# Patient Record
Sex: FEMALE | Race: WHITE | NOT HISPANIC OR LATINO | URBAN - METROPOLITAN AREA
[De-identification: names, ages, dates, MRNs, and addresses within clinical notes are randomized per-mention and may not be internally consistent; named-entity substitution may affect disease eponyms.]

---

## 2024-04-30 ENCOUNTER — OFFICE VISIT (OUTPATIENT)
Dept: PRIMARY CARE | Facility: CLINIC | Age: 59
End: 2024-04-30
Payer: MEDICARE

## 2024-04-30 ENCOUNTER — LAB (OUTPATIENT)
Dept: LAB | Facility: LAB | Age: 59
End: 2024-04-30
Payer: MEDICARE

## 2024-04-30 VITALS
WEIGHT: 199.4 LBS | BODY MASS INDEX: 36.7 KG/M2 | DIASTOLIC BLOOD PRESSURE: 78 MMHG | HEART RATE: 86 BPM | OXYGEN SATURATION: 91 % | SYSTOLIC BLOOD PRESSURE: 128 MMHG | HEIGHT: 62 IN

## 2024-04-30 DIAGNOSIS — F41.9 ANXIETY: ICD-10-CM

## 2024-04-30 DIAGNOSIS — F17.210 CIGARETTE NICOTINE DEPENDENCE WITHOUT COMPLICATION: ICD-10-CM

## 2024-04-30 DIAGNOSIS — Z00.00 WELLNESS EXAMINATION: ICD-10-CM

## 2024-04-30 DIAGNOSIS — J44.9 CHRONIC OBSTRUCTIVE PULMONARY DISEASE, UNSPECIFIED COPD TYPE (MULTI): ICD-10-CM

## 2024-04-30 DIAGNOSIS — E55.9 VITAMIN D DEFICIENCY: ICD-10-CM

## 2024-04-30 DIAGNOSIS — E11.9 TYPE 2 DIABETES MELLITUS WITHOUT COMPLICATION, WITHOUT LONG-TERM CURRENT USE OF INSULIN (MULTI): ICD-10-CM

## 2024-04-30 DIAGNOSIS — G47.00 INSOMNIA, UNSPECIFIED TYPE: ICD-10-CM

## 2024-04-30 DIAGNOSIS — Z12.11 SCREENING FOR COLON CANCER: ICD-10-CM

## 2024-04-30 DIAGNOSIS — E78.5 HYPERLIPIDEMIA, UNSPECIFIED HYPERLIPIDEMIA TYPE: ICD-10-CM

## 2024-04-30 DIAGNOSIS — K21.9 GASTROESOPHAGEAL REFLUX DISEASE, UNSPECIFIED WHETHER ESOPHAGITIS PRESENT: ICD-10-CM

## 2024-04-30 DIAGNOSIS — E11.9 TYPE 2 DIABETES MELLITUS WITHOUT COMPLICATION, WITHOUT LONG-TERM CURRENT USE OF INSULIN (MULTI): Primary | ICD-10-CM

## 2024-04-30 LAB
25(OH)D3 SERPL-MCNC: 19 NG/ML (ref 30–100)
ALBUMIN SERPL BCP-MCNC: 4.2 G/DL (ref 3.4–5)
ALP SERPL-CCNC: 61 U/L (ref 33–110)
ALT SERPL W P-5'-P-CCNC: 14 U/L (ref 7–45)
ANION GAP SERPL CALC-SCNC: 14 MMOL/L (ref 10–20)
AST SERPL W P-5'-P-CCNC: 11 U/L (ref 9–39)
BILIRUB SERPL-MCNC: 0.2 MG/DL (ref 0–1.2)
BUN SERPL-MCNC: 14 MG/DL (ref 6–23)
CALCIUM SERPL-MCNC: 9.4 MG/DL (ref 8.6–10.6)
CHLORIDE SERPL-SCNC: 103 MMOL/L (ref 98–107)
CHOLEST SERPL-MCNC: 148 MG/DL (ref 0–199)
CHOLESTEROL/HDL RATIO: 2.4
CO2 SERPL-SCNC: 29 MMOL/L (ref 21–32)
CREAT SERPL-MCNC: 0.79 MG/DL (ref 0.5–1.05)
CREAT UR-MCNC: 179.8 MG/DL (ref 20–320)
EGFRCR SERPLBLD CKD-EPI 2021: 86 ML/MIN/1.73M*2
ERYTHROCYTE [DISTWIDTH] IN BLOOD BY AUTOMATED COUNT: 14.6 % (ref 11.5–14.5)
EST. AVERAGE GLUCOSE BLD GHB EST-MCNC: 137 MG/DL
GLUCOSE SERPL-MCNC: 99 MG/DL (ref 74–99)
HBA1C MFR BLD: 6.4 %
HCT VFR BLD AUTO: 43.5 % (ref 36–46)
HDLC SERPL-MCNC: 61.2 MG/DL
HGB BLD-MCNC: 13.9 G/DL (ref 12–16)
LDLC SERPL CALC-MCNC: 64 MG/DL
MCH RBC QN AUTO: 27.3 PG (ref 26–34)
MCHC RBC AUTO-ENTMCNC: 32 G/DL (ref 32–36)
MCV RBC AUTO: 85 FL (ref 80–100)
MICROALBUMIN UR-MCNC: 8.9 MG/L
MICROALBUMIN/CREAT UR: 4.9 UG/MG CREAT
NON HDL CHOLESTEROL: 87 MG/DL (ref 0–149)
NRBC BLD-RTO: 0 /100 WBCS (ref 0–0)
PLATELET # BLD AUTO: 342 X10*3/UL (ref 150–450)
POTASSIUM SERPL-SCNC: 4.7 MMOL/L (ref 3.5–5.3)
PROT SERPL-MCNC: 6.5 G/DL (ref 6.4–8.2)
RBC # BLD AUTO: 5.1 X10*6/UL (ref 4–5.2)
SODIUM SERPL-SCNC: 141 MMOL/L (ref 136–145)
T4 FREE SERPL-MCNC: 1.11 NG/DL (ref 0.78–1.48)
TRIGL SERPL-MCNC: 114 MG/DL (ref 0–149)
TSH SERPL-ACNC: 5.01 MIU/L (ref 0.44–3.98)
VLDL: 23 MG/DL (ref 0–40)
WBC # BLD AUTO: 8.1 X10*3/UL (ref 4.4–11.3)

## 2024-04-30 PROCEDURE — 90715 TDAP VACCINE 7 YRS/> IM: CPT | Performed by: STUDENT IN AN ORGANIZED HEALTH CARE EDUCATION/TRAINING PROGRAM

## 2024-04-30 PROCEDURE — 3078F DIAST BP <80 MM HG: CPT | Performed by: STUDENT IN AN ORGANIZED HEALTH CARE EDUCATION/TRAINING PROGRAM

## 2024-04-30 PROCEDURE — 80053 COMPREHEN METABOLIC PANEL: CPT

## 2024-04-30 PROCEDURE — 82043 UR ALBUMIN QUANTITATIVE: CPT

## 2024-04-30 PROCEDURE — 80061 LIPID PANEL: CPT

## 2024-04-30 PROCEDURE — 82570 ASSAY OF URINE CREATININE: CPT

## 2024-04-30 PROCEDURE — 36415 COLL VENOUS BLD VENIPUNCTURE: CPT

## 2024-04-30 PROCEDURE — 99396 PREV VISIT EST AGE 40-64: CPT | Performed by: STUDENT IN AN ORGANIZED HEALTH CARE EDUCATION/TRAINING PROGRAM

## 2024-04-30 PROCEDURE — 90471 IMMUNIZATION ADMIN: CPT | Performed by: STUDENT IN AN ORGANIZED HEALTH CARE EDUCATION/TRAINING PROGRAM

## 2024-04-30 PROCEDURE — 3074F SYST BP LT 130 MM HG: CPT | Performed by: STUDENT IN AN ORGANIZED HEALTH CARE EDUCATION/TRAINING PROGRAM

## 2024-04-30 PROCEDURE — 85027 COMPLETE CBC AUTOMATED: CPT

## 2024-04-30 PROCEDURE — 1036F TOBACCO NON-USER: CPT | Performed by: STUDENT IN AN ORGANIZED HEALTH CARE EDUCATION/TRAINING PROGRAM

## 2024-04-30 PROCEDURE — 83036 HEMOGLOBIN GLYCOSYLATED A1C: CPT

## 2024-04-30 PROCEDURE — 82306 VITAMIN D 25 HYDROXY: CPT

## 2024-04-30 PROCEDURE — 84439 ASSAY OF FREE THYROXINE: CPT

## 2024-04-30 PROCEDURE — 99204 OFFICE O/P NEW MOD 45 MIN: CPT | Performed by: STUDENT IN AN ORGANIZED HEALTH CARE EDUCATION/TRAINING PROGRAM

## 2024-04-30 PROCEDURE — 84443 ASSAY THYROID STIM HORMONE: CPT

## 2024-04-30 RX ORDER — ROSUVASTATIN CALCIUM 10 MG/1
10 TABLET, COATED ORAL
COMMUNITY
Start: 2023-11-03 | End: 2024-04-30 | Stop reason: SDUPTHER

## 2024-04-30 RX ORDER — METFORMIN HYDROCHLORIDE 500 MG/1
500 TABLET, EXTENDED RELEASE ORAL 2 TIMES DAILY
Qty: 180 TABLET | Refills: 3 | Status: SHIPPED | OUTPATIENT
Start: 2024-04-30 | End: 2025-04-30

## 2024-04-30 RX ORDER — METFORMIN HYDROCHLORIDE 500 MG/1
500 TABLET, EXTENDED RELEASE ORAL 2 TIMES DAILY
COMMUNITY
End: 2024-04-30 | Stop reason: SDUPTHER

## 2024-04-30 RX ORDER — TRAZODONE HYDROCHLORIDE 50 MG/1
50 TABLET ORAL NIGHTLY
COMMUNITY
Start: 2024-04-18

## 2024-04-30 RX ORDER — BUPROPION HYDROCHLORIDE 75 MG/1
75 TABLET ORAL 3 TIMES DAILY
COMMUNITY
Start: 2024-04-18

## 2024-04-30 RX ORDER — ALPRAZOLAM 1 MG/1
1 TABLET ORAL 2 TIMES DAILY
COMMUNITY
Start: 2013-06-03

## 2024-04-30 RX ORDER — OMEPRAZOLE 40 MG/1
40 CAPSULE, DELAYED RELEASE ORAL
COMMUNITY
Start: 2023-11-03 | End: 2024-04-30 | Stop reason: SDUPTHER

## 2024-04-30 RX ORDER — ROSUVASTATIN CALCIUM 10 MG/1
10 TABLET, COATED ORAL
Qty: 90 TABLET | Refills: 3 | Status: SHIPPED | OUTPATIENT
Start: 2024-04-30 | End: 2025-04-30

## 2024-04-30 RX ORDER — ALBUTEROL SULFATE 90 UG/1
2 AEROSOL, METERED RESPIRATORY (INHALATION) EVERY 4 HOURS PRN
COMMUNITY
Start: 2014-01-30

## 2024-04-30 RX ORDER — CARBINOXAMINE MALEATE 4 MG/1
4 TABLET ORAL 2 TIMES DAILY
COMMUNITY
Start: 2024-04-17

## 2024-04-30 RX ORDER — OMEPRAZOLE 40 MG/1
40 CAPSULE, DELAYED RELEASE ORAL
Qty: 90 CAPSULE | Refills: 3 | Status: SHIPPED | OUTPATIENT
Start: 2024-04-30 | End: 2025-04-30

## 2024-04-30 RX ORDER — ESCITALOPRAM OXALATE 20 MG/1
20 TABLET ORAL
COMMUNITY

## 2024-04-30 RX ORDER — BUDESONIDE 0.5 MG/2ML
0.5 INHALANT ORAL
COMMUNITY
Start: 2012-11-26

## 2024-04-30 ASSESSMENT — PATIENT HEALTH QUESTIONNAIRE - PHQ9
2. FEELING DOWN, DEPRESSED OR HOPELESS: NOT AT ALL
1. LITTLE INTEREST OR PLEASURE IN DOING THINGS: NOT AT ALL
SUM OF ALL RESPONSES TO PHQ9 QUESTIONS 1 AND 2: 0

## 2024-04-30 ASSESSMENT — COLUMBIA-SUICIDE SEVERITY RATING SCALE - C-SSRS
1. IN THE PAST MONTH, HAVE YOU WISHED YOU WERE DEAD OR WISHED YOU COULD GO TO SLEEP AND NOT WAKE UP?: NO
6. HAVE YOU EVER DONE ANYTHING, STARTED TO DO ANYTHING, OR PREPARED TO DO ANYTHING TO END YOUR LIFE?: NO
2. HAVE YOU ACTUALLY HAD ANY THOUGHTS OF KILLING YOURSELF?: NO

## 2024-04-30 ASSESSMENT — PAIN SCALES - GENERAL: PAINLEVEL: 0-NO PAIN

## 2024-04-30 NOTE — PROGRESS NOTES
"Subjective   Reason for Visit: Minal Sun is an 58 y.o. female here for a Medicare Wellness visit.               HPI    Patient Care Team:  Mika Morgan MD as PCP - General (Internal Medicine)  Mika Morgan MD     Review of Systems    Objective   Vitals:  /78   Pulse 86   Ht 1.575 m (5' 2\")   Wt 90.4 kg (199 lb 6.4 oz)   SpO2 91%   BMI 36.47 kg/m²       Physical Exam    Assessment/Plan   Problem List Items Addressed This Visit    None           HPI: 58-year-old female presenting to establish care, follow-up on multiple concerns, CPE.  Doing relatively well without any significant new concerns or interval changes.    DMII  Stable, tolerates current regimen well.    HLD  Stable, tolerates current regimen well.    GERD  Stable, tolerates current regimen well.    COPD  Stable, tolerates current regimen well.  Following with allergist, no pulmonologist.    Insomnia, anxiety  Stable, tolerates current regimen well.  Following with psychiatry who manages medications.    SocHx:   - Smoking: About 40 pack years, quit 3 years ago    Denies HA, changes in vision, chest pain, SOB/ALFREDO, palpitations, N/V/D/C, dysuria/hematuria, hematochezia/melena, paresthesias, LE edema.    12 point ROS reviewed and negative other than as stated in HPI      General: Alert, oriented, pleasant, in no acute distress  HEENT:      Head: normocephalic, atraumatic;      eyes: EOMI, no scleral icterus;   Neck: soft, supple, non-tender, no masses appreciated  CV: Heart with regular rate and rhythm, normal S1/S2, no murmurs  Lungs: CTAB without wheezing, rhonchi or rales; good respiratory effort, no increased work of breathing  Abdomen: soft, non-tender, non-distended, no masses appreciated  Extremities: no edema, no cyanosis  MSK: ROM of upper and lower extremities intact; strength 5/5 upper and lower extremities  Neuro: Cranial nerves grossly intact; alert and oriented, normal gait  Psych: Appropriate mood and affect    # " HM  -CBC, CMP, Lipid panel, Vit D, TSH with reflex T4  -Vaccines:       Flu: Out of season      Shingrix: Recommended      Pneumococcal: UTD, next shot at 64      Tdap: IO  -Rajendra w/ elisabeth: 12/2023  -Last PAP: Follows with GYN  -Lung cancer screening with low-dose CT:  about 40 pack years, quit 3 years ago, screening ordered  -Colonoscopy: 2016, 5-year plan, screening ordered  -Osteoporosis screening: At 64    #DMII  -Last A1c 6.4  - Currently on metformin  mg twice daily  - Diabetic eye exam: Ophthalmology referral given  - Diabetic foot exam negative in office  -Plan to add GLP-1, [facility] pharmacist consulted to initiate  - Repeat A1c, albumin    #HLD  -Continue rosuvastatin 10 mg daily  - Repeat lipid panel    #GERD  -Continue omeprazole 40 mg daily    #COPD  -Continue Pulmicort twice daily, albuterol as needed    #Insomnia #anxiety  -Currently on Wellbutrin 75 mg 3 times daily, Lexapro 20 mg daily, alprazolam 1 mg as needed    F/U 6 months, sooner if indicated    Chris D'Amico, DO

## 2024-05-01 DIAGNOSIS — E11.9 TYPE 2 DIABETES MELLITUS WITHOUT COMPLICATION, WITHOUT LONG-TERM CURRENT USE OF INSULIN (MULTI): Primary | ICD-10-CM

## 2024-05-01 NOTE — RESULT ENCOUNTER NOTE
Hemoglobin A1c at 6.4, continue with changes discussed in office.    TSH is borderline high with normal T4, considered subclinical hypothyroidism, has been normal in past labs, will reevaluate at next blood draw, no medication at this time.    Vitamin D mildly low at 19, recommend OTC vitamin D3, 2000 units daily.    Remaining labs unremarkable.

## 2024-05-03 ENCOUNTER — APPOINTMENT (OUTPATIENT)
Dept: PHARMACY | Facility: HOSPITAL | Age: 59
End: 2024-05-03
Payer: MEDICARE

## 2024-05-06 ENCOUNTER — APPOINTMENT (OUTPATIENT)
Dept: PHARMACY | Facility: HOSPITAL | Age: 59
End: 2024-05-06
Payer: MEDICARE

## 2024-05-23 ENCOUNTER — TELEMEDICINE (OUTPATIENT)
Dept: PHARMACY | Facility: HOSPITAL | Age: 59
End: 2024-05-23
Payer: MEDICARE

## 2024-05-23 ENCOUNTER — SPECIALTY PHARMACY (OUTPATIENT)
Dept: PHARMACY | Facility: CLINIC | Age: 59
End: 2024-05-23

## 2024-05-23 DIAGNOSIS — E11.9 TYPE 2 DIABETES MELLITUS WITHOUT COMPLICATION, WITHOUT LONG-TERM CURRENT USE OF INSULIN (MULTI): Primary | ICD-10-CM

## 2024-05-23 PROCEDURE — RXMED WILLOW AMBULATORY MEDICATION CHARGE

## 2024-05-23 RX ORDER — TIRZEPATIDE 2.5 MG/.5ML
2.5 INJECTION, SOLUTION SUBCUTANEOUS
Qty: 2 ML | Refills: 2 | Status: SHIPPED | OUTPATIENT
Start: 2024-05-26

## 2024-05-23 NOTE — PROGRESS NOTES
"Pharmacist Clinic: Diabetes Management  Minal Sun is a 58 y.o. female was referred to Clinical Pharmacy Team for diabetes management.     Referring Provider: Christopher D'Amico, DO     HISTORY OF PRESENT ILLNESS  Patient was diagnosed with diabetes in June of 2023, she has a large family history of T2DM    Diet: patient reports she knows she does not eat \"the best\"   - diet is inconsistent, some days first food will not be unitl mid day, it might be a sandwhich  - patient reports her weakness is treats, she loves brownies, cookies, cake, etc.     LAB REVIEW   Glucose (mg/dL)   Date Value   04/30/2024 99   02/17/2020 102 (H)   11/05/2019 101 (H)   10/07/2017 136 (H)     Hemoglobin A1C (%)   Date Value   04/30/2024 6.4 (H)   12/13/2023 6.4 (H)   10/02/2023 6.4 (H)   06/09/2023 6.5 (H)     Bicarbonate (mmol/L)   Date Value   04/30/2024 29   02/17/2020 28   11/05/2019 30   10/07/2017 27     Urea Nitrogen (mg/dL)   Date Value   04/30/2024 14   02/17/2020 9   11/05/2019 12   10/07/2017 12     Creatinine (mg/dL)   Date Value   04/30/2024 0.79   02/17/2020 0.63   11/05/2019 0.75   10/07/2017 0.78     Lab Results   Component Value Date    HGBA1C 6.4 (H) 04/30/2024    HGBA1C 6.4 (H) 12/13/2023    HGBA1C 6.4 (H) 10/02/2023     Lab Results   Component Value Date    CHOL 148 04/30/2024    CHOL 155 11/05/2019     Lab Results   Component Value Date    HDL 61.2 04/30/2024    HDL 53.1 11/05/2019     Lab Results   Component Value Date    LDLCALC 64 04/30/2024     Lab Results   Component Value Date    TRIG 114 04/30/2024    TRIG 159 (H) 11/05/2019       DIABETES ASSESSMENT    CURRENT PHARMACOTHERAPY  - metformin  mg twice daily     SECONDARY PREVENTION  - Statin? Yes  - ACE-I/ARB? No    DISCUSSION:   Spoke with patient about her most recent A1c, it is at goal however we would like to start a medication to assist with weight loss in addition to glucose lowering   Discussed options alternative pharmacotherapy: "   Discussed with patient a once weekly injection that would also help with weight loss   Went over mounjaro 2.5 mg once weekly   Counseled patient on MOA, expectations, side effects, etc   Answered all questions and concerns     RECOMMENDATIONS/PLAN  1. Patients diabetes is stable with most recent A1c of 6.4 % (goal < 7 %).   - Continue all meds under the continuation of care with the referring provider and clinical pharmacy team.  - Initiate mounjaro 2.5 mg oncw weekly.     Clinical Pharmacist follow up: 1 week     Thank you,  Shea High, PharmD    Verbal consent to manage patient's drug therapy was obtained from the patient. They were informed they may decline to participate or withdraw from participation in pharmacy services at any time.

## 2024-05-28 ENCOUNTER — PHARMACY VISIT (OUTPATIENT)
Dept: PHARMACY | Facility: CLINIC | Age: 59
End: 2024-05-28
Payer: COMMERCIAL

## 2024-05-28 ENCOUNTER — APPOINTMENT (OUTPATIENT)
Dept: PHARMACY | Facility: HOSPITAL | Age: 59
End: 2024-05-28
Payer: MEDICARE

## 2024-05-28 ENCOUNTER — TELEMEDICINE (OUTPATIENT)
Dept: PHARMACY | Facility: HOSPITAL | Age: 59
End: 2024-05-28
Payer: MEDICARE

## 2024-05-28 DIAGNOSIS — E11.9 TYPE 2 DIABETES MELLITUS WITHOUT COMPLICATION, WITHOUT LONG-TERM CURRENT USE OF INSULIN (MULTI): Primary | ICD-10-CM

## 2024-05-28 NOTE — PROGRESS NOTES
"Pharmacist Clinic: Diabetes Management  Minal Sun is a 58 y.o. female was referred to Clinical Pharmacy Team for diabetes management.     Referring Provider: Christopher D'Amico, DO     HISTORY OF PRESENT ILLNESS  Patient was diagnosed with diabetes in June of 2023, she has a large family history of T2DM    Diet: patient reports she knows she does not eat \"the best\"   - diet is inconsistent, some days first food will not be unitl mid day, it might be a sandwhich  - patient reports her weakness is treats, she loves brownies, cookies, cake, etc.     LAB REVIEW   Glucose (mg/dL)   Date Value   04/30/2024 99   02/17/2020 102 (H)   11/05/2019 101 (H)   10/07/2017 136 (H)     Hemoglobin A1C (%)   Date Value   04/30/2024 6.4 (H)   12/13/2023 6.4 (H)   10/02/2023 6.4 (H)   06/09/2023 6.5 (H)     Bicarbonate (mmol/L)   Date Value   04/30/2024 29   02/17/2020 28   11/05/2019 30   10/07/2017 27     Urea Nitrogen (mg/dL)   Date Value   04/30/2024 14   02/17/2020 9   11/05/2019 12   10/07/2017 12     Creatinine (mg/dL)   Date Value   04/30/2024 0.79   02/17/2020 0.63   11/05/2019 0.75   10/07/2017 0.78     Lab Results   Component Value Date    HGBA1C 6.4 (H) 04/30/2024    HGBA1C 6.4 (H) 12/13/2023    HGBA1C 6.4 (H) 10/02/2023     Lab Results   Component Value Date    CHOL 148 04/30/2024    CHOL 155 11/05/2019     Lab Results   Component Value Date    HDL 61.2 04/30/2024    HDL 53.1 11/05/2019     Lab Results   Component Value Date    LDLCALC 64 04/30/2024     Lab Results   Component Value Date    TRIG 114 04/30/2024    TRIG 159 (H) 11/05/2019       DIABETES ASSESSMENT    CURRENT PHARMACOTHERAPY  - metformin  mg twice daily     SECONDARY PREVENTION  - Statin? Yes  - ACE-I/ARB? No    DISCUSSION:   Spoke with patient about her most recent A1c, it is at goal however we would like to start a medication to assist with weight loss in addition to glucose lowering   Discussed options alternative pharmacotherapy: "   Discussed with patient a once weekly injection that would also help with weight loss   Went over mounjaro 2.5 mg once weekly   Counseled patient on MOA, expectations, side effects, etc   Answered all questions and concerns     RECOMMENDATIONS/PLAN  1. Patients diabetes is stable with most recent A1c of 6.4 % (goal < 7 %).   - Continue all meds under the continuation of care with the referring provider and clinical pharmacy team.  - Initiate mounjaro 2.5 mg once weekly.     Clinical Pharmacist follow up: 2 weeks    PAP Approval: 5/25/24    Thank you,  Shea High, PharmD    Verbal consent to manage patient's drug therapy was obtained from the patient. They were informed they may decline to participate or withdraw from participation in pharmacy services at any time.

## 2024-06-11 ENCOUNTER — TELEMEDICINE (OUTPATIENT)
Dept: PHARMACY | Facility: HOSPITAL | Age: 59
End: 2024-06-11
Payer: MEDICARE

## 2024-06-11 DIAGNOSIS — E11.9 TYPE 2 DIABETES MELLITUS WITHOUT COMPLICATION, WITHOUT LONG-TERM CURRENT USE OF INSULIN (MULTI): Primary | ICD-10-CM

## 2024-06-11 NOTE — PROGRESS NOTES
"Pharmacist Clinic: Diabetes Management  Minal Sun is a 58 y.o. female was referred to Clinical Pharmacy Team for diabetes management.     Referring Provider: Christopher D'Amico, DO     HISTORY OF PRESENT ILLNESS  Patient was diagnosed with diabetes in June of 2023, she has a large family history of T2DM    Diet: patient reports she knows she does not eat \"the best\"   - diet is inconsistent, some days first food will not be unitl mid day, it might be a sandwhich  - patient reports her weakness is treats, she loves brownies, cookies, cake, etc.     LAB REVIEW   Glucose (mg/dL)   Date Value   04/30/2024 99   02/17/2020 102 (H)   11/05/2019 101 (H)   10/07/2017 136 (H)     Hemoglobin A1C (%)   Date Value   04/30/2024 6.4 (H)   12/13/2023 6.4 (H)   10/02/2023 6.4 (H)   06/09/2023 6.5 (H)     Bicarbonate (mmol/L)   Date Value   04/30/2024 29   02/17/2020 28   11/05/2019 30   10/07/2017 27     Urea Nitrogen (mg/dL)   Date Value   04/30/2024 14   02/17/2020 9   11/05/2019 12   10/07/2017 12     Creatinine (mg/dL)   Date Value   04/30/2024 0.79   02/17/2020 0.63   11/05/2019 0.75   10/07/2017 0.78     Lab Results   Component Value Date    HGBA1C 6.4 (H) 04/30/2024    HGBA1C 6.4 (H) 12/13/2023    HGBA1C 6.4 (H) 10/02/2023     Lab Results   Component Value Date    CHOL 148 04/30/2024    CHOL 155 11/05/2019     Lab Results   Component Value Date    HDL 61.2 04/30/2024    HDL 53.1 11/05/2019     Lab Results   Component Value Date    LDLCALC 64 04/30/2024     Lab Results   Component Value Date    TRIG 114 04/30/2024    TRIG 159 (H) 11/05/2019       DIABETES ASSESSMENT    CURRENT PHARMACOTHERAPY  - metformin  mg twice daily   - mounjaro 2.5 mg once weekly     SECONDARY PREVENTION  - Statin? Yes  - ACE-I/ARB? No    DISCUSSION:   Patient started mounjaro 2.5 mg:   She reports nausea but it has gotten significanlty better since starting the medication   She reports constipation, but she also has been eating " significantly less   Counseled patient on miralax use, answered all questions and concerns   Discussed we will continue on current dose, and reassess in 1 month to see if side effects have subsided     RECOMMENDATIONS/PLAN  1. Patients diabetes is stable with most recent A1c of 6.4 % (goal < 7 %).   - Continue all meds under the continuation of care with the referring provider and clinical pharmacy team.    Clinical Pharmacist follow up: 5 weeks   [facility] PAP Approval: 5/25/24    Thank you,  Shea High, PharmD    Verbal consent to manage patient's drug therapy was obtained from the patient. They were informed they may decline to participate or withdraw from participation in pharmacy services at any time.

## 2024-06-17 ENCOUNTER — PHARMACY VISIT (OUTPATIENT)
Dept: PHARMACY | Facility: CLINIC | Age: 59
End: 2024-06-17
Payer: COMMERCIAL

## 2024-06-17 PROCEDURE — RXMED WILLOW AMBULATORY MEDICATION CHARGE

## 2024-07-15 ENCOUNTER — TELEMEDICINE (OUTPATIENT)
Dept: PHARMACY | Facility: HOSPITAL | Age: 59
End: 2024-07-15
Payer: MEDICARE

## 2024-07-15 DIAGNOSIS — E11.9 TYPE 2 DIABETES MELLITUS WITHOUT COMPLICATION, WITHOUT LONG-TERM CURRENT USE OF INSULIN (MULTI): Primary | ICD-10-CM

## 2024-07-15 PROCEDURE — RXMED WILLOW AMBULATORY MEDICATION CHARGE

## 2024-07-15 RX ORDER — TIRZEPATIDE 5 MG/.5ML
5 INJECTION, SOLUTION SUBCUTANEOUS
Qty: 2 ML | Refills: 2 | Status: SHIPPED | OUTPATIENT
Start: 2024-07-21

## 2024-07-15 NOTE — PROGRESS NOTES
"Pharmacist Clinic: Diabetes Management  Minal Sun is a 58 y.o. female was referred to Clinical Pharmacy Team for diabetes management.     Referring Provider: Christopher D'Amico, DO     HISTORY OF PRESENT ILLNESS  Patient was diagnosed with diabetes in June of 2023, she has a large family history of T2DM    Diet: patient reports she knows she does not eat \"the best\"   - diet is inconsistent, some days first food will not be unitl mid day, it might be a sandwhich  - patient reports her weakness is treats, she loves brownies, cookies, cake, etc.     LAB REVIEW   Glucose (mg/dL)   Date Value   04/30/2024 99   02/17/2020 102 (H)   11/05/2019 101 (H)   10/07/2017 136 (H)     Hemoglobin A1C (%)   Date Value   04/30/2024 6.4 (H)   12/13/2023 6.4 (H)   10/02/2023 6.4 (H)   06/09/2023 6.5 (H)     Bicarbonate (mmol/L)   Date Value   04/30/2024 29   02/17/2020 28   11/05/2019 30   10/07/2017 27     Urea Nitrogen (mg/dL)   Date Value   04/30/2024 14   02/17/2020 9   11/05/2019 12   10/07/2017 12     Creatinine (mg/dL)   Date Value   04/30/2024 0.79   02/17/2020 0.63   11/05/2019 0.75   10/07/2017 0.78     Lab Results   Component Value Date    HGBA1C 6.4 (H) 04/30/2024    HGBA1C 6.4 (H) 12/13/2023    HGBA1C 6.4 (H) 10/02/2023     Lab Results   Component Value Date    CHOL 148 04/30/2024    CHOL 155 11/05/2019     Lab Results   Component Value Date    HDL 61.2 04/30/2024    HDL 53.1 11/05/2019     Lab Results   Component Value Date    LDLCALC 64 04/30/2024     Lab Results   Component Value Date    TRIG 114 04/30/2024    TRIG 159 (H) 11/05/2019       DIABETES ASSESSMENT    CURRENT PHARMACOTHERAPY  - metformin  mg twice daily   - mounjaro 2.5 mg once weekly     SECONDARY PREVENTION  - Statin? Yes  - ACE-I/ARB? No    DISCUSSION:   Patient is tolerating mounjaro 2.5 mg:   She denies any GI side effects since starting the medication  She has been focusing on healthy eating to avoid any GI side effects  Discussed " increasing the dose as patient reports her appetite has increased   Counseled patient, answered all questions and concerns     RECOMMENDATIONS/PLAN  1. Patients diabetes is stable with most recent A1c of 6.4 % (goal < 7 %).   - Continue all meds under the continuation of care with the referring provider and clinical pharmacy team.  - Increase mounjaro to 5 mg under the skin once weekly.     Clinical Pharmacist follow up: 2 weeks    PAP Approval: 5/25/24    Thank you,  Shea High, PharmD    Verbal consent to manage patient's drug therapy was obtained from the patient. They were informed they may decline to participate or withdraw from participation in pharmacy services at any time.

## 2024-07-16 ENCOUNTER — PHARMACY VISIT (OUTPATIENT)
Dept: PHARMACY | Facility: CLINIC | Age: 59
End: 2024-07-16
Payer: COMMERCIAL

## 2024-07-16 ENCOUNTER — APPOINTMENT (OUTPATIENT)
Dept: PHARMACY | Facility: HOSPITAL | Age: 59
End: 2024-07-16
Payer: MEDICARE

## 2024-07-29 ENCOUNTER — APPOINTMENT (OUTPATIENT)
Dept: PHARMACY | Facility: HOSPITAL | Age: 59
End: 2024-07-29
Payer: MEDICARE

## 2024-08-05 ENCOUNTER — APPOINTMENT (OUTPATIENT)
Dept: PHARMACY | Facility: HOSPITAL | Age: 59
End: 2024-08-05
Payer: MEDICARE

## 2024-08-05 DIAGNOSIS — E11.9 TYPE 2 DIABETES MELLITUS WITHOUT COMPLICATION, WITHOUT LONG-TERM CURRENT USE OF INSULIN (MULTI): Primary | ICD-10-CM

## 2024-08-05 NOTE — PROGRESS NOTES
"Pharmacist Clinic: Diabetes Management  Minal Sun is a 58 y.o. female was referred to Clinical Pharmacy Team for diabetes management.     Referring Provider: Christopher D'Amico, DO     HISTORY OF PRESENT ILLNESS  Patient was diagnosed with diabetes in June of 2023, she has a large family history of T2DM    Diet: patient reports she knows she does not eat \"the best\"   - diet is inconsistent, some days first food will not be unitl mid day, it might be a sandwhich  - patient reports her weakness is treats, she loves brownies, cookies, cake, etc.     LAB REVIEW   Glucose (mg/dL)   Date Value   04/30/2024 99   02/17/2020 102 (H)   11/05/2019 101 (H)   10/07/2017 136 (H)     Hemoglobin A1C (%)   Date Value   04/30/2024 6.4 (H)   12/13/2023 6.4 (H)   10/02/2023 6.4 (H)   06/09/2023 6.5 (H)     Bicarbonate (mmol/L)   Date Value   04/30/2024 29   02/17/2020 28   11/05/2019 30   10/07/2017 27     Urea Nitrogen (mg/dL)   Date Value   04/30/2024 14   02/17/2020 9   11/05/2019 12   10/07/2017 12     Creatinine (mg/dL)   Date Value   04/30/2024 0.79   02/17/2020 0.63   11/05/2019 0.75   10/07/2017 0.78     Lab Results   Component Value Date    HGBA1C 6.4 (H) 04/30/2024    HGBA1C 6.4 (H) 12/13/2023    HGBA1C 6.4 (H) 10/02/2023     Lab Results   Component Value Date    CHOL 148 04/30/2024    CHOL 155 11/05/2019     Lab Results   Component Value Date    HDL 61.2 04/30/2024    HDL 53.1 11/05/2019     Lab Results   Component Value Date    LDLCALC 64 04/30/2024     Lab Results   Component Value Date    TRIG 114 04/30/2024    TRIG 159 (H) 11/05/2019       DIABETES ASSESSMENT    CURRENT PHARMACOTHERAPY  - metformin  mg twice daily   - mounjaro 5 mg once weekly     SECONDARY PREVENTION  - Statin? Yes  - ACE-I/ARB? No    DISCUSSION:   Patient is tolerating mounjaro 5 mg:   She reports mild nausea, however she reports it gets better as her week goes on   Discussed she does not feel like she has lost any weight, she denies " any change in the way her clothes are fitting   Continue on the same dose for 1 more month, we will consider after next PCP appt     RECOMMENDATIONS/PLAN  1. Patients diabetes is stable with most recent A1c of 6.4 % (goal < 7 %).   - Continue all meds under the continuation of care with the referring provider and clinical pharmacy team.    Clinical Pharmacist follow up: 4 weeks    PAP Approval: 5/25/24    Thank you,  Shea High, PharmD    Verbal consent to manage patient's drug therapy was obtained from the patient. They were informed they may decline to participate or withdraw from participation in pharmacy services at any time.

## 2024-08-13 PROCEDURE — RXMED WILLOW AMBULATORY MEDICATION CHARGE

## 2024-08-14 ENCOUNTER — PHARMACY VISIT (OUTPATIENT)
Dept: PHARMACY | Facility: CLINIC | Age: 59
End: 2024-08-14
Payer: COMMERCIAL

## 2024-08-21 ENCOUNTER — APPOINTMENT (OUTPATIENT)
Dept: PHARMACY | Facility: HOSPITAL | Age: 59
End: 2024-08-21
Payer: MEDICARE

## 2024-08-26 ENCOUNTER — APPOINTMENT (OUTPATIENT)
Dept: PHARMACY | Facility: HOSPITAL | Age: 59
End: 2024-08-26
Payer: MEDICARE

## 2024-08-26 DIAGNOSIS — E11.9 TYPE 2 DIABETES MELLITUS WITHOUT COMPLICATION, WITHOUT LONG-TERM CURRENT USE OF INSULIN (MULTI): Primary | ICD-10-CM

## 2024-08-26 NOTE — PROGRESS NOTES
"Pharmacist Clinic: Diabetes Management  Minal Sun is a 59 y.o. female was referred to Clinical Pharmacy Team for diabetes management.     Referring Provider: Christopher D'Amico, DO     HISTORY OF PRESENT ILLNESS  Patient was diagnosed with diabetes in June of 2023, she has a large family history of T2DM    Diet: patient reports she knows she does not eat \"the best\"   - diet is inconsistent, some days first food will not be unitl mid day, it might be a sandwhich  - patient reports her weakness is treats, she loves brownies, cookies, cake, etc.     LAB REVIEW   Glucose (mg/dL)   Date Value   04/30/2024 99   02/17/2020 102 (H)   11/05/2019 101 (H)   10/07/2017 136 (H)     Hemoglobin A1C (%)   Date Value   04/30/2024 6.4 (H)   12/13/2023 6.4 (H)   10/02/2023 6.4 (H)   06/09/2023 6.5 (H)     Bicarbonate (mmol/L)   Date Value   04/30/2024 29   02/17/2020 28   11/05/2019 30   10/07/2017 27     Urea Nitrogen (mg/dL)   Date Value   04/30/2024 14   02/17/2020 9   11/05/2019 12   10/07/2017 12     Creatinine (mg/dL)   Date Value   04/30/2024 0.79   02/17/2020 0.63   11/05/2019 0.75   10/07/2017 0.78     Lab Results   Component Value Date    HGBA1C 6.4 (H) 04/30/2024    HGBA1C 6.4 (H) 12/13/2023    HGBA1C 6.4 (H) 10/02/2023     Lab Results   Component Value Date    CHOL 148 04/30/2024    CHOL 155 11/05/2019     Lab Results   Component Value Date    HDL 61.2 04/30/2024    HDL 53.1 11/05/2019     Lab Results   Component Value Date    LDLCALC 64 04/30/2024     Lab Results   Component Value Date    TRIG 114 04/30/2024    TRIG 159 (H) 11/05/2019       DIABETES ASSESSMENT    CURRENT PHARMACOTHERAPY  - metformin  mg twice daily   - mounjaro 5 mg once weekly     SECONDARY PREVENTION  - Statin? Yes  - ACE-I/ARB? No    DISCUSSION:   Patient is tolerating mounjaro 5 mg:   She reports mild nausea, however she reports it gets better as her week goes on   Discussed she does not feel like she has lost any weight, she denies " any change in the way her clothes are fitting   Continue on the same dose for 1 more month, we will consider after next PCP appt     RECOMMENDATIONS/PLAN  1. Patients diabetes is stable with most recent A1c of 6.4 % (goal < 7 %).   - Continue all meds under the continuation of care with the referring provider and clinical pharmacy team.    Clinical Pharmacist follow up: 4 weeks    PAP Approval: 5/25/24    Thank you,  Shea High, PharmD    Verbal consent to manage patient's drug therapy was obtained from the patient. They were informed they may decline to participate or withdraw from participation in pharmacy services at any time.

## 2024-09-03 ENCOUNTER — APPOINTMENT (OUTPATIENT)
Dept: PRIMARY CARE | Facility: CLINIC | Age: 59
End: 2024-09-03
Payer: MEDICARE

## 2024-09-03 VITALS
HEIGHT: 62 IN | HEART RATE: 81 BPM | DIASTOLIC BLOOD PRESSURE: 87 MMHG | OXYGEN SATURATION: 95 % | SYSTOLIC BLOOD PRESSURE: 138 MMHG | BODY MASS INDEX: 33.13 KG/M2 | WEIGHT: 180 LBS

## 2024-09-03 DIAGNOSIS — G47.00 INSOMNIA, UNSPECIFIED TYPE: ICD-10-CM

## 2024-09-03 DIAGNOSIS — K21.9 GASTROESOPHAGEAL REFLUX DISEASE, UNSPECIFIED WHETHER ESOPHAGITIS PRESENT: ICD-10-CM

## 2024-09-03 DIAGNOSIS — J44.9 CHRONIC OBSTRUCTIVE PULMONARY DISEASE, UNSPECIFIED COPD TYPE (MULTI): ICD-10-CM

## 2024-09-03 DIAGNOSIS — E78.5 HYPERLIPIDEMIA, UNSPECIFIED HYPERLIPIDEMIA TYPE: ICD-10-CM

## 2024-09-03 DIAGNOSIS — F41.9 ANXIETY: ICD-10-CM

## 2024-09-03 DIAGNOSIS — E11.9 TYPE 2 DIABETES MELLITUS WITHOUT COMPLICATION, WITHOUT LONG-TERM CURRENT USE OF INSULIN (MULTI): Primary | ICD-10-CM

## 2024-09-03 DIAGNOSIS — R05.8 PRODUCTIVE COUGH: ICD-10-CM

## 2024-09-03 PROBLEM — H02.531 EYELID RETRACTION RIGHT UPPER EYELID: Status: ACTIVE | Noted: 2024-09-03

## 2024-09-03 PROBLEM — E05.00: Status: ACTIVE | Noted: 2022-04-21

## 2024-09-03 PROBLEM — H05.20 PROPTOSIS: Status: ACTIVE | Noted: 2024-09-03

## 2024-09-03 PROBLEM — F17.200 CURRENT SMOKER: Status: ACTIVE | Noted: 2020-10-20

## 2024-09-03 PROBLEM — J01.90 ACUTE BACTERIAL SINUSITIS: Status: ACTIVE | Noted: 2024-09-03

## 2024-09-03 PROBLEM — M76.60 ACHILLES TENDON PAIN: Status: ACTIVE | Noted: 2024-09-03

## 2024-09-03 PROBLEM — M89.9 DISORDER OF BONE AND ARTICULAR CARTILAGE: Status: ACTIVE | Noted: 2024-09-03

## 2024-09-03 PROBLEM — R71.8 ELEVATED HEMATOCRIT: Status: ACTIVE | Noted: 2024-09-03

## 2024-09-03 PROBLEM — E78.2 MIXED HYPERLIPIDEMIA: Status: ACTIVE | Noted: 2024-09-03

## 2024-09-03 PROBLEM — L21.9 SEBORRHEIC DERMATITIS: Status: ACTIVE | Noted: 2024-09-03

## 2024-09-03 PROBLEM — B96.89 ACUTE BACTERIAL SINUSITIS: Status: ACTIVE | Noted: 2024-09-03

## 2024-09-03 PROBLEM — H57.89 THYROID EYE DISEASE: Status: ACTIVE | Noted: 2024-09-03

## 2024-09-03 PROBLEM — H50.21 HYPOTROPIA OF RIGHT EYE: Status: ACTIVE | Noted: 2024-09-03

## 2024-09-03 PROBLEM — H10.9 CONJUNCTIVITIS OF RIGHT EYE: Status: ACTIVE | Noted: 2024-09-03

## 2024-09-03 PROBLEM — R30.0 DYSURIA: Status: ACTIVE | Noted: 2024-09-03

## 2024-09-03 PROBLEM — M67.431 GANGLION OF RIGHT WRIST: Status: ACTIVE | Noted: 2024-09-03

## 2024-09-03 PROBLEM — H52.31 ANISOMETROPIA: Status: ACTIVE | Noted: 2022-04-21

## 2024-09-03 PROBLEM — J45.909 ASTHMA (HHS-HCC): Status: ACTIVE | Noted: 2024-09-03

## 2024-09-03 PROBLEM — H02.532 EYELID RETRACTION RIGHT LOWER EYELID: Status: ACTIVE | Noted: 2024-09-03

## 2024-09-03 PROBLEM — E55.9 VITAMIN D DEFICIENCY: Status: ACTIVE | Noted: 2024-09-03
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
## 2024-09-03 PROBLEM — H05.821: Status: ACTIVE | Noted: 2024-09-03

## 2024-09-03 PROBLEM — M75.42 IMPINGEMENT SYNDROME OF LEFT SHOULDER: Status: ACTIVE | Noted: 2024-09-03

## 2024-09-03 PROBLEM — M25.519 SHOULDER PAIN: Status: ACTIVE | Noted: 2024-09-03

## 2024-09-03 PROBLEM — J44.89 ASTHMA-COPD OVERLAP SYNDROME (MULTI): Status: ACTIVE | Noted: 2020-10-20

## 2024-09-03 PROBLEM — E05.00 THYROTOXICOSIS WITH DIFFUSE GOITER WITHOUT THYROTOXIC CRISIS OR STORM: Status: ACTIVE | Noted: 2017-11-12

## 2024-09-03 PROBLEM — H50.22 HYPERTROPIA OF LEFT EYE: Status: ACTIVE | Noted: 2022-04-21

## 2024-09-03 PROBLEM — J30.2 SEASONAL ALLERGIC RHINITIS: Status: ACTIVE | Noted: 2020-10-20

## 2024-09-03 PROBLEM — M94.9 DISORDER OF BONE AND ARTICULAR CARTILAGE: Status: ACTIVE | Noted: 2024-09-03

## 2024-09-03 PROBLEM — R07.89 ATYPICAL CHEST PAIN: Status: ACTIVE | Noted: 2024-09-03

## 2024-09-03 PROBLEM — K59.09 CHRONIC CONSTIPATION: Status: ACTIVE | Noted: 2024-09-03

## 2024-09-03 PROBLEM — R73.03 PREDIABETES: Status: ACTIVE | Noted: 2024-09-03

## 2024-09-03 PROBLEM — R73.01 IMPAIRED FASTING GLUCOSE: Status: ACTIVE | Noted: 2024-09-03

## 2024-09-03 PROBLEM — H53.2 DIPLOPIA: Status: ACTIVE | Noted: 2024-09-03

## 2024-09-03 PROBLEM — L50.0 ALLERGIC URTICARIA: Status: ACTIVE | Noted: 2024-09-03

## 2024-09-03 PROBLEM — E66.9 OBESITY: Status: ACTIVE | Noted: 2024-09-03

## 2024-09-03 PROBLEM — M25.50 ARTHRALGIA: Status: ACTIVE | Noted: 2024-09-03

## 2024-09-03 PROBLEM — R03.0 ELEVATED BLOOD PRESSURE READING: Status: ACTIVE | Noted: 2024-09-03

## 2024-09-03 PROBLEM — R11.0 NAUSEA: Status: ACTIVE | Noted: 2024-09-03

## 2024-09-03 PROBLEM — E07.9 THYROID EYE DISEASE: Status: ACTIVE | Noted: 2024-09-03

## 2024-09-03 PROBLEM — E07.9: Status: ACTIVE | Noted: 2024-09-03

## 2024-09-03 PROBLEM — R23.2 HOT FLASHES: Status: ACTIVE | Noted: 2024-09-03

## 2024-09-03 PROCEDURE — 3061F NEG MICROALBUMINURIA REV: CPT | Performed by: STUDENT IN AN ORGANIZED HEALTH CARE EDUCATION/TRAINING PROGRAM

## 2024-09-03 PROCEDURE — 99214 OFFICE O/P EST MOD 30 MIN: CPT | Performed by: STUDENT IN AN ORGANIZED HEALTH CARE EDUCATION/TRAINING PROGRAM

## 2024-09-03 PROCEDURE — 3044F HG A1C LEVEL LT 7.0%: CPT | Performed by: STUDENT IN AN ORGANIZED HEALTH CARE EDUCATION/TRAINING PROGRAM

## 2024-09-03 PROCEDURE — 3079F DIAST BP 80-89 MM HG: CPT | Performed by: STUDENT IN AN ORGANIZED HEALTH CARE EDUCATION/TRAINING PROGRAM

## 2024-09-03 PROCEDURE — 3008F BODY MASS INDEX DOCD: CPT | Performed by: STUDENT IN AN ORGANIZED HEALTH CARE EDUCATION/TRAINING PROGRAM

## 2024-09-03 PROCEDURE — 3048F LDL-C <100 MG/DL: CPT | Performed by: STUDENT IN AN ORGANIZED HEALTH CARE EDUCATION/TRAINING PROGRAM

## 2024-09-03 PROCEDURE — 1036F TOBACCO NON-USER: CPT | Performed by: STUDENT IN AN ORGANIZED HEALTH CARE EDUCATION/TRAINING PROGRAM

## 2024-09-03 PROCEDURE — 3075F SYST BP GE 130 - 139MM HG: CPT | Performed by: STUDENT IN AN ORGANIZED HEALTH CARE EDUCATION/TRAINING PROGRAM

## 2024-09-03 RX ORDER — AMOXICILLIN AND CLAVULANATE POTASSIUM 875; 125 MG/1; MG/1
875 TABLET, FILM COATED ORAL 2 TIMES DAILY
Qty: 20 TABLET | Refills: 0 | Status: SHIPPED | OUTPATIENT
Start: 2024-09-03 | End: 2024-09-03

## 2024-09-03 RX ORDER — PREDNISONE 20 MG/1
40 TABLET ORAL DAILY
Qty: 10 TABLET | Refills: 0 | Status: SHIPPED | OUTPATIENT
Start: 2024-09-03 | End: 2024-09-03

## 2024-09-03 RX ORDER — PREDNISONE 20 MG/1
40 TABLET ORAL DAILY
Qty: 10 TABLET | Refills: 0 | Status: SHIPPED | OUTPATIENT
Start: 2024-09-03 | End: 2024-09-08

## 2024-09-03 RX ORDER — AMOXICILLIN AND CLAVULANATE POTASSIUM 875; 125 MG/1; MG/1
875 TABLET, FILM COATED ORAL 2 TIMES DAILY
Qty: 20 TABLET | Refills: 0 | Status: SHIPPED | OUTPATIENT
Start: 2024-09-03 | End: 2024-09-13

## 2024-09-03 ASSESSMENT — ENCOUNTER SYMPTOMS
VISUAL CHANGE: 0
SPEECH DIFFICULTY: 0
SEIZURES: 0
SWEATS: 0
POLYDIPSIA: 0
LOSS OF SENSATION IN FEET: 0
WEAKNESS: 0
TREMORS: 0
DIZZINESS: 0
BLURRED VISION: 0
BLACKOUTS: 0
DEPRESSION: 0
CONFUSION: 0
OCCASIONAL FEELINGS OF UNSTEADINESS: 0
HUNGER: 0
FATIGUE: 0
POLYPHAGIA: 0
HEADACHES: 0
WEIGHT LOSS: 0
NERVOUS/ANXIOUS: 0

## 2024-09-03 ASSESSMENT — COLUMBIA-SUICIDE SEVERITY RATING SCALE - C-SSRS
2. HAVE YOU ACTUALLY HAD ANY THOUGHTS OF KILLING YOURSELF?: NO
1. IN THE PAST MONTH, HAVE YOU WISHED YOU WERE DEAD OR WISHED YOU COULD GO TO SLEEP AND NOT WAKE UP?: NO
6. HAVE YOU EVER DONE ANYTHING, STARTED TO DO ANYTHING, OR PREPARED TO DO ANYTHING TO END YOUR LIFE?: NO

## 2024-09-03 ASSESSMENT — PATIENT HEALTH QUESTIONNAIRE - PHQ9
2. FEELING DOWN, DEPRESSED OR HOPELESS: NOT AT ALL
SUM OF ALL RESPONSES TO PHQ9 QUESTIONS 1 AND 2: 0
1. LITTLE INTEREST OR PLEASURE IN DOING THINGS: NOT AT ALL

## 2024-09-03 NOTE — PROGRESS NOTES
59-year-old female presenting for follow-up on multiple chronic conditions.    DMII  Stable, tolerates current regimen well.    HLD  Stable, tolerates current regimen well.    GERD  Stable, tolerates current regimen well.    COPD  Stable, tolerates current regimen well.  Following with allergist, no pulmonologist.  Has had productive cough over the past 2 months.    Insomnia, anxiety  Stable, tolerates current regimen well.  Following with psychiatry who manages medications.    SocHx:   - Smoking: About 40 pack years, quit 3 years ago    Denies HA, changes in vision, chest pain, SOB/ALFREDO, palpitations, N/V/D/C, dysuria/hematuria, hematochezia/melena, paresthesias, LE edema.    12 point ROS reviewed and negative other than as stated in HPI      General: Alert, oriented, pleasant, in no acute distress  HEENT:      Head: normocephalic, atraumatic;      eyes: EOMI, no scleral icterus;   Neck: soft, supple, non-tender, no masses appreciated  CV: Heart with regular rate and rhythm, normal S1/S2, no murmurs  Lungs: Some coarse lung sounds diffusely  Neuro: Cranial nerves grossly intact; alert and oriented, normal gait  Psych: Appropriate mood and affect    #DMII  -Last A1c 6.4  - Currently on metformin  mg twice daily, Mounjaro 5 mg weekly  - Diabetic eye exam: Ophthalmology referral given at last appointment  - Diabetic foot exam negative in office 04/2024  - Following with [facility] pharmacist  - Repeat A1c    #HLD  -Continue rosuvastatin 10 mg daily  - Repeat lipid panel    #GERD  -Continue omeprazole 40 mg daily    #COPD #Productive cough  -Continue Pulmicort twice daily, albuterol as needed  -Following with allergist, referral to pulmonology  - Augmentin 875 mg twice daily x 10 days, prednisone 40 mg x 5 days    #Insomnia #anxiety  -Currently on Wellbutrin 75 mg 3 times daily, Lexapro 20 mg daily, alprazolam 1 mg as needed    F/U CPE in April, sooner if indicated    Chris D'Amico, DO    Answers submitted by the patient  for this visit:  Diabetes Questionnaire (Submitted on 9/3/2024)  Chief Complaint: Diabetes problem  Diabetes type: type 2  MedicAlert ID: [id number]  Disease duration: 5 Years  blurred vision: No  chest pain: No  fatigue: No  foot paresthesias: No  foot ulcerations: No  polydipsia: No  polyphagia: No  polyuria: No  visual change: No  weakness: No  weight loss: No  Symptom course: stable  confusion: No  speech difficulty: No  dizziness: No  nervous/anxious: No  headaches: No  hunger: No  mood changes: No  pallor: No  seizures: No  tremors: No  sleepiness: No  sweats: No  blackouts: No  hospitalization: No  nocturnal hypoglycemia: No  required assistance: No  required glucagon: No  CVA: No  heart disease: No  impotence: No  nephropathy: No  peripheral neuropathy: No  PVD: No  retinopathy: No  Weight trend: stable  Dietitian visit: No  Eye exam current: Yes  Sees podiatrist: No

## 2024-09-10 DIAGNOSIS — R05.8 PRODUCTIVE COUGH: ICD-10-CM

## 2024-09-10 RX ORDER — PREDNISONE 20 MG/1
40 TABLET ORAL DAILY
Qty: 10 TABLET | Refills: 0 | Status: SHIPPED | OUTPATIENT
Start: 2024-09-10 | End: 2024-09-15

## 2024-09-11 ENCOUNTER — APPOINTMENT (OUTPATIENT)
Dept: PHARMACY | Facility: HOSPITAL | Age: 59
End: 2024-09-11
Payer: MEDICARE

## 2024-09-11 DIAGNOSIS — J44.9 CHRONIC OBSTRUCTIVE PULMONARY DISEASE, UNSPECIFIED COPD TYPE (MULTI): Primary | ICD-10-CM

## 2024-09-11 DIAGNOSIS — E11.9 TYPE 2 DIABETES MELLITUS WITHOUT COMPLICATION, WITHOUT LONG-TERM CURRENT USE OF INSULIN (MULTI): Primary | ICD-10-CM

## 2024-09-11 DIAGNOSIS — R05.8 PRODUCTIVE COUGH: ICD-10-CM

## 2024-09-11 PROCEDURE — RXMED WILLOW AMBULATORY MEDICATION CHARGE

## 2024-09-11 RX ORDER — LEVOFLOXACIN 750 MG/1
750 TABLET ORAL DAILY
Qty: 5 TABLET | Refills: 0 | Status: SHIPPED | OUTPATIENT
Start: 2024-09-11 | End: 2024-09-16

## 2024-09-11 RX ORDER — TIRZEPATIDE 7.5 MG/.5ML
7.5 INJECTION, SOLUTION SUBCUTANEOUS WEEKLY
Qty: 2 ML | Refills: 5 | Status: SHIPPED | OUTPATIENT
Start: 2024-09-11

## 2024-09-11 NOTE — PROGRESS NOTES
"Pharmacist Clinic: Diabetes Management  Minal Sun is a 59 y.o. female was referred to Clinical Pharmacy Team for diabetes management.     Referring Provider: Christopher D'Amico, DO     HISTORY OF PRESENT ILLNESS  Patient was diagnosed with diabetes in June of 2023, she has a large family history of T2DM    Diet: patient reports she knows she does not eat \"the best\"   - diet is inconsistent, some days first food will not be unitl mid day, it might be a sandwhich  - patient reports her weakness is treats, she loves brownies, cookies, cake, etc.     Starting weight of 199 lbs (36.47 kg/m2)    LAB REVIEW   Glucose (mg/dL)   Date Value   04/30/2024 99   02/17/2020 102 (H)   11/05/2019 101 (H)   10/07/2017 136 (H)     Hemoglobin A1C (%)   Date Value   04/30/2024 6.4 (H)   12/13/2023 6.4 (H)   10/02/2023 6.4 (H)   06/09/2023 6.5 (H)     Bicarbonate (mmol/L)   Date Value   04/30/2024 29   02/17/2020 28   11/05/2019 30   10/07/2017 27     Urea Nitrogen (mg/dL)   Date Value   04/30/2024 14   02/17/2020 9   11/05/2019 12   10/07/2017 12     Creatinine (mg/dL)   Date Value   04/30/2024 0.79   02/17/2020 0.63   11/05/2019 0.75   10/07/2017 0.78     Lab Results   Component Value Date    HGBA1C 6.4 (H) 04/30/2024    HGBA1C 6.4 (H) 12/13/2023    HGBA1C 6.4 (H) 10/02/2023     Lab Results   Component Value Date    CHOL 148 04/30/2024    CHOL 155 11/05/2019     Lab Results   Component Value Date    HDL 61.2 04/30/2024    HDL 53.1 11/05/2019     Lab Results   Component Value Date    LDLCALC 64 04/30/2024     Lab Results   Component Value Date    TRIG 114 04/30/2024    TRIG 159 (H) 11/05/2019       DIABETES ASSESSMENT    CURRENT PHARMACOTHERAPY  - metformin  mg twice daily   - mounjaro 5 mg once weekly     SECONDARY PREVENTION  - Statin? Yes  - ACE-I/ARB? No    DISCUSSION:   Patient is tolerating mounjaro 5 mg:   She denies any side effects at this time, she is down 19 pounds  She reports snacking is up, discussed " increasing the dose to 7.5 mg once weekly   Counseled patient, answered all questions and concerns     RECOMMENDATIONS/PLAN  1. Patients diabetes is stable with most recent A1c of 6.4 % (goal < 7 %).   - Continue all meds under the continuation of care with the referring provider and clinical pharmacy team.  - Increase mounjaro to 7.5 mg under the skin once weekly.     Clinical Pharmacist follow up: 4 weeks   [facility] PAP Approval: 5/25/24    Thank you,  Shea High, PharmD    Verbal consent to manage patient's drug therapy was obtained from the patient. They were informed they may decline to participate or withdraw from participation in pharmacy services at any time.

## 2024-09-14 ENCOUNTER — PHARMACY VISIT (OUTPATIENT)
Dept: PHARMACY | Facility: CLINIC | Age: 59
End: 2024-09-14
Payer: COMMERCIAL

## 2024-09-16 ENCOUNTER — LAB (OUTPATIENT)
Dept: LAB | Facility: LAB | Age: 59
End: 2024-09-16
Payer: MEDICARE

## 2024-09-16 DIAGNOSIS — E78.5 HYPERLIPIDEMIA, UNSPECIFIED HYPERLIPIDEMIA TYPE: ICD-10-CM

## 2024-09-16 DIAGNOSIS — F41.9 ANXIETY: ICD-10-CM

## 2024-09-16 DIAGNOSIS — K21.9 GASTROESOPHAGEAL REFLUX DISEASE, UNSPECIFIED WHETHER ESOPHAGITIS PRESENT: ICD-10-CM

## 2024-09-16 DIAGNOSIS — G47.00 INSOMNIA, UNSPECIFIED TYPE: ICD-10-CM

## 2024-09-16 DIAGNOSIS — E11.9 TYPE 2 DIABETES MELLITUS WITHOUT COMPLICATION, WITHOUT LONG-TERM CURRENT USE OF INSULIN (MULTI): ICD-10-CM

## 2024-09-16 DIAGNOSIS — J44.9 CHRONIC OBSTRUCTIVE PULMONARY DISEASE, UNSPECIFIED COPD TYPE (MULTI): ICD-10-CM

## 2024-09-16 LAB
ALBUMIN SERPL BCP-MCNC: 4.3 G/DL (ref 3.4–5)
ALP SERPL-CCNC: 74 U/L (ref 33–136)
ALT SERPL W P-5'-P-CCNC: 19 U/L (ref 7–45)
ANION GAP SERPL CALC-SCNC: 16 MMOL/L (ref 10–20)
AST SERPL W P-5'-P-CCNC: 14 U/L (ref 9–39)
BILIRUB SERPL-MCNC: 0.4 MG/DL (ref 0–1.2)
BUN SERPL-MCNC: 12 MG/DL (ref 6–23)
CALCIUM SERPL-MCNC: 9.8 MG/DL (ref 8.6–10.6)
CHLORIDE SERPL-SCNC: 104 MMOL/L (ref 98–107)
CHOLEST SERPL-MCNC: 161 MG/DL (ref 0–199)
CHOLESTEROL/HDL RATIO: 2.4
CO2 SERPL-SCNC: 25 MMOL/L (ref 21–32)
CREAT SERPL-MCNC: 0.78 MG/DL (ref 0.5–1.05)
EGFRCR SERPLBLD CKD-EPI 2021: 87 ML/MIN/1.73M*2
ERYTHROCYTE [DISTWIDTH] IN BLOOD BY AUTOMATED COUNT: 15.2 % (ref 11.5–14.5)
EST. AVERAGE GLUCOSE BLD GHB EST-MCNC: 120 MG/DL
GLUCOSE SERPL-MCNC: 101 MG/DL (ref 74–99)
HBA1C MFR BLD: 5.8 %
HCT VFR BLD AUTO: 44.1 % (ref 36–46)
HDLC SERPL-MCNC: 66.7 MG/DL
HGB BLD-MCNC: 14 G/DL (ref 12–16)
LDLC SERPL CALC-MCNC: 71 MG/DL
MCH RBC QN AUTO: 27.3 PG (ref 26–34)
MCHC RBC AUTO-ENTMCNC: 31.7 G/DL (ref 32–36)
MCV RBC AUTO: 86 FL (ref 80–100)
NON HDL CHOLESTEROL: 94 MG/DL (ref 0–149)
NRBC BLD-RTO: 0 /100 WBCS (ref 0–0)
PLATELET # BLD AUTO: 394 X10*3/UL (ref 150–450)
POTASSIUM SERPL-SCNC: 4.4 MMOL/L (ref 3.5–5.3)
PROT SERPL-MCNC: 6.7 G/DL (ref 6.4–8.2)
RBC # BLD AUTO: 5.13 X10*6/UL (ref 4–5.2)
SODIUM SERPL-SCNC: 141 MMOL/L (ref 136–145)
TRIGL SERPL-MCNC: 118 MG/DL (ref 0–149)
VLDL: 24 MG/DL (ref 0–40)
WBC # BLD AUTO: 12.1 X10*3/UL (ref 4.4–11.3)

## 2024-09-16 PROCEDURE — 80053 COMPREHEN METABOLIC PANEL: CPT

## 2024-09-16 PROCEDURE — 85027 COMPLETE CBC AUTOMATED: CPT

## 2024-09-16 PROCEDURE — 80061 LIPID PANEL: CPT

## 2024-09-16 PROCEDURE — 36415 COLL VENOUS BLD VENIPUNCTURE: CPT

## 2024-09-16 PROCEDURE — 83036 HEMOGLOBIN GLYCOSYLATED A1C: CPT

## 2024-09-18 NOTE — RESULT ENCOUNTER NOTE
Hemoglobin A1c at 5.8, which is very good.    Borderline elevated white blood cell count, no clear etiology.  Could be inflammation or recent infection.  Will continue to monitor.    Remaining labs are not remarkable

## 2024-09-20 ENCOUNTER — HOSPITAL ENCOUNTER (OUTPATIENT)
Dept: RADIOLOGY | Facility: CLINIC | Age: 59
Discharge: HOME | End: 2024-09-20
Payer: MEDICARE

## 2024-09-20 DIAGNOSIS — F17.210 CIGARETTE NICOTINE DEPENDENCE WITHOUT COMPLICATION: ICD-10-CM

## 2024-09-20 PROCEDURE — 71271 CT THORAX LUNG CANCER SCR C-: CPT

## 2024-09-23 DIAGNOSIS — R59.0 MEDIASTINAL LYMPHADENOPATHY: Primary | ICD-10-CM

## 2024-09-23 DIAGNOSIS — R91.8 HILAR MASS: Primary | ICD-10-CM

## 2024-09-23 DIAGNOSIS — R59.0 MEDIASTINAL LYMPHADENOPATHY: ICD-10-CM

## 2024-09-23 NOTE — PROGRESS NOTES
Bronchoscopy Scheduling Request    Pre-bronchoscopy visit: New patient visit with Bronchoscopy group provider  Please schedule procedure: ASAP    Cytology on-site:  Yes  Location:  [facility]  Performing physician:  Interventional bronchoscopist  Referring physician:  , Christopher D'Amico, DO  Indication:  Left hilar mass, LN, ?SCLC  Sedation / Anesthesia:  GA  Procedure:  Dx EBUS  Time:  Tier 1  Fluorscopy:   No  Imaging needed:  None  Labs:  None  Meds:  None  Special Considerations:  Possible small cell lung cancer - research team (Jose Torres) notified  Reviewed by:  Murali Harris MD

## 2024-09-24 ENCOUNTER — TELEMEDICINE (OUTPATIENT)
Dept: PULMONOLOGY | Facility: CLINIC | Age: 59
End: 2024-09-24
Payer: MEDICARE

## 2024-09-24 ENCOUNTER — TELEMEDICINE (OUTPATIENT)
Dept: PHARMACY | Facility: HOSPITAL | Age: 59
End: 2024-09-24
Payer: MEDICARE

## 2024-09-24 VITALS — WEIGHT: 180 LBS | HEIGHT: 62 IN | BODY MASS INDEX: 33.13 KG/M2

## 2024-09-24 DIAGNOSIS — Z01.811 PREOP PULMONARY/RESPIRATORY EXAM: Primary | ICD-10-CM

## 2024-09-24 DIAGNOSIS — E11.9 TYPE 2 DIABETES MELLITUS WITHOUT COMPLICATION, WITHOUT LONG-TERM CURRENT USE OF INSULIN (MULTI): Primary | ICD-10-CM

## 2024-09-24 PROCEDURE — 1036F TOBACCO NON-USER: CPT | Performed by: INTERNAL MEDICINE

## 2024-09-24 PROCEDURE — 3061F NEG MICROALBUMINURIA REV: CPT | Performed by: INTERNAL MEDICINE

## 2024-09-24 PROCEDURE — 3048F LDL-C <100 MG/DL: CPT | Performed by: INTERNAL MEDICINE

## 2024-09-24 PROCEDURE — 99203 OFFICE O/P NEW LOW 30 MIN: CPT | Performed by: INTERNAL MEDICINE

## 2024-09-24 PROCEDURE — 3044F HG A1C LEVEL LT 7.0%: CPT | Performed by: INTERNAL MEDICINE

## 2024-09-24 PROCEDURE — 3008F BODY MASS INDEX DOCD: CPT | Performed by: INTERNAL MEDICINE

## 2024-09-24 NOTE — PROGRESS NOTES
"Pharmacist Clinic: Diabetes Management  Minal Sun is a 59 y.o. female was referred to Clinical Pharmacy Team for diabetes management.     Referring Provider: Christopher D'Amico, DO     HISTORY OF PRESENT ILLNESS  Patient was diagnosed with diabetes in June of 2023, she has a large family history of T2DM    Diet: patient reports she knows she does not eat \"the best\"   - diet is inconsistent, some days first food will not be unitl mid day, it might be a sandwhich  - patient reports her weakness is treats, she loves brownies, cookies, cake, etc.     Starting weight of 199 lbs (36.47 kg/m2)    LAB REVIEW   Glucose (mg/dL)   Date Value   09/16/2024 101 (H)   04/30/2024 99   02/17/2020 102 (H)   11/05/2019 101 (H)   10/07/2017 136 (H)     Hemoglobin A1C (%)   Date Value   09/16/2024 5.8 (H)   04/30/2024 6.4 (H)   12/13/2023 6.4 (H)   10/02/2023 6.4 (H)   06/09/2023 6.5 (H)     Bicarbonate (mmol/L)   Date Value   09/16/2024 25   04/30/2024 29   02/17/2020 28   11/05/2019 30   10/07/2017 27     Urea Nitrogen (mg/dL)   Date Value   09/16/2024 12   04/30/2024 14   02/17/2020 9   11/05/2019 12   10/07/2017 12     Creatinine (mg/dL)   Date Value   09/16/2024 0.78   04/30/2024 0.79   02/17/2020 0.63   11/05/2019 0.75   10/07/2017 0.78     Lab Results   Component Value Date    HGBA1C 5.8 (H) 09/16/2024    HGBA1C 6.4 (H) 04/30/2024    HGBA1C 6.4 (H) 12/13/2023     Lab Results   Component Value Date    CHOL 161 09/16/2024    CHOL 148 04/30/2024    CHOL 155 11/05/2019     Lab Results   Component Value Date    HDL 66.7 09/16/2024    HDL 61.2 04/30/2024    HDL 53.1 11/05/2019     Lab Results   Component Value Date    LDLCALC 71 09/16/2024    LDLCALC 64 04/30/2024     Lab Results   Component Value Date    TRIG 118 09/16/2024    TRIG 114 04/30/2024    TRIG 159 (H) 11/05/2019       DIABETES ASSESSMENT    CURRENT PHARMACOTHERAPY  - metformin  mg twice daily   - mounjaro 7.5 mg once weekly     SECONDARY PREVENTION  - " Statin? Yes  - ACE-I/ARB? No    DISCUSSION:   Patient reports since starting mounjaro her asthma/COPD has worsened  Discussed this not a side effect from mounjaro, however if it would make her feel more comfortable we can stop the medication for 1 month and reassess   We had originally used the medication for metabolic benefits and to assist with oral steroid use from recent exacerbations     RECOMMENDATIONS/PLAN  1. Patients diabetes is stable with most recent A1c of 6.4 % (goal < 7 %).   - Continue all meds under the continuation of care with the referring provider and clinical pharmacy team.  - Discontinue mounjaro to 7.5 mg under the skin once weekly.     Clinical Pharmacist follow up: 4 weeks    PAP Approval: 5/25/24    Thank you,  Shea High, PharmD    Verbal consent to manage patient's drug therapy was obtained from the patient. They were informed they may decline to participate or withdraw from participation in pharmacy services at any time.

## 2024-09-24 NOTE — PROGRESS NOTES
Subjective   Patient ID: Minal Sun is a 59 y.o. female who presents for Pre Bronchoscopy.  HPI  The patient was contacted by telephone and I discussed her bronchoscopy with her.  I reviewed with her that there was significant tissue in the mediastinum and evidence of lymphadenopathy with possible obstruction of some of the smaller airways.  She told me that she has been running periodically some fever and chills usually at the same time.  She has also been given a couple courses of antibiotics and a couple courses of steroids with no significant change in those symptoms.  I reviewed with her that the possible obstruction in the airways could cause her to have fevers and chills.  These problems apparently have been going on for about 3 months.  She is scheduled to have this procedure on the upcoming Friday.  And will be done at noon at the [facility].  Review of Systems  Patient reports a history of COPD and is currently on Breztri inhaler and nebulized albuterol treatments.  She currently denies having any sore throat or hemoptysis.  She also denies any cardiac disease.  She is unaware of any current pneumonia.  Objective I answered all the questions that the patient had to her satisfaction and she is agreeable to proceed with the bronchoscopy on this coming Friday.      Assessment/Plan            Rene Vazquez DO 09/24/24 3:57 PM

## 2024-09-27 ENCOUNTER — HOSPITAL ENCOUNTER (OUTPATIENT)
Dept: GASTROENTEROLOGY | Facility: HOSPITAL | Age: 59
Setting detail: OUTPATIENT SURGERY
Discharge: HOME | End: 2024-09-27
Payer: MEDICARE

## 2024-09-27 ENCOUNTER — ANESTHESIA (OUTPATIENT)
Dept: GASTROENTEROLOGY | Facility: HOSPITAL | Age: 59
End: 2024-09-27
Payer: MEDICARE

## 2024-09-27 ENCOUNTER — ANESTHESIA EVENT (OUTPATIENT)
Dept: GASTROENTEROLOGY | Facility: HOSPITAL | Age: 59
End: 2024-09-27
Payer: MEDICARE

## 2024-09-27 VITALS
HEIGHT: 62 IN | DIASTOLIC BLOOD PRESSURE: 74 MMHG | HEART RATE: 89 BPM | TEMPERATURE: 98.4 F | BODY MASS INDEX: 32.2 KG/M2 | OXYGEN SATURATION: 95 % | RESPIRATION RATE: 18 BRPM | WEIGHT: 175 LBS | SYSTOLIC BLOOD PRESSURE: 126 MMHG

## 2024-09-27 DIAGNOSIS — R91.8 HILAR MASS: ICD-10-CM

## 2024-09-27 DIAGNOSIS — C34.90 SCLC (SMALL CELL LUNG CARCINOMA): Primary | ICD-10-CM

## 2024-09-27 DIAGNOSIS — C34.32 MALIGNANT NEOPLASM OF LOWER LOBE OF LEFT LUNG (MULTI): Primary | ICD-10-CM

## 2024-09-27 LAB — GLUCOSE BLD MANUAL STRIP-MCNC: 118 MG/DL (ref 74–99)

## 2024-09-27 PROCEDURE — 2500000005 HC RX 250 GENERAL PHARMACY W/O HCPCS

## 2024-09-27 PROCEDURE — 2500000004 HC RX 250 GENERAL PHARMACY W/ HCPCS (ALT 636 FOR OP/ED)

## 2024-09-27 PROCEDURE — 7100000009 HC PHASE TWO TIME - INITIAL BASE CHARGE

## 2024-09-27 PROCEDURE — 31652 BRONCH EBUS SAMPLNG 1/2 NODE: CPT | Performed by: STUDENT IN AN ORGANIZED HEALTH CARE EDUCATION/TRAINING PROGRAM

## 2024-09-27 PROCEDURE — 3700000001 HC GENERAL ANESTHESIA TIME - INITIAL BASE CHARGE

## 2024-09-27 PROCEDURE — 3700000002 HC GENERAL ANESTHESIA TIME - EACH INCREMENTAL 1 MINUTE

## 2024-09-27 PROCEDURE — 2500000002 HC RX 250 W HCPCS SELF ADMINISTERED DRUGS (ALT 637 FOR MEDICARE OP, ALT 636 FOR OP/ED): Performed by: ANESTHESIOLOGY

## 2024-09-27 PROCEDURE — 82947 ASSAY GLUCOSE BLOOD QUANT: CPT

## 2024-09-27 RX ORDER — PROPOFOL 10 MG/ML
INJECTION, EMULSION INTRAVENOUS AS NEEDED
Status: DISCONTINUED | OUTPATIENT
Start: 2024-09-27 | End: 2024-09-27

## 2024-09-27 RX ORDER — ONDANSETRON HYDROCHLORIDE 2 MG/ML
4 INJECTION, SOLUTION INTRAVENOUS ONCE AS NEEDED
Status: DISCONTINUED | OUTPATIENT
Start: 2024-09-27 | End: 2024-09-28 | Stop reason: HOSPADM

## 2024-09-27 RX ORDER — ROCURONIUM BROMIDE 10 MG/ML
INJECTION, SOLUTION INTRAVENOUS AS NEEDED
Status: DISCONTINUED | OUTPATIENT
Start: 2024-09-27 | End: 2024-09-27

## 2024-09-27 RX ORDER — LIDOCAINE HYDROCHLORIDE 20 MG/ML
INJECTION, SOLUTION INFILTRATION; PERINEURAL AS NEEDED
Status: DISCONTINUED | OUTPATIENT
Start: 2024-09-27 | End: 2024-09-27

## 2024-09-27 RX ORDER — ALBUTEROL SULFATE 0.83 MG/ML
2.5 SOLUTION RESPIRATORY (INHALATION) ONCE
Status: COMPLETED | OUTPATIENT
Start: 2024-09-27 | End: 2024-09-27

## 2024-09-27 RX ORDER — HEPARIN 100 UNIT/ML
500 SYRINGE INTRAVENOUS AS NEEDED
OUTPATIENT
Start: 2024-09-27

## 2024-09-27 RX ORDER — LIDOCAINE HYDROCHLORIDE 10 MG/ML
0.1 INJECTION, SOLUTION EPIDURAL; INFILTRATION; INTRACAUDAL; PERINEURAL ONCE
Status: DISCONTINUED | OUTPATIENT
Start: 2024-09-27 | End: 2024-09-28 | Stop reason: HOSPADM

## 2024-09-27 RX ORDER — HEPARIN SODIUM,PORCINE/PF 10 UNIT/ML
50 SYRINGE (ML) INTRAVENOUS AS NEEDED
OUTPATIENT
Start: 2024-09-27

## 2024-09-27 RX ORDER — ALBUTEROL SULFATE 0.83 MG/ML
2.5 SOLUTION RESPIRATORY (INHALATION) ONCE AS NEEDED
Status: DISCONTINUED | OUTPATIENT
Start: 2024-09-27 | End: 2024-09-28 | Stop reason: HOSPADM

## 2024-09-27 RX ORDER — SODIUM CHLORIDE, SODIUM LACTATE, POTASSIUM CHLORIDE, CALCIUM CHLORIDE 600; 310; 30; 20 MG/100ML; MG/100ML; MG/100ML; MG/100ML
100 INJECTION, SOLUTION INTRAVENOUS CONTINUOUS
Status: DISCONTINUED | OUTPATIENT
Start: 2024-09-27 | End: 2024-09-28 | Stop reason: HOSPADM

## 2024-09-27 RX ORDER — FENTANYL CITRATE 50 UG/ML
INJECTION, SOLUTION INTRAMUSCULAR; INTRAVENOUS AS NEEDED
Status: DISCONTINUED | OUTPATIENT
Start: 2024-09-27 | End: 2024-09-27

## 2024-09-27 ASSESSMENT — PAIN - FUNCTIONAL ASSESSMENT
PAIN_FUNCTIONAL_ASSESSMENT: 0-10

## 2024-09-27 ASSESSMENT — PAIN SCALES - GENERAL
PAINLEVEL_OUTOF10: 0 - NO PAIN

## 2024-09-27 ASSESSMENT — COLUMBIA-SUICIDE SEVERITY RATING SCALE - C-SSRS
6. HAVE YOU EVER DONE ANYTHING, STARTED TO DO ANYTHING, OR PREPARED TO DO ANYTHING TO END YOUR LIFE?: NO
2. HAVE YOU ACTUALLY HAD ANY THOUGHTS OF KILLING YOURSELF?: NO
1. IN THE PAST MONTH, HAVE YOU WISHED YOU WERE DEAD OR WISHED YOU COULD GO TO SLEEP AND NOT WAKE UP?: NO

## 2024-09-27 NOTE — ANESTHESIA POSTPROCEDURE EVALUATION
Patient: Minal Sun    Procedure Summary       Date: 09/27/24 Room / Location: [facility]    Anesthesia Start: 1258 Anesthesia Stop: 1352    Procedure: BRONCHOSCOPY Diagnosis: Hilar mass    Scheduled Providers: Sonu Nathan MD; Oliva Liu MD Responsible Provider: Oliva Liu MD    Anesthesia Type: general ASA Status: 3            Anesthesia Type: general    Vitals Value Taken Time   BP *** 09/27/24 1352   Temp *** 09/27/24 1352   Pulse *** 09/27/24 1352   Resp *** 09/27/24 1352   SpO2 *** 09/27/24 1352       Anesthesia Post Evaluation    No notable events documented.

## 2024-09-27 NOTE — ANESTHESIA PROCEDURE NOTES
Airway  Date/Time: 9/27/2024 1:10 PM  Urgency: elective    Airway not difficult    Staffing  Performed: TYLER   Authorized by: Oliva Liu MD    Performed by: TYLER Torres  Patient location during procedure: OR    Indications and Patient Condition  Indications for airway management: anesthesia  Spontaneous ventilation: present  Sedation level: deep  Preoxygenated: no  Patient position: sniffing  MILS maintained throughout  Mask difficulty assessment: 1 - vent by mask    Final Airway Details  Final airway type: endotracheal airway      Successful airway: ETT  Cuffed: yes   Successful intubation technique: direct laryngoscopy  Facilitating devices/methods: intubating stylet  Endotracheal tube insertion site: oral  Blade: Tony  Blade size: #3  ETT size (mm): 8.5  Cormack-Lehane Classification: grade I - full view of glottis  Placement verified by: chest auscultation and capnometry   Measured from: gums  ETT to gums (cm): 21  Number of attempts at approach: 1    Additional Comments  Atraumatic intubation, dentition in tact

## 2024-09-27 NOTE — ANESTHESIA PREPROCEDURE EVALUATION
[facility] Emergency Department    [address] [place] [address]    Phone:  [phone]    Fax:  [phone]                                       Sebastián Bryant   MRN: [id number]    Department:  [facility] Emergency Department   Date of Visit:  5/23/2017           Patient Information     Date Of Birth          1993        Your diagnoses for this visit were:     Chest pain, unspecified type        You were seen by Jaspreet Ching MD.        Discharge Instructions       Please make an appointment to follow up with [facility] [phone] in 3-5 days even if entirely better.      Discharge Instructions  Chest Pain    You have been seen today for chest pain or discomfort.  At this time, your doctor has found no signs that your chest pain is due to a serious or life-threatening condition, (or you have declined more testing and/or admission to the hospital). However, sometimes there is a serious problem that does not show up right away. Your evaluation today may not be complete and you may need further testing and evaluation.     You need to follow-up with your regular doctor within 3 days.    Return to the Emergency Department if:    Your chest pain changes, gets worse, starts to happen more often, or comes with less activity.    You are short of breath.    You get very weak or tired.    You pass out or faint.    You have any new symptoms, like fever, cough, numb legs, or you cough up blood.    You have anything else that worries you.    Until you follow-up with your regular doctor please do the following:    Take one aspirin daily unless you have an allergy or are told not to by your doctor.    If a stress test appointment has been made, go to the appointment.    If you have questions, contact your regular doctor.    If your doctor today has told you to follow-up with your regular doctor, it is very important that you make an appointment with  Patient: Minal Sun    Procedure Information       Date/Time: 09/27/24 1300    Scheduled providers: Sonu Nathan MD; Oliva Liu MD    Procedure: BRONCHOSCOPY    Location: [facility]            Relevant Problems   Anesthesia (within normal limits)      Cardiac   (+) Atypical chest pain   (+) Hyperlipidemia   (+) Mixed hyperlipidemia      Pulmonary   (+) Asthma (Nebulizer 4x day)   (+) Asthma-COPD overlap syndrome (Multi)   (+) Chronic obstructive pulmonary disease (Multi)      Neuro   (+) Anxiety   (+) PTSD (post-traumatic stress disorder)      GI   (+) Esophageal reflux      /Renal (within normal limits)      Liver (within normal limits)      Endocrine   (+) Diabetes mellitus type 2 without retinopathy (Multi) (118)   (+) Obesity   (+) Thyrotoxicosis with diffuse goiter without thyrotoxic crisis or storm      Hematology (within normal limits)      Musculoskeletal (within normal limits)      HEENT   (+) Acute bacterial sinusitis      ID   (+) Acute bacterial sinusitis      Skin (within normal limits)      GYN (within normal limits)       Clinical information reviewed:    Allergies  Meds     OB Status           NPO Detail:  NPO/Void Status  Carbohydrate Drink Given Prior to Surgery? : N  Date of Last Liquid: 09/27/24  Time of Last Liquid: 0000  Date of Last Solid: 09/26/24  Time of Last Solid: 2100  Last Intake Type: Clear fluids  Time of Last Void: 1000         Physical Exam    Airway  Mallampati: II  TM distance: >3 FB     Cardiovascular - normal exam     Dental   Comments: intact   Pulmonary - normal exam  (+) rhonchi     Abdominal          Vitals:    09/27/24 1210   BP: (!) 142/94   Pulse: 99   Resp: 18   Temp: 36.4 °C (97.5 °F)   SpO2: 94%       Past Surgical History:   Procedure Laterality Date    BACK SURGERY  09/17/2013    Lower Back Surgery    HYSTERECTOMY  07/01/2016    Hysterectomy     Past Medical History:   Diagnosis Date    Chronic obstructive pulmonary disease,  unspecified (Multi)     Chronic obstructive pulmonary disease    Encounter for general adult medical examination without abnormal findings 11/18/2020    Encounter for preventive health examination    Personal history of other endocrine, nutritional and metabolic disease     History of diabetes mellitus    Post-traumatic stress disorder, unspecified     PTSD (post-traumatic stress disorder)    Type 2 diabetes mellitus (Multi)        Current Outpatient Medications:     budesonide (Pulmicort) 0.5 mg/2 mL nebulizer solution, Take 2 mL (0.5 mg) by nebulization 2 times a day., Disp: , Rfl:     buPROPion (Wellbutrin) 75 mg tablet, Take 1 tablet (75 mg) by mouth 3 times a day., Disp: , Rfl:     carbinoxamine maleate 4 mg tablet, Take 4 mg by mouth 2 times a day., Disp: , Rfl:     escitalopram (Lexapro) 20 mg tablet, Take 1 tablet (20 mg) by mouth once daily., Disp: , Rfl:     metFORMIN  mg 24 hr tablet, Take 1 tablet (500 mg) by mouth 2 times a day., Disp: 180 tablet, Rfl: 3    omeprazole (PriLOSEC) 40 mg DR capsule, Take 1 capsule (40 mg) by mouth once daily., Disp: 90 capsule, Rfl: 3    ProAir HFA 90 mcg/actuation inhaler, Inhale 2 puffs every 4 hours if needed., Disp: , Rfl:     rosuvastatin (Crestor) 10 mg tablet, Take 1 tablet (10 mg) by mouth once daily., Disp: 90 tablet, Rfl: 3    traZODone (Desyrel) 50 mg tablet, Take 1 tablet (50 mg) by mouth once daily at bedtime., Disp: , Rfl:   Prior to Admission medications    Medication Sig Start Date End Date Taking? Authorizing Provider   budesonide (Pulmicort) 0.5 mg/2 mL nebulizer solution Take 2 mL (0.5 mg) by nebulization 2 times a day. 11/26/12  Yes Historical Provider, MD   buPROPion (Wellbutrin) 75 mg tablet Take 1 tablet (75 mg) by mouth 3 times a day. 4/18/24  Yes Historical Provider, MD   carbinoxamine maleate 4 mg tablet Take 4 mg by mouth 2 times a day. 4/17/24  Yes Historical Provider, MD   escitalopram (Lexapro) 20 mg tablet Take 1 tablet (20 mg) by mouth  your clinic and go to the appointment.  If you do not follow-up with your primary doctor, it may result in missing an important development which could result in permanent injury or disability and/or lasting pain.  If there is any problem keeping your appointment, call your doctor or return to the Emergency Department.    If you were given a prescription for medicine here today, be sure to read all of the information (including the package insert) that comes with your prescription.  This will include important information about the medicine, its side effects, and any warnings that you need to know about.  The pharmacist who fills the prescription can provide more information and answer questions you may have about the medicine.  If you have questions or concerns that the pharmacist cannot address, please call or return to the Emergency Department.       24 Hour Appointment Hotline       To make an appointment at any [facility], call [phone] ([phone]). If you don't have a family doctor or clinic, we will help you find one. [facility] are conveniently located to serve the needs of you and your family.             Review of your medicines      Notice     You have not been prescribed any medications.            Procedures and tests performed during your visit     Basic metabolic panel (BMP)    CBC (platelets, no diff)    Chest XR,  PA & LAT    EKG 12 lead    Troponin I (now)      Orders Needing Specimen Collection     None      Pending Results     No orders found from 5/21/2017 to 5/24/2017.            Pending Culture Results     No orders found from 5/21/2017 to 5/24/2017.            Pending Results Instructions     If you had any lab results that were not finalized at the time of your Discharge, you can call the ED Lab Result RN at [phone]. You will be contacted by this team for any positive Lab results or changes in treatment. The nurses are available 7 days a week from 10A to 6:30P.   You can leave a message 24 hours per day and they will return your call.        Test Results From Your Hospital Stay        5/23/2017  3:21 PM      Component Results     Component Value Ref Range & Units Status    WBC 7.0 4.0 - 11.0 10e9/L Final    RBC Count 4.80 4.4 - 5.9 10e12/L Final    Hemoglobin 15.1 13.3 - 17.7 g/dL Final    Hematocrit 45.0 40.0 - 53.0 % Final    MCV 94 78 - 100 fl Final    MCH 31.5 26.5 - 33.0 pg Final    MCHC 33.6 31.5 - 36.5 g/dL Final    RDW 11.7 10.0 - 15.0 % Final    Platelet Count 294 150 - 450 10e9/L Final         5/23/2017  3:38 PM      Component Results     Component Value Ref Range & Units Status    Sodium 139 133 - 144 mmol/L Final    Potassium 3.6 3.4 - 5.3 mmol/L Final    Chloride 105 94 - 109 mmol/L Final    Carbon Dioxide 27 20 - 32 mmol/L Final    Anion Gap 7 3 - 14 mmol/L Final    Glucose 88 70 - 99 mg/dL Final    Urea Nitrogen 12 7 - 30 mg/dL Final    Creatinine 0.75 0.66 - 1.25 mg/dL Final    GFR Estimate >90  Non  GFR Calc   >60 mL/min/1.7m2 Final    GFR Estimate If Black >90   GFR Calc   >60 mL/min/1.7m2 Final    Calcium 9.1 8.5 - 10.1 mg/dL Final         5/23/2017  3:38 PM      Component Results     Component Value Ref Range & Units Status    Troponin I ES  0.000 - 0.045 ug/L Final    <0.015  The 99th percentile for upper reference range is 0.045 ug/L.  Troponin values in   the range of 0.045 - 0.120 ug/L may be associated with risks of adverse   clinical events.           5/23/2017  3:54 PM      Narrative     CHEST TWO VIEWS  5/23/2017 3:38 PM     HISTORY: Chest pain.    COMPARISON: None.    FINDINGS: Heart size and pulmonary vascularity are within normal  limits. The lungs are clear. No pneumothorax or pleural effusion.         Impression     IMPRESSION: Normal two views of the chest.    JAIME FISHER MD                Clinical Quality Measure: Blood Pressure Screening     Your blood pressure was checked while you were in the  once daily.   Yes Historical Provider, MD   metFORMIN  mg 24 hr tablet Take 1 tablet (500 mg) by mouth 2 times a day. 4/30/24 4/30/25 Yes Christopher D'Amico, DO   omeprazole (PriLOSEC) 40 mg DR capsule Take 1 capsule (40 mg) by mouth once daily. 4/30/24 4/30/25 Yes Christopher D'Amico, DO   ProAir HFA 90 mcg/actuation inhaler Inhale 2 puffs every 4 hours if needed. 1/30/14  Yes Historical Provider, MD   rosuvastatin (Crestor) 10 mg tablet Take 1 tablet (10 mg) by mouth once daily. 4/30/24 4/30/25 Yes Christopher D'Amico, DO   traZODone (Desyrel) 50 mg tablet Take 1 tablet (50 mg) by mouth once daily at bedtime. 4/18/24  Yes Historical Provider, MD   tirzepatide (Mounjaro) 7.5 mg/0.5 mL pen injector Inject 7.5 mg under the skin 1 (one) time per week.  Patient not taking: Reported on 9/24/2024 9/11/24 9/24/24  Christopher D'Amico, DO     Allergies   Allergen Reactions    Clindamycin Diarrhea    Erythromycin Diarrhea    Erythromycin Base Other and Nausea Only    Oxycodone-Acetaminophen Hives     anxiety     Social History     Tobacco Use    Smoking status: Former     Current packs/day: 0.00     Average packs/day: 1 pack/day for 38.3 years (38.3 ttl pk-yrs)     Types: Cigarettes     Start date: 9/9/1982     Quit date: 2021     Years since quitting: 3.7    Smokeless tobacco: Never   Substance Use Topics    Alcohol use: Never         Chemistry    Lab Results   Component Value Date/Time     09/16/2024 1423    K 4.4 09/16/2024 1423     09/16/2024 1423    CO2 25 09/16/2024 1423    BUN 12 09/16/2024 1423    CREATININE 0.78 09/16/2024 1423    Lab Results   Component Value Date/Time    CALCIUM 9.8 09/16/2024 1423    ALKPHOS 74 09/16/2024 1423    AST 14 09/16/2024 1423    ALT 19 09/16/2024 1423    BILITOT 0.4 09/16/2024 1423          Lab Results   Component Value Date/Time    WBC 12.1 (H) 09/16/2024 1423    HGB 14.0 09/16/2024 1423    HCT 44.1 09/16/2024 1423     09/16/2024 1423     Lab Results    Component Value Date/Time    PROTIME 10.6 10/07/2017 0621    INR 1.0 10/07/2017 0621     No results found for this or any previous visit (from the past 4464 hour(s)).  No results found for this or any previous visit from the past 1095 days.       Anesthesia Plan    History of general anesthesia?: yes  History of complications of general anesthesia?: unknown/emergency    ASA 3     general     intravenous induction   Anesthetic plan and risks discussed with patient.  Use of blood products discussed with spouse who.    Plan discussed with CAA and CRNA.       "emergency department today. The last reading we obtained was  BP: 119/76 . Please read the guidelines below about what these numbers mean and what you should do about them.  If your systolic blood pressure (the top number) is less than 120 and your diastolic blood pressure (the bottom number) is less than 80, then your blood pressure is normal. There is nothing more that you need to do about it.  If your systolic blood pressure (the top number) is 120-139 or your diastolic blood pressure (the bottom number) is 80-89, your blood pressure may be higher than it should be. You should have your blood pressure rechecked within a year by a primary care provider.  If your systolic blood pressure (the top number) is 140 or greater or your diastolic blood pressure (the bottom number) is 90 or greater, you may have high blood pressure. High blood pressure is treatable, but if left untreated over time it can put you at risk for heart attack, stroke, or kidney failure. You should have your blood pressure rechecked by a primary care provider within the next 4 weeks.  If your provider in the emergency department today gave you specific instructions to follow-up with your doctor or provider even sooner than that, you should follow that instruction and not wait for up to 4 weeks for your follow-up visit.        Thank you for choosing [facility]       Thank you for choosing [facility] for your care. Our goal is always to provide you with excellent care. Hearing back from our patients is one way we can continue to improve our services. Please take a few minutes to complete the written survey that you may receive in the mail after you visit with us. Thank you!        [employer] Information     [employer] lets you send messages to your doctor, view your test results, renew your prescriptions, schedule appointments and more. To sign up, go to [email or web address] . Click on \"Log in\" on the left side of the screen, which will take you to the " "Welcome page. Then click on \"Sign up Now\" on the right side of the page.     You will be asked to enter the access code listed below, as well as some personal information. Please follow the directions to create your username and password.     Your access code is: [email or web address]  Expires: 2017  3:53 PM     Your access code will  in 90 days. If you need help or a new code, please call your [facility] or [phone].        Care EveryWhere ID     This is your Care EveryWhere ID. This could be used by other organizations to access your [facility] medical records  [id number]        After Visit Summary       This is your record. Keep this with you and show to your community pharmacist(s) and doctor(s) at your next visit.                  "

## 2024-09-27 NOTE — DISCHARGE INSTRUCTIONS
The anesthetics, sedatives or narcotics which were given to you today will be acting in your body for the next 24 hours, so you might feel a little sleepy or groggy. This feeling should slowly wear off.   Carefully read and follow the instructions below:   You received sedation today.   Do not drive or operate machinery or power tools of any kind.   No alcoholic beverages today, not even beer or wine.   No over the counter medications that contain alcohol or may cause drowsiness.   Do not make important decisions or sign legal documents.     Do not use Aspirin containing products or non-steroidal medications for the next 24 hours.  (Examples of these types of medications include: Advil, Aleve, Ecotrin, Ibuprofen, Motrin or Naprosyn.  This list is not all-inclusive.  Check with your physician or pharmacist before resuming these medications.)  Tylenol, cough medicine, cough drops or throat lozenges may be used when you are allowed to resume eating and drinking.     Call your physician if any of these symptoms occur:   High fever over 101 degrees or chills (a low grade fever is common for 24 hours)   Rash or hives   Persistent nausea or vomiting   Inability to urinate within 8 hours after the procedure  Go directly to the emergency room if you notice any of the following:   Shortness of breath   Chest pain  Coughing up large amounts of bright red blood greater than a teaspoonful of blood clots (about a teaspoonful for the next 24-48 hours is normal, especially if you had a biopsy)  Resume all normal medications unless directed otherwise by your doctor.     Your doctor recommends these additional instructions:    Follow up with your referring physician as previously scheduled.    If you experience any problems or have any questions following discharge, please call:   Before 5 pm: [phone]   After 5pm and on weekends: [phone] / [phone] and ask for the Pulmonary Fellow on-call (Pager Number: [phone])

## 2024-09-27 NOTE — ANESTHESIA POSTPROCEDURE EVALUATION
Patient: Minal Sun    Procedure Summary       Date: 09/27/24 Room / Location: [facility]    Anesthesia Start: 1258 Anesthesia Stop: 1352    Procedure: BRONCHOSCOPY Diagnosis: Hilar mass    Scheduled Providers: Sonu Nathan MD Responsible Provider: Oliva Liu MD    Anesthesia Type: general ASA Status: 3            Anesthesia Type: general    Vitals Value Taken Time   /74 09/27/24 1450   Temp 36.9 °C (98.4 °F) 09/27/24 1350   Pulse 89 09/27/24 1450   Resp 18 09/27/24 1450   SpO2 95 % 09/27/24 1450       Anesthesia Post Evaluation    Patient location during evaluation: PACU  Patient participation: complete - patient participated  Level of consciousness: awake  Pain management: adequate  Airway patency: patent  Cardiovascular status: acceptable  Respiratory status: acceptable  Hydration status: acceptable  Postoperative Nausea and Vomiting: none        No notable events documented.

## 2024-09-27 NOTE — LETTER
Dear, Minal Sun      9/23/2024                                              INFORMATION FOR YOUR PROCEDURE     INSTRUCTIONS MUST BE FOLLOWED OR YOU RUN THE RISK OF YOUR CASE BEING CANCELED     Information is attached to this e-mail for your upcoming procedure. (Look for the paperclip in your email to access this information)  Lab requisitions are also included as well as procedural instructions.    You are scheduled for your Bronchoscopy on  9/27/24,    with Dr. Sonu Nathan    Arrival is at  12:00 PM ,  for Check In prior to your actual procedure time. This allows us to prepare you for your procedure.  Location:   [place] [address]   Bronchoscopy / Endoscopy Suite   [facility] Room 1300.     Located on the 1st Floor close to the Main entrance of the hospital.  Enter through the front doors, turn left and we are the 1st hallway on the left hand side.(Room 1300 [facility]).  If you park in the visitor's garage you will come in on the second floor and will need to make your way down to the 1st level.     Patient information is right there if assistance is needed.    NPO (No food or drink) after midnight the night before your procedure. This includes coffee, water, and soda, hard candy, gum or mints.  If taking your morning medications, a small sip of water is allowed to get the medication down.    For your safety, you must have a responsible, adult  accompany you to your procedure.  You will not be permitted to drive yourself home if you have received any type of anesthesia or sedation.    Automated calls about your upcoming scheduled appointments can be quite confusing for patients.    The times may vary depending on what you have scheduled for procedure day. Follow the time/s I have given you  so there is no confusion.    Blood work will need to be done prior to your procedure, preferably at a  Facility.   There are no restrictions for testing. Your blood work is current.  No need to repeat.      Dr. Rene Vazquez,  will call you on 9/24/24, @ 3:00 PM    This is a phone visit to go over your medical history prior to your procedure, and is a necessary part of your workup.  The patient must be present at this phone visit.    Please reach out to me with any questions you may have [place] @ [phone] or   [place] @ [phone]    Have a nice day!    Estrella Madrigal    Bronchoscopy   Interventional Pulmonology    MD Santana Cantu MD Sameer Avasarala, MD Catalina Teba, MD Andrew Dunatchik, MD Sruti Brahmandam, MD        [facility]  Pulmonary, Critical Care and Sleep Medicine  [address] [place] [address]  P [phone]  - [phone]  [email or web address]

## 2024-09-30 ENCOUNTER — APPOINTMENT (OUTPATIENT)
Dept: RADIOLOGY | Facility: CLINIC | Age: 59
End: 2024-09-30
Payer: MEDICARE

## 2024-09-30 ENCOUNTER — PATIENT OUTREACH (OUTPATIENT)
Dept: HEMATOLOGY/ONCOLOGY | Facility: HOSPITAL | Age: 59
End: 2024-09-30
Payer: MEDICARE

## 2024-09-30 DIAGNOSIS — C34.90 SCLC (SMALL CELL LUNG CARCINOMA): ICD-10-CM

## 2024-09-30 DIAGNOSIS — C34.90 SMALL CELL LUNG CANCER IN ADULT (MULTI): Primary | ICD-10-CM

## 2024-09-30 NOTE — PROGRESS NOTES
Called and left patient VM with new dates and times for testing.  MRI is 10/1 at 945 and PET scan is at 1115, both at [place] location.  Also mentioned appt with Dr Kohli is Wed 10/2 at 1pm and where to go.  Left phone number for her to call back with any questions.

## 2024-10-01 ENCOUNTER — HOSPITAL ENCOUNTER (OUTPATIENT)
Dept: RADIOLOGY | Facility: CLINIC | Age: 59
Discharge: HOME | End: 2024-10-01
Payer: MEDICARE

## 2024-10-01 DIAGNOSIS — C34.90 MALIGNANT NEOPLASM OF LUNG, UNSPECIFIED LATERALITY, UNSPECIFIED PART OF LUNG (MULTI): Primary | ICD-10-CM

## 2024-10-01 DIAGNOSIS — C34.90 SCLC (SMALL CELL LUNG CARCINOMA): ICD-10-CM

## 2024-10-01 DIAGNOSIS — C34.90 SMALL CELL LUNG CANCER IN ADULT (MULTI): ICD-10-CM

## 2024-10-01 PROCEDURE — 78815 PET IMAGE W/CT SKULL-THIGH: CPT

## 2024-10-01 PROCEDURE — 70553 MRI BRAIN STEM W/O & W/DYE: CPT

## 2024-10-01 PROCEDURE — 2550000001 HC RX 255 CONTRASTS: Performed by: CLINICAL NURSE SPECIALIST

## 2024-10-01 PROCEDURE — 78815 PET IMAGE W/CT SKULL-THIGH: CPT | Performed by: NUCLEAR MEDICINE

## 2024-10-01 PROCEDURE — A9552 F18 FDG: HCPCS | Performed by: CLINICAL NURSE SPECIALIST

## 2024-10-01 PROCEDURE — 70553 MRI BRAIN STEM W/O & W/DYE: CPT | Performed by: RADIOLOGY

## 2024-10-01 PROCEDURE — 3430000001 HC RX 343 DIAGNOSTIC RADIOPHARMACEUTICALS: Performed by: CLINICAL NURSE SPECIALIST

## 2024-10-01 PROCEDURE — A9575 INJ GADOTERATE MEGLUMI 0.1ML: HCPCS | Performed by: CLINICAL NURSE SPECIALIST

## 2024-10-01 RX ORDER — FLUDEOXYGLUCOSE F 18 200 MCI/ML
10.2 INJECTION, SOLUTION INTRAVENOUS
Status: COMPLETED | OUTPATIENT
Start: 2024-10-01 | End: 2024-10-01

## 2024-10-01 RX ORDER — GADOTERATE MEGLUMINE 376.9 MG/ML
17 INJECTION INTRAVENOUS
Status: COMPLETED | OUTPATIENT
Start: 2024-10-01 | End: 2024-10-01

## 2024-10-02 ENCOUNTER — LAB (OUTPATIENT)
Dept: LAB | Facility: HOSPITAL | Age: 59
End: 2024-10-02
Payer: MEDICARE

## 2024-10-02 ENCOUNTER — APPOINTMENT (OUTPATIENT)
Dept: HEMATOLOGY/ONCOLOGY | Facility: HOSPITAL | Age: 59
End: 2024-10-02
Payer: MEDICARE

## 2024-10-02 ENCOUNTER — ONCOLOGY MEDICATION OUTREACH (OUTPATIENT)
Dept: HEMATOLOGY/ONCOLOGY | Facility: HOSPITAL | Age: 59
End: 2024-10-02

## 2024-10-02 ENCOUNTER — OFFICE VISIT (OUTPATIENT)
Dept: HEMATOLOGY/ONCOLOGY | Facility: HOSPITAL | Age: 59
End: 2024-10-02
Payer: MEDICARE

## 2024-10-02 VITALS
RESPIRATION RATE: 20 BRPM | SYSTOLIC BLOOD PRESSURE: 124 MMHG | TEMPERATURE: 96.4 F | HEART RATE: 105 BPM | OXYGEN SATURATION: 95 % | DIASTOLIC BLOOD PRESSURE: 74 MMHG | WEIGHT: 173.28 LBS | HEIGHT: 62 IN | BODY MASS INDEX: 31.89 KG/M2

## 2024-10-02 DIAGNOSIS — C34.32 SMALL CELL CARCINOMA OF LOWER LOBE OF LEFT LUNG: ICD-10-CM

## 2024-10-02 DIAGNOSIS — C34.90 SMALL CELL CARCINOMA OF LUNG, UNSPECIFIED LATERALITY, UNSPECIFIED PART OF LUNG: ICD-10-CM

## 2024-10-02 DIAGNOSIS — C34.90 SMALL CELL CARCINOMA OF LUNG, UNSPECIFIED LATERALITY, UNSPECIFIED PART OF LUNG: Primary | ICD-10-CM

## 2024-10-02 DIAGNOSIS — C34.90 MALIGNANT NEOPLASM OF LUNG, UNSPECIFIED LATERALITY, UNSPECIFIED PART OF LUNG (MULTI): ICD-10-CM

## 2024-10-02 DIAGNOSIS — C34.32 SMALL CELL CARCINOMA OF LOWER LOBE OF LEFT LUNG: Primary | ICD-10-CM

## 2024-10-02 LAB
ALBUMIN SERPL BCP-MCNC: 4.3 G/DL (ref 3.4–5)
ALP SERPL-CCNC: 103 U/L (ref 33–136)
ALT SERPL W P-5'-P-CCNC: 24 U/L (ref 7–45)
ANION GAP SERPL CALC-SCNC: 16 MMOL/L (ref 10–20)
AST SERPL W P-5'-P-CCNC: 20 U/L (ref 9–39)
BASOPHILS # BLD AUTO: 0.03 X10*3/UL (ref 0–0.1)
BASOPHILS NFR BLD AUTO: 0.3 %
BILIRUB SERPL-MCNC: 0.4 MG/DL (ref 0–1.2)
BUN SERPL-MCNC: 13 MG/DL (ref 6–23)
CALCIUM SERPL-MCNC: 9.5 MG/DL (ref 8.6–10.3)
CHLORIDE SERPL-SCNC: 104 MMOL/L (ref 98–107)
CO2 SERPL-SCNC: 24 MMOL/L (ref 21–32)
CREAT SERPL-MCNC: 0.74 MG/DL (ref 0.5–1.05)
EGFRCR SERPLBLD CKD-EPI 2021: >90 ML/MIN/1.73M*2
EOSINOPHIL # BLD AUTO: 0.14 X10*3/UL (ref 0–0.7)
EOSINOPHIL NFR BLD AUTO: 1.6 %
ERYTHROCYTE [DISTWIDTH] IN BLOOD BY AUTOMATED COUNT: 14.9 % (ref 11.5–14.5)
GLUCOSE SERPL-MCNC: 108 MG/DL (ref 74–99)
HBV CORE AB SER QL: NONREACTIVE
HBV SURFACE AB SER-ACNC: <3.1 MIU/ML
HBV SURFACE AG SERPL QL IA: NONREACTIVE
HCT VFR BLD AUTO: 47.5 % (ref 36–46)
HGB BLD-MCNC: 15.6 G/DL (ref 12–16)
HOLD SPECIMEN: NORMAL
IMM GRANULOCYTES # BLD AUTO: 0.03 X10*3/UL (ref 0–0.7)
IMM GRANULOCYTES NFR BLD AUTO: 0.3 % (ref 0–0.9)
LDH SERPL L TO P-CCNC: 190 U/L (ref 84–246)
LYMPHOCYTES # BLD AUTO: 1.99 X10*3/UL (ref 1.2–4.8)
LYMPHOCYTES NFR BLD AUTO: 22.3 %
MCH RBC QN AUTO: 27.8 PG (ref 26–34)
MCHC RBC AUTO-ENTMCNC: 32.8 G/DL (ref 32–36)
MCV RBC AUTO: 85 FL (ref 80–100)
MONOCYTES # BLD AUTO: 0.54 X10*3/UL (ref 0.1–1)
MONOCYTES NFR BLD AUTO: 6.1 %
NEUTROPHILS # BLD AUTO: 6.18 X10*3/UL (ref 1.2–7.7)
NEUTROPHILS NFR BLD AUTO: 69.4 %
NRBC BLD-RTO: 0 /100 WBCS (ref 0–0)
PLATELET # BLD AUTO: 337 X10*3/UL (ref 150–450)
POTASSIUM SERPL-SCNC: 4.4 MMOL/L (ref 3.5–5.3)
PROT SERPL-MCNC: 7.1 G/DL (ref 6.4–8.2)
RBC # BLD AUTO: 5.61 X10*6/UL (ref 4–5.2)
SODIUM SERPL-SCNC: 140 MMOL/L (ref 136–145)
WBC # BLD AUTO: 8.9 X10*3/UL (ref 4.4–11.3)

## 2024-10-02 PROCEDURE — 87340 HEPATITIS B SURFACE AG IA: CPT

## 2024-10-02 PROCEDURE — 3048F LDL-C <100 MG/DL: CPT | Performed by: INTERNAL MEDICINE

## 2024-10-02 PROCEDURE — 86706 HEP B SURFACE ANTIBODY: CPT

## 2024-10-02 PROCEDURE — 84075 ASSAY ALKALINE PHOSPHATASE: CPT

## 2024-10-02 PROCEDURE — 3008F BODY MASS INDEX DOCD: CPT | Performed by: INTERNAL MEDICINE

## 2024-10-02 PROCEDURE — 86704 HEP B CORE ANTIBODY TOTAL: CPT

## 2024-10-02 PROCEDURE — 84182 PROTEIN WESTERN BLOT TEST: CPT

## 2024-10-02 PROCEDURE — 83615 LACTATE (LD) (LDH) ENZYME: CPT

## 2024-10-02 PROCEDURE — 86596 VOLTAGE-GTD CA CHNL ANTB EA: CPT

## 2024-10-02 PROCEDURE — 3074F SYST BP LT 130 MM HG: CPT | Performed by: INTERNAL MEDICINE

## 2024-10-02 PROCEDURE — 99205 OFFICE O/P NEW HI 60 MIN: CPT | Performed by: INTERNAL MEDICINE

## 2024-10-02 PROCEDURE — 36415 COLL VENOUS BLD VENIPUNCTURE: CPT

## 2024-10-02 PROCEDURE — 3044F HG A1C LEVEL LT 7.0%: CPT | Performed by: INTERNAL MEDICINE

## 2024-10-02 PROCEDURE — 85025 COMPLETE CBC W/AUTO DIFF WBC: CPT

## 2024-10-02 PROCEDURE — 99215 OFFICE O/P EST HI 40 MIN: CPT | Performed by: INTERNAL MEDICINE

## 2024-10-02 PROCEDURE — 3078F DIAST BP <80 MM HG: CPT | Performed by: INTERNAL MEDICINE

## 2024-10-02 PROCEDURE — 3061F NEG MICROALBUMINURIA REV: CPT | Performed by: INTERNAL MEDICINE

## 2024-10-02 RX ORDER — PROCHLORPERAZINE EDISYLATE 5 MG/ML
10 INJECTION INTRAMUSCULAR; INTRAVENOUS EVERY 6 HOURS PRN
OUTPATIENT
Start: 2024-10-07

## 2024-10-02 RX ORDER — FAMOTIDINE 10 MG/ML
20 INJECTION INTRAVENOUS ONCE AS NEEDED
Status: CANCELLED | OUTPATIENT
Start: 2024-10-04

## 2024-10-02 RX ORDER — FAMOTIDINE 10 MG/ML
20 INJECTION INTRAVENOUS ONCE AS NEEDED
OUTPATIENT
Start: 2024-10-07

## 2024-10-02 RX ORDER — EPINEPHRINE 0.3 MG/.3ML
0.3 INJECTION SUBCUTANEOUS EVERY 5 MIN PRN
OUTPATIENT
Start: 2024-10-08

## 2024-10-02 RX ORDER — DIPHENHYDRAMINE HYDROCHLORIDE 50 MG/ML
50 INJECTION INTRAMUSCULAR; INTRAVENOUS AS NEEDED
OUTPATIENT
Start: 2024-10-07

## 2024-10-02 RX ORDER — PALONOSETRON 0.05 MG/ML
0.25 INJECTION, SOLUTION INTRAVENOUS ONCE
Status: CANCELLED | OUTPATIENT
Start: 2024-10-04

## 2024-10-02 RX ORDER — ALBUTEROL SULFATE 0.83 MG/ML
3 SOLUTION RESPIRATORY (INHALATION) AS NEEDED
Status: CANCELLED | OUTPATIENT
Start: 2024-10-04

## 2024-10-02 RX ORDER — OLANZAPINE 5 MG/1
5 TABLET ORAL NIGHTLY
Qty: 4 TABLET | Refills: 5 | Status: SHIPPED | OUTPATIENT
Start: 2024-10-02

## 2024-10-02 RX ORDER — ONDANSETRON HYDROCHLORIDE 2 MG/ML
8 INJECTION, SOLUTION INTRAVENOUS ONCE
OUTPATIENT
Start: 2024-10-08

## 2024-10-02 RX ORDER — DIPHENHYDRAMINE HYDROCHLORIDE 50 MG/ML
50 INJECTION INTRAMUSCULAR; INTRAVENOUS AS NEEDED
OUTPATIENT
Start: 2024-10-08

## 2024-10-02 RX ORDER — PROCHLORPERAZINE MALEATE 10 MG
10 TABLET ORAL EVERY 6 HOURS PRN
OUTPATIENT
Start: 2024-10-07

## 2024-10-02 RX ORDER — PROCHLORPERAZINE EDISYLATE 5 MG/ML
10 INJECTION INTRAMUSCULAR; INTRAVENOUS EVERY 6 HOURS PRN
Status: CANCELLED | OUTPATIENT
Start: 2024-10-04

## 2024-10-02 RX ORDER — PROCHLORPERAZINE MALEATE 10 MG
10 TABLET ORAL EVERY 6 HOURS PRN
Qty: 30 TABLET | Refills: 5 | Status: SHIPPED | OUTPATIENT
Start: 2024-10-02

## 2024-10-02 RX ORDER — ONDANSETRON HYDROCHLORIDE 8 MG/1
8 TABLET, FILM COATED ORAL EVERY 8 HOURS PRN
Qty: 30 TABLET | Refills: 5 | Status: SHIPPED | OUTPATIENT
Start: 2024-10-02

## 2024-10-02 RX ORDER — PROCHLORPERAZINE MALEATE 10 MG
10 TABLET ORAL EVERY 6 HOURS PRN
Status: CANCELLED | OUTPATIENT
Start: 2024-10-04

## 2024-10-02 RX ORDER — ALBUTEROL SULFATE 0.83 MG/ML
3 SOLUTION RESPIRATORY (INHALATION) AS NEEDED
OUTPATIENT
Start: 2024-10-08

## 2024-10-02 RX ORDER — PROCHLORPERAZINE EDISYLATE 5 MG/ML
10 INJECTION INTRAMUSCULAR; INTRAVENOUS EVERY 6 HOURS PRN
OUTPATIENT
Start: 2024-10-08

## 2024-10-02 RX ORDER — FAMOTIDINE 10 MG/ML
20 INJECTION INTRAVENOUS ONCE AS NEEDED
OUTPATIENT
Start: 2024-10-08

## 2024-10-02 RX ORDER — ALBUTEROL SULFATE 0.83 MG/ML
3 SOLUTION RESPIRATORY (INHALATION) AS NEEDED
OUTPATIENT
Start: 2024-10-07

## 2024-10-02 RX ORDER — PROCHLORPERAZINE MALEATE 10 MG
10 TABLET ORAL EVERY 6 HOURS PRN
OUTPATIENT
Start: 2024-10-08

## 2024-10-02 RX ORDER — DIPHENHYDRAMINE HYDROCHLORIDE 50 MG/ML
50 INJECTION INTRAMUSCULAR; INTRAVENOUS AS NEEDED
Status: CANCELLED | OUTPATIENT
Start: 2024-10-04

## 2024-10-02 RX ORDER — EPINEPHRINE 0.3 MG/.3ML
0.3 INJECTION SUBCUTANEOUS EVERY 5 MIN PRN
OUTPATIENT
Start: 2024-10-07

## 2024-10-02 RX ORDER — EPINEPHRINE 0.3 MG/.3ML
0.3 INJECTION SUBCUTANEOUS EVERY 5 MIN PRN
Status: CANCELLED | OUTPATIENT
Start: 2024-10-04

## 2024-10-02 ASSESSMENT — PAIN SCALES - GENERAL: PAINLEVEL: 0-NO PAIN

## 2024-10-02 ASSESSMENT — PATIENT HEALTH QUESTIONNAIRE - PHQ9
SUM OF ALL RESPONSES TO PHQ9 QUESTIONS 1 AND 2: 0
1. LITTLE INTEREST OR PLEASURE IN DOING THINGS: NOT AT ALL
2. FEELING DOWN, DEPRESSED OR HOPELESS: NOT AT ALL

## 2024-10-02 NOTE — PROGRESS NOTES
ONCOLOGY CLINICAL PHARMACY NOTE     Subjective  Minal Sun is a 59 y.o. 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   Patient was seen in clinic today by Dr. Kohli to discuss systemic treatment initiation.     Objective  There were no vitals taken for this visit.  Lab Results   Component Value Date    WBC 12.1 (H) 09/16/2024    HGB 14.0 09/16/2024    HCT 44.1 09/16/2024    MCV 86 09/16/2024     09/16/2024      Lab Results   Component Value Date    GLUCOSE 101 (H) 09/16/2024    CALCIUM 9.8 09/16/2024     09/16/2024    K 4.4 09/16/2024    CO2 25 09/16/2024     09/16/2024    BUN 12 09/16/2024    CREATININE 0.78 09/16/2024     Lab Results   Component Value Date    ALT 19 09/16/2024    AST 14 09/16/2024    ALKPHOS 74 09/16/2024    BILITOT 0.4 09/16/2024       Allergies  Allergies   Allergen Reactions    Clindamycin Diarrhea    Erythromycin Diarrhea    Erythromycin Base Other and Nausea Only    Oxycodone-Acetaminophen Hives     anxiety       Medications  Were medications reconciled this encounter: No     Current Outpatient Medications:     budesonide (Pulmicort) 0.5 mg/2 mL nebulizer solution, Take 2 mL (0.5 mg) by nebulization 2 times a day., Disp: , Rfl:     buPROPion (Wellbutrin) 75 mg tablet, Take 1 tablet (75 mg) by mouth 3 times a day., Disp: , Rfl:     carbinoxamine maleate 4 mg tablet, Take 4 mg by mouth 2 times a day., Disp: , Rfl:     escitalopram (Lexapro) 20 mg tablet, Take 1 tablet (20 mg) by mouth once daily., Disp: , Rfl:     metFORMIN  mg 24 hr tablet, Take 1 tablet (500 mg) by mouth 2 times a day., Disp: 180 tablet, Rfl: 3    OLANZapine (ZyPREXA) 5 mg tablet, Take 1 tablet (5 mg) by mouth once daily at bedtime. For 4 days starting the evening of treatment., Disp: 4 tablet, Rfl: 5    omeprazole (PriLOSEC) 40 mg DR capsule, Take 1 capsule (40 mg) by mouth once daily., Disp: 90 capsule, Rfl: 3    ondansetron (Zofran) 8 mg tablet, Take 1 tablet (8 mg) by mouth every 8  hours if needed for nausea or vomiting., Disp: 30 tablet, Rfl: 5    ProAir HFA 90 mcg/actuation inhaler, Inhale 2 puffs every 4 hours if needed., Disp: , Rfl:     prochlorperazine (Compazine) 10 mg tablet, Take 1 tablet (10 mg) by mouth every 6 hours if needed for nausea or vomiting., Disp: 30 tablet, Rfl: 5    rosuvastatin (Crestor) 10 mg tablet, Take 1 tablet (10 mg) by mouth once daily., Disp: 90 tablet, Rfl: 3    traZODone (Desyrel) 50 mg tablet, Take 1 tablet (50 mg) by mouth once daily at bedtime., Disp: , Rfl:     Assessment and Plan  Minal Sun is a 59 y.o. 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    Chemotherapy:  Plan for C1D1 Fri (10/4), Days 2-3 Mon-Tue (10/7-10/8) per scheduling  Drug interactions identified: None   Dose adjustments (renal/hepatic, toxicities): None currently  Patient was counseled on the following:   Carboplatin  Administration schedule: goal AUC of 5 Day 1 of every 21-day cycle (4 cycles)  Side effects counseled on: decreased cell counts (thrombocytopenia, neutropenia, anemia), nausea  Etoposide  Administration schedule: 100 mg/m2 Days 1-3 of every 21-day cycle (4 cycles)  Side effects counseled on: myelosuppression, hair loss    Supportive Care:  CINV:  Pre-meds day of treatment: Aloxi, Emend, dexamethasone  Prescriptions released from [place] to patient's home pharmacy: Zyprexa nightly x4 nights starting Day 1 of each cycle, Zofran prn (first line), Compazine prn (second line; first line for 2-3 days following Day 1)  Growth Factor Support: None currently    Instructed patient to alert team and go to Emergency Department for fevers of at least 100.4 degrees F, or other changes in clinical status. All questions were answered and my contact information was provided to patient.    Jory Evans [facility]  Clinical Pharmacist - Thoracic

## 2024-10-02 NOTE — PROGRESS NOTES
Patient ID: Minal Sun is a 59 y.o. female.  Referring Physician: No referring provider defined for this encounter.  Primary Care Provider: Christopher D'Amico, DO     DIAGNOSIS    Small cell carcinoma of the lung.  Date of diagnosis is September 27 from a bronchoscopic biopsy of a subcarinal lymph node.  Immunohistochemistry positive for TTF-1, INSM1, synaptophysin and chromogranin, negative for p40, RB immunostain shows loss of nuclear expression.      STAGING    Clinical T4, N2, M1 C, stage IVc, extensive stage disease      CURRENT SITES OF DISEASE     Left lung, mediastinum, left hilum of the lung, liver, intra-abdominal lymph nodes in the portacaval and periportal region, bone metastases      MOLECULAR GENOMICS    Not yet performed      SERUM TUMOR MARKERS    Baseline LDH within normal limits      PRIOR THERAPY    None      CURRENT THERAPY    Combination chemotherapy and immunotherapy.  Plan to initiate therapy on October 4, 2024.  Consideration for clinical trial with Lutethera in addition to standard treatment.  Consent given to patient.  If agrees we will proceed with evaluation for cycle #2.      CURRENT ONCOLOGICAL PROBLEMS    1-cough and shortness of breath      HISTORY OF PRESENT ILLNESS    This is a 59-year-old patient.  She states that she was feeling sick toward the end of spring of this year with some sinus problems.  Was seen by her allergist and was treated with antibiotics.  She was also seen at 1 point by primary care and steroids and antibiotics and inhalers were given.  She did not have any resolution and then ultimately developed a little bit of the hemoptysis which led to a chest x-ray showing an abnormality and finally a CT scan of the chest.The CT scan of the chest was done as a lung cancer screening low-dose CT and completed on September 20, 2024.  This demonstrated a extensive infiltrative mediastinal and hilar mass.  There was narrowing of the left mainstem bronchus and lower  lobe bronchus secondary to extrinsic compression.  The predominant growth was within the left hilum and subcarinal region.  She underwent a bronchoscopy dated September 27, 2024 showing splaying of the main alanna.  There was mild erosion of a lymph node into the left mainstem bronchus.  There was erythema and mild erosions of an endobronchial tumor along the medial border.  Moderate to severe stenosis of the left lower lobe bronchus to 75%.      PAST MEDICAL HISTORY    1- Lumbar surgery about 30 years ago   2- hysterectomy in 2000  3- Diabetes  4- COPD  5- Right eye exophthalmus      SOCIAL HISTORY    Patient lives with her sister.  She lives in [facility].  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  She started smoking at the age of 14 and continues to smoke at the age of 59.  Has smoked for 45 years on average 1 pack/day.      CURRENT MEDS    See medication list, meds reviewed, includes metformin, rosuvastatin, been done so Nied inhalers, Wellbutrin escitalopram, trazodone      ALLERGIES    Patient denies any drug allergies      FAMILY HISTORY     Mother had bladder cancer and possibly breast cancer.    Subjective    HPI    Review of Systems   Constitutional:  Positive for fatigue. Negative for appetite change, chills, diaphoresis, fever and unexpected weight change.   HENT:   Negative for hearing loss, lump/mass, mouth sores, nosebleeds, sore throat, tinnitus and trouble swallowing.    Eyes:  Negative for eye problems and icterus.   Respiratory:  Positive for chest tightness, cough, hemoptysis and shortness of breath. Negative for wheezing.    Cardiovascular:  Negative for chest pain, leg swelling and palpitations.   Gastrointestinal:  Negative for abdominal distention, abdominal pain, blood in stool, constipation, diarrhea, nausea, rectal pain and vomiting.   Genitourinary:   "Positive for bladder incontinence. Negative for difficulty urinating, dyspareunia, dysuria, frequency, hematuria and nocturia.    Musculoskeletal:  Negative for arthralgias, back pain, flank pain, gait problem, myalgias, neck pain and neck stiffness.   Skin:  Negative for itching and rash.   Neurological:  Negative for dizziness, extremity weakness, gait problem, headaches, light-headedness, numbness, seizures and speech difficulty.   Hematological:  Negative for adenopathy. Does not bruise/bleed easily.   Psychiatric/Behavioral:  Negative for confusion, decreased concentration, depression, sleep disturbance and suicidal ideas. The patient is nervous/anxious.         Objective   BSA: 1.86 meters squared  /74 (BP Location: Left arm, Patient Position: Sitting, BP Cuff Size: Adult)   Pulse 105   Temp 35.8 °C (96.4 °F) (Temporal)   Resp 20   Ht 1.587 m (5' 2.48\")   Wt 78.6 kg (173 lb 4.5 oz)   SpO2 95%   BMI 31.21 kg/m²       Physical Exam  Constitutional:       General: She is not in acute distress.     Appearance: Normal appearance. She is not ill-appearing, toxic-appearing or diaphoretic.   HENT:      Nose: No congestion or rhinorrhea.      Mouth/Throat:      Pharynx: Oropharynx is clear. No oropharyngeal exudate or posterior oropharyngeal erythema.   Eyes:      General: No scleral icterus.     Conjunctiva/sclera: Conjunctivae normal.      Comments: Chronic right eye exophthalmos   Cardiovascular:      Rate and Rhythm: Normal rate and regular rhythm.      Pulses: Normal pulses.      Heart sounds: Normal heart sounds. No murmur heard.     No friction rub. No gallop.   Pulmonary:      Effort: Pulmonary effort is normal. No respiratory distress.      Comments: Diminished breath and air movement in left base  Abdominal:      General: There is no distension.      Palpations: There is no mass.      Tenderness: There is no abdominal tenderness. There is no guarding or rebound.   Musculoskeletal:      Cervical " back: No tenderness.      Right lower leg: No edema.      Left lower leg: No edema.   Lymphadenopathy:      Cervical: No cervical adenopathy.   Skin:     General: Skin is warm.      Coloration: Skin is not jaundiced.      Findings: No bruising.   Neurological:      General: No focal deficit present.      Mental Status: She is oriented to person, place, and time.   Psychiatric:         Mood and Affect: Mood normal.         Behavior: Behavior normal.         Performance Status:  Symptomatic; fully ambulatory    FINAL DIAGNOSIS   A. LYMPH NODE, LEVEL 7, BIOPSY:   - Small cell carcinoma. See comment   Electronically signed by Eben Loza MD on 10/3/2024 at 0939        By the signature on this report, the individual or group listed as making the Final Interpretation/Diagnosis certifies that they have reviewed this case.    Comment    The tumor cells are immunohistochemically positive for CAM5.2, TTF1, INSM1, synaptophysin, chromogranin, while they are negative for p40. Rb immunostain shows loss of nuclear expression.        NM PET CT LUNG CA STAGING; 10/1/2024   IMPRESSION:  1.  Intensely hypermetabolic left perihilar mass with mediastinal  infiltration compatible with biopsy-proven primary lung cancer.  2. Innumerable hypermetabolic hypodense lesions throughout the liver  consistent with hepatic metastasis.  3. Hypermetabolic mediastinal, periportal, and portacaval lymph nodes  are consistent with ace disease.  4. Innumerable intensely hypermetabolic lytic osseous lesions  throughout the axial and appendicular skeleton consistent with  diffuse osseous metastasis.    MR BRAIN W AND WO IV CONTRAST dated October 1, 2024  IMPRESSION:  No acute intracranial abnormality. No evidence of intracranial  metastatic disease.        Assessment/Plan      This is a very nice 59-year-old patient who is found to have extensive stage small cell carcinoma of the lung originating from the left hilar region infiltrating the mediastinum  with bone and abdominal metastases.  Her symptoms are mainly respiratory at this point with cough hemoptysis shortness of breath and chest tightness.    I had a lengthy discussion with the patient and her sister-in-law that was present.  I explained the diagnosis, the histologic subtype, the natural history of the disease as well as its rapid growth rate in association with cigarette smoking.  We talked about her current sites of disease and stage and understanding this is stage IV and not curable.    We talked about the standard approach to extensive stage small cell lung cancer with combination of chemotherapy and immunotherapy.  I explained to them that we would like to start this as soon as possible given her symptomatology and a volume of disease within her left lung.  They voiced understanding.  We also talked about response rates that are high but ultimate recurrence.  We also talked about potential involvement in a clinical trial and a consent form was given to her.               Jd Kohli MD

## 2024-10-03 ENCOUNTER — APPOINTMENT (OUTPATIENT)
Dept: HEMATOLOGY/ONCOLOGY | Facility: HOSPITAL | Age: 59
End: 2024-10-03
Payer: MEDICARE

## 2024-10-03 LAB
LAB AP ASR DISCLAIMER: NORMAL
LABORATORY COMMENT REPORT: NORMAL
PATH REPORT.COMMENTS IMP SPEC: NORMAL
PATH REPORT.FINAL DX SPEC: NORMAL
PATH REPORT.FINAL DX SPEC: NORMAL
PATH REPORT.GROSS SPEC: NORMAL
PATH REPORT.GROSS SPEC: NORMAL
PATH REPORT.INTRAOP OBS SPEC DOC: NORMAL
PATH REPORT.TOTAL CANCER: NORMAL
PATH REPORT.TOTAL CANCER: NORMAL
RESIDENT REVIEW: NORMAL

## 2024-10-04 ENCOUNTER — INFUSION (OUTPATIENT)
Dept: HEMATOLOGY/ONCOLOGY | Facility: HOSPITAL | Age: 59
End: 2024-10-04
Payer: MEDICARE

## 2024-10-04 VITALS
HEART RATE: 99 BPM | RESPIRATION RATE: 18 BRPM | WEIGHT: 174.38 LBS | HEIGHT: 62 IN | OXYGEN SATURATION: 94 % | TEMPERATURE: 98.8 F | SYSTOLIC BLOOD PRESSURE: 137 MMHG | DIASTOLIC BLOOD PRESSURE: 76 MMHG | BODY MASS INDEX: 32.09 KG/M2

## 2024-10-04 DIAGNOSIS — C34.90 SMALL CELL CARCINOMA OF LUNG, UNSPECIFIED LATERALITY, UNSPECIFIED PART OF LUNG: ICD-10-CM

## 2024-10-04 PROCEDURE — 2500000004 HC RX 250 GENERAL PHARMACY W/ HCPCS (ALT 636 FOR OP/ED): Performed by: INTERNAL MEDICINE

## 2024-10-04 PROCEDURE — 96375 TX/PRO/DX INJ NEW DRUG ADDON: CPT | Mod: INF

## 2024-10-04 PROCEDURE — 96413 CHEMO IV INFUSION 1 HR: CPT

## 2024-10-04 PROCEDURE — 96367 TX/PROPH/DG ADDL SEQ IV INF: CPT

## 2024-10-04 PROCEDURE — 96417 CHEMO IV INFUS EACH ADDL SEQ: CPT

## 2024-10-04 RX ORDER — ALBUTEROL SULFATE 0.83 MG/ML
3 SOLUTION RESPIRATORY (INHALATION) AS NEEDED
Status: DISCONTINUED | OUTPATIENT
Start: 2024-10-04 | End: 2024-10-04 | Stop reason: HOSPADM

## 2024-10-04 RX ORDER — DIPHENHYDRAMINE HYDROCHLORIDE 50 MG/ML
50 INJECTION INTRAMUSCULAR; INTRAVENOUS AS NEEDED
Status: DISCONTINUED | OUTPATIENT
Start: 2024-10-04 | End: 2024-10-04 | Stop reason: HOSPADM

## 2024-10-04 RX ORDER — PROCHLORPERAZINE EDISYLATE 5 MG/ML
10 INJECTION INTRAMUSCULAR; INTRAVENOUS EVERY 6 HOURS PRN
Status: DISCONTINUED | OUTPATIENT
Start: 2024-10-04 | End: 2024-10-04 | Stop reason: HOSPADM

## 2024-10-04 RX ORDER — PALONOSETRON 0.05 MG/ML
0.25 INJECTION, SOLUTION INTRAVENOUS ONCE
Status: COMPLETED | OUTPATIENT
Start: 2024-10-04 | End: 2024-10-04

## 2024-10-04 RX ORDER — PROCHLORPERAZINE MALEATE 10 MG
10 TABLET ORAL EVERY 6 HOURS PRN
Status: DISCONTINUED | OUTPATIENT
Start: 2024-10-04 | End: 2024-10-04 | Stop reason: HOSPADM

## 2024-10-04 RX ORDER — EPINEPHRINE 0.3 MG/.3ML
0.3 INJECTION SUBCUTANEOUS EVERY 5 MIN PRN
Status: DISCONTINUED | OUTPATIENT
Start: 2024-10-04 | End: 2024-10-04 | Stop reason: HOSPADM

## 2024-10-04 RX ORDER — FAMOTIDINE 10 MG/ML
20 INJECTION INTRAVENOUS ONCE AS NEEDED
Status: DISCONTINUED | OUTPATIENT
Start: 2024-10-04 | End: 2024-10-04 | Stop reason: HOSPADM

## 2024-10-04 NOTE — PROGRESS NOTES
Minal Sun is a 59 y.o. female who presents for Follow-up (INFUSION).    She is on the following chemotherapy regimen: D1C1    Treatment Plans       Name Type Plan Dates Plan Provider         Active    CARBOplatin / Etoposide, 21 Day Cycles - Lung Oncology Treatment  10/3/2024 - Present dJ Kohli MD                    Labs drawn prior to arrival. Assessment completed. Pt tolerated all infusions well. Education provided about home medication use and treatment schedule. All questions answered. Pt discharged in stable condition.

## 2024-10-05 LAB
HU1 AB SER QL IB: NEGATIVE
HU2 AB SER QL IB: NEGATIVE
PCA-TR AB SER QL IB: NEGATIVE
PURKINJE CELLS AB SER QL IB: NEGATIVE
VGCC-P/Q BIND AB SER-SCNC: 0 PMOL/L (ref 0–24.5)

## 2024-10-06 ASSESSMENT — ENCOUNTER SYMPTOMS
HEMOPTYSIS: 1
SPEECH DIFFICULTY: 0
SORE THROAT: 0
FATIGUE: 1
DIZZINESS: 0
FREQUENCY: 0
APPETITE CHANGE: 0
EYE PROBLEMS: 0
VOMITING: 0
NERVOUS/ANXIOUS: 1
ABDOMINAL PAIN: 0
DECREASED CONCENTRATION: 0
UNEXPECTED WEIGHT CHANGE: 0
TROUBLE SWALLOWING: 0
BACK PAIN: 0
SHORTNESS OF BREATH: 1
DYSURIA: 0
CONSTIPATION: 0
DIAPHORESIS: 0
SLEEP DISTURBANCE: 0
EXTREMITY WEAKNESS: 0
CHEST TIGHTNESS: 1
SEIZURES: 0
LEG SWELLING: 0
PALPITATIONS: 0
FLANK PAIN: 0
LIGHT-HEADEDNESS: 0
NUMBNESS: 0
FEVER: 0
DIFFICULTY URINATING: 0
NECK STIFFNESS: 0
HEMATURIA: 0
BRUISES/BLEEDS EASILY: 0
WHEEZING: 0
DIARRHEA: 0
HEADACHES: 0
CHILLS: 0
ARTHRALGIAS: 0
ADENOPATHY: 0
SCLERAL ICTERUS: 0
MYALGIAS: 0
COUGH: 1
RECTAL PAIN: 0
ABDOMINAL DISTENTION: 0
BLOOD IN STOOL: 0
CONFUSION: 0
NECK PAIN: 0
DEPRESSION: 0
NAUSEA: 0

## 2024-10-07 ENCOUNTER — INFUSION (OUTPATIENT)
Dept: HEMATOLOGY/ONCOLOGY | Facility: HOSPITAL | Age: 59
End: 2024-10-07
Payer: MEDICARE

## 2024-10-07 VITALS
OXYGEN SATURATION: 96 % | HEART RATE: 95 BPM | TEMPERATURE: 97.9 F | WEIGHT: 179.01 LBS | RESPIRATION RATE: 18 BRPM | DIASTOLIC BLOOD PRESSURE: 64 MMHG | SYSTOLIC BLOOD PRESSURE: 134 MMHG | BODY MASS INDEX: 32.24 KG/M2

## 2024-10-07 DIAGNOSIS — C34.90 SMALL CELL CARCINOMA OF LUNG, UNSPECIFIED LATERALITY, UNSPECIFIED PART OF LUNG: ICD-10-CM

## 2024-10-07 DIAGNOSIS — Z00.6 EXAMINATION OF PARTICIPANT IN CLINICAL TRIAL: ICD-10-CM

## 2024-10-07 DIAGNOSIS — C34.90 SMALL CELL CARCINOMA OF LUNG, UNSPECIFIED LATERALITY, UNSPECIFIED PART OF LUNG: Primary | ICD-10-CM

## 2024-10-07 PROCEDURE — 96375 TX/PRO/DX INJ NEW DRUG ADDON: CPT | Mod: INF

## 2024-10-07 PROCEDURE — 2500000004 HC RX 250 GENERAL PHARMACY W/ HCPCS (ALT 636 FOR OP/ED): Performed by: INTERNAL MEDICINE

## 2024-10-07 PROCEDURE — 96413 CHEMO IV INFUSION 1 HR: CPT

## 2024-10-07 RX ORDER — FAMOTIDINE 10 MG/ML
20 INJECTION INTRAVENOUS ONCE AS NEEDED
Status: DISCONTINUED | OUTPATIENT
Start: 2024-10-07 | End: 2024-10-07 | Stop reason: HOSPADM

## 2024-10-07 RX ORDER — DIPHENHYDRAMINE HYDROCHLORIDE 50 MG/ML
50 INJECTION INTRAMUSCULAR; INTRAVENOUS AS NEEDED
Status: DISCONTINUED | OUTPATIENT
Start: 2024-10-07 | End: 2024-10-07 | Stop reason: HOSPADM

## 2024-10-07 RX ORDER — EPINEPHRINE 0.3 MG/.3ML
0.3 INJECTION SUBCUTANEOUS EVERY 5 MIN PRN
Status: DISCONTINUED | OUTPATIENT
Start: 2024-10-07 | End: 2024-10-07 | Stop reason: HOSPADM

## 2024-10-07 RX ORDER — PROCHLORPERAZINE MALEATE 10 MG
10 TABLET ORAL EVERY 6 HOURS PRN
Status: DISCONTINUED | OUTPATIENT
Start: 2024-10-07 | End: 2024-10-07 | Stop reason: HOSPADM

## 2024-10-07 RX ORDER — PROCHLORPERAZINE EDISYLATE 5 MG/ML
10 INJECTION INTRAMUSCULAR; INTRAVENOUS EVERY 6 HOURS PRN
Status: DISCONTINUED | OUTPATIENT
Start: 2024-10-07 | End: 2024-10-07 | Stop reason: HOSPADM

## 2024-10-07 RX ORDER — ALBUTEROL SULFATE 0.83 MG/ML
3 SOLUTION RESPIRATORY (INHALATION) AS NEEDED
Status: DISCONTINUED | OUTPATIENT
Start: 2024-10-07 | End: 2024-10-07 | Stop reason: HOSPADM

## 2024-10-07 ASSESSMENT — PAIN SCALES - GENERAL: PAINLEVEL: 0-NO PAIN

## 2024-10-07 NOTE — PROGRESS NOTES
[facility] Infusion Nursing Note  10/07/24    Minal Sun is a 59 y.o. year old female patient presenting to outpatient infusion for cycle 1 day 2 of the following regimen:    Treatment Plans       Name Type Plan Dates Plan Provider         Active    CARBOplatin / Etoposide, 21 Day Cycles - Lung Oncology Treatment  10/3/2024 - Present Jd Kohli MD                  Since the last visit, she reports doing well. Overall, she states that energy level is good. Appetite has been unchanged and good. she reports no complaints.     Line type: PIV  Line removed/maintained prior to discharge: removed    Administrations This Visit       dexAMETHasone (Decadron) injection 8 mg       Admin Date  10/07/2024 Action  Given Dose  8 mg Route  intravenous Documented By  Elena Mosqueda RN              etoposide (Toposar) 186 mg in sodium chloride 0.9% 556.3 mL IV       Admin Date  10/07/2024 Action  New Bag Dose  186 mg Rate  556.3 mL/hr Route  intravenous Documented By  Elena Mosqueda RN                  Hypersensitivity reaction noted: No  Patient tolerated treatment well. Discharged home in stable condition.    Follow-up Plan: 10/8    ELENA MOSQUEDA RN

## 2024-10-08 ENCOUNTER — INFUSION (OUTPATIENT)
Dept: HEMATOLOGY/ONCOLOGY | Facility: HOSPITAL | Age: 59
End: 2024-10-08
Payer: MEDICARE

## 2024-10-08 VITALS
TEMPERATURE: 97.7 F | OXYGEN SATURATION: 99 % | BODY MASS INDEX: 31.7 KG/M2 | RESPIRATION RATE: 18 BRPM | WEIGHT: 176 LBS | DIASTOLIC BLOOD PRESSURE: 69 MMHG | SYSTOLIC BLOOD PRESSURE: 141 MMHG

## 2024-10-08 DIAGNOSIS — C34.90 SMALL CELL CARCINOMA OF LUNG, UNSPECIFIED LATERALITY, UNSPECIFIED PART OF LUNG: ICD-10-CM

## 2024-10-08 PROCEDURE — 96375 TX/PRO/DX INJ NEW DRUG ADDON: CPT | Mod: INF

## 2024-10-08 PROCEDURE — 96413 CHEMO IV INFUSION 1 HR: CPT

## 2024-10-08 PROCEDURE — 2500000004 HC RX 250 GENERAL PHARMACY W/ HCPCS (ALT 636 FOR OP/ED): Performed by: INTERNAL MEDICINE

## 2024-10-08 RX ORDER — HEPARIN 100 UNIT/ML
500 SYRINGE INTRAVENOUS AS NEEDED
OUTPATIENT
Start: 2024-10-08

## 2024-10-08 RX ORDER — ONDANSETRON HYDROCHLORIDE 2 MG/ML
8 INJECTION, SOLUTION INTRAVENOUS ONCE
Status: COMPLETED | OUTPATIENT
Start: 2024-10-08 | End: 2024-10-08

## 2024-10-08 RX ORDER — HEPARIN SODIUM,PORCINE/PF 10 UNIT/ML
50 SYRINGE (ML) INTRAVENOUS AS NEEDED
OUTPATIENT
Start: 2024-10-08

## 2024-10-08 RX ORDER — PROCHLORPERAZINE EDISYLATE 5 MG/ML
10 INJECTION INTRAMUSCULAR; INTRAVENOUS EVERY 6 HOURS PRN
Status: DISCONTINUED | OUTPATIENT
Start: 2024-10-08 | End: 2024-10-08 | Stop reason: HOSPADM

## 2024-10-08 RX ORDER — PROCHLORPERAZINE MALEATE 10 MG
10 TABLET ORAL EVERY 6 HOURS PRN
Status: DISCONTINUED | OUTPATIENT
Start: 2024-10-08 | End: 2024-10-08 | Stop reason: HOSPADM

## 2024-10-08 ASSESSMENT — PAIN SCALES - GENERAL: PAINLEVEL: 0-NO PAIN

## 2024-10-08 NOTE — PROGRESS NOTES
Pt arrived for C1D3 etoposide infusion. Ambulated back to chair without difficulty. IV started to left AC, positive blood return, flushes without difficulty. Per-meds given as ordered. Etoposide infusion completed without complication. AVS provided. IV removed, gauze and coban applied. Pt discharged from clinic.

## 2024-10-09 ENCOUNTER — APPOINTMENT (OUTPATIENT)
Dept: PHARMACY | Facility: HOSPITAL | Age: 59
End: 2024-10-09
Payer: MEDICARE

## 2024-10-11 ENCOUNTER — HOSPITAL ENCOUNTER (OUTPATIENT)
Dept: RADIOLOGY | Facility: HOSPITAL | Age: 59
Discharge: HOME | End: 2024-10-11
Payer: MEDICARE

## 2024-10-11 DIAGNOSIS — Z00.6 EXAMINATION OF PARTICIPANT IN CLINICAL TRIAL: ICD-10-CM

## 2024-10-11 PROCEDURE — 78815 PET IMAGE W/CT SKULL-THIGH: CPT | Mod: PS

## 2024-10-11 PROCEDURE — 3430000001 HC RX 343 DIAGNOSTIC RADIOPHARMACEUTICALS: Performed by: INTERNAL MEDICINE

## 2024-10-11 PROCEDURE — A9587 GALLIUM GA-68: HCPCS | Performed by: INTERNAL MEDICINE

## 2024-10-13 DIAGNOSIS — Z00.6 EXAMINATION OF PARTICIPANT IN CLINICAL TRIAL: ICD-10-CM

## 2024-10-13 DIAGNOSIS — C34.90 SMALL CELL CARCINOMA OF LUNG, UNSPECIFIED LATERALITY, UNSPECIFIED PART OF LUNG: ICD-10-CM

## 2024-10-14 ENCOUNTER — APPOINTMENT (OUTPATIENT)
Dept: RADIOLOGY | Facility: CLINIC | Age: 59
End: 2024-10-14
Payer: MEDICARE

## 2024-10-14 LAB
DNA RANGE(S) EXAMINED NAR: NORMAL
GENE DIS ANL INTERP-IMP: POSITIVE
GENE DIS ASSESSED: NORMAL
REASON FOR STUDY: NORMAL
TEMPUS BLOOD TUMOR MUTATIONAL BURDEN: 6.6 M/MB
TEMPUS LCA: NORMAL
TEMPUS MSI NOTE: NORMAL
TEMPUS PORTAL: NORMAL
TEMPUS THERAPY1: NORMAL
TEMPUS THERAPYCOUNT: 1
TEMPUS TRIALCOUNT: 3
TEMPUS TRIALMATCHES1: NORMAL
TEMPUS TRIALMATCHES2: NORMAL
TEMPUS TRIALMATCHES3: NORMAL

## 2024-10-15 ENCOUNTER — APPOINTMENT (OUTPATIENT)
Dept: PULMONOLOGY | Facility: CLINIC | Age: 59
End: 2024-10-15
Payer: MEDICARE

## 2024-10-17 ENCOUNTER — APPOINTMENT (OUTPATIENT)
Dept: RADIOLOGY | Facility: CLINIC | Age: 59
End: 2024-10-17
Payer: MEDICARE

## 2024-10-17 ENCOUNTER — HOSPITAL ENCOUNTER (OUTPATIENT)
Dept: RADIOLOGY | Facility: HOSPITAL | Age: 59
Discharge: HOME | End: 2024-10-17
Payer: MEDICARE

## 2024-10-17 ENCOUNTER — HOSPITAL ENCOUNTER (OUTPATIENT)
Dept: RADIOLOGY | Facility: HOSPITAL | Age: 59
End: 2024-10-17
Payer: MEDICARE

## 2024-10-17 ENCOUNTER — APPOINTMENT (OUTPATIENT)
Dept: RADIOLOGY | Facility: HOSPITAL | Age: 59
End: 2024-10-17
Payer: MEDICARE

## 2024-10-17 ENCOUNTER — OFFICE VISIT (OUTPATIENT)
Dept: HEMATOLOGY/ONCOLOGY | Facility: HOSPITAL | Age: 59
End: 2024-10-17
Payer: MEDICARE

## 2024-10-17 ENCOUNTER — LAB (OUTPATIENT)
Dept: LAB | Facility: HOSPITAL | Age: 59
End: 2024-10-17
Payer: MEDICARE

## 2024-10-17 ENCOUNTER — APPOINTMENT (OUTPATIENT)
Dept: HEMATOLOGY/ONCOLOGY | Facility: HOSPITAL | Age: 59
End: 2024-10-17
Payer: MEDICARE

## 2024-10-17 VITALS
OXYGEN SATURATION: 97 % | HEIGHT: 62 IN | RESPIRATION RATE: 14 BRPM | TEMPERATURE: 96.8 F | WEIGHT: 180.3 LBS | HEART RATE: 83 BPM | BODY MASS INDEX: 33.18 KG/M2 | DIASTOLIC BLOOD PRESSURE: 78 MMHG | SYSTOLIC BLOOD PRESSURE: 102 MMHG

## 2024-10-17 DIAGNOSIS — C34.90 SMALL CELL CARCINOMA OF LUNG, UNSPECIFIED LATERALITY, UNSPECIFIED PART OF LUNG: ICD-10-CM

## 2024-10-17 DIAGNOSIS — Z00.6 EXAMINATION OF PARTICIPANT IN CLINICAL TRIAL: ICD-10-CM

## 2024-10-17 DIAGNOSIS — C34.90 SMALL CELL CARCINOMA OF LUNG, UNSPECIFIED LATERALITY, UNSPECIFIED PART OF LUNG: Primary | ICD-10-CM

## 2024-10-17 LAB
ALBUMIN SERPL BCP-MCNC: 4.3 G/DL (ref 3.4–5)
ALP SERPL-CCNC: 98 U/L (ref 33–136)
ALT SERPL W P-5'-P-CCNC: 24 U/L (ref 7–45)
AMYLASE SERPL-CCNC: 53 U/L (ref 29–103)
ANION GAP SERPL CALC-SCNC: 13 MMOL/L (ref 10–20)
APPEARANCE UR: CLEAR
APTT PPP: 41 SECONDS (ref 27–38)
AST SERPL W P-5'-P-CCNC: 14 U/L (ref 9–39)
BASOPHILS # BLD AUTO: 0.02 X10*3/UL (ref 0–0.1)
BASOPHILS NFR BLD AUTO: 0.4 %
BILIRUB DIRECT SERPL-MCNC: 0.1 MG/DL (ref 0–0.3)
BILIRUB SERPL-MCNC: 0.3 MG/DL (ref 0–1.2)
BILIRUB UR STRIP.AUTO-MCNC: NEGATIVE MG/DL
BUN SERPL-MCNC: 9 MG/DL (ref 6–23)
CALCIUM SERPL-MCNC: 9.2 MG/DL (ref 8.6–10.3)
CHLORIDE SERPL-SCNC: 103 MMOL/L (ref 98–107)
CO2 SERPL-SCNC: 28 MMOL/L (ref 21–32)
COLOR UR: COLORLESS
CREAT SERPL-MCNC: 0.58 MG/DL (ref 0.5–1.05)
EGFRCR SERPLBLD CKD-EPI 2021: >90 ML/MIN/1.73M*2
EOSINOPHIL # BLD AUTO: 0.03 X10*3/UL (ref 0–0.7)
EOSINOPHIL NFR BLD AUTO: 0.6 %
ERYTHROCYTE [DISTWIDTH] IN BLOOD BY AUTOMATED COUNT: 14.5 % (ref 11.5–14.5)
FSH SERPL-ACNC: 71.2 IU/L
GGT SERPL-CCNC: 183 U/L (ref 5–55)
GLUCOSE SERPL-MCNC: 127 MG/DL (ref 74–99)
GLUCOSE UR STRIP.AUTO-MCNC: NORMAL MG/DL
HBV SURFACE AB SER-ACNC: <3.1 MIU/ML
HCT VFR BLD AUTO: 41.6 % (ref 36–46)
HCV AB SER QL: NONREACTIVE
HGB BLD-MCNC: 13.5 G/DL (ref 12–16)
HIV 1+2 AB+HIV1 P24 AG SERPL QL IA: NONREACTIVE
IMM GRANULOCYTES # BLD AUTO: 0.02 X10*3/UL (ref 0–0.7)
IMM GRANULOCYTES NFR BLD AUTO: 0.4 % (ref 0–0.9)
INR PPP: 0.9 (ref 0.9–1.1)
KETONES UR STRIP.AUTO-MCNC: NEGATIVE MG/DL
LDH SERPL L TO P-CCNC: 143 U/L (ref 84–246)
LEUKOCYTE ESTERASE UR QL STRIP.AUTO: NEGATIVE
LH SERPL-ACNC: 30.9 IU/L
LIPASE SERPL-CCNC: 23 U/L (ref 9–82)
LYMPHOCYTES # BLD AUTO: 1.71 X10*3/UL (ref 1.2–4.8)
LYMPHOCYTES NFR BLD AUTO: 31.8 %
MAGNESIUM SERPL-MCNC: 1.88 MG/DL (ref 1.6–2.4)
MCH RBC QN AUTO: 28 PG (ref 26–34)
MCHC RBC AUTO-ENTMCNC: 32.5 G/DL (ref 32–36)
MCV RBC AUTO: 86 FL (ref 80–100)
MONOCYTES # BLD AUTO: 0.34 X10*3/UL (ref 0.1–1)
MONOCYTES NFR BLD AUTO: 6.3 %
NEUTROPHILS # BLD AUTO: 3.25 X10*3/UL (ref 1.2–7.7)
NEUTROPHILS NFR BLD AUTO: 60.5 %
NITRITE UR QL STRIP.AUTO: NEGATIVE
NRBC BLD-RTO: 0 /100 WBCS (ref 0–0)
PH UR STRIP.AUTO: 5 [PH]
PLATELET # BLD AUTO: 255 X10*3/UL (ref 150–450)
POTASSIUM SERPL-SCNC: 4 MMOL/L (ref 3.5–5.3)
PROT SERPL-MCNC: 6.8 G/DL (ref 6.4–8.2)
PROT UR STRIP.AUTO-MCNC: NEGATIVE MG/DL
PROTHROMBIN TIME: 10.3 SECONDS (ref 9.8–12.8)
RBC # BLD AUTO: 4.82 X10*6/UL (ref 4–5.2)
RBC # UR STRIP.AUTO: NEGATIVE /UL
SODIUM SERPL-SCNC: 140 MMOL/L (ref 136–145)
SP GR UR STRIP.AUTO: 1.03
T4 FREE SERPL-MCNC: 1.22 NG/DL (ref 0.78–1.48)
TSH SERPL-ACNC: 2.59 MIU/L (ref 0.44–3.98)
URATE SERPL-MCNC: 3.6 MG/DL (ref 2.3–6.7)
UROBILINOGEN UR STRIP.AUTO-MCNC: NORMAL MG/DL
WBC # BLD AUTO: 5.4 X10*3/UL (ref 4.4–11.3)

## 2024-10-17 PROCEDURE — 83735 ASSAY OF MAGNESIUM: CPT

## 2024-10-17 PROCEDURE — 83001 ASSAY OF GONADOTROPIN (FSH): CPT

## 2024-10-17 PROCEDURE — 82248 BILIRUBIN DIRECT: CPT

## 2024-10-17 PROCEDURE — 85610 PROTHROMBIN TIME: CPT

## 2024-10-17 PROCEDURE — 85025 COMPLETE CBC W/AUTO DIFF WBC: CPT

## 2024-10-17 PROCEDURE — 87389 HIV-1 AG W/HIV-1&-2 AB AG IA: CPT

## 2024-10-17 PROCEDURE — 36415 COLL VENOUS BLD VENIPUNCTURE: CPT

## 2024-10-17 PROCEDURE — 2550000001 HC RX 255 CONTRASTS: Performed by: INTERNAL MEDICINE

## 2024-10-17 PROCEDURE — 85730 THROMBOPLASTIN TIME PARTIAL: CPT

## 2024-10-17 PROCEDURE — A9503 TC99M MEDRONATE: HCPCS | Performed by: INTERNAL MEDICINE

## 2024-10-17 PROCEDURE — 86706 HEP B SURFACE ANTIBODY: CPT

## 2024-10-17 PROCEDURE — 86803 HEPATITIS C AB TEST: CPT

## 2024-10-17 PROCEDURE — 99215 OFFICE O/P EST HI 40 MIN: CPT

## 2024-10-17 PROCEDURE — 71260 CT THORAX DX C+: CPT

## 2024-10-17 PROCEDURE — 83690 ASSAY OF LIPASE: CPT

## 2024-10-17 PROCEDURE — 82150 ASSAY OF AMYLASE: CPT

## 2024-10-17 PROCEDURE — 3430000001 HC RX 343 DIAGNOSTIC RADIOPHARMACEUTICALS: Performed by: INTERNAL MEDICINE

## 2024-10-17 PROCEDURE — 84439 ASSAY OF FREE THYROXINE: CPT

## 2024-10-17 PROCEDURE — 80053 COMPREHEN METABOLIC PANEL: CPT

## 2024-10-17 PROCEDURE — 84443 ASSAY THYROID STIM HORMONE: CPT

## 2024-10-17 PROCEDURE — 81003 URINALYSIS AUTO W/O SCOPE: CPT

## 2024-10-17 PROCEDURE — 82977 ASSAY OF GGT: CPT

## 2024-10-17 PROCEDURE — 83615 LACTATE (LD) (LDH) ENZYME: CPT

## 2024-10-17 PROCEDURE — 78306 BONE IMAGING WHOLE BODY: CPT

## 2024-10-17 PROCEDURE — 84550 ASSAY OF BLOOD/URIC ACID: CPT

## 2024-10-17 ASSESSMENT — ENCOUNTER SYMPTOMS
WHEEZING: 0
UNEXPECTED WEIGHT CHANGE: 0
BACK PAIN: 0
HEADACHES: 0
VOMITING: 0
EYE PROBLEMS: 0
HEMATURIA: 0
NUMBNESS: 0
ABDOMINAL PAIN: 0
FLANK PAIN: 0
ABDOMINAL DISTENTION: 0
COUGH: 1
NERVOUS/ANXIOUS: 1
RECTAL PAIN: 0
NAUSEA: 0
DIFFICULTY URINATING: 0
PALPITATIONS: 0
BRUISES/BLEEDS EASILY: 0
DEPRESSION: 0
DIZZINESS: 0
FREQUENCY: 0
APPETITE CHANGE: 0
CHEST TIGHTNESS: 1
MYALGIAS: 0
ADENOPATHY: 0
TROUBLE SWALLOWING: 0
HEMOPTYSIS: 1
CHILLS: 0
FEVER: 0
SEIZURES: 0
CONFUSION: 0
SCLERAL ICTERUS: 0
SPEECH DIFFICULTY: 0
EXTREMITY WEAKNESS: 0
SORE THROAT: 0
ARTHRALGIAS: 0
SHORTNESS OF BREATH: 1
BLOOD IN STOOL: 0
LIGHT-HEADEDNESS: 0
DYSURIA: 0
DECREASED CONCENTRATION: 0
LEG SWELLING: 0
CONSTIPATION: 0
DIAPHORESIS: 0
NECK PAIN: 0
DIARRHEA: 0
FATIGUE: 1
NECK STIFFNESS: 0
SLEEP DISTURBANCE: 0

## 2024-10-17 ASSESSMENT — PAIN SCALES - GENERAL: PAINLEVEL_OUTOF10: 0-NO PAIN

## 2024-10-17 NOTE — PROGRESS NOTES
Patient ID: Minal Sun is a 59 y.o. female.  Referring Physician: No referring provider defined for this encounter.  Primary Care Provider: Christopher D'Amico, DO     DIAGNOSIS    Small cell carcinoma of the lung.  Date of diagnosis is September 27 from a bronchoscopic biopsy of a subcarinal lymph node.  Immunohistochemistry positive for TTF-1, INSM1, synaptophysin and chromogranin, negative for p40, RB immunostain shows loss of nuclear expression.      STAGING    Clinical T4, N2, M1 C, stage IVc, extensive stage disease      CURRENT SITES OF DISEASE     Left lung, mediastinum, left hilum of the lung, liver, intra-abdominal lymph nodes in the portacaval and periportal region, bone metastases      MOLECULAR GENOMICS    [facility] Liquid Biopsy: 10.02.2024  PIK3CA p.R88Q  TP53 p.R24(G  RB1 C540.1G>T  Blood TMB 6.6 m/MB  MSI high not detected.  CtDNA Tumor Fraction 61 %      SERUM TUMOR MARKERS    Baseline LDH within normal limits      PRIOR THERAPY    None      CURRENT THERAPY    10.04.2024: Started on carboplatin and Etoposide in the 1st line setting.  Signed consent to participate on a clinical trial [id number] which is a combination of Atezolizumab + CARBOplatin / Etoposide / [id number], 21 Day     CURRENT ONCOLOGICAL PROBLEMS    1-cough and shortness of breath      HISTORY OF PRESENT ILLNESS    This is a 59-year-old patient.  She states that she was feeling sick toward the end of spring of this year with some sinus problems.  Was seen by her allergist and was treated with antibiotics.  She was also seen at 1 point by primary care and steroids and antibiotics and inhalers were given.  She did not have any resolution and then ultimately developed a little bit of the hemoptysis which led to a chest x-ray showing an abnormality and finally a CT scan of the chest.The CT scan of the chest was done as a lung cancer screening low-dose CT and completed on September 20, 2024.  This demonstrated a extensive infiltrative  mediastinal and hilar mass.  There was narrowing of the left mainstem bronchus and lower lobe bronchus secondary to extrinsic compression.  The predominant growth was within the left hilum and subcarinal region.  She underwent a bronchoscopy dated September 27, 2024 showing splaying of the main alanna.  There was mild erosion of a lymph node into the left mainstem bronchus.  There was erythema and mild erosions of an endobronchial tumor along the medial border.  Moderate to severe stenosis of the left lower lobe bronchus to 75%.      PAST MEDICAL HISTORY    1- Lumbar surgery about 30 years ago   2- hysterectomy in 2000  3- Diabetes  4- COPD  5- Right eye exophthalmus      SOCIAL HISTORY    Patient lives with her sister.  She lives in [facility].  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  She started smoking at the age of 14 and continues to smoke at the age of 59.  Has smoked for 45 years on average 1 pack/day.      CURRENT MEDS    See medication list, meds reviewed, includes metformin, rosuvastatin, been done so Nied inhalers, Wellbutrin escitalopram, trazodone      ALLERGIES    Patient denies any drug allergies      FAMILY HISTORY     Mother had bladder cancer and possibly breast cancer.    Subjective    Today 10/17/2024  Patient in clinic with her sister for a screening visit on [id number].    Patient reports she feel pretty good. She reports she tolerated her first treatment with chemotherapy well. She reports she continues to experience symptoms of intermittent back/mediastinal neoplasm pain, feeling cold at times and SOB with exertion. She reports after the initial chemotherapy she had couple of days of soft stools which resolved. Today she denies any dizziness, lightheadedness, headaches, rash/itching, chills or fever, SOB, cough, sputum, chest pain or chest tightness, painful  "inflammation/ulceration oral mucous membranes, nausea/vomiting, diarrhea/constipation, hematemesis /hemoptysis, hematuria/rectal bleeding, urinary symptoms, swelling extremities, weakness/fatigue, or peripheral neuropathy. ROS as above. Remainder of 10 point review of systems elicited and otherwise unremarkable.         Review of Systems   Constitutional:  Positive for fatigue. Negative for appetite change, chills, diaphoresis, fever and unexpected weight change.   HENT:   Negative for hearing loss, lump/mass, mouth sores, nosebleeds, sore throat, tinnitus and trouble swallowing.    Eyes:  Negative for eye problems and icterus.   Respiratory:  Positive for chest tightness, cough, hemoptysis and shortness of breath. Negative for wheezing.    Cardiovascular:  Negative for chest pain, leg swelling and palpitations.   Gastrointestinal:  Negative for abdominal distention, abdominal pain, blood in stool, constipation, diarrhea, nausea, rectal pain and vomiting.   Genitourinary:  Positive for bladder incontinence. Negative for difficulty urinating, dyspareunia, dysuria, frequency, hematuria and nocturia.    Musculoskeletal:  Negative for arthralgias, back pain, flank pain, gait problem, myalgias, neck pain and neck stiffness.   Skin:  Negative for itching and rash.   Neurological:  Negative for dizziness, extremity weakness, gait problem, headaches, light-headedness, numbness, seizures and speech difficulty.   Hematological:  Negative for adenopathy. Does not bruise/bleed easily.   Psychiatric/Behavioral:  Negative for confusion, decreased concentration, depression, sleep disturbance and suicidal ideas. The patient is nervous/anxious.         Objective   BSA: 1.9 meters squared  /78 (BP Location: Left arm, Patient Position: Sitting, BP Cuff Size: Large adult)   Pulse 83   Temp 36 °C (96.8 °F) (Skin)   Resp 14   Ht (S) 1.582 m (5' 2.28\")   Wt 81.8 kg (180 lb 4.8 oz)   SpO2 97%   BMI 32.68 kg/m²     Daily " "Weight  10/17/24 : 81.8 kg (180 lb 4.8 oz)  10/08/24 : 79.8 kg (176 lb)  10/07/24 : 81.2 kg (179 lb 0.2 oz)  10/04/24 : 79.1 kg (174 lb 6.1 oz)  10/02/24 : 78.6 kg (173 lb 4.5 oz)        9/27/2024     2:50 PM 10/1/2024     9:24 AM 10/2/2024     1:33 PM 10/4/2024    12:33 PM 10/7/2024     3:39 PM 10/8/2024     2:54 PM 10/17/2024    12:07 PM   Vitals   Systolic 126  124 137 134 141 102   Diastolic 74  74 76 64 69 78   Heart Rate 89  105 99 95  83   Temp   35.8 °C (96.4 °F) 37.1 °C (98.8 °F) 36.6 °C (97.9 °F) 36.5 °C (97.7 °F) 36 °C (96.8 °F)   Resp 18  20 18 18 18 14   Height (in)   1.587 m (5' 2.48\")  1.587 m (5' 2.48\")   1.582 m (5' 2.28\")    Weight (lb)  175 173.28 174.38 179.01 176 180.3   BMI  32.01 kg/m2 31.21 kg/m2 31.41 kg/m2 32.24 kg/m2 31.7 kg/m2 32.68 kg/m2   BSA (m2)  1.86 m2 1.86 m2 1.87 m2 1.89 m2 1.88 m2 1.9 m2   Visit Report   Report    Report       Significant value         Physical Exam  Constitutional:       General: She is not in acute distress.     Appearance: Normal appearance. She is not ill-appearing, toxic-appearing or diaphoretic.   HENT:      Nose: No congestion or rhinorrhea.      Mouth/Throat:      Pharynx: Oropharynx is clear. No oropharyngeal exudate or posterior oropharyngeal erythema.   Eyes:      General: No scleral icterus.     Conjunctiva/sclera: Conjunctivae normal.      Comments: Chronic right eye exophthalmos   Cardiovascular:      Rate and Rhythm: Normal rate and regular rhythm.      Pulses: Normal pulses.      Heart sounds: Normal heart sounds. No murmur heard.     No friction rub. No gallop.   Pulmonary:      Effort: Pulmonary effort is normal. No respiratory distress.      Comments: Diminished breath and air movement in left base  Abdominal:      General: There is no distension.      Palpations: There is no mass.      Tenderness: There is no abdominal tenderness. There is no guarding or rebound.   Musculoskeletal:      Cervical back: No tenderness.      Right lower leg: No " edema.      Left lower leg: No edema.   Lymphadenopathy:      Cervical: No cervical adenopathy.   Skin:     General: Skin is warm.      Coloration: Skin is not jaundiced.      Findings: No bruising.   Neurological:      General: No focal deficit present.      Mental Status: She is oriented to person, place, and time.   Psychiatric:         Mood and Affect: Mood normal.         Behavior: Behavior normal.       Performance Status:  ECOG 1: Restricted in physically strenuous activity but ambulatory and able to carry out work of a light or sedentary nature, e.g., light house work, office work.     Lab on 10/17/2024   Component Date Value Ref Range Status    WBC 10/17/2024 5.4  4.4 - 11.3 x10*3/uL Final    nRBC 10/17/2024 0.0  0.0 - 0.0 /100 WBCs Final    RBC 10/17/2024 4.82  4.00 - 5.20 x10*6/uL Final    Hemoglobin 10/17/2024 13.5  12.0 - 16.0 g/dL Final    Hematocrit 10/17/2024 41.6  36.0 - 46.0 % Final    MCV 10/17/2024 86  80 - 100 fL Final    MCH 10/17/2024 28.0  26.0 - 34.0 pg Final    MCHC 10/17/2024 32.5  32.0 - 36.0 g/dL Final    RDW 10/17/2024 14.5  11.5 - 14.5 % Final    Platelets 10/17/2024 255  150 - 450 x10*3/uL Final    Neutrophils % 10/17/2024 60.5  40.0 - 80.0 % Final    Immature Granulocytes %, Automated 10/17/2024 0.4  0.0 - 0.9 % Final    Lymphocytes % 10/17/2024 31.8  13.0 - 44.0 % Final    Monocytes % 10/17/2024 6.3  2.0 - 10.0 % Final    Eosinophils % 10/17/2024 0.6  0.0 - 6.0 % Final    Basophils % 10/17/2024 0.4  0.0 - 2.0 % Final    Neutrophils Absolute 10/17/2024 3.25  1.20 - 7.70 x10*3/uL Final    Immature Granulocytes Absolute, Au* 10/17/2024 0.02  0.00 - 0.70 x10*3/uL Final    Lymphocytes Absolute 10/17/2024 1.71  1.20 - 4.80 x10*3/uL Final    Monocytes Absolute 10/17/2024 0.34  0.10 - 1.00 x10*3/uL Final    Eosinophils Absolute 10/17/2024 0.03  0.00 - 0.70 x10*3/uL Final    Basophils Absolute 10/17/2024 0.02  0.00 - 0.10 x10*3/uL Final    Glucose 10/17/2024 127 (H)  74 - 99 mg/dL Final     Sodium 10/17/2024 140  136 - 145 mmol/L Final    Potassium 10/17/2024 4.0  3.5 - 5.3 mmol/L Final    Chloride 10/17/2024 103  98 - 107 mmol/L Final    Bicarbonate 10/17/2024 28  21 - 32 mmol/L Final    Anion Gap 10/17/2024 13  10 - 20 mmol/L Final    Urea Nitrogen 10/17/2024 9  6 - 23 mg/dL Final    Creatinine 10/17/2024 0.58  0.50 - 1.05 mg/dL Final    eGFR 10/17/2024 >90  >60 mL/min/1.73m*2 Final    Calcium 10/17/2024 9.2  8.6 - 10.3 mg/dL Final    Albumin 10/17/2024 4.3  3.4 - 5.0 g/dL Final    Alkaline Phosphatase 10/17/2024 98  33 - 136 U/L Final    Total Protein 10/17/2024 6.8  6.4 - 8.2 g/dL Final    AST 10/17/2024 14  9 - 39 U/L Final    Bilirubin, Total 10/17/2024 0.3  0.0 - 1.2 mg/dL Final    ALT 10/17/2024 24  7 - 45 U/L Final    Magnesium 10/17/2024 1.88  1.60 - 2.40 mg/dL Final    LDH 10/17/2024 143  84 - 246 U/L Final    GGT 10/17/2024 183 (H)  5 - 55 U/L Final    Amylase 10/17/2024 53  29 - 103 U/L Final    Lipase 10/17/2024 23  9 - 82 U/L Final    aPTT 10/17/2024 41 (H)  27 - 38 seconds Final    Protime 10/17/2024 10.3  9.8 - 12.8 seconds Final    INR 10/17/2024 0.9  0.9 - 1.1 Final    Color, Urine 10/17/2024 Colorless (N)  Light-Yellow, Yellow, Dark-Yellow Final    Appearance, Urine 10/17/2024 Clear  Clear Final    Specific Gravity, Urine 10/17/2024 1.029  1.005 - 1.035 Final    pH, Urine 10/17/2024 5.0  5.0, 5.5, 6.0, 6.5, 7.0, 7.5, 8.0 Final    Protein, Urine 10/17/2024 NEGATIVE  NEGATIVE, 10 (TRACE), 20 (TRACE) mg/dL Final    Glucose, Urine 10/17/2024 Normal  Normal mg/dL Final    Blood, Urine 10/17/2024 NEGATIVE  NEGATIVE Final    Ketones, Urine 10/17/2024 NEGATIVE  NEGATIVE mg/dL Final    Bilirubin, Urine 10/17/2024 NEGATIVE  NEGATIVE Final    Urobilinogen, Urine 10/17/2024 Normal  Normal mg/dL Final    Nitrite, Urine 10/17/2024 NEGATIVE  NEGATIVE Final    Leukocyte Esterase, Urine 10/17/2024 NEGATIVE  NEGATIVE Final    Hepatitis B Surface AB 10/17/2024 <3.1  <10.0 mIU/mL Final    Hepatitis C  AB 10/17/2024 Nonreactive  Nonreactive Final    Bilirubin, Direct 10/17/2024 0.1  0.0 - 0.3 mg/dL Final    Thyroxine, Free 10/17/2024 1.22  0.78 - 1.48 ng/dL Final    Follicle Stimulating Hormone 10/17/2024 71.2  IU/L Final    Luteinizing Hormone 10/17/2024 30.9  IU/L Final    HIV 1/2 Antigen/Antibody Screen wi* 10/17/2024 Nonreactive  Nonreactive Final    Uric Acid 10/17/2024 3.6  2.3 - 6.7 mg/dL Final    Thyroid Stimulating Hormone 10/17/2024 2.59  0.44 - 3.98 mIU/L Final   Lab on 10/02/2024   Component Date Value Ref Range Status    Hepatitis B Surface AG 10/02/2024 Nonreactive  Nonreactive Final    Hepatitis B Core AB- Total 10/02/2024 Nonreactive  Nonreactive Final    Hepatitis B Surface AB 10/02/2024 <3.1  <10.0 mIU/mL Final    Extra Tube 10/02/2024 Hold for add-ons.   Final    Extra Tube 10/02/2024 Hold for add-ons.   Final    Extra Tube 10/02/2024 Hold for add-ons.   Final    Extra Tube 10/02/2024 Hold for add-ons.   Final    WBC 10/02/2024 8.9  4.4 - 11.3 x10*3/uL Final    nRBC 10/02/2024 0.0  0.0 - 0.0 /100 WBCs Final    RBC 10/02/2024 5.61 (H)  4.00 - 5.20 x10*6/uL Final    Hemoglobin 10/02/2024 15.6  12.0 - 16.0 g/dL Final    Hematocrit 10/02/2024 47.5 (H)  36.0 - 46.0 % Final    MCV 10/02/2024 85  80 - 100 fL Final    MCH 10/02/2024 27.8  26.0 - 34.0 pg Final    MCHC 10/02/2024 32.8  32.0 - 36.0 g/dL Final    RDW 10/02/2024 14.9 (H)  11.5 - 14.5 % Final    Platelets 10/02/2024 337  150 - 450 x10*3/uL Final    Neutrophils % 10/02/2024 69.4  40.0 - 80.0 % Final    Immature Granulocytes %, Automated 10/02/2024 0.3  0.0 - 0.9 % Final    Lymphocytes % 10/02/2024 22.3  13.0 - 44.0 % Final    Monocytes % 10/02/2024 6.1  2.0 - 10.0 % Final    Eosinophils % 10/02/2024 1.6  0.0 - 6.0 % Final    Basophils % 10/02/2024 0.3  0.0 - 2.0 % Final    Neutrophils Absolute 10/02/2024 6.18  1.20 - 7.70 x10*3/uL Final    Immature Granulocytes Absolute, Au* 10/02/2024 0.03  0.00 - 0.70 x10*3/uL Final    Lymphocytes Absolute  10/02/2024 1.99  1.20 - 4.80 x10*3/uL Final    Monocytes Absolute 10/02/2024 0.54  0.10 - 1.00 x10*3/uL Final    Eosinophils Absolute 10/02/2024 0.14  0.00 - 0.70 x10*3/uL Final    Basophils Absolute 10/02/2024 0.03  0.00 - 0.10 x10*3/uL Final    Glucose 10/02/2024 108 (H)  74 - 99 mg/dL Final    Sodium 10/02/2024 140  136 - 145 mmol/L Final    Potassium 10/02/2024 4.4  3.5 - 5.3 mmol/L Final    Chloride 10/02/2024 104  98 - 107 mmol/L Final    Bicarbonate 10/02/2024 24  21 - 32 mmol/L Final    Anion Gap 10/02/2024 16  10 - 20 mmol/L Final    Urea Nitrogen 10/02/2024 13  6 - 23 mg/dL Final    Creatinine 10/02/2024 0.74  0.50 - 1.05 mg/dL Final    eGFR 10/02/2024 >90  >60 mL/min/1.73m*2 Final    Calcium 10/02/2024 9.5  8.6 - 10.3 mg/dL Final    Albumin 10/02/2024 4.3  3.4 - 5.0 g/dL Final    Alkaline Phosphatase 10/02/2024 103  33 - 136 U/L Final    Total Protein 10/02/2024 7.1  6.4 - 8.2 g/dL Final    AST 10/02/2024 20  9 - 39 U/L Final    Bilirubin, Total 10/02/2024 0.4  0.0 - 1.2 mg/dL Final    ALT 10/02/2024 24  7 - 45 U/L Final    LDH 10/02/2024 190  84 - 246 U/L Final    P/Q-Type Voltage-Gated Calcium Beti* 10/02/2024 0.0  0.0 - 24.5 pmol/L Final    Neuronal Nuclear Ab (Hu) IgG, IB, * 10/02/2024 Negative  Negative Final    Neuronal Nuclear Ab (Ri) IgG, IB, * 10/02/2024 Negative  Negative Final    Neuronal Nuclear Ab (TR/DNER) IgG,* 10/02/2024 Negative  Negative Final    Neuronal Nuclear Ab (Yo) IgG, IB, * 10/02/2024 Negative  Negative Final   Office Visit on 10/02/2024   Component Date Value Ref Range Status    Reason for Study 10/02/2024 To identify mutations relevant to patient's cancer.   Final    Genetic Diseases Assessed 10/02/2024 Cancer   Final    Description of Ranges of DNA Seque* 10/02/2024 523 gene liquid biopsy   Final    Overall Interpretation 10/02/2024 positive   Final    [facility] Portal 10/02/2024  [email or web address]   Final    Low Coverage Regions 10/02/2024 CARM1, [id number], FANCC, KDM5D, NOTCH1, NOTCH2, PHLPP2, PRKN, PTPRT, RAD51C, RHOA, RXRA, SDHAF2, TGFBR1, TP53, TP63   Final    Therapy Count 10/02/2024 1   Final    [facility]: Potential Therapy 1 10/02/2024    Final                    Value:Gene: 8975^PIK3CA^HGNC  Variant: p.R88Q  Match Type: snvIndel  Match Type Description: PIK3CA p.R88Q  Agent: Alpelisib  Drug Class: PI3K Inhibitor  Tissue: Solid Tumors  Association: Response  Evidence Status: Clinical research  Evidence ID: [id number]  Evidence URL: https://www.ncbi.nlm.nih.gov/pubmed/44655970  Evidence Title: Phosphatidylinositol 3-Kinase  -Selective Inhibition With Alpelisib (YVV642) in UZT2MR-Fjottka Solid Tumors: Results From the First-in-Human Study - PubMed  KDB Variant: Gain-of-function  Label: FDA Off Label  FDA Approved?: Yes  On label?: No    Trial Count 10/02/2024 3   Final    Trial 1: Matched criteria 10/02/2024    Final                    Value:Clinical Trial NCT ID: [id number]  Clinical Trial Title: Study of ATRN-119 in Patients with Advanced Solid Tumors  Clinical Trial URL: https://clinicaltrials.gov/ct2/show/TZB73633637  Clinical Phase: Phase 1/Phase 2  Clinical Trial Matches: TP53 p.R249G mutation, RB1 c.540-1G>T mutation  Clinical Trial Distance and Location: [place], [place]    Trial 2: Matched criteria 10/02/2024    Final                    Value:Clinical Trial NCT ID: [id number]  Clinical Trial Title: First-in-Human Study of STX-478 as Monotherapy and in Combination With Other Antineoplastic Agents in Participants With Advanced Solid Tumors  Clinical Trial URL: https://clinicaltrials.gov/ct2/show/FSU56801566  Clinical Phase: Phase 1/Phase 2  Clinical Trial Matches: PIK3CA p.R88Q mutation  Clinical Trial Distance and Location: [place], [place]    Trial 3: Matched criteria  10/02/2024    Final                    Value:Clinical Trial NCT ID: [id number]  Clinical Trial Title: Study of AVZO-021 in Patients With Advanced Solid Tumors  Clinical Trial URL: https://clinicaltrials.gov/ct2/show/FHZ32119260  Clinical Phase: Phase 1/Phase 2  Clinical Trial Matches: RB1 c.540-1G>T mutation  Clinical Trial Distance and Location: [place], [place]    Tumor Mutational [place] 10/02/2024 6.6  m/MB Final    Microsatellite Instability Note 10/02/2024 MSI-High not detected   Final   Hospital Outpatient Visit on 09/27/2024   Component Date Value Ref Range Status    POCT Glucose 09/27/2024 118 (H)  74 - 99 mg/dL Final    Case Report 09/27/2024    Final                    Value:Non-gynecologic Cytology                          Case: [id number]                                   Authorizing Provider:  Sonu Nathan MD     Collected:           09/27/2024 1317              Ordering Location:     [facility]       Received:            09/27/2024 1520                                     Center                                                                       Pathologist:           Roberto Boudreaux MD                                                      Specimen:    LYMPH NODE 7 PULMONARY FINE NEEDLE ASPIRATION                                              Final Cytological Interpretation 09/27/2024    Final                    Value:A. LYMPH NODE 7 PULMONARY FINE NEEDLE ASPIRATION, CYTOLOGY AND CELL BLOCK:  --CELLULAR FINDINGS CONSISTENT WITH SMALL CELL CARCINOMA.  SEE ALSO SURGICAL PATHOLOGY REPORT, [id number].        09/27/2024    Final                    Value:Slide(s) initially screened by PREMA JOHNSON at [facility] [id number] [address] [facility] [address]  By the signature on this report, the individual or group listed as making the Final Interpretation/Diagnosis certifies that they have reviewed this case.       Rapid Onsite Evaluation 09/27/2024    Final                     Value:A. LYMPH NODE 7 PULMONARY FINE NEEDLE ASPIRATION.  Rapid On-site Evaluation Read Result:  Pass 1:  Malignant cells present.    Pathologist: Dr. Roberto Boudreaux Called Date/Time: 9/27/2024  1:34 PM    Evaluation performed at:   [facility]  Department of Pathology  [address] [place] [id number]  Phone: [phone] Fax: [phone]          Specimen Description 09/27/2024    Final                    Value:A. LYMPH NODE 7 PULMONARY FINE NEEDLE ASPIRATION.  Received 2 direct smears (1 air-dried Diff-Quik and 1 spray-fixed) and 35 ml red hazy needle rinse in Cytolyt with particles.     Received 30 ml red hazy RPMI fluid for flow cytometry testing if needed.           Specimen Processing Detail 09/27/2024    Final                    Value:A1 Slides Only (No Block)   A1-1 Diff Quik Stain Smear NGYN   A1-2 Pap Stain Smear NGYN   A2 Cell Block   A2-1 H&E       Case Report 09/27/2024    Final                    Value:Surgical Pathology                                Case: [id number]                                  Authorizing Provider:  Sonu Nathan MD     Collected:           09/27/2024 1324              Ordering Location:     [facility]       Received:            09/27/2024 2120                                     Center                                                                       Pathologist:           Eben Loza MD                                                              Specimen:    LYMPH NODE PULMONARY (SPECIFY SITE), LN 7                                                  FINAL DIAGNOSIS 09/27/2024    Final                    Value:A. LYMPH NODE, LEVEL 7, BIOPSY:   - Small cell carcinoma. See comment        09/27/2024    Final                    Value:By the signature on this report, the individual or group listed as making the Final Interpretation/Diagnosis certifies that they have reviewed this case.       Comment  "09/27/2024    Final                    Value:The tumor cells are immunohistochemically positive for CAM5.2, TTF1, INSM1, synaptophysin, chromogranin, while they are negative for p40. Rb immunostain shows loss of nuclear expression.       Resident Review 09/27/2024    Final                    Value:The microscopic findings were reviewed in conjunction with pathology resident, Titi Gonzalez MD.        Gross Description 09/27/2024    Final                    Value:Received in formalin, labeled with the patient´s name and hospital number and \"pulmonary lymph node\", are multiple minute* red-tan, soft tissue fragments aggregating to 2.3 x 0.5 x 0.4 cm. The specimen is submitted in toto in one cassette.  JEK/SBS          Disclaimer 09/27/2024    Final                    Value:One or more of the reagents used to perform assays on this specimen MAY have contained components considered to be analyte specific reagents (ASR's).  ASR's have not been cleared or approved by the U.S. Food and Drug Administration.  These assays were developed and their performance characteristics determined by the Department of Pathology at [facility]. The FDA does not require this test to go through premarket FDA review. This test is used for clinical purposes. It should not be regarded as investigational or for research. This laboratory is certified under the Clinical Laboratory Improvement Amendments (CLIA) as qualified to perform high complexity clinical laboratory testing.  The assays were performed with appropriate positive and negative controls which stained appropriately.        CT chest abdomen pelvis w IV contrast    Result Date: 10/17/2024  Impression: Lung cancer staging exam. 1. Ill-defined left hilar mass encasing the left upper lobe pulmonary artery branch and extending into the mediastinum compatible with known malignancy and conglomerate mediastinal lymphadenopathy. Findings are similar to recent " PET-CT, although mildly improved from 09/20/2024 CT chest. 2. Multiple liver metastases as above. 3. Widespread hypermetabolic osseous metastases seen on 10/01/2024 PET-CT are less well visualized on this exam.   I personally reviewed the images/study and I agree with the resident Mika Rico's findings as stated. This study was interpreted at [facility], [place], [place].   MACRO: None     Dictation workstation:   [id number]    NM PET CT net netspot    Result Date: 10/12/2024  Impression: 1. This study demonstrates metastatic lung cancer to lymph nodes in the thorax, liver and bones, with similar disease distribution in comparison to prior FDG PET-CT,  the Ga68 Dotatate uptake overall is less than FDG uptake. 2. No new Ga68 DOTATATE avid metastasis since prior exam.   I personally reviewed the image(s) / study and agree with the findings and interpretation as stated. This study was interpreted at [facility].   MACRO: None   Signed by: Yesenia Gonzalez 10/12/2024 8:16 AM Dictation workstation:   [id number]    NM PET CT lung CA staging    Result Date: 10/1/2024  Impression: 1.  Intensely hypermetabolic left perihilar mass with mediastinal infiltration compatible with biopsy-proven primary lung cancer. 2. Innumerable hypermetabolic hypodense lesions throughout the liver consistent with hepatic metastasis. 3. Hypermetabolic mediastinal, periportal, and portacaval lymph nodes are consistent with ace disease. 4. Innumerable intensely hypermetabolic lytic osseous lesions throughout the axial and appendicular skeleton consistent with diffuse osseous metastasis.     I personally reviewed the image(s) / study and agree with the findings and interpretation as stated. This study was interpreted at [facility].     Signed by: Trell Soares 10/1/2024 4:22 PM Dictation workstation:   [id number]    MR brain w and wo IV  contrast    Result Date: 10/1/2024  Impression: No acute intracranial abnormality. No evidence of intracranial metastatic disease.   Moderate microangiopathic disease noted.   Signed by: Daphne Winston 10/1/2024 10:30 AM Dictation workstation:   [id number]      Assessment/Plan      Ms. Sun is a very nice 59-year-old patient diagnosed with extensive stage small cell carcinoma of the lung originating from the left hilar region infiltrating the mediastinum with bone and abdominal metastases. She started treatment in the first line setting on Carboplatin and Etoposide. Given we an opportunity to place her on a study. She was offered to participate on the study after starting treatment on Carboplatin and Etoposide which is allowed per protocol. She signed consent and is currently screening for the study [id number]. She reports she tolerated the initial treatment pretty well without any major adverse events. Today we reviewed the possible AE associated with treatment. Patient reports she is trying to give up smoking.  Plan discussed in detail with the patient. Patient in agreement with the plan of care and is aware to call the office and research nurse if experiencing any new symptoms. RTC per protocol.          Baldev Ferraro, APRN-CNP

## 2024-10-18 DIAGNOSIS — Z00.6 EXAMINATION OF PARTICIPANT IN CLINICAL TRIAL: ICD-10-CM

## 2024-10-18 DIAGNOSIS — C34.90 SMALL CELL CARCINOMA OF LUNG, UNSPECIFIED LATERALITY, UNSPECIFIED PART OF LUNG: ICD-10-CM

## 2024-10-18 LAB
DNA RANGE(S) EXAMINED NAR: NORMAL
GENE DIS ANL INTERP-IMP: POSITIVE
GENE DIS ASSESSED: NORMAL
GENE MUT TESTED BLD/T: 2.6 M/MB
MSI CA SPEC-IMP: NORMAL
REASON FOR STUDY: NORMAL
TEMPUS GERMLINE NOTE: NORMAL
TEMPUS LCA: NORMAL
TEMPUS PORTAL: NORMAL
TEMPUS TREATMENT IMPLICATIONS NOTE: NORMAL
TEMPUS TRIALCOUNT: 3
TEMPUS TRIALMATCHES1: NORMAL
TEMPUS TRIALMATCHES2: NORMAL
TEMPUS TRIALMATCHES3: NORMAL

## 2024-10-21 ENCOUNTER — APPOINTMENT (OUTPATIENT)
Dept: RADIOLOGY | Facility: HOSPITAL | Age: 59
End: 2024-10-21
Payer: MEDICARE

## 2024-10-21 LAB
DNA RANGE(S) EXAMINED NAR: NORMAL
GENE DIS ANL INTERP-IMP: POSITIVE
GENE DIS ASSESSED: NORMAL
GENE MUT TESTED BLD/T: 2.6 M/MB
MSI CA SPEC-IMP: NORMAL
REASON FOR STUDY: NORMAL
TEMPUS GERMLINE NOTE: NORMAL
TEMPUS LCA: NORMAL
TEMPUS PORTAL: NORMAL
TEMPUS TREATMENT IMPLICATIONS NOTE: NORMAL
TEMPUS TRIALCOUNT: 3
TEMPUS TRIALMATCHES1: NORMAL
TEMPUS TRIALMATCHES2: NORMAL
TEMPUS TRIALMATCHES3: NORMAL
TEMPUS XR RESULT 1: NORMAL

## 2024-10-22 ENCOUNTER — APPOINTMENT (OUTPATIENT)
Dept: PHARMACY | Facility: HOSPITAL | Age: 59
End: 2024-10-22
Payer: MEDICARE

## 2024-10-22 DIAGNOSIS — E11.9 TYPE 2 DIABETES MELLITUS WITHOUT COMPLICATION, WITHOUT LONG-TERM CURRENT USE OF INSULIN (MULTI): Primary | ICD-10-CM

## 2024-10-22 NOTE — PROGRESS NOTES
"Pharmacist Clinic: Diabetes Management  Minal Sun is a 59 y.o. female was referred to Clinical Pharmacy Team for diabetes management.     Referring Provider: Christopher D'Amico, DO     HISTORY OF PRESENT ILLNESS  Patient was diagnosed with diabetes in June of 2023, she has a large family history of T2DM    Diet: patient reports she knows she does not eat \"the best\"   - diet is inconsistent, some days first food will not be unitl mid day, it might be a sandwhich  - patient reports her weakness is treats, she loves brownies, cookies, cake, etc.     Starting weight of 199 lbs (36.47 kg/m2)    LAB REVIEW   Glucose (mg/dL)   Date Value   10/17/2024 127 (H)   10/02/2024 108 (H)   09/16/2024 101 (H)     Hemoglobin A1C (%)   Date Value   09/16/2024 5.8 (H)   04/30/2024 6.4 (H)   12/13/2023 6.4 (H)   10/02/2023 6.4 (H)   06/09/2023 6.5 (H)     Bicarbonate (mmol/L)   Date Value   10/17/2024 28   10/02/2024 24   09/16/2024 25     Urea Nitrogen (mg/dL)   Date Value   10/17/2024 9   10/02/2024 13   09/16/2024 12     Creatinine (mg/dL)   Date Value   10/17/2024 0.58   10/02/2024 0.74   09/16/2024 0.78     Lab Results   Component Value Date    HGBA1C 5.8 (H) 09/16/2024    HGBA1C 6.4 (H) 04/30/2024    HGBA1C 6.4 (H) 12/13/2023     Lab Results   Component Value Date    CHOL 161 09/16/2024    CHOL 148 04/30/2024    CHOL 155 11/05/2019     Lab Results   Component Value Date    HDL 66.7 09/16/2024    HDL 61.2 04/30/2024    HDL 53.1 11/05/2019     Lab Results   Component Value Date    LDLCALC 71 09/16/2024    LDLCALC 64 04/30/2024     Lab Results   Component Value Date    TRIG 118 09/16/2024    TRIG 114 04/30/2024    TRIG 159 (H) 11/05/2019       DIABETES ASSESSMENT    CURRENT PHARMACOTHERAPY  - metformin  mg twice daily     HISTORICAL PHARMACOTHERAPY   - mounjaro 7.5 mg once weekly: discontinued due to concurrent chemo     SECONDARY PREVENTION  - Statin? Yes  - ACE-I/ARB? No    DISCUSSION:   Discussed at this time, " weight loss is not a priority, our main use of mounjaro was for the cardiometabolic benefits   Answered all questions and concerns    RECOMMENDATIONS/PLAN  1. Patients diabetes is stable with most recent A1c of 6.4 % (goal < 7 %).   - Continue all meds under the continuation of care with the referring provider and clinical pharmacy team.    Clinical Pharmacist follow up: as needed   [facility] Approval: 5/25/24    Thank you,  Shea High, PharmD    Verbal consent to manage patient's drug therapy was obtained from the patient. They were informed they may decline to participate or withdraw from participation in pharmacy services at any time.     no

## 2024-10-30 DIAGNOSIS — C34.90 SMALL CELL CARCINOMA OF LUNG, UNSPECIFIED LATERALITY, UNSPECIFIED PART OF LUNG: Primary | ICD-10-CM

## 2024-10-30 RX ORDER — ONDANSETRON HYDROCHLORIDE 8 MG/1
8 TABLET, FILM COATED ORAL EVERY 8 HOURS PRN
Qty: 30 TABLET | Refills: 5 | Status: SHIPPED | OUTPATIENT
Start: 2024-10-30

## 2024-10-30 RX ORDER — PROCHLORPERAZINE MALEATE 10 MG
10 TABLET ORAL EVERY 6 HOURS PRN
Qty: 30 TABLET | Refills: 5 | Status: SHIPPED | OUTPATIENT
Start: 2024-10-30

## 2024-10-30 RX ORDER — OLANZAPINE 5 MG/1
5 TABLET ORAL NIGHTLY
Qty: 4 TABLET | Refills: 3 | Status: SHIPPED | OUTPATIENT
Start: 2024-10-30

## 2024-11-01 ENCOUNTER — TELEPHONE (OUTPATIENT)
Dept: HEMATOLOGY/ONCOLOGY | Facility: HOSPITAL | Age: 59
End: 2024-11-01
Payer: MEDICARE

## 2024-11-01 ENCOUNTER — APPOINTMENT (OUTPATIENT)
Dept: RADIOLOGY | Facility: HOSPITAL | Age: 59
End: 2024-11-01
Payer: MEDICARE

## 2024-11-01 DIAGNOSIS — C34.90 SMALL CELL CARCINOMA OF LUNG, UNSPECIFIED LATERALITY, UNSPECIFIED PART OF LUNG: Primary | ICD-10-CM

## 2024-11-01 NOTE — PROGRESS NOTES
Patient called back from earlier message regarding upcoming appointments.  Reviewed upcoming appointments and that she will need to get lab work done prior to visit.  She stated she will get done on Monday 11/4 for visit on 11/6.  Patient stated understanding for schedule and will call if she has any additional questions.

## 2024-11-04 ENCOUNTER — LAB (OUTPATIENT)
Dept: LAB | Facility: LAB | Age: 59
End: 2024-11-04
Payer: MEDICARE

## 2024-11-04 ENCOUNTER — DOCUMENTATION (OUTPATIENT)
Dept: HEMATOLOGY/ONCOLOGY | Facility: HOSPITAL | Age: 59
End: 2024-11-04
Payer: MEDICARE

## 2024-11-04 ENCOUNTER — APPOINTMENT (OUTPATIENT)
Dept: RESEARCH | Facility: HOSPITAL | Age: 59
End: 2024-11-04
Payer: MEDICARE

## 2024-11-04 DIAGNOSIS — C34.90 SMALL CELL CARCINOMA OF LUNG, UNSPECIFIED LATERALITY, UNSPECIFIED PART OF LUNG: ICD-10-CM

## 2024-11-04 LAB
ALBUMIN SERPL BCP-MCNC: 4.2 G/DL (ref 3.4–5)
ALP SERPL-CCNC: 87 U/L (ref 33–136)
ALT SERPL W P-5'-P-CCNC: 22 U/L (ref 7–45)
ANION GAP SERPL CALC-SCNC: 14 MMOL/L (ref 10–20)
AST SERPL W P-5'-P-CCNC: 19 U/L (ref 9–39)
BASOPHILS # BLD AUTO: 0.04 X10*3/UL (ref 0–0.1)
BASOPHILS NFR BLD AUTO: 0.6 %
BILIRUB SERPL-MCNC: 0.3 MG/DL (ref 0–1.2)
BUN SERPL-MCNC: 10 MG/DL (ref 6–23)
CALCIUM SERPL-MCNC: 9.1 MG/DL (ref 8.6–10.6)
CHLORIDE SERPL-SCNC: 104 MMOL/L (ref 98–107)
CO2 SERPL-SCNC: 30 MMOL/L (ref 21–32)
CREAT SERPL-MCNC: 0.71 MG/DL (ref 0.5–1.05)
EGFRCR SERPLBLD CKD-EPI 2021: >90 ML/MIN/1.73M*2
EOSINOPHIL # BLD AUTO: 0.13 X10*3/UL (ref 0–0.7)
EOSINOPHIL NFR BLD AUTO: 1.9 %
ERYTHROCYTE [DISTWIDTH] IN BLOOD BY AUTOMATED COUNT: 16.7 % (ref 11.5–14.5)
GLUCOSE SERPL-MCNC: 110 MG/DL (ref 74–99)
HCT VFR BLD AUTO: 44.2 % (ref 36–46)
HGB BLD-MCNC: 13.9 G/DL (ref 12–16)
IMM GRANULOCYTES # BLD AUTO: 0.02 X10*3/UL (ref 0–0.7)
IMM GRANULOCYTES NFR BLD AUTO: 0.3 % (ref 0–0.9)
LDH SERPL L TO P-CCNC: 137 U/L (ref 84–246)
LYMPHOCYTES # BLD AUTO: 2.39 X10*3/UL (ref 1.2–4.8)
LYMPHOCYTES NFR BLD AUTO: 35.5 %
MCH RBC QN AUTO: 27.6 PG (ref 26–34)
MCHC RBC AUTO-ENTMCNC: 31.4 G/DL (ref 32–36)
MCV RBC AUTO: 88 FL (ref 80–100)
MONOCYTES # BLD AUTO: 0.63 X10*3/UL (ref 0.1–1)
MONOCYTES NFR BLD AUTO: 9.4 %
NEUTROPHILS # BLD AUTO: 3.52 X10*3/UL (ref 1.2–7.7)
NEUTROPHILS NFR BLD AUTO: 52.3 %
NRBC BLD-RTO: 0 /100 WBCS (ref 0–0)
PLATELET # BLD AUTO: 369 X10*3/UL (ref 150–450)
POTASSIUM SERPL-SCNC: 4.7 MMOL/L (ref 3.5–5.3)
PROT SERPL-MCNC: 6.4 G/DL (ref 6.4–8.2)
RBC # BLD AUTO: 5.04 X10*6/UL (ref 4–5.2)
SODIUM SERPL-SCNC: 143 MMOL/L (ref 136–145)
WBC # BLD AUTO: 6.7 X10*3/UL (ref 4.4–11.3)

## 2024-11-04 PROCEDURE — 36415 COLL VENOUS BLD VENIPUNCTURE: CPT

## 2024-11-04 PROCEDURE — 83615 LACTATE (LD) (LDH) ENZYME: CPT

## 2024-11-04 PROCEDURE — 80053 COMPREHEN METABOLIC PANEL: CPT

## 2024-11-04 PROCEDURE — 85025 COMPLETE CBC W/AUTO DIFF WBC: CPT

## 2024-11-04 NOTE — PROGRESS NOTES
Ms. Dino notified research nurse on 10- that she did not wish to continue with study related activities for the clinical trial and does not want to begin treatment on the clinical trial. She wishes to receive standard of care treatment and treatments closer to home when possible. Dr. Cano and sponsor notified. Patient will receive standard of care carboplatin, etoposide and durvalumab.

## 2024-11-05 ENCOUNTER — APPOINTMENT (OUTPATIENT)
Dept: RESEARCH | Facility: HOSPITAL | Age: 59
End: 2024-11-05
Payer: MEDICARE

## 2024-11-05 ENCOUNTER — APPOINTMENT (OUTPATIENT)
Dept: RADIOLOGY | Facility: HOSPITAL | Age: 59
End: 2024-11-05
Payer: MEDICARE

## 2024-11-06 ENCOUNTER — INFUSION (OUTPATIENT)
Dept: HEMATOLOGY/ONCOLOGY | Facility: HOSPITAL | Age: 59
End: 2024-11-06
Payer: MEDICARE

## 2024-11-06 ENCOUNTER — OFFICE VISIT (OUTPATIENT)
Dept: HEMATOLOGY/ONCOLOGY | Facility: HOSPITAL | Age: 59
End: 2024-11-06
Payer: MEDICARE

## 2024-11-06 VITALS
BODY MASS INDEX: 32.5 KG/M2 | HEART RATE: 90 BPM | RESPIRATION RATE: 17 BRPM | DIASTOLIC BLOOD PRESSURE: 76 MMHG | WEIGHT: 179.3 LBS | TEMPERATURE: 98.4 F | OXYGEN SATURATION: 94 % | SYSTOLIC BLOOD PRESSURE: 136 MMHG

## 2024-11-06 DIAGNOSIS — C34.90 SMALL CELL CARCINOMA OF LUNG, UNSPECIFIED LATERALITY, UNSPECIFIED PART OF LUNG: ICD-10-CM

## 2024-11-06 PROCEDURE — 99215 OFFICE O/P EST HI 40 MIN: CPT | Performed by: INTERNAL MEDICINE

## 2024-11-06 PROCEDURE — 3048F LDL-C <100 MG/DL: CPT | Performed by: INTERNAL MEDICINE

## 2024-11-06 PROCEDURE — 3044F HG A1C LEVEL LT 7.0%: CPT | Performed by: INTERNAL MEDICINE

## 2024-11-06 PROCEDURE — 2500000004 HC RX 250 GENERAL PHARMACY W/ HCPCS (ALT 636 FOR OP/ED): Performed by: INTERNAL MEDICINE

## 2024-11-06 PROCEDURE — 96417 CHEMO IV INFUS EACH ADDL SEQ: CPT

## 2024-11-06 PROCEDURE — 3061F NEG MICROALBUMINURIA REV: CPT | Performed by: INTERNAL MEDICINE

## 2024-11-06 PROCEDURE — 3075F SYST BP GE 130 - 139MM HG: CPT | Performed by: INTERNAL MEDICINE

## 2024-11-06 PROCEDURE — 96375 TX/PRO/DX INJ NEW DRUG ADDON: CPT | Mod: INF

## 2024-11-06 PROCEDURE — 96367 TX/PROPH/DG ADDL SEQ IV INF: CPT

## 2024-11-06 PROCEDURE — 3078F DIAST BP <80 MM HG: CPT | Performed by: INTERNAL MEDICINE

## 2024-11-06 PROCEDURE — 96413 CHEMO IV INFUSION 1 HR: CPT

## 2024-11-06 RX ORDER — EPINEPHRINE 0.3 MG/.3ML
0.3 INJECTION SUBCUTANEOUS EVERY 5 MIN PRN
Status: CANCELLED | OUTPATIENT
Start: 2024-11-06

## 2024-11-06 RX ORDER — PALONOSETRON 0.05 MG/ML
0.25 INJECTION, SOLUTION INTRAVENOUS ONCE
Status: COMPLETED | OUTPATIENT
Start: 2024-11-06 | End: 2024-11-06

## 2024-11-06 RX ORDER — ALBUTEROL SULFATE 0.83 MG/ML
3 SOLUTION RESPIRATORY (INHALATION) AS NEEDED
Status: CANCELLED | OUTPATIENT
Start: 2024-11-06

## 2024-11-06 RX ORDER — PROCHLORPERAZINE MALEATE 10 MG
10 TABLET ORAL EVERY 6 HOURS PRN
Status: DISCONTINUED | OUTPATIENT
Start: 2024-11-06 | End: 2024-11-06 | Stop reason: HOSPADM

## 2024-11-06 RX ORDER — DEXAMETHASONE 4 MG/1
12 TABLET ORAL ONCE
Status: CANCELLED | OUTPATIENT
Start: 2024-11-06 | End: 2024-11-06

## 2024-11-06 RX ORDER — EPINEPHRINE 0.3 MG/.3ML
0.3 INJECTION SUBCUTANEOUS EVERY 5 MIN PRN
Status: DISCONTINUED | OUTPATIENT
Start: 2024-11-06 | End: 2024-11-06 | Stop reason: HOSPADM

## 2024-11-06 RX ORDER — PALONOSETRON 0.05 MG/ML
0.25 INJECTION, SOLUTION INTRAVENOUS ONCE
Status: CANCELLED | OUTPATIENT
Start: 2024-11-06

## 2024-11-06 RX ORDER — PROCHLORPERAZINE EDISYLATE 5 MG/ML
10 INJECTION INTRAMUSCULAR; INTRAVENOUS EVERY 6 HOURS PRN
Status: CANCELLED | OUTPATIENT
Start: 2024-11-06

## 2024-11-06 RX ORDER — PROCHLORPERAZINE MALEATE 10 MG
10 TABLET ORAL EVERY 6 HOURS PRN
Status: CANCELLED | OUTPATIENT
Start: 2024-11-06

## 2024-11-06 RX ORDER — ALBUTEROL SULFATE 0.83 MG/ML
3 SOLUTION RESPIRATORY (INHALATION) AS NEEDED
Status: CANCELLED | OUTPATIENT
Start: 2024-11-08

## 2024-11-06 RX ORDER — FAMOTIDINE 10 MG/ML
20 INJECTION INTRAVENOUS ONCE AS NEEDED
Status: CANCELLED | OUTPATIENT
Start: 2024-11-07

## 2024-11-06 RX ORDER — PROCHLORPERAZINE EDISYLATE 5 MG/ML
10 INJECTION INTRAMUSCULAR; INTRAVENOUS EVERY 6 HOURS PRN
Status: CANCELLED | OUTPATIENT
Start: 2024-11-07

## 2024-11-06 RX ORDER — ALBUTEROL SULFATE 0.83 MG/ML
3 SOLUTION RESPIRATORY (INHALATION) AS NEEDED
Status: DISCONTINUED | OUTPATIENT
Start: 2024-11-06 | End: 2024-11-06 | Stop reason: HOSPADM

## 2024-11-06 RX ORDER — DIPHENHYDRAMINE HYDROCHLORIDE 50 MG/ML
50 INJECTION INTRAMUSCULAR; INTRAVENOUS AS NEEDED
Status: DISCONTINUED | OUTPATIENT
Start: 2024-11-06 | End: 2024-11-06 | Stop reason: HOSPADM

## 2024-11-06 RX ORDER — ALBUTEROL SULFATE 0.83 MG/ML
3 SOLUTION RESPIRATORY (INHALATION) AS NEEDED
Status: CANCELLED | OUTPATIENT
Start: 2024-11-07

## 2024-11-06 RX ORDER — DEXAMETHASONE 6 MG/1
12 TABLET ORAL ONCE
Status: COMPLETED | OUTPATIENT
Start: 2024-11-06 | End: 2024-11-06

## 2024-11-06 RX ORDER — EPINEPHRINE 0.3 MG/.3ML
0.3 INJECTION SUBCUTANEOUS EVERY 5 MIN PRN
Status: CANCELLED | OUTPATIENT
Start: 2024-11-07

## 2024-11-06 RX ORDER — DIPHENHYDRAMINE HYDROCHLORIDE 50 MG/ML
50 INJECTION INTRAMUSCULAR; INTRAVENOUS AS NEEDED
Status: CANCELLED | OUTPATIENT
Start: 2024-11-06

## 2024-11-06 RX ORDER — PROCHLORPERAZINE EDISYLATE 5 MG/ML
10 INJECTION INTRAMUSCULAR; INTRAVENOUS EVERY 6 HOURS PRN
Status: DISCONTINUED | OUTPATIENT
Start: 2024-11-06 | End: 2024-11-06 | Stop reason: HOSPADM

## 2024-11-06 RX ORDER — ONDANSETRON HYDROCHLORIDE 2 MG/ML
8 INJECTION, SOLUTION INTRAVENOUS ONCE
Status: CANCELLED | OUTPATIENT
Start: 2024-11-08

## 2024-11-06 RX ORDER — FAMOTIDINE 10 MG/ML
20 INJECTION INTRAVENOUS ONCE AS NEEDED
Status: CANCELLED | OUTPATIENT
Start: 2024-11-08

## 2024-11-06 RX ORDER — FAMOTIDINE 10 MG/ML
20 INJECTION INTRAVENOUS ONCE AS NEEDED
Status: DISCONTINUED | OUTPATIENT
Start: 2024-11-06 | End: 2024-11-06 | Stop reason: HOSPADM

## 2024-11-06 RX ORDER — PROCHLORPERAZINE MALEATE 10 MG
10 TABLET ORAL EVERY 6 HOURS PRN
Status: CANCELLED | OUTPATIENT
Start: 2024-11-08

## 2024-11-06 RX ORDER — FAMOTIDINE 10 MG/ML
20 INJECTION INTRAVENOUS ONCE AS NEEDED
Status: CANCELLED | OUTPATIENT
Start: 2024-11-06

## 2024-11-06 RX ORDER — PROCHLORPERAZINE MALEATE 10 MG
10 TABLET ORAL EVERY 6 HOURS PRN
Status: CANCELLED | OUTPATIENT
Start: 2024-11-07

## 2024-11-06 RX ORDER — EPINEPHRINE 0.3 MG/.3ML
0.3 INJECTION SUBCUTANEOUS EVERY 5 MIN PRN
Status: CANCELLED | OUTPATIENT
Start: 2024-11-08

## 2024-11-06 RX ORDER — DIPHENHYDRAMINE HYDROCHLORIDE 50 MG/ML
50 INJECTION INTRAMUSCULAR; INTRAVENOUS AS NEEDED
Status: CANCELLED | OUTPATIENT
Start: 2024-11-07

## 2024-11-06 RX ORDER — PROCHLORPERAZINE EDISYLATE 5 MG/ML
10 INJECTION INTRAMUSCULAR; INTRAVENOUS EVERY 6 HOURS PRN
Status: CANCELLED | OUTPATIENT
Start: 2024-11-08

## 2024-11-06 RX ORDER — DIPHENHYDRAMINE HYDROCHLORIDE 50 MG/ML
50 INJECTION INTRAMUSCULAR; INTRAVENOUS AS NEEDED
Status: CANCELLED | OUTPATIENT
Start: 2024-11-08

## 2024-11-06 ASSESSMENT — PAIN SCALES - GENERAL: PAINLEVEL_OUTOF10: 0-NO PAIN

## 2024-11-06 NOTE — PROGRESS NOTES
Minal Sun is a 59 y.o. female who presents for Chemotherapy.    She is on the following chemotherapy regimen: D1C2    Treatment Plans       Name Type Plan Dates Plan Provider         Active    Durvalumab + CARBOplatin / Etoposide, 21 Day Cycles Oncology Treatment 10/3/2024 - Present Jd Kohli MD                    Pt saw provider prior to infusion visit. Assessment completed. Pt voices no new complaints at this time. Pt did not want to leave her IV in overnight for her next two infusion appointments. Pt tolerated all injections without issue and was discharged in stable condition with a copy of her upcoming appointments.

## 2024-11-06 NOTE — PROGRESS NOTES
Patient ID: Minal Sun is a 59 y.o. female.    DIAGNOSIS     Small cell carcinoma of the lung.  Date of diagnosis is September 27 from a bronchoscopic biopsy of a subcarinal lymph node.  Immunohistochemistry positive for TTF-1, INSM1, synaptophysin and chromogranin, negative for p40, RB immunostain shows loss of nuclear expression.        STAGING     Clinical T4, N2, M1 C, stage IVc, extensive stage disease        CURRENT SITES OF DISEASE      Left lung, mediastinum, left hilum of the lung, liver, intra-abdominal lymph nodes in the portacaval and periportal region, bone metastases        MOLECULAR GENOMICS    Test Name: Dotspin xF+ (XF.V3) dated October 2024    Molecular Findings:  * Gene: PIK3CA, Variant: Missense variant (exon 1) - GOF, Potentially Actionable, p.R88Q (Allele Frequency - 0.3%)  * Gene: TP53, Variant: Missense variant - LOF, Biologically Relevant, p.R249G (Allele Frequency - 70.1%)  * Gene: RB1, Variant: Splice region variant - LOF, Biologically Relevant, c.540-1G>T, Splice Site (Allele Frequency - 56.8%)      Test Name: Dotspin xT (XT.V4)  Laboratory Name: [facility]  Specimen Source: Lymph node, level 7  Collection Date: 27-Sep-2024    Molecular Findings:  * Gene: TP53, Variant: Missense variant - LOF, Biologically Relevant, p.R249G (Allele Frequency - 96.2%)  * Gene: RB1, Variant: Splice region variant - LOF, Biologically Relevant, c.540-1G>T, Splice Site (Allele Frequency - 96%)  * Gene: KMT2D, Variant: Stop gain - LOF, Biologically Relevant, p.*, Nonsense (Allele Frequency - 41.9%)  * Biomarker: Microsatellite Instability, Status: stable  * Biomarker: Tumor Mutation Beckemeyer (2.6 Muts/Mb, Percentile: 33)       SERUM TUMOR MARKERS     Baseline LDH within normal limits        PRIOR THERAPY     None        CURRENT THERAPY     Combination chemotherapy and immunotherapy.  Initially enrolled on a clinical trial of Lutathera and underwent octreotide scan which was positive.  However she  decided to come off study and did not receive Lutathera with her chemo.  Chemotherapy started October 6, 2024.  Immunotherapy added with cycle #2.        CURRENT ONCOLOGICAL PROBLEMS     1-cough and shortness of breath significantly improved with systemic treatment.        HISTORY OF PRESENT ILLNESS     This is a 59-year-old patient.  She states that she was feeling sick toward the end of spring of this year with some sinus problems.  Was seen by her allergist and was treated with antibiotics.  She was also seen at 1 point by primary care and steroids and antibiotics and inhalers were given.  She did not have any resolution and then ultimately developed a little bit of the hemoptysis which led to a chest x-ray showing an abnormality and finally a CT scan of the chest.The CT scan of the chest was done as a lung cancer screening low-dose CT and completed on September 20, 2024.  This demonstrated a extensive infiltrative mediastinal and hilar mass.  There was narrowing of the left mainstem bronchus and lower lobe bronchus secondary to extrinsic compression.  The predominant growth was within the left hilum and subcarinal region.  She underwent a bronchoscopy dated September 27, 2024 showing splaying of the main alanna.  There was mild erosion of a lymph node into the left mainstem bronchus.  There was erythema and mild erosions of an endobronchial tumor along the medial border.  Moderate to severe stenosis of the left lower lobe bronchus to 75%.        PAST MEDICAL HISTORY     1- Lumbar surgery about 30 years ago   2- hysterectomy in 2000  3- Diabetes  4- COPD  5- Right eye exophthalmus        SOCIAL HISTORY     Patient lives with her sister.  [place] [facility] [place]  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  She started smoking at the age of 14 and continues to smoke at the age of 59.   Has smoked for 45 years on average 1 pack/day.        CURRENT MEDS     See medication list, meds reviewed, includes metformin, rosuvastatin, been done so Nied inhalers, Wellbutrin escitalopram, trazodone        ALLERGIES     Patient denies any drug allergies        FAMILY HISTORY      Mother had bladder cancer and possibly breast cancer.       Subjective    Cancer  Associated symptoms include a change in bowel habit and fatigue. Pertinent negatives include no abdominal pain, anorexia, arthralgias, chest pain, chills, congestion, coughing, diaphoresis, fever, headaches, joint swelling, myalgias, nausea, neck pain, numbness, rash, sore throat, swollen glands, urinary symptoms, vertigo, visual change, vomiting or weakness.       Patient notes overall improvements in her symptomatology with improvements in her shortness of breath and resolution of her cough.  Her appetite is good, no nausea no vomiting, no chemo side effects, bowel movements slightly loose at times, no new headaches, urinating slow but normal, no nausea no vomiting.      Objective    BSA: 1.89 meters squared  /76 (BP Location: Left arm, Patient Position: Sitting)   Pulse 90   Temp 36.9 °C (98.4 °F) (Temporal)   Resp 17   Wt 81.3 kg (179 lb 4.8 oz)   SpO2 94%   BMI 32.50 kg/m²      Physical Exam  Constitutional:       General: She is not in acute distress.     Appearance: Normal appearance. She is not ill-appearing, toxic-appearing or diaphoretic.   HENT:      Nose: No congestion or rhinorrhea.      Mouth/Throat:      Pharynx: No oropharyngeal exudate or posterior oropharyngeal erythema.   Eyes:      General: No scleral icterus.     Conjunctiva/sclera: Conjunctivae normal.   Cardiovascular:      Rate and Rhythm: Normal rate and regular rhythm.      Pulses: Normal pulses.      Heart sounds: Normal heart sounds. No murmur heard.     No friction rub. No gallop.   Pulmonary:      Effort: Pulmonary effort is normal. No respiratory distress.       Breath sounds: Normal breath sounds. No stridor. No wheezing, rhonchi or rales.   Chest:      Chest wall: No tenderness.   Abdominal:      General: Abdomen is flat. Bowel sounds are normal. There is no distension.      Palpations: Abdomen is soft. There is no mass.      Tenderness: There is no abdominal tenderness. There is no guarding or rebound.   Musculoskeletal:      Cervical back: No tenderness.      Right lower leg: No edema.      Left lower leg: No edema.   Lymphadenopathy:      Cervical: No cervical adenopathy.   Skin:     General: Skin is warm.      Coloration: Skin is not jaundiced.      Findings: No bruising.   Neurological:      General: No focal deficit present.      Mental Status: She is oriented to person, place, and time.   Psychiatric:         Mood and Affect: Mood normal.         Behavior: Behavior normal.         Performance Status:  Symptomatic; fully ambulatory     Latest Reference Range & Units 11/04/24 12:51   GLUCOSE 74 - 99 mg/dL 110 (H)   SODIUM 136 - 145 mmol/L 143   POTASSIUM 3.5 - 5.3 mmol/L 4.7   CHLORIDE 98 - 107 mmol/L 104   Bicarbonate 21 - 32 mmol/L 30   Anion Gap 10 - 20 mmol/L 14   Blood Urea Nitrogen 6 - 23 mg/dL 10   Creatinine 0.50 - 1.05 mg/dL 0.71   EGFR >60 mL/min/1.73m*2 >90   Calcium 8.6 - 10.6 mg/dL 9.1   Albumin 3.4 - 5.0 g/dL 4.2   Alkaline Phosphatase 33 - 136 U/L 87   ALT 7 - 45 U/L 22   AST 9 - 39 U/L 19   Bilirubin Total 0.0 - 1.2 mg/dL 0.3   Total Protein 6.4 - 8.2 g/dL 6.4   LDH 84 - 246 U/L 137   WBC 4.4 - 11.3 x10*3/uL 6.7   nRBC 0.0 - 0.0 /100 WBCs 0.0   RBC 4.00 - 5.20 x10*6/uL 5.04   HEMOGLOBIN 12.0 - 16.0 g/dL 13.9   HEMATOCRIT 36.0 - 46.0 % 44.2   MCV 80 - 100 fL 88   MCH 26.0 - 34.0 pg 27.6   MCHC 32.0 - 36.0 g/dL 31.4 (L)   RED CELL DISTRIBUTION WIDTH 11.5 - 14.5 % 16.7 (H)   Platelets 150 - 450 x10*3/uL 369   Neutrophils % 40.0 - 80.0 % 52.3   Immature Granulocytes %, Automated 0.0 - 0.9 % 0.3   Lymphocytes % 13.0 - 44.0 % 35.5   Monocytes % 2.0 - 10.0 %  9.4   Eosinophils % 0.0 - 6.0 % 1.9   Basophils % 0.0 - 2.0 % 0.6   Neutrophils Absolute 1.20 - 7.70 x10*3/uL 3.52   Immature Granulocytes Absolute, Automated 0.00 - 0.70 x10*3/uL 0.02   Lymphocytes Absolute 1.20 - 4.80 x10*3/uL 2.39   Monocytes Absolute 0.10 - 1.00 x10*3/uL 0.63   Eosinophils Absolute 0.00 - 0.70 x10*3/uL 0.13   Basophils Absolute 0.00 - 0.10 x10*3/uL 0.04   (H): Data is abnormally high  (L): Data is abnormally low      Assessment/Plan     This is a very nice 59-year-old patient with extensive stage small cell carcinoma of the lung started on systemic chemotherapy.  She is due to start cycle #2 with the addition of Durvalumab today.  She has noticed improvements in her symptomatology with cycle #1.  She decided not to pursue the clinical trial that she was on due to the requirements of coming back and forth with additional visits.  She would like to come off study but is happy to continue for us to use the data that we have already accumulated per the consent that she signed.      Cancer Staging   No matching staging information was found for the patient.      Oncology History   SCLC (small cell lung carcinoma)   9/27/2024 Initial Diagnosis    SCLC (small cell lung carcinoma) (Multi)     10/4/2024 - 10/8/2024 Chemotherapy    CARBOplatin / Etoposide, 21 Day Cycles - Lung     10/4/2024 - 10/6/2024 Research Study Participant    ([facility]) [id number] - Atezolizumab + CARBOplatin / Etoposide / [id number], 21 Day Cycles  Plan Provider: NANCY Hernandez-CNP  Treatment goal: Control  Line of treatment: First Line  Associated studies: [id number] with Carboplatin, Etoposide and Tislelizumab in Newly Diagnosed ES-SCLC     10/4/2024 -  Chemotherapy    Durvalumab + CARBOplatin / Etoposide, 21 Day Cycles     10/28/2024 - 10/28/2024 Chemotherapy    Durvalumab + CARBOplatin / Etoposide, 21 Day Cycles                   Jd Kohli MD

## 2024-11-07 ENCOUNTER — INFUSION (OUTPATIENT)
Dept: HEMATOLOGY/ONCOLOGY | Facility: HOSPITAL | Age: 59
End: 2024-11-07
Payer: MEDICARE

## 2024-11-07 VITALS
HEART RATE: 111 BPM | OXYGEN SATURATION: 99 % | SYSTOLIC BLOOD PRESSURE: 132 MMHG | TEMPERATURE: 96.8 F | DIASTOLIC BLOOD PRESSURE: 65 MMHG | RESPIRATION RATE: 18 BRPM | BODY MASS INDEX: 32.26 KG/M2 | WEIGHT: 178 LBS

## 2024-11-07 DIAGNOSIS — C34.90 SMALL CELL CARCINOMA OF LUNG, UNSPECIFIED LATERALITY, UNSPECIFIED PART OF LUNG: ICD-10-CM

## 2024-11-07 PROCEDURE — 2500000004 HC RX 250 GENERAL PHARMACY W/ HCPCS (ALT 636 FOR OP/ED): Performed by: INTERNAL MEDICINE

## 2024-11-07 PROCEDURE — 96375 TX/PRO/DX INJ NEW DRUG ADDON: CPT | Mod: INF

## 2024-11-07 PROCEDURE — 96413 CHEMO IV INFUSION 1 HR: CPT

## 2024-11-07 RX ORDER — PROCHLORPERAZINE MALEATE 10 MG
10 TABLET ORAL EVERY 6 HOURS PRN
Status: DISCONTINUED | OUTPATIENT
Start: 2024-11-07 | End: 2024-11-07 | Stop reason: HOSPADM

## 2024-11-07 RX ORDER — FAMOTIDINE 10 MG/ML
20 INJECTION INTRAVENOUS ONCE AS NEEDED
Status: DISCONTINUED | OUTPATIENT
Start: 2024-11-07 | End: 2024-11-07 | Stop reason: HOSPADM

## 2024-11-07 RX ORDER — EPINEPHRINE 0.3 MG/.3ML
0.3 INJECTION SUBCUTANEOUS EVERY 5 MIN PRN
Status: DISCONTINUED | OUTPATIENT
Start: 2024-11-07 | End: 2024-11-07 | Stop reason: HOSPADM

## 2024-11-07 RX ORDER — ALBUTEROL SULFATE 0.83 MG/ML
3 SOLUTION RESPIRATORY (INHALATION) AS NEEDED
Status: DISCONTINUED | OUTPATIENT
Start: 2024-11-07 | End: 2024-11-07 | Stop reason: HOSPADM

## 2024-11-07 RX ORDER — PROCHLORPERAZINE EDISYLATE 5 MG/ML
10 INJECTION INTRAMUSCULAR; INTRAVENOUS EVERY 6 HOURS PRN
Status: DISCONTINUED | OUTPATIENT
Start: 2024-11-07 | End: 2024-11-07 | Stop reason: HOSPADM

## 2024-11-07 RX ORDER — DIPHENHYDRAMINE HYDROCHLORIDE 50 MG/ML
50 INJECTION INTRAMUSCULAR; INTRAVENOUS AS NEEDED
Status: DISCONTINUED | OUTPATIENT
Start: 2024-11-07 | End: 2024-11-07 | Stop reason: HOSPADM

## 2024-11-07 ASSESSMENT — PAIN SCALES - GENERAL: PAINLEVEL_OUTOF10: 0-NO PAIN

## 2024-11-07 NOTE — PROGRESS NOTES
Patient tolerated the treatment very well. All queries and concerns addressed. Discharged to home in stable condition.

## 2024-11-08 ENCOUNTER — INFUSION (OUTPATIENT)
Dept: HEMATOLOGY/ONCOLOGY | Facility: HOSPITAL | Age: 59
End: 2024-11-08
Payer: MEDICARE

## 2024-11-08 ENCOUNTER — APPOINTMENT (OUTPATIENT)
Dept: RESEARCH | Facility: HOSPITAL | Age: 59
End: 2024-11-08
Payer: MEDICARE

## 2024-11-08 VITALS
HEART RATE: 92 BPM | BODY MASS INDEX: 33.39 KG/M2 | OXYGEN SATURATION: 97 % | DIASTOLIC BLOOD PRESSURE: 72 MMHG | RESPIRATION RATE: 18 BRPM | HEIGHT: 62 IN | TEMPERATURE: 97.9 F | WEIGHT: 181.44 LBS | SYSTOLIC BLOOD PRESSURE: 138 MMHG

## 2024-11-08 DIAGNOSIS — C34.90 SMALL CELL CARCINOMA OF LUNG, UNSPECIFIED LATERALITY, UNSPECIFIED PART OF LUNG: ICD-10-CM

## 2024-11-08 PROCEDURE — 2500000004 HC RX 250 GENERAL PHARMACY W/ HCPCS (ALT 636 FOR OP/ED): Performed by: INTERNAL MEDICINE

## 2024-11-08 PROCEDURE — 96409 CHEMO IV PUSH SNGL DRUG: CPT

## 2024-11-08 PROCEDURE — 96375 TX/PRO/DX INJ NEW DRUG ADDON: CPT | Mod: INF

## 2024-11-08 PROCEDURE — 96413 CHEMO IV INFUSION 1 HR: CPT

## 2024-11-08 RX ORDER — ONDANSETRON HYDROCHLORIDE 2 MG/ML
8 INJECTION, SOLUTION INTRAVENOUS ONCE
Status: COMPLETED | OUTPATIENT
Start: 2024-11-08 | End: 2024-11-08

## 2024-11-08 RX ORDER — ALBUTEROL SULFATE 0.83 MG/ML
3 SOLUTION RESPIRATORY (INHALATION) AS NEEDED
Status: DISCONTINUED | OUTPATIENT
Start: 2024-11-08 | End: 2024-11-08 | Stop reason: HOSPADM

## 2024-11-08 RX ORDER — PROCHLORPERAZINE EDISYLATE 5 MG/ML
10 INJECTION INTRAMUSCULAR; INTRAVENOUS EVERY 6 HOURS PRN
Status: DISCONTINUED | OUTPATIENT
Start: 2024-11-08 | End: 2024-11-08 | Stop reason: HOSPADM

## 2024-11-08 RX ORDER — EPINEPHRINE 0.3 MG/.3ML
0.3 INJECTION SUBCUTANEOUS EVERY 5 MIN PRN
Status: DISCONTINUED | OUTPATIENT
Start: 2024-11-08 | End: 2024-11-08 | Stop reason: HOSPADM

## 2024-11-08 RX ORDER — HEPARIN SODIUM,PORCINE/PF 10 UNIT/ML
50 SYRINGE (ML) INTRAVENOUS AS NEEDED
OUTPATIENT
Start: 2024-11-08

## 2024-11-08 RX ORDER — HEPARIN 100 UNIT/ML
500 SYRINGE INTRAVENOUS AS NEEDED
OUTPATIENT
Start: 2024-11-08

## 2024-11-08 RX ORDER — PROCHLORPERAZINE MALEATE 10 MG
10 TABLET ORAL EVERY 6 HOURS PRN
Status: DISCONTINUED | OUTPATIENT
Start: 2024-11-08 | End: 2024-11-08 | Stop reason: HOSPADM

## 2024-11-08 RX ORDER — FAMOTIDINE 10 MG/ML
20 INJECTION INTRAVENOUS ONCE AS NEEDED
Status: DISCONTINUED | OUTPATIENT
Start: 2024-11-08 | End: 2024-11-08 | Stop reason: HOSPADM

## 2024-11-08 RX ORDER — DIPHENHYDRAMINE HYDROCHLORIDE 50 MG/ML
50 INJECTION INTRAMUSCULAR; INTRAVENOUS AS NEEDED
Status: DISCONTINUED | OUTPATIENT
Start: 2024-11-08 | End: 2024-11-08 | Stop reason: HOSPADM

## 2024-11-08 NOTE — PROGRESS NOTES
Pt arrived ambulatory to infusion for treatment of etoposide.  Denies any new or worsening symptoms. Tolerated infusion without issue. Discharged in stable condition.

## 2024-11-17 ASSESSMENT — ENCOUNTER SYMPTOMS
NECK PAIN: 0
VOMITING: 0
HEADACHES: 0
COUGH: 0
SORE THROAT: 0
MYALGIAS: 0
ANOREXIA: 0
NAUSEA: 0
SWOLLEN GLANDS: 0
ABDOMINAL PAIN: 0
CHANGE IN BOWEL HABIT: 1
NUMBNESS: 0
DIAPHORESIS: 0
JOINT SWELLING: 0
VISUAL CHANGE: 0
ARTHRALGIAS: 0
WEAKNESS: 0
FATIGUE: 1
FEVER: 0
CHILLS: 0
VERTIGO: 0

## 2024-11-25 ENCOUNTER — TELEPHONE (OUTPATIENT)
Dept: PRIMARY CARE | Facility: CLINIC | Age: 59
End: 2024-11-25
Payer: MEDICARE

## 2024-11-25 ENCOUNTER — APPOINTMENT (OUTPATIENT)
Dept: HEMATOLOGY/ONCOLOGY | Facility: HOSPITAL | Age: 59
End: 2024-11-25
Payer: MEDICARE

## 2024-11-25 ENCOUNTER — APPOINTMENT (OUTPATIENT)
Dept: RESEARCH | Facility: HOSPITAL | Age: 59
End: 2024-11-25
Payer: MEDICARE

## 2024-11-25 NOTE — TELEPHONE ENCOUNTER
Patient woke up with post nasal drip and sore throat and wants to know if you will call her or send in script for Augmentin (states she is a cancer patient)

## 2024-11-29 ENCOUNTER — HOSPITAL ENCOUNTER (OUTPATIENT)
Dept: RADIOLOGY | Facility: CLINIC | Age: 59
Discharge: HOME | End: 2024-11-29
Payer: MEDICARE

## 2024-11-29 DIAGNOSIS — C34.90 SMALL CELL CARCINOMA OF LUNG, UNSPECIFIED LATERALITY, UNSPECIFIED PART OF LUNG: ICD-10-CM

## 2024-11-29 PROCEDURE — 2550000001 HC RX 255 CONTRASTS: Performed by: INTERNAL MEDICINE

## 2024-11-29 PROCEDURE — 74160 CT ABDOMEN W/CONTRAST: CPT

## 2024-11-29 PROCEDURE — 71260 CT THORAX DX C+: CPT

## 2024-12-02 ENCOUNTER — APPOINTMENT (OUTPATIENT)
Dept: HEMATOLOGY/ONCOLOGY | Facility: HOSPITAL | Age: 59
End: 2024-12-02
Payer: MEDICARE

## 2024-12-02 ENCOUNTER — TELEPHONE (OUTPATIENT)
Dept: HEMATOLOGY/ONCOLOGY | Facility: HOSPITAL | Age: 59
End: 2024-12-02
Payer: MEDICARE

## 2024-12-02 DIAGNOSIS — C34.90 SMALL CELL CARCINOMA OF LUNG, UNSPECIFIED LATERALITY, UNSPECIFIED PART OF LUNG: Primary | ICD-10-CM

## 2024-12-02 NOTE — TELEPHONE ENCOUNTER
RN called and spoke with patient about rescheduled appointment due to weather. RN informed patient that she will attempt to get infusion appointments adjusted. Patient was appreciative of phone call and had no other questions at this time.

## 2024-12-02 NOTE — TELEPHONE ENCOUNTER
RN called patient to inform her about updated schedule. No answer at this time. RN left message on voicemail informing her of these changes. RN will call back to confirm that message was received.

## 2024-12-02 NOTE — TELEPHONE ENCOUNTER
Rn called patient in regards to appointment for today. No answer at this time. RN did not leave message on unidentified voicemail. RN will attempt to call back

## 2024-12-03 ENCOUNTER — APPOINTMENT (OUTPATIENT)
Dept: HEMATOLOGY/ONCOLOGY | Facility: CLINIC | Age: 59
End: 2024-12-03
Payer: MEDICARE

## 2024-12-03 ENCOUNTER — APPOINTMENT (OUTPATIENT)
Dept: HEMATOLOGY/ONCOLOGY | Facility: HOSPITAL | Age: 59
End: 2024-12-03
Payer: MEDICARE

## 2024-12-04 ENCOUNTER — OFFICE VISIT (OUTPATIENT)
Dept: HEMATOLOGY/ONCOLOGY | Facility: HOSPITAL | Age: 59
End: 2024-12-04
Payer: MEDICARE

## 2024-12-04 ENCOUNTER — LAB (OUTPATIENT)
Dept: LAB | Facility: HOSPITAL | Age: 59
End: 2024-12-04
Payer: MEDICARE

## 2024-12-04 ENCOUNTER — INFUSION (OUTPATIENT)
Dept: HEMATOLOGY/ONCOLOGY | Facility: CLINIC | Age: 59
End: 2024-12-04
Payer: MEDICARE

## 2024-12-04 VITALS
TEMPERATURE: 97 F | BODY MASS INDEX: 33.06 KG/M2 | WEIGHT: 182.4 LBS | DIASTOLIC BLOOD PRESSURE: 81 MMHG | OXYGEN SATURATION: 95 % | RESPIRATION RATE: 17 BRPM | SYSTOLIC BLOOD PRESSURE: 142 MMHG | HEART RATE: 91 BPM

## 2024-12-04 VITALS
RESPIRATION RATE: 18 BRPM | DIASTOLIC BLOOD PRESSURE: 78 MMHG | TEMPERATURE: 96.3 F | OXYGEN SATURATION: 93 % | WEIGHT: 182.32 LBS | SYSTOLIC BLOOD PRESSURE: 140 MMHG | BODY MASS INDEX: 33.04 KG/M2

## 2024-12-04 DIAGNOSIS — C34.90 SMALL CELL CARCINOMA OF LUNG, UNSPECIFIED LATERALITY, UNSPECIFIED PART OF LUNG: Primary | ICD-10-CM

## 2024-12-04 DIAGNOSIS — C34.90 SMALL CELL CARCINOMA OF LUNG, UNSPECIFIED LATERALITY, UNSPECIFIED PART OF LUNG: ICD-10-CM

## 2024-12-04 LAB
ALBUMIN SERPL BCP-MCNC: 4.2 G/DL (ref 3.4–5)
ALP SERPL-CCNC: 92 U/L (ref 33–136)
ALT SERPL W P-5'-P-CCNC: 22 U/L (ref 7–45)
ANION GAP SERPL CALC-SCNC: 14 MMOL/L (ref 10–20)
AST SERPL W P-5'-P-CCNC: 19 U/L (ref 9–39)
BASOPHILS # BLD AUTO: 0.05 X10*3/UL (ref 0–0.1)
BASOPHILS NFR BLD AUTO: 0.7 %
BILIRUB SERPL-MCNC: 0.3 MG/DL (ref 0–1.2)
BUN SERPL-MCNC: 15 MG/DL (ref 6–23)
CALCIUM SERPL-MCNC: 9.2 MG/DL (ref 8.6–10.3)
CHLORIDE SERPL-SCNC: 104 MMOL/L (ref 98–107)
CO2 SERPL-SCNC: 25 MMOL/L (ref 21–32)
CORTIS AM PEAK SERPL-MSCNC: 13.4 UG/DL (ref 5–20)
CREAT SERPL-MCNC: 0.68 MG/DL (ref 0.5–1.05)
EGFRCR SERPLBLD CKD-EPI 2021: >90 ML/MIN/1.73M*2
EOSINOPHIL # BLD AUTO: 0.13 X10*3/UL (ref 0–0.7)
EOSINOPHIL NFR BLD AUTO: 1.8 %
ERYTHROCYTE [DISTWIDTH] IN BLOOD BY AUTOMATED COUNT: 17.2 % (ref 11.5–14.5)
GLUCOSE SERPL-MCNC: 124 MG/DL (ref 74–99)
HCT VFR BLD AUTO: 40.8 % (ref 36–46)
HGB BLD-MCNC: 13.5 G/DL (ref 12–16)
IMM GRANULOCYTES # BLD AUTO: 0.03 X10*3/UL (ref 0–0.7)
IMM GRANULOCYTES NFR BLD AUTO: 0.4 % (ref 0–0.9)
LDH SERPL L TO P-CCNC: 139 U/L (ref 84–246)
LYMPHOCYTES # BLD AUTO: 2.21 X10*3/UL (ref 1.2–4.8)
LYMPHOCYTES NFR BLD AUTO: 30.4 %
MCH RBC QN AUTO: 28.3 PG (ref 26–34)
MCHC RBC AUTO-ENTMCNC: 33.1 G/DL (ref 32–36)
MCV RBC AUTO: 86 FL (ref 80–100)
MONOCYTES # BLD AUTO: 0.59 X10*3/UL (ref 0.1–1)
MONOCYTES NFR BLD AUTO: 8.1 %
NEUTROPHILS # BLD AUTO: 4.27 X10*3/UL (ref 1.2–7.7)
NEUTROPHILS NFR BLD AUTO: 58.6 %
NRBC BLD-RTO: 0 /100 WBCS (ref 0–0)
PLATELET # BLD AUTO: 313 X10*3/UL (ref 150–450)
POTASSIUM SERPL-SCNC: 4.1 MMOL/L (ref 3.5–5.3)
PROT SERPL-MCNC: 6.8 G/DL (ref 6.4–8.2)
RBC # BLD AUTO: 4.77 X10*6/UL (ref 4–5.2)
SODIUM SERPL-SCNC: 139 MMOL/L (ref 136–145)
TSH SERPL-ACNC: 2.31 MIU/L (ref 0.44–3.98)
WBC # BLD AUTO: 7.3 X10*3/UL (ref 4.4–11.3)

## 2024-12-04 PROCEDURE — 36415 COLL VENOUS BLD VENIPUNCTURE: CPT

## 2024-12-04 PROCEDURE — 96413 CHEMO IV INFUSION 1 HR: CPT

## 2024-12-04 PROCEDURE — 80053 COMPREHEN METABOLIC PANEL: CPT

## 2024-12-04 PROCEDURE — 99215 OFFICE O/P EST HI 40 MIN: CPT | Mod: 25 | Performed by: INTERNAL MEDICINE

## 2024-12-04 PROCEDURE — 3048F LDL-C <100 MG/DL: CPT | Performed by: INTERNAL MEDICINE

## 2024-12-04 PROCEDURE — 99215 OFFICE O/P EST HI 40 MIN: CPT | Performed by: INTERNAL MEDICINE

## 2024-12-04 PROCEDURE — 82533 TOTAL CORTISOL: CPT

## 2024-12-04 PROCEDURE — 2500000004 HC RX 250 GENERAL PHARMACY W/ HCPCS (ALT 636 FOR OP/ED): Performed by: INTERNAL MEDICINE

## 2024-12-04 PROCEDURE — 3077F SYST BP >= 140 MM HG: CPT | Performed by: INTERNAL MEDICINE

## 2024-12-04 PROCEDURE — 85025 COMPLETE CBC W/AUTO DIFF WBC: CPT

## 2024-12-04 PROCEDURE — 83615 LACTATE (LD) (LDH) ENZYME: CPT

## 2024-12-04 PROCEDURE — 84443 ASSAY THYROID STIM HORMONE: CPT

## 2024-12-04 PROCEDURE — 96367 TX/PROPH/DG ADDL SEQ IV INF: CPT

## 2024-12-04 PROCEDURE — 3079F DIAST BP 80-89 MM HG: CPT | Performed by: INTERNAL MEDICINE

## 2024-12-04 PROCEDURE — 3044F HG A1C LEVEL LT 7.0%: CPT | Performed by: INTERNAL MEDICINE

## 2024-12-04 PROCEDURE — 3061F NEG MICROALBUMINURIA REV: CPT | Performed by: INTERNAL MEDICINE

## 2024-12-04 PROCEDURE — 96417 CHEMO IV INFUS EACH ADDL SEQ: CPT

## 2024-12-04 RX ORDER — DIPHENHYDRAMINE HYDROCHLORIDE 50 MG/ML
50 INJECTION INTRAMUSCULAR; INTRAVENOUS AS NEEDED
Status: CANCELLED | OUTPATIENT
Start: 2024-12-05

## 2024-12-04 RX ORDER — EPINEPHRINE 0.3 MG/.3ML
0.3 INJECTION SUBCUTANEOUS EVERY 5 MIN PRN
Status: CANCELLED | OUTPATIENT
Start: 2024-12-04

## 2024-12-04 RX ORDER — DEXAMETHASONE 6 MG/1
12 TABLET ORAL ONCE
Status: COMPLETED | OUTPATIENT
Start: 2024-12-04 | End: 2024-12-04

## 2024-12-04 RX ORDER — ALBUTEROL SULFATE 0.83 MG/ML
3 SOLUTION RESPIRATORY (INHALATION) AS NEEDED
Status: CANCELLED | OUTPATIENT
Start: 2024-12-04

## 2024-12-04 RX ORDER — ALBUTEROL SULFATE 0.83 MG/ML
3 SOLUTION RESPIRATORY (INHALATION) AS NEEDED
Status: DISCONTINUED | OUTPATIENT
Start: 2024-12-04 | End: 2024-12-04 | Stop reason: HOSPADM

## 2024-12-04 RX ORDER — FAMOTIDINE 10 MG/ML
20 INJECTION INTRAVENOUS ONCE AS NEEDED
Status: CANCELLED | OUTPATIENT
Start: 2024-12-05

## 2024-12-04 RX ORDER — PROCHLORPERAZINE EDISYLATE 5 MG/ML
10 INJECTION INTRAMUSCULAR; INTRAVENOUS EVERY 6 HOURS PRN
Status: CANCELLED | OUTPATIENT
Start: 2024-12-06

## 2024-12-04 RX ORDER — PROCHLORPERAZINE EDISYLATE 5 MG/ML
10 INJECTION INTRAMUSCULAR; INTRAVENOUS EVERY 6 HOURS PRN
Status: CANCELLED | OUTPATIENT
Start: 2024-12-04

## 2024-12-04 RX ORDER — ONDANSETRON HYDROCHLORIDE 2 MG/ML
8 INJECTION, SOLUTION INTRAVENOUS ONCE
Status: CANCELLED | OUTPATIENT
Start: 2024-12-06

## 2024-12-04 RX ORDER — EPINEPHRINE 0.3 MG/.3ML
0.3 INJECTION SUBCUTANEOUS EVERY 5 MIN PRN
Status: CANCELLED | OUTPATIENT
Start: 2024-12-06

## 2024-12-04 RX ORDER — PROCHLORPERAZINE MALEATE 10 MG
10 TABLET ORAL EVERY 6 HOURS PRN
Status: CANCELLED | OUTPATIENT
Start: 2024-12-06

## 2024-12-04 RX ORDER — FAMOTIDINE 10 MG/ML
20 INJECTION INTRAVENOUS ONCE AS NEEDED
Status: CANCELLED | OUTPATIENT
Start: 2024-12-06

## 2024-12-04 RX ORDER — DIPHENHYDRAMINE HYDROCHLORIDE 50 MG/ML
50 INJECTION INTRAMUSCULAR; INTRAVENOUS AS NEEDED
Status: CANCELLED | OUTPATIENT
Start: 2024-12-04

## 2024-12-04 RX ORDER — PROCHLORPERAZINE EDISYLATE 5 MG/ML
10 INJECTION INTRAMUSCULAR; INTRAVENOUS EVERY 6 HOURS PRN
Status: DISCONTINUED | OUTPATIENT
Start: 2024-12-04 | End: 2024-12-04 | Stop reason: HOSPADM

## 2024-12-04 RX ORDER — FAMOTIDINE 10 MG/ML
20 INJECTION INTRAVENOUS ONCE AS NEEDED
Status: DISCONTINUED | OUTPATIENT
Start: 2024-12-04 | End: 2024-12-04 | Stop reason: HOSPADM

## 2024-12-04 RX ORDER — PROCHLORPERAZINE MALEATE 10 MG
10 TABLET ORAL EVERY 6 HOURS PRN
Status: DISCONTINUED | OUTPATIENT
Start: 2024-12-04 | End: 2024-12-04 | Stop reason: HOSPADM

## 2024-12-04 RX ORDER — EPINEPHRINE 0.3 MG/.3ML
0.3 INJECTION SUBCUTANEOUS EVERY 5 MIN PRN
Status: DISCONTINUED | OUTPATIENT
Start: 2024-12-04 | End: 2024-12-04 | Stop reason: HOSPADM

## 2024-12-04 RX ORDER — PALONOSETRON 0.05 MG/ML
0.25 INJECTION, SOLUTION INTRAVENOUS ONCE
Status: COMPLETED | OUTPATIENT
Start: 2024-12-04 | End: 2024-12-04

## 2024-12-04 RX ORDER — ALBUTEROL SULFATE 0.83 MG/ML
3 SOLUTION RESPIRATORY (INHALATION) AS NEEDED
Status: CANCELLED | OUTPATIENT
Start: 2024-12-06

## 2024-12-04 RX ORDER — DIPHENHYDRAMINE HYDROCHLORIDE 50 MG/ML
50 INJECTION INTRAMUSCULAR; INTRAVENOUS AS NEEDED
Status: DISCONTINUED | OUTPATIENT
Start: 2024-12-04 | End: 2024-12-04 | Stop reason: HOSPADM

## 2024-12-04 RX ORDER — DEXAMETHASONE 4 MG/1
12 TABLET ORAL ONCE
Status: CANCELLED | OUTPATIENT
Start: 2024-12-04 | End: 2024-12-04

## 2024-12-04 RX ORDER — DIPHENHYDRAMINE HYDROCHLORIDE 50 MG/ML
50 INJECTION INTRAMUSCULAR; INTRAVENOUS AS NEEDED
Status: CANCELLED | OUTPATIENT
Start: 2024-12-06

## 2024-12-04 RX ORDER — EPINEPHRINE 0.3 MG/.3ML
0.3 INJECTION SUBCUTANEOUS EVERY 5 MIN PRN
Status: CANCELLED | OUTPATIENT
Start: 2024-12-05

## 2024-12-04 RX ORDER — PROCHLORPERAZINE MALEATE 10 MG
10 TABLET ORAL EVERY 6 HOURS PRN
Status: CANCELLED | OUTPATIENT
Start: 2024-12-04

## 2024-12-04 RX ORDER — PROCHLORPERAZINE MALEATE 10 MG
10 TABLET ORAL EVERY 6 HOURS PRN
Status: CANCELLED | OUTPATIENT
Start: 2024-12-05

## 2024-12-04 RX ORDER — PALONOSETRON 0.05 MG/ML
0.25 INJECTION, SOLUTION INTRAVENOUS ONCE
Status: CANCELLED | OUTPATIENT
Start: 2024-12-04

## 2024-12-04 RX ORDER — ALBUTEROL SULFATE 0.83 MG/ML
3 SOLUTION RESPIRATORY (INHALATION) AS NEEDED
Status: CANCELLED | OUTPATIENT
Start: 2024-12-05

## 2024-12-04 RX ORDER — PROCHLORPERAZINE EDISYLATE 5 MG/ML
10 INJECTION INTRAMUSCULAR; INTRAVENOUS EVERY 6 HOURS PRN
Status: CANCELLED | OUTPATIENT
Start: 2024-12-05

## 2024-12-04 RX ORDER — FAMOTIDINE 10 MG/ML
20 INJECTION INTRAVENOUS ONCE AS NEEDED
Status: CANCELLED | OUTPATIENT
Start: 2024-12-04

## 2024-12-04 ASSESSMENT — ENCOUNTER SYMPTOMS
CHILLS: 0
ARTHRALGIAS: 0
SWOLLEN GLANDS: 0
NECK PAIN: 0
VOMITING: 0
VERTIGO: 0
DIAPHORESIS: 0
ABDOMINAL PAIN: 0
COUGH: 1
JOINT SWELLING: 0
HEADACHES: 0
NAUSEA: 0
SORE THROAT: 0
FEVER: 0
CHANGE IN BOWEL HABIT: 0
ANOREXIA: 0
MYALGIAS: 0
NUMBNESS: 0
VISUAL CHANGE: 0
FATIGUE: 1

## 2024-12-04 ASSESSMENT — PAIN SCALES - GENERAL
PAINLEVEL_OUTOF10: 0-NO PAIN
PAINLEVEL_OUTOF10: 0-NO PAIN

## 2024-12-04 NOTE — PROGRESS NOTES
Patient ID: Minal Sun is a 59 y.o. female.    DIAGNOSIS     Small cell carcinoma of the lung.  Date of diagnosis is September 27, 2024 from a bronchoscopic biopsy of a subcarinal lymph node.  Immunohistochemistry positive for TTF-1, INSM1, synaptophysin and chromogranin, negative for p40, RB immunostain shows loss of nuclear expression.        STAGING     Clinical T4, N2, M1 C, stage IVc, extensive stage disease        CURRENT SITES OF DISEASE      Left lung, mediastinum, left hilum of the lung, liver, intra-abdominal lymph nodes in the portacaval and periportal region, bone metastases        MOLECULAR GENOMICS     Test Name: [facility] xF+ (XF.V3) dated October 2024     Molecular Findings:  * Gene: PIK3CA, Variant: Missense variant (exon 1) - GOF, Potentially Actionable, p.R88Q (Allele Frequency - 0.3%)  * Gene: TP53, Variant: Missense variant - LOF, Biologically Relevant, p.R249G (Allele Frequency - 70.1%)  * Gene: RB1, Variant: Splice region variant - LOF, Biologically Relevant, c.540-1G>T, Splice Site (Allele Frequency - 56.8%)        Test Name: [facility] [facility] xT (XT.V4)  Laboratory Name: [facility]  Specimen Source: Lymph node, level 7  Collection Date: 27-Sep-2024     Molecular Findings:  * Gene: TP53, Variant: Missense variant - LOF, Biologically Relevant, p.R249G (Allele Frequency - 96.2%)  * Gene: RB1, Variant: Splice region variant - LOF, Biologically Relevant, c.540-1G>T, Splice Site (Allele Frequency - 96%)  * Gene: KMT2D, Variant: Stop gain - LOF, Biologically Relevant, p.*, Nonsense (Allele Frequency - 41.9%)  * Biomarker: Microsatellite Instability, Status: stable  * Biomarker: Tumor Mutation [place] (2.6 Muts/Mb, Percentile: 33)        SERUM TUMOR MARKERS     Baseline LDH within normal limits        PRIOR THERAPY     None        CURRENT THERAPY     Combination chemotherapy and immunotherapy.  Initially enrolled on a clinical trial of Lutathera and underwent octreotide scan which was positive.   However she decided to come off study and did not receive Lutathera with her chemo.  Chemotherapy started October 6, 2024.  Immunotherapy added with cycle #2.  Minor response seen on imaging after 2 cycles of treatment        CURRENT ONCOLOGICAL PROBLEMS     1-cough and shortness of breath significantly improved with systemic treatment.        HISTORY OF PRESENT ILLNESS     This is a 59-year-old patient.  She states that she was feeling sick toward the end of spring of this year with some sinus problems.  Was seen by her allergist and was treated with antibiotics.  She was also seen at 1 point by primary care and steroids and antibiotics and inhalers were given.  She did not have any resolution and then ultimately developed a little bit of the hemoptysis which led to a chest x-ray showing an abnormality and finally a CT scan of the chest.The CT scan of the chest was done as a lung cancer screening low-dose CT and completed on September 20, 2024.  This demonstrated a extensive infiltrative mediastinal and hilar mass.  There was narrowing of the left mainstem bronchus and lower lobe bronchus secondary to extrinsic compression.  The predominant growth was within the left hilum and subcarinal region.  She underwent a bronchoscopy dated September 27, 2024 showing splaying of the main alanna.  There was mild erosion of a lymph node into the left mainstem bronchus.  There was erythema and mild erosions of an endobronchial tumor along the medial border.  Moderate to severe stenosis of the left lower lobe bronchus to 75%.        PAST MEDICAL HISTORY     1- Lumbar surgery about 30 years ago   2- hysterectomy in 2000  3- Diabetes  4- COPD  5- Right eye exophthalmus        SOCIAL HISTORY     Patient lives with her sister.  She lives in [facility].  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  She  started smoking at the age of 14 and continues to smoke at the age of 59.  Has smoked for 45 years on average 1 pack/day.        CURRENT MEDS     See medication list, meds reviewed, includes metformin, rosuvastatin, been done so Nied inhalers, Wellbutrin escitalopram, trazodone        ALLERGIES     Patient denies any drug allergies        FAMILY HISTORY      Mother had bladder cancer and possibly breast cancer.             Subjective    Cancer  Associated symptoms include coughing and fatigue. Pertinent negatives include no abdominal pain, anorexia, arthralgias, change in bowel habit, chest pain, chills, congestion, diaphoresis, fever, headaches, joint swelling, myalgias, nausea, neck pain, numbness, rash, sore throat, swollen glands, urinary symptoms, vertigo, visual change or vomiting.     Patient is s/p 2 cycles of chemotherapy overall she has had some improvement in her cough and shortness of breath but she has some residual symptoms of.  Has been tolerating treatment well with no adverse events.    Objective    BSA: There is no height or weight on file to calculate BSA.  There were no vitals taken for this visit.     Physical Exam  Constitutional:       General: She is not in acute distress.     Appearance: Normal appearance. She is not ill-appearing, toxic-appearing or diaphoretic.   HENT:      Nose: No congestion or rhinorrhea.      Mouth/Throat:      Pharynx: No oropharyngeal exudate or posterior oropharyngeal erythema.   Eyes:      General: No scleral icterus.     Conjunctiva/sclera: Conjunctivae normal.   Cardiovascular:      Rate and Rhythm: Normal rate and regular rhythm.      Pulses: Normal pulses.      Heart sounds: Normal heart sounds. No murmur heard.     No friction rub. No gallop.   Pulmonary:      Effort: Pulmonary effort is normal. No respiratory distress.      Breath sounds: Normal breath sounds. No stridor. No wheezing, rhonchi or rales.   Chest:      Chest wall: No tenderness.   Abdominal:       General: There is no distension.      Palpations: There is no mass.      Tenderness: There is no abdominal tenderness. There is no guarding or rebound.   Musculoskeletal:      Cervical back: No tenderness.      Right lower leg: No edema.      Left lower leg: No edema.   Lymphadenopathy:      Cervical: No cervical adenopathy.   Skin:     General: Skin is warm.      Coloration: Skin is not jaundiced.      Findings: No bruising or lesion.   Neurological:      General: No focal deficit present.      Mental Status: She is oriented to person, place, and time.   Psychiatric:         Mood and Affect: Mood normal.         Behavior: Behavior normal.         Performance Status:  Symptomatic; fully ambulatory    CT CHEST W IV CONTRAST; 11/29/2024   IMPRESSION:  1. Redemonstration of ill-defined left hilar mass extending into the  mediastinum, encasing the left upper lobe pulmonary artery and the  left bronchial tree, consistent with known malignancy and associated  mediastinal lymphadenopathy. Appearance is similar to the prior.  2. Redemonstration of hepatic metastatic disease.    CT ABDOMEN W IV CONTRAST; 11/29/2024   IMPRESSION:  1.  Mixed response. A dominant lesion in the posterior segment of the  right lobe of the liver has slightly decreased in size. There is a  new 6 mm hypodense lesion segment # 4A of the liver. Please see above.  2. 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  3. No abdominal or pelvic lymphadenopathy.    Assessment/Plan     This is a very nice 59-year-old patient with extensive stage small cell carcinoma s/p 2 cycles of chemoimmunotherapy.  I personally reviewed her most recent CT scan of the evidence of disease progression however her response is somewhat suboptimal    I explained this to the patient.  Given the absence of progression and that there has been some response I recommended continuing treatment although she does not meet the criteria of a  partial response.  She will proceed with cycle #3 of treatment.  We plan on repeating imaging after cycle #4.        Cancer Staging   No matching staging information was found for the patient.      Oncology History   SCLC (small cell lung carcinoma)   9/27/2024 Initial Diagnosis    SCLC (small cell lung carcinoma) (Multi)     10/4/2024 - 10/8/2024 Chemotherapy    CARBOplatin / Etoposide, 21 Day Cycles - Lung     10/4/2024 - 10/6/2024 Research Study Participant    ([facility]) [id number] - Atezolizumab + CARBOplatin / Etoposide / [id number], 21 Day Cycles  Plan Provider: NANCY Hernandez-CNP  Treatment goal: Control  Line of treatment: First Line  Associated studies: (177Lu)[id number] with Carboplatin, Etoposide and Tislelizumab in Newly Diagnosed ES-SCLC     10/4/2024 -  Chemotherapy    Durvalumab + CARBOplatin / Etoposide, 21 Day Cycles     10/28/2024 - 10/28/2024 Chemotherapy    Durvalumab + CARBOplatin / Etoposide, 21 Day Cycles                   Jd Kohli MD

## 2024-12-04 NOTE — PROGRESS NOTES
Patient here for chemo infusion. Obtained IV access, reviewed labs and weight, ready for treatment.  Tolerated without complication. Reviewed schedule, denies questions or needs.

## 2024-12-05 ENCOUNTER — INFUSION (OUTPATIENT)
Dept: HEMATOLOGY/ONCOLOGY | Facility: CLINIC | Age: 59
End: 2024-12-05
Payer: MEDICARE

## 2024-12-05 VITALS
HEART RATE: 116 BPM | TEMPERATURE: 97.7 F | SYSTOLIC BLOOD PRESSURE: 127 MMHG | RESPIRATION RATE: 18 BRPM | WEIGHT: 183.64 LBS | DIASTOLIC BLOOD PRESSURE: 76 MMHG | BODY MASS INDEX: 33.28 KG/M2 | OXYGEN SATURATION: 93 %

## 2024-12-05 DIAGNOSIS — C34.90 SMALL CELL CARCINOMA OF LUNG, UNSPECIFIED LATERALITY, UNSPECIFIED PART OF LUNG: ICD-10-CM

## 2024-12-05 PROCEDURE — 2500000004 HC RX 250 GENERAL PHARMACY W/ HCPCS (ALT 636 FOR OP/ED): Performed by: INTERNAL MEDICINE

## 2024-12-05 PROCEDURE — 96413 CHEMO IV INFUSION 1 HR: CPT

## 2024-12-05 PROCEDURE — 96375 TX/PRO/DX INJ NEW DRUG ADDON: CPT | Mod: INF

## 2024-12-05 RX ORDER — FAMOTIDINE 10 MG/ML
20 INJECTION INTRAVENOUS ONCE AS NEEDED
Status: DISCONTINUED | OUTPATIENT
Start: 2024-12-05 | End: 2024-12-05 | Stop reason: HOSPADM

## 2024-12-05 RX ORDER — ALBUTEROL SULFATE 0.83 MG/ML
3 SOLUTION RESPIRATORY (INHALATION) AS NEEDED
Status: DISCONTINUED | OUTPATIENT
Start: 2024-12-05 | End: 2024-12-05 | Stop reason: HOSPADM

## 2024-12-05 RX ORDER — HEPARIN 100 UNIT/ML
500 SYRINGE INTRAVENOUS AS NEEDED
Status: CANCELLED | OUTPATIENT
Start: 2024-12-05

## 2024-12-05 RX ORDER — PROCHLORPERAZINE MALEATE 10 MG
10 TABLET ORAL EVERY 6 HOURS PRN
Status: DISCONTINUED | OUTPATIENT
Start: 2024-12-05 | End: 2024-12-05 | Stop reason: HOSPADM

## 2024-12-05 RX ORDER — HEPARIN SODIUM,PORCINE/PF 10 UNIT/ML
50 SYRINGE (ML) INTRAVENOUS AS NEEDED
Status: CANCELLED | OUTPATIENT
Start: 2024-12-05

## 2024-12-05 RX ORDER — EPINEPHRINE 0.3 MG/.3ML
0.3 INJECTION SUBCUTANEOUS EVERY 5 MIN PRN
Status: DISCONTINUED | OUTPATIENT
Start: 2024-12-05 | End: 2024-12-05 | Stop reason: HOSPADM

## 2024-12-05 RX ORDER — DIPHENHYDRAMINE HYDROCHLORIDE 50 MG/ML
50 INJECTION INTRAMUSCULAR; INTRAVENOUS AS NEEDED
Status: DISCONTINUED | OUTPATIENT
Start: 2024-12-05 | End: 2024-12-05 | Stop reason: HOSPADM

## 2024-12-05 RX ORDER — PROCHLORPERAZINE EDISYLATE 5 MG/ML
10 INJECTION INTRAMUSCULAR; INTRAVENOUS EVERY 6 HOURS PRN
Status: DISCONTINUED | OUTPATIENT
Start: 2024-12-05 | End: 2024-12-05 | Stop reason: HOSPADM

## 2024-12-05 ASSESSMENT — PAIN SCALES - GENERAL: PAINLEVEL_OUTOF10: 0-NO PAIN

## 2024-12-06 ENCOUNTER — INFUSION (OUTPATIENT)
Dept: HEMATOLOGY/ONCOLOGY | Facility: HOSPITAL | Age: 59
End: 2024-12-06
Payer: MEDICARE

## 2024-12-06 VITALS
TEMPERATURE: 96.8 F | RESPIRATION RATE: 18 BRPM | WEIGHT: 182.9 LBS | BODY MASS INDEX: 33.15 KG/M2 | HEART RATE: 92 BPM | OXYGEN SATURATION: 99 %

## 2024-12-06 DIAGNOSIS — C34.90 SMALL CELL CARCINOMA OF LUNG, UNSPECIFIED LATERALITY, UNSPECIFIED PART OF LUNG: ICD-10-CM

## 2024-12-06 PROCEDURE — 96413 CHEMO IV INFUSION 1 HR: CPT

## 2024-12-06 PROCEDURE — 2500000004 HC RX 250 GENERAL PHARMACY W/ HCPCS (ALT 636 FOR OP/ED): Performed by: INTERNAL MEDICINE

## 2024-12-06 PROCEDURE — 96375 TX/PRO/DX INJ NEW DRUG ADDON: CPT | Mod: INF

## 2024-12-06 RX ORDER — HEPARIN 100 UNIT/ML
500 SYRINGE INTRAVENOUS AS NEEDED
OUTPATIENT
Start: 2024-12-06

## 2024-12-06 RX ORDER — HEPARIN SODIUM,PORCINE/PF 10 UNIT/ML
50 SYRINGE (ML) INTRAVENOUS AS NEEDED
OUTPATIENT
Start: 2024-12-06

## 2024-12-06 RX ORDER — ONDANSETRON HYDROCHLORIDE 2 MG/ML
8 INJECTION, SOLUTION INTRAVENOUS ONCE
Status: COMPLETED | OUTPATIENT
Start: 2024-12-06 | End: 2024-12-06

## 2024-12-06 ASSESSMENT — PAIN SCALES - GENERAL: PAINLEVEL_OUTOF10: 0-NO PAIN

## 2024-12-06 NOTE — PROGRESS NOTES
Patient presents today for C3D3 of etoposide. She denies any side effects from the first two days of treatment and feels well. Infusion initiated and after 75% of the dose given, patient called out from the restroom reporting the channel fell off the pump and her chemo tubing was dripping. Estimated 100 ml of etoposide remaining in the bag. Per Anne Silver CNP, infusion complete for the day. PIV removed and patient ambulated off the unit in stable condition with her support person and is aware of her upcoming provider and infusion appts.

## 2024-12-19 ENCOUNTER — APPOINTMENT (OUTPATIENT)
Dept: CARDIOLOGY | Facility: HOSPITAL | Age: 59
End: 2024-12-19
Payer: MEDICARE

## 2024-12-19 ENCOUNTER — APPOINTMENT (OUTPATIENT)
Dept: RADIOLOGY | Facility: HOSPITAL | Age: 59
End: 2024-12-19
Payer: MEDICARE

## 2024-12-19 ENCOUNTER — NURSE TRIAGE (OUTPATIENT)
Dept: ADMISSION | Facility: HOSPITAL | Age: 59
End: 2024-12-19
Payer: MEDICARE

## 2024-12-19 ENCOUNTER — HOSPITAL ENCOUNTER (EMERGENCY)
Facility: HOSPITAL | Age: 59
Discharge: HOME | End: 2024-12-19
Attending: STUDENT IN AN ORGANIZED HEALTH CARE EDUCATION/TRAINING PROGRAM
Payer: MEDICARE

## 2024-12-19 VITALS
DIASTOLIC BLOOD PRESSURE: 90 MMHG | BODY MASS INDEX: 33.13 KG/M2 | HEART RATE: 80 BPM | TEMPERATURE: 97 F | OXYGEN SATURATION: 97 % | RESPIRATION RATE: 18 BRPM | WEIGHT: 180 LBS | SYSTOLIC BLOOD PRESSURE: 163 MMHG | HEIGHT: 62 IN

## 2024-12-19 DIAGNOSIS — I26.94 MULTIPLE SUBSEGMENTAL PULMONARY EMBOLI WITHOUT ACUTE COR PULMONALE: Primary | ICD-10-CM

## 2024-12-19 LAB
ALBUMIN SERPL BCP-MCNC: 4 G/DL (ref 3.4–5)
ALP SERPL-CCNC: 68 U/L (ref 33–136)
ALT SERPL W P-5'-P-CCNC: 14 U/L (ref 7–45)
ANION GAP SERPL CALCULATED.3IONS-SCNC: 12 MMOL/L (ref 10–20)
AST SERPL W P-5'-P-CCNC: 13 U/L (ref 9–39)
ATRIAL RATE: 74 BPM
BASOPHILS # BLD AUTO: 0.01 X10*3/UL (ref 0–0.1)
BASOPHILS NFR BLD AUTO: 0.3 %
BILIRUB SERPL-MCNC: 0.3 MG/DL (ref 0–1.2)
BUN SERPL-MCNC: 9 MG/DL (ref 6–23)
CALCIUM SERPL-MCNC: 9 MG/DL (ref 8.6–10.3)
CARDIAC TROPONIN I PNL SERPL HS: <3 NG/L (ref 0–13)
CARDIAC TROPONIN I PNL SERPL HS: <3 NG/L (ref 0–13)
CHLORIDE SERPL-SCNC: 105 MMOL/L (ref 98–107)
CO2 SERPL-SCNC: 25 MMOL/L (ref 21–32)
CREAT SERPL-MCNC: 0.58 MG/DL (ref 0.5–1.05)
EGFRCR SERPLBLD CKD-EPI 2021: >90 ML/MIN/1.73M*2
EOSINOPHIL # BLD AUTO: 0.08 X10*3/UL (ref 0–0.7)
EOSINOPHIL NFR BLD AUTO: 2.6 %
ERYTHROCYTE [DISTWIDTH] IN BLOOD BY AUTOMATED COUNT: 17.2 % (ref 11.5–14.5)
FLUAV RNA RESP QL NAA+PROBE: NOT DETECTED
FLUBV RNA RESP QL NAA+PROBE: NOT DETECTED
GLUCOSE SERPL-MCNC: 112 MG/DL (ref 74–99)
HCT VFR BLD AUTO: 37.3 % (ref 36–46)
HGB BLD-MCNC: 12.4 G/DL (ref 12–16)
IMM GRANULOCYTES # BLD AUTO: 0 X10*3/UL (ref 0–0.7)
IMM GRANULOCYTES NFR BLD AUTO: 0 % (ref 0–0.9)
LIPASE SERPL-CCNC: 12 U/L (ref 9–82)
LYMPHOCYTES # BLD AUTO: 1.37 X10*3/UL (ref 1.2–4.8)
LYMPHOCYTES NFR BLD AUTO: 44.5 %
MAGNESIUM SERPL-MCNC: 2.04 MG/DL (ref 1.6–2.4)
MCH RBC QN AUTO: 29 PG (ref 26–34)
MCHC RBC AUTO-ENTMCNC: 33.2 G/DL (ref 32–36)
MCV RBC AUTO: 87 FL (ref 80–100)
MONOCYTES # BLD AUTO: 0.47 X10*3/UL (ref 0.1–1)
MONOCYTES NFR BLD AUTO: 15.3 %
NEUTROPHILS # BLD AUTO: 1.15 X10*3/UL (ref 1.2–7.7)
NEUTROPHILS NFR BLD AUTO: 37.3 %
NRBC BLD-RTO: 0 /100 WBCS (ref 0–0)
P AXIS: 58 DEGREES
P OFFSET: 203 MS
P ONSET: 154 MS
PLATELET # BLD AUTO: 186 X10*3/UL (ref 150–450)
POTASSIUM SERPL-SCNC: 4 MMOL/L (ref 3.5–5.3)
PR INTERVAL: 142 MS
PROT SERPL-MCNC: 6.7 G/DL (ref 6.4–8.2)
Q ONSET: 225 MS
QRS COUNT: 12 BEATS
QRS DURATION: 76 MS
QT INTERVAL: 398 MS
QTC CALCULATION(BAZETT): 441 MS
QTC FREDERICIA: 427 MS
R AXIS: 68 DEGREES
RBC # BLD AUTO: 4.28 X10*6/UL (ref 4–5.2)
SARS-COV-2 RNA RESP QL NAA+PROBE: NOT DETECTED
SODIUM SERPL-SCNC: 138 MMOL/L (ref 136–145)
T AXIS: 65 DEGREES
T OFFSET: 424 MS
VENTRICULAR RATE: 74 BPM
WBC # BLD AUTO: 3.1 X10*3/UL (ref 4.4–11.3)

## 2024-12-19 PROCEDURE — 74177 CT ABD & PELVIS W/CONTRAST: CPT

## 2024-12-19 PROCEDURE — 2550000001 HC RX 255 CONTRASTS

## 2024-12-19 PROCEDURE — 85025 COMPLETE CBC W/AUTO DIFF WBC: CPT

## 2024-12-19 PROCEDURE — 83735 ASSAY OF MAGNESIUM: CPT

## 2024-12-19 PROCEDURE — 36415 COLL VENOUS BLD VENIPUNCTURE: CPT

## 2024-12-19 PROCEDURE — 87636 SARSCOV2 & INF A&B AMP PRB: CPT

## 2024-12-19 PROCEDURE — 72128 CT CHEST SPINE W/O DYE: CPT

## 2024-12-19 PROCEDURE — 71275 CT ANGIOGRAPHY CHEST: CPT

## 2024-12-19 PROCEDURE — 74177 CT ABD & PELVIS W/CONTRAST: CPT | Performed by: RADIOLOGY

## 2024-12-19 PROCEDURE — 93005 ELECTROCARDIOGRAM TRACING: CPT

## 2024-12-19 PROCEDURE — 71275 CT ANGIOGRAPHY CHEST: CPT | Performed by: RADIOLOGY

## 2024-12-19 PROCEDURE — 72131 CT LUMBAR SPINE W/O DYE: CPT | Performed by: RADIOLOGY

## 2024-12-19 PROCEDURE — 83690 ASSAY OF LIPASE: CPT

## 2024-12-19 PROCEDURE — 84484 ASSAY OF TROPONIN QUANT: CPT

## 2024-12-19 PROCEDURE — 72131 CT LUMBAR SPINE W/O DYE: CPT

## 2024-12-19 PROCEDURE — 2500000001 HC RX 250 WO HCPCS SELF ADMINISTERED DRUGS (ALT 637 FOR MEDICARE OP)

## 2024-12-19 PROCEDURE — 80053 COMPREHEN METABOLIC PANEL: CPT

## 2024-12-19 PROCEDURE — 99285 EMERGENCY DEPT VISIT HI MDM: CPT | Mod: 25 | Performed by: STUDENT IN AN ORGANIZED HEALTH CARE EDUCATION/TRAINING PROGRAM

## 2024-12-19 RX ORDER — ACETAMINOPHEN 325 MG/1
975 TABLET ORAL ONCE
Status: COMPLETED | OUTPATIENT
Start: 2024-12-19 | End: 2024-12-19

## 2024-12-19 RX ORDER — TIZANIDINE HYDROCHLORIDE 2 MG/1
2 CAPSULE, GELATIN COATED ORAL 3 TIMES DAILY
Qty: 15 CAPSULE | Refills: 0 | Status: SHIPPED | OUTPATIENT
Start: 2024-12-19 | End: 2024-12-23 | Stop reason: SDUPTHER

## 2024-12-19 RX ORDER — ACETAMINOPHEN 325 MG/1
650 TABLET ORAL EVERY 6 HOURS PRN
Qty: 30 TABLET | Refills: 0 | Status: SHIPPED | OUTPATIENT
Start: 2024-12-19

## 2024-12-19 RX ORDER — OXYCODONE HYDROCHLORIDE 5 MG/1
5 TABLET ORAL EVERY 6 HOURS PRN
Qty: 5 TABLET | Refills: 0 | Status: SHIPPED | OUTPATIENT
Start: 2024-12-19 | End: 2024-12-22

## 2024-12-19 RX ADMIN — ACETAMINOPHEN 975 MG: 325 TABLET ORAL at 11:49

## 2024-12-19 RX ADMIN — IOHEXOL 75 ML: 350 INJECTION, SOLUTION INTRAVENOUS at 12:37

## 2024-12-19 ASSESSMENT — LIFESTYLE VARIABLES
HAVE YOU EVER FELT YOU SHOULD CUT DOWN ON YOUR DRINKING: NO
TOTAL SCORE: 0
EVER HAD A DRINK FIRST THING IN THE MORNING TO STEADY YOUR NERVES TO GET RID OF A HANGOVER: NO
HAVE PEOPLE ANNOYED YOU BY CRITICIZING YOUR DRINKING: NO
EVER FELT BAD OR GUILTY ABOUT YOUR DRINKING: NO

## 2024-12-19 ASSESSMENT — PAIN SCALES - GENERAL
PAINLEVEL_OUTOF10: 7
PAINLEVEL_OUTOF10: 8

## 2024-12-19 ASSESSMENT — COLUMBIA-SUICIDE SEVERITY RATING SCALE - C-SSRS
1. IN THE PAST MONTH, HAVE YOU WISHED YOU WERE DEAD OR WISHED YOU COULD GO TO SLEEP AND NOT WAKE UP?: NO
2. HAVE YOU ACTUALLY HAD ANY THOUGHTS OF KILLING YOURSELF?: NO
6. HAVE YOU EVER DONE ANYTHING, STARTED TO DO ANYTHING, OR PREPARED TO DO ANYTHING TO END YOUR LIFE?: NO

## 2024-12-19 ASSESSMENT — PAIN - FUNCTIONAL ASSESSMENT: PAIN_FUNCTIONAL_ASSESSMENT: 0-10

## 2024-12-19 NOTE — DISCHARGE INSTRUCTIONS
Thank you for choosing [facility] and [facility] for your care today. Please follow up as indicated as continued care and treatment is a very important part of your care today. Please return to this or any Emergency Department if you have any new or worsening symptoms.

## 2024-12-19 NOTE — ED PROVIDER NOTES
"The patient was seen in conjunction with the advanced practice provider, and I performed a substantive portion of the encounter. The following is my supervisory note.    History:  Patient presents to the ED for left-sided thoracic pain for last few days.  Does not endorse any retrosternal cardiac chest pain and is not appreciating pleuritic chest pain.  States pain is constant, dull in nature, moderate in severity, no alleviating/aggravating factors.  Notes nonproductive cough and has not experienced any hemoptysis.  Not appreciating associated shortness of breath.  Notes no history of nephrolithiasis.  States she is actively being treated for SCLC.  Notes no history of DVT/PE.  Notes no bilateral/L leg swelling.  Denies any infectious symptoms to include fevers or chills.  Denies any other significant systemic symptoms or complaints.    VS:  /90 (BP Location: Left arm, Patient Position: Sitting)   Pulse 80   Temp 36.1 °C (97 °F) (Temporal)   Resp 18   Ht 1.575 m (5' 2\")   Wt 81.6 kg (180 lb)   SpO2 97%   BMI 32.92 kg/m²      Physical exam:  CONST: alert, normal appearance, no acute distress, does not appear ill/toxic  HEAD: normocephalic, atraumatic  NECK: no JVD  ENT: MMM, no rhinorrhea/congestion, posterior oropharynx clear and oral secretions well controlled  EYES: PEPRL, EOMI, no scleral icterus, no nystagmus  CV: RRR, no murmurs, 2+ equal/symmetrical pulses x4  PULM: CTAB, no respiratory distress, not requiring supplemental O2  ABD: soft, flat/non-tender/non-distended, no mass  SKIN: warm/dry, no pallor or jaundice, no rash  NEURO: A&Ox4, no facial asymmetry, no focal neuro deficits, gross strength/motor/sensation intact x4, normal gait        I personally reviewed and interpreted the following studies: EKG is NSR 74, normal axis/intervals, no appreciable ischemia, labs are significant for unremarkable/noncontributory, images are notable for CTA PE LLL segmental and subsegmental PEs without " evidence of infarct or right heart strain .      MDM:  Patient presented to the ED for report of back pain near constant for the last 3 days.  Concerning PMHx of SCLC, asthma/COPD, HTN, HLD, DM2.    Per Chart Review: Nothing pertinent to this ED encounter.    Assessment/evaluation consistent with LLL segmental/subsegmental PE without evidence of infarct and likely resulting in pleurodesis/pleural drain. No concerning history, clinical evidence/work-up, or exam findings for the concerning differentials of ACS/MI, PTX, focal/multifocal lobar PNA, malignant cardiac arrhythmia, significant electrolyte/metabolic derangement. These conditions have been thoroughly evaluated and determined to be sufficiently unlikely to be the etiology of patient's presenting symptoms.     Scores: Heart 2 (1-2% MACE)    ED Course/Diagnosis:  ED Course as of 12/22/24 1045   Thu Dec 19, 2024   1220 EKG personally interpreted by me performed at 1213  Normal sinus rhythm with ventricular rate 74 normal axis and intervals no acute ischemic changes [EF]      ED Course User Index  [EF] Mary Jo Perez DO         Diagnoses as of 12/22/24 1045   Multiple subsegmental pulmonary emboli without acute cor pulmonale       1. Multiple subsegmental pulmonary emboli without acute cor pulmonale  apixaban (Eliquis) 5 mg (74 tabs) tablet    acetaminophen (TylenoL) 325 mg tablet    oxyCODONE (Roxicodone) 5 mg immediate release tablet    tiZANidine (Zanaflex) 2 mg capsule               Harjit Henning MD  12/22/24 1045

## 2024-12-19 NOTE — ED PROVIDER NOTES
HPI   Chief Complaint   Patient presents with    Back Pain     Back pain that has been going on for 3 days. 9/10 pain. No trauma or cause for pain. On chemo for small cell cancer, stomach, bone and lungs.       Patient is a 59-year-old female presenting to the emergency department for evaluation of left side pain.  Patient has a history of stage IV small cell lung cancer.  She states for the past 3 days she has had pain under her left breast that wraps around to her back.  She states the pain is worse with heat or any movement.  She states pain improves with ice.  She states she called her oncology office who recommended she come to the emergency department to be further evaluated.  She admits to a cough but no congestion.  She denies any chest pain.  She does admit to shortness of breath which is chronic for her.  She denies any fevers, chills, nausea, vomiting, abdominal pain, hematuria, dysuria, constipation, diarrhea.  Patient denies any recent falls or injuries.  She denies any recent heavy lifting.              Patient History   Past Medical History:   Diagnosis Date    Chronic obstructive pulmonary disease, unspecified     Chronic obstructive pulmonary disease    Encounter for general adult medical examination without abnormal findings 11/18/2020    Encounter for preventive health examination    Personal history of other endocrine, nutritional and metabolic disease     History of diabetes mellitus    Post-traumatic stress disorder, unspecified     PTSD (post-traumatic stress disorder)    Type 2 diabetes mellitus      Past Surgical History:   Procedure Laterality Date    BACK SURGERY  09/17/2013    Lower Back Surgery    HYSTERECTOMY  07/01/2016    Hysterectomy     No family history on file.  Social History     Tobacco Use    Smoking status: Former     Current packs/day: 0.00     Average packs/day: 1 pack/day for 38.3 years (38.3 ttl pk-yrs)     Types: Cigarettes     Start date: 9/9/1982     Quit date: 2021      Years since quitting: 3.9    Smokeless tobacco: Never   Substance Use Topics    Alcohol use: Never    Drug use: Never       Physical Exam   ED Triage Vitals [12/19/24 1033]   Temperature Heart Rate Respirations BP   36.1 °C (97 °F) 99 18 163/90      Pulse Ox Temp Source Heart Rate Source Patient Position   100 % Temporal Monitor Sitting      BP Location FiO2 (%)     Left arm --       Physical Exam  Vitals and nursing note reviewed.   Constitutional:       General: She is not in acute distress.     Appearance: Normal appearance. She is not ill-appearing or toxic-appearing.   HENT:      Head: Normocephalic and atraumatic.      Mouth/Throat:      Mouth: Mucous membranes are moist.   Cardiovascular:      Rate and Rhythm: Normal rate and regular rhythm.      Pulses: Normal pulses.      Heart sounds: No murmur heard.     No friction rub. No gallop.   Pulmonary:      Effort: Pulmonary effort is normal.      Breath sounds: Normal breath sounds. No wheezing, rhonchi or rales.   Abdominal:      Palpations: Abdomen is soft.      Tenderness: There is abdominal tenderness (Left upper quadrant and over the left lower ribs with no palpable deformities.).   Musculoskeletal:         General: Normal range of motion.   Skin:     General: Skin is warm and dry.   Neurological:      General: No focal deficit present.      Mental Status: She is alert and oriented to person, place, and time.   Psychiatric:         Mood and Affect: Mood normal.         Behavior: Behavior normal.           ED Course & MDM   ED Course as of 12/19/24 1558   Thu Dec 19, 2024   1220 EKG personally interpreted by me performed at 1213  Normal sinus rhythm with ventricular rate 74 normal axis and intervals no acute ischemic changes [EF]      ED Course User Index  [EF] Mary Jo Perez DO         Diagnoses as of 12/19/24 1558   Multiple subsegmental pulmonary emboli without acute cor pulmonale                 No data recorded                                 Medical  Decision Making  **Disclaimer parts of this chart have been completed using voice recognition software. Please excuse any errors of transcription.     Patient seen in conjunction with attending physician Dr. Henning.    HPI: Detailed above.    Exam: A medically appropriate exam performed, outlined above, given the known history and presentation.    History obtained from: Patient    EKG: Reviewed and interpreted by my attending physician    Labs/Diagnostics:  Labs Reviewed   CBC WITH AUTO DIFFERENTIAL - Abnormal       Result Value    WBC 3.1 (*)     nRBC 0.0      RBC 4.28      Hemoglobin 12.4      Hematocrit 37.3      MCV 87      MCH 29.0      MCHC 33.2      RDW 17.2 (*)     Platelets 186      Neutrophils % 37.3      Immature Granulocytes %, Automated 0.0      Lymphocytes % 44.5      Monocytes % 15.3      Eosinophils % 2.6      Basophils % 0.3      Neutrophils Absolute 1.15 (*)     Immature Granulocytes Absolute, Automated 0.00      Lymphocytes Absolute 1.37      Monocytes Absolute 0.47      Eosinophils Absolute 0.08      Basophils Absolute 0.01     COMPREHENSIVE METABOLIC PANEL - Abnormal    Glucose 112 (*)     Sodium 138      Potassium 4.0      Chloride 105      Bicarbonate 25      Anion Gap 12      Urea Nitrogen 9      Creatinine 0.58      eGFR >90      Calcium 9.0      Albumin 4.0      Alkaline Phosphatase 68      Total Protein 6.7      AST 13      Bilirubin, Total 0.3      ALT 14     MAGNESIUM - Normal    Magnesium 2.04     SARS-COV-2 AND INFLUENZA A/B PCR - Normal    Flu A Result Not Detected      Flu B Result Not Detected      Coronavirus 2019, PCR Not Detected      Narrative:     This assay has received FDA Emergency Use Authorization (EUA) and  is only authorized for the duration of time that circumstances exist to justify the authorization of the emergency use of in vitro diagnostic tests for the detection of SARS-CoV-2 virus and/or diagnosis of COVID-19 infection under section 564(b)(1) of the Act, 21  U.S.C. 360bbb-3(b)(1). Testing for SARS-CoV-2 is only recommended for patients who meet current clinical and/or epidemiological criteria as defined by federal, state, or local public health directives. This assay is an in vitro diagnostic nucleic acid amplification test for the qualitative detection of SARS-CoV-2, Influenza A, and Influenza B from nasopharyngeal specimens and has been validated for use at [facility]. Negative results do not preclude COVID-19 infections or Influenza A/B infections, and should not be used as the sole basis for diagnosis, treatment, or other management decisions. If Influenza A/B and RSV PCR results are negative, testing for Parainfluenza virus, Adenovirus and Metapneumovirus is routinely performed for [facility] pediatric oncology and intensive care inpatients, and is available on other patients by placing an add-on request.    LIPASE - Normal    Lipase 12      Narrative:     Venipuncture immediately after or during the administration of Metamizole may lead to falsely low results. Testing should be performed immediately prior to Metamizole dosing.   SERIAL TROPONIN-INITIAL - Normal    Troponin I, High Sensitivity <3      Narrative:     Less than 99th percentile of normal range cutoff-  Female and children under 18 years old <14 ng/L; Male <21 ng/L: Negative  Repeat testing should be performed if clinically indicated.     Female and children under 18 years old 14-50 ng/L; Male 21-50 ng/L:  Consistent with possible cardiac damage and possible increased clinical   risk. Serial measurements may help to assess extent of myocardial damage.     >50 ng/L: Consistent with cardiac damage, increased clinical risk and  myocardial infarction. Serial measurements may help assess extent of   myocardial damage.      NOTE: Children less than 1 year old may have higher baseline troponin   levels and results should be interpreted in conjunction with the overall   clinical context.      NOTE: Troponin I testing is performed using a different   testing methodology at [facility] than at other   [facility]. Direct result comparisons should only   be made within the same method.   SERIAL TROPONIN, 1 HOUR - Normal    Troponin I, High Sensitivity <3      Narrative:     Less than 99th percentile of normal range cutoff-  Female and children under 18 years old <14 ng/L; Male <21 ng/L: Negative  Repeat testing should be performed if clinically indicated.     Female and children under 18 years old 14-50 ng/L; Male 21-50 ng/L:  Consistent with possible cardiac damage and possible increased clinical   risk. Serial measurements may help to assess extent of myocardial damage.     >50 ng/L: Consistent with cardiac damage, increased clinical risk and  myocardial infarction. Serial measurements may help assess extent of   myocardial damage.      NOTE: Children less than 1 year old may have higher baseline troponin   levels and results should be interpreted in conjunction with the overall   clinical context.     NOTE: Troponin I testing is performed using a different   testing methodology at [facility] than at other   [facility]. Direct result comparisons should only   be made within the same method.   TROPONIN SERIES- (INITIAL, 1 HR)    Narrative:     The following orders were created for panel order Troponin I Series, High Sensitivity (0, 1 HR).  Procedure                               Abnormality         Status                     ---------                               -----------         ------                     Troponin I, High Sensiti...[[id number]]  Normal              Final result               Troponin, High Sensitivi...[[id number]]  Normal              Final result                 Please view results for these tests on the individual orders.     CT angio chest for pulmonary embolism   Final Result   1. (+) PE:pulmonary emboli are seen within the left lower lobe    segmental and subsegmental branches. No additional pulmonary emboli   are seen.   2. Redemonstration of ill-defined left hilar mass extending into the   mediastinum, encasing the left lower lobe pulmonary artery and the   left bronchial tree, consistent with known malignancy and associated   mediastinal lymphadenopathy. Appearance is similar to the prior.   3. Faint sclerotic densities in the visualized spine consistent with   metastatic disease.                  Signed by: Braulio Zelaya 12/19/2024 3:05 PM   Dictation workstation:   [id number]      CT abdomen pelvis w IV contrast   Final Result   Stable appearing examination since 11/29/2024. No evidence for an   acute inflammatory process or new pathology.        No significant interval change in hepatic hypodense lesions   consistent with metastatic disease.        No significant interval change in pelvic and lumbar osseous   osteoblastic lesions.        Signed by: Braulio Zelaya 12/19/2024 3:21 PM   Dictation workstation:   [id number]      CT thoracic spine wo IV contrast   Final Result   Addendum (preliminary) 1 of 1   Interpreted By:  Braulio Zelaya,    ADDENDUM:   There are faint sclerotic densities throughout the visualized   thoracic spine concerning for mild diffuse osteoblastic disease. If   deemed clinically warranted, a repeat nuclear medicine bone scan can   be obtained as deemed clinically warranted.        Signed by: Braulio Zelaya 12/19/2024 2:09 PM        -------- ORIGINAL REPORT --------   Dictation workstation:   [id number]      Final   No suspicious lesions.  Patent appearance of the bony spinal canal   and bony neural foramina. Mild degenerative changes.        Signed by: Braulio Zelaya 12/19/2024 1:52 PM   Dictation workstation:   [id number]      CT lumbar spine wo IV contrast   Final Result   1. No acute fracture or spondylolisthesis.        2. Faint scattered sclerotic densities throughout the vertebral   bodies and  visualized pelvic bones consistent with metastatic disease.        3. Chronic degenerative changes as described in the body of the   report.        Signed by: Braulio Zelaya 12/19/2024 2:43 PM   Dictation workstation:   [id number]        EMERGENCY DEPARTMENT COURSE and DIFFERENTIAL DIAGNOSIS/MDM:  Patient is a 59-year-old female presenting to the emergency department for evaluation of left side pain.  On physical exam vital signs remarkable for hypertension but otherwise stable and patient is in no acute distress.  Lung sounds clear to auscultation bilaterally.  Patient has tenderness to palpation to left upper quadrant specifically over the left lower ribs.  She denies any trauma or injury.  Due to patient's history of cancer there is concern for possible PE therefore CT angio of the chest was ordered.  CT of the abdomen and pelvis also ordered to rule out any pancreatic masses or pancreatitis.  CT of the thoracic and lumbar spine also ordered to rule out any mets to the spine or spinal injury/trauma.  Viral swabs ordered to rule out any COVID for flu.  Troponin less than 3.  Patient tested negative for COVID and flu.  Lipase normal.  CMP showed no electrolyte abnormalities.  Magnesium normal.  CBC remarkable for leukopenia but no anemia.  CT angio the chest showed evidence of pulmonary emboli within the left lower lobe segmental and subsegmental branches with no additional pulmonary emboli seen.  Patient not on any anticoagulation at this time.  CT of the abdomen and pelvis showed stable appearing exam since 11/29/2024 with no acute abnormalities.  CT of the thoracic spine showed concerning for mild diffuse osteoblastic disease but no fractures.  CT of the lumbar spine showed no acute fracture or spondylolisthesis.  Shared decision-making was performed with the patient and at this time we feel patient can be discharged home on Eliquis.  Patient was given a prescription for tizanidine, oxycodone for pain,  "Tylenol, and Eliquis starter pack.  She was advised to follow-up with primary care physician outpatient within the next 1 to 2 days.  She will return to the emergency department with any new or worsening symptoms.  Given that patient does not have any heart strain from the pulmonary emboli and they are more localized to the left lower lobe we feel patient is safe for discharge.  Emergent pathologies were considered for this patient, although I have low suspicion for anything acutely emergent given patient's clinical presentation, history, physical exam, stable vital signs, and relatively unremarkable workup.  Discharging patient home is reasonable plan of care for outpatient management.     All labs, imaging, and diagnostic studies were reviewed by me and patient was counseled on clinical impression, expectations, and plan.  Patient was educated to follow-up with PCP in the following 1-2 days.  All questions from patient were answered. They elicited understanding and were agreeable to course of treatment.  Patient was discharged in stable condition and given strict return precautions.            Vitals:    Vitals:    12/19/24 1033   BP: 163/90   BP Location: Left arm   Patient Position: Sitting   Pulse: 99   Resp: 18   Temp: 36.1 °C (97 °F)   TempSrc: Temporal   SpO2: 100%   Weight: 81.6 kg (180 lb)   Height: 1.575 m (5' 2\")     History Limited by:    None    Independent history obtained from:    None    External records reviewed:    Outpatient Note hematology and oncology report determine patient's medical history and type of cancer that the patient has    Diagnostics interpreted by me:    CT Scan(s) see MDM    Discussions with other clinicians:    None    Chronic conditions impacting care:    Cancer    Social determinants of health affecting care:    None    Diagnostic tests considered but not performed: None    ED Medications managed:    Medications   acetaminophen (Tylenol) tablet 975 mg (975 mg oral Given " 12/19/24 1149)   iohexol (OMNIPaque) 350 mg iodine/mL solution 75 mL (75 mL intravenous Given 12/19/24 1237)       Prescription drugs considered:     Eliquis starter pack, tizanidine, Tylenol, oxycodone    Screenings:              Procedure  Procedures     Venessa Gonsales PA-C  12/19/24 7281

## 2024-12-19 NOTE — TELEPHONE ENCOUNTER
Patient called back regarding [employer] message.  She is having pain at IV site and chest pain.    Chest pain started 3-4 days ago.  Chest pain is constant.  Pain 7-8/10 - not improving.  Waking her up at night and unable to get comfortable.  Taking Advil for pain with no relief.  SOB continues at times.  Pain in left chest to back - sharp/shooting pain.  Painful laying down/sitting up.  No fever.  Drinking fluids/soft foods.  Chest pain is worse after eating.  Advised ED.  Patient agreeable with plan - unsure which ED she will go to.  Advised to call EMS if symptoms worsen.  Patient agreeable and will head to nearest ED now.

## 2024-12-19 NOTE — TELEPHONE ENCOUNTER
Attempted to return call from my chart message sent today. No answer- VM left with return contact info and request for return call to discuss ongoing symptoms and concerns.

## 2024-12-20 ENCOUNTER — TELEPHONE (OUTPATIENT)
Dept: ADMISSION | Facility: HOSPITAL | Age: 59
End: 2024-12-20
Payer: MEDICARE

## 2024-12-20 ENCOUNTER — TELEPHONE (OUTPATIENT)
Dept: HEMATOLOGY/ONCOLOGY | Facility: HOSPITAL | Age: 59
End: 2024-12-20
Payer: MEDICARE

## 2024-12-20 DIAGNOSIS — C34.90 SMALL CELL CARCINOMA OF LUNG, UNSPECIFIED LATERALITY, UNSPECIFIED PART OF LUNG: Primary | ICD-10-CM

## 2024-12-20 DIAGNOSIS — I26.94 MULTIPLE SUBSEGMENTAL PULMONARY EMBOLI WITHOUT ACUTE COR PULMONALE: ICD-10-CM

## 2024-12-20 RX ORDER — OXYCODONE HYDROCHLORIDE 5 MG/1
5 TABLET ORAL EVERY 4 HOURS PRN
Qty: 30 TABLET | Refills: 0 | Status: SHIPPED | OUTPATIENT
Start: 2024-12-20 | End: 2025-01-19

## 2024-12-21 NOTE — TELEPHONE ENCOUNTER
Received page stating urgent medication request, returned call, no answer, left voicemail with callback number

## 2024-12-21 NOTE — TELEPHONE ENCOUNTER
Patient calling to follow up on tizanidine refill. States has enough through Monday morning when office reopens.

## 2024-12-22 NOTE — TELEPHONE ENCOUNTER
I called and Left V/M that the tizanidine (Zanaflex) 2mg capsule RX was placed on 12/19 to [facility] in [facility]. I told her she didn't need to call us back,  but if there was an issue and she didn't get this RX to call the nurse line back on Monday morning since she has enough to last through Monday morning.

## 2024-12-23 ENCOUNTER — TELEPHONE (OUTPATIENT)
Dept: HEMATOLOGY/ONCOLOGY | Facility: HOSPITAL | Age: 59
End: 2024-12-23

## 2024-12-23 DIAGNOSIS — I26.94 MULTIPLE SUBSEGMENTAL PULMONARY EMBOLI WITHOUT ACUTE COR PULMONALE: ICD-10-CM

## 2024-12-23 RX ORDER — TIZANIDINE HYDROCHLORIDE 2 MG/1
2 CAPSULE, GELATIN COATED ORAL 3 TIMES DAILY PRN
Qty: 21 CAPSULE | Refills: 0 | Status: SHIPPED | OUTPATIENT
Start: 2024-12-23 | End: 2024-12-30

## 2024-12-24 NOTE — TELEPHONE ENCOUNTER
Received message from patient that she had still not received a script for tizanidine and was having significant pain from her recently diagnosed pulmonary embolism. Discussed that eliquis will prevent further propagation of her PE but not resolve it. Her body would gradually reabsorb the clot and her symptoms are expected to gradually improve, over a period of weeks to months. Per review of chart, order was started but not submitted earlier today. Will submit order for continued tizanidine 2mg TID PRN x 7d, until she is able to see her oncology team on 12/30 in clinic for follow-up.

## 2024-12-26 ENCOUNTER — NURSE TRIAGE (OUTPATIENT)
Dept: ADMISSION | Facility: HOSPITAL | Age: 59
End: 2024-12-26
Payer: MEDICARE

## 2024-12-26 ENCOUNTER — TELEPHONE (OUTPATIENT)
Dept: ADMISSION | Facility: HOSPITAL | Age: 59
End: 2024-12-26
Payer: MEDICARE

## 2024-12-26 DIAGNOSIS — C34.90 SMALL CELL CARCINOMA OF LUNG, UNSPECIFIED LATERALITY, UNSPECIFIED PART OF LUNG: Primary | ICD-10-CM

## 2024-12-26 RX ORDER — TRAMADOL HYDROCHLORIDE 50 MG/1
50 TABLET ORAL EVERY 8 HOURS PRN
Qty: 30 TABLET | Refills: 0 | Status: SHIPPED | OUTPATIENT
Start: 2024-12-26 | End: 2025-01-05

## 2024-12-26 RX ORDER — NALOXONE HYDROCHLORIDE 4 MG/.1ML
1 SPRAY NASAL AS NEEDED
Qty: 2 EACH | Refills: 0 | Status: SHIPPED | OUTPATIENT
Start: 2024-12-26

## 2024-12-26 RX ORDER — ALPRAZOLAM 1 MG/1
1 TABLET ORAL 3 TIMES DAILY PRN
Qty: 30 TABLET | Refills: 0 | Status: SHIPPED | OUTPATIENT
Start: 2024-12-26 | End: 2025-01-05

## 2024-12-26 NOTE — TELEPHONE ENCOUNTER
The patient is aware that both meds were sent to her [facility], denied further questions or concerns at this time. Was appreciative

## 2024-12-26 NOTE — TELEPHONE ENCOUNTER
"Received a page from patient: URGENT RX NEEDED, PAIN    Per previous phone note from today:   \"Per Dr. Kohli- he attempted to reach pt directly, no answer.   He will send xanax and tramadol to her pharmacy on file today.   Detailed message left on identified VM with above information and repeat if pt has unbearable pain or new onset dyspnea she should seek immediate medical attention. Return contact info left.\"    I called the patient back, however I got her VM. I will call her back shortly to discuss.   "

## 2024-12-26 NOTE — TELEPHONE ENCOUNTER
Pt was in ED 12/19- diagnosed with blood clots in lungs.   Pt also realized she is allergic to oxycodone, which is what ED gave her for pain- she has broken out in a rash- rash is on bra line- has not spread or worsened.   Pt having pain below ribs, from front to back - stabbing pain is continuous. Pain is 7-8/10. Pt using tylenol- which is not helping.   Pt denies dyspnea,   Pt is not sleeping, anxiety is worsening, awakens at night with panic attacks.   Pt requesting something for pain and to help her sleep.   Next planned FUV and infusion 12/30  Pharmacy: [facility] [place]  Secure chat sent to Dr. Kohli  Pt aware if dyspnea worsens, pain untolerable- she should report to ED.

## 2024-12-26 NOTE — TELEPHONE ENCOUNTER
Still no Rx sent, I asked Dr. Kohli if he would like me to ask on-call fellow. Awaiting response.

## 2024-12-26 NOTE — TELEPHONE ENCOUNTER
This RN attempted again to reach the patient, I was able to reach her, just really hoping to get the tramadol and xanax for the evening since Dr. Kohli did not send. Message sent to on-call fellow to see if he would give short course for the evening, she was appreciative. Aware to go to ER is pain worsening or SOB, denies any worsening symptoms, just would like relief of the immediate symptoms she is having    Dr. Kohli responded to my secure chat that he would do this now,     Once complete, I will call the patient back to relay

## 2024-12-26 NOTE — TELEPHONE ENCOUNTER
Per Dr. Kohli- he attempted to reach pt directly, no answer.   He will send xanax and tramadol to her pharmacy on file today.   Detailed message left on identified VM with above information and repeat if pt has unbearable pain or new onset dyspnea she should seek immediate medical attention.   Return contact info left.

## 2024-12-30 ENCOUNTER — LAB (OUTPATIENT)
Dept: LAB | Facility: HOSPITAL | Age: 59
End: 2024-12-30
Payer: MEDICARE

## 2024-12-30 ENCOUNTER — TELEPHONE (OUTPATIENT)
Dept: HEMATOLOGY/ONCOLOGY | Facility: HOSPITAL | Age: 59
End: 2024-12-30
Payer: MEDICARE

## 2024-12-30 ENCOUNTER — OFFICE VISIT (OUTPATIENT)
Dept: HEMATOLOGY/ONCOLOGY | Facility: HOSPITAL | Age: 59
End: 2024-12-30
Payer: MEDICARE

## 2024-12-30 ENCOUNTER — APPOINTMENT (OUTPATIENT)
Dept: HEMATOLOGY/ONCOLOGY | Facility: HOSPITAL | Age: 59
End: 2024-12-30
Payer: MEDICARE

## 2024-12-30 VITALS
TEMPERATURE: 97.9 F | WEIGHT: 183.64 LBS | OXYGEN SATURATION: 95 % | SYSTOLIC BLOOD PRESSURE: 116 MMHG | DIASTOLIC BLOOD PRESSURE: 72 MMHG | HEART RATE: 110 BPM | RESPIRATION RATE: 18 BRPM | BODY MASS INDEX: 33.59 KG/M2

## 2024-12-30 DIAGNOSIS — C34.90 SMALL CELL CARCINOMA OF LUNG, UNSPECIFIED LATERALITY, UNSPECIFIED PART OF LUNG: Primary | ICD-10-CM

## 2024-12-30 DIAGNOSIS — C34.90 SMALL CELL CARCINOMA OF LUNG, UNSPECIFIED LATERALITY, UNSPECIFIED PART OF LUNG: ICD-10-CM

## 2024-12-30 LAB
ALBUMIN SERPL BCP-MCNC: 4 G/DL (ref 3.4–5)
ALP SERPL-CCNC: 105 U/L (ref 33–136)
ALT SERPL W P-5'-P-CCNC: 32 U/L (ref 7–45)
ANION GAP SERPL CALC-SCNC: 12 MMOL/L (ref 10–20)
AST SERPL W P-5'-P-CCNC: 25 U/L (ref 9–39)
BASOPHILS # BLD AUTO: 0.04 X10*3/UL (ref 0–0.1)
BASOPHILS NFR BLD AUTO: 0.8 %
BILIRUB SERPL-MCNC: 0.3 MG/DL (ref 0–1.2)
BUN SERPL-MCNC: 11 MG/DL (ref 6–23)
CALCIUM SERPL-MCNC: 8.5 MG/DL (ref 8.6–10.3)
CHLORIDE SERPL-SCNC: 104 MMOL/L (ref 98–107)
CO2 SERPL-SCNC: 27 MMOL/L (ref 21–32)
CREAT SERPL-MCNC: 0.52 MG/DL (ref 0.5–1.05)
EGFRCR SERPLBLD CKD-EPI 2021: >90 ML/MIN/1.73M*2
EOSINOPHIL # BLD AUTO: 0.11 X10*3/UL (ref 0–0.7)
EOSINOPHIL NFR BLD AUTO: 2.2 %
ERYTHROCYTE [DISTWIDTH] IN BLOOD BY AUTOMATED COUNT: 17 % (ref 11.5–14.5)
GLUCOSE SERPL-MCNC: 117 MG/DL (ref 74–99)
HCT VFR BLD AUTO: 37.4 % (ref 36–46)
HGB BLD-MCNC: 12.4 G/DL (ref 12–16)
IMM GRANULOCYTES # BLD AUTO: 0.02 X10*3/UL (ref 0–0.7)
IMM GRANULOCYTES NFR BLD AUTO: 0.4 % (ref 0–0.9)
LDH SERPL L TO P-CCNC: 147 U/L (ref 84–246)
LYMPHOCYTES # BLD AUTO: 1.77 X10*3/UL (ref 1.2–4.8)
LYMPHOCYTES NFR BLD AUTO: 35.2 %
MCH RBC QN AUTO: 29.2 PG (ref 26–34)
MCHC RBC AUTO-ENTMCNC: 33.2 G/DL (ref 32–36)
MCV RBC AUTO: 88 FL (ref 80–100)
MONOCYTES # BLD AUTO: 0.63 X10*3/UL (ref 0.1–1)
MONOCYTES NFR BLD AUTO: 12.5 %
NEUTROPHILS # BLD AUTO: 2.46 X10*3/UL (ref 1.2–7.7)
NEUTROPHILS NFR BLD AUTO: 48.9 %
NRBC BLD-RTO: 0 /100 WBCS (ref 0–0)
PLATELET # BLD AUTO: 287 X10*3/UL (ref 150–450)
POTASSIUM SERPL-SCNC: 4.2 MMOL/L (ref 3.5–5.3)
PROT SERPL-MCNC: 6.4 G/DL (ref 6.4–8.2)
RBC # BLD AUTO: 4.25 X10*6/UL (ref 4–5.2)
SODIUM SERPL-SCNC: 139 MMOL/L (ref 136–145)
WBC # BLD AUTO: 5 X10*3/UL (ref 4.4–11.3)

## 2024-12-30 PROCEDURE — 36415 COLL VENOUS BLD VENIPUNCTURE: CPT

## 2024-12-30 PROCEDURE — 80053 COMPREHEN METABOLIC PANEL: CPT

## 2024-12-30 PROCEDURE — 83615 LACTATE (LD) (LDH) ENZYME: CPT

## 2024-12-30 PROCEDURE — 3074F SYST BP LT 130 MM HG: CPT | Performed by: CLINICAL NURSE SPECIALIST

## 2024-12-30 PROCEDURE — 85025 COMPLETE CBC W/AUTO DIFF WBC: CPT

## 2024-12-30 PROCEDURE — 99215 OFFICE O/P EST HI 40 MIN: CPT | Performed by: CLINICAL NURSE SPECIALIST

## 2024-12-30 PROCEDURE — 3078F DIAST BP <80 MM HG: CPT | Performed by: CLINICAL NURSE SPECIALIST

## 2024-12-30 RX ORDER — ALBUTEROL SULFATE 0.83 MG/ML
3 SOLUTION RESPIRATORY (INHALATION) AS NEEDED
Status: CANCELLED | OUTPATIENT
Start: 2025-01-16

## 2024-12-30 RX ORDER — PROCHLORPERAZINE EDISYLATE 5 MG/ML
10 INJECTION INTRAMUSCULAR; INTRAVENOUS EVERY 6 HOURS PRN
Status: CANCELLED | OUTPATIENT
Start: 2025-01-18

## 2024-12-30 RX ORDER — FAMOTIDINE 10 MG/ML
20 INJECTION INTRAVENOUS ONCE AS NEEDED
Status: CANCELLED | OUTPATIENT
Start: 2025-01-16

## 2024-12-30 RX ORDER — PROCHLORPERAZINE MALEATE 10 MG
10 TABLET ORAL EVERY 6 HOURS PRN
Status: CANCELLED | OUTPATIENT
Start: 2025-01-16

## 2024-12-30 RX ORDER — PROCHLORPERAZINE MALEATE 10 MG
10 TABLET ORAL EVERY 6 HOURS PRN
Status: CANCELLED | OUTPATIENT
Start: 2025-01-15

## 2024-12-30 RX ORDER — ALBUTEROL SULFATE 0.83 MG/ML
3 SOLUTION RESPIRATORY (INHALATION) AS NEEDED
Status: CANCELLED | OUTPATIENT
Start: 2025-01-15

## 2024-12-30 RX ORDER — EPINEPHRINE 0.3 MG/.3ML
0.3 INJECTION SUBCUTANEOUS EVERY 5 MIN PRN
Status: CANCELLED | OUTPATIENT
Start: 2025-01-15

## 2024-12-30 RX ORDER — FAMOTIDINE 10 MG/ML
20 INJECTION INTRAVENOUS ONCE AS NEEDED
Status: CANCELLED | OUTPATIENT
Start: 2025-01-15

## 2024-12-30 RX ORDER — VALACYCLOVIR HYDROCHLORIDE 1 G/1
1000 TABLET, FILM COATED ORAL 3 TIMES DAILY
Qty: 21 TABLET | Refills: 0 | Status: SHIPPED | OUTPATIENT
Start: 2024-12-30 | End: 2025-01-06

## 2024-12-30 RX ORDER — EPINEPHRINE 0.3 MG/.3ML
0.3 INJECTION SUBCUTANEOUS EVERY 5 MIN PRN
Status: CANCELLED | OUTPATIENT
Start: 2025-01-18

## 2024-12-30 RX ORDER — DIPHENHYDRAMINE HYDROCHLORIDE 50 MG/ML
50 INJECTION INTRAMUSCULAR; INTRAVENOUS AS NEEDED
Status: CANCELLED | OUTPATIENT
Start: 2025-01-16

## 2024-12-30 RX ORDER — DIPHENHYDRAMINE HYDROCHLORIDE 50 MG/ML
50 INJECTION INTRAMUSCULAR; INTRAVENOUS AS NEEDED
Status: CANCELLED | OUTPATIENT
Start: 2025-01-18

## 2024-12-30 RX ORDER — ONDANSETRON HYDROCHLORIDE 2 MG/ML
8 INJECTION, SOLUTION INTRAVENOUS ONCE
Status: CANCELLED | OUTPATIENT
Start: 2025-01-18

## 2024-12-30 RX ORDER — FAMOTIDINE 10 MG/ML
20 INJECTION INTRAVENOUS ONCE AS NEEDED
Status: CANCELLED | OUTPATIENT
Start: 2025-01-18

## 2024-12-30 RX ORDER — EPINEPHRINE 0.3 MG/.3ML
0.3 INJECTION SUBCUTANEOUS EVERY 5 MIN PRN
Status: CANCELLED | OUTPATIENT
Start: 2025-01-16

## 2024-12-30 RX ORDER — DEXAMETHASONE 4 MG/1
12 TABLET ORAL ONCE
Status: CANCELLED | OUTPATIENT
Start: 2025-01-15 | End: 2025-01-06

## 2024-12-30 RX ORDER — DIPHENHYDRAMINE HYDROCHLORIDE 50 MG/ML
50 INJECTION INTRAMUSCULAR; INTRAVENOUS AS NEEDED
Status: CANCELLED | OUTPATIENT
Start: 2025-01-15

## 2024-12-30 RX ORDER — PALONOSETRON 0.05 MG/ML
0.25 INJECTION, SOLUTION INTRAVENOUS ONCE
Status: CANCELLED | OUTPATIENT
Start: 2025-01-15

## 2024-12-30 RX ORDER — PROCHLORPERAZINE MALEATE 10 MG
10 TABLET ORAL EVERY 6 HOURS PRN
Status: CANCELLED | OUTPATIENT
Start: 2025-01-18

## 2024-12-30 RX ORDER — ALBUTEROL SULFATE 0.83 MG/ML
3 SOLUTION RESPIRATORY (INHALATION) AS NEEDED
Status: CANCELLED | OUTPATIENT
Start: 2025-01-18

## 2024-12-30 RX ORDER — PROCHLORPERAZINE EDISYLATE 5 MG/ML
10 INJECTION INTRAMUSCULAR; INTRAVENOUS EVERY 6 HOURS PRN
Status: CANCELLED | OUTPATIENT
Start: 2025-01-15

## 2024-12-30 RX ORDER — PROCHLORPERAZINE EDISYLATE 5 MG/ML
10 INJECTION INTRAMUSCULAR; INTRAVENOUS EVERY 6 HOURS PRN
Status: CANCELLED | OUTPATIENT
Start: 2025-01-16

## 2024-12-30 ASSESSMENT — ENCOUNTER SYMPTOMS
CHILLS: 0
NAUSEA: 0
HEADACHES: 0
MYALGIAS: 0
VERTIGO: 0
COUGH: 1
ARTHRALGIAS: 0
FATIGUE: 1
ANOREXIA: 0
CHANGE IN BOWEL HABIT: 0
SWOLLEN GLANDS: 0
FEVER: 0
JOINT SWELLING: 0
DIAPHORESIS: 0
NUMBNESS: 0
NECK PAIN: 0
VISUAL CHANGE: 0
VOMITING: 0
SORE THROAT: 0
ABDOMINAL PAIN: 0

## 2024-12-30 ASSESSMENT — PAIN SCALES - GENERAL: PAINLEVEL_OUTOF10: 5

## 2024-12-30 NOTE — TELEPHONE ENCOUNTER
RN placed call to patient about upcoming appointments. No answer at this time. RN left a voicemail informing them of the time and location of appointments. RN provided the call back number in case she had more questions.    RN will attempt to message patient via [employer] and call again tomorrow to confirm that she received message.

## 2024-12-30 NOTE — PROGRESS NOTES
Patient ID: Minal Sun is a 59 y.o. female.    DIAGNOSIS     Small cell carcinoma of the lung.  Date of diagnosis is September 27, 2024 from a bronchoscopic biopsy of a subcarinal lymph node.  Immunohistochemistry positive for TTF-1, INSM1, synaptophysin and chromogranin, negative for p40, RB immunostain shows loss of nuclear expression.        STAGING     Clinical T4, N2, M1 C, stage IVc, extensive stage disease        CURRENT SITES OF DISEASE      Left lung, mediastinum, left hilum of the lung, liver, intra-abdominal lymph nodes in the portacaval and periportal region, bone metastases        MOLECULAR GENOMICS     Test Name: iCook[employer]tw xF+ (XF.V3) dated October 2024     Molecular Findings:  * Gene: PIK3CA, Variant: Missense variant (exon 1) - GOF, Potentially Actionable, p.R88Q (Allele Frequency - 0.3%)  * Gene: TP53, Variant: Missense variant - LOF, Biologically Relevant, p.R249G (Allele Frequency - 70.1%)  * Gene: RB1, Variant: Splice region variant - LOF, Biologically Relevant, c.540-1G>T, Splice Site (Allele Frequency - 56.8%)        Test Name: iCook.tw xT (XT.V4)  Laboratory Name: [facility]  Specimen Source: Lymph node, level 7  Collection Date: 27-Sep-2024     Molecular Findings:  * Gene: TP53, Variant: Missense variant - LOF, Biologically Relevant, p.R249G (Allele Frequency - 96.2%)  * Gene: RB1, Variant: Splice region variant - LOF, Biologically Relevant, c.540-1G>T, Splice Site (Allele Frequency - 96%)  * Gene: KMT2D, Variant: Stop gain - LOF, Biologically Relevant, p.*, Nonsense (Allele Frequency - 41.9%)  * Biomarker: Microsatellite Instability, Status: stable  * Biomarker: Tumor Mutation Marfa (2.6 Muts/Mb, Percentile: 33)        SERUM TUMOR MARKERS     Baseline LDH within normal limits        PRIOR THERAPY     None        CURRENT THERAPY     Combination chemotherapy and immunotherapy.  Initially enrolled on a clinical trial of Lutathera and underwent octreotide scan which was positive.   However she decided to come off study and did not receive Lutathera with her chemo.  Chemotherapy started October 6, 2024.  Immunotherapy added with cycle #2.  Minor response seen on imaging after 2 cycles of treatment        CURRENT ONCOLOGICAL PROBLEMS     1-cough and shortness of breath significantly improved with systemic treatment.        HISTORY OF PRESENT ILLNESS     This is a 59-year-old patient.  She states that she was feeling sick toward the end of spring of this year with some sinus problems.  Was seen by her allergist and was treated with antibiotics.  She was also seen at 1 point by primary care and steroids and antibiotics and inhalers were given.  She did not have any resolution and then ultimately developed a little bit of the hemoptysis which led to a chest x-ray showing an abnormality and finally a CT scan of the chest.The CT scan of the chest was done as a lung cancer screening low-dose CT and completed on September 20, 2024.  This demonstrated a extensive infiltrative mediastinal and hilar mass.  There was narrowing of the left mainstem bronchus and lower lobe bronchus secondary to extrinsic compression.  The predominant growth was within the left hilum and subcarinal region.  She underwent a bronchoscopy dated September 27, 2024 showing splaying of the main alanna.  There was mild erosion of a lymph node into the left mainstem bronchus.  There was erythema and mild erosions of an endobronchial tumor along the medial border.  Moderate to severe stenosis of the left lower lobe bronchus to 75%.        PAST MEDICAL HISTORY     1- Lumbar surgery about 30 years ago   2- hysterectomy in 2000  3- Diabetes  4- COPD  5- Right eye exophthalmus        SOCIAL HISTORY     Patient lives with her sister.  She lives in [facility].  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  She  started smoking at the age of 14 and continues to smoke at the age of 59.  Has smoked for 45 years on average 1 pack/day.        CURRENT MEDS     See medication list, meds reviewed, includes metformin, rosuvastatin, been done so Nied inhalers, Wellbutrin escitalopram, trazodone        ALLERGIES     Patient denies any drug allergies        FAMILY HISTORY      Mother had bladder cancer and possibly breast cancer.             Subjective    Today I am meeting with Minal Sun prior to fourth  cycle of treatment.  She reports that she developed a rash under her bra area and dotting her ribcage to her back.  It was a painful itch, and she can barely tolerate wearing a bra.  This seemed to develop on December 23rd, 4 days after she was diagnosed with a pulmonary embolism due to chest pain- she thought it was related to an allergy to oxycodone which she was given for her PE related pain.  Today she has a cluster of pustules which have dried, but some fresh outbreak under her breast.  She reports that her symptoms from her cancer, cough and shortness of breath continue to be improved.      Cancer  Associated symptoms include coughing and fatigue. Pertinent negatives include no abdominal pain, anorexia, arthralgias, change in bowel habit, chest pain, chills, congestion, diaphoresis, fever, headaches, joint swelling, myalgias, nausea, neck pain, numbness, rash, sore throat, swollen glands, urinary symptoms, vertigo, visual change or vomiting.       Objective    BSA: 1.91 meters squared  /72 (BP Location: Left arm, Patient Position: Sitting, BP Cuff Size: Adult)   Pulse 110   Temp 36.6 °C (97.9 °F) (Temporal)   Resp 18   Wt 83.3 kg (183 lb 10.3 oz)   SpO2 95%   BMI 33.59 kg/m²      Physical Exam  Constitutional:       General: She is not in acute distress.     Appearance: Normal appearance. She is not ill-appearing, toxic-appearing or diaphoretic.   HENT:      Nose: No congestion or rhinorrhea.       Mouth/Throat:      Pharynx: No oropharyngeal exudate or posterior oropharyngeal erythema.   Eyes:      General: No scleral icterus.     Conjunctiva/sclera: Conjunctivae normal.   Cardiovascular:      Rate and Rhythm: Normal rate and regular rhythm.      Pulses: Normal pulses.      Heart sounds: Normal heart sounds. No murmur heard.     No friction rub. No gallop.   Pulmonary:      Effort: Pulmonary effort is normal. No respiratory distress.      Breath sounds: Normal breath sounds. No stridor. No wheezing, rhonchi or rales.   Chest:      Chest wall: No tenderness.   Abdominal:      General: There is no distension.      Palpations: There is no mass.      Tenderness: There is no abdominal tenderness. There is no guarding or rebound.   Musculoskeletal:      Cervical back: No tenderness.      Right lower leg: No edema.      Left lower leg: No edema.   Lymphadenopathy:      Cervical: No cervical adenopathy.   Skin:     General: Skin is warm.      Coloration: Skin is not jaundiced.      Findings: No bruising or lesion.   Neurological:      General: No focal deficit present.      Mental Status: She is oriented to person, place, and time.   Psychiatric:         Mood and Affect: Mood normal.         Behavior: Behavior normal.         Performance Status:  Symptomatic; fully ambulatory    CT CHEST W IV CONTRAST; 11/29/2024   IMPRESSION:  1. Redemonstration of ill-defined left hilar mass extending into the  mediastinum, encasing the left upper lobe pulmonary artery and the  left bronchial tree, consistent with known malignancy and associated  mediastinal lymphadenopathy. Appearance is similar to the prior.  2. Redemonstration of hepatic metastatic disease.    CT ABDOMEN W IV CONTRAST; 11/29/2024   IMPRESSION:  1.  Mixed response. A dominant lesion in the posterior segment of the  right lobe of the liver has slightly decreased in size. There is a  new 6 mm hypodense lesion segment # 4A of the liver. Please see above.  2.  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  3. No abdominal or pelvic lymphadenopathy.    Assessment/Plan     This is a very nice 59-year-old patient with extensive stage small cell carcinoma s/p 3 cycles of chemoimmunotherapy.  At last visit, Dr. Kohli  reviewed her most recent CT scan - he felt her response was suboptimal, but given the absence of progression, decided to continue our treatment course.  She subsequently developed a pulmonary embolism. And today she appears to have herpes zoster affecting her T7 dermatome.     Today we will do the followin- hold cycle 4 of treatment with chemotherapy  2- start vatrex for her zoster infection  3- renew pain meds as needed  4- we will see her back in 1-2 weeks for treatment again, then plan a repeat scan -     Cancer Staging   No matching staging information was found for the patient.      Oncology History   SCLC (small cell lung carcinoma)   2024 Initial Diagnosis    SCLC (small cell lung carcinoma) (Multi)     10/4/2024 - 10/8/2024 Chemotherapy    CARBOplatin / Etoposide, 21 Day Cycles - Lung     10/4/2024 - 10/6/2024 Research Study Participant    ([facility]) [id number] - Atezolizumab + CARBOplatin / Etoposide / [id number], 21 Day Cycles  Plan Provider: NANCY Hernandez-CNP  Treatment goal: Control  Line of treatment: First Line  Associated studies: [id number] with Carboplatin, Etoposide and Tislelizumab in Newly Diagnosed ES-SCLC     10/4/2024 -  Chemotherapy    Durvalumab + CARBOplatin / Etoposide, 21 Day Cycles     10/28/2024 - 10/28/2024 Chemotherapy    Durvalumab + CARBOplatin / Etoposide, 21 Day Cycles                   NANCY Corcoran-CNS

## 2024-12-31 ENCOUNTER — APPOINTMENT (OUTPATIENT)
Dept: HEMATOLOGY/ONCOLOGY | Facility: CLINIC | Age: 59
End: 2024-12-31
Payer: MEDICARE

## 2025-01-02 ENCOUNTER — TELEPHONE (OUTPATIENT)
Dept: ADMISSION | Facility: HOSPITAL | Age: 60
End: 2025-01-02
Payer: MEDICARE

## 2025-01-02 ENCOUNTER — APPOINTMENT (OUTPATIENT)
Dept: HEMATOLOGY/ONCOLOGY | Facility: CLINIC | Age: 60
End: 2025-01-02
Payer: MEDICARE

## 2025-01-02 DIAGNOSIS — I26.94 MULTIPLE SUBSEGMENTAL PULMONARY EMBOLI WITHOUT ACUTE COR PULMONALE: ICD-10-CM

## 2025-01-02 DIAGNOSIS — C34.90 SMALL CELL CARCINOMA OF LUNG, UNSPECIFIED LATERALITY, UNSPECIFIED PART OF LUNG: ICD-10-CM

## 2025-01-02 RX ORDER — TIZANIDINE HYDROCHLORIDE 2 MG/1
2 CAPSULE, GELATIN COATED ORAL 3 TIMES DAILY PRN
Qty: 60 CAPSULE | Refills: 0 | Status: SHIPPED | OUTPATIENT
Start: 2025-01-02 | End: 2025-02-01

## 2025-01-02 RX ORDER — OXYCODONE HYDROCHLORIDE 5 MG/1
5 TABLET ORAL EVERY 4 HOURS PRN
Qty: 60 TABLET | Refills: 0 | Status: SHIPPED | OUTPATIENT
Start: 2025-01-02

## 2025-01-02 NOTE — TELEPHONE ENCOUNTER
Pt is requesting refills of tizanidine 2mg TID prn and oxycodone 5mg q4 prn, #30.  [facility].

## 2025-01-05 ENCOUNTER — TELEPHONE (OUTPATIENT)
Dept: ADMISSION | Facility: HOSPITAL | Age: 60
End: 2025-01-05
Payer: MEDICARE

## 2025-01-05 ENCOUNTER — TELEPHONE (OUTPATIENT)
Dept: HEMATOLOGY/ONCOLOGY | Facility: HOSPITAL | Age: 60
End: 2025-01-05

## 2025-01-05 DIAGNOSIS — C34.90 SMALL CELL CARCINOMA OF LUNG, UNSPECIFIED LATERALITY, UNSPECIFIED PART OF LUNG: ICD-10-CM

## 2025-01-05 DIAGNOSIS — I26.94 MULTIPLE SUBSEGMENTAL PULMONARY EMBOLI WITHOUT ACUTE COR PULMONALE: Primary | ICD-10-CM

## 2025-01-05 RX ORDER — TRAMADOL HYDROCHLORIDE 50 MG/1
50 TABLET ORAL EVERY 8 HOURS PRN
Qty: 30 TABLET | Refills: 0 | Status: SHIPPED | OUTPATIENT
Start: 2025-01-05 | End: 2025-01-15

## 2025-01-05 RX ORDER — TIZANIDINE HYDROCHLORIDE 2 MG/1
2 CAPSULE, GELATIN COATED ORAL 3 TIMES DAILY PRN
Qty: 60 CAPSULE | Refills: 0 | Status: CANCELLED | OUTPATIENT
Start: 2025-01-05 | End: 2025-02-04

## 2025-01-05 RX ORDER — VALACYCLOVIR HYDROCHLORIDE 1 G/1
1000 TABLET, FILM COATED ORAL 3 TIMES DAILY
Qty: 21 TABLET | Refills: 0 | Status: CANCELLED | OUTPATIENT
Start: 2025-01-05 | End: 2025-01-12

## 2025-01-05 NOTE — TELEPHONE ENCOUNTER
Per the on-call fellow. HE will send tramadol however the valacyclovir and tizanidine are coming up with a major drug interaction, he would prefer she call the office tomorrow for refills on those. This RN updated the patient sister Rosy. She was appreciative.    Team - please see refill requests, some of the [facility]s [facility] which will need corrected. Thank you.

## 2025-01-05 NOTE — TELEPHONE ENCOUNTER
Pt sister called in on her behalf, chief complaint: out of Rx, in pain, 2mg capsules of tizanidine - tramadol 50mg tablet - valacyclovir 1mg tablet - no refills left and has shingles.  Pt of Dr. Kohli and Anne Silver.    Of note, the Tizanidine medication was refilled however sent to [facility]. Will need sent to the [facility] in [place] ([facility] will need 14 days to fill)    The patient needs Tramadol 50mg refilled (needs sent to [facility] in [place])    Also needs Valacyclovir refilled, just completed today her 7 day course however still has severe shingles, unsure if she needs another 7 days or a whole new medication.    Message sent to On-call fellow, to summarize, patient needs tizanidine, tramadol and valacyclovir refilled to [facility] in [place]

## 2025-01-06 ENCOUNTER — TELEPHONE (OUTPATIENT)
Dept: ADMISSION | Facility: HOSPITAL | Age: 60
End: 2025-01-06
Payer: MEDICARE

## 2025-01-06 DIAGNOSIS — C34.90 SMALL CELL CARCINOMA OF LUNG, UNSPECIFIED LATERALITY, UNSPECIFIED PART OF LUNG: ICD-10-CM

## 2025-01-06 DIAGNOSIS — C34.90 SMALL CELL CARCINOMA OF LUNG, UNSPECIFIED LATERALITY, UNSPECIFIED PART OF LUNG: Primary | ICD-10-CM

## 2025-01-06 DIAGNOSIS — I26.94 MULTIPLE SUBSEGMENTAL PULMONARY EMBOLI WITHOUT ACUTE COR PULMONALE: ICD-10-CM

## 2025-01-06 RX ORDER — OXYCODONE HYDROCHLORIDE 5 MG/1
5 TABLET ORAL EVERY 4 HOURS PRN
Qty: 60 TABLET | Refills: 0 | Status: SHIPPED | OUTPATIENT
Start: 2025-01-06

## 2025-01-06 NOTE — TELEPHONE ENCOUNTER
Pt's sister states pt is weak, fatigued, in pain from shingles and is unsure if she can keep infusion appts starting 1/7, 1/8, 1/9.   Pt would like to have infusions at [place] location d/t proximity to their home.   Pt's sister states they sent my chart message over weekend, but have not gotten a response- reviewed that pts need to use phone triage line for urgent symptom management for quickest response- pt's sister verbalized understanding.   Secure chat message sent to Dr. Kohli team

## 2025-01-06 NOTE — TELEPHONE ENCOUNTER
EMILY Silver CNP advised treatment can be delayed x 1 week.   Message sent to [place] charge to check times available for next week

## 2025-01-06 NOTE — TELEPHONE ENCOUNTER
Pt's sister in agreement to no infusion this week. Pt is out of pain medication- script was sent to mail order pharmacy - not local pharmacy on file.   Pt encouraged to go to ER if pain/symptoms worsening. Offered option of [facility]- pt declined. Request placed for infusion appts next week in [facility] as no availability in [place]  New orders will be needed for next week infusion appts.

## 2025-01-07 ENCOUNTER — TELEPHONE (OUTPATIENT)
Dept: ADMISSION | Facility: HOSPITAL | Age: 60
End: 2025-01-07
Payer: MEDICARE

## 2025-01-07 DIAGNOSIS — I26.94 MULTIPLE SUBSEGMENTAL PULMONARY EMBOLI WITHOUT ACUTE COR PULMONALE: ICD-10-CM

## 2025-01-07 RX ORDER — TIZANIDINE HYDROCHLORIDE 2 MG/1
2 CAPSULE, GELATIN COATED ORAL 3 TIMES DAILY PRN
Qty: 60 CAPSULE | Refills: 0 | Status: SHIPPED | OUTPATIENT
Start: 2025-01-07 | End: 2025-02-06

## 2025-01-07 NOTE — TELEPHONE ENCOUNTER
Pt called in regarding this also- she said she doesn't want anything sent to [facility] and asked that I delete it from her chart. She wants the Tizanidine sent to [facility] on file. Secure chat sent to Anne letting her know.

## 2025-01-07 NOTE — TELEPHONE ENCOUNTER
[facility] calling in regards to most recent prescription for Tizanidine capsule sent on 1/2/25. Prescription plan will not cover capsules. [facility] request tablets be prescribes as alternative which is covered in plan.  If agreeable please send new script for Tizanidine tabs. Script for capsules will close out by end of today    If any questions call [phone]   ref # [phone]

## 2025-01-08 ENCOUNTER — APPOINTMENT (OUTPATIENT)
Dept: HEMATOLOGY/ONCOLOGY | Facility: CLINIC | Age: 60
End: 2025-01-08
Payer: MEDICARE

## 2025-01-09 ENCOUNTER — TELEPHONE (OUTPATIENT)
Dept: ADMISSION | Facility: HOSPITAL | Age: 60
End: 2025-01-09
Payer: MEDICARE

## 2025-01-09 ENCOUNTER — APPOINTMENT (OUTPATIENT)
Dept: HEMATOLOGY/ONCOLOGY | Facility: CLINIC | Age: 60
End: 2025-01-09
Payer: MEDICARE

## 2025-01-09 NOTE — TELEPHONE ENCOUNTER
[facility] Scheduling - are you able to place patient with Anne Silver instead of Dr. Kohli on 1/29? Dr. Kohli closed clinic, thank you

## 2025-01-09 NOTE — TELEPHONE ENCOUNTER
The patient called in, states that scheduling is unable to schedule her with Dr. Kohli (as requested per the scheduling order) due to lack of availability, she was transferred over to RN nursing line to discuss.    Team - please assist, it looks like per the orders there are orders for MD visit with infusion

## 2025-01-09 NOTE — TELEPHONE ENCOUNTER
Pt calls back returning call. Message relayed regarding appointments on  1/29. Understanding verbalized with teach back. No further needs at this time.

## 2025-01-10 ENCOUNTER — TELEPHONE (OUTPATIENT)
Dept: HEMATOLOGY/ONCOLOGY | Facility: HOSPITAL | Age: 60
End: 2025-01-10
Payer: MEDICARE

## 2025-01-10 NOTE — TELEPHONE ENCOUNTER
Called and left message for patient that still working on trying to get patient scheduled at [place].  Wanted to make sure ok to schedule at [facility] if unable to get [place] location since that is next closet location to her home.

## 2025-01-10 NOTE — TELEPHONE ENCOUNTER
Returned patients call, unable to reach patient to confirm appointments with her.  Left VM detailing appts are at [facility] 1/15, 1/16, 1/17.  She will have virtual appt with Anne on 1/14 at 130.  She is to get labs either Monday or Tuesday morning at [place].

## 2025-01-13 ENCOUNTER — LAB (OUTPATIENT)
Dept: LAB | Facility: CLINIC | Age: 60
End: 2025-01-13
Payer: MEDICARE

## 2025-01-13 DIAGNOSIS — C34.32 SMALL CELL CARCINOMA OF LOWER LOBE OF LEFT LUNG: ICD-10-CM

## 2025-01-13 LAB
ALBUMIN SERPL BCP-MCNC: 4.2 G/DL (ref 3.4–5)
ALP SERPL-CCNC: 115 U/L (ref 33–136)
ALT SERPL W P-5'-P-CCNC: 31 U/L (ref 7–45)
ANION GAP SERPL CALC-SCNC: 14 MMOL/L (ref 10–20)
AST SERPL W P-5'-P-CCNC: 28 U/L (ref 9–39)
BASOPHILS # BLD AUTO: 0.03 X10*3/UL (ref 0–0.1)
BASOPHILS NFR BLD AUTO: 0.4 %
BILIRUB SERPL-MCNC: 0.3 MG/DL (ref 0–1.2)
BUN SERPL-MCNC: 12 MG/DL (ref 6–23)
CALCIUM SERPL-MCNC: 9.5 MG/DL (ref 8.6–10.3)
CHLORIDE SERPL-SCNC: 102 MMOL/L (ref 98–107)
CO2 SERPL-SCNC: 26 MMOL/L (ref 21–32)
CREAT SERPL-MCNC: 0.7 MG/DL (ref 0.5–1.05)
EGFRCR SERPLBLD CKD-EPI 2021: >90 ML/MIN/1.73M*2
EOSINOPHIL # BLD AUTO: 0.31 X10*3/UL (ref 0–0.7)
EOSINOPHIL NFR BLD AUTO: 3.9 %
ERYTHROCYTE [DISTWIDTH] IN BLOOD BY AUTOMATED COUNT: 16.7 % (ref 11.5–14.5)
GLUCOSE SERPL-MCNC: 122 MG/DL (ref 74–99)
HCT VFR BLD AUTO: 42.2 % (ref 36–46)
HGB BLD-MCNC: 13.8 G/DL (ref 12–16)
IMM GRANULOCYTES # BLD AUTO: 0.02 X10*3/UL (ref 0–0.7)
IMM GRANULOCYTES NFR BLD AUTO: 0.2 % (ref 0–0.9)
LDH SERPL L TO P-CCNC: 205 U/L (ref 84–246)
LYMPHOCYTES # BLD AUTO: 1.56 X10*3/UL (ref 1.2–4.8)
LYMPHOCYTES NFR BLD AUTO: 19.5 %
MCH RBC QN AUTO: 29.6 PG (ref 26–34)
MCHC RBC AUTO-ENTMCNC: 32.7 G/DL (ref 32–36)
MCV RBC AUTO: 91 FL (ref 80–100)
MONOCYTES # BLD AUTO: 0.69 X10*3/UL (ref 0.1–1)
MONOCYTES NFR BLD AUTO: 8.6 %
NEUTROPHILS # BLD AUTO: 5.41 X10*3/UL (ref 1.2–7.7)
NEUTROPHILS NFR BLD AUTO: 67.4 %
NRBC BLD-RTO: 0 /100 WBCS (ref 0–0)
PLATELET # BLD AUTO: 267 X10*3/UL (ref 150–450)
POTASSIUM SERPL-SCNC: 4 MMOL/L (ref 3.5–5.3)
PROT SERPL-MCNC: 6.9 G/DL (ref 6.4–8.2)
RBC # BLD AUTO: 4.66 X10*6/UL (ref 4–5.2)
SODIUM SERPL-SCNC: 138 MMOL/L (ref 136–145)
WBC # BLD AUTO: 8 X10*3/UL (ref 4.4–11.3)

## 2025-01-13 PROCEDURE — 85025 COMPLETE CBC W/AUTO DIFF WBC: CPT

## 2025-01-13 PROCEDURE — 36415 COLL VENOUS BLD VENIPUNCTURE: CPT

## 2025-01-13 PROCEDURE — 84075 ASSAY ALKALINE PHOSPHATASE: CPT

## 2025-01-13 PROCEDURE — 83615 LACTATE (LD) (LDH) ENZYME: CPT

## 2025-01-14 ENCOUNTER — TELEMEDICINE (OUTPATIENT)
Dept: HEMATOLOGY/ONCOLOGY | Facility: HOSPITAL | Age: 60
End: 2025-01-14
Payer: MEDICARE

## 2025-01-14 ENCOUNTER — TELEPHONE (OUTPATIENT)
Dept: ADMISSION | Facility: HOSPITAL | Age: 60
End: 2025-01-14
Payer: MEDICARE

## 2025-01-14 DIAGNOSIS — I26.99 OTHER PULMONARY EMBOLISM WITHOUT ACUTE COR PULMONALE, UNSPECIFIED CHRONICITY (MULTI): ICD-10-CM

## 2025-01-14 DIAGNOSIS — C34.90 SMALL CELL CARCINOMA OF LUNG, UNSPECIFIED LATERALITY, UNSPECIFIED PART OF LUNG: Primary | ICD-10-CM

## 2025-01-14 PROCEDURE — 99214 OFFICE O/P EST MOD 30 MIN: CPT | Performed by: CLINICAL NURSE SPECIALIST

## 2025-01-14 RX ORDER — EPINEPHRINE 0.3 MG/.3ML
0.3 INJECTION SUBCUTANEOUS EVERY 5 MIN PRN
Status: CANCELLED | OUTPATIENT
Start: 2025-01-15

## 2025-01-14 RX ORDER — PROCHLORPERAZINE MALEATE 10 MG
10 TABLET ORAL EVERY 6 HOURS PRN
Status: CANCELLED | OUTPATIENT
Start: 2025-01-16

## 2025-01-14 RX ORDER — ONDANSETRON HYDROCHLORIDE 2 MG/ML
8 INJECTION, SOLUTION INTRAVENOUS ONCE
Status: CANCELLED | OUTPATIENT
Start: 2025-01-18

## 2025-01-14 RX ORDER — EPINEPHRINE 0.3 MG/.3ML
0.3 INJECTION SUBCUTANEOUS EVERY 5 MIN PRN
Status: CANCELLED | OUTPATIENT
Start: 2025-01-16

## 2025-01-14 RX ORDER — ALBUTEROL SULFATE 0.83 MG/ML
3 SOLUTION RESPIRATORY (INHALATION) AS NEEDED
Status: CANCELLED | OUTPATIENT
Start: 2025-01-15

## 2025-01-14 RX ORDER — DIPHENHYDRAMINE HYDROCHLORIDE 50 MG/ML
50 INJECTION INTRAMUSCULAR; INTRAVENOUS AS NEEDED
Status: CANCELLED | OUTPATIENT
Start: 2025-01-16

## 2025-01-14 RX ORDER — PROCHLORPERAZINE EDISYLATE 5 MG/ML
10 INJECTION INTRAMUSCULAR; INTRAVENOUS EVERY 6 HOURS PRN
Status: CANCELLED | OUTPATIENT
Start: 2025-01-15

## 2025-01-14 RX ORDER — DIPHENHYDRAMINE HYDROCHLORIDE 50 MG/ML
50 INJECTION INTRAMUSCULAR; INTRAVENOUS AS NEEDED
Status: CANCELLED | OUTPATIENT
Start: 2025-01-15

## 2025-01-14 RX ORDER — EPINEPHRINE 0.3 MG/.3ML
0.3 INJECTION SUBCUTANEOUS EVERY 5 MIN PRN
Status: CANCELLED | OUTPATIENT
Start: 2025-01-18

## 2025-01-14 RX ORDER — PROCHLORPERAZINE MALEATE 10 MG
10 TABLET ORAL EVERY 6 HOURS PRN
Status: CANCELLED | OUTPATIENT
Start: 2025-01-15

## 2025-01-14 RX ORDER — PALONOSETRON 0.05 MG/ML
0.25 INJECTION, SOLUTION INTRAVENOUS ONCE
Status: CANCELLED | OUTPATIENT
Start: 2025-01-15

## 2025-01-14 RX ORDER — DIPHENHYDRAMINE HYDROCHLORIDE 50 MG/ML
50 INJECTION INTRAMUSCULAR; INTRAVENOUS AS NEEDED
Status: CANCELLED | OUTPATIENT
Start: 2025-01-18

## 2025-01-14 RX ORDER — PROCHLORPERAZINE EDISYLATE 5 MG/ML
10 INJECTION INTRAMUSCULAR; INTRAVENOUS EVERY 6 HOURS PRN
Status: CANCELLED | OUTPATIENT
Start: 2025-01-18

## 2025-01-14 RX ORDER — FAMOTIDINE 10 MG/ML
20 INJECTION INTRAVENOUS ONCE AS NEEDED
Status: CANCELLED | OUTPATIENT
Start: 2025-01-18

## 2025-01-14 RX ORDER — PROCHLORPERAZINE MALEATE 10 MG
10 TABLET ORAL EVERY 6 HOURS PRN
Status: CANCELLED | OUTPATIENT
Start: 2025-01-18

## 2025-01-14 RX ORDER — DEXAMETHASONE 4 MG/1
12 TABLET ORAL ONCE
Status: CANCELLED | OUTPATIENT
Start: 2025-01-15 | End: 2025-01-15

## 2025-01-14 RX ORDER — PROCHLORPERAZINE EDISYLATE 5 MG/ML
10 INJECTION INTRAMUSCULAR; INTRAVENOUS EVERY 6 HOURS PRN
Status: CANCELLED | OUTPATIENT
Start: 2025-01-16

## 2025-01-14 RX ORDER — FAMOTIDINE 10 MG/ML
20 INJECTION INTRAVENOUS ONCE AS NEEDED
Status: CANCELLED | OUTPATIENT
Start: 2025-01-16

## 2025-01-14 RX ORDER — ALBUTEROL SULFATE 0.83 MG/ML
3 SOLUTION RESPIRATORY (INHALATION) AS NEEDED
Status: CANCELLED | OUTPATIENT
Start: 2025-01-16

## 2025-01-14 RX ORDER — ALBUTEROL SULFATE 0.83 MG/ML
3 SOLUTION RESPIRATORY (INHALATION) AS NEEDED
Status: CANCELLED | OUTPATIENT
Start: 2025-01-18

## 2025-01-14 RX ORDER — FAMOTIDINE 10 MG/ML
20 INJECTION INTRAVENOUS ONCE AS NEEDED
Status: CANCELLED | OUTPATIENT
Start: 2025-01-15

## 2025-01-14 ASSESSMENT — ENCOUNTER SYMPTOMS
JOINT SWELLING: 0
VERTIGO: 0
VISUAL CHANGE: 0
NUMBNESS: 0
FEVER: 0
NAUSEA: 0
NECK PAIN: 0
VOMITING: 0
ARTHRALGIAS: 0
HEADACHES: 0
CHANGE IN BOWEL HABIT: 0
CHILLS: 0
ANOREXIA: 0
MYALGIAS: 0
FATIGUE: 1
ABDOMINAL PAIN: 0
SWOLLEN GLANDS: 0
COUGH: 1
SORE THROAT: 0
DIAPHORESIS: 0

## 2025-01-14 NOTE — PROGRESS NOTES
Patient ID: Minal Sun is a 59 y.o. female.    DIAGNOSIS     Small cell carcinoma of the lung.  Date of diagnosis is September 27, 2024 from a bronchoscopic biopsy of a subcarinal lymph node.  Immunohistochemistry positive for TTF-1, INSM1, synaptophysin and chromogranin, negative for p40, RB immunostain shows loss of nuclear expression.        STAGING     Clinical T4, N2, M1 C, stage IVc, extensive stage disease        CURRENT SITES OF DISEASE      Left lung, mediastinum, left hilum of the lung, liver, intra-abdominal lymph nodes in the portacaval and periportal region, bone metastases        MOLECULAR GENOMICS     Test Name: [employer] xF+ (XF.V3) dated October 2024     Molecular Findings:  * Gene: PIK3CA, Variant: Missense variant (exon 1) - GOF, Potentially Actionable, p.R88Q (Allele Frequency - 0.3%)  * Gene: TP53, Variant: Missense variant - LOF, Biologically Relevant, p.R249G (Allele Frequency - 70.1%)  * Gene: RB1, Variant: Splice region variant - LOF, Biologically Relevant, c.540-1G>T, Splice Site (Allele Frequency - 56.8%)        Test Name: [employer] xT (XT.V4)  Laboratory Name: [facility]  Specimen Source: Lymph node, level 7  Collection Date: 27-Sep-2024     Molecular Findings:  * Gene: TP53, Variant: Missense variant - LOF, Biologically Relevant, p.R249G (Allele Frequency - 96.2%)  * Gene: RB1, Variant: Splice region variant - LOF, Biologically Relevant, c.540-1G>T, Splice Site (Allele Frequency - 96%)  * Gene: KMT2D, Variant: Stop gain - LOF, Biologically Relevant, p.*, Nonsense (Allele Frequency - 41.9%)  * Biomarker: Microsatellite Instability, Status: stable  * Biomarker: Tumor Mutation [place] (2.6 Muts/Mb, Percentile: 33)        SERUM TUMOR MARKERS     Baseline LDH within normal limits        PRIOR THERAPY     None        CURRENT THERAPY     Combination chemotherapy and immunotherapy.  Initially enrolled on a clinical trial of Lutathera and underwent octreotide scan which was positive.   However she decided to come off study and did not receive Lutathera with her chemo.  Chemotherapy started October 6, 2024.  Immunotherapy added with cycle #2.  Minor response seen on imaging after 2 cycles of treatment        CURRENT ONCOLOGICAL PROBLEMS     1-cough and shortness of breath significantly improved with systemic treatment.        HISTORY OF PRESENT ILLNESS     This is a 59-year-old patient.  She states that she was feeling sick toward the end of spring of this year with some sinus problems.  Was seen by her allergist and was treated with antibiotics.  She was also seen at 1 point by primary care and steroids and antibiotics and inhalers were given.  She did not have any resolution and then ultimately developed a little bit of the hemoptysis which led to a chest x-ray showing an abnormality and finally a CT scan of the chest.The CT scan of the chest was done as a lung cancer screening low-dose CT and completed on September 20, 2024.  This demonstrated a extensive infiltrative mediastinal and hilar mass.  There was narrowing of the left mainstem bronchus and lower lobe bronchus secondary to extrinsic compression.  The predominant growth was within the left hilum and subcarinal region.  She underwent a bronchoscopy dated September 27, 2024 showing splaying of the main alanna.  There was mild erosion of a lymph node into the left mainstem bronchus.  There was erythema and mild erosions of an endobronchial tumor along the medial border.  Moderate to severe stenosis of the left lower lobe bronchus to 75%.        PAST MEDICAL HISTORY     1- Lumbar surgery about 30 years ago   2- hysterectomy in 2000  3- Diabetes  4- COPD  5- Right eye exophthalmus        SOCIAL HISTORY     Patient lives with her sister.  She lives in [place].  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  She  started smoking at the age of 14 and continues to smoke at the age of 59.  Has smoked for 45 years on average 1 pack/day.        CURRENT MEDS     See medication list, meds reviewed, includes metformin, rosuvastatin, been done so Nied inhalers, Wellbutrin escitalopram, trazodone        ALLERGIES     Patient denies any drug allergies        FAMILY HISTORY      Mother had bladder cancer and possibly breast cancer.             Subjective    Today I am meeting with Minal Sun by telephone  prior to fourth  cycle of treatment.  Recall that she came 2 weeks ago, and was found to have herpes zoster- with painful lesions under her breast and wrapping around to her back- we treated it with valtrex and delayed her treatment.  She has had a fair bit of pain, and actually deferred treatment another week due to it.  Zoster pain is improving now, but still an issue.  She is using calamine on her lesions, and taking oxycodoen for pain.    Recall this seemed to develop on December 23rd, 4 days after she was diagnosed with a pulmonary embolism due to chest pain- she continues on Eliquis.    She reports that her symptoms from her cancer, cough and shortness of breath seem to have improved, but are returning.  She would like help getting her eliquis renewed and has asked me to contact her pharmacist .        Cancer  Associated symptoms include coughing and fatigue. Pertinent negatives include no abdominal pain, anorexia, arthralgias, change in bowel habit, chest pain, chills, congestion, diaphoresis, fever, headaches, joint swelling, myalgias, nausea, neck pain, numbness, rash, sore throat, swollen glands, urinary symptoms, vertigo, visual change or vomiting.       Objective    BSA: There is no height or weight on file to calculate BSA.  There were no vitals taken for this visit.     Physical Exam  Constitutional:       General: She is not in acute distress.     Appearance: Normal appearance. She is not ill-appearing,  toxic-appearing or diaphoretic.   HENT:      Nose: No congestion or rhinorrhea.      Mouth/Throat:      Pharynx: No oropharyngeal exudate or posterior oropharyngeal erythema.   Eyes:      General: No scleral icterus.     Conjunctiva/sclera: Conjunctivae normal.   Cardiovascular:      Rate and Rhythm: Normal rate and regular rhythm.      Pulses: Normal pulses.      Heart sounds: Normal heart sounds. No murmur heard.     No friction rub. No gallop.   Pulmonary:      Effort: Pulmonary effort is normal. No respiratory distress.      Breath sounds: Normal breath sounds. No stridor. No wheezing, rhonchi or rales.   Chest:      Chest wall: No tenderness.   Abdominal:      General: There is no distension.      Palpations: There is no mass.      Tenderness: There is no abdominal tenderness. There is no guarding or rebound.   Musculoskeletal:      Cervical back: No tenderness.      Right lower leg: No edema.      Left lower leg: No edema.   Lymphadenopathy:      Cervical: No cervical adenopathy.   Skin:     General: Skin is warm.      Coloration: Skin is not jaundiced.      Findings: No bruising or lesion.   Neurological:      General: No focal deficit present.      Mental Status: She is oriented to person, place, and time.   Psychiatric:         Mood and Affect: Mood normal.         Behavior: Behavior normal.         Performance Status:  Symptomatic; fully ambulatory    CT CHEST W IV CONTRAST; 11/29/2024   IMPRESSION:  1. Redemonstration of ill-defined left hilar mass extending into the  mediastinum, encasing the left upper lobe pulmonary artery and the  left bronchial tree, consistent with known malignancy and associated  mediastinal lymphadenopathy. Appearance is similar to the prior.  2. Redemonstration of hepatic metastatic disease.    CT ABDOMEN W IV CONTRAST; 11/29/2024   IMPRESSION:  1.  Mixed response. A dominant lesion in the posterior segment of the  right lobe of the liver has slightly decreased in size. There is  a  new 6 mm hypodense lesion segment # 4A of the liver. Please see above.  2. 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  3. No abdominal or pelvic lymphadenopathy.    Assessment/Plan     This is a very nice 59-year-old patient with extensive stage small cell carcinoma s/p 3 cycles of chemoimmunotherapy.  At last visit, Dr. Kohli  reviewed her most recent CT scan - he felt her response was suboptimal, but given the absence of progression, decided to continue our treatment course.  She subsequently developed a pulmonary embolism. At last visit she developed  herpes zoster affecting her T7 dermatome.  We held treatment a week, and treated her with valtrex and oxycodone.  She extended her treatment break by another week due to pain from shingles.  She is now improving and up for 4th cycle of treatment.  Cough seems to have returned.      Today we will do the followin- Proceed with treatment tomorrow, Thursday, and Friday  2- return to clinic in 3 weeks with repeat scan  3-I will work with her pharmacist to help her get her [facility]   Cancer Staging   No matching staging information was found for the patient.      Oncology History   SCLC (small cell lung carcinoma)   2024 Initial Diagnosis    SCLC (small cell lung carcinoma) (Multi)     10/4/2024 - 10/8/2024 Chemotherapy    CARBOplatin / Etoposide, 21 Day Cycles - Lung     10/4/2024 - 10/6/2024 Research Study Participant    ([facility]) [id number] - Atezolizumab + CARBOplatin / Etoposide / [id number], 21 Day Cycles  Plan Provider: JESSICA Hernandez  Treatment goal: Control  Line of treatment: First Line  Associated studies: (177Lu)[id number] with Carboplatin, Etoposide and Tislelizumab in Newly Diagnosed ES-SCLC     10/4/2024 -  Chemotherapy    Durvalumab + CARBOplatin / Etoposide, 21 Day Cycles     10/28/2024 - 10/28/2024 Chemotherapy    Durvalumab + CARBOplatin / Etoposide, 21 Day Cycles     2025 -   Chemotherapy    Durvalumab, 28 Day Cycles                   Anne Silver, APRN-CNS

## 2025-01-14 NOTE — TELEPHONE ENCOUNTER
"Pt friend Rosy called in on behalf of the patient, chief complaint: cough/pain/questions regarding chemo treatment.  Pt of Dr. Kohli and Anne Silver     This RN called the friend back, the friend is concerned about weather tomorrow, unsure if she will be able to get her to her apt, they are going to wait to see how things are tomorrow. Denies any worsening symptoms but nervous to miss the treatment due to \"pain and cough coming back\". No further needs per this RN at this time  "

## 2025-01-15 ENCOUNTER — INFUSION (OUTPATIENT)
Dept: HEMATOLOGY/ONCOLOGY | Facility: HOSPITAL | Age: 60
End: 2025-01-15
Payer: MEDICARE

## 2025-01-15 ENCOUNTER — APPOINTMENT (OUTPATIENT)
Dept: HEMATOLOGY/ONCOLOGY | Facility: CLINIC | Age: 60
End: 2025-01-15
Payer: MEDICARE

## 2025-01-15 VITALS
OXYGEN SATURATION: 92 % | DIASTOLIC BLOOD PRESSURE: 62 MMHG | RESPIRATION RATE: 18 BRPM | SYSTOLIC BLOOD PRESSURE: 116 MMHG | HEART RATE: 110 BPM | WEIGHT: 180 LBS | TEMPERATURE: 98.2 F | BODY MASS INDEX: 32.92 KG/M2

## 2025-01-15 DIAGNOSIS — C34.90 SMALL CELL CARCINOMA OF LUNG, UNSPECIFIED LATERALITY, UNSPECIFIED PART OF LUNG: ICD-10-CM

## 2025-01-15 PROCEDURE — 96413 CHEMO IV INFUSION 1 HR: CPT

## 2025-01-15 PROCEDURE — 96375 TX/PRO/DX INJ NEW DRUG ADDON: CPT | Mod: INF

## 2025-01-15 PROCEDURE — 96417 CHEMO IV INFUS EACH ADDL SEQ: CPT

## 2025-01-15 PROCEDURE — 2500000004 HC RX 250 GENERAL PHARMACY W/ HCPCS (ALT 636 FOR OP/ED): Performed by: CLINICAL NURSE SPECIALIST

## 2025-01-15 PROCEDURE — 96367 TX/PROPH/DG ADDL SEQ IV INF: CPT

## 2025-01-15 RX ORDER — PROCHLORPERAZINE MALEATE 10 MG
10 TABLET ORAL EVERY 6 HOURS PRN
Status: DISCONTINUED | OUTPATIENT
Start: 2025-01-15 | End: 2025-01-15 | Stop reason: HOSPADM

## 2025-01-15 RX ORDER — DIPHENHYDRAMINE HYDROCHLORIDE 50 MG/ML
50 INJECTION INTRAMUSCULAR; INTRAVENOUS AS NEEDED
Status: DISCONTINUED | OUTPATIENT
Start: 2025-01-15 | End: 2025-01-15 | Stop reason: HOSPADM

## 2025-01-15 RX ORDER — FAMOTIDINE 10 MG/ML
20 INJECTION INTRAVENOUS ONCE AS NEEDED
Status: DISCONTINUED | OUTPATIENT
Start: 2025-01-15 | End: 2025-01-15 | Stop reason: HOSPADM

## 2025-01-15 RX ORDER — HEPARIN SODIUM,PORCINE/PF 10 UNIT/ML
50 SYRINGE (ML) INTRAVENOUS AS NEEDED
Status: CANCELLED | OUTPATIENT
Start: 2025-01-15

## 2025-01-15 RX ORDER — PROCHLORPERAZINE EDISYLATE 5 MG/ML
10 INJECTION INTRAMUSCULAR; INTRAVENOUS EVERY 6 HOURS PRN
Status: DISCONTINUED | OUTPATIENT
Start: 2025-01-15 | End: 2025-01-15 | Stop reason: HOSPADM

## 2025-01-15 RX ORDER — DEXAMETHASONE 6 MG/1
12 TABLET ORAL ONCE
Status: COMPLETED | OUTPATIENT
Start: 2025-01-15 | End: 2025-01-15

## 2025-01-15 RX ORDER — HEPARIN 100 UNIT/ML
500 SYRINGE INTRAVENOUS AS NEEDED
Status: CANCELLED | OUTPATIENT
Start: 2025-01-15

## 2025-01-15 RX ORDER — ALBUTEROL SULFATE 0.83 MG/ML
3 SOLUTION RESPIRATORY (INHALATION) AS NEEDED
Status: DISCONTINUED | OUTPATIENT
Start: 2025-01-15 | End: 2025-01-15 | Stop reason: HOSPADM

## 2025-01-15 RX ORDER — PALONOSETRON 0.05 MG/ML
0.25 INJECTION, SOLUTION INTRAVENOUS ONCE
Status: COMPLETED | OUTPATIENT
Start: 2025-01-15 | End: 2025-01-15

## 2025-01-15 RX ORDER — EPINEPHRINE 0.3 MG/.3ML
0.3 INJECTION SUBCUTANEOUS EVERY 5 MIN PRN
Status: DISCONTINUED | OUTPATIENT
Start: 2025-01-15 | End: 2025-01-15 | Stop reason: HOSPADM

## 2025-01-15 RX ADMIN — DEXAMETHASONE 12 MG: 6 TABLET ORAL at 14:51

## 2025-01-15 RX ADMIN — SODIUM CHLORIDE 190 MG: 9 INJECTION, SOLUTION INTRAVENOUS at 17:13

## 2025-01-15 RX ADMIN — SODIUM CHLORIDE 1500 MG: 9 INJECTION, SOLUTION INTRAVENOUS at 15:35

## 2025-01-15 RX ADMIN — FOSAPREPITANT 150 MG: 150 INJECTION, POWDER, LYOPHILIZED, FOR SOLUTION INTRAVENOUS at 14:57

## 2025-01-15 RX ADMIN — PALONOSETRON HYDROCHLORIDE 250 MCG: 0.25 INJECTION INTRAVENOUS at 14:45

## 2025-01-15 RX ADMIN — CARBOPLATIN 550 MG: 600 INJECTION, SOLUTION INTRAVENOUS at 16:31

## 2025-01-15 NOTE — SIGNIFICANT EVENT

## 2025-01-16 ENCOUNTER — INFUSION (OUTPATIENT)
Dept: HEMATOLOGY/ONCOLOGY | Facility: HOSPITAL | Age: 60
End: 2025-01-16
Payer: MEDICARE

## 2025-01-16 ENCOUNTER — APPOINTMENT (OUTPATIENT)
Dept: HEMATOLOGY/ONCOLOGY | Facility: CLINIC | Age: 60
End: 2025-01-16
Payer: MEDICARE

## 2025-01-16 VITALS
HEART RATE: 92 BPM | RESPIRATION RATE: 18 BRPM | DIASTOLIC BLOOD PRESSURE: 66 MMHG | SYSTOLIC BLOOD PRESSURE: 131 MMHG | BODY MASS INDEX: 33.47 KG/M2 | OXYGEN SATURATION: 95 % | WEIGHT: 183 LBS | TEMPERATURE: 97.9 F

## 2025-01-16 DIAGNOSIS — C34.90 SMALL CELL CARCINOMA OF LUNG, UNSPECIFIED LATERALITY, UNSPECIFIED PART OF LUNG: ICD-10-CM

## 2025-01-16 PROCEDURE — 2500000004 HC RX 250 GENERAL PHARMACY W/ HCPCS (ALT 636 FOR OP/ED): Performed by: CLINICAL NURSE SPECIALIST

## 2025-01-16 PROCEDURE — 96375 TX/PRO/DX INJ NEW DRUG ADDON: CPT | Mod: INF

## 2025-01-16 PROCEDURE — 96413 CHEMO IV INFUSION 1 HR: CPT

## 2025-01-16 RX ORDER — EPINEPHRINE 0.3 MG/.3ML
0.3 INJECTION SUBCUTANEOUS EVERY 5 MIN PRN
Status: DISCONTINUED | OUTPATIENT
Start: 2025-01-16 | End: 2025-01-16 | Stop reason: HOSPADM

## 2025-01-16 RX ORDER — HEPARIN SODIUM,PORCINE/PF 10 UNIT/ML
50 SYRINGE (ML) INTRAVENOUS AS NEEDED
OUTPATIENT
Start: 2025-01-16

## 2025-01-16 RX ORDER — FAMOTIDINE 10 MG/ML
20 INJECTION INTRAVENOUS ONCE AS NEEDED
Status: DISCONTINUED | OUTPATIENT
Start: 2025-01-16 | End: 2025-01-16 | Stop reason: HOSPADM

## 2025-01-16 RX ORDER — DIPHENHYDRAMINE HYDROCHLORIDE 50 MG/ML
50 INJECTION INTRAMUSCULAR; INTRAVENOUS AS NEEDED
Status: DISCONTINUED | OUTPATIENT
Start: 2025-01-16 | End: 2025-01-16 | Stop reason: HOSPADM

## 2025-01-16 RX ORDER — HEPARIN 100 UNIT/ML
500 SYRINGE INTRAVENOUS AS NEEDED
OUTPATIENT
Start: 2025-01-16

## 2025-01-16 RX ORDER — ALBUTEROL SULFATE 0.83 MG/ML
3 SOLUTION RESPIRATORY (INHALATION) AS NEEDED
Status: DISCONTINUED | OUTPATIENT
Start: 2025-01-16 | End: 2025-01-16 | Stop reason: HOSPADM

## 2025-01-16 RX ADMIN — SODIUM CHLORIDE 190 MG: 9 INJECTION, SOLUTION INTRAVENOUS at 12:28

## 2025-01-16 RX ADMIN — DEXAMETHASONE SODIUM PHOSPHATE 8 MG: 4 INJECTION, SOLUTION INTRA-ARTICULAR; INTRALESIONAL; INTRAMUSCULAR; INTRAVENOUS; SOFT TISSUE at 12:20

## 2025-01-16 NOTE — PROGRESS NOTES
Pt. To [facility] for Day 2 etoposide infusion.  Given without incident.  Pt. Discharged to home.

## 2025-01-16 NOTE — SIGNIFICANT EVENT

## 2025-01-17 ENCOUNTER — APPOINTMENT (OUTPATIENT)
Dept: HEMATOLOGY/ONCOLOGY | Facility: CLINIC | Age: 60
End: 2025-01-17
Payer: MEDICARE

## 2025-01-17 ENCOUNTER — INFUSION (OUTPATIENT)
Dept: HEMATOLOGY/ONCOLOGY | Facility: HOSPITAL | Age: 60
End: 2025-01-17
Payer: MEDICARE

## 2025-01-17 VITALS
SYSTOLIC BLOOD PRESSURE: 119 MMHG | TEMPERATURE: 97.9 F | WEIGHT: 184.3 LBS | HEART RATE: 81 BPM | RESPIRATION RATE: 16 BRPM | OXYGEN SATURATION: 95 % | BODY MASS INDEX: 33.71 KG/M2 | DIASTOLIC BLOOD PRESSURE: 71 MMHG

## 2025-01-17 DIAGNOSIS — C34.90 SMALL CELL CARCINOMA OF LUNG, UNSPECIFIED LATERALITY, UNSPECIFIED PART OF LUNG: ICD-10-CM

## 2025-01-17 PROCEDURE — 96375 TX/PRO/DX INJ NEW DRUG ADDON: CPT | Mod: INF

## 2025-01-17 PROCEDURE — 2500000004 HC RX 250 GENERAL PHARMACY W/ HCPCS (ALT 636 FOR OP/ED): Performed by: CLINICAL NURSE SPECIALIST

## 2025-01-17 PROCEDURE — 96413 CHEMO IV INFUSION 1 HR: CPT

## 2025-01-17 RX ORDER — DIPHENHYDRAMINE HYDROCHLORIDE 50 MG/ML
50 INJECTION INTRAMUSCULAR; INTRAVENOUS AS NEEDED
Status: DISCONTINUED | OUTPATIENT
Start: 2025-01-17 | End: 2025-01-17 | Stop reason: HOSPADM

## 2025-01-17 RX ORDER — PROCHLORPERAZINE EDISYLATE 5 MG/ML
10 INJECTION INTRAMUSCULAR; INTRAVENOUS EVERY 6 HOURS PRN
Status: DISCONTINUED | OUTPATIENT
Start: 2025-01-17 | End: 2025-01-17 | Stop reason: HOSPADM

## 2025-01-17 RX ORDER — EPINEPHRINE 0.3 MG/.3ML
0.3 INJECTION SUBCUTANEOUS EVERY 5 MIN PRN
Status: DISCONTINUED | OUTPATIENT
Start: 2025-01-17 | End: 2025-01-17 | Stop reason: HOSPADM

## 2025-01-17 RX ORDER — ONDANSETRON HYDROCHLORIDE 2 MG/ML
8 INJECTION, SOLUTION INTRAVENOUS ONCE
Status: COMPLETED | OUTPATIENT
Start: 2025-01-17 | End: 2025-01-17

## 2025-01-17 RX ORDER — ALBUTEROL SULFATE 0.83 MG/ML
3 SOLUTION RESPIRATORY (INHALATION) AS NEEDED
Status: DISCONTINUED | OUTPATIENT
Start: 2025-01-17 | End: 2025-01-17 | Stop reason: HOSPADM

## 2025-01-17 RX ORDER — FAMOTIDINE 10 MG/ML
20 INJECTION INTRAVENOUS ONCE AS NEEDED
Status: DISCONTINUED | OUTPATIENT
Start: 2025-01-17 | End: 2025-01-17 | Stop reason: HOSPADM

## 2025-01-17 RX ORDER — PROCHLORPERAZINE MALEATE 10 MG
10 TABLET ORAL EVERY 6 HOURS PRN
Status: DISCONTINUED | OUTPATIENT
Start: 2025-01-17 | End: 2025-01-17 | Stop reason: HOSPADM

## 2025-01-17 RX ADMIN — ONDANSETRON 8 MG: 2 INJECTION INTRAMUSCULAR; INTRAVENOUS at 14:23

## 2025-01-17 RX ADMIN — DEXAMETHASONE SODIUM PHOSPHATE 8 MG: 4 INJECTION, SOLUTION INTRA-ARTICULAR; INTRALESIONAL; INTRAMUSCULAR; INTRAVENOUS; SOFT TISSUE at 14:23

## 2025-01-17 RX ADMIN — SODIUM CHLORIDE 190 MG: 9 INJECTION, SOLUTION INTRAVENOUS at 14:50

## 2025-01-17 NOTE — PROGRESS NOTES
Patient arrived ambulatory to infusion for scheduled tx of etoposide.  Denies any new or worsening sx. Tolerated infusion without issue. Patient made aware of up coming appointments.Discharged in stable condition.

## 2025-01-20 ENCOUNTER — PHARMACY VISIT (OUTPATIENT)
Dept: PHARMACY | Facility: CLINIC | Age: 60
End: 2025-01-20
Payer: COMMERCIAL

## 2025-01-20 PROCEDURE — RXMED WILLOW AMBULATORY MEDICATION CHARGE

## 2025-01-22 ENCOUNTER — TELEPHONE (OUTPATIENT)
Dept: ADMISSION | Facility: HOSPITAL | Age: 60
End: 2025-01-22
Payer: MEDICARE

## 2025-01-22 DIAGNOSIS — C34.90 SMALL CELL CARCINOMA OF LUNG, UNSPECIFIED LATERALITY, UNSPECIFIED PART OF LUNG: ICD-10-CM

## 2025-01-22 DIAGNOSIS — I26.94 MULTIPLE SUBSEGMENTAL PULMONARY EMBOLI WITHOUT ACUTE COR PULMONALE: ICD-10-CM

## 2025-01-22 RX ORDER — TIZANIDINE HYDROCHLORIDE 2 MG/1
2 CAPSULE, GELATIN COATED ORAL 3 TIMES DAILY PRN
Qty: 60 CAPSULE | Refills: 0 | Status: SHIPPED | OUTPATIENT
Start: 2025-01-22 | End: 2025-02-21

## 2025-01-22 RX ORDER — OXYCODONE HYDROCHLORIDE 5 MG/1
5 TABLET ORAL EVERY 4 HOURS PRN
Qty: 60 TABLET | Refills: 0 | Status: SHIPPED | OUTPATIENT
Start: 2025-01-22

## 2025-01-22 NOTE — TELEPHONE ENCOUNTER
Per Anne Silver: Spoke with patient and sent in both scripts.   She is taking 3-4 tabs a day of oxycodone.  I recommended she use senna s for constipation.

## 2025-01-22 NOTE — TELEPHONE ENCOUNTER
Pt is requesting refills of oxycodone 5mg q4 prn, #60 and tizanidine 2mg TID prn, #60.  Preferred pharmacy is [facility] [place].

## 2025-01-24 ENCOUNTER — HOSPITAL ENCOUNTER (OUTPATIENT)
Dept: RADIOLOGY | Facility: CLINIC | Age: 60
Discharge: HOME | End: 2025-01-24
Payer: MEDICARE

## 2025-01-24 DIAGNOSIS — C34.90 SMALL CELL CARCINOMA OF LUNG, UNSPECIFIED LATERALITY, UNSPECIFIED PART OF LUNG: ICD-10-CM

## 2025-01-24 PROCEDURE — 71260 CT THORAX DX C+: CPT

## 2025-01-24 PROCEDURE — 74160 CT ABDOMEN W/CONTRAST: CPT

## 2025-01-24 PROCEDURE — 2550000001 HC RX 255 CONTRASTS: Performed by: CLINICAL NURSE SPECIALIST

## 2025-01-24 RX ADMIN — IOHEXOL 100 ML: 350 INJECTION, SOLUTION INTRAVENOUS at 14:07

## 2025-01-29 ENCOUNTER — OFFICE VISIT (OUTPATIENT)
Dept: HEMATOLOGY/ONCOLOGY | Facility: HOSPITAL | Age: 60
End: 2025-01-29
Payer: MEDICARE

## 2025-01-29 ENCOUNTER — LAB (OUTPATIENT)
Dept: LAB | Facility: HOSPITAL | Age: 60
End: 2025-01-29
Payer: MEDICARE

## 2025-01-29 ENCOUNTER — APPOINTMENT (OUTPATIENT)
Dept: HEMATOLOGY/ONCOLOGY | Facility: HOSPITAL | Age: 60
End: 2025-01-29
Payer: MEDICARE

## 2025-01-29 VITALS
DIASTOLIC BLOOD PRESSURE: 82 MMHG | BODY MASS INDEX: 33.91 KG/M2 | TEMPERATURE: 97.3 F | SYSTOLIC BLOOD PRESSURE: 121 MMHG | HEART RATE: 82 BPM | WEIGHT: 185.41 LBS | RESPIRATION RATE: 20 BRPM | OXYGEN SATURATION: 98 %

## 2025-01-29 DIAGNOSIS — C34.90 SMALL CELL CARCINOMA OF LUNG, UNSPECIFIED LATERALITY, UNSPECIFIED PART OF LUNG: ICD-10-CM

## 2025-01-29 DIAGNOSIS — C34.90 SMALL CELL CARCINOMA OF LUNG, UNSPECIFIED LATERALITY, UNSPECIFIED PART OF LUNG: Primary | ICD-10-CM

## 2025-01-29 LAB
ALBUMIN SERPL BCP-MCNC: 4.3 G/DL (ref 3.4–5)
ALP SERPL-CCNC: 105 U/L (ref 33–136)
ALT SERPL W P-5'-P-CCNC: 13 U/L (ref 7–45)
ANION GAP SERPL CALC-SCNC: 14 MMOL/L (ref 10–20)
AST SERPL W P-5'-P-CCNC: 14 U/L (ref 9–39)
BASOPHILS # BLD MANUAL: 0.03 X10*3/UL (ref 0–0.1)
BASOPHILS NFR BLD MANUAL: 1 %
BILIRUB SERPL-MCNC: 0.2 MG/DL (ref 0–1.2)
BUN SERPL-MCNC: 10 MG/DL (ref 6–23)
CALCIUM SERPL-MCNC: 9.4 MG/DL (ref 8.6–10.6)
CHLORIDE SERPL-SCNC: 105 MMOL/L (ref 98–107)
CO2 SERPL-SCNC: 25 MMOL/L (ref 21–32)
CREAT SERPL-MCNC: 0.63 MG/DL (ref 0.5–1.05)
EGFRCR SERPLBLD CKD-EPI 2021: >90 ML/MIN/1.73M*2
EOSINOPHIL # BLD MANUAL: 0.05 X10*3/UL (ref 0–0.7)
EOSINOPHIL NFR BLD MANUAL: 2 %
ERYTHROCYTE [DISTWIDTH] IN BLOOD BY AUTOMATED COUNT: 15.9 % (ref 11.5–14.5)
GLUCOSE SERPL-MCNC: 101 MG/DL (ref 74–99)
HCT VFR BLD AUTO: 38.9 % (ref 36–46)
HGB BLD-MCNC: 12.9 G/DL (ref 12–16)
IMM GRANULOCYTES # BLD AUTO: 0 X10*3/UL (ref 0–0.7)
IMM GRANULOCYTES NFR BLD AUTO: 0 % (ref 0–0.9)
LYMPHOCYTES # BLD MANUAL: 1.48 X10*3/UL (ref 1.2–4.8)
LYMPHOCYTES NFR BLD MANUAL: 57 %
MCH RBC QN AUTO: 29.9 PG (ref 26–34)
MCHC RBC AUTO-ENTMCNC: 33.2 G/DL (ref 32–36)
MCV RBC AUTO: 90 FL (ref 80–100)
MONOCYTES # BLD MANUAL: 0.31 X10*3/UL (ref 0.1–1)
MONOCYTES NFR BLD MANUAL: 12 %
NEUTROPHILS # BLD MANUAL: 0.73 X10*3/UL (ref 1.2–7.7)
NEUTS BAND # BLD MANUAL: 0.05 X10*3/UL (ref 0–0.7)
NEUTS BAND NFR BLD MANUAL: 2 %
NEUTS SEG # BLD MANUAL: 0.68 X10*3/UL (ref 1.2–7)
NEUTS SEG NFR BLD MANUAL: 26 %
NRBC BLD-RTO: 0 /100 WBCS (ref 0–0)
PLATELET # BLD AUTO: 148 X10*3/UL (ref 150–450)
POTASSIUM SERPL-SCNC: 4 MMOL/L (ref 3.5–5.3)
PROT SERPL-MCNC: 7 G/DL (ref 6.4–8.2)
RBC # BLD AUTO: 4.31 X10*6/UL (ref 4–5.2)
RBC MORPH BLD: ABNORMAL
SODIUM SERPL-SCNC: 140 MMOL/L (ref 136–145)
TOTAL CELLS COUNTED BLD: 100
WBC # BLD AUTO: 2.6 X10*3/UL (ref 4.4–11.3)

## 2025-01-29 PROCEDURE — 80053 COMPREHEN METABOLIC PANEL: CPT

## 2025-01-29 PROCEDURE — 3079F DIAST BP 80-89 MM HG: CPT | Performed by: CLINICAL NURSE SPECIALIST

## 2025-01-29 PROCEDURE — 85027 COMPLETE CBC AUTOMATED: CPT

## 2025-01-29 PROCEDURE — 99214 OFFICE O/P EST MOD 30 MIN: CPT | Performed by: CLINICAL NURSE SPECIALIST

## 2025-01-29 PROCEDURE — 85007 BL SMEAR W/DIFF WBC COUNT: CPT

## 2025-01-29 PROCEDURE — 36415 COLL VENOUS BLD VENIPUNCTURE: CPT

## 2025-01-29 PROCEDURE — 3074F SYST BP LT 130 MM HG: CPT | Performed by: CLINICAL NURSE SPECIALIST

## 2025-01-29 ASSESSMENT — PAIN SCALES - GENERAL: PAINLEVEL_OUTOF10: 0-NO PAIN

## 2025-01-30 ENCOUNTER — TELEPHONE (OUTPATIENT)
Dept: ADMISSION | Facility: HOSPITAL | Age: 60
End: 2025-01-30
Payer: MEDICARE

## 2025-01-30 RX ORDER — METHYLPREDNISOLONE 4 MG/1
TABLET ORAL
Qty: 21 TABLET | Refills: 0 | Status: SHIPPED | OUTPATIENT
Start: 2025-01-30 | End: 2025-02-05

## 2025-01-30 ASSESSMENT — ENCOUNTER SYMPTOMS
FATIGUE: 1
MYALGIAS: 0
NUMBNESS: 0
CHANGE IN BOWEL HABIT: 0
NAUSEA: 0
NECK PAIN: 0
VOMITING: 0
SWOLLEN GLANDS: 0
ANOREXIA: 0
FEVER: 0
HEADACHES: 0
COUGH: 1
VISUAL CHANGE: 0
JOINT SWELLING: 0
SORE THROAT: 0
DIAPHORESIS: 0
CHILLS: 0
VERTIGO: 0
ARTHRALGIAS: 0
ABDOMINAL PAIN: 0

## 2025-01-30 NOTE — TELEPHONE ENCOUNTER
Prescription submitted and follow up appt scheduled for 2/5.  RN partner sent [employer] message to notify pt.

## 2025-01-30 NOTE — PROGRESS NOTES
Patient ID: Minal Sun is a 59 y.o. female.    DIAGNOSIS     Small cell carcinoma of the lung.  Date of diagnosis is September 27, 2024 from a bronchoscopic biopsy of a subcarinal lymph node.  Immunohistochemistry positive for TTF-1, INSM1, synaptophysin and chromogranin, negative for p40, RB immunostain shows loss of nuclear expression.        STAGING     Clinical T4, N2, M1 C, stage IVc, extensive stage disease        CURRENT SITES OF DISEASE      Left lung, mediastinum, left hilum of the lung, liver, intra-abdominal lymph nodes in the portacaval and periportal region, bone metastases        MOLECULAR GENOMICS     Test Name: [employer] xF+ (XF.V3) dated October 2024     Molecular Findings:  * Gene: PIK3CA, Variant: Missense variant (exon 1) - GOF, Potentially Actionable, p.R88Q (Allele Frequency - 0.3%)  * Gene: TP53, Variant: Missense variant - LOF, Biologically Relevant, p.R249G (Allele Frequency - 70.1%)  * Gene: RB1, Variant: Splice region variant - LOF, Biologically Relevant, c.540-1G>T, Splice Site (Allele Frequency - 56.8%)        Test Name: [employer] xT (XT.V4)  Laboratory Name: [facility]  Specimen Source: Lymph node, level 7  Collection Date: 27-Sep-2024     Molecular Findings:  * Gene: TP53, Variant: Missense variant - LOF, Biologically Relevant, p.R249G (Allele Frequency - 96.2%)  * Gene: RB1, Variant: Splice region variant - LOF, Biologically Relevant, c.540-1G>T, Splice Site (Allele Frequency - 96%)  * Gene: KMT2D, Variant: Stop gain - LOF, Biologically Relevant, p.*, Nonsense (Allele Frequency - 41.9%)  * Biomarker: Microsatellite Instability, Status: stable  * Biomarker: Tumor Mutation [place] (2.6 Muts/Mb, Percentile: 33)        SERUM TUMOR MARKERS     Baseline LDH within normal limits        PRIOR THERAPY     None        CURRENT THERAPY     Combination chemotherapy and immunotherapy.  Initially enrolled on a clinical trial of Lutathera and underwent octreotide scan which was positive.   However she decided to come off study and did not receive Lutathera with her chemo.  Chemotherapy started October 6, 2024.  Immunotherapy added with cycle #2.  Minor response seen on imaging after 2 cycles of treatment        CURRENT ONCOLOGICAL PROBLEMS     1-cough and shortness of breath significantly improved with systemic treatment.        HISTORY OF PRESENT ILLNESS     This is a 59-year-old patient.  She states that she was feeling sick toward the end of spring of this year with some sinus problems.  Was seen by her allergist and was treated with antibiotics.  She was also seen at 1 point by primary care and steroids and antibiotics and inhalers were given.  She did not have any resolution and then ultimately developed a little bit of the hemoptysis which led to a chest x-ray showing an abnormality and finally a CT scan of the chest.The CT scan of the chest was done as a lung cancer screening low-dose CT and completed on September 20, 2024.  This demonstrated a extensive infiltrative mediastinal and hilar mass.  There was narrowing of the left mainstem bronchus and lower lobe bronchus secondary to extrinsic compression.  The predominant growth was within the left hilum and subcarinal region.  She underwent a bronchoscopy dated September 27, 2024 showing splaying of the main alanna.  There was mild erosion of a lymph node into the left mainstem bronchus.  There was erythema and mild erosions of an endobronchial tumor along the medial border.  Moderate to severe stenosis of the left lower lobe bronchus to 75%.        PAST MEDICAL HISTORY     1- Lumbar surgery about 30 years ago   2- hysterectomy in 2000  3- Diabetes  4- COPD  5- Right eye exophthalmus        SOCIAL HISTORY     Patient lives with her sister.  She lives in [place].  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  She  started smoking at the age of 14 and continues to smoke at the age of 59.  Has smoked for 45 years on average 1 pack/day.        CURRENT MEDS     See medication list, meds reviewed, includes metformin, rosuvastatin, been done so Nied inhalers, Wellbutrin escitalopram, trazodone        ALLERGIES     Patient denies any drug allergies        FAMILY HISTORY      Mother had bladder cancer and possibly breast cancer.             Subjective    Today I am meeting with Minal Sun by telephone  prior to fourth  cycle of treatment.  Recall that she came 2 weeks ago, and was found to have herpes zoster- with painful lesions under her breast and wrapping around to her back- we treated it with valtrex and delayed her treatment.  She has had a fair bit of pain, and actually deferred treatment another week due to it.  Zoster pain is improving now, but still an issue.  She is using calamine on her lesions, and taking oxycodoen for pain.    Recall this seemed to develop on December 23rd, 4 days after she was diagnosed with a pulmonary embolism due to chest pain- she continues on Eliquis.    She reports that her symptoms from her cancer, cough and shortness of breath seem to have improved, but are returning.  She would like help getting her eliquis renewed and has asked me to contact her pharmacist .        Cancer  Associated symptoms include coughing and fatigue. Pertinent negatives include no abdominal pain, anorexia, arthralgias, change in bowel habit, chest pain, chills, congestion, diaphoresis, fever, headaches, joint swelling, myalgias, nausea, neck pain, numbness, rash, sore throat, swollen glands, urinary symptoms, vertigo, visual change or vomiting.       Objective    BSA: 1.92 meters squared  /82 (BP Location: Left arm, Patient Position: Sitting, BP Cuff Size: Adult)   Pulse 82   Temp 36.3 °C (97.3 °F) (Temporal)   Resp 20   Wt 84.1 kg (185 lb 6.5 oz)   SpO2 98%   BMI 33.91 kg/m²      Physical  Exam  Constitutional:       General: She is not in acute distress.     Appearance: Normal appearance. She is not ill-appearing, toxic-appearing or diaphoretic.   HENT:      Nose: No congestion or rhinorrhea.      Mouth/Throat:      Pharynx: No oropharyngeal exudate or posterior oropharyngeal erythema.   Eyes:      General: No scleral icterus.     Conjunctiva/sclera: Conjunctivae normal.   Cardiovascular:      Rate and Rhythm: Normal rate and regular rhythm.      Pulses: Normal pulses.      Heart sounds: Normal heart sounds. No murmur heard.     No friction rub. No gallop.   Pulmonary:      Effort: Pulmonary effort is normal. No respiratory distress.      Breath sounds: Normal breath sounds. No stridor. No wheezing, rhonchi or rales.   Chest:      Chest wall: No tenderness.   Abdominal:      General: There is no distension.      Palpations: There is no mass.      Tenderness: There is no abdominal tenderness. There is no guarding or rebound.   Musculoskeletal:      Cervical back: No tenderness.      Right lower leg: No edema.      Left lower leg: No edema.   Lymphadenopathy:      Cervical: No cervical adenopathy.   Skin:     General: Skin is warm.      Coloration: Skin is not jaundiced.      Findings: No bruising or lesion.   Neurological:      General: No focal deficit present.      Mental Status: She is oriented to person, place, and time.   Psychiatric:         Mood and Affect: Mood normal.         Behavior: Behavior normal.         Performance Status:  Symptomatic; fully ambulatory    CT CHEST W IV CONTRAST; 11/29/2024   IMPRESSION:  1. Redemonstration of ill-defined left hilar mass extending into the  mediastinum, encasing the left upper lobe pulmonary artery and the  left bronchial tree, consistent with known malignancy and associated  mediastinal lymphadenopathy. Appearance is similar to the prior.  2. Redemonstration of hepatic metastatic disease.    CT ABDOMEN W IV CONTRAST; 11/29/2024   IMPRESSION:  1.   Mixed response. A dominant lesion in the posterior segment of the  right lobe of the liver has slightly decreased in size. There is a  new 6 mm hypodense lesion segment # 4A of the liver. Please see above.  2. 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  3. No abdominal or pelvic lymphadenopathy.    Assessment/Plan     This is a very nice 59-year-old patient with extensive stage small cell carcinoma s/p 3 cycles of chemoimmunotherapy.  At last visit, Dr. Kohli  reviewed her most recent CT scan - he felt her response was suboptimal, but given the absence of progression, decided to continue our treatment course.  She subsequently developed a pulmonary embolism. At last visit she developed  herpes zoster affecting her T7 dermatome.  We held treatment a week, and treated her with valtrex and oxycodone.  She extended her treatment break by another week due to pain from shingles.  She is now improving and up for 4th cycle of treatment.  Cough seems to have returned.      Today we will do the followin- Proceed with treatment tomorrow, Thursday, and Friday  2- return to clinic in 3 weeks with repeat scan  3-I will work with her pharmacist to help her get her Eliquis   Cancer Staging   No matching staging information was found for the patient.      Oncology History   SCLC (small cell lung carcinoma)   2024 Initial Diagnosis    SCLC (small cell lung carcinoma) (Multi)     10/4/2024 - 10/8/2024 Chemotherapy    CARBOplatin / Etoposide, 21 Day Cycles - Lung     10/4/2024 - 10/6/2024 Research Study Participant    ([facility]) [id number] - Atezolizumab + CARBOplatin / Etoposide / [id number], 21 Day Cycles  Plan Provider: NANCY Hernandez-CNP  Treatment goal: Control  Line of treatment: First Line  Associated studies: (177Lu)[id number] with Carboplatin, Etoposide and Tislelizumab in Newly Diagnosed ES-SCLC     10/4/2024 -  Chemotherapy    Durvalumab + CARBOplatin /  Etoposide, 21 Day Cycles     10/28/2024 - 10/28/2024 Chemotherapy    Durvalumab + CARBOplatin / Etoposide, 21 Day Cycles     2/7/2025 -  Chemotherapy    Durvalumab, 28 Day Cycles                   Anne Sivler, APRN-CNS

## 2025-01-30 NOTE — TELEPHONE ENCOUNTER
Pt called to follow up on prescription for steroid that was discussed at her appt yesterday.  She also asked about plan for follow up next week and inquired when that would be?

## 2025-02-05 ENCOUNTER — LAB (OUTPATIENT)
Dept: LAB | Facility: HOSPITAL | Age: 60
End: 2025-02-05
Payer: MEDICARE

## 2025-02-05 ENCOUNTER — OFFICE VISIT (OUTPATIENT)
Dept: HEMATOLOGY/ONCOLOGY | Facility: HOSPITAL | Age: 60
End: 2025-02-05
Payer: MEDICARE

## 2025-02-05 ENCOUNTER — APPOINTMENT (OUTPATIENT)
Dept: HEMATOLOGY/ONCOLOGY | Facility: HOSPITAL | Age: 60
End: 2025-02-05
Payer: MEDICARE

## 2025-02-05 ENCOUNTER — PATIENT MESSAGE (OUTPATIENT)
Dept: HEMATOLOGY/ONCOLOGY | Facility: HOSPITAL | Age: 60
End: 2025-02-05
Payer: MEDICARE

## 2025-02-05 VITALS
TEMPERATURE: 97.3 F | HEART RATE: 88 BPM | OXYGEN SATURATION: 97 % | DIASTOLIC BLOOD PRESSURE: 83 MMHG | BODY MASS INDEX: 33.43 KG/M2 | SYSTOLIC BLOOD PRESSURE: 138 MMHG | RESPIRATION RATE: 17 BRPM | WEIGHT: 182.76 LBS

## 2025-02-05 DIAGNOSIS — C34.90 SMALL CELL CARCINOMA OF LUNG, UNSPECIFIED LATERALITY, UNSPECIFIED PART OF LUNG: ICD-10-CM

## 2025-02-05 DIAGNOSIS — C34.90 MALIGNANT NEOPLASM OF LUNG, UNSPECIFIED LATERALITY, UNSPECIFIED PART OF LUNG (MULTI): Primary | ICD-10-CM

## 2025-02-05 DIAGNOSIS — C34.90 SMALL CELL CARCINOMA OF LUNG, UNSPECIFIED LATERALITY, UNSPECIFIED PART OF LUNG: Primary | ICD-10-CM

## 2025-02-05 LAB
ALBUMIN SERPL BCP-MCNC: 4.5 G/DL (ref 3.4–5)
ALP SERPL-CCNC: 108 U/L (ref 33–136)
ALT SERPL W P-5'-P-CCNC: 14 U/L (ref 7–45)
ANION GAP SERPL CALC-SCNC: 13 MMOL/L (ref 10–20)
AST SERPL W P-5'-P-CCNC: 16 U/L (ref 9–39)
BASOPHILS # BLD AUTO: 0.03 X10*3/UL (ref 0–0.1)
BASOPHILS NFR BLD AUTO: 0.5 %
BILIRUB SERPL-MCNC: 0.3 MG/DL (ref 0–1.2)
BUN SERPL-MCNC: 11 MG/DL (ref 6–23)
CALCIUM SERPL-MCNC: 9.3 MG/DL (ref 8.6–10.3)
CHLORIDE SERPL-SCNC: 100 MMOL/L (ref 98–107)
CO2 SERPL-SCNC: 29 MMOL/L (ref 21–32)
CREAT SERPL-MCNC: 0.8 MG/DL (ref 0.5–1.05)
EGFRCR SERPLBLD CKD-EPI 2021: 84 ML/MIN/1.73M*2
EOSINOPHIL # BLD AUTO: 0.03 X10*3/UL (ref 0–0.7)
EOSINOPHIL NFR BLD AUTO: 0.5 %
ERYTHROCYTE [DISTWIDTH] IN BLOOD BY AUTOMATED COUNT: 17.2 % (ref 11.5–14.5)
GLUCOSE SERPL-MCNC: 105 MG/DL (ref 74–99)
HCT VFR BLD AUTO: 44 % (ref 36–46)
HGB BLD-MCNC: 14.3 G/DL (ref 12–16)
IMM GRANULOCYTES # BLD AUTO: 0.08 X10*3/UL (ref 0–0.7)
IMM GRANULOCYTES NFR BLD AUTO: 1.4 % (ref 0–0.9)
LDH SERPL L TO P-CCNC: 182 U/L (ref 84–246)
LYMPHOCYTES # BLD AUTO: 2.51 X10*3/UL (ref 1.2–4.8)
LYMPHOCYTES NFR BLD AUTO: 42.6 %
MCH RBC QN AUTO: 30.5 PG (ref 26–34)
MCHC RBC AUTO-ENTMCNC: 32.5 G/DL (ref 32–36)
MCV RBC AUTO: 94 FL (ref 80–100)
MONOCYTES # BLD AUTO: 0.66 X10*3/UL (ref 0.1–1)
MONOCYTES NFR BLD AUTO: 11.2 %
NEUTROPHILS # BLD AUTO: 2.58 X10*3/UL (ref 1.2–7.7)
NEUTROPHILS NFR BLD AUTO: 43.8 %
NRBC BLD-RTO: 0 /100 WBCS (ref 0–0)
PLATELET # BLD AUTO: 399 X10*3/UL (ref 150–450)
POTASSIUM SERPL-SCNC: 4.3 MMOL/L (ref 3.5–5.3)
PROT SERPL-MCNC: 7.1 G/DL (ref 6.4–8.2)
RBC # BLD AUTO: 4.69 X10*6/UL (ref 4–5.2)
SODIUM SERPL-SCNC: 138 MMOL/L (ref 136–145)
WBC # BLD AUTO: 5.9 X10*3/UL (ref 4.4–11.3)

## 2025-02-05 PROCEDURE — 99215 OFFICE O/P EST HI 40 MIN: CPT | Performed by: INTERNAL MEDICINE

## 2025-02-05 PROCEDURE — 3079F DIAST BP 80-89 MM HG: CPT | Performed by: INTERNAL MEDICINE

## 2025-02-05 PROCEDURE — 84075 ASSAY ALKALINE PHOSPHATASE: CPT

## 2025-02-05 PROCEDURE — 36415 COLL VENOUS BLD VENIPUNCTURE: CPT

## 2025-02-05 PROCEDURE — 3075F SYST BP GE 130 - 139MM HG: CPT | Performed by: INTERNAL MEDICINE

## 2025-02-05 PROCEDURE — 83615 LACTATE (LD) (LDH) ENZYME: CPT

## 2025-02-05 PROCEDURE — 85025 COMPLETE CBC W/AUTO DIFF WBC: CPT

## 2025-02-05 ASSESSMENT — COLUMBIA-SUICIDE SEVERITY RATING SCALE - C-SSRS
6. HAVE YOU EVER DONE ANYTHING, STARTED TO DO ANYTHING, OR PREPARED TO DO ANYTHING TO END YOUR LIFE?: NO
1. IN THE PAST MONTH, HAVE YOU WISHED YOU WERE DEAD OR WISHED YOU COULD GO TO SLEEP AND NOT WAKE UP?: NO
2. HAVE YOU ACTUALLY HAD ANY THOUGHTS OF KILLING YOURSELF?: NO

## 2025-02-05 ASSESSMENT — PAIN SCALES - GENERAL: PAINLEVEL_OUTOF10: 5

## 2025-02-05 NOTE — PROGRESS NOTES
Patient ID: Minal Sun is a 59 y.o. female.    DIAGNOSIS     Small cell carcinoma of the lung.  Date of diagnosis is September 27, 2024 from a bronchoscopic biopsy of a subcarinal lymph node.  Immunohistochemistry positive for TTF-1, INSM1, synaptophysin and chromogranin, negative for p40, RB immunostain shows loss of nuclear expression.        STAGING     Clinical T4, N2, M1 C, stage IVc, extensive stage disease        CURRENT SITES OF DISEASE      Left lung, mediastinum, left hilum of the lung, liver, intra-abdominal lymph nodes in the portacaval and periportal region, bone metastases        MOLECULAR GENOMICS     Test Name: [facility] ([facility]) xF+ (XF.V3) dated October 2024     Molecular Findings:  * Gene: PIK3CA, Variant: Missense variant (exon 1) - GOF, Potentially Actionable, p.R88Q (Allele Frequency - 0.3%)  * Gene: TP53, Variant: Missense variant - LOF, Biologically Relevant, p.R249G (Allele Frequency - 70.1%)  * Gene: RB1, Variant: Splice region variant - LOF, Biologically Relevant, c.540-1G>T, Splice Site (Allele Frequency - 56.8%)        Test Name: [facility] ([facility]) xT (XT.V4)  Laboratory Name: [facility]  Specimen Source: Lymph node, level 7  Collection Date: 27-Sep-2024     Molecular Findings:  * Gene: TP53, Variant: Missense variant - LOF, Biologically Relevant, p.R249G (Allele Frequency - 96.2%)  * Gene: RB1, Variant: Splice region variant - LOF, Biologically Relevant, c.540-1G>T, Splice Site (Allele Frequency - 96%)  * Gene: KMT2D, Variant: Stop gain - LOF, Biologically Relevant, p.*, Nonsense (Allele Frequency - 41.9%)  * Biomarker: Microsatellite Instability, Status: stable  * Biomarker: Tumor Mutation [place] (2.6 Muts/Mb, Percentile: 33)        SERUM TUMOR MARKERS     Baseline LDH within normal limits        PRIOR THERAPY     None        CURRENT THERAPY     Combination chemotherapy and immunotherapy.  Initially enrolled on a clinical trial of Lutathera and underwent octreotide scan which was positive.   However she decided to come off study and did not receive Lutathera with her chemo.  Chemotherapy started October 6, 2024.  Immunotherapy added with cycle #2.  Minor response seen on imaging after 2 cycles of treatment        CURRENT ONCOLOGICAL PROBLEMS     1-cough and shortness of breath significantly improved with systemic treatment.        HISTORY OF PRESENT ILLNESS     This is a 59-year-old patient.  She states that she was feeling sick toward the end of spring of this year with some sinus problems.  Was seen by her allergist and was treated with antibiotics.  She was also seen at 1 point by primary care and steroids and antibiotics and inhalers were given.  She did not have any resolution and then ultimately developed a little bit of the hemoptysis which led to a chest x-ray showing an abnormality and finally a CT scan of the chest.The CT scan of the chest was done as a lung cancer screening low-dose CT and completed on September 20, 2024.  This demonstrated a extensive infiltrative mediastinal and hilar mass.  There was narrowing of the left mainstem bronchus and lower lobe bronchus secondary to extrinsic compression.  The predominant growth was within the left hilum and subcarinal region.  She underwent a bronchoscopy dated September 27, 2024 showing splaying of the main alanna.  There was mild erosion of a lymph node into the left mainstem bronchus.  There was erythema and mild erosions of an endobronchial tumor along the medial border.  Moderate to severe stenosis of the left lower lobe bronchus to 75%.        PAST MEDICAL HISTORY     1- Lumbar surgery about 30 years ago   2- hysterectomy in 2000  3- Diabetes  4- COPD  5- Right eye exophthalmus        SOCIAL HISTORY     Patient lives with her sister.  She lives in [facility].  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  She  started smoking at the age of 14 and continues to smoke at the age of 59.  Has smoked for 45 years on average 1 pack/day.        CURRENT MEDS     See medication list, meds reviewed, includes metformin, rosuvastatin, been done so Nied inhalers, Wellbutrin escitalopram, trazodone        ALLERGIES     Patient denies any drug allergies        FAMILY HISTORY      Mother had bladder cancer and possibly breast cancer.             Subjective    HPI      Objective    BSA: 1.9 meters squared  /83 (BP Location: Left arm, Patient Position: Sitting, BP Cuff Size: Large adult)   Pulse 88   Temp 36.3 °C (97.3 °F) (Temporal)   Resp 17   Wt 82.9 kg (182 lb 12.2 oz)   SpO2 97%   BMI 33.43 kg/m²      Physical Exam    Performance Status:  {ECOG performance status:41174}     Latest Reference Range & Units 01/29/25 14:01   GLUCOSE 74 - 99 mg/dL 101 (H)   SODIUM 136 - 145 mmol/L 140   POTASSIUM 3.5 - 5.3 mmol/L 4.0   CHLORIDE 98 - 107 mmol/L 105   Bicarbonate 21 - 32 mmol/L 25   Anion Gap 10 - 20 mmol/L 14   Blood Urea Nitrogen 6 - 23 mg/dL 10   Creatinine 0.50 - 1.05 mg/dL 0.63   EGFR >60 mL/min/1.73m*2 >90   Calcium 8.6 - 10.6 mg/dL 9.4   Albumin 3.4 - 5.0 g/dL 4.3   Alkaline Phosphatase 33 - 136 U/L 105   ALT 7 - 45 U/L 13   AST 9 - 39 U/L 14   Bilirubin Total 0.0 - 1.2 mg/dL 0.2   Total Protein 6.4 - 8.2 g/dL 7.0   WBC 4.4 - 11.3 x10*3/uL 2.6 (L)   nRBC 0.0 - 0.0 /100 WBCs 0.0   RBC 4.00 - 5.20 x10*6/uL 4.31   HEMOGLOBIN 12.0 - 16.0 g/dL 12.9   HEMATOCRIT 36.0 - 46.0 % 38.9   MCV 80 - 100 fL 90   MCH 26.0 - 34.0 pg 29.9   MCHC 32.0 - 36.0 g/dL 33.2   RED CELL DISTRIBUTION WIDTH 11.5 - 14.5 % 15.9 (H)   Platelets 150 - 450 x10*3/uL 148 (L)   Immature Granulocytes %, Automated 0.0 - 0.9 % 0.0   Immature Granulocytes Absolute, Automated 0.00 - 0.70 x10*3/uL 0.00   Neutrophils %, Manual 40.0 - 80.0 % 26.0   Bands %, Manual 0.0 - 5.0 % 2.0   Lymphocytes %, Manual 13.0 - 44.0 % 57.0   Monocytes %, Manual 2.0 - 10.0 % 12.0    Eosinophils %, Manual 0.0 - 6.0 % 2.0   Basophils %, Manual 0.0 - 2.0 % 1.0   Seg Neutrophils Absolute, Manual 1.20 - 7.00 x10*3/uL 0.68 (L)   Bands Absolute, Manual 0.00 - 0.70 x10*3/uL 0.05   Lymphocytes Absolute, Manual 1.20 - 4.80 x10*3/uL 1.48   Monocytes Absolute, Manual 0.10 - 1.00 x10*3/uL 0.31   Eosinophils Absolute, Manual 0.00 - 0.70 x10*3/uL 0.05   Basophils Absolute, Manual 0.00 - 0.10 x10*3/uL 0.03   Total Cells Counted  100   Neutrophils Absolute, Manual 1.20 - 7.70 x10*3/uL 0.73 (L)   RBC Morphology  No significant RBC morphology present   (H): Data is abnormally high  (L): Data is abnormally low    CT ABDOMEN W IV CONTRAST; CT CHEST W IV CONTRAST; 1/24/2025   IMPRESSION:  Restaging of metastatic small-cell lung carcinoma, as compared to CT  12/19/2024:  1. Stable to slightly increased disease burden as evidenced by  gradually increasing size of the distal, left subhilar component of  the primary neoplasm with stable size of hepatic metastases.  2. Stable mediastinal lymphadenopathy, including prominent  paratracheal and aortopulmonary window nodes detailed above, as well  as mixed lytic-sclerotic osseous lesions throughout the axial  skeleton.  3. Increased postobstructive pneumonitis within the left lower lobe.        Assessment/Plan       Cancer Staging   No matching staging information was found for the patient.      Oncology History   SCLC (small cell lung carcinoma)   9/27/2024 Initial Diagnosis    SCLC (small cell lung carcinoma) (Multi)     10/4/2024 - 10/8/2024 Chemotherapy    CARBOplatin / Etoposide, 21 Day Cycles - Lung     10/4/2024 - 10/6/2024 Research Study Participant    (RS) [id number] - Atezolizumab + CARBOplatin / Etoposide / Bi-OXCJ-EUST, 21 Day Cycles  Plan Provider: JESSICA Hernandez  Treatment goal: Control  Line of treatment: First Line  Associated studies: (177Lu)[id number] with Carboplatin, Etoposide and Tislelizumab in Newly Diagnosed ES-SCLC     10/4/2024 -  1/17/2025 Chemotherapy    Durvalumab + CARBOplatin / Etoposide, 21 Day Cycles     10/28/2024 - 10/28/2024 Chemotherapy    Durvalumab + CARBOplatin / Etoposide, 21 Day Cycles     2/5/2025 -  Chemotherapy    Durvalumab, 28 Day Cycles          {Assess/PlanSmartLinks:40467}         dJ Kohli MD             lytic-sclerotic osseous lesions throughout the axial  skeleton.  3. Increased postobstructive pneumonitis within the left lower lobe.        Assessment/Plan     This is a very nice 59-year-old patient who unfortunately has not responded so to systemic chemoimmunotherapy as frontline treatment of small cell carcinoma, extensive stage disease.  After 4 cycles of chemotherapy there is evidence of disease progression.  Her symptoms are worsening although her ECOG performance status remains of 1.  We talked about treatment options including consideration of a clinical trial that we reviewed with her.  Our team is meeting with her as well.  Her symptoms are being managed through supportive oncology.  If she does not proceed with clinical trials we will consider tarlatama for the patient.      Cancer Staging   No matching staging information was found for the patient.      Oncology History   SCLC (small cell lung carcinoma)   9/27/2024 Initial Diagnosis    SCLC (small cell lung carcinoma) (Multi)     10/4/2024 - 10/8/2024 Chemotherapy    CARBOplatin / Etoposide, 21 Day Cycles - Lung     10/4/2024 - 10/6/2024 Research Study Participant    ([facility]) [id number] - Atezolizumab + CARBOplatin / Etoposide / [id number], 21 Day Cycles  Plan Provider: JESSICA Hernandez  Treatment goal: Control  Line of treatment: First Line  Associated studies: (177Lu)[id number] with Carboplatin, Etoposide and Tislelizumab in Newly Diagnosed ES-SCLC     10/4/2024 - 1/17/2025 Chemotherapy    Durvalumab + CARBOplatin / Etoposide, 21 Day Cycles     10/28/2024 - 10/28/2024 Chemotherapy    Durvalumab + CARBOplatin / Etoposide, 21 Day Cycles     2/5/2025 -  Chemotherapy    Durvalumab, 28 Day Cycles                   Jd Kohli MD

## 2025-02-06 ENCOUNTER — HOSPITAL ENCOUNTER (OUTPATIENT)
Dept: RADIOLOGY | Facility: CLINIC | Age: 60
Discharge: HOME | End: 2025-02-06
Payer: MEDICARE

## 2025-02-06 DIAGNOSIS — C34.90 SMALL CELL CARCINOMA OF LUNG, UNSPECIFIED LATERALITY, UNSPECIFIED PART OF LUNG: ICD-10-CM

## 2025-02-06 PROCEDURE — 2550000001 HC RX 255 CONTRASTS: Performed by: INTERNAL MEDICINE

## 2025-02-06 PROCEDURE — A9575 INJ GADOTERATE MEGLUMI 0.1ML: HCPCS | Performed by: INTERNAL MEDICINE

## 2025-02-06 PROCEDURE — 70553 MRI BRAIN STEM W/O & W/DYE: CPT

## 2025-02-06 RX ORDER — GADOTERATE MEGLUMINE 376.9 MG/ML
17 INJECTION INTRAVENOUS
Status: COMPLETED | OUTPATIENT
Start: 2025-02-06 | End: 2025-02-06

## 2025-02-06 RX ADMIN — GADOTERATE MEGLUMINE 17 ML: 376.9 INJECTION INTRAVENOUS at 09:41

## 2025-02-07 ENCOUNTER — DOCUMENTATION (OUTPATIENT)
Dept: HEMATOLOGY/ONCOLOGY | Facility: HOSPITAL | Age: 60
End: 2025-02-07
Payer: MEDICARE

## 2025-02-10 ENCOUNTER — SOCIAL WORK (OUTPATIENT)
Dept: CASE MANAGEMENT | Facility: HOSPITAL | Age: 60
End: 2025-02-10
Payer: MEDICARE

## 2025-02-10 ENCOUNTER — HOSPITAL ENCOUNTER (OUTPATIENT)
Dept: RESEARCH | Facility: HOSPITAL | Age: 60
Discharge: HOME | End: 2025-02-10
Payer: MEDICARE

## 2025-02-10 VITALS
WEIGHT: 188.27 LBS | OXYGEN SATURATION: 97 % | DIASTOLIC BLOOD PRESSURE: 85 MMHG | RESPIRATION RATE: 20 BRPM | BODY MASS INDEX: 33.36 KG/M2 | HEIGHT: 63 IN | TEMPERATURE: 97.5 F | HEART RATE: 95 BPM | SYSTOLIC BLOOD PRESSURE: 136 MMHG

## 2025-02-10 DIAGNOSIS — Z00.6 EXAMINATION OF PARTICIPANT IN CLINICAL TRIAL: ICD-10-CM

## 2025-02-10 DIAGNOSIS — C34.90 SMALL CELL CARCINOMA OF LUNG, UNSPECIFIED LATERALITY, UNSPECIFIED PART OF LUNG: ICD-10-CM

## 2025-02-10 LAB
ALBUMIN SERPL BCP-MCNC: 4.2 G/DL (ref 3.4–5)
ALP SERPL-CCNC: 105 U/L (ref 33–136)
ALT SERPL W P-5'-P-CCNC: 15 U/L (ref 7–45)
ANION GAP SERPL CALC-SCNC: 13 MMOL/L (ref 10–20)
APPEARANCE UR: CLEAR
APTT PPP: 49 SECONDS (ref 27–38)
AST SERPL W P-5'-P-CCNC: 23 U/L (ref 9–39)
BASOPHILS # BLD AUTO: 0.05 X10*3/UL (ref 0–0.1)
BASOPHILS NFR BLD AUTO: 0.6 %
BILIRUB DIRECT SERPL-MCNC: 0 MG/DL (ref 0–0.3)
BILIRUB SERPL-MCNC: 0.3 MG/DL (ref 0–1.2)
BILIRUB UR STRIP.AUTO-MCNC: NEGATIVE MG/DL
BUN SERPL-MCNC: 9 MG/DL (ref 6–23)
CALCIUM SERPL-MCNC: 9.2 MG/DL (ref 8.6–10.6)
CHLORIDE SERPL-SCNC: 102 MMOL/L (ref 98–107)
CO2 SERPL-SCNC: 26 MMOL/L (ref 21–32)
COLOR UR: YELLOW
CORTIS SERPL-MCNC: 11.1 UG/DL (ref 2.5–20)
CREAT SERPL-MCNC: 0.66 MG/DL (ref 0.5–1.05)
EGFRCR SERPLBLD CKD-EPI 2021: >90 ML/MIN/1.73M*2
EOSINOPHIL # BLD AUTO: 0.04 X10*3/UL (ref 0–0.7)
EOSINOPHIL NFR BLD AUTO: 0.5 %
ERYTHROCYTE [DISTWIDTH] IN BLOOD BY AUTOMATED COUNT: 16.5 % (ref 11.5–14.5)
FSH SERPL-ACNC: 75.6 IU/L
GGT SERPL-CCNC: 134 U/L (ref 5–55)
GLUCOSE SERPL-MCNC: 112 MG/DL (ref 74–99)
GLUCOSE UR STRIP.AUTO-MCNC: NORMAL MG/DL
HCT VFR BLD AUTO: 38.8 % (ref 36–46)
HGB BLD-MCNC: 13 G/DL (ref 12–16)
IMM GRANULOCYTES # BLD AUTO: 0.06 X10*3/UL (ref 0–0.7)
IMM GRANULOCYTES NFR BLD AUTO: 0.7 % (ref 0–0.9)
INR PPP: 1.1 (ref 0.9–1.1)
KETONES UR STRIP.AUTO-MCNC: NEGATIVE MG/DL
LDH SERPL L TO P-CCNC: 206 U/L (ref 84–246)
LEUKOCYTE ESTERASE UR QL STRIP.AUTO: NEGATIVE
LYMPHOCYTES # BLD AUTO: 2 X10*3/UL (ref 1.2–4.8)
LYMPHOCYTES NFR BLD AUTO: 24 %
MAGNESIUM SERPL-MCNC: 2.16 MG/DL (ref 1.6–2.4)
MCH RBC QN AUTO: 30.4 PG (ref 26–34)
MCHC RBC AUTO-ENTMCNC: 33.5 G/DL (ref 32–36)
MCV RBC AUTO: 91 FL (ref 80–100)
MONOCYTES # BLD AUTO: 0.66 X10*3/UL (ref 0.1–1)
MONOCYTES NFR BLD AUTO: 7.9 %
NEUTROPHILS # BLD AUTO: 5.51 X10*3/UL (ref 1.2–7.7)
NEUTROPHILS NFR BLD AUTO: 66.3 %
NITRITE UR QL STRIP.AUTO: NEGATIVE
NRBC BLD-RTO: 0 /100 WBCS (ref 0–0)
PH UR STRIP.AUTO: 6.5 [PH]
PHOSPHATE SERPL-MCNC: 3.9 MG/DL (ref 2.5–4.9)
PLATELET # BLD AUTO: 374 X10*3/UL (ref 150–450)
POTASSIUM SERPL-SCNC: 4 MMOL/L (ref 3.5–5.3)
PROT SERPL-MCNC: 6.6 G/DL (ref 6.4–8.2)
PROT UR STRIP.AUTO-MCNC: NEGATIVE MG/DL
PROTHROMBIN TIME: 11.9 SECONDS (ref 9.8–12.8)
RBC # BLD AUTO: 4.28 X10*6/UL (ref 4–5.2)
RBC # UR STRIP.AUTO: NEGATIVE MG/DL
SODIUM SERPL-SCNC: 137 MMOL/L (ref 136–145)
SP GR UR STRIP.AUTO: 1.02
T3FREE SERPL-MCNC: 1.1 PG/ML (ref 2.3–4.2)
T4 FREE SERPL-MCNC: 0.34 NG/DL (ref 0.78–1.48)
TSH SERPL-ACNC: 84.17 MIU/L (ref 0.44–3.98)
UROBILINOGEN UR STRIP.AUTO-MCNC: NORMAL MG/DL
WBC # BLD AUTO: 8.3 X10*3/UL (ref 4.4–11.3)

## 2025-02-10 PROCEDURE — 82024 ASSAY OF ACTH: CPT

## 2025-02-10 PROCEDURE — 36415 COLL VENOUS BLD VENIPUNCTURE: CPT

## 2025-02-10 PROCEDURE — 83735 ASSAY OF MAGNESIUM: CPT

## 2025-02-10 PROCEDURE — 85025 COMPLETE CBC W/AUTO DIFF WBC: CPT

## 2025-02-10 PROCEDURE — 82533 TOTAL CORTISOL: CPT

## 2025-02-10 PROCEDURE — 93005 ELECTROCARDIOGRAM TRACING: CPT | Mod: DCRU

## 2025-02-10 PROCEDURE — 83615 LACTATE (LD) (LDH) ENZYME: CPT

## 2025-02-10 PROCEDURE — 85610 PROTHROMBIN TIME: CPT

## 2025-02-10 PROCEDURE — 84100 ASSAY OF PHOSPHORUS: CPT

## 2025-02-10 PROCEDURE — 84443 ASSAY THYROID STIM HORMONE: CPT

## 2025-02-10 PROCEDURE — 85730 THROMBOPLASTIN TIME PARTIAL: CPT

## 2025-02-10 PROCEDURE — 84481 FREE ASSAY (FT-3): CPT

## 2025-02-10 PROCEDURE — 84075 ASSAY ALKALINE PHOSPHATASE: CPT

## 2025-02-10 PROCEDURE — 81003 URINALYSIS AUTO W/O SCOPE: CPT

## 2025-02-10 PROCEDURE — 82248 BILIRUBIN DIRECT: CPT

## 2025-02-10 PROCEDURE — 84439 ASSAY OF FREE THYROXINE: CPT

## 2025-02-10 PROCEDURE — 82977 ASSAY OF GGT: CPT

## 2025-02-10 PROCEDURE — 83001 ASSAY OF GONADOTROPIN (FSH): CPT

## 2025-02-10 NOTE — RESEARCH NOTES
"1540                                        [facility]><[id number]><SCREENING>  DCRU NURSING VISIT NOTE  Study Name: [id number]   IRB#: [id number]  DCRU#: (DCRU # [id number])  Protocol Version Dated:  6/15/2023  PI: Jd Kohli MD.      Time point: SCREEN     Encounter Date: 02/10/2025  Encounter Time:  1:00 PM EST  Encounter Department: [place] [id number] RESEARCH         Gabyr    Haiku     Admission and Prior to Starting Study Activities      1. Notify  when patient arrives to unit.  2. Verify consent was obtained prior to any study activities.  3. Complete DCRU and EPIC Admission/Visit Note.  4. Obtain height in centimeters - with shoes off.  5. Obtain weight in kilograms - with shoes off & heavy items removed.  6. Perform venipuncture or may Access mediport/Central Line for sample collection procedures.  7. Physical Exam.     Study Specific Instructions and Documentation   HEIGHT  WEIGHT  VITALS  ECG  LABS  DISCHARGE       Subjective   Minal Sun is a 59 y.o. female and is here for a Research clinical visit.    Visit Provider: Janice Ramirez, ANSLEY     Allergies:   Allergies   Allergen Reactions    Clindamycin Diarrhea    Erythromycin Diarrhea    Erythromycin Base Other and Nausea Only    Oxycodone-Acetaminophen Hives     anxiety       Objective     Vital Signs:    Vitals:    02/10/25 1453   BP: 136/85   Pulse: 95   Resp: 20   Temp: 36.4 °C (97.5 °F)   TempSrc: Oral   SpO2: 97%   Weight: 85.4 kg (188 lb 4.4 oz)   Height: 1.6 m (5' 2.99\")       Physical Exam     ASSESSMENT and PLAN:  Problem List Items Addressed This Visit          Hematology and Neoplasia    SCLC (small cell lung carcinoma)    Relevant Orders    Adrenocorticotropic Hormone (ACTH)    aPTT    Bilirubin, Direct    CBC and Auto Differential    Comprehensive Metabolic Panel    Cortisol    Follicle Stimulating Hormone    Gamma-Glutamyl Transferase    Lactate Dehydrogenase    Magnesium    Phosphorus "    Protime-INR    Thyroid Stimulating Hormone    Thyroxine, Free    Triiodothyronine, Free    Urinalysis with Reflex Microscopic     Other Visit Diagnoses       Examination of participant in clinical trial        Relevant Orders    Adrenocorticotropic Hormone (ACTH)    aPTT    Bilirubin, Direct    CBC and Auto Differential    Comprehensive Metabolic Panel    Cortisol    Follicle Stimulating Hormone    Gamma-Glutamyl Transferase    Lactate Dehydrogenase    Magnesium    Phosphorus    Protime-INR    Thyroid Stimulating Hormone    Thyroxine, Free    Triiodothyronine, Free    Urinalysis with Reflex Microscopic             Medications as of the completion of today's visit:  Current Outpatient Medications   Medication Sig Dispense Refill    acetaminophen (TylenoL) 325 mg tablet Take 2 tablets (650 mg) by mouth every 6 hours if needed for mild pain (1 - 3) or moderate pain (4 - 6). 30 tablet 0    ALPRAZolam (Xanax) 1 mg tablet Take 1 tablet (1 mg) by mouth 3 times a day as needed for anxiety for up to 10 days. 30 tablet 0    apixaban (Eliquis) 5 mg tablet Take 1 tablet (5 mg) by mouth 2 times a day. 180 tablet 0    budesonide (Pulmicort) 0.5 mg/2 mL nebulizer solution Take 2 mL (0.5 mg) by nebulization 2 times a day.      buPROPion (Wellbutrin) 75 mg tablet Take 1 tablet (75 mg) by mouth 3 times a day.      carbinoxamine maleate 4 mg tablet Take 4 mg by mouth 2 times a day.      escitalopram (Lexapro) 20 mg tablet Take 1 tablet (20 mg) by mouth once daily.      metFORMIN  mg 24 hr tablet Take 1 tablet (500 mg) by mouth 2 times a day. 180 tablet 3    naloxone (Narcan) 4 mg/0.1 mL nasal spray Administer 1 spray (4 mg) into affected nostril(s) if needed for opioid reversal. May repeat every 2-3 minutes if needed, alternating nostrils, until medical assistance becomes available. 2 each 0    OLANZapine (ZyPREXA) 5 mg tablet Take 1 tablet (5 mg) by mouth once daily at bedtime. For 4 days starting the evening of treatment 4  tablet 3    omeprazole (PriLOSEC) 40 mg DR capsule Take 1 capsule (40 mg) by mouth once daily. 90 capsule 3    ondansetron (Zofran) 8 mg tablet Take 1 tablet (8 mg) by mouth every 8 hours if needed for nausea or vomiting. 30 tablet 5    oxyCODONE (Roxicodone) 5 mg immediate release tablet Take 1 tablet (5 mg) by mouth every 4 hours if needed for severe pain (7 - 10) for up to 60 doses. 60 tablet 0    ProAir HFA 90 mcg/actuation inhaler Inhale 2 puffs every 4 hours if needed.      prochlorperazine (Compazine) 10 mg tablet Take 1 tablet (10 mg) by mouth every 6 hours if needed for nausea or vomiting. 30 tablet 5    rosuvastatin (Crestor) 10 mg tablet Take 1 tablet (10 mg) by mouth once daily. 90 tablet 3    tiZANidine (Zanaflex) 2 mg capsule Take 1 capsule (2 mg) by mouth 3 times a day as needed for muscle spasms. 60 capsule 0    traZODone (Desyrel) 50 mg tablet Take 1 tablet (50 mg) by mouth once daily at bedtime.       No current facility-administered medications for this encounter.           Orders placed during today's visit:  Orders Placed This Encounter   Procedures    Adrenocorticotropic Hormone (ACTH)     Standing Status:   Standing     Number of Occurrences:   1     Order Specific Question:   Release result to MyChart     Answer:   Immediate [1]    aPTT     Standing Status:   Standing     Number of Occurrences:   1     Order Specific Question:   Release result to MyChart     Answer:   Immediate [1]    Bilirubin, Direct     Standing Status:   Standing     Number of Occurrences:   1     Order Specific Question:   Release result to MyChart     Answer:   Immediate [1]    CBC and Auto Differential     Standing Status:   Standing     Number of Occurrences:   1     Order Specific Question:   Release result to MyChart     Answer:   Immediate [1]    Comprehensive Metabolic Panel     Standing Status:   Standing     Number of Occurrences:   1     Order Specific Question:   Release result to MyChart     Answer:    Immediate [1]    Cortisol     Standing Status:   Standing     Number of Occurrences:   1     Order Specific Question:   Release result to MyChart     Answer:   Immediate [1]    Follicle Stimulating Hormone     Standing Status:   Standing     Number of Occurrences:   1     Order Specific Question:   Release result to MyChart     Answer:   Immediate [1]    Gamma-Glutamyl Transferase     Standing Status:   Standing     Number of Occurrences:   1     Order Specific Question:   Release result to MyChart     Answer:   Immediate [1]    Lactate Dehydrogenase     Standing Status:   Standing     Number of Occurrences:   1     Order Specific Question:   Release result to MyChart     Answer:   Immediate [1]    Magnesium     Standing Status:   Standing     Number of Occurrences:   1     Order Specific Question:   Release result to MyChart     Answer:   Immediate [1]    Phosphorus     Standing Status:   Standing     Number of Occurrences:   1     Order Specific Question:   Release result to MyChart     Answer:   Immediate [1]    Protime-INR     Standing Status:   Standing     Number of Occurrences:   1     Order Specific Question:   Release result to MyChart     Answer:   Immediate [1]    Thyroid Stimulating Hormone     Standing Status:   Standing     Number of Occurrences:   1     Order Specific Question:   Release result to MyChart     Answer:   Immediate [1]    Thyroxine, Free     Standing Status:   Standing     Number of Occurrences:   1     Order Specific Question:   Release result to MyChart     Answer:   Immediate [1]    Triiodothyronine, Free     Standing Status:   Standing     Number of Occurrences:   1     Order Specific Question:   Release result to MyChart     Answer:   Immediate [1]    Urinalysis with Reflex Microscopic     Standing Status:   Standing     Number of Occurrences:   1     Order Specific Question:   Release result to MyChart     Answer:   Immediate [1]        No study found      Vital Signs   Temp, Heart  Rate, Respiration, Blood Pressure: rest sitting at least 3 minutes prior to obtaining     Safety Labs   For Oncology study, Refer [place] Treatment Plan for orders     ECG   JOSEFA 150c Study-specific ECG Machine - rest supine or semi-recumbent at least 5 minutes prior:  Single ECG  Patient to remain stationary during ECG (no talking, laughing, deep breathing, sleeping or swallowing) for approximately 10 seconds during the ECG recording  QTcF calculation: Fridericia's formula: S:\DCRU_Staff\Nursing\Protocols\QTcF calculator  Time Point Completion Time    Screening 1521        Discharge Instructions   Discharge patient after study requirements completed.  Discharge time: 1540     Janice Ramirez RN  02/10/25

## 2025-02-10 NOTE — PROGRESS NOTES
Social Work Note    Referral Source: JESSICA Ferraro  Meeting Location: N/A  Person(s) Present: N/A  Identified Needs: Transportation options   Impression and Plan: SW received Epic chat from JESSICA that Minal would like to know her transportation options. ABDON made call to [employer], no insurance benefit per representative, call reference # [id number]. ABDON searched for local and community resources. Will provide resources to ABDON to review with Minal.   Interventions Provided: Assessment and Referral to Community Resource  Estimated Time Spent: 45 minutes

## 2025-02-11 ENCOUNTER — SOCIAL WORK (OUTPATIENT)
Dept: CASE MANAGEMENT | Facility: HOSPITAL | Age: 60
End: 2025-02-11
Payer: MEDICARE

## 2025-02-11 DIAGNOSIS — C34.90 SMALL CELL CARCINOMA OF LUNG, UNSPECIFIED LATERALITY, UNSPECIFIED PART OF LUNG: Primary | ICD-10-CM

## 2025-02-11 RX ORDER — LEVOTHYROXINE SODIUM 50 UG/1
50 TABLET ORAL DAILY
Qty: 30 TABLET | Refills: 11 | Status: SHIPPED | OUTPATIENT
Start: 2025-02-11 | End: 2026-02-11

## 2025-02-11 NOTE — PROGRESS NOTES
This SW was notified by yesterday's covering SW today that pt would like a call re: transportation options for treatment. She is a research patient. This SW left a  with callback info this afternoon for pt.

## 2025-02-12 ENCOUNTER — APPOINTMENT (OUTPATIENT)
Dept: CARDIOLOGY | Facility: HOSPITAL | Age: 60
End: 2025-02-12
Payer: MEDICARE

## 2025-02-12 LAB — ACTH PLAS-MCNC: 5.5 PG/ML (ref 7.2–63.3)

## 2025-02-13 ENCOUNTER — HOSPITAL ENCOUNTER (OUTPATIENT)
Dept: RESEARCH | Facility: HOSPITAL | Age: 60
Discharge: HOME | End: 2025-02-13
Payer: MEDICARE

## 2025-02-13 ENCOUNTER — HOSPITAL ENCOUNTER (OUTPATIENT)
Dept: CARDIOLOGY | Facility: HOSPITAL | Age: 60
Discharge: HOME | End: 2025-02-13
Payer: MEDICARE

## 2025-02-13 ENCOUNTER — HOSPITAL ENCOUNTER (OUTPATIENT)
Dept: RADIOLOGY | Facility: HOSPITAL | Age: 60
Discharge: HOME | End: 2025-02-13
Payer: MEDICARE

## 2025-02-13 DIAGNOSIS — C34.90 SMALL CELL CARCINOMA OF LUNG, UNSPECIFIED LATERALITY, UNSPECIFIED PART OF LUNG: ICD-10-CM

## 2025-02-13 DIAGNOSIS — Z00.6 EXAMINATION OF PARTICIPANT IN CLINICAL TRIAL: ICD-10-CM

## 2025-02-13 DIAGNOSIS — C34.00 SMALL CELL CARCINOMA OF HILUM OF LUNG, UNSPECIFIED LATERALITY: ICD-10-CM

## 2025-02-13 LAB
AORTIC VALVE PEAK VELOCITY: 1.46 M/S
AV PEAK GRADIENT: 8 MMHG
EJECTION FRACTION APICAL 4 CHAMBER: 61.8
EJECTION FRACTION: 58 %
LEFT ATRIUM VOLUME AREA LENGTH INDEX BSA: 18.5 ML/M2
LEFT VENTRICLE INTERNAL DIMENSION DIASTOLE: 3.47 CM (ref 3.5–6)
MITRAL VALVE E/A RATIO: 0.97
RIGHT VENTRICLE FREE WALL PEAK S': 7.33 CM/S
RIGHT VENTRICLE PEAK SYSTOLIC PRESSURE: 22.7 MMHG
TRICUSPID ANNULAR PLANE SYSTOLIC EXCURSION: 1.5 CM

## 2025-02-13 PROCEDURE — 93005 ELECTROCARDIOGRAM TRACING: CPT | Mod: DCRU

## 2025-02-13 PROCEDURE — 72193 CT PELVIS W/DYE: CPT

## 2025-02-13 PROCEDURE — 2550000001 HC RX 255 CONTRASTS

## 2025-02-13 PROCEDURE — 93325 DOPPLER ECHO COLOR FLOW MAPG: CPT

## 2025-02-13 RX ADMIN — IOHEXOL 80 ML: 350 INJECTION, SOLUTION INTRAVENOUS at 15:32

## 2025-02-13 ASSESSMENT — ENCOUNTER SYMPTOMS
VERTIGO: 0
SWOLLEN GLANDS: 0
CHANGE IN BOWEL HABIT: 0
ARTHRALGIAS: 0
VOMITING: 0
ABDOMINAL PAIN: 0
ANOREXIA: 0
NAUSEA: 0
SORE THROAT: 0
HEADACHES: 0
FATIGUE: 1
FEVER: 0
VISUAL CHANGE: 0
NECK PAIN: 0
COUGH: 1
CHILLS: 0
NUMBNESS: 0
MYALGIAS: 0
DIAPHORESIS: 0
JOINT SWELLING: 0

## 2025-02-13 NOTE — ADDENDUM NOTE
Encounter addended by: NANCY Hernandez-CNP on: 2/13/2025 8:30 AM   Actions taken: Level of Service modified, SmartForm saved, Clinical Note Signed

## 2025-02-13 NOTE — RESEARCH NOTES
RS><[id number]><SCREENING>  DCRU NURSING VISIT NOTE  Study Name: [id number]   IRB#: [id number]  DCRU#: ([facility] # [id number])  Protocol Version Dated:  6/15/2023  PI: Jd Kohli MD.      Time point: SCREEN (This is in addition to the 2/10/25 visit)    Encounter Date: 02/13/2025  Encounter Time:  1:00 PM EST  Encounter Department: [place] [id number] RESEARCH         Pager    Haiku     Study Specific Instructions and Documentation   Repeat single ECG with study-specific JOSEFA 150 machine       Subjective   Minal Sun is a 59 y.o. female and is here for a Research clinical visit.    Visit Provider: NANCY Hernandez-CNP     Allergies:   Allergies   Allergen Reactions    Clindamycin Diarrhea    Erythromycin Diarrhea    Erythromycin Base Other and Nausea Only    Oxycodone-Acetaminophen Hives     anxiety       Physical Exam     ASSESSMENT and PLAN:  Problem List Items Addressed This Visit    None       Medications as of the completion of today's visit:  Current Outpatient Medications   Medication Sig Dispense Refill    acetaminophen (TylenoL) 325 mg tablet Take 2 tablets (650 mg) by mouth every 6 hours if needed for mild pain (1 - 3) or moderate pain (4 - 6). 30 tablet 0    ALPRAZolam (Xanax) 1 mg tablet Take 1 tablet (1 mg) by mouth 3 times a day as needed for anxiety for up to 10 days. 30 tablet 0    apixaban (Eliquis) 5 mg tablet Take 1 tablet (5 mg) by mouth 2 times a day. 180 tablet 0    budesonide (Pulmicort) 0.5 mg/2 mL nebulizer solution Take 2 mL (0.5 mg) by nebulization 2 times a day.      buPROPion (Wellbutrin) 75 mg tablet Take 1 tablet (75 mg) by mouth 3 times a day.      carbinoxamine maleate 4 mg tablet Take 4 mg by mouth 2 times a day.      escitalopram (Lexapro) 20 mg tablet Take 1 tablet (20 mg) by mouth once daily.      levothyroxine (Synthroid) 50 mcg tablet Take 1 tablet (50 mcg) by mouth early in the morning..  Take on an empty stomach at the same time each day, either 30 to 60 minutes prior to breakfast 30 tablet 11    metFORMIN  mg 24 hr tablet Take 1 tablet (500 mg) by mouth 2 times a day. 180 tablet 3    naloxone (Narcan) 4 mg/0.1 mL nasal spray Administer 1 spray (4 mg) into affected nostril(s) if needed for opioid reversal. May repeat every 2-3 minutes if needed, alternating nostrils, until medical assistance becomes available. 2 each 0    OLANZapine (ZyPREXA) 5 mg tablet Take 1 tablet (5 mg) by mouth once daily at bedtime. For 4 days starting the evening of treatment 4 tablet 3    omeprazole (PriLOSEC) 40 mg DR capsule Take 1 capsule (40 mg) by mouth once daily. 90 capsule 3    ondansetron (Zofran) 8 mg tablet Take 1 tablet (8 mg) by mouth every 8 hours if needed for nausea or vomiting. 30 tablet 5    oxyCODONE (Roxicodone) 5 mg immediate release tablet Take 1 tablet (5 mg) by mouth every 4 hours if needed for severe pain (7 - 10) for up to 60 doses. 60 tablet 0    ProAir HFA 90 mcg/actuation inhaler Inhale 2 puffs every 4 hours if needed.      prochlorperazine (Compazine) 10 mg tablet Take 1 tablet (10 mg) by mouth every 6 hours if needed for nausea or vomiting. 30 tablet 5    rosuvastatin (Crestor) 10 mg tablet Take 1 tablet (10 mg) by mouth once daily. 90 tablet 3    tiZANidine (Zanaflex) 2 mg capsule Take 1 capsule (2 mg) by mouth 3 times a day as needed for muscle spasms. 60 capsule 0    traZODone (Desyrel) 50 mg tablet Take 1 tablet (50 mg) by mouth once daily at bedtime.       No current facility-administered medications for this encounter.           Orders placed during today's visit:  No orders of the defined types were placed in this encounter.       [id number] - ABBV-706 alone or in combination in subjects with advanced solid tumors    Patient has no adverse events documented in the Research Adverse Events activity.      ECG   JOSEFA 150c Study-specific ECG Machine - rest supine or semi-recumbent at  least 5 minutes prior:  Single ECG  Patient to remain stationary during ECG (no talking, laughing, deep breathing, sleeping or swallowing) for approximately 10 seconds during the ECG recording  QTcF calculation: Fridericia's formula: S:\DCRU_Staff\Nursing\Protocols\QTcF calculator  Time Point Rest Time Completion Time    Screening 1259 1314        Discharge Instructions   Discharge patient after study requirements completed.  Discharge time: 1339     Marcelino Byrne RN  02/13/25

## 2025-02-13 NOTE — PROGRESS NOTES
Patient ID: Minal Sun is a 59 y.o. female.    DIAGNOSIS     Small cell carcinoma of the lung.  Date of diagnosis is September 27, 2024 from a bronchoscopic biopsy of a subcarinal lymph node.  Immunohistochemistry positive for TTF-1, INSM1, synaptophysin and chromogranin, negative for p40, RB immunostain shows loss of nuclear expression.        STAGING     Clinical T4, N2, M1 C, stage IVc, extensive stage disease        CURRENT SITES OF DISEASE      Left lung, mediastinum, left hilum of the lung, liver, intra-abdominal lymph nodes in the portacaval and periportal region, bone metastases        MOLECULAR GENOMICS     Test Name: [employer] xF+ (XF.V3) dated October 2024     Molecular Findings:  * Gene: [id number], Variant: Missense variant (exon 1) - GOF, Potentially Actionable, p.R88Q (Allele Frequency - 0.3%)  * Gene: TP53, Variant: Missense variant - LOF, Biologically Relevant, p.R249G (Allele Frequency - 70.1%)  * Gene: RB1, Variant: Splice region variant - LOF, Biologically Relevant, c.540-1G>T, Splice Site (Allele Frequency - 56.8%)        Test Name: [employer] xT (XT.V4)  Laboratory Name: [facility]  Specimen Source: Lymph node, level 7  Collection Date: 27-Sep-2024     Molecular Findings:  * Gene: TP53, Variant: Missense variant - LOF, Biologically Relevant, p.R249G (Allele Frequency - 96.2%)  * Gene: RB1, Variant: Splice region variant - LOF, Biologically Relevant, c.540-1G>T, Splice Site (Allele Frequency - 96%)  * Gene: KMT2D, Variant: Stop gain - LOF, Biologically Relevant, p.*, Nonsense (Allele Frequency - 41.9%)  * Biomarker: Microsatellite Instability, Status: stable  * Biomarker: Tumor Mutation Bitely (2.6 Muts/Mb, Percentile: 33)        SERUM TUMOR MARKERS     Baseline LDH within normal limits        PRIOR THERAPY     Combination chemotherapy and immunotherapy.  Initially enrolled on a clinical trial of Lutathera and underwent octreotide scan which was positive.  However she decided to come off  study and did not receive Lutathera with her chemo.  Chemotherapy started October 6, 2024.  Immunotherapy completed added with cycle #2 11.06.2024.  Minor response observed after 2 cycles of treatment. PD observed after C4.        CURRENT THERAPY     2.10.2025: Signed consent on Phase 1 study [id number] with study drug [id number] which is an ADC targeting SEZ6 with a topoisomerase 1 inhibitor payload     CURRENT ONCOLOGICAL PROBLEMS    Cough and shortness of breath significantly improved with systemic treatment.  Pulmonary Embolism  Immunotherapy induced hypothyroidism        HISTORY OF PRESENT ILLNESS     This is a 59-year-old patient.  She states that she was feeling sick toward the end of spring of this year with some sinus problems.  Was seen by her allergist and was treated with antibiotics.  She was also seen at 1 point by primary care and steroids and antibiotics and inhalers were given.  She did not have any resolution and then ultimately developed a little bit of the hemoptysis which led to a chest x-ray showing an abnormality and finally a CT scan of the chest.The CT scan of the chest was done as a lung cancer screening low-dose CT and completed on September 20, 2024.  This demonstrated a extensive infiltrative mediastinal and hilar mass.  There was narrowing of the left mainstem bronchus and lower lobe bronchus secondary to extrinsic compression.  The predominant growth was within the left hilum and subcarinal region.  She underwent a bronchoscopy dated September 27, 2024 showing splaying of the main alanna.  There was mild erosion of a lymph node into the left mainstem bronchus.  There was erythema and mild erosions of an endobronchial tumor along the medial border.  Moderate to severe stenosis of the left lower lobe bronchus to 75%.        PAST MEDICAL HISTORY     Lumbar surgery about 30 years ago   hysterectomy in 2000  Diabetes  COPD  Right eye exophthalmus  herpes zoster        SOCIAL HISTORY     Patient  "lives with her sister.  She lives in [facility].  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  She started smoking at the age of 14 and continues to smoke at the age of 59.  Has smoked for 45 years on average 1 pack/day.        CURRENT MEDS     See medication list, meds reviewed, includes metformin, rosuvastatin, been done so Nied inhalers, Wellbutrin escitalopram, trazodone        ALLERGIES     Patient denies any drug allergies        FAMILY HISTORY      Mother had bladder cancer and possibly breast cancer.     Subjective    Today 02.10.2025 Patient in clinic  with her sister for screening visit on [id number].   Patient reports over all she feels good. Does have baseline symptoms of fatigue, SOB with exertion, cough with clear sputum, noncardiac chest pain and back pain. Today she denies any headaches, rash/itching, chills or fever, chest pain or chest tightness, painful inflammation/ulceration oral mucous membranes, nausea/vomiting, diarrhea, hematemesis /hemoptysis, hematuria/rectal bleeding, urinary symptoms, swelling extremities, weakness, or peripheral neuropathy. ROS as above. Remainder of 10 point review of systems elicited and otherwise unremarkable.     Cancer  Associated symptoms include coughing and fatigue. Pertinent negatives include no abdominal pain, anorexia, arthralgias, change in bowel habit, chest pain, chills, congestion, diaphoresis, fever, headaches, joint swelling, myalgias, nausea, neck pain, numbness, rash, sore throat, swollen glands, urinary symptoms, vertigo, visual change or vomiting.     Objective    BSA: 1.95 meters squared  /85 (BP Location: Left arm, Patient Position: Sitting)   Pulse 95   Temp 36.4 °C (97.5 °F) (Oral)   Resp 20   Ht 1.6 m (5' 2.99\")   Wt 85.4 kg (188 lb 4.4 oz)   SpO2 97%   BMI 33.36 kg/m²      Daily Weight  02/10/25 : 85.4 " "kg (188 lb 4.4 oz)  02/06/25 : 81.6 kg (180 lb)  02/05/25 : 82.9 kg (182 lb 12.2 oz)  01/29/25 : 84.1 kg (185 lb 6.5 oz)  01/17/25 : 83.6 kg (184 lb 4.8 oz)         1/15/2025     2:15 PM 1/16/2025    11:00 AM 1/17/2025     1:52 PM 1/29/2025     2:12 PM 2/5/2025     1:28 PM 2/6/2025     9:14 AM 2/10/2025     2:53 PM   Vitals   Systolic 116 131 119 121 138  136   Diastolic 62 66 71 82 83  85   BP Location Right arm Right arm Right arm Left arm Left arm  Left arm   Heart Rate 110 92 81 82 88  95   Temp 36.8 °C (98.2 °F) 36.6 °C (97.9 °F) 36.6 °C (97.9 °F) 36.3 °C (97.3 °F) 36.3 °C (97.3 °F)  36.4 °C (97.5 °F)   Resp 18 18 16 20 17  20   Height      1.575 m (5' 2\") 1.6 m (5' 2.99\")   Weight (lb) 180 183 184.3 185.41 182.76 180 188.27   BMI 32.92 kg/m2 33.47 kg/m2 33.71 kg/m2 33.91 kg/m2 33.43 kg/m2 32.92 kg/m2 33.36 kg/m2   BSA (m2) 1.89 m2 1.91 m2 1.91 m2 1.92 m2 1.9 m2 1.89 m2 1.95 m2   Visit Report    Report Report         Physical Exam  Constitutional:       General: She is not in acute distress.     Appearance: Normal appearance. She is not ill-appearing, toxic-appearing or diaphoretic.   HENT:      Nose: No congestion or rhinorrhea.      Mouth/Throat:      Pharynx: No oropharyngeal exudate or posterior oropharyngeal erythema.   Eyes:      General: No scleral icterus.     Conjunctiva/sclera: Conjunctivae normal.   Cardiovascular:      Rate and Rhythm: Normal rate and regular rhythm.      Pulses: Normal pulses.      Heart sounds: Normal heart sounds. No murmur heard.     No friction rub. No gallop.   Pulmonary:      Effort: Pulmonary effort is normal. No respiratory distress.      Breath sounds: Normal breath sounds. No stridor. No wheezing, rhonchi or rales.   Chest:      Chest wall: No tenderness.   Abdominal:      General: There is no distension.      Palpations: There is no mass.      Tenderness: There is no abdominal tenderness. There is no guarding or rebound.   Musculoskeletal:      Cervical back: No " tenderness.      Right lower leg: No edema.      Left lower leg: No edema.   Lymphadenopathy:      Cervical: No cervical adenopathy.   Skin:     General: Skin is warm.      Coloration: Skin is not jaundiced.      Findings: No bruising or lesion.   Neurological:      General: No focal deficit present.      Mental Status: She is oriented to person, place, and time.   Psychiatric:         Mood and Affect: Mood normal.         Behavior: Behavior normal.       Performance Status:  ECOG 1: Restricted in physically strenuous activity but ambulatory and able to carry out work of a light or sedentary nature, e.g., light house work, office work.     Hospital Outpatient Visit on 02/10/2025   Component Date Value Ref Range Status    Adrenocorticotropic Hormone (ACTH) 02/10/2025 5.5 (L)  7.2 - 63.3 pg/mL Final    aPTT 02/10/2025 49 (H)  27 - 38 seconds Final    Bilirubin, Direct 02/10/2025 0.0  0.0 - 0.3 mg/dL Final    WBC 02/10/2025 8.3  4.4 - 11.3 x10*3/uL Final    nRBC 02/10/2025 0.0  0.0 - 0.0 /100 WBCs Final    RBC 02/10/2025 4.28  4.00 - 5.20 x10*6/uL Final    Hemoglobin 02/10/2025 13.0  12.0 - 16.0 g/dL Final    Hematocrit 02/10/2025 38.8  36.0 - 46.0 % Final    MCV 02/10/2025 91  80 - 100 fL Final    MCH 02/10/2025 30.4  26.0 - 34.0 pg Final    MCHC 02/10/2025 33.5  32.0 - 36.0 g/dL Final    RDW 02/10/2025 16.5 (H)  11.5 - 14.5 % Final    Platelets 02/10/2025 374  150 - 450 x10*3/uL Final    Neutrophils % 02/10/2025 66.3  40.0 - 80.0 % Final    Immature Granulocytes %, Automated 02/10/2025 0.7  0.0 - 0.9 % Final    Lymphocytes % 02/10/2025 24.0  13.0 - 44.0 % Final    Monocytes % 02/10/2025 7.9  2.0 - 10.0 % Final    Eosinophils % 02/10/2025 0.5  0.0 - 6.0 % Final    Basophils % 02/10/2025 0.6  0.0 - 2.0 % Final    Neutrophils Absolute 02/10/2025 5.51  1.20 - 7.70 x10*3/uL Final    Immature Granulocytes Absolute, Au* 02/10/2025 0.06  0.00 - 0.70 x10*3/uL Final    Lymphocytes Absolute 02/10/2025 2.00  1.20 - 4.80 x10*3/uL  Final    Monocytes Absolute 02/10/2025 0.66  0.10 - 1.00 x10*3/uL Final    Eosinophils Absolute 02/10/2025 0.04  0.00 - 0.70 x10*3/uL Final    Basophils Absolute 02/10/2025 0.05  0.00 - 0.10 x10*3/uL Final    Glucose 02/10/2025 112 (H)  74 - 99 mg/dL Final    Sodium 02/10/2025 137  136 - 145 mmol/L Final    Potassium 02/10/2025 4.0  3.5 - 5.3 mmol/L Final    Chloride 02/10/2025 102  98 - 107 mmol/L Final    Bicarbonate 02/10/2025 26  21 - 32 mmol/L Final    Anion Gap 02/10/2025 13  10 - 20 mmol/L Final    Urea Nitrogen 02/10/2025 9  6 - 23 mg/dL Final    Creatinine 02/10/2025 0.66  0.50 - 1.05 mg/dL Final    eGFR 02/10/2025 >90  >60 mL/min/1.73m*2 Final    Calcium 02/10/2025 9.2  8.6 - 10.6 mg/dL Final    Albumin 02/10/2025 4.2  3.4 - 5.0 g/dL Final    Alkaline Phosphatase 02/10/2025 105  33 - 136 U/L Final    Total Protein 02/10/2025 6.6  6.4 - 8.2 g/dL Final    AST 02/10/2025 23  9 - 39 U/L Final    Bilirubin, Total 02/10/2025 0.3  0.0 - 1.2 mg/dL Final    ALT 02/10/2025 15  7 - 45 U/L Final    Cortisol 02/10/2025 11.1  2.5 - 20.0 ug/dL Final    Follicle Stimulating Hormone 02/10/2025 75.6  IU/L Final    GGT 02/10/2025 134 (H)  5 - 55 U/L Final    LDH 02/10/2025 206  84 - 246 U/L Final    Magnesium 02/10/2025 2.16  1.60 - 2.40 mg/dL Final    Phosphorus 02/10/2025 3.9  2.5 - 4.9 mg/dL Final    Protime 02/10/2025 11.9  9.8 - 12.8 seconds Final    INR 02/10/2025 1.1  0.9 - 1.1 Final    Thyroid Stimulating Hormone 02/10/2025 84.17 (H)  0.44 - 3.98 mIU/L Final    Thyroxine, Free 02/10/2025 0.34 (L)  0.78 - 1.48 ng/dL Final    Triiodothyronine, Free 02/10/2025 1.1 (L)  2.3 - 4.2 pg/mL Final    Color, Urine 02/10/2025 Yellow  Light-Yellow, Yellow, Dark-Yellow Final    Appearance, Urine 02/10/2025 Clear  Clear Final    Specific Gravity, Urine 02/10/2025 1.016  1.005 - 1.035 Final    pH, Urine 02/10/2025 6.5  5.0, 5.5, 6.0, 6.5, 7.0, 7.5, 8.0 Final    Protein, Urine 02/10/2025 NEGATIVE  NEGATIVE, 10 (TRACE), 20 (TRACE)  mg/dL Final    Glucose, Urine 02/10/2025 Normal  Normal mg/dL Final    Blood, Urine 02/10/2025 NEGATIVE  NEGATIVE mg/dL Final    Ketones, Urine 02/10/2025 NEGATIVE  NEGATIVE mg/dL Final    Bilirubin, Urine 02/10/2025 NEGATIVE  NEGATIVE mg/dL Final    Urobilinogen, Urine 02/10/2025 Normal  Normal mg/dL Final    Nitrite, Urine 02/10/2025 NEGATIVE  NEGATIVE Final    Leukocyte Esterase, Urine 02/10/2025 NEGATIVE  NEGATIVE Final   Lab on 02/05/2025   Component Date Value Ref Range Status    WBC 02/05/2025 5.9  4.4 - 11.3 x10*3/uL Final    nRBC 02/05/2025 0.0  0.0 - 0.0 /100 WBCs Final    RBC 02/05/2025 4.69  4.00 - 5.20 x10*6/uL Final    Hemoglobin 02/05/2025 14.3  12.0 - 16.0 g/dL Final    Hematocrit 02/05/2025 44.0  36.0 - 46.0 % Final    MCV 02/05/2025 94  80 - 100 fL Final    MCH 02/05/2025 30.5  26.0 - 34.0 pg Final    MCHC 02/05/2025 32.5  32.0 - 36.0 g/dL Final    RDW 02/05/2025 17.2 (H)  11.5 - 14.5 % Final    Platelets 02/05/2025 399  150 - 450 x10*3/uL Final    Neutrophils % 02/05/2025 43.8  40.0 - 80.0 % Final    Immature Granulocytes %, Automated 02/05/2025 1.4 (H)  0.0 - 0.9 % Final    Lymphocytes % 02/05/2025 42.6  13.0 - 44.0 % Final    Monocytes % 02/05/2025 11.2  2.0 - 10.0 % Final    Eosinophils % 02/05/2025 0.5  0.0 - 6.0 % Final    Basophils % 02/05/2025 0.5  0.0 - 2.0 % Final    Neutrophils Absolute 02/05/2025 2.58  1.20 - 7.70 x10*3/uL Final    Immature Granulocytes Absolute, Au* 02/05/2025 0.08  0.00 - 0.70 x10*3/uL Final    Lymphocytes Absolute 02/05/2025 2.51  1.20 - 4.80 x10*3/uL Final    Monocytes Absolute 02/05/2025 0.66  0.10 - 1.00 x10*3/uL Final    Eosinophils Absolute 02/05/2025 0.03  0.00 - 0.70 x10*3/uL Final    Basophils Absolute 02/05/2025 0.03  0.00 - 0.10 x10*3/uL Final    Glucose 02/05/2025 105 (H)  74 - 99 mg/dL Final    Sodium 02/05/2025 138  136 - 145 mmol/L Final    Potassium 02/05/2025 4.3  3.5 - 5.3 mmol/L Final    Chloride 02/05/2025 100  98 - 107 mmol/L Final    Bicarbonate  02/05/2025 29  21 - 32 mmol/L Final    Anion Gap 02/05/2025 13  10 - 20 mmol/L Final    Urea Nitrogen 02/05/2025 11  6 - 23 mg/dL Final    Creatinine 02/05/2025 0.80  0.50 - 1.05 mg/dL Final    eGFR 02/05/2025 84  >60 mL/min/1.73m*2 Final    Calcium 02/05/2025 9.3  8.6 - 10.3 mg/dL Final    Albumin 02/05/2025 4.5  3.4 - 5.0 g/dL Final    Alkaline Phosphatase 02/05/2025 108  33 - 136 U/L Final    Total Protein 02/05/2025 7.1  6.4 - 8.2 g/dL Final    AST 02/05/2025 16  9 - 39 U/L Final    Bilirubin, Total 02/05/2025 0.3  0.0 - 1.2 mg/dL Final    ALT 02/05/2025 14  7 - 45 U/L Final    LDH 02/05/2025 182  84 - 246 U/L Final   Lab on 01/29/2025   Component Date Value Ref Range Status    WBC 01/29/2025 2.6 (L)  4.4 - 11.3 x10*3/uL Final    nRBC 01/29/2025 0.0  0.0 - 0.0 /100 WBCs Final    RBC 01/29/2025 4.31  4.00 - 5.20 x10*6/uL Final    Hemoglobin 01/29/2025 12.9  12.0 - 16.0 g/dL Final    Hematocrit 01/29/2025 38.9  36.0 - 46.0 % Final    MCV 01/29/2025 90  80 - 100 fL Final    MCH 01/29/2025 29.9  26.0 - 34.0 pg Final    MCHC 01/29/2025 33.2  32.0 - 36.0 g/dL Final    RDW 01/29/2025 15.9 (H)  11.5 - 14.5 % Final    Platelets 01/29/2025 148 (L)  150 - 450 x10*3/uL Final    Immature Granulocytes %, Automated 01/29/2025 0.0  0.0 - 0.9 % Final    Immature Granulocytes Absolute, Au* 01/29/2025 0.00  0.00 - 0.70 x10*3/uL Final    Glucose 01/29/2025 101 (H)  74 - 99 mg/dL Final    Sodium 01/29/2025 140  136 - 145 mmol/L Final    Potassium 01/29/2025 4.0  3.5 - 5.3 mmol/L Final    Chloride 01/29/2025 105  98 - 107 mmol/L Final    Bicarbonate 01/29/2025 25  21 - 32 mmol/L Final    Anion Gap 01/29/2025 14  10 - 20 mmol/L Final    Urea Nitrogen 01/29/2025 10  6 - 23 mg/dL Final    Creatinine 01/29/2025 0.63  0.50 - 1.05 mg/dL Final    eGFR 01/29/2025 >90  >60 mL/min/1.73m*2 Final    Calcium 01/29/2025 9.4  8.6 - 10.6 mg/dL Final    Albumin 01/29/2025 4.3  3.4 - 5.0 g/dL Final    Alkaline Phosphatase 01/29/2025 105  33 - 136 U/L  Final    Total Protein 01/29/2025 7.0  6.4 - 8.2 g/dL Final    AST 01/29/2025 14  9 - 39 U/L Final    Bilirubin, Total 01/29/2025 0.2  0.0 - 1.2 mg/dL Final    ALT 01/29/2025 13  7 - 45 U/L Final    Neutrophils %, Manual 01/29/2025 26.0  40.0 - 80.0 % Final    Bands %, Manual 01/29/2025 2.0  0.0 - 5.0 % Final    Lymphocytes %, Manual 01/29/2025 57.0  13.0 - 44.0 % Final    Monocytes %, Manual 01/29/2025 12.0  2.0 - 10.0 % Final    Eosinophils %, Manual 01/29/2025 2.0  0.0 - 6.0 % Final    Basophils %, Manual 01/29/2025 1.0  0.0 - 2.0 % Final    Seg Neutrophils Absolute, Manual 01/29/2025 0.68 (L)  1.20 - 7.00 x10*3/uL Final    Bands Absolute, Manual 01/29/2025 0.05  0.00 - 0.70 x10*3/uL Final    Lymphocytes Absolute, Manual 01/29/2025 1.48  1.20 - 4.80 x10*3/uL Final    Monocytes Absolute, Manual 01/29/2025 0.31  0.10 - 1.00 x10*3/uL Final    Eosinophils Absolute, Manual 01/29/2025 0.05  0.00 - 0.70 x10*3/uL Final    Basophils Absolute, Manual 01/29/2025 0.03  0.00 - 0.10 x10*3/uL Final    Total Cells Counted 01/29/2025 100   Final    Neutrophils Absolute, Manual 01/29/2025 0.73 (L)  1.20 - 7.70 x10*3/uL Final    RBC Morphology 01/29/2025 No significant RBC morphology present   Final   Lab on 01/13/2025   Component Date Value Ref Range Status    WBC 01/13/2025 8.0  4.4 - 11.3 x10*3/uL Final    nRBC 01/13/2025 0.0  0.0 - 0.0 /100 WBCs Final    RBC 01/13/2025 4.66  4.00 - 5.20 x10*6/uL Final    Hemoglobin 01/13/2025 13.8  12.0 - 16.0 g/dL Final    Hematocrit 01/13/2025 42.2  36.0 - 46.0 % Final    MCV 01/13/2025 91  80 - 100 fL Final    MCH 01/13/2025 29.6  26.0 - 34.0 pg Final    MCHC 01/13/2025 32.7  32.0 - 36.0 g/dL Final    RDW 01/13/2025 16.7 (H)  11.5 - 14.5 % Final    Platelets 01/13/2025 267  150 - 450 x10*3/uL Final    Neutrophils % 01/13/2025 67.4  40.0 - 80.0 % Final    Immature Granulocytes %, Automated 01/13/2025 0.2  0.0 - 0.9 % Final    Lymphocytes % 01/13/2025 19.5  13.0 - 44.0 % Final    Monocytes %  01/13/2025 8.6  2.0 - 10.0 % Final    Eosinophils % 01/13/2025 3.9  0.0 - 6.0 % Final    Basophils % 01/13/2025 0.4  0.0 - 2.0 % Final    Neutrophils Absolute 01/13/2025 5.41  1.20 - 7.70 x10*3/uL Final    Immature Granulocytes Absolute, Au* 01/13/2025 0.02  0.00 - 0.70 x10*3/uL Final    Lymphocytes Absolute 01/13/2025 1.56  1.20 - 4.80 x10*3/uL Final    Monocytes Absolute 01/13/2025 0.69  0.10 - 1.00 x10*3/uL Final    Eosinophils Absolute 01/13/2025 0.31  0.00 - 0.70 x10*3/uL Final    Basophils Absolute 01/13/2025 0.03  0.00 - 0.10 x10*3/uL Final    Glucose 01/13/2025 122 (H)  74 - 99 mg/dL Final    Sodium 01/13/2025 138  136 - 145 mmol/L Final    Potassium 01/13/2025 4.0  3.5 - 5.3 mmol/L Final    Chloride 01/13/2025 102  98 - 107 mmol/L Final    Bicarbonate 01/13/2025 26  21 - 32 mmol/L Final    Anion Gap 01/13/2025 14  10 - 20 mmol/L Final    Urea Nitrogen 01/13/2025 12  6 - 23 mg/dL Final    Creatinine 01/13/2025 0.70  0.50 - 1.05 mg/dL Final    eGFR 01/13/2025 >90  >60 mL/min/1.73m*2 Final    Calcium 01/13/2025 9.5  8.6 - 10.3 mg/dL Final    Albumin 01/13/2025 4.2  3.4 - 5.0 g/dL Final    Alkaline Phosphatase 01/13/2025 115  33 - 136 U/L Final    Total Protein 01/13/2025 6.9  6.4 - 8.2 g/dL Final    AST 01/13/2025 28  9 - 39 U/L Final    Bilirubin, Total 01/13/2025 0.3  0.0 - 1.2 mg/dL Final    ALT 01/13/2025 31  7 - 45 U/L Final    LDH 01/13/2025 205  84 - 246 U/L Final       MR brain w and wo IV contrast    Result Date: 2/6/2025  Impression: No new mass or pathologic enhancement was identified.   Nonspecific white matter changes most likely reflect chronic small-vessel ischemic disease given the age of the patient.   Likely right-sided proptosis stable in retrospect compared to the prior exam of 10/01/2024. Given prior masslike enlargement of the inferior rectus muscle on CT 10/07/2017 the finding which is not seen on the current limited exam of the orbits observation could nonetheless reflect residua of  prior disease. Please correlate clinically and consider dedicated orbital MRI or follow-up orbital CT for further evaluation if clinically indicated   MACRO: None   Signed by: Emiliano Kong 2/6/2025 10:23 AM Dictation workstation:   [id number]    CT abdomen w IV contrast    Result Date: 1/24/2025  Impression: Restaging of metastatic small-cell lung carcinoma, as compared to CT 12/19/2024: 1. Stable to slightly increased disease burden as evidenced by gradually increasing size of the distal, left subhilar component of the primary neoplasm with stable size of hepatic metastases. 2. Stable mediastinal lymphadenopathy, including prominent paratracheal and aortopulmonary window nodes detailed above, as well as mixed lytic-sclerotic osseous lesions throughout the axial skeleton. 3. Increased postobstructive pneumonitis within the left lower lobe.   I personally reviewed the images/study and I agree with the findings as stated. This study was interpreted at [place], [place].   MACRO: None   Signed by: Yao Parham 1/24/2025 9:54 PM Dictation workstation:   [id number]    CT chest w IV contrast    Result Date: 1/24/2025  Impression: Restaging of metastatic small-cell lung carcinoma, as compared to CT 12/19/2024: 1. Stable to slightly increased disease burden as evidenced by gradually increasing size of the distal, left subhilar component of the primary neoplasm with stable size of hepatic metastases. 2. Stable mediastinal lymphadenopathy, including prominent paratracheal and aortopulmonary window nodes detailed above, as well as mixed lytic-sclerotic osseous lesions throughout the axial skeleton. 3. Increased postobstructive pneumonitis within the left lower lobe.   I personally reviewed the images/study and I agree with the findings as stated. This study was interpreted at [place], [place].   MACRO: None   Signed by: Yao Parham  1/24/2025 9:54 PM Dictation workstation:   [id number]      Assessment/Plan     Ms. 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 Initial imaging post C2 reviewed by Dr Kohli appeared to have sub-optimal response to treatment. Unfortunately after C4 imaging confirmed she had progressive disease. In the last visit Dr Kohli discussed all treatment options which included SOC Taralatamab and also clinical trial [id number] with study drug [id number]-706 which is an ADC targeting SEZ6 with a topoisomerase 1 inhibitor payload. Patient opted to participate on study and signed consent today. She looks clinically good to participate on study. Finding of possible immunotherapy induced hypothyroidism labs findings started the patient on levothyroxine 50 mcg. If eligible we plan to start her on treatment ASAP.       Cancer Staging   No matching staging information was found for the patient.      Oncology History   SCLC (small cell lung carcinoma)   9/27/2024 Initial Diagnosis    SCLC (small cell lung carcinoma) (Multi)     10/4/2024 - 10/8/2024 Chemotherapy    CARBOplatin / Etoposide, 21 Day Cycles - Lung     10/4/2024 - 10/6/2024 Research Study Participant    ([facility]) [id number] - Atezolizumab + CARBOplatin / Etoposide / [id number], 21 Day Cycles  Plan Provider: NANCY Hernandez-CNP  Treatment goal: Control  Line of treatment: First Line  Associated studies: ([id number])[id number] with Carboplatin, Etoposide and Tislelizumab in Newly Diagnosed ES-SCLC     10/4/2024 - 1/17/2025 Chemotherapy    Durvalumab + CARBOplatin / Etoposide, 21 Day Cycles     10/28/2024 - 10/28/2024 Chemotherapy    Durvalumab + CARBOplatin / Etoposide, 21 Day Cycles     2/5/2025 -  Chemotherapy    Durvalumab, 28 Day Cycles

## 2025-02-14 ENCOUNTER — TELEPHONE (OUTPATIENT)
Dept: HEMATOLOGY/ONCOLOGY | Facility: HOSPITAL | Age: 60
End: 2025-02-14
Payer: MEDICARE

## 2025-02-14 PROCEDURE — RXMED WILLOW AMBULATORY MEDICATION CHARGE

## 2025-02-14 NOTE — ADDENDUM NOTE
Encounter addended by: Marcelino Byrne RN on: 2/14/2025 9:06 AM   Actions taken: LDA properties accepted

## 2025-02-14 NOTE — TELEPHONE ENCOUNTER
Patient states that she received a text message today instructing her to schedule a liver biopsy. She states that no one talked to her about this and she would like for someone from the team to call her back with more information.    Message sent to team.

## 2025-02-17 DIAGNOSIS — C34.90 SMALL CELL CARCINOMA OF LUNG, UNSPECIFIED LATERALITY, UNSPECIFIED PART OF LUNG: Primary | ICD-10-CM

## 2025-02-18 ENCOUNTER — PHARMACY VISIT (OUTPATIENT)
Dept: PHARMACY | Facility: CLINIC | Age: 60
End: 2025-02-18
Payer: COMMERCIAL

## 2025-02-18 ENCOUNTER — LAB (OUTPATIENT)
Dept: LAB | Facility: CLINIC | Age: 60
End: 2025-02-18
Payer: MEDICARE

## 2025-02-18 DIAGNOSIS — C34.90 SMALL CELL CARCINOMA OF LUNG, UNSPECIFIED LATERALITY, UNSPECIFIED PART OF LUNG: ICD-10-CM

## 2025-02-18 LAB
ALBUMIN SERPL BCP-MCNC: 4.4 G/DL (ref 3.4–5)
ALP SERPL-CCNC: 129 U/L (ref 33–136)
ALT SERPL W P-5'-P-CCNC: 33 U/L (ref 7–45)
ANION GAP SERPL CALC-SCNC: 14 MMOL/L (ref 10–20)
APPEARANCE UR: CLEAR
AST SERPL W P-5'-P-CCNC: 35 U/L (ref 9–39)
BASOPHILS # BLD AUTO: 0.03 X10*3/UL (ref 0–0.1)
BASOPHILS NFR BLD AUTO: 0.4 %
BILIRUB DIRECT SERPL-MCNC: 0.1 MG/DL (ref 0–0.3)
BILIRUB SERPL-MCNC: 0.3 MG/DL (ref 0–1.2)
BILIRUB UR STRIP.AUTO-MCNC: NEGATIVE MG/DL
BUN SERPL-MCNC: 8 MG/DL (ref 6–23)
CALCIUM SERPL-MCNC: 9.4 MG/DL (ref 8.6–10.3)
CHLORIDE SERPL-SCNC: 102 MMOL/L (ref 98–107)
CO2 SERPL-SCNC: 27 MMOL/L (ref 21–32)
COLOR UR: NORMAL
CREAT SERPL-MCNC: 0.83 MG/DL (ref 0.5–1.05)
DNA RANGE(S) EXAMINED NAR: NORMAL
EGFRCR SERPLBLD CKD-EPI 2021: 81 ML/MIN/1.73M*2
EOSINOPHIL # BLD AUTO: 0.12 X10*3/UL (ref 0–0.7)
EOSINOPHIL NFR BLD AUTO: 1.7 %
ERYTHROCYTE [DISTWIDTH] IN BLOOD BY AUTOMATED COUNT: 17.2 % (ref 11.5–14.5)
GENE DIS ANL INTERP-IMP: POSITIVE
GENE DIS ASSESSED: NORMAL
GGT SERPL-CCNC: 213 U/L (ref 5–55)
GLUCOSE SERPL-MCNC: 123 MG/DL (ref 74–99)
GLUCOSE UR STRIP.AUTO-MCNC: NORMAL MG/DL
HCT VFR BLD AUTO: 41.1 % (ref 36–46)
HGB BLD-MCNC: 13.3 G/DL (ref 12–16)
IMM GRANULOCYTES # BLD AUTO: 0.04 X10*3/UL (ref 0–0.7)
IMM GRANULOCYTES NFR BLD AUTO: 0.6 % (ref 0–0.9)
KETONES UR STRIP.AUTO-MCNC: NEGATIVE MG/DL
LDH SERPL L TO P-CCNC: 274 U/L (ref 84–246)
LEUKOCYTE ESTERASE UR QL STRIP.AUTO: NEGATIVE
LYMPHOCYTES # BLD AUTO: 1.8 X10*3/UL (ref 1.2–4.8)
LYMPHOCYTES NFR BLD AUTO: 25.6 %
MAGNESIUM SERPL-MCNC: 2.15 MG/DL (ref 1.6–2.4)
MCH RBC QN AUTO: 30.6 PG (ref 26–34)
MCHC RBC AUTO-ENTMCNC: 32.4 G/DL (ref 32–36)
MCV RBC AUTO: 95 FL (ref 80–100)
MONOCYTES # BLD AUTO: 0.47 X10*3/UL (ref 0.1–1)
MONOCYTES NFR BLD AUTO: 6.7 %
NEUTROPHILS # BLD AUTO: 4.57 X10*3/UL (ref 1.2–7.7)
NEUTROPHILS NFR BLD AUTO: 65 %
NITRITE UR QL STRIP.AUTO: NEGATIVE
NRBC BLD-RTO: 0 /100 WBCS (ref 0–0)
PH UR STRIP.AUTO: 5.5 [PH]
PHOSPHATE SERPL-MCNC: 4.3 MG/DL (ref 2.5–4.9)
PLATELET # BLD AUTO: 237 X10*3/UL (ref 150–450)
POTASSIUM SERPL-SCNC: 4.1 MMOL/L (ref 3.5–5.3)
PROT SERPL-MCNC: 6.9 G/DL (ref 6.4–8.2)
PROT UR STRIP.AUTO-MCNC: NEGATIVE MG/DL
RBC # BLD AUTO: 4.34 X10*6/UL (ref 4–5.2)
RBC # UR STRIP.AUTO: NEGATIVE MG/DL
REASON FOR STUDY: NORMAL
SODIUM SERPL-SCNC: 139 MMOL/L (ref 136–145)
SP GR UR STRIP.AUTO: 1.01
TEMPUS BLOOD TUMOR MUTATIONAL BURDEN: 6.6 M/MB
TEMPUS LCA: NORMAL
TEMPUS MSI NOTE: NORMAL
TEMPUS PORTAL: NORMAL
TEMPUS TREATMENT IMPLICATIONS NOTE: NORMAL
TEMPUS TRIALCOUNT: 3
TEMPUS TRIALMATCHES1: NORMAL
TEMPUS TRIALMATCHES2: NORMAL
TEMPUS TRIALMATCHES3: NORMAL
UROBILINOGEN UR STRIP.AUTO-MCNC: NORMAL MG/DL
WBC # BLD AUTO: 7 X10*3/UL (ref 4.4–11.3)

## 2025-02-18 PROCEDURE — 36415 COLL VENOUS BLD VENIPUNCTURE: CPT

## 2025-02-18 PROCEDURE — 84100 ASSAY OF PHOSPHORUS: CPT

## 2025-02-18 PROCEDURE — 81003 URINALYSIS AUTO W/O SCOPE: CPT

## 2025-02-18 PROCEDURE — 82248 BILIRUBIN DIRECT: CPT

## 2025-02-18 PROCEDURE — 80053 COMPREHEN METABOLIC PANEL: CPT

## 2025-02-18 PROCEDURE — 82977 ASSAY OF GGT: CPT

## 2025-02-18 PROCEDURE — 83735 ASSAY OF MAGNESIUM: CPT

## 2025-02-18 PROCEDURE — 83615 LACTATE (LD) (LDH) ENZYME: CPT

## 2025-02-18 PROCEDURE — 85025 COMPLETE CBC W/AUTO DIFF WBC: CPT

## 2025-02-19 ENCOUNTER — EDUCATION (OUTPATIENT)
Dept: HEMATOLOGY/ONCOLOGY | Facility: HOSPITAL | Age: 60
End: 2025-02-19
Payer: MEDICARE

## 2025-02-19 ENCOUNTER — HOSPITAL ENCOUNTER (OUTPATIENT)
Dept: RESEARCH | Facility: HOSPITAL | Age: 60
Discharge: HOME | End: 2025-02-19
Payer: MEDICARE

## 2025-02-19 VITALS
RESPIRATION RATE: 20 BRPM | SYSTOLIC BLOOD PRESSURE: 152 MMHG | OXYGEN SATURATION: 94 % | WEIGHT: 187.61 LBS | HEART RATE: 70 BPM | DIASTOLIC BLOOD PRESSURE: 90 MMHG | BODY MASS INDEX: 33.24 KG/M2 | TEMPERATURE: 98.2 F

## 2025-02-19 DIAGNOSIS — C34.90 SMALL CELL CARCINOMA OF LUNG, UNSPECIFIED LATERALITY, UNSPECIFIED PART OF LUNG: Primary | ICD-10-CM

## 2025-02-19 LAB
HOLD SPECIMEN: NORMAL

## 2025-02-19 PROCEDURE — 93005 ELECTROCARDIOGRAM TRACING: CPT | Mod: DCRU

## 2025-02-19 PROCEDURE — 96413 CHEMO IV INFUSION 1 HR: CPT

## 2025-02-19 PROCEDURE — 2560000001 HC RX 256 EXPERIMENTAL DRUGS

## 2025-02-19 RX ORDER — PROCHLORPERAZINE MALEATE 10 MG
10 TABLET ORAL EVERY 6 HOURS PRN
Status: CANCELLED | OUTPATIENT
Start: 2025-02-19

## 2025-02-19 RX ORDER — EPINEPHRINE 1 MG/ML
0.3 INJECTION, SOLUTION, CONCENTRATE INTRAVENOUS EVERY 5 MIN PRN
Status: CANCELLED | OUTPATIENT
Start: 2025-02-19

## 2025-02-19 RX ORDER — ALBUTEROL SULFATE 0.83 MG/ML
3 SOLUTION RESPIRATORY (INHALATION) AS NEEDED
Status: CANCELLED | OUTPATIENT
Start: 2025-02-19

## 2025-02-19 RX ORDER — DIPHENHYDRAMINE HYDROCHLORIDE 50 MG/ML
50 INJECTION INTRAMUSCULAR; INTRAVENOUS AS NEEDED
Status: CANCELLED | OUTPATIENT
Start: 2025-02-19

## 2025-02-19 RX ORDER — FAMOTIDINE 10 MG/ML
20 INJECTION, SOLUTION INTRAVENOUS ONCE AS NEEDED
Status: CANCELLED | OUTPATIENT
Start: 2025-02-19

## 2025-02-19 RX ORDER — PROCHLORPERAZINE EDISYLATE 5 MG/ML
10 INJECTION INTRAMUSCULAR; INTRAVENOUS EVERY 6 HOURS PRN
Status: CANCELLED | OUTPATIENT
Start: 2025-02-19

## 2025-02-19 RX ADMIN — Medication 110.6 MG: at 11:15

## 2025-02-19 ASSESSMENT — ENCOUNTER SYMPTOMS
SWOLLEN GLANDS: 0
JOINT SWELLING: 0
NUMBNESS: 0
NAUSEA: 0
CHANGE IN BOWEL HABIT: 0
ARTHRALGIAS: 0
ABDOMINAL PAIN: 0
VERTIGO: 0
VISUAL CHANGE: 0
VOMITING: 0
NECK PAIN: 0
DIAPHORESIS: 0
FATIGUE: 1
CHILLS: 0
MYALGIAS: 0
COUGH: 1
SORE THROAT: 0
FEVER: 0
ANOREXIA: 0
HEADACHES: 0

## 2025-02-19 NOTE — PROGRESS NOTES
Research Note Treatment Day    Minal Sun is here today for treatment on [id number]. Today is C1D1. Procedures completed per protocol. AE's and con-meds reviewed with patient. Patient states that her only issue today is her pain. Denies N/V/D/C at this time. Lab work resulted yesterday and is ok to treat today. Patient is aware of treatment plan and will return tomorrow 2/20 for C1D2.                          [x]   Received treatment as planned   OR  []    Treatment delayed; patient calendar updated as required   Treatment delayed because:    []   AE    []   Physician Discretion    []   Clinical Deterioration or Progression     []   Other    Education Documentation  Nausea Management, taught by Jazzy Guerrero RN at 2/19/2025  9:24 AM.  Learner: Family, Patient  Readiness: Acceptance  Method: Explanation  Response: Verbalizes Understanding    Treatment Plan and Schedule, taught by Jazzy Guerrero RN at 2/19/2025  9:24 AM.  Learner: Family, Patient  Readiness: Acceptance  Method: Explanation  Response: Verbalizes Understanding    General Medication Information, taught by Jazzy Guerrero RN at 2/19/2025  9:24 AM.  Learner: Family, Patient  Readiness: Acceptance  Method: Explanation  Response: Verbalizes Understanding    Supportive Medications, taught by Jazzy Guerrero RN at 2/19/2025  9:24 AM.  Learner: Family, Patient  Readiness: Acceptance  Method: Explanation  Response: Verbalizes Understanding    Comprehensive Metabolic Panel (CMP), taught by Jazzy Guerrero RN at 2/19/2025  9:24 AM.  Learner: Family, Patient  Readiness: Acceptance  Method: Explanation  Response: Verbalizes Understanding    Complete Blood Count with Differential (CBC w/ Diff), taught by Jazzy Guerrero RN at 2/19/2025  9:24 AM.  Learner: Family, Patient  Readiness: Acceptance  Method: Explanation  Response: Verbalizes Understanding    When and How to Contact Clinic, taught by Jazzy Guerrero RN at 2/19/2025  9:24 AM.  Learner:  Family, Patient  Readiness: Acceptance  Method: Explanation  Response: Verbalizes Understanding    Education Comments  No comments found.

## 2025-02-19 NOTE — RESEARCH NOTES
RSH><[id number]><[id number]>  DCRU NURSING VISIT NOTE  Study Name: [id number]- Part 1_Escalation- Monotherapy  IRB#: [id number]  DCRU#: ([facility] # [id number])  Protocol Version Dated:  6/15/2023  PI: Jd Kohli MD.      Time point: Cycle 1 - Day 1    Encounter Date: 02/19/2025  Encounter Time:  9:00 AM EST  Encounter Department: [place] [address] RESEARCH     #1     Phone Pager   Jazzy Menjivarkehindeviktor       Admission and Prior to Starting Study Activities   Notify  when patient arrives to unit.  Complete DCRU/[facility] intake form in EMR.  Confirm DCRU Standing Orders (provided by Study Team/) are signed & available on chart (Expires after 1 year).  Obtain weight in kilograms - with shoes off & heavy items removed.  Obtain vital signs after sitting at least 3 minutes. Record in EMR.  Insert one peripheral IV line for sample collection procedures (flush line with 5 - 10 mL normal saline following each blood draw). Access mediport (if available) otherwise insert second peripheral line in opposite arm for Investigative drug administration (if peripheral line, flush line with 5 - 10 mL normal saline before & after infusion)  Do Not draw research samples from the same line the investigational drug is infused through.  Physical Exam including pulmonary exam & neuro assessment.       Dietary Guidelines   Regular diet     Study Specific Instructions and Documentation   If available enter a snapshot of performable actions:  1-Safety Labs  2- ECG   3- Vital Signs  4-Research Correlatives  5-Study Drug  6- Vital Signs  7-ECG  8-Research Correlatives  9-Discharge     PRE-DOSE Safety Labs   For Oncology study, Refer [place] Treatment Plan for orders     Criteria to Treat   DCRU RN reviewed and meets eligibility to proceed with treatment plan   Time team notified: 0900   DCRU RN notifies study team to review eligibility and approval before dosing  procedures  Time team approves: 0950        Study Regimen and Dosing   For Oncology study, Refer [place] Treatment Plan for orders   Part 1_Escalation_Monotherapy - patients with advanced recurrent or refractory solid tumors (small cell lung cancer, neuroendocrine tumors or brain tumors) will receive gradually increasing doses of ABBV-706 to determine MTD (Maximum Tolerated Dose).  Cycle = 21-days.  ABBV-706 administered IV Day 1 of each cycle.     Safety Parameters and Special Instructions   ABBV-706  ABBV-706 is an antibody-drug conjugate (ADC) with an anti-seizure-related antibody (SEZ6) & topoisomerase inhibitor  Potential Side Effects/Adverse Events: GI effects (diarrhea, nausea, vomiting, stool abnormalities), peripheral neuropathy, myelosuppression, rash, infusion-related reaction.  Administer infusion over 60 mins (+/- 10 mins).  Observation post-dose 1 hour for Cycle 1 Day 1. If no reaction, no observation for subsequent visits.            Subjective   Minal Sun is a 59 y.o. female and is here for a Research clinical visit.    Visit Provider: Janice Ramirez RN     Allergies:   Allergies   Allergen Reactions    Clindamycin Diarrhea    Erythromycin Diarrhea    Erythromycin Base Other and Nausea Only    Oxycodone-Acetaminophen Hives     anxiety       Objective     Vital Signs:    Vitals:    02/19/25 0858 02/19/25 1144 02/19/25 1214   BP: 143/86 (!) 135/91 152/90   Pulse: 86 70 70   Resp: 24 20 20   Temp: 36.2 °C (97.2 °F) 36.3 °C (97.3 °F) 36.8 °C (98.2 °F)   TempSrc: Temporal Temporal Temporal   SpO2: 95% 94% 94%   Weight: 85.1 kg (187 lb 9.8 oz)         Physical Exam     ASSESSMENT and PLAN:  Problem List Items Addressed This Visit          Hematology and Neoplasia    SCLC (small cell lung carcinoma) - Primary    Relevant Medications    Study [id number] ABBV-706 110.6 mg in sodium chloride 0.9% 100 mL IV (Completed)    Other Relevant Orders    Clinic Appointment Request    Infusion Appointment Request     Research collection: 4mL Red Top - Research Collect ABBV-706 ADA/NADA; Pre-Dose; Within 2 hours; Ambient; Other (5-8x); Allow to clot 30-60 minutes. (Completed)    Research collection: 2mL Red Top - Research Collect ABBV-706 PK ADC/Total AB; Pre-Dose; Within 2 hours; Ambient; Other (5-8x); Allow to clot 30-60 minutes. (Completed)    Research collection: 5mL Gold SST - Research Collect Biomarker Serum; Pre-Dose; Within 2 hours; Ambient; 5x; Allow to clot 30 minutes (Completed)    Research collection: 3mL Lavender EDTA - Research Collect Free Top1 Inhibitor; Pre-Dose; Within 2 hours; On ICE; 8-10x (Completed)    Research collection: 4mL Lavender EDTA - Research Collect Biomarker WB DNA; Pre-Dose; Within 2 hours; Ambient; 8-10x (Completed)    Research collection: 10mL RareCyte AccuCyte - Reserach Collect CTC (SCLC Only); Pre-Dose; Within 2 hours; 8-10x (Completed)    Research collection: 10mL Streck - Research Collect ctDNA Plasma/WB; Pre-Dose; Within 2 hours; 8-10x (Completed)    Research collection: 10mL Streck - Research Collect ctDNA Plasma/WB; Pre-Dose; Within 2 hours; 8-10x (Completed)    Research collection: 2mL Red Top - Research Collect ABBV-706 PK ADC/Total AB; Post-Dose; Other (EOI +15 minutes); Other (+/- 5 minutes); Ambient; Other (5-8x); Allow to clot 30-60 minutes. (Completed)    Research collection: 3mL Lavender EDTA - Research Collect Free Top1 Inhibitor; Post-Dose; Other (EOI +15 minutes); Other (+/- 5 minutes); On ICE; 8-10x (Completed)    Research collection: 2mL Red Top - Research Collect ABBV-706 PK ADC/Total AB; Post-Dose; EOI +2 hours; +/- 15 minutes; Ambient; Other (5-8x); Allow to clot 30-60 minutes.    Research collection: 3mL Lavender EDTA - Research Collect Free Top1 Inhibitor; Post-Dose; EOI +2 hours; +/- 15 minutes; On ICE; 8-10x    Research collection: 2mL Red Top - Research Collect ABBV-706 PK ADC/Total AB; Post-Dose; Other (EOI +4 hours); +/- 15 minutes; Ambient; Other (5-8x); Allow  to clot 30-60 minutes.    Research collection: 3mL Lavender EDTA - Research Collect Free Top1 Inhibitor; Post-Dose; Other (EOI +4 hours); +/- 15 minutes; On ICE; 8-10x    Research collection: 1mL Lavender EDTA - Reserach Collect C3a (FC3Ar); Post-Dose; Other (Within 2 hours after first sign of reaction.)    Research collection: 1mL SST - Research Collect C5 (345); Post-Dose; Other (Within 2 hours after first sign of reaction.)    Research collection: 1mL SST - Research Collect IgE; Post-Dose; Other (Within 2 hours after first sign of reaction.)    Research collection: 2mL Red Top - Research Collect Tryptase; Post-Dose; Other (Within 2 hours after first sign of reaction)    Infusion Appointment Request    Research collection: 2mL Red Top - Research Collect ABBV-706 PK ADC/Total AB; Post-Dose; EOI +24 hours; +/- 2 hours; Ambient; Other (5-8x); Allow to clot 30-60 minutes.    Research collection: 3mL Lavender EDTA - Research Collect Free Top1 Inhibitor; Post-Dose; EOI +24 hours; +/- 2 hours; On ICE; 8-10x    Infusion Appointment Request    Research collection: 2mL Red Top - Research Collect ABBV-706 PK ADC/Total AB; Post-Dose; EOI +48 hours; +/- 2 hours; Ambient; Other (5-8x); Allow to clot 30-60 minutes.    Research collection: 3mL Lavender EDTA - Research Collect Free Top1 Inhibitor; Post-Dose; EOI +48 hours; +/- 2 hours; On ICE; 8-10x    Research collection: 10mL RareCyte AccuCyte - Reserach Collect CTC (SCLC Only); Post-Dose; EOI +48 hours; +/- 2 hours; 8-10x    Clinic Appointment Request    Infusion Appointment Request    CBC and Auto Differential    Comprehensive metabolic panel    Bilirubin, Direct    Gamma GT    Lactate dehydrogenase    Magnesium    Phosphorus    Research collection: 4mL Red Top - Research Collect ABBV-706 ADA/NADA; Post-Dose; Other (EOI +168 hours); Other (+/- 24 hours); Ambient; Other (5-8x); Allow to clot 30-60 minutes.    Research collection: 2mL Red Top - Research Collect ABBV-706 PK  ADC/Total AB; Post-Dose; Other (EOI +168 hours); Other (+/- 24 hours); Ambient; Other (5-8x); Allow to clot 30-60 minutes.    Research collection: 3mL Lavender EDTA - Research Collect Free Top1 Inhibitor; Post-Dose; Other (EOI +168 hours); Other (+/- 24 hours); On ICE; 8-10x    Research collection: 5mL Gold SST - Research Collect Biomarker Serum; Post-Dose; Other (EOI +168 hours); Other (+/- 24 hours); Ambient; 5x; Keep upright and allow to clot 30 minutes.    Research collection: 10mL RareCyte AccuCyte - Reserach Collect CTC (SCLC Only); Post-Dose; Other (EOI +168 hours); Other (+/- 24 hours); 8-10x    Clinic Appointment Request    Infusion Appointment Request    CBC and Auto Differential    Comprehensive metabolic panel    Bilirubin, Direct    Gamma GT    Lactate dehydrogenase    Magnesium    Phosphorus    Research collection: 2mL Red Top - Research Collect ABBV-706 PK ADC/Total AB; Post-Dose; Other (EOI +336 hours); (+/- 24 hours); Ambient; Other (5-8x); Allow to clot 30-60 minutes.    Research collection: 3mL Lavender EDTA - Research Collect Free Top1 Inhibitor; Post-Dose; Other (EOI +336 hours); Other (+/- 24 hours); On ICE; 8-10x    Research collection: 5mL Gold SST - Research Collect Biomarker Serum; Post-Dose; Other (EOI +336 hours); Other (+/- 24 hours); Ambient; 5x; Keep upright and allow to clot 30 minutes.    Research collection: 10mL RareCyte AccuCyte - Reserach Collect CTC (SCLC Only); Post-Dose; Other (EOI +336 hours); Other (+/- 24 hours); 8-10x (Completed)    Research collection: 4mL Red Top - Research Collect ABBV-706 ADA/NADA; Pre-Dose; Within 2 hours; Ambient; Other (5-8x); Allow to clot 30-60 minutes. (Completed)    Research collection: 2mL Red Top - Research Collect ABBV-706 PK ADC/Total AB; Pre-Dose; Within 2 hours; Ambient; Other (5-8x); Allow to clot 30-60 minutes. (Completed)    Research collection: 5mL Gold SST - Research Collect Biomarker Serum; Pre-Dose; Within 2 hours; Ambient; 5x;  Allow to clot 30 minutes (Completed)    Research collection: 3mL Lavender EDTA - Research Collect Free Top1 Inhibitor; Pre-Dose; Within 2 hours; On ICE; 8-10x (Completed)    Research collection: 4mL Lavender EDTA - Research Collect Biomarker WB DNA; Pre-Dose; Within 2 hours; Ambient; 8-10x (Completed)    Research collection: 10mL RareCyte AccuCyte - Reserach Collect CTC (SCLC Only); Pre-Dose; Within 2 hours; 8-10x (Completed)    Research collection: 10mL Streck - Research Collect ctDNA Plasma/WB; Pre-Dose; Within 2 hours; 8-10x (Completed)    Research collection: 10mL Streck - Research Collect ctDNA Plasma/WB; Pre-Dose; Within 2 hours; 8-10x (Completed)    Research collection: 10mL RareCyte AccuCyte - Reserach Collect CTC (SCLC Only); Post-Dose; Other (EOI +336 hours); Other (+/- 24 hours); 8-10x (Completed)    Adult diet Regular    Research collection: 2mL Red Top - Research Collect ABBV-706 PK ADC/Total AB; Post-Dose; Other (EOI +15 minutes); Other (+/- 5 minutes); Ambient; Other (5-8x); Allow to clot 30-60 minutes. (Completed)    Research collection: 3mL Lavender EDTA - Research Collect Free Top1 Inhibitor; Post-Dose; Other (EOI +15 minutes); Other (+/- 5 minutes); On ICE; 8-10x (Completed)    Research collection: 2mL Red Top - Research Collect ABBV-706 PK ADC/Total AB; Post-Dose; EOI +2 hours; +/- 15 minutes; Ambient; Other (5-8x); Allow to clot 30-60 minutes.    Research collection: 3mL Lavender EDTA - Research Collect Free Top1 Inhibitor; Post-Dose; EOI +2 hours; +/- 15 minutes; On ICE; 8-10x        Medications as of the completion of today's visit:  Current Outpatient Medications   Medication Sig Dispense Refill    acetaminophen (TylenoL) 325 mg tablet Take 2 tablets (650 mg) by mouth every 6 hours if needed for mild pain (1 - 3) or moderate pain (4 - 6). 30 tablet 0    ALPRAZolam (Xanax) 1 mg tablet Take 1 tablet (1 mg) by mouth 3 times a day as needed for anxiety for up to 10 days. 30 tablet 0    apixaban  (Eliquis) 5 mg tablet Take 1 tablet (5 mg) by mouth 2 times a day. 180 tablet 0    budesonide (Pulmicort) 0.5 mg/2 mL nebulizer solution Take 2 mL (0.5 mg) by nebulization 2 times a day.      buPROPion (Wellbutrin) 75 mg tablet Take 1 tablet (75 mg) by mouth 3 times a day.      carbinoxamine maleate 4 mg tablet Take 4 mg by mouth 2 times a day.      escitalopram (Lexapro) 20 mg tablet Take 1 tablet (20 mg) by mouth once daily.      levothyroxine (Synthroid) 50 mcg tablet Take 1 tablet (50 mcg) by mouth early in the morning.. Take on an empty stomach at the same time each day, either 30 to 60 minutes prior to breakfast 30 tablet 11    metFORMIN  mg 24 hr tablet Take 1 tablet (500 mg) by mouth 2 times a day. 180 tablet 3    naloxone (Narcan) 4 mg/0.1 mL nasal spray Administer 1 spray (4 mg) into affected nostril(s) if needed for opioid reversal. May repeat every 2-3 minutes if needed, alternating nostrils, until medical assistance becomes available. 2 each 0    OLANZapine (ZyPREXA) 5 mg tablet Take 1 tablet (5 mg) by mouth once daily at bedtime. For 4 days starting the evening of treatment 4 tablet 3    omeprazole (PriLOSEC) 40 mg DR capsule Take 1 capsule (40 mg) by mouth once daily. 90 capsule 3    ondansetron (Zofran) 8 mg tablet Take 1 tablet (8 mg) by mouth every 8 hours if needed for nausea or vomiting. 30 tablet 5    oxyCODONE (Roxicodone) 5 mg immediate release tablet Take 1 tablet (5 mg) by mouth every 4 hours if needed for severe pain (7 - 10) for up to 60 doses. 60 tablet 0    ProAir HFA 90 mcg/actuation inhaler Inhale 2 puffs every 4 hours if needed.      prochlorperazine (Compazine) 10 mg tablet Take 1 tablet (10 mg) by mouth every 6 hours if needed for nausea or vomiting. 30 tablet 5    rosuvastatin (Crestor) 10 mg tablet Take 1 tablet (10 mg) by mouth once daily. 90 tablet 3    tiZANidine (Zanaflex) 2 mg capsule Take 1 capsule (2 mg) by mouth 3 times a day as needed for muscle spasms. 60 capsule 0     traZODone (Desyrel) 50 mg tablet Take 1 tablet (50 mg) by mouth once daily at bedtime.       No current facility-administered medications for this encounter.       Administrations This Visit       Study [id number] ABBV-706 110.6 mg in sodium chloride 0.9% 100 mL IV       Admin Date  02/19/2025 Action  New Bag Dose  110.6 mg Route  intravenous Documented By  Janice Ramirez RN                    Orders placed during today's visit:  Orders Placed This Encounter   Procedures    Research collection: 4mL Red Top - Research Collect ABBV-706 ADA/NADA; Pre-Dose; Within 2 hours; Ambient; Other (5-8x); Allow to clot 30-60 minutes.     Standing Status:   Future     Number of Occurrences:   1     Standing Expiration Date:   2/19/2026     Order Specific Question:   Test     Answer:   ABBV-706 ADA/NADA     Order Specific Question:   Timepoint     Answer:   Pre-Dose     Order Specific Question:   Pre-Dose     Answer:   Within 2 hours     Order Specific Question:   Temperature     Answer:   Ambient     Order Specific Question:   Invert     Answer:   Other     Comments:   5-8x     Order Specific Question:   Handling     Answer:   Allow to clot 30-60 minutes.     Order Specific Question:   Optional?     Answer:   No    Research collection: 2mL Red Top - Research Collect ABBV-706 PK ADC/Total AB; Pre-Dose; Within 2 hours; Ambient; Other (5-8x); Allow to clot 30-60 minutes.     Standing Status:   Future     Number of Occurrences:   1     Standing Expiration Date:   2/19/2026     Order Specific Question:   Test     Answer:   ABBV-706 PK ADC/Total AB     Order Specific Question:   Timepoint     Answer:   Pre-Dose     Order Specific Question:   Pre-Dose     Answer:   Within 2 hours     Order Specific Question:   Temperature     Answer:   Ambient     Order Specific Question:   Invert     Answer:   Other     Comments:   5-8x     Order Specific Question:   Handling     Answer:   Allow to clot 30-60 minutes.     Order Specific Question:    Optional?     Answer:   No    Research collection: 5mL Gold SST - Research Collect Biomarker Serum; Pre-Dose; Within 2 hours; Ambient; 5x; Allow to clot 30 minutes     Standing Status:   Future     Number of Occurrences:   1     Standing Expiration Date:   2/19/2026     Order Specific Question:   Test     Answer:   Biomarker Serum     Order Specific Question:   Timepoint     Answer:   Pre-Dose     Order Specific Question:   Pre-Dose     Answer:   Within 2 hours     Order Specific Question:   Temperature     Answer:   Ambient     Order Specific Question:   Invert     Answer:   5x     Order Specific Question:   Handling     Answer:   Allow to clot 30 minutes    Research collection: 3mL Lavender EDTA - Research Collect Free Top1 Inhibitor; Pre-Dose; Within 2 hours; On ICE; 8-10x     Standing Status:   Future     Number of Occurrences:   1     Standing Expiration Date:   2/19/2026     Order Specific Question:   Test     Answer:   Free Top1 Inhibitor     Order Specific Question:   Timepoint     Answer:   Pre-Dose     Order Specific Question:   Pre-Dose     Answer:   Within 2 hours     Order Specific Question:   Temperature     Answer:   On ICE     Order Specific Question:   Invert     Answer:   8-10x     Order Specific Question:   Optional?     Answer:   No    Research collection: 4mL Lavender EDTA - Research Collect Biomarker WB DNA; Pre-Dose; Within 2 hours; Ambient; 8-10x     Standing Status:   Future     Number of Occurrences:   1     Standing Expiration Date:   2/19/2026     Order Specific Question:   Test     Answer:   Biomarker WB DNA     Order Specific Question:   Timepoint     Answer:   Pre-Dose     Order Specific Question:   Pre-Dose     Answer:   Within 2 hours     Order Specific Question:   Temperature     Answer:   Ambient     Order Specific Question:   Invert     Answer:   8-10x     Order Specific Question:   Optional?     Answer:   Yes    Research collection: 10mL RareCyte AccuCyte - Reserach Collect CTC  (SCLC Only); Pre-Dose; Within 2 hours; 8-10x     Standing Status:   Future     Number of Occurrences:   1     Standing Expiration Date:   2/19/2026     Order Specific Question:   Test     Answer:   CTC (SCLC Only)     Order Specific Question:   Timepoint     Answer:   Pre-Dose     Order Specific Question:   Pre-Dose     Answer:   Within 2 hours     Order Specific Question:   Invert     Answer:   8-10x     Order Specific Question:   Optional?     Answer:   No    Research collection: 10mL Streck - Research Collect ctDNA Plasma/WB; Pre-Dose; Within 2 hours; 8-10x     Standing Status:   Future     Number of Occurrences:   1     Standing Expiration Date:   2/19/2026     Order Specific Question:   Test     Answer:   ctDNA Plasma/WB     Order Specific Question:   Timepoint     Answer:   Pre-Dose     Order Specific Question:   Pre-Dose     Answer:   Within 2 hours     Order Specific Question:   Invert     Answer:   8-10x     Order Specific Question:   Optional?     Answer:   No    Research collection: 10mL Streck - Research Collect ctDNA Plasma/WB; Pre-Dose; Within 2 hours; 8-10x     Standing Status:   Future     Number of Occurrences:   1     Standing Expiration Date:   2/19/2026     Order Specific Question:   Test     Answer:   ctDNA Plasma/WB     Order Specific Question:   Timepoint     Answer:   Pre-Dose     Order Specific Question:   Pre-Dose     Answer:   Within 2 hours     Order Specific Question:   Invert     Answer:   8-10x     Order Specific Question:   Optional?     Answer:   No    Research collection: 2mL NimbusBase Collect ABBV-706 PK ADC/Total AB; Post-Dose; Other (EOI +15 minutes); Other (+/- 5 minutes); Ambient; Other (5-8x); Allow to clot 30-60 minutes.     Standing Status:   Future     Number of Occurrences:   1     Standing Expiration Date:   2/19/2026     Order Specific Question:   Test     Answer:   ABBV-706 PK ADC/Total AB     Order Specific Question:   Timepoint     Answer:   Post-Dose      Order Specific Question:   Post-Dose     Answer:   Other     Comments:   EOI +15 minutes     Order Specific Question:   Post-Dose Window     Answer:   Other     Comments:   +/- 5 minutes     Order Specific Question:   Temperature     Answer:   Ambient     Order Specific Question:   Invert     Answer:   Other     Comments:   5-8x     Order Specific Question:   Handling     Answer:   Allow to clot 30-60 minutes.     Order Specific Question:   Optional?     Answer:   No    Research collection: 3mL Lavender EDTA - Research Collect Free Top1 Inhibitor; Post-Dose; Other (EOI +15 minutes); Other (+/- 5 minutes); On ICE; 8-10x     Standing Status:   Future     Number of Occurrences:   1     Standing Expiration Date:   2/19/2026     Order Specific Question:   Test     Answer:   Free Top1 Inhibitor     Order Specific Question:   Timepoint     Answer:   Post-Dose     Order Specific Question:   Post-Dose     Answer:   Other     Comments:   EOI +15 minutes     Order Specific Question:   Post-Dose Window     Answer:   Other     Comments:   +/- 5 minutes     Order Specific Question:   Temperature     Answer:   On ICE     Order Specific Question:   Invert     Answer:   8-10x     Order Specific Question:   Optional?     Answer:   No    Research collection: 2mL Red Top - Research Collect ABBV-706 PK ADC/Total AB; Post-Dose; EOI +2 hours; +/- 15 minutes; Ambient; Other (5-8x); Allow to clot 30-60 minutes.     Standing Status:   Future     Number of Occurrences:   1     Standing Expiration Date:   2/19/2026     Order Specific Question:   Test     Answer:   ABBV-706 PK ADC/Total AB     Order Specific Question:   Timepoint     Answer:   Post-Dose     Order Specific Question:   Post-Dose     Answer:   EOI +2 hours     Order Specific Question:   Post-Dose Window     Answer:   +/- 15 minutes     Order Specific Question:   Temperature     Answer:   Ambient     Order Specific Question:   Invert     Answer:   Other     Comments:   5-8x      Order Specific Question:   Handling     Answer:   Allow to clot 30-60 minutes.     Order Specific Question:   Optional?     Answer:   No    Research collection: 3mL Lavender EDTA - Research Collect Free Top1 Inhibitor; Post-Dose; EOI +2 hours; +/- 15 minutes; On ICE; 8-10x     Standing Status:   Future     Number of Occurrences:   1     Standing Expiration Date:   2/19/2026     Order Specific Question:   Test     Answer:   Free Top1 Inhibitor     Order Specific Question:   Timepoint     Answer:   Post-Dose     Order Specific Question:   Post-Dose     Answer:   EOI +2 hours     Order Specific Question:   Post-Dose Window     Answer:   +/- 15 minutes     Order Specific Question:   Temperature     Answer:   On ICE     Order Specific Question:   Invert     Answer:   8-10x     Order Specific Question:   Optional?     Answer:   No    Research collection: 2mL Red Top - Research Collect ABBV-706 PK ADC/Total AB; Post-Dose; Other (EOI +4 hours); +/- 15 minutes; Ambient; Other (5-8x); Allow to clot 30-60 minutes.     Standing Status:   Future     Standing Expiration Date:   2/19/2026     Order Specific Question:   Test     Answer:   ABBV-706 PK ADC/Total AB     Order Specific Question:   Timepoint     Answer:   Post-Dose     Order Specific Question:   Post-Dose     Answer:   Other     Comments:   EOI +4 hours     Order Specific Question:   Post-Dose Window     Answer:   +/- 15 minutes     Order Specific Question:   Temperature     Answer:   Ambient     Order Specific Question:   Invert     Answer:   Other     Comments:   5-8x     Order Specific Question:   Handling     Answer:   Allow to clot 30-60 minutes.     Order Specific Question:   Optional?     Answer:   No    Research collection: 3mL Lavender EDTA - Research Collect Free Top1 Inhibitor; Post-Dose; Other (EOI +4 hours); +/- 15 minutes; On ICE; 8-10x     Standing Status:   Future     Standing Expiration Date:   2/19/2026     Order Specific Question:   Test     Answer:    Free Top1 Inhibitor     Order Specific Question:   Timepoint     Answer:   Post-Dose     Order Specific Question:   Post-Dose     Answer:   Other     Comments:   EOI +4 hours     Order Specific Question:   Post-Dose Window     Answer:   +/- 15 minutes     Order Specific Question:   Temperature     Answer:   On ICE     Order Specific Question:   Invert     Answer:   8-10x     Order Specific Question:   Optional?     Answer:   No    Research collection: 1mL Lavender EDTA - Reserach Collect C3a (FC3Ar); Post-Dose; Other (Within 2 hours after first sign of reaction.)     Standing Status:   Future     Standing Expiration Date:   2/19/2026     Order Specific Question:   Test     Answer:   C3a (FC3Ar)     Order Specific Question:   Timepoint     Answer:   Post-Dose     Order Specific Question:   Post-Dose     Answer:   Other     Comments:   Within 2 hours after first sign of reaction.     Order Specific Question:   Optional?     Answer:   Yes    Research collection: 1mL SST - Research Collect C5 (345); Post-Dose; Other (Within 2 hours after first sign of reaction.)     Standing Status:   Future     Standing Expiration Date:   2/19/2026     Order Specific Question:   Test     Answer:   C5 (345)     Order Specific Question:   Timepoint     Answer:   Post-Dose     Order Specific Question:   Post-Dose     Answer:   Other     Comments:   Within 2 hours after first sign of reaction.     Order Specific Question:   Optional?     Answer:   Yes    Research collection: 1mL SST - Research Collect IgE; Post-Dose; Other (Within 2 hours after first sign of reaction.)     Standing Status:   Future     Standing Expiration Date:   2/19/2026     Order Specific Question:   Test     Answer:   IgE     Order Specific Question:   Timepoint     Answer:   Post-Dose     Order Specific Question:   Post-Dose     Answer:   Other     Comments:   Within 2 hours after first sign of reaction.     Order Specific Question:   Optional?     Answer:   Yes     Research collection: 2mL Red Top - Research Collect Tryptase; Post-Dose; Other (Within 2 hours after first sign of reaction)     Standing Status:   Future     Standing Expiration Date:   2/19/2026     Order Specific Question:   Test     Answer:   Tryptase     Order Specific Question:   Timepoint     Answer:   Post-Dose     Order Specific Question:   Post-Dose     Answer:   Other     Comments:   Within 2 hours after first sign of reaction     Order Specific Question:   Optional?     Answer:   Yes    Research collection: 2mL Red Top - Research Collect ABBV-706 PK ADC/Total AB; Post-Dose; EOI +24 hours; +/- 2 hours; Ambient; Other (5-8x); Allow to clot 30-60 minutes.     Standing Status:   Future     Standing Expiration Date:   2/20/2026     Order Specific Question:   Test     Answer:   ABBV-706 PK ADC/Total AB     Order Specific Question:   Timepoint     Answer:   Post-Dose     Order Specific Question:   Post-Dose     Answer:   EOI +24 hours     Order Specific Question:   Post-Dose Window     Answer:   +/- 2 hours     Order Specific Question:   Temperature     Answer:   Ambient     Order Specific Question:   Invert     Answer:   Other     Comments:   5-8x     Order Specific Question:   Handling     Answer:   Allow to clot 30-60 minutes.     Order Specific Question:   Optional?     Answer:   No    Research collection: 3mL Lavender EDTA - Research Collect Free Top1 Inhibitor; Post-Dose; EOI +24 hours; +/- 2 hours; On ICE; 8-10x     Standing Status:   Future     Standing Expiration Date:   2/20/2026     Order Specific Question:   Test     Answer:   Free Top1 Inhibitor     Order Specific Question:   Timepoint     Answer:   Post-Dose     Order Specific Question:   Post-Dose     Answer:   EOI +24 hours     Order Specific Question:   Post-Dose Window     Answer:   +/- 2 hours     Order Specific Question:   Temperature     Answer:   On ICE     Order Specific Question:   Invert     Answer:   8-10x     Order Specific Question:    Optional?     Answer:   No    Research collection: 2mL Red Top - Research Collect ABBV-706 PK ADC/Total AB; Post-Dose; EOI +48 hours; +/- 2 hours; Ambient; Other (5-8x); Allow to clot 30-60 minutes.     Standing Status:   Future     Standing Expiration Date:   2/21/2026     Order Specific Question:   Test     Answer:   ABBV-706 PK ADC/Total AB     Order Specific Question:   Timepoint     Answer:   Post-Dose     Order Specific Question:   Post-Dose     Answer:   EOI +48 hours     Order Specific Question:   Post-Dose Window     Answer:   +/- 2 hours     Order Specific Question:   Temperature     Answer:   Ambient     Order Specific Question:   Invert     Answer:   Other     Comments:   5-8x     Order Specific Question:   Handling     Answer:   Allow to clot 30-60 minutes.     Order Specific Question:   Optional?     Answer:   No    Research collection: 3mL Lavender EDTA - Research Collect Free Top1 Inhibitor; Post-Dose; EOI +48 hours; +/- 2 hours; On ICE; 8-10x     Standing Status:   Future     Standing Expiration Date:   2/21/2026     Order Specific Question:   Test     Answer:   Free Top1 Inhibitor     Order Specific Question:   Timepoint     Answer:   Post-Dose     Order Specific Question:   Post-Dose     Answer:   EOI +48 hours     Order Specific Question:   Post-Dose Window     Answer:   +/- 2 hours     Order Specific Question:   Temperature     Answer:   On ICE     Order Specific Question:   Invert     Answer:   8-10x     Order Specific Question:   Optional?     Answer:   No    Research collection: 10mL RareCyte AccuCyte - Reserach Collect CTC (S[facility] Only); Post-Dose; EOI +48 hours; +/- 2 hours; 8-10x     Standing Status:   Future     Standing Expiration Date:   2/21/2026     Order Specific Question:   Test     Answer:   CTC (S[facility] Only)     Order Specific Question:   Timepoint     Answer:   Post-Dose     Order Specific Question:   Post-Dose     Answer:   EOI +48 hours     Order Specific Question:   Post-Dose  Window     Answer:   +/- 2 hours     Order Specific Question:   Invert     Answer:   8-10x     Order Specific Question:   Optional?     Answer:   No    CBC and Auto Differential     Standing Status:   Future     Standing Expiration Date:   2/26/2026     Order Specific Question:   Release result to MyChart     Answer:   Immediate [1]    Comprehensive metabolic panel     Standing Status:   Future     Standing Expiration Date:   2/26/2026     Order Specific Question:   Release result to MyChart     Answer:   Immediate [1]    Bilirubin, Direct     Standing Status:   Future     Standing Expiration Date:   2/26/2026     Order Specific Question:   Release result to MyChart     Answer:   Immediate [1]    Gamma GT     Standing Status:   Future     Standing Expiration Date:   2/26/2026     Order Specific Question:   Release result to MyChart     Answer:   Immediate [1]    Lactate dehydrogenase     Standing Status:   Future     Standing Expiration Date:   2/26/2026     Order Specific Question:   Release result to MyChart     Answer:   Immediate [1]    Magnesium     Standing Status:   Future     Standing Expiration Date:   2/26/2026     Order Specific Question:   Release result to MyChart     Answer:   Immediate [1]    Phosphorus     Standing Status:   Future     Standing Expiration Date:   2/26/2026     Order Specific Question:   Release result to MyChart     Answer:   Immediate [1]    Research collection: 4mL Red Top - Research Collect ABBV-706 ADA/NADA; Post-Dose; Other (EOI +168 hours); Other (+/- 24 hours); Ambient; Other (5-8x); Allow to clot 30-60 minutes.     Standing Status:   Future     Standing Expiration Date:   2/26/2026     Order Specific Question:   Test     Answer:   ABBV-706 ADA/NADA     Order Specific Question:   Timepoint     Answer:   Post-Dose     Order Specific Question:   Post-Dose     Answer:   Other     Comments:   EOI +168 hours     Order Specific Question:   Post-Dose Window     Answer:   Other      Comments:   +/- 24 hours     Order Specific Question:   Temperature     Answer:   Ambient     Order Specific Question:   Invert     Answer:   Other     Comments:   5-8x     Order Specific Question:   Handling     Answer:   Allow to clot 30-60 minutes.     Order Specific Question:   Optional?     Answer:   No    Research collection: 2mL Red Top - Research Collect ABBV-706 PK ADC/Total AB; Post-Dose; Other (EOI +168 hours); Other (+/- 24 hours); Ambient; Other (5-8x); Allow to clot 30-60 minutes.     Standing Status:   Future     Standing Expiration Date:   2/26/2026     Order Specific Question:   Test     Answer:   ABBV-706 PK ADC/Total AB     Order Specific Question:   Timepoint     Answer:   Post-Dose     Order Specific Question:   Post-Dose     Answer:   Other     Comments:   EOI +168 hours     Order Specific Question:   Post-Dose Window     Answer:   Other     Comments:   +/- 24 hours     Order Specific Question:   Temperature     Answer:   Ambient     Order Specific Question:   Invert     Answer:   Other     Comments:   5-8x     Order Specific Question:   Handling     Answer:   Allow to clot 30-60 minutes.     Order Specific Question:   Optional?     Answer:   No    Research collection: 3mL Lavender EDTA - Research Collect Free Top1 Inhibitor; Post-Dose; Other (EOI +168 hours); Other (+/- 24 hours); On ICE; 8-10x     Standing Status:   Future     Standing Expiration Date:   2/26/2026     Order Specific Question:   Test     Answer:   Free Top1 Inhibitor     Order Specific Question:   Timepoint     Answer:   Post-Dose     Order Specific Question:   Post-Dose     Answer:   Other     Comments:   EOI +168 hours     Order Specific Question:   Post-Dose Window     Answer:   Other     Comments:   +/- 24 hours     Order Specific Question:   Temperature     Answer:   On ICE     Order Specific Question:   Invert     Answer:   8-10x     Order Specific Question:   Optional?     Answer:   No    Research collection: 5mL Gold  SST - Research Collect Biomarker Serum; Post-Dose; Other (EOI +168 hours); Other (+/- 24 hours); Ambient; 5x; Keep upright and allow to clot 30 minutes.     Standing Status:   Future     Standing Expiration Date:   2/26/2026     Order Specific Question:   Test     Answer:   Biomarker Serum     Order Specific Question:   Timepoint     Answer:   Post-Dose     Order Specific Question:   Post-Dose     Answer:   Other     Comments:   EOI +168 hours     Order Specific Question:   Post-Dose Window     Answer:   Other     Comments:   +/- 24 hours     Order Specific Question:   Temperature     Answer:   Ambient     Order Specific Question:   Invert     Answer:   5x     Order Specific Question:   Handling     Answer:   Keep upright and allow to clot 30 minutes.     Order Specific Question:   Optional?     Answer:   No    Research collection: 10mL RareCyte AccuCyte - Reserach Collect CTC ([facility] Only); Post-Dose; Other (EOI +168 hours); Other (+/- 24 hours); 8-10x     Standing Status:   Future     Standing Expiration Date:   2/26/2026     Order Specific Question:   Test     Answer:   CTC ([facility] Only)     Order Specific Question:   Timepoint     Answer:   Post-Dose     Order Specific Question:   Post-Dose     Answer:   Other     Comments:   EOI +168 hours     Order Specific Question:   Post-Dose Window     Answer:   Other     Comments:   +/- 24 hours     Order Specific Question:   Invert     Answer:   8-10x     Order Specific Question:   Optional?     Answer:   No    CBC and Auto Differential     Standing Status:   Future     Standing Expiration Date:   3/5/2026     Order Specific Question:   Release result to MyChart     Answer:   Immediate [1]    Comprehensive metabolic panel     Standing Status:   Future     Standing Expiration Date:   3/5/2026     Order Specific Question:   Release result to MyChart     Answer:   Immediate [1]    Bilirubin, Direct     Standing Status:   Future     Standing Expiration Date:   3/5/2026     Order  Specific Question:   Release result to MyChart     Answer:   Immediate [1]    Gamma GT     Standing Status:   Future     Standing Expiration Date:   3/5/2026     Order Specific Question:   Release result to MyChart     Answer:   Immediate [1]    Lactate dehydrogenase     Standing Status:   Future     Standing Expiration Date:   3/5/2026     Order Specific Question:   Release result to MyChart     Answer:   Immediate [1]    Magnesium     Standing Status:   Future     Standing Expiration Date:   3/5/2026     Order Specific Question:   Release result to MyChart     Answer:   Immediate [1]    Phosphorus     Standing Status:   Future     Standing Expiration Date:   3/5/2026     Order Specific Question:   Release result to MyChart     Answer:   Immediate [1]    Research collection: 2mL Red Top - Research Collect ABBV-706 PK ADC/Total AB; Post-Dose; Other (EOI +336 hours); (+/- 24 hours); Ambient; Other (5-8x); Allow to clot 30-60 minutes.     Standing Status:   Future     Standing Expiration Date:   3/5/2026     Order Specific Question:   Test     Answer:   ABBV-706 PK ADC/Total AB     Order Specific Question:   Timepoint     Answer:   Post-Dose     Order Specific Question:   Post-Dose     Answer:   Other     Comments:   EOI +336 hours     Order Specific Question:   Temperature     Answer:   Ambient     Order Specific Question:   Invert     Answer:   Other     Comments:   5-8x     Order Specific Question:   Handling     Answer:   Allow to clot 30-60 minutes.     Order Specific Question:   Optional?     Answer:   No    Research collection: 3mL Lavender EDTA - Research Collect Free Top1 Inhibitor; Post-Dose; Other (EOI +336 hours); Other (+/- 24 hours); On ICE; 8-10x     Standing Status:   Future     Standing Expiration Date:   3/5/2026     Order Specific Question:   Test     Answer:   Free Top1 Inhibitor     Order Specific Question:   Timepoint     Answer:   Post-Dose     Order Specific Question:   Post-Dose     Answer:    Other     Comments:   EOI +336 hours     Order Specific Question:   Post-Dose Window     Answer:   Other     Comments:   +/- 24 hours     Order Specific Question:   Temperature     Answer:   On ICE     Order Specific Question:   Invert     Answer:   8-10x     Order Specific Question:   Optional?     Answer:   No    Research collection: 5mL Gold [facility] - Research Collect Biomarker Serum; Post-Dose; Other (EOI +336 hours); Other (+/- 24 hours); Ambient; 5x; Keep upright and allow to clot 30 minutes.     Standing Status:   Future     Standing Expiration Date:   3/5/2026     Order Specific Question:   Test     Answer:   Biomarker Serum     Order Specific Question:   Timepoint     Answer:   Post-Dose     Order Specific Question:   Post-Dose     Answer:   Other     Comments:   EOI +336 hours     Order Specific Question:   Post-Dose Window     Answer:   Other     Comments:   +/- 24 hours     Order Specific Question:   Temperature     Answer:   Ambient     Order Specific Question:   Invert     Answer:   5x     Order Specific Question:   Handling     Answer:   Keep upright and allow to clot 30 minutes.     Order Specific Question:   Optional?     Answer:   No    Research collection: 10mL RareCyte AccuCyte - Reserach Collect CTC ([facility] Only); Post-Dose; Other (EOI +336 hours); Other (+/- 24 hours); 8-10x     Standing Status:   Future     Number of Occurrences:   1     Standing Expiration Date:   3/5/2026     Order Specific Question:   Test     Answer:   CTC (S[facility] Only)     Order Specific Question:   Timepoint     Answer:   Post-Dose     Order Specific Question:   Post-Dose     Answer:   Other     Comments:   EOI +336 hours     Order Specific Question:   Post-Dose Window     Answer:   Other     Comments:   +/- 24 hours     Order Specific Question:   Invert     Answer:   8-10x     Order Specific Question:   Optional?     Answer:   No    Research collection: 4mL Red Top - Research Collect ABBV-706 ADA/NADA; Pre-Dose; Within 2 hours;  Ambient; Other (5-8x); Allow to clot 30-60 minutes.     Standing Status:   Standing     Number of Occurrences:   1     Order Specific Question:   Test     Answer:   ABBV-706 ADA/NADA     Order Specific Question:   Timepoint     Answer:   Pre-Dose     Order Specific Question:   Pre-Dose     Answer:   Within 2 hours     Order Specific Question:   Temperature     Answer:   Ambient     Order Specific Question:   Invert     Answer:   Other     Comments:   5-8x     Order Specific Question:   Handling     Answer:   Allow to clot 30-60 minutes.     Order Specific Question:   Optional?     Answer:   No    Research collection: 2mL Red Top - Research Collect ABBV-706 PK ADC/Total AB; Pre-Dose; Within 2 hours; Ambient; Other (5-8x); Allow to clot 30-60 minutes.     Standing Status:   Standing     Number of Occurrences:   1     Order Specific Question:   Test     Answer:   ABBV-706 PK ADC/Total AB     Order Specific Question:   Timepoint     Answer:   Pre-Dose     Order Specific Question:   Pre-Dose     Answer:   Within 2 hours     Order Specific Question:   Temperature     Answer:   Ambient     Order Specific Question:   Invert     Answer:   Other     Comments:   5-8x     Order Specific Question:   Handling     Answer:   Allow to clot 30-60 minutes.     Order Specific Question:   Optional?     Answer:   No    Research collection: 5mL Gold SST - Research Collect Biomarker Serum; Pre-Dose; Within 2 hours; Ambient; 5x; Allow to clot 30 minutes     Standing Status:   Standing     Number of Occurrences:   1     Order Specific Question:   Test     Answer:   Biomarker Serum     Order Specific Question:   Timepoint     Answer:   Pre-Dose     Order Specific Question:   Pre-Dose     Answer:   Within 2 hours     Order Specific Question:   Temperature     Answer:   Ambient     Order Specific Question:   Invert     Answer:   5x     Order Specific Question:   Handling     Answer:   Allow to clot 30 minutes    Research collection: 3mL  Lavender EDTA - Research Collect Free Top1 Inhibitor; Pre-Dose; Within 2 hours; On ICE; 8-10x     Standing Status:   Standing     Number of Occurrences:   1     Order Specific Question:   Test     Answer:   Free Top1 Inhibitor     Order Specific Question:   Timepoint     Answer:   Pre-Dose     Order Specific Question:   Pre-Dose     Answer:   Within 2 hours     Order Specific Question:   Temperature     Answer:   On ICE     Order Specific Question:   Invert     Answer:   8-10x     Order Specific Question:   Optional?     Answer:   No    Research collection: 4mL Lavender EDTA - Research Collect Biomarker WB DNA; Pre-Dose; Within 2 hours; Ambient; 8-10x     Standing Status:   Standing     Number of Occurrences:   1     Order Specific Question:   Test     Answer:   Biomarker WB DNA     Order Specific Question:   Timepoint     Answer:   Pre-Dose     Order Specific Question:   Pre-Dose     Answer:   Within 2 hours     Order Specific Question:   Temperature     Answer:   Ambient     Order Specific Question:   Invert     Answer:   8-10x     Order Specific Question:   Optional?     Answer:   Yes    Research collection: 10mL RareCyte AccuCyte - Reserach Collect CTC (S[facility] Only); Pre-Dose; Within 2 hours; 8-10x     Standing Status:   Standing     Number of Occurrences:   1     Order Specific Question:   Test     Answer:   CTC (S[facility] Only)     Order Specific Question:   Timepoint     Answer:   Pre-Dose     Order Specific Question:   Pre-Dose     Answer:   Within 2 hours     Order Specific Question:   Invert     Answer:   8-10x     Order Specific Question:   Optional?     Answer:   No    Research collection: 10mL Streck - Research Collect ctDNA Plasma/WB; Pre-Dose; Within 2 hours; 8-10x     Standing Status:   Standing     Number of Occurrences:   1     Order Specific Question:   Test     Answer:   ctDNA Plasma/WB     Order Specific Question:   Timepoint     Answer:   Pre-Dose     Order Specific Question:   Pre-Dose     Answer:    Within 2 hours     Order Specific Question:   Invert     Answer:   8-10x     Order Specific Question:   Optional?     Answer:   No    Research collection: 10mL Streck - Research Collect ctDNA Plasma/WB; Pre-Dose; Within 2 hours; 8-10x     Standing Status:   Standing     Number of Occurrences:   1     Order Specific Question:   Test     Answer:   ctDNA Plasma/WB     Order Specific Question:   Timepoint     Answer:   Pre-Dose     Order Specific Question:   Pre-Dose     Answer:   Within 2 hours     Order Specific Question:   Invert     Answer:   8-10x     Order Specific Question:   Optional?     Answer:   No    Research collection: 10mL RareCyte AccuCyte - Reserach Collect CTC (S[facility] Only); Post-Dose; Other (EOI +336 hours); Other (+/- 24 hours); 8-10x     Standing Status:   Standing     Number of Occurrences:   1     Order Specific Question:   Test     Answer:   CTC (S[facility] Only)     Order Specific Question:   Timepoint     Answer:   Post-Dose     Order Specific Question:   Post-Dose     Answer:   Other     Comments:   EOI +336 hours     Order Specific Question:   Post-Dose Window     Answer:   Other     Comments:   +/- 24 hours     Order Specific Question:   Invert     Answer:   8-10x     Order Specific Question:   Optional?     Answer:   No    Research collection: 2mL Red [employer] [place] - Research Collect ABBV-706 PK ADC/Total AB; Post-Dose; Other (EOI +15 minutes); Other (+/- 5 minutes); Ambient; Other (5-8x); Allow to clot 30-60 minutes.     Standing Status:   Standing     Number of Occurrences:   1     Order Specific Question:   Test     Answer:   ABBV-706 PK ADC/Total AB     Order Specific Question:   Timepoint     Answer:   Post-Dose     Order Specific Question:   Post-Dose     Answer:   Other     Comments:   EOI +15 minutes     Order Specific Question:   Post-Dose Window     Answer:   Other     Comments:   +/- 5 minutes     Order Specific Question:   Temperature     Answer:   Ambient     Order Specific Question:   Invert      Answer:   Other     Comments:   5-8x     Order Specific Question:   Handling     Answer:   Allow to clot 30-60 minutes.     Order Specific Question:   Optional?     Answer:   No    Research collection: 3mL Lavender EDTA - Research Collect Free Top1 Inhibitor; Post-Dose; Other (EOI +15 minutes); Other (+/- 5 minutes); On ICE; 8-10x     Standing Status:   Standing     Number of Occurrences:   1     Order Specific Question:   Test     Answer:   Free Top1 Inhibitor     Order Specific Question:   Timepoint     Answer:   Post-Dose     Order Specific Question:   Post-Dose     Answer:   Other     Comments:   EOI +15 minutes     Order Specific Question:   Post-Dose Window     Answer:   Other     Comments:   +/- 5 minutes     Order Specific Question:   Temperature     Answer:   On ICE     Order Specific Question:   Invert     Answer:   8-10x     Order Specific Question:   Optional?     Answer:   No    Research collection: 2mL Red Top - Research Collect ABBV-706 PK ADC/Total AB; Post-Dose; EOI +2 hours; +/- 15 minutes; Ambient; Other (5-8x); Allow to clot 30-60 minutes.     Standing Status:   Standing     Number of Occurrences:   1     Order Specific Question:   Test     Answer:   ABBV-706 PK ADC/Total AB     Order Specific Question:   Timepoint     Answer:   Post-Dose     Order Specific Question:   Post-Dose     Answer:   EOI +2 hours     Order Specific Question:   Post-Dose Window     Answer:   +/- 15 minutes     Order Specific Question:   Temperature     Answer:   Ambient     Order Specific Question:   Invert     Answer:   Other     Comments:   5-8x     Order Specific Question:   Handling     Answer:   Allow to clot 30-60 minutes.     Order Specific Question:   Optional?     Answer:   No    Research collection: 3mL Lavender EDTA - Research Collect Free Top1 Inhibitor; Post-Dose; EOI +2 hours; +/- 15 minutes; On ICE; 8-10x     Standing Status:   Standing     Number of Occurrences:   1     Order Specific Question:   Test      Answer:   Free Top1 Inhibitor     Order Specific Question:   Timepoint     Answer:   Post-Dose     Order Specific Question:   Post-Dose     Answer:   EOI +2 hours     Order Specific Question:   Post-Dose Window     Answer:   +/- 15 minutes     Order Specific Question:   Temperature     Answer:   On ICE     Order Specific Question:   Invert     Answer:   8-10x     Order Specific Question:   Optional?     Answer:   No    Adult diet     Order Specific Question:   Diet type     Answer:   Regular    Adult diet Regular     Standing Status:   Standing     Number of Occurrences:   1     Order Specific Question:   Diet type     Answer:   Regular        [id number] - ABBV-706 alone or in combination in subjects with advanced solid tumors    Patient has no adverse events documented in the Research Adverse Events activity.          PRE-DOSE ECG   JOSEFA 150c Study-specific ECG Machine - rest supine or semi-recumbent at least 5 minutes prior:  Single     ECGs are time-matched to PK sample collection & should be collected within 10 mins prior to PK collection.  ECGs occurring near meals will take place prior to meals  QTcF calculation: Fridericia's formula: [email or web address]\QTcF calculator  MD/Provider to sign & date. Do not need to wait before starting treatment.  - Copies given to Baldev Ferraro NP.  To be signed by Dr. Kohli later today.  Contact MD/Provider if abnormal from baseline.    Rest Time  QTcF   0930 [id number]        PRE-DOSE Vital Signs     Temp, Heart Rate, Respiration, Blood Pressure: rest sitting at least 3 minutes prior to obtaining     PRE-DOSE Research Correlatives:     Access Type: #22 PIV Location: [facility]   For Oncology study, [facility] Treatment Plan for orders  Enter Additional Information here not covered in the Treatment Plan:  Text  AFTER ECGs  Deliver to [facility] lab for processing    Time point Specimen Test Volume Tube Handling Draw Time    Pre-dose (within 2 hours) (draw in order)       ABBV-706 ADA/NADA 4 mL Red Top Serum Invert 5 - 8x; Ambient Allow to clot 30 -60 mins 1015    ABBV-706 PK ADC/Total AB 2 mL Red Top Serum Invert 5 - 8x; Ambient  Allow to clot 30 -60 mins 1015    Biomarker Serum 5 mL Gold Top SST Invert 5x; Upright. Ambient Allow to clot 30 mins 1015    Free TOP1 Inhibitor 3 mL EDTA Lavender Invert 8 - 10x. On ICE 1015    Biomarker WB DNA- If ordered see EPIC 4 mL EDTA Lavender Invert 8 - 10x. Ambient 1015    CTC ([facility] Only)-If ordered see EPCI 10 mL RareCyte AccuCyte BCT See Below 1015    ctDNA Plasma/WB 2 x 10 Streck Cell-Free DNA  1015    CTC & ctDNA Plasma/WB instructions  Keep patient's arm in the downward position during collection procedure.  Hold the tube with the stopper in the uppermost position so that the tube contents do not touch the stopper or the end of the needle during sample collection.  Release tourniquet once blood start to flow in the tube, or within 2 minutes of application.  Invert 8 - 10x.        Weight-Based Dosing   Baseline (screening) weight (kg) 85.4 kg  Date measured 2/10/25 ( Refer Previous encounters to enter)  Actual weight   Vitals:    02/19/25 0858   Weight: 85.1 kg (187 lb 9.8 oz)     (Weight on Day 1 of cycle)   Date Measured 02/19/2025  Enter Information here  Dose based on Cycle 1 Day 1 weight.  May use Cycle 1 Day 1 weight unless noted weight difference of 10% weight gain or loss.  The minimum dose of ABBV-706 is 50 mg.  Any calculated dose < 50 mg, patient to be administered 50 mg.  Body weight capped at 120 kg. 120 kg will be utilized to calculate the dose.     Research Drug Administration   Document medication administration in MAR activity. Document Research specific instructions below.  Enter Information here  ABBV-706  Infuse over 60 minutes (+ 10 minutes).  Observation time 1 hour post-dose.  If any Infusion-Related Reaction or Suspected Allergic-Type Reaction specific safety labs to be drawn- see EPIC     Infusion-Related Reactions    Review Safety Parameters on the medication Order. Note specific instructions below.    - SOC Hypersensivity Orders      Þ If any Infusion-Related Reaction or Suspected Allergic-Type Reaction Þ  Clinical Safety Labs  Z-Req#:    Timepoint Blood Test Collection Priority Order of Origin       Within 2 hours after 1st sign of reaction C3a (FC3AR) (1 mL Lavender top) STAT Z-req (send to [facility])    C5 (354) (1 mL SST) STAT Z-req (send to [place])    IgE STAT Z-req    Tryptase (2 mL Red Top) STAT Z-req     DURING-DOSE Vital Signs     Temp, Heart Rate, Respiration, Blood Pressure: rest sitting at least 3 minutes prior to obtaining  30 minutes (+/-10 minutes) after the start of infusion      POST-DOSE Vital Signs      Temp, Heart Rate, Respiration, Blood Pressure: rest sitting at least 3 minutes prior to obtaining  End of Infusion (Time “0”)     POST-DOSE ECG   JOSEFA 150c Study-specific ECG Machine - rest supine or semi-recumbent at least 5 minutes prior:  Single  ECGs are time-matched to PK sample collection & should be collected within 10 mins prior to PK collection.  ECGs occurring near meals will take place prior to meals  QTcF calculation: Fridericia's formula: S:\[email or web address]\Nursing\Protocols\QTcF calculator  Contact MD/Provider if abnormal from baseline.    Time Point  Rest Time  QTcF   +15 min after End of Infusion 1233 403   +2 Hours after End of Infusion 1400 407        POST-DOSE Research Correlatives:     Access Type: #22 PIV Location: [facility]   For Oncology study, Refer [facility] Treatment Plan for orders  Enter Additional Information here not covered in the Treatment Plan:    Time point Specimen Test Volume Tube Handling Draw Time    +15 mins after EOI (+/- 5 mins ) (draw in order)   ABBV-706 PK ADC/Total AB 2 mL Red Top Serum Invert 5 - 8x; Ambient Allow to clot 30 -60 mins 1236    Free TOP1 Inhibitor 3 mL EDTA Lavender Invert 8 - 10x. On ICE 1236   +2 hrs after EOI (+/- 15 mins ) (draw in order)    ABBV-706  PK ADC/Total AB 2 mL Red Top Serum Invert 5 - 8x; Ambient Allow to clot 30 -60 mins 1419    Free TOP1 Inhibitor 3 mL EDTA Lavender Invert 8 - 10x. On ICE 1419   +4 hrs after EOI (+/- 15 mins ) ABBV-706 PK ADC/Total AB 2 mL Red Top Serum Invert 5 - 8x; Ambient Allow to clot 30 -60 mins 1602    Free TOP1 Inhibitor 3 mL EDTA Lavender Invert 8 - 10x. On ICE 1602          Discharge Instructions   Discharge patient to home after study requirements completed or PK sample obtained.  Remind pt to return: at 1200 on Day 2 for +24 hr PK & ECGs (+/- 2 hrs) after ABBV-706 EOI   Discharge time: 1608     Janice Ramirez RN  02/19/25

## 2025-02-19 NOTE — PROGRESS NOTES
Patient ID: Minal Sun is a 59 y.o. female.    DIAGNOSIS     Small cell carcinoma of the lung.  Date of diagnosis is September 27, 2024 from a bronchoscopic biopsy of a subcarinal lymph node.  Immunohistochemistry positive for TTF-1, INSM1, synaptophysin and chromogranin, negative for p40, RB immunostain shows loss of nuclear expression.        STAGING     Clinical T4, N2, M1 C, stage IVc, extensive stage disease        CURRENT SITES OF DISEASE      Left lung, mediastinum, left hilum of the lung, liver, intra-abdominal lymph nodes in the portacaval and periportal region, bone metastases        MOLECULAR GENOMICS     Test Name: Fritter xF+ (XF.V3) dated October 2024     Molecular Findings:  * Gene: PIK3CA, Variant: Missense variant (exon 1) - GOF, Potentially Actionable, p.R88Q (Allele Frequency - 0.3%)  * Gene: TP53, Variant: Missense variant - LOF, Biologically Relevant, p.R249G (Allele Frequency - 70.1%)  * Gene: RB1, Variant: Splice region variant - LOF, Biologically Relevant, c.540-1G>T, Splice Site (Allele Frequency - 56.8%)        Test Name: Fritter xT (XT.V4)  Laboratory Name: [facility]  Specimen Source: Lymph node, level 7  Collection Date: 27-Sep-2024     Molecular Findings:  * Gene: TP53, Variant: Missense variant - LOF, Biologically Relevant, p.R249G (Allele Frequency - 96.2%)  * Gene: RB1, Variant: Splice region variant - LOF, Biologically Relevant, c.540-1G>T, Splice Site (Allele Frequency - 96%)  * Gene: KMT2D, Variant: Stop gain - LOF, Biologically Relevant, p.*, Nonsense (Allele Frequency - 41.9%)  * Biomarker: Microsatellite Instability, Status: stable  * Biomarker: Tumor Mutation [place] (2.6 Muts/Mb, Percentile: 33)        SERUM TUMOR MARKERS     Baseline LDH within normal limits  C1D1 on Study LDH was 274        PRIOR THERAPY     Combination chemotherapy and immunotherapy.  Initially enrolled on a clinical trial of Lutathera and underwent octreotide scan which was positive.   However she decided to come off study and did not receive Lutathera with her chemo.  Chemotherapy started October 6, 2024.  Immunotherapy completed added with cycle #2 11.06.2024.  Minor response observed after 2 cycles of treatment. PD observed after C4.        CURRENT THERAPY     2.19.2025: Started on Phase 1 study [id number] with study drug ABBV-706 which is an ADC targeting SEZ6 with a topoisomerase 1 inhibitor payload     CURRENT ONCOLOGICAL PROBLEMS    Cough and shortness of breath significantly improved with systemic treatment.  Pulmonary Embolism  Immunotherapy induced hypothyroidism  Neoplasm non cardiac chest/back pain        HISTORY OF PRESENT ILLNESS     This is a 59-year-old patient.  She states that she was feeling sick toward the end of spring of this year with some sinus problems.  Was seen by her allergist and was treated with antibiotics.  She was also seen at 1 point by primary care and steroids and antibiotics and inhalers were given.  She did not have any resolution and then ultimately developed a little bit of the hemoptysis which led to a chest x-ray showing an abnormality and finally a CT scan of the chest.The CT scan of the chest was done as a lung cancer screening low-dose CT and completed on September 20, 2024.  This demonstrated a extensive infiltrative mediastinal and hilar mass.  There was narrowing of the left mainstem bronchus and lower lobe bronchus secondary to extrinsic compression.  The predominant growth was within the left hilum and subcarinal region.  She underwent a bronchoscopy dated September 27, 2024 showing splaying of the main alanna.  There was mild erosion of a lymph node into the left mainstem bronchus.  There was erythema and mild erosions of an endobronchial tumor along the medial border.  Moderate to severe stenosis of the left lower lobe bronchus to 75%.        PAST MEDICAL HISTORY     Lumbar surgery about 30 years ago   hysterectomy in 2000  Diabetes  COPD  Right  eye exophthalmus  herpes zoster        SOCIAL HISTORY     Patient lives with her sister.  She lives in [facility].  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  She started smoking at the age of 14 and continues to smoke at the age of 59.  Has smoked for 45 years on average 1 pack/day.        CURRENT MEDS     See medication list, meds reviewed, includes metformin, rosuvastatin, been done so Nied inhalers, Wellbutrin escitalopram, trazodone        ALLERGIES     Patient denies any drug allergies        FAMILY HISTORY      Mother had bladder cancer and possibly breast cancer.     Subjective    Today 02.19.2025 Patient in clinic for C1D1 on [id number].   Patient reports over all she feels good, but continues to have baseline symptoms of fatigue, SOB with exertion, cough with clear sputum, noncardiac chest pain and back pain. Today she denies any headaches, rash/itching, chills or fever, chest pain or chest tightness, painful inflammation/ulceration oral mucous membranes, nausea/vomiting, diarrhea, hematemesis /hemoptysis, hematuria/rectal bleeding, urinary symptoms, swelling extremities, weakness, or peripheral neuropathy. ROS as above. Remainder of 10 point review of systems elicited and otherwise unremarkable.     Cancer  Associated symptoms include coughing and fatigue. Pertinent negatives include no abdominal pain, anorexia, arthralgias, change in bowel habit, chest pain, chills, congestion, diaphoresis, fever, headaches, joint swelling, myalgias, nausea, neck pain, numbness, rash, sore throat, swollen glands, urinary symptoms, vertigo, visual change or vomiting.     Objective    BSA: 1.94 meters squared  /86 (BP Location: Left arm, Patient Position: Sitting)   Pulse 86   Temp 36.2 °C (97.2 °F) (Temporal)   Resp 24   Wt 85.1 kg (187 lb 9.8 oz)   SpO2 95%   BMI 33.24 kg/m²     "  Daily Weight  02/19/25 : 85.1 kg (187 lb 9.8 oz)  02/10/25 : 85.4 kg (188 lb 4.4 oz)  02/06/25 : 81.6 kg (180 lb)  02/05/25 : 82.9 kg (182 lb 12.2 oz)  01/29/25 : 84.1 kg (185 lb 6.5 oz)         1/16/2025    11:00 AM 1/17/2025     1:52 PM 1/29/2025     2:12 PM 2/5/2025     1:28 PM 2/6/2025     9:14 AM 2/10/2025     2:53 PM 2/19/2025     8:58 AM   Vitals   Systolic 131 119 121 138  136 143   Diastolic 66 71 82 83  85 86   BP Location Right arm Right arm Left arm Left arm  Left arm Left arm   Heart Rate 92 81 82 88  95 86   Temp 36.6 °C (97.9 °F) 36.6 °C (97.9 °F) 36.3 °C (97.3 °F) 36.3 °C (97.3 °F)  36.4 °C (97.5 °F) 36.2 °C (97.2 °F)   Resp 18 16 20 17  20 24   Height     1.575 m (5' 2\") 1.6 m (5' 2.99\")    Weight (lb) 183 184.3 185.41 182.76 180 188.27 187.61   BMI 33.47 kg/m2 33.71 kg/m2 33.91 kg/m2 33.43 kg/m2 32.92 kg/m2 33.36 kg/m2 33.24 kg/m2   BSA (m2) 1.91 m2 1.91 m2 1.92 m2 1.9 m2 1.89 m2 1.95 m2 1.94 m2   Visit Report   Report Report          Physical Exam  Constitutional:       General: She is not in acute distress.     Appearance: Normal appearance. She is not ill-appearing, toxic-appearing or diaphoretic.   HENT:      Nose: No congestion or rhinorrhea.      Mouth/Throat:      Pharynx: No oropharyngeal exudate or posterior oropharyngeal erythema.   Eyes:      General: No scleral icterus.     Conjunctiva/sclera: Conjunctivae normal.   Cardiovascular:      Rate and Rhythm: Normal rate and regular rhythm.      Pulses: Normal pulses.      Heart sounds: Normal heart sounds. No murmur heard.     No friction rub. No gallop.   Pulmonary:      Effort: Pulmonary effort is normal. No respiratory distress.      Breath sounds: Normal breath sounds. No stridor. No wheezing, rhonchi or rales.   Chest:      Chest wall: No tenderness.   Abdominal:      General: There is no distension.      Palpations: There is no mass.      Tenderness: There is no abdominal tenderness. There is no guarding or rebound. "   Musculoskeletal:      Cervical back: No tenderness.      Right lower leg: No edema.      Left lower leg: No edema.   Lymphadenopathy:      Cervical: No cervical adenopathy.   Skin:     General: Skin is warm.      Coloration: Skin is not jaundiced.      Findings: No bruising or lesion.   Neurological:      General: No focal deficit present.      Mental Status: She is oriented to person, place, and time.   Psychiatric:         Mood and Affect: Mood normal.         Behavior: Behavior normal.       Performance Status:  ECOG 1: Restricted in physically strenuous activity but ambulatory and able to carry out work of a light or sedentary nature, e.g., light house work, office work.     Lab on 02/18/2025   Component Date Value Ref Range Status    Color, Urine 02/18/2025 Light-Yellow  Light-Yellow, Yellow, Dark-Yellow Final    Appearance, Urine 02/18/2025 Clear  Clear Final    Specific Gravity, Urine 02/18/2025 1.010  1.005 - 1.035 Final    pH, Urine 02/18/2025 5.5  5.0, 5.5, 6.0, 6.5, 7.0, 7.5, 8.0 Final    Protein, Urine 02/18/2025 NEGATIVE  NEGATIVE, 10 (TRACE), 20 (TRACE) mg/dL Final    Glucose, Urine 02/18/2025 Normal  Normal mg/dL Final    Blood, Urine 02/18/2025 NEGATIVE  NEGATIVE mg/dL Final    Ketones, Urine 02/18/2025 NEGATIVE  NEGATIVE mg/dL Final    Bilirubin, Urine 02/18/2025 NEGATIVE  NEGATIVE mg/dL Final    Urobilinogen, Urine 02/18/2025 Normal  Normal mg/dL Final    Nitrite, Urine 02/18/2025 NEGATIVE  NEGATIVE Final    Leukocyte Esterase, Urine 02/18/2025 NEGATIVE  NEGATIVE Final    Phosphorus 02/18/2025 4.3  2.5 - 4.9 mg/dL Final    Magnesium 02/18/2025 2.15  1.60 - 2.40 mg/dL Final    LDH 02/18/2025 274 (H)  84 - 246 U/L Final    GGT 02/18/2025 213 (H)  5 - 55 U/L Final    Bilirubin, Direct 02/18/2025 0.1  0.0 - 0.3 mg/dL Final    Glucose 02/18/2025 123 (H)  74 - 99 mg/dL Final    Sodium 02/18/2025 139  136 - 145 mmol/L Final    Potassium 02/18/2025 4.1  3.5 - 5.3 mmol/L Final    Chloride 02/18/2025 102   98 - 107 mmol/L Final    Bicarbonate 02/18/2025 27  21 - 32 mmol/L Final    Anion Gap 02/18/2025 14  10 - 20 mmol/L Final    Urea Nitrogen 02/18/2025 8  6 - 23 mg/dL Final    Creatinine 02/18/2025 0.83  0.50 - 1.05 mg/dL Final    eGFR 02/18/2025 81  >60 mL/min/1.73m*2 Final    Calcium 02/18/2025 9.4  8.6 - 10.3 mg/dL Final    Albumin 02/18/2025 4.4  3.4 - 5.0 g/dL Final    Alkaline Phosphatase 02/18/2025 129  33 - 136 U/L Final    Total Protein 02/18/2025 6.9  6.4 - 8.2 g/dL Final    AST 02/18/2025 35  9 - 39 U/L Final    Bilirubin, Total 02/18/2025 0.3  0.0 - 1.2 mg/dL Final    ALT 02/18/2025 33  7 - 45 U/L Final    WBC 02/18/2025 7.0  4.4 - 11.3 x10*3/uL Final    nRBC 02/18/2025 0.0  0.0 - 0.0 /100 WBCs Final    RBC 02/18/2025 4.34  4.00 - 5.20 x10*6/uL Final    Hemoglobin 02/18/2025 13.3  12.0 - 16.0 g/dL Final    Hematocrit 02/18/2025 41.1  36.0 - 46.0 % Final    MCV 02/18/2025 95  80 - 100 fL Final    MCH 02/18/2025 30.6  26.0 - 34.0 pg Final    MCHC 02/18/2025 32.4  32.0 - 36.0 g/dL Final    RDW 02/18/2025 17.2 (H)  11.5 - 14.5 % Final    Platelets 02/18/2025 237  150 - 450 x10*3/uL Final    Neutrophils % 02/18/2025 65.0  40.0 - 80.0 % Final    Immature Granulocytes %, Automated 02/18/2025 0.6  0.0 - 0.9 % Final    Lymphocytes % 02/18/2025 25.6  13.0 - 44.0 % Final    Monocytes % 02/18/2025 6.7  2.0 - 10.0 % Final    Eosinophils % 02/18/2025 1.7  0.0 - 6.0 % Final    Basophils % 02/18/2025 0.4  0.0 - 2.0 % Final    Neutrophils Absolute 02/18/2025 4.57  1.20 - 7.70 x10*3/uL Final    Immature Granulocytes Absolute, Au* 02/18/2025 0.04  0.00 - 0.70 x10*3/uL Final    Lymphocytes Absolute 02/18/2025 1.80  1.20 - 4.80 x10*3/uL Final    Monocytes Absolute 02/18/2025 0.47  0.10 - 1.00 x10*3/uL Final    Eosinophils Absolute 02/18/2025 0.12  0.00 - 0.70 x10*3/uL Final    Basophils Absolute 02/18/2025 0.03  0.00 - 0.10 x10*3/uL Final   Hospital Outpatient Visit on 02/13/2025   Component Date Value Ref Range Status    AV  pk jose 02/13/2025 1.46  m/s Final    MV E/A ratio 02/13/2025 0.97   Final    LA vol index A/L 02/13/2025 18.5  ml/m2 Final    Tricuspid annular plane systolic e* 02/13/2025 1.5  cm Final    LV EF 02/13/2025 58  % Final    RV free wall pk S' 02/13/2025 7.33  cm/s Final    LVIDd 02/13/2025 3.47  cm Final    RVSP 02/13/2025 22.7  mmHg Final    AV pk grad 02/13/2025 8  mmHg Final    LV A4C EF 02/13/2025 61.8   Final   Hospital Outpatient Visit on 02/10/2025   Component Date Value Ref Range Status    Adrenocorticotropic Hormone (ACTH) 02/10/2025 5.5 (L)  7.2 - 63.3 pg/mL Final    aPTT 02/10/2025 49 (H)  27 - 38 seconds Final    Bilirubin, Direct 02/10/2025 0.0  0.0 - 0.3 mg/dL Final    WBC 02/10/2025 8.3  4.4 - 11.3 x10*3/uL Final    nRBC 02/10/2025 0.0  0.0 - 0.0 /100 WBCs Final    RBC 02/10/2025 4.28  4.00 - 5.20 x10*6/uL Final    Hemoglobin 02/10/2025 13.0  12.0 - 16.0 g/dL Final    Hematocrit 02/10/2025 38.8  36.0 - 46.0 % Final    MCV 02/10/2025 91  80 - 100 fL Final    MCH 02/10/2025 30.4  26.0 - 34.0 pg Final    MCHC 02/10/2025 33.5  32.0 - 36.0 g/dL Final    RDW 02/10/2025 16.5 (H)  11.5 - 14.5 % Final    Platelets 02/10/2025 374  150 - 450 x10*3/uL Final    Neutrophils % 02/10/2025 66.3  40.0 - 80.0 % Final    Immature Granulocytes %, Automated 02/10/2025 0.7  0.0 - 0.9 % Final    Lymphocytes % 02/10/2025 24.0  13.0 - 44.0 % Final    Monocytes % 02/10/2025 7.9  2.0 - 10.0 % Final    Eosinophils % 02/10/2025 0.5  0.0 - 6.0 % Final    Basophils % 02/10/2025 0.6  0.0 - 2.0 % Final    Neutrophils Absolute 02/10/2025 5.51  1.20 - 7.70 x10*3/uL Final    Immature Granulocytes Absolute, Au* 02/10/2025 0.06  0.00 - 0.70 x10*3/uL Final    Lymphocytes Absolute 02/10/2025 2.00  1.20 - 4.80 x10*3/uL Final    Monocytes Absolute 02/10/2025 0.66  0.10 - 1.00 x10*3/uL Final    Eosinophils Absolute 02/10/2025 0.04  0.00 - 0.70 x10*3/uL Final    Basophils Absolute 02/10/2025 0.05  0.00 - 0.10 x10*3/uL Final    Glucose 02/10/2025  112 (H)  74 - 99 mg/dL Final    Sodium 02/10/2025 137  136 - 145 mmol/L Final    Potassium 02/10/2025 4.0  3.5 - 5.3 mmol/L Final    Chloride 02/10/2025 102  98 - 107 mmol/L Final    Bicarbonate 02/10/2025 26  21 - 32 mmol/L Final    Anion Gap 02/10/2025 13  10 - 20 mmol/L Final    Urea Nitrogen 02/10/2025 9  6 - 23 mg/dL Final    Creatinine 02/10/2025 0.66  0.50 - 1.05 mg/dL Final    eGFR 02/10/2025 >90  >60 mL/min/1.73m*2 Final    Calcium 02/10/2025 9.2  8.6 - 10.6 mg/dL Final    Albumin 02/10/2025 4.2  3.4 - 5.0 g/dL Final    Alkaline Phosphatase 02/10/2025 105  33 - 136 U/L Final    Total Protein 02/10/2025 6.6  6.4 - 8.2 g/dL Final    AST 02/10/2025 23  9 - 39 U/L Final    Bilirubin, Total 02/10/2025 0.3  0.0 - 1.2 mg/dL Final    ALT 02/10/2025 15  7 - 45 U/L Final    Cortisol 02/10/2025 11.1  2.5 - 20.0 ug/dL Final    Follicle Stimulating Hormone 02/10/2025 75.6  IU/L Final    GGT 02/10/2025 134 (H)  5 - 55 U/L Final    LDH 02/10/2025 206  84 - 246 U/L Final    Magnesium 02/10/2025 2.16  1.60 - 2.40 mg/dL Final    Phosphorus 02/10/2025 3.9  2.5 - 4.9 mg/dL Final    Protime 02/10/2025 11.9  9.8 - 12.8 seconds Final    INR 02/10/2025 1.1  0.9 - 1.1 Final    Thyroid Stimulating Hormone 02/10/2025 84.17 (H)  0.44 - 3.98 mIU/L Final    Thyroxine, Free 02/10/2025 0.34 (L)  0.78 - 1.48 ng/dL Final    Triiodothyronine, Free 02/10/2025 1.1 (L)  2.3 - 4.2 pg/mL Final    Color, Urine 02/10/2025 Yellow  Light-Yellow, Yellow, Dark-Yellow Final    Appearance, Urine 02/10/2025 Clear  Clear Final    Specific Gravity, Urine 02/10/2025 1.016  1.005 - 1.035 Final    pH, Urine 02/10/2025 6.5  5.0, 5.5, 6.0, 6.5, 7.0, 7.5, 8.0 Final    Protein, Urine 02/10/2025 NEGATIVE  NEGATIVE, 10 (TRACE), 20 (TRACE) mg/dL Final    Glucose, Urine 02/10/2025 Normal  Normal mg/dL Final    Blood, Urine 02/10/2025 NEGATIVE  NEGATIVE mg/dL Final    Ketones, Urine 02/10/2025 NEGATIVE  NEGATIVE mg/dL Final    Bilirubin, Urine 02/10/2025 NEGATIVE   NEGATIVE mg/dL Final    Urobilinogen, Urine 02/10/2025 Normal  Normal mg/dL Final    Nitrite, Urine 02/10/2025 NEGATIVE  NEGATIVE Final    Leukocyte Esterase, Urine 02/10/2025 NEGATIVE  NEGATIVE Final   Lab on 02/05/2025   Component Date Value Ref Range Status    WBC 02/05/2025 5.9  4.4 - 11.3 x10*3/uL Final    nRBC 02/05/2025 0.0  0.0 - 0.0 /100 WBCs Final    RBC 02/05/2025 4.69  4.00 - 5.20 x10*6/uL Final    Hemoglobin 02/05/2025 14.3  12.0 - 16.0 g/dL Final    Hematocrit 02/05/2025 44.0  36.0 - 46.0 % Final    MCV 02/05/2025 94  80 - 100 fL Final    MCH 02/05/2025 30.5  26.0 - 34.0 pg Final    MCHC 02/05/2025 32.5  32.0 - 36.0 g/dL Final    RDW 02/05/2025 17.2 (H)  11.5 - 14.5 % Final    Platelets 02/05/2025 399  150 - 450 x10*3/uL Final    Neutrophils % 02/05/2025 43.8  40.0 - 80.0 % Final    Immature Granulocytes %, Automated 02/05/2025 1.4 (H)  0.0 - 0.9 % Final    Lymphocytes % 02/05/2025 42.6  13.0 - 44.0 % Final    Monocytes % 02/05/2025 11.2  2.0 - 10.0 % Final    Eosinophils % 02/05/2025 0.5  0.0 - 6.0 % Final    Basophils % 02/05/2025 0.5  0.0 - 2.0 % Final    Neutrophils Absolute 02/05/2025 2.58  1.20 - 7.70 x10*3/uL Final    Immature Granulocytes Absolute, Au* 02/05/2025 0.08  0.00 - 0.70 x10*3/uL Final    Lymphocytes Absolute 02/05/2025 2.51  1.20 - 4.80 x10*3/uL Final    Monocytes Absolute 02/05/2025 0.66  0.10 - 1.00 x10*3/uL Final    Eosinophils Absolute 02/05/2025 0.03  0.00 - 0.70 x10*3/uL Final    Basophils Absolute 02/05/2025 0.03  0.00 - 0.10 x10*3/uL Final    Glucose 02/05/2025 105 (H)  74 - 99 mg/dL Final    Sodium 02/05/2025 138  136 - 145 mmol/L Final    Potassium 02/05/2025 4.3  3.5 - 5.3 mmol/L Final    Chloride 02/05/2025 100  98 - 107 mmol/L Final    Bicarbonate 02/05/2025 29  21 - 32 mmol/L Final    Anion Gap 02/05/2025 13  10 - 20 mmol/L Final    Urea Nitrogen 02/05/2025 11  6 - 23 mg/dL Final    Creatinine 02/05/2025 0.80  0.50 - 1.05 mg/dL Final    eGFR 02/05/2025 84  >60  mL/min/1.73m*2 Final    Calcium 2025 9.3  8.6 - 10.3 mg/dL Final    Albumin 2025 4.5  3.4 - 5.0 g/dL Final    Alkaline Phosphatase 2025 108  33 - 136 U/L Final    Total Protein 2025 7.1  6.4 - 8.2 g/dL Final    AST 2025 16  9 - 39 U/L Final    Bilirubin, Total 2025 0.3  0.0 - 1.2 mg/dL Final    ALT 2025 14  7 - 45 U/L Final    LDH 2025 182  84 - 246 U/L Final   Office Visit on 2025   Component Date Value Ref Range Status    Reason for Study 2025 To identify mutations relevant to patient's cancer.   Final    Genetic Diseases Assessed 2025 Cancer   Final    Description of Ranges of DNA Seque* 2025 523 gene liquid biopsy   Final    Overall Interpretation 2025 positive   Final    [facility] 2025 [email or web address]   Final    Low Coverage Regions 2025 CHEK1, VFV63P4, DNMT1, FANCC, HDAC2, KDM5D, MAPK1, NOTCH2, PMS1, RAD51C, RHOA, RXRA, SDHAF2, TGFBR1, TP63   Final    Trial Count 2025 3   Final    Trial 1: Matched criteria 2025    Final                    Value:Clinical Trial NCT ID: [id number]  Clinical Trial Title: Study of ATRN-119 in Patients with Advanced Solid Tumors  Clinical Trial URL: https://clinicaltrials.gov/ct2/show/XGW26996259  Clinical Phase: Phase 1/Phase 2  Clinical Trial Matches: TP53 p.R249G mutation, RB1 c.540-1G>T mutation  Clinical Trial Distance and Location: [place], [place]    Trial 2: Matched criteria 2025    Final                    Value:Clinical Trial NCT ID: [id number]  Clinical Trial Title: Study of AVZO-021 in Patients with Advanced Solid Tumors  Clinical Trial URL: https://clinicaltrials.gov/ct2/show/[email or web address]  Clinical Phase: Phase 1/Phase 2  Clinical Trial Matches: RB1 c.540-1G>T mutation  Clinical Trial Distance and Location: [place], [place]    Trial 3: Matched criteria  02/05/2025    Final                    Value:Clinical Trial NCT ID: [id number]  Clinical Trial Title: Testing the Combination of Two Anti-cancer Drugs, Peposertib () and  for Advanced Solid Tumors  Clinical Trial URL: https://clinicaltrials.gov/ct2/show/XLV12860003  Clinical Phase: Phase 1  Clinical Trial Matches: MYC amplification  Clinical Trial Distance and Location: [facility], [place], [place]    Tumor Mutational Wesley 02/05/2025 6.6  m/MB Final    Microsatellite Instability Note 02/05/2025 MSI-High not detected   Final    Treatment Implications Note 02/05/2025 No reportable treatment options found.   Final   Lab on 01/29/2025   Component Date Value Ref Range Status    WBC 01/29/2025 2.6 (L)  4.4 - 11.3 x10*3/uL Final    nRBC 01/29/2025 0.0  0.0 - 0.0 /100 WBCs Final    RBC 01/29/2025 4.31  4.00 - 5.20 x10*6/uL Final    Hemoglobin 01/29/2025 12.9  12.0 - 16.0 g/dL Final    Hematocrit 01/29/2025 38.9  36.0 - 46.0 % Final    MCV 01/29/2025 90  80 - 100 fL Final    MCH 01/29/2025 29.9  26.0 - 34.0 pg Final    MCHC 01/29/2025 33.2  32.0 - 36.0 g/dL Final    RDW 01/29/2025 15.9 (H)  11.5 - 14.5 % Final    Platelets 01/29/2025 148 (L)  150 - 450 x10*3/uL Final    Immature Granulocytes %, Automated 01/29/2025 0.0  0.0 - 0.9 % Final    Immature Granulocytes Absolute, Au* 01/29/2025 0.00  0.00 - 0.70 x10*3/uL Final    Glucose 01/29/2025 101 (H)  74 - 99 mg/dL Final    Sodium 01/29/2025 140  136 - 145 mmol/L Final    Potassium 01/29/2025 4.0  3.5 - 5.3 mmol/L Final    Chloride 01/29/2025 105  98 - 107 mmol/L Final    Bicarbonate 01/29/2025 25  21 - 32 mmol/L Final    Anion Gap 01/29/2025 14  10 - 20 mmol/L Final    Urea Nitrogen 01/29/2025 10  6 - 23 mg/dL Final    Creatinine 01/29/2025 0.63  0.50 - 1.05 mg/dL Final    eGFR 01/29/2025 >90  >60 mL/min/1.73m*2 Final    Calcium 01/29/2025 9.4  8.6 - 10.6 mg/dL Final    Albumin 01/29/2025 4.3  3.4 - 5.0 g/dL Final    Alkaline Phosphatase 01/29/2025 105  33 - 136 U/L  Final    Total Protein 01/29/2025 7.0  6.4 - 8.2 g/dL Final    AST 01/29/2025 14  9 - 39 U/L Final    Bilirubin, Total 01/29/2025 0.2  0.0 - 1.2 mg/dL Final    ALT 01/29/2025 13  7 - 45 U/L Final    Neutrophils %, Manual 01/29/2025 26.0  40.0 - 80.0 % Final    Bands %, Manual 01/29/2025 2.0  0.0 - 5.0 % Final    Lymphocytes %, Manual 01/29/2025 57.0  13.0 - 44.0 % Final    Monocytes %, Manual 01/29/2025 12.0  2.0 - 10.0 % Final    Eosinophils %, Manual 01/29/2025 2.0  0.0 - 6.0 % Final    Basophils %, Manual 01/29/2025 1.0  0.0 - 2.0 % Final    Seg Neutrophils Absolute, Manual 01/29/2025 0.68 (L)  1.20 - 7.00 x10*3/uL Final    Bands Absolute, Manual 01/29/2025 0.05  0.00 - 0.70 x10*3/uL Final    Lymphocytes Absolute, Manual 01/29/2025 1.48  1.20 - 4.80 x10*3/uL Final    Monocytes Absolute, Manual 01/29/2025 0.31  0.10 - 1.00 x10*3/uL Final    Eosinophils Absolute, Manual 01/29/2025 0.05  0.00 - 0.70 x10*3/uL Final    Basophils Absolute, Manual 01/29/2025 0.03  0.00 - 0.10 x10*3/uL Final    Total Cells Counted 01/29/2025 100   Final    Neutrophils Absolute, Manual 01/29/2025 0.73 (L)  1.20 - 7.70 x10*3/uL Final    RBC Morphology 01/29/2025 No significant RBC morphology present   Final      CT pelvis w IV contrast    Result Date: 2/13/2025  Impression: Restaging of metastatic small-cell lung carcinoma prior to initiation of clinical trial (to be combined with CT chest and abdomen 01/24/2025): 1. Stable tumor burden as evidenced by stable mixed lytic-sclerotic osseous lesions throughout the pelvis and lower lumbar vertebrae. 2. No evidence of new disease within the pelvis.   I personally reviewed the images/study and I agree with the findings as stated. This study was interpreted at [facility], [place], [place].   MACRO: None   Signed by: Yao Parham 2/13/2025 9:25 PM Dictation workstation:   [id number]    MR brain w and wo IV contrast    Result Date: 2/6/2025  Impression: No new  mass or pathologic enhancement was identified.   Nonspecific white matter changes most likely reflect chronic small-vessel ischemic disease given the age of the patient.   Likely right-sided proptosis stable in retrospect compared to the prior exam of 10/01/2024. Given prior masslike enlargement of the inferior rectus muscle on CT 10/07/2017 the finding which is not seen on the current limited exam of the orbits observation could nonetheless reflect residua of prior disease. Please correlate clinically and consider dedicated orbital MRI or follow-up orbital CT for further evaluation if clinically indicated   MACRO: None   Signed by: Emiliano Kong 2/6/2025 10:23 AM Dictation workstation:   [id number]    CT abdomen w IV contrast    Result Date: 1/24/2025  Impression: Restaging of metastatic small-cell lung carcinoma, as compared to CT 12/19/2024: 1. Stable to slightly increased disease burden as evidenced by gradually increasing size of the distal, left subhilar component of the primary neoplasm with stable size of hepatic metastases. 2. Stable mediastinal lymphadenopathy, including prominent paratracheal and aortopulmonary window nodes detailed above, as well as mixed lytic-sclerotic osseous lesions throughout the axial skeleton. 3. Increased postobstructive pneumonitis within the left lower lobe.   I personally reviewed the images/study and I agree with the findings as stated. This study was interpreted at [facility], [place], [place].   MACRO: None   Signed by: Yao Parham 1/24/2025 9:54 PM Dictation workstation:   [id number]    CT chest w IV contrast    Result Date: 1/24/2025  Impression: Restaging of metastatic small-cell lung carcinoma, as compared to CT 12/19/2024: 1. Stable to slightly increased disease burden as evidenced by gradually increasing size of the distal, left subhilar component of the primary neoplasm with stable size of hepatic metastases. 2. Stable mediastinal  lymphadenopathy, including prominent paratracheal and aortopulmonary window nodes detailed above, as well as mixed lytic-sclerotic osseous lesions throughout the axial skeleton. 3. Increased postobstructive pneumonitis within the left lower lobe.   I personally reviewed the images/study and I agree with the findings as stated. This study was interpreted at [place], [place].   MACRO: None   Signed by: Yao Parham 1/24/2025 9:54 PM Dictation workstation:   [id number]        Assessment/Plan     Ms. 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 Initial imaging post C2 reviewed by Dr Kohli appeared to have sub-optimal response to treatment. Unfortunately after C4 imaging confirmed she had progressive disease. In the last visit Dr Kohli discussed all treatment options which included SOC Taralatamab and also clinical trial [id number] with study drug ABBV-706 which is an ADC targeting SEZ6 with a topoisomerase 1 inhibitor payload. Patient opted to participate on study and completed screening requirements on study. Today she is in for C1D1 on study,  She looks clinically good today. We will proceed with treatment today. Plan discussed in detail with the patient. Patient in agreement with the plan of care and is aware to call the office and research nurse if experiencing any new symptoms. RTC per protocol.      Cancer Staging   No matching staging information was found for the patient.      Oncology History   SCLC (small cell lung carcinoma)   9/27/2024 Initial Diagnosis    SCLC (small cell lung carcinoma) (Multi)     10/4/2024 - 10/8/2024 Chemotherapy    CARBOplatin / Etoposide, 21 Day Cycles - Lung     10/4/2024 - 10/6/2024 Research Study Participant    ([email or web address] [id number] - Atezolizumab + CARBOplatin / Etoposide / [place]-VABC-DPCM, 21 Day Cycles  Plan Provider:  JESSICA Hernandez  Treatment goal: Control  Line of treatment: First Line  Associated studies: (177Lu)[id number] with Carboplatin, Etoposide and Tislelizumab in Newly Diagnosed ES-SCLC     10/4/2024 - 1/17/2025 Chemotherapy    Durvalumab + CARBOplatin / Etoposide, 21 Day Cycles     10/28/2024 - 10/28/2024 Chemotherapy    Durvalumab + CARBOplatin / Etoposide, 21 Day Cycles     2/5/2025 - 2/5/2025 Chemotherapy    Durvalumab, 28 Day Cycles     2/19/2025 -  Research Study Participant    ([facility]) [id number] Part 1 - ABBV-706, 21 Day Cycles  Plan Provider: JESSICA Hernandez  Treatment goal: Control  Line of treatment: Second Line  Associated studies: ABBV-706 alone or in combination in subjects with advanced solid tumors

## 2025-02-20 ENCOUNTER — DOCUMENTATION (OUTPATIENT)
Dept: HEMATOLOGY/ONCOLOGY | Facility: HOSPITAL | Age: 60
End: 2025-02-20
Payer: MEDICARE

## 2025-02-20 ENCOUNTER — HOSPITAL ENCOUNTER (OUTPATIENT)
Dept: RESEARCH | Facility: HOSPITAL | Age: 60
Discharge: HOME | End: 2025-02-20
Payer: MEDICARE

## 2025-02-20 VITALS
TEMPERATURE: 98.1 F | HEART RATE: 97 BPM | RESPIRATION RATE: 18 BRPM | SYSTOLIC BLOOD PRESSURE: 118 MMHG | DIASTOLIC BLOOD PRESSURE: 75 MMHG

## 2025-02-20 DIAGNOSIS — C34.90 SMALL CELL CARCINOMA OF LUNG, UNSPECIFIED LATERALITY, UNSPECIFIED PART OF LUNG: ICD-10-CM

## 2025-02-20 LAB
HOLD SPECIMEN: NORMAL
HOLD SPECIMEN: NORMAL

## 2025-02-20 PROCEDURE — 36415 COLL VENOUS BLD VENIPUNCTURE: CPT

## 2025-02-20 PROCEDURE — 93005 ELECTROCARDIOGRAM TRACING: CPT | Mod: DCRU

## 2025-02-20 RX ORDER — ALPRAZOLAM 0.5 MG/1
1 TABLET ORAL 2 TIMES DAILY PRN
Qty: 30 TABLET | Refills: 0 | Status: SHIPPED | OUTPATIENT
Start: 2025-02-20 | End: 2025-03-07

## 2025-02-20 NOTE — RESEARCH NOTES
RSH><[id number]><C1D2><[id number]>  DCRU NURSING VISIT NOTE  Study Name: [id number]- Part 1_Escalation- Monotherapy  IRB#: [id number]  DCRU#: ([facility] # [id number])  Protocol Version Dated:  6/15/2023  PI: Jd Kohli MD.      Time point: Cycle 1 - Day 2    Encounter Date: 02/20/2025  Encounter Time: 12:00 PM EST  Encounter Department: [place] [address] RESEARCH     #1     Phone Pager   Chapis Busby -[phone]    #2 Phone Pager        Other Phone Pager            Study Regimen and Dosing   For Oncology study, Refer [place] Treatment Plan for orders   Part 1_Escalation_Monotherapy - patients with advanced recurrent or refractory solid tumors (small cell lung cancer, neuroendocrine tumors or brain tumors) will receive gradually increasing doses of ABBV-706 to determine MTD (Maximum Tolerated Dose).  Cycle = 21-days.  ABBV-706 administered IV Day 1 of each cycle.     Dietary Guidelines   Regular diet       Admission and Prior to Starting Study Activities   Notify  when patient arrives to unit.  Patient review/update DCRU/[place] intake form.  Obtain vital signs after sitting at least 3 minutes. Record on flow sheet & in EMR.  Perform venipuncture for sample collection procedures. Do Not draw research samples from mediport/central line if used for infusion         Study Specific Instructions and Documentation   If available enter a snapshot of performable actions:  ECG   Research Correlatives  Discharge       Subjective   Minal Sun is a 59 y.o. female and is here for a Research clinical visit.    Visit Provider: NANCY Hernandez-CNP     Allergies:   Allergies   Allergen Reactions    Clindamycin Diarrhea    Erythromycin Diarrhea    Erythromycin Base Other and Nausea Only    Oxycodone-Acetaminophen Hives     anxiety       Objective     Vital Signs:    Vitals:    02/20/25 1212   BP: 118/75   Pulse: 97   Resp: 18   Temp:  36.7 °C (98.1 °F)   [facility]: Oral       Physical Exam     ASSESSMENT and PLAN:  Problem List Items Addressed This Visit       SCLC (small cell lung carcinoma)    Relevant Orders    Research collection: 2mL Red Top - Research Collect ABBV-706 PK ADC/Total AB; Post-Dose; EOI +24 hours; +/- 2 hours; Ambient; Other (5-8x); Allow to clot 30-60 minutes.    Research collection: 3mL Lavender EDTA - Research Collect Free Top1 Inhibitor; Post-Dose; EOI +24 hours; +/- 2 hours; On ICE; 8-10x        Medications as of the completion of today's visit:  Current Outpatient Medications   Medication Sig Dispense Refill    acetaminophen (TylenoL) 325 mg tablet Take 2 tablets (650 mg) by mouth every 6 hours if needed for mild pain (1 - 3) or moderate pain (4 - 6). 30 tablet 0    ALPRAZolam (Xanax) 1 mg tablet Take 1 tablet (1 mg) by mouth 3 times a day as needed for anxiety for up to 10 days. 30 tablet 0    apixaban (Eliquis) 5 mg tablet Take 1 tablet (5 mg) by mouth 2 times a day. 180 tablet 0    budesonide (Pulmicort) 0.5 mg/2 mL nebulizer solution Take 2 mL (0.5 mg) by nebulization 2 times a day.      buPROPion (Wellbutrin) 75 mg tablet Take 1 tablet (75 mg) by mouth 3 times a day.      carbinoxamine maleate 4 mg tablet Take 4 mg by mouth 2 times a day.      escitalopram (Lexapro) 20 mg tablet Take 1 tablet (20 mg) by mouth once daily.      levothyroxine (Synthroid) 50 mcg tablet Take 1 tablet (50 mcg) by mouth early in the morning.. Take on an empty stomach at the same time each day, either 30 to 60 minutes prior to breakfast 30 tablet 11    metFORMIN  mg 24 hr tablet Take 1 tablet (500 mg) by mouth 2 times a day. 180 tablet 3    naloxone (Narcan) 4 mg/0.1 mL nasal spray Administer 1 spray (4 mg) into affected nostril(s) if needed for opioid reversal. May repeat every 2-3 minutes if needed, alternating nostrils, until medical assistance becomes available. 2 each 0    OLANZapine (ZyPREXA) 5 mg tablet Take 1 tablet (5 mg) by  mouth once daily at bedtime. For 4 days starting the evening of treatment 4 tablet 3    omeprazole (PriLOSEC) 40 mg DR capsule Take 1 capsule (40 mg) by mouth once daily. 90 capsule 3    ondansetron (Zofran) 8 mg tablet Take 1 tablet (8 mg) by mouth every 8 hours if needed for nausea or vomiting. 30 tablet 5    oxyCODONE (Roxicodone) 5 mg immediate release tablet Take 1 tablet (5 mg) by mouth every 4 hours if needed for severe pain (7 - 10) for up to 60 doses. 60 tablet 0    ProAir HFA 90 mcg/actuation inhaler Inhale 2 puffs every 4 hours if needed.      prochlorperazine (Compazine) 10 mg tablet Take 1 tablet (10 mg) by mouth every 6 hours if needed for nausea or vomiting. 30 tablet 5    rosuvastatin (Crestor) 10 mg tablet Take 1 tablet (10 mg) by mouth once daily. 90 tablet 3    tiZANidine (Zanaflex) 2 mg capsule Take 1 capsule (2 mg) by mouth 3 times a day as needed for muscle spasms. 60 capsule 0    traZODone (Desyrel) 50 mg tablet Take 1 tablet (50 mg) by mouth once daily at bedtime.       No current facility-administered medications for this encounter.           Orders placed during today's visit:  Orders Placed This Encounter   Procedures    Research collection: 2mL Red Top - Research Collect ABBV-706 PK ADC/Total AB; Post-Dose; EOI +24 hours; +/- 2 hours; Ambient; Other (5-8x); Allow to clot 30-60 minutes.     Standing Status:   Standing     Number of Occurrences:   1     Order Specific Question:   Test     Answer:   ABBV-706 PK ADC/Total AB     Order Specific Question:   Timepoint     Answer:   Post-Dose     Order Specific Question:   Post-Dose     Answer:   EOI +24 hours     Order Specific Question:   Post-Dose Window     Answer:   +/- 2 hours     Order Specific Question:   Temperature     Answer:   Ambient     Order Specific Question:   Invert     Answer:   Other     Comments:   5-8x     Order Specific Question:   Handling     Answer:   Allow to clot 30-60 minutes.     Order Specific Question:   Optional?      Answer:   No    Research collection: 3mL Lavender EDTA - Research Collect Free Top1 Inhibitor; Post-Dose; EOI +24 hours; +/- 2 hours; On ICE; 8-10x     Standing Status:   Standing     Number of Occurrences:   1     Order Specific Question:   Test     Answer:   Free Top1 Inhibitor     Order Specific Question:   Timepoint     Answer:   Post-Dose     Order Specific Question:   Post-Dose     Answer:   EOI +24 hours     Order Specific Question:   Post-Dose Window     Answer:   +/- 2 hours     Order Specific Question:   Temperature     Answer:   On ICE     Order Specific Question:   Invert     Answer:   8-10x     Order Specific Question:   Optional?     Answer:   No        [id number] - ABBV-706 alone or in combination in subjects with advanced solid tumors    Patient has no adverse events documented in the Research Adverse Events activity.      Day 2 ECG   JOSEFA 150c Study-specific ECG Machine - rest supine or semi-recumbent at least 5 minutes prior:  Single     ECGs are time-matched to PK sample collection & should be collected within 10 mins prior to PK collection.  ECGs occurring near meals will take place prior to meals  QTcF calculation: Fridericia's formula: S:\DCRU_Staff\Nursing\Protocols\QTcF calculator  MD/Provider to sign & date. Do not need to wait before starting treatment.  Contact MD/Provider if abnormal from baseline.    Time Point  Rest Time  QTcF   +24 hours after end of infusion 1224 446 msec        Day 2 Research Correlatives:     Access Type: 21G butterfly  Location: left hand   For Oncology study, Refer [place] Treatment Plan for orders  Enter Additional Information here not covered in the Treatment Plan:  Text  AFTER ECGs  Deliver to DCRU/TRPC lab for processing    Time point Specimen Test Volume Tube Handling Draw Time   Day 2  +24 hrs (+/- 2 hrs) after EOI  (draw in order) ABBV-706 PK ADC/Total AB 2 mL Red Top Serum Invert 5 - 8x; Ambient  Allow to clot 30 -60 mins 1240    Free TOP1 Inhibitor 3 mL  EDTA Lavender Invert 8 - 10x. On ICE 1240          Discharge Instructions   Discharge patient to home after study requirements completed or PK sample obtained.  Remind patient to return: on Day 3 for +48 hr PK & ECGs (+/- 2 hrs) after ABBV-706 EOI.  Discharge time: 1307     Maria Teresa Orlando RN  02/20/25

## 2025-02-21 ENCOUNTER — TELEPHONE (OUTPATIENT)
Dept: PALLIATIVE MEDICINE | Facility: CLINIC | Age: 60
End: 2025-02-21
Payer: MEDICARE

## 2025-02-21 ENCOUNTER — HOSPITAL ENCOUNTER (OUTPATIENT)
Dept: RESEARCH | Facility: HOSPITAL | Age: 60
Discharge: HOME | End: 2025-02-21
Payer: MEDICARE

## 2025-02-21 VITALS
RESPIRATION RATE: 17 BRPM | TEMPERATURE: 98.1 F | HEART RATE: 102 BPM | OXYGEN SATURATION: 94 % | DIASTOLIC BLOOD PRESSURE: 82 MMHG | SYSTOLIC BLOOD PRESSURE: 133 MMHG

## 2025-02-21 DIAGNOSIS — C34.90 SMALL CELL CARCINOMA OF LUNG, UNSPECIFIED LATERALITY, UNSPECIFIED PART OF LUNG: ICD-10-CM

## 2025-02-21 LAB
HOLD SPECIMEN: NORMAL

## 2025-02-21 PROCEDURE — 93005 ELECTROCARDIOGRAM TRACING: CPT | Mod: DCRU

## 2025-02-21 PROCEDURE — 36415 COLL VENOUS BLD VENIPUNCTURE: CPT

## 2025-02-21 NOTE — TELEPHONE ENCOUNTER
Patient referred to supportive oncology/palliative care by Baldev Ferraro CNP. Patient with small cell lung cancer started palliative clinical trial on 2/19/25 experiencing cancer related pain and anxiety. Per referral, patient will be at [facility] for clinical trial at least 1wk and at times has transportation difficulties so onc would like to coordinate if possible. VM left asking for a call back to discuss referral and scheduling. Our office will try calling patient again next week if no return call.

## 2025-02-21 NOTE — RESEARCH NOTES
RSH><[id number]><C1D3><[id number]>  DCRU NURSING VISIT NOTE  Study Name: [id number]- Part 1_Escalation- Monotherapy  IRB#: [id number]  DCRU#: ([facility] # [id number])  Protocol Version Dated:  6/15/2023  PI: Jd Kohli MD.  Time point: Cycle 1 - Day 3  Encounter Date: 02/21/2025  Encounter Time: 12:00 PM EST  Encounter Department: [place] [address] RESEARCH   #1     Phone Pager   not working here [phone]     Study Regimen and Dosing   For Oncology study, Refer [place] Treatment Plan for orders   Part 1_Escalation_Monotherapy - patients with advanced recurrent or refractory solid tumors (small cell lung cancer, neuroendocrine tumors or brain tumors) will receive gradually increasing doses of ABBV-706 to determine MTD (Maximum Tolerated Dose).  Cycle = 21-days.  ABBV-706 administered IV Day 1 of each cycle.     Dietary Guidelines   Regular diet     Admission and Prior to Starting Study Activities   Notify  when patient arrives to unit.  Patient review/update DCRU/[place] intake form.  Obtain vital signs after sitting at least 3 minutes. Record on flow sheet & in EMR.  Perform venipuncture for sample collection procedures. Do Not draw research samples from mediport/central line if used for infusion       Study Specific Instructions and Documentation   If available enter a snapshot of performable actions:  ECG   Research Correlatives  Discharge   Minal Sun is a 59 y.o. female and is here for a Research clinical visit.  Visit Provider: NANCY Hernandez-CNP   Allergies:   Allergies   Allergen Reactions    Clindamycin Diarrhea    Erythromycin Diarrhea    Erythromycin Base Other and Nausea Only    Oxycodone-Acetaminophen Hives     anxiety   Vital Signs:    Vitals:    02/21/25 1230 02/21/25 1305   BP: 133/82    Pulse: 102    Resp: 17    Temp: 36.7 °C (98.1 °F)    TempSrc: Oral    SpO2: 94% 94%       Day 3 ECG   JOSEFA 150c Study-specific ECG Machine  - rest supine or semi-recumbent at least 5 minutes prior:  Single   ECGs are time-matched to PK sample collection & should be collected within 10 mins prior to PK collection.  ECGs occurring near meals will take place prior to meals  QTcF calculation: Fridericia's formula: S:\DCRU_Staff\Nursing\Protocols\QTcF calculator  MD/Provider to sign & date. Do not need to wait before starting treatment.  Contact MD/Provider if abnormal from baseline.  -coordinator has ECG-  Time point  Rest Time  QTcF   +48 Hours after End of Infusion 1220 409        Day 3 Research Correlatives:     Access Type: 22G PIV Location: R Forearm   or Oncology study, Refer [place] Treatment Plan for orders  AFTER ECG  Deliver to DCRU/TRPC lab for processing  Time point Specimen Test Volume Tube Handling Draw Time      Day 3  +48 hrs (+/- 2 hrs) after EOI  (draw in order)) ABBV-706 PK ADC/Total AB 2 mL Red Top Serum Invert 5 - 8x; Ambient  Allow to clot 30 -60 mins 1240    Free TOP1 Inhibitor 3 mL EDTA Lavender Invert 8 - 10x. On ICE 1240    ð CTC (SCLC Only) 10 mL RareCyte AccuCyte BCT See instructions below 1240   CTC instructions  Keep patient's arm in the downward position during collection procedure.  Hold the tube with the stopper in the uppermost position so that the tube contents do not touch the stopper or the end of the needle during sample collection.  Release tourniquet once blood start to flow in the tube, or within 2 minutes of application.  Invert 8 - 10x.     Discharge Instructions   Discharge patient to home after study requirements completed or PK sample obtained.  Remind patient to return: on Day 8 for +168 hr PK & ECGs (+/- 24 hrs) after ABBV-706 EOI.  Discharge time: 1247 visit requirements completed.   Jovani Eli, RN  02/21/25

## 2025-02-23 ASSESSMENT — ENCOUNTER SYMPTOMS
CHANGE IN BOWEL HABIT: 1
VISUAL CHANGE: 0
SWOLLEN GLANDS: 0
VERTIGO: 0
NECK PAIN: 0
ABDOMINAL PAIN: 0
JOINT SWELLING: 0
ARTHRALGIAS: 0
CHILLS: 0
MYALGIAS: 0
FEVER: 0
VOMITING: 0
FATIGUE: 1
HEADACHES: 0
COUGH: 1
NAUSEA: 0
NUMBNESS: 0
DIAPHORESIS: 0
SORE THROAT: 0
WEAKNESS: 0
ANOREXIA: 0

## 2025-02-24 DIAGNOSIS — C34.90 SMALL CELL CARCINOMA OF LUNG, UNSPECIFIED LATERALITY, UNSPECIFIED PART OF LUNG: Primary | ICD-10-CM

## 2025-02-25 ENCOUNTER — TELEPHONE (OUTPATIENT)
Dept: PALLIATIVE MEDICINE | Facility: HOSPITAL | Age: 60
End: 2025-02-25
Payer: MEDICARE

## 2025-02-25 ENCOUNTER — APPOINTMENT (OUTPATIENT)
Dept: RADIOLOGY | Facility: CLINIC | Age: 60
End: 2025-02-25
Payer: MEDICARE

## 2025-02-25 ENCOUNTER — SOCIAL WORK (OUTPATIENT)
Dept: CASE MANAGEMENT | Facility: HOSPITAL | Age: 60
End: 2025-02-25
Payer: MEDICARE

## 2025-02-25 DIAGNOSIS — C34.90 SMALL CELL CARCINOMA OF LUNG, UNSPECIFIED LATERALITY, UNSPECIFIED PART OF LUNG: ICD-10-CM

## 2025-02-25 NOTE — PROGRESS NOTES
This SW received a call back from pt and sister in the background, following message left for them on 02/11/25. Pt asked about getting Uber/Lyft rides to her treatment at [facility] (research trial). SW advised that is not available. Assuming pt's [facility] [employer] plan does not cover transportation (it does not appear to be Medicare), SW recommended [facility]. Pt's sister took the phone and said she was told that [employer] provides Uber/Lyft transportation for free. SW explained that it is limited foundation money to provide rides in case of emergency ie. Planned ride falls through last minute. Pt's sister said this is an emergency. SW offered for them to speak with [facility] supervisor. Sister declined then was willing to call Jae to learn about the program. This SW advised there is a fee but pt may be eligible for discount based on age/income/disability.   [facility] also communicated with research RN to see if [facility] program could accommodate pt's schedule, as sister said that they were told pt's schedule could vary and was unpredictable. RN provided schedule which she requested and was just made available to pt and staff. It appears possible that the first day of each cycle (every 3 weeks) could potentially go later than the last [employer] .  SW then received a message from a relative, Julieta Sun, who said she knows a clinical staff member at [facility] who told them pt should be able to get rides to her tx from [facility]. SW returned the call, and left a message. Any info shared will be generic to [facility].  SW to follow as needed.

## 2025-02-25 NOTE — TELEPHONE ENCOUNTER
2nd attempt to call patient after referral sent to supportive oncology by Baldev Ferraro NP.  No answer.  Voicemail message left for patient to call office to schedule appointment.

## 2025-02-26 ENCOUNTER — TELEPHONE (OUTPATIENT)
Dept: PALLIATIVE MEDICINE | Facility: CLINIC | Age: 60
End: 2025-02-26
Payer: MEDICARE

## 2025-02-26 ENCOUNTER — EDUCATION (OUTPATIENT)
Dept: HEMATOLOGY/ONCOLOGY | Facility: HOSPITAL | Age: 60
End: 2025-02-26
Payer: MEDICARE

## 2025-02-26 ENCOUNTER — HOSPITAL ENCOUNTER (OUTPATIENT)
Dept: RESEARCH | Facility: HOSPITAL | Age: 60
Discharge: HOME | End: 2025-02-26
Payer: MEDICARE

## 2025-02-26 VITALS
TEMPERATURE: 100.2 F | HEART RATE: 100 BPM | SYSTOLIC BLOOD PRESSURE: 127 MMHG | OXYGEN SATURATION: 92 % | DIASTOLIC BLOOD PRESSURE: 74 MMHG | RESPIRATION RATE: 18 BRPM

## 2025-02-26 DIAGNOSIS — C34.10 SMALL CELL CARCINOMA OF UPPER LOBE OF LUNG, UNSPECIFIED LATERALITY: Primary | ICD-10-CM

## 2025-02-26 DIAGNOSIS — C34.90 SMALL CELL CARCINOMA OF LUNG, UNSPECIFIED LATERALITY, UNSPECIFIED PART OF LUNG: ICD-10-CM

## 2025-02-26 LAB
ALBUMIN SERPL BCP-MCNC: 4.2 G/DL (ref 3.4–5)
ALP SERPL-CCNC: 104 U/L (ref 33–136)
ALT SERPL W P-5'-P-CCNC: 15 U/L (ref 7–45)
ANION GAP SERPL CALC-SCNC: 16 MMOL/L (ref 10–20)
AST SERPL W P-5'-P-CCNC: 16 U/L (ref 9–39)
BASOPHILS # BLD AUTO: 0.01 X10*3/UL (ref 0–0.1)
BASOPHILS NFR BLD AUTO: 0.2 %
BILIRUB DIRECT SERPL-MCNC: 0.1 MG/DL (ref 0–0.3)
BILIRUB SERPL-MCNC: 0.4 MG/DL (ref 0–1.2)
BUN SERPL-MCNC: 10 MG/DL (ref 6–23)
CALCIUM SERPL-MCNC: 9 MG/DL (ref 8.6–10.6)
CHLORIDE SERPL-SCNC: 99 MMOL/L (ref 98–107)
CO2 SERPL-SCNC: 24 MMOL/L (ref 21–32)
CREAT SERPL-MCNC: 0.73 MG/DL (ref 0.5–1.05)
CRP SERPL-MCNC: 0.83 MG/DL
EGFRCR SERPLBLD CKD-EPI 2021: >90 ML/MIN/1.73M*2
EOSINOPHIL # BLD AUTO: 0.09 X10*3/UL (ref 0–0.7)
EOSINOPHIL NFR BLD AUTO: 1.7 %
ERYTHROCYTE [DISTWIDTH] IN BLOOD BY AUTOMATED COUNT: 16.9 % (ref 11.5–14.5)
FLUAV RNA RESP QL NAA+PROBE: DETECTED
FLUBV RNA RESP QL NAA+PROBE: NOT DETECTED
GGT SERPL-CCNC: 163 U/L (ref 5–55)
GLUCOSE SERPL-MCNC: 133 MG/DL (ref 74–99)
HCT VFR BLD AUTO: 37 % (ref 36–46)
HGB BLD-MCNC: 12.2 G/DL (ref 12–16)
HOLD SPECIMEN: NORMAL
IMM GRANULOCYTES # BLD AUTO: 0.02 X10*3/UL (ref 0–0.7)
IMM GRANULOCYTES NFR BLD AUTO: 0.4 % (ref 0–0.9)
LDH SERPL L TO P-CCNC: 264 U/L (ref 84–246)
LYMPHOCYTES # BLD AUTO: 0.2 X10*3/UL (ref 1.2–4.8)
LYMPHOCYTES NFR BLD AUTO: 3.7 %
MAGNESIUM SERPL-MCNC: 1.83 MG/DL (ref 1.6–2.4)
MCH RBC QN AUTO: 31.1 PG (ref 26–34)
MCHC RBC AUTO-ENTMCNC: 33 G/DL (ref 32–36)
MCV RBC AUTO: 94 FL (ref 80–100)
MONOCYTES # BLD AUTO: 0.28 X10*3/UL (ref 0.1–1)
MONOCYTES NFR BLD AUTO: 5.2 %
NEUTROPHILS # BLD AUTO: 4.8 X10*3/UL (ref 1.2–7.7)
NEUTROPHILS NFR BLD AUTO: 88.8 %
NRBC BLD-RTO: 0 /100 WBCS (ref 0–0)
PHOSPHATE SERPL-MCNC: 3.5 MG/DL (ref 2.5–4.9)
PLATELET # BLD AUTO: 223 X10*3/UL (ref 150–450)
POTASSIUM SERPL-SCNC: 4 MMOL/L (ref 3.5–5.3)
PROT SERPL-MCNC: 6.3 G/DL (ref 6.4–8.2)
RBC # BLD AUTO: 3.92 X10*6/UL (ref 4–5.2)
SARS-COV-2 RNA RESP QL NAA+PROBE: NOT DETECTED
SODIUM SERPL-SCNC: 135 MMOL/L (ref 136–145)
WBC # BLD AUTO: 5.4 X10*3/UL (ref 4.4–11.3)

## 2025-02-26 PROCEDURE — 80053 COMPREHEN METABOLIC PANEL: CPT

## 2025-02-26 PROCEDURE — 82248 BILIRUBIN DIRECT: CPT

## 2025-02-26 PROCEDURE — 2500000001 HC RX 250 WO HCPCS SELF ADMINISTERED DRUGS (ALT 637 FOR MEDICARE OP)

## 2025-02-26 PROCEDURE — 96374 THER/PROPH/DIAG INJ IV PUSH: CPT

## 2025-02-26 PROCEDURE — 96360 HYDRATION IV INFUSION INIT: CPT

## 2025-02-26 PROCEDURE — 82977 ASSAY OF GGT: CPT

## 2025-02-26 PROCEDURE — 83615 LACTATE (LD) (LDH) ENZYME: CPT

## 2025-02-26 PROCEDURE — 85025 COMPLETE CBC W/AUTO DIFF WBC: CPT

## 2025-02-26 PROCEDURE — 87636 SARSCOV2 & INF A&B AMP PRB: CPT

## 2025-02-26 PROCEDURE — 86140 C-REACTIVE PROTEIN: CPT

## 2025-02-26 PROCEDURE — 2500000004 HC RX 250 GENERAL PHARMACY W/ HCPCS (ALT 636 FOR OP/ED)

## 2025-02-26 PROCEDURE — 96361 HYDRATE IV INFUSION ADD-ON: CPT

## 2025-02-26 PROCEDURE — 83735 ASSAY OF MAGNESIUM: CPT

## 2025-02-26 PROCEDURE — 84100 ASSAY OF PHOSPHORUS: CPT

## 2025-02-26 RX ORDER — ALPRAZOLAM 0.5 MG/1
0.5 TABLET ORAL ONCE
Status: COMPLETED | OUTPATIENT
Start: 2025-02-26 | End: 2025-02-26

## 2025-02-26 RX ORDER — HEPARIN SODIUM,PORCINE/PF 10 UNIT/ML
50 SYRINGE (ML) INTRAVENOUS AS NEEDED
OUTPATIENT
Start: 2025-02-26

## 2025-02-26 RX ORDER — OXYCODONE HYDROCHLORIDE 5 MG/1
5 TABLET ORAL ONCE
Status: COMPLETED | OUTPATIENT
Start: 2025-02-26 | End: 2025-02-26

## 2025-02-26 RX ORDER — IBUPROFEN 400 MG/1
400 TABLET ORAL ONCE
Status: COMPLETED | OUTPATIENT
Start: 2025-02-26 | End: 2025-02-26

## 2025-02-26 RX ORDER — ONDANSETRON HYDROCHLORIDE 2 MG/ML
8 INJECTION, SOLUTION INTRAVENOUS ONCE
Status: COMPLETED | OUTPATIENT
Start: 2025-02-26 | End: 2025-02-26

## 2025-02-26 RX ORDER — OSELTAMIVIR PHOSPHATE 75 MG/1
75 CAPSULE ORAL EVERY 12 HOURS
Qty: 10 CAPSULE | Refills: 0 | Status: SHIPPED | OUTPATIENT
Start: 2025-02-26 | End: 2025-03-03

## 2025-02-26 RX ORDER — HEPARIN 100 UNIT/ML
500 SYRINGE INTRAVENOUS AS NEEDED
OUTPATIENT
Start: 2025-02-26

## 2025-02-26 RX ADMIN — ONDANSETRON 8 MG: 2 INJECTION INTRAMUSCULAR; INTRAVENOUS at 13:05

## 2025-02-26 RX ADMIN — IBUPROFEN 400 MG: 400 TABLET ORAL at 14:40

## 2025-02-26 RX ADMIN — SODIUM CHLORIDE 1000 ML: 9 INJECTION, SOLUTION INTRAVENOUS at 12:47

## 2025-02-26 RX ADMIN — OXYCODONE 5 MG: 5 TABLET ORAL at 13:04

## 2025-02-26 RX ADMIN — ALPRAZOLAM 0.5 MG: 0.5 TABLET ORAL at 13:04

## 2025-02-26 ASSESSMENT — ENCOUNTER SYMPTOMS
SWOLLEN GLANDS: 0
ARTHRALGIAS: 0
DIAPHORESIS: 0
NECK PAIN: 0
FEVER: 0
NAUSEA: 0
VOMITING: 0
MYALGIAS: 0
CHILLS: 0
NUMBNESS: 0
CHANGE IN BOWEL HABIT: 0
SORE THROAT: 0
ABDOMINAL PAIN: 0
VISUAL CHANGE: 0
ANOREXIA: 0
FATIGUE: 1
HEADACHES: 0
COUGH: 1
VERTIGO: 0
JOINT SWELLING: 0

## 2025-02-26 ASSESSMENT — PAIN SCALES - GENERAL
PAINLEVEL_OUTOF10: 8
PAINLEVEL_OUTOF10: 6

## 2025-02-26 ASSESSMENT — PAIN DESCRIPTION - DESCRIPTORS: DESCRIPTORS: ACHING

## 2025-02-26 ASSESSMENT — PAIN DESCRIPTION - LOCATION: LOCATION: HEAD

## 2025-02-26 NOTE — PROGRESS NOTES
Research Note Non-Treatment Day    Minal Sun is here today. Patient is on [id number]. Today is C1D8. Procedures completed per protocol. AE's and con-meds reviewed with patient. Patient currently has a fever of 100.3. She states that she woke up this morning feeling fatigue, nausea, malaise, and headache. Denies fevers at home. Patient was tachycardic. She also reported left eye twitching that started 2 days ago. Denies dizziness with standing. Patient received 1L IV hydration. Covid and flu swab pending. Patient is aware of treatment plan and will return 3/5 for C1D15. Discussed with patient and social work, Elba, about transportation options. Patient is in the process of getting a [employer] [place] card to appy for [employer] transportation on days 8 and 15 of each cycle. She seemed agreeable to finding her own transportation on D1 of each cycle since those days have longer unpredictable time frames. [employer] will charge her for their transportation services but this can be covered with the trial reimbursement the patient gets for attending her research appts.           Education Documentation  Treatment Plan and Schedule, taught by Jazzy Guerrero RN at 2/26/2025 12:15 PM.  Learner: Patient  Readiness: Acceptance  Method: Explanation  Response: Verbalizes Understanding    General Medication Information, taught by Jazzy Guerrero RN at 2/26/2025 12:15 PM.  Learner: Patient  Readiness: Acceptance  Method: Explanation  Response: Verbalizes Understanding    Education Comments  No comments found.

## 2025-02-26 NOTE — ADDENDUM NOTE
Encounter addended by: JESSICA Hernandez on: 2/26/2025 5:38 PM   Actions taken: Level of Service modified, Clinical Note Signed

## 2025-02-26 NOTE — PROGRESS NOTES
Patient ID: Minal Sun is a 59 y.o. female.    DIAGNOSIS     Small cell carcinoma of the lung.  Date of diagnosis is September 27, 2024 from a bronchoscopic biopsy of a subcarinal lymph node.  Immunohistochemistry positive for TTF-1, INSM1, synaptophysin and chromogranin, negative for p40, RB immunostain shows loss of nuclear expression.        STAGING     Clinical T4, N2, M1 C, stage IVc, extensive stage disease        CURRENT SITES OF DISEASE      Left lung, mediastinum, left hilum of the lung, liver, intra-abdominal lymph nodes in the portacaval and periportal region, bone metastases        MOLECULAR GENOMICS     Test Name: [employer] xF+ (XF.V3) dated October 2024     Molecular Findings:  * Gene: PIK3CA, Variant: Missense variant (exon 1) - GOF, Potentially Actionable, p.R88Q (Allele Frequency - 0.3%)  * Gene: TP53, Variant: Missense variant - LOF, Biologically Relevant, p.R249G (Allele Frequency - 70.1%)  * Gene: RB1, Variant: Splice region variant - LOF, Biologically Relevant, c.540-1G>T, Splice Site (Allele Frequency - 56.8%)        Test Name: [employer] xT (XT.V4)  Laboratory Name: [facility]  Specimen Source: Lymph node, level 7  Collection Date: 27-Sep-2024     Molecular Findings:  * Gene: TP53, Variant: Missense variant - LOF, Biologically Relevant, p.R249G (Allele Frequency - 96.2%)  * Gene: RB1, Variant: Splice region variant - LOF, Biologically Relevant, c.540-1G>T, Splice Site (Allele Frequency - 96%)  * Gene: KMT2D, Variant: Stop gain - LOF, Biologically Relevant, p.*, Nonsense (Allele Frequency - 41.9%)  * Biomarker: Microsatellite Instability, Status: stable  * Biomarker: Tumor Mutation [place] (2.6 Muts/Mb, Percentile: 33)        SERUM TUMOR MARKERS     Baseline LDH within normal limits  C1D1 on Study LDH was 274        PRIOR THERAPY     Combination chemotherapy and immunotherapy.  Initially enrolled on a clinical trial of Lutathera and underwent octreotide scan which was positive.   However she decided to come off study and did not receive Lutathera with her chemo.  Chemotherapy started October 6, 2024.  Immunotherapy completed added with cycle #2 11.06.2024.  Minor response observed after 2 cycles of treatment. PD observed after C4.        CURRENT THERAPY     2.19.2025: Started on Phase 1 study [id number] with study drug [id number] which is an ADC targeting SEZ6 with a topoisomerase 1 inhibitor payload     CURRENT ONCOLOGICAL PROBLEMS    Cough and shortness of breath significantly improved with systemic treatment.  Pulmonary Embolism  Immunotherapy induced hypothyroidism  Neoplasm non cardiac chest/back pain        HISTORY OF PRESENT ILLNESS     This is a 59-year-old patient.  She states that she was feeling sick toward the end of spring of this year with some sinus problems.  Was seen by her allergist and was treated with antibiotics.  She was also seen at 1 point by primary care and steroids and antibiotics and inhalers were given.  She did not have any resolution and then ultimately developed a little bit of the hemoptysis which led to a chest x-ray showing an abnormality and finally a CT scan of the chest.The CT scan of the chest was done as a lung cancer screening low-dose CT and completed on September 20, 2024.  This demonstrated a extensive infiltrative mediastinal and hilar mass.  There was narrowing of the left mainstem bronchus and lower lobe bronchus secondary to extrinsic compression.  The predominant growth was within the left hilum and subcarinal region.  She underwent a bronchoscopy dated September 27, 2024 showing splaying of the main alanna.  There was mild erosion of a lymph node into the left mainstem bronchus.  There was erythema and mild erosions of an endobronchial tumor along the medial border.  Moderate to severe stenosis of the left lower lobe bronchus to 75%.        PAST MEDICAL HISTORY     Lumbar surgery about 30 years ago   hysterectomy in 2000  Diabetes  COPD  Right  eye exophthalmus  herpes zoster        SOCIAL HISTORY     Patient lives with her sister.  She lives in [facility].  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  She started smoking at the age of 14 and continues to smoke at the age of 59.  Has smoked for 45 years on average 1 pack/day.        CURRENT MEDS     See medication list, meds reviewed, includes metformin, rosuvastatin, been done so Nied inhalers, Wellbutrin escitalopram, trazodone        ALLERGIES     Patient denies any drug allergies        FAMILY HISTORY      Mother had bladder cancer and possibly breast cancer.     Subjective    Today 02.26.2025 Patient in clinic for C1D8 on [id number].   Patient reports she was feeling quiet well until yesterday and started have symptoms of worsening fatigue and headache. She also continues to have her baseline symptoms of SOB with exertion, cough with clear sputum, noncardiac chest pain and back pain (neoplasm pain). She did mentions that her sister was having a sore throat and now keeping quite well. Today she denies any rash/itching, chills or fever, chest pain or chest tightness, painful inflammation/ulceration oral mucous membranes, vomiting, diarrhea/constipation, hematemesis /hemoptysis, hematuria/rectal bleeding, urinary symptoms, swelling extremities, or peripheral neuropathy. ROS as above. Remainder of 10 point review of systems elicited and otherwise unremarkable.     Cancer  Associated symptoms include coughing and fatigue. Pertinent negatives include no abdominal pain, anorexia, arthralgias, change in bowel habit, chest pain, chills, congestion, diaphoresis, fever, headaches, joint swelling, myalgias, nausea, neck pain, numbness, rash, sore throat, swollen glands, urinary symptoms, vertigo, visual change or vomiting.     Objective    BSA: There is no height or weight on file to  calculate BSA.  /74 (BP Location: Left arm, Patient Position: Lying)   Pulse 100   Temp 37.9 °C (100.2 °F) (Temporal)   Resp 18   SpO2 92%      Daily Weight  02/19/25 : 85.1 kg (187 lb 9.8 oz)  02/10/25 : 85.4 kg (188 lb 4.4 oz)  02/06/25 : 81.6 kg (180 lb)  02/05/25 : 82.9 kg (182 lb 12.2 oz)  01/29/25 : 84.1 kg (185 lb 6.5 oz)         2/19/2025    11:44 AM 2/19/2025    12:14 PM 2/20/2025    12:12 PM 2/21/2025    12:30 PM 2/26/2025    12:11 PM 2/26/2025    12:15 PM 2/26/2025     2:19 PM   Vitals   Systolic 135 152 118 133 129  127   Diastolic 91 90 75 82 85  74   BP Location Right arm Right arm Right arm Right arm Left arm  Left arm   Heart Rate 70 70 97 102 122  100   Temp 36.3 °C (97.3 °F) 36.8 °C (98.2 °F) 36.7 °C (98.1 °F) 36.7 °C (98.1 °F) 37.9 °C (100.2 °F) 37.9 °C (100.2 °F) 37.9 °C (100.2 °F)   Resp 20 20 18 17 20  18       Physical Exam  Constitutional:       General: She is not in acute distress.     Appearance: Normal appearance. She is not ill-appearing, toxic-appearing or diaphoretic.   HENT:      Nose: No congestion or rhinorrhea.      Mouth/Throat:      Pharynx: No oropharyngeal exudate or posterior oropharyngeal erythema.   Eyes:      General: No scleral icterus.     Conjunctiva/sclera: Conjunctivae normal.   Cardiovascular:      Rate and Rhythm: Normal rate and regular rhythm.      Pulses: Normal pulses.      Heart sounds: Normal heart sounds. No murmur heard.     No friction rub. No gallop.   Pulmonary:      Effort: Pulmonary effort is normal. No respiratory distress.      Breath sounds: Normal breath sounds. No stridor. No wheezing, rhonchi or rales.   Chest:      Chest wall: No tenderness.   Abdominal:      General: There is no distension.      Palpations: There is no mass.      Tenderness: There is no abdominal tenderness. There is no guarding or rebound.   Musculoskeletal:      Cervical back: No tenderness.      Right lower leg: No edema.      Left lower leg: No edema.    Lymphadenopathy:      Cervical: No cervical adenopathy.   Skin:     General: Skin is warm.      Coloration: Skin is not jaundiced.      Findings: No bruising or lesion.   Neurological:      General: No focal deficit present.      Mental Status: She is oriented to person, place, and time.   Psychiatric:         Mood and Affect: Mood normal.         Behavior: Behavior normal.       Performance Status:  ECOG 1: Restricted in physically strenuous activity but ambulatory and able to carry out work of a light or sedentary nature, e.g., light house work, office work.     Hospital Outpatient Visit on 02/26/2025   Component Date Value Ref Range Status    WBC 02/26/2025 5.4  4.4 - 11.3 x10*3/uL Final    nRBC 02/26/2025 0.0  0.0 - 0.0 /100 WBCs Final    RBC 02/26/2025 3.92 (L)  4.00 - 5.20 x10*6/uL Final    Hemoglobin 02/26/2025 12.2  12.0 - 16.0 g/dL Final    Hematocrit 02/26/2025 37.0  36.0 - 46.0 % Final    MCV 02/26/2025 94  80 - 100 fL Final    MCH 02/26/2025 31.1  26.0 - 34.0 pg Final    MCHC 02/26/2025 33.0  32.0 - 36.0 g/dL Final    RDW 02/26/2025 16.9 (H)  11.5 - 14.5 % Final    Platelets 02/26/2025 223  150 - 450 x10*3/uL Final    Neutrophils % 02/26/2025 88.8  40.0 - 80.0 % Final    Immature Granulocytes %, Automated 02/26/2025 0.4  0.0 - 0.9 % Final    Lymphocytes % 02/26/2025 3.7  13.0 - 44.0 % Final    Monocytes % 02/26/2025 5.2  2.0 - 10.0 % Final    Eosinophils % 02/26/2025 1.7  0.0 - 6.0 % Final    Basophils % 02/26/2025 0.2  0.0 - 2.0 % Final    Neutrophils Absolute 02/26/2025 4.80  1.20 - 7.70 x10*3/uL Final    Immature Granulocytes Absolute, Au* 02/26/2025 0.02  0.00 - 0.70 x10*3/uL Final    Lymphocytes Absolute 02/26/2025 0.20 (L)  1.20 - 4.80 x10*3/uL Final    Monocytes Absolute 02/26/2025 0.28  0.10 - 1.00 x10*3/uL Final    Eosinophils Absolute 02/26/2025 0.09  0.00 - 0.70 x10*3/uL Final    Basophils Absolute 02/26/2025 0.01  0.00 - 0.10 x10*3/uL Final    Glucose 02/26/2025 133 (H)  74 - 99 mg/dL Final     Sodium 02/26/2025 135 (L)  136 - 145 mmol/L Final    Potassium 02/26/2025 4.0  3.5 - 5.3 mmol/L Final    Chloride 02/26/2025 99  98 - 107 mmol/L Final    Bicarbonate 02/26/2025 24  21 - 32 mmol/L Final    Anion Gap 02/26/2025 16  10 - 20 mmol/L Final    Urea Nitrogen 02/26/2025 10  6 - 23 mg/dL Final    Creatinine 02/26/2025 0.73  0.50 - 1.05 mg/dL Final    eGFR 02/26/2025 >90  >60 mL/min/1.73m*2 Final    Calcium 02/26/2025 9.0  8.6 - 10.6 mg/dL Final    Albumin 02/26/2025 4.2  3.4 - 5.0 g/dL Final    Alkaline Phosphatase 02/26/2025 104  33 - 136 U/L Final    Total Protein 02/26/2025 6.3 (L)  6.4 - 8.2 g/dL Final    AST 02/26/2025 16  9 - 39 U/L Final    Bilirubin, Total 02/26/2025 0.4  0.0 - 1.2 mg/dL Final    ALT 02/26/2025 15  7 - 45 U/L Final    Bilirubin, Direct 02/26/2025 0.1  0.0 - 0.3 mg/dL Final    GGT 02/26/2025 163 (H)  5 - 55 U/L Final    LDH 02/26/2025 264 (H)  84 - 246 U/L Final    Magnesium 02/26/2025 1.83  1.60 - 2.40 mg/dL Final    Phosphorus 02/26/2025 3.5  2.5 - 4.9 mg/dL Final    Extra Tube 02/26/2025 Hold for add-ons.   Final    Extra Tube 02/26/2025 Hold for add-ons.   Final    Extra Tube 02/26/2025 Hold for add-ons.   Final    Extra Tube 02/26/2025 Hold for add-ons.   Final    Extra Tube 02/26/2025 Hold for add-ons.   Final    C-Reactive Protein 02/26/2025 0.83  <1.00 mg/dL Final   Hospital Outpatient Visit on 02/21/2025   Component Date Value Ref Range Status    Extra Tube 02/21/2025 Hold for add-ons.   Final    Extra Tube 02/21/2025 Hold for add-ons.   Final    Extra Tube 02/21/2025 Hold for add-ons.   Final   Hospital Outpatient Visit on 02/20/2025   Component Date Value Ref Range Status    Extra Tube 02/20/2025 Hold for add-ons.   Final    Extra Tube 02/20/2025 Hold for add-ons.   Final   Hospital Outpatient Visit on 02/19/2025   Component Date Value Ref Range Status    Extra Tube 02/19/2025 Hold for add-ons.   Final    Extra Tube 02/19/2025 Hold for add-ons.   Final    Extra Tube  02/19/2025 Hold for add-ons.   Final    Extra Tube 02/19/2025 Hold for add-ons.   Final    Extra Tube 02/19/2025 Hold for add-ons.   Final    Extra Tube 02/19/2025 Hold for add-ons.   Final    Extra Tube 02/19/2025 Hold for add-ons.   Final    Extra Tube 02/19/2025 Hold for add-ons.   Final    Extra Tube 02/19/2025 Hold for add-ons.   Final    Extra Tube 02/19/2025 Hold for add-ons.   Final    Extra Tube 02/19/2025 Hold for add-ons.   Final    Extra Tube 02/19/2025 Hold for add-ons.   Final    Extra Tube 02/19/2025 Hold for add-ons.   Final    Extra Tube 02/19/2025 Hold for add-ons.   Final    Extra Tube 02/19/2025 Hold for add-ons.   Final   Lab on 02/18/2025   Component Date Value Ref Range Status    Color, Urine 02/18/2025 Light-Yellow  Light-Yellow, Yellow, Dark-Yellow Final    Appearance, Urine 02/18/2025 Clear  Clear Final    Specific Gravity, Urine 02/18/2025 1.010  1.005 - 1.035 Final    pH, Urine 02/18/2025 5.5  5.0, 5.5, 6.0, 6.5, 7.0, 7.5, 8.0 Final    Protein, Urine 02/18/2025 NEGATIVE  NEGATIVE, 10 (TRACE), 20 (TRACE) mg/dL Final    Glucose, Urine 02/18/2025 Normal  Normal mg/dL Final    Blood, Urine 02/18/2025 NEGATIVE  NEGATIVE mg/dL Final    Ketones, Urine 02/18/2025 NEGATIVE  NEGATIVE mg/dL Final    Bilirubin, Urine 02/18/2025 NEGATIVE  NEGATIVE mg/dL Final    Urobilinogen, Urine 02/18/2025 Normal  Normal mg/dL Final    Nitrite, Urine 02/18/2025 NEGATIVE  NEGATIVE Final    Leukocyte Esterase, Urine 02/18/2025 NEGATIVE  NEGATIVE Final    Phosphorus 02/18/2025 4.3  2.5 - 4.9 mg/dL Final    Magnesium 02/18/2025 2.15  1.60 - 2.40 mg/dL Final    LDH 02/18/2025 274 (H)  84 - 246 U/L Final    GGT 02/18/2025 213 (H)  5 - 55 U/L Final    Bilirubin, Direct 02/18/2025 0.1  0.0 - 0.3 mg/dL Final    Glucose 02/18/2025 123 (H)  74 - 99 mg/dL Final    Sodium 02/18/2025 139  136 - 145 mmol/L Final    Potassium 02/18/2025 4.1  3.5 - 5.3 mmol/L Final    Chloride 02/18/2025 102  98 - 107 mmol/L Final    Bicarbonate  02/18/2025 27  21 - 32 mmol/L Final    Anion Gap 02/18/2025 14  10 - 20 mmol/L Final    Urea Nitrogen 02/18/2025 8  6 - 23 mg/dL Final    Creatinine 02/18/2025 0.83  0.50 - 1.05 mg/dL Final    eGFR 02/18/2025 81  >60 mL/min/1.73m*2 Final    Calcium 02/18/2025 9.4  8.6 - 10.3 mg/dL Final    Albumin 02/18/2025 4.4  3.4 - 5.0 g/dL Final    Alkaline Phosphatase 02/18/2025 129  33 - 136 U/L Final    Total Protein 02/18/2025 6.9  6.4 - 8.2 g/dL Final    AST 02/18/2025 35  9 - 39 U/L Final    Bilirubin, Total 02/18/2025 0.3  0.0 - 1.2 mg/dL Final    ALT 02/18/2025 33  7 - 45 U/L Final    WBC 02/18/2025 7.0  4.4 - 11.3 x10*3/uL Final    nRBC 02/18/2025 0.0  0.0 - 0.0 /100 WBCs Final    RBC 02/18/2025 4.34  4.00 - 5.20 x10*6/uL Final    Hemoglobin 02/18/2025 13.3  12.0 - 16.0 g/dL Final    Hematocrit 02/18/2025 41.1  36.0 - 46.0 % Final    MCV 02/18/2025 95  80 - 100 fL Final    MCH 02/18/2025 30.6  26.0 - 34.0 pg Final    MCHC 02/18/2025 32.4  32.0 - 36.0 g/dL Final    RDW 02/18/2025 17.2 (H)  11.5 - 14.5 % Final    Platelets 02/18/2025 237  150 - 450 x10*3/uL Final    Neutrophils % 02/18/2025 65.0  40.0 - 80.0 % Final    Immature Granulocytes %, Automated 02/18/2025 0.6  0.0 - 0.9 % Final    Lymphocytes % 02/18/2025 25.6  13.0 - 44.0 % Final    Monocytes % 02/18/2025 6.7  2.0 - 10.0 % Final    Eosinophils % 02/18/2025 1.7  0.0 - 6.0 % Final    Basophils % 02/18/2025 0.4  0.0 - 2.0 % Final    Neutrophils Absolute 02/18/2025 4.57  1.20 - 7.70 x10*3/uL Final    Immature Granulocytes Absolute, Au* 02/18/2025 0.04  0.00 - 0.70 x10*3/uL Final    Lymphocytes Absolute 02/18/2025 1.80  1.20 - 4.80 x10*3/uL Final    Monocytes Absolute 02/18/2025 0.47  0.10 - 1.00 x10*3/uL Final    Eosinophils Absolute 02/18/2025 0.12  0.00 - 0.70 x10*3/uL Final    Basophils Absolute 02/18/2025 0.03  0.00 - 0.10 x10*3/uL Final   Hospital Outpatient Visit on 02/13/2025   Component Date Value Ref Range Status    AV pk jose 02/13/2025 1.46  m/s Final     MV E/A ratio 02/13/2025 0.97   Final    LA vol index A/L 02/13/2025 18.5  ml/m2 Final    Tricuspid annular plane systolic e* 02/13/2025 1.5  cm Final    LV EF 02/13/2025 58  % Final    RV free wall pk S' 02/13/2025 7.33  cm/s Final    LVIDd 02/13/2025 3.47  cm Final    RVSP 02/13/2025 22.7  mmHg Final    AV pk grad 02/13/2025 8  mmHg Final    LV A4C EF 02/13/2025 61.8   Final   Hospital Outpatient Visit on 02/10/2025   Component Date Value Ref Range Status    Adrenocorticotropic Hormone (ACTH) 02/10/2025 5.5 (L)  7.2 - 63.3 pg/mL Final    aPTT 02/10/2025 49 (H)  27 - 38 seconds Final    Bilirubin, Direct 02/10/2025 0.0  0.0 - 0.3 mg/dL Final    WBC 02/10/2025 8.3  4.4 - 11.3 x10*3/uL Final    nRBC 02/10/2025 0.0  0.0 - 0.0 /100 WBCs Final    RBC 02/10/2025 4.28  4.00 - 5.20 x10*6/uL Final    Hemoglobin 02/10/2025 13.0  12.0 - 16.0 g/dL Final    Hematocrit 02/10/2025 38.8  36.0 - 46.0 % Final    MCV 02/10/2025 91  80 - 100 fL Final    MCH 02/10/2025 30.4  26.0 - 34.0 pg Final    MCHC 02/10/2025 33.5  32.0 - 36.0 g/dL Final    RDW 02/10/2025 16.5 (H)  11.5 - 14.5 % Final    Platelets 02/10/2025 374  150 - 450 x10*3/uL Final    Neutrophils % 02/10/2025 66.3  40.0 - 80.0 % Final    Immature Granulocytes %, Automated 02/10/2025 0.7  0.0 - 0.9 % Final    Lymphocytes % 02/10/2025 24.0  13.0 - 44.0 % Final    Monocytes % 02/10/2025 7.9  2.0 - 10.0 % Final    Eosinophils % 02/10/2025 0.5  0.0 - 6.0 % Final    Basophils % 02/10/2025 0.6  0.0 - 2.0 % Final    Neutrophils Absolute 02/10/2025 5.51  1.20 - 7.70 x10*3/uL Final    Immature Granulocytes Absolute, Au* 02/10/2025 0.06  0.00 - 0.70 x10*3/uL Final    Lymphocytes Absolute 02/10/2025 2.00  1.20 - 4.80 x10*3/uL Final    Monocytes Absolute 02/10/2025 0.66  0.10 - 1.00 x10*3/uL Final    Eosinophils Absolute 02/10/2025 0.04  0.00 - 0.70 x10*3/uL Final    Basophils Absolute 02/10/2025 0.05  0.00 - 0.10 x10*3/uL Final    Glucose 02/10/2025 112 (H)  74 - 99 mg/dL Final    Sodium  02/10/2025 137  136 - 145 mmol/L Final    Potassium 02/10/2025 4.0  3.5 - 5.3 mmol/L Final    Chloride 02/10/2025 102  98 - 107 mmol/L Final    Bicarbonate 02/10/2025 26  21 - 32 mmol/L Final    Anion Gap 02/10/2025 13  10 - 20 mmol/L Final    Urea Nitrogen 02/10/2025 9  6 - 23 mg/dL Final    Creatinine 02/10/2025 0.66  0.50 - 1.05 mg/dL Final    eGFR 02/10/2025 >90  >60 mL/min/1.73m*2 Final    Calcium 02/10/2025 9.2  8.6 - 10.6 mg/dL Final    Albumin 02/10/2025 4.2  3.4 - 5.0 g/dL Final    Alkaline Phosphatase 02/10/2025 105  33 - 136 U/L Final    Total Protein 02/10/2025 6.6  6.4 - 8.2 g/dL Final    AST 02/10/2025 23  9 - 39 U/L Final    Bilirubin, Total 02/10/2025 0.3  0.0 - 1.2 mg/dL Final    ALT 02/10/2025 15  7 - 45 U/L Final    Cortisol 02/10/2025 11.1  2.5 - 20.0 ug/dL Final    Follicle Stimulating Hormone 02/10/2025 75.6  IU/L Final    GGT 02/10/2025 134 (H)  5 - 55 U/L Final    LDH 02/10/2025 206  84 - 246 U/L Final    Magnesium 02/10/2025 2.16  1.60 - 2.40 mg/dL Final    Phosphorus 02/10/2025 3.9  2.5 - 4.9 mg/dL Final    Protime 02/10/2025 11.9  9.8 - 12.8 seconds Final    INR 02/10/2025 1.1  0.9 - 1.1 Final    Thyroid Stimulating Hormone 02/10/2025 84.17 (H)  0.44 - 3.98 mIU/L Final    Thyroxine, Free 02/10/2025 0.34 (L)  0.78 - 1.48 ng/dL Final    Triiodothyronine, Free 02/10/2025 1.1 (L)  2.3 - 4.2 pg/mL Final    Color, Urine 02/10/2025 Yellow  Light-Yellow, Yellow, Dark-Yellow Final    Appearance, Urine 02/10/2025 Clear  Clear Final    Specific Gravity, Urine 02/10/2025 1.016  1.005 - 1.035 Final    pH, Urine 02/10/2025 6.5  5.0, 5.5, 6.0, 6.5, 7.0, 7.5, 8.0 Final    Protein, Urine 02/10/2025 NEGATIVE  NEGATIVE, 10 (TRACE), 20 (TRACE) mg/dL Final    Glucose, Urine 02/10/2025 Normal  Normal mg/dL Final    Blood, Urine 02/10/2025 NEGATIVE  NEGATIVE mg/dL Final    Ketones, Urine 02/10/2025 NEGATIVE  NEGATIVE mg/dL Final    Bilirubin, Urine 02/10/2025 NEGATIVE  NEGATIVE mg/dL Final    Urobilinogen, Urine  02/10/2025 Normal  Normal mg/dL Final    Nitrite, Urine 02/10/2025 NEGATIVE  NEGATIVE Final    Leukocyte Esterase, Urine 02/10/2025 NEGATIVE  NEGATIVE Final   Lab on 02/05/2025   Component Date Value Ref Range Status    WBC 02/05/2025 5.9  4.4 - 11.3 x10*3/uL Final    nRBC 02/05/2025 0.0  0.0 - 0.0 /100 WBCs Final    RBC 02/05/2025 4.69  4.00 - 5.20 x10*6/uL Final    Hemoglobin 02/05/2025 14.3  12.0 - 16.0 g/dL Final    Hematocrit 02/05/2025 44.0  36.0 - 46.0 % Final    MCV 02/05/2025 94  80 - 100 fL Final    MCH 02/05/2025 30.5  26.0 - 34.0 pg Final    MCHC 02/05/2025 32.5  32.0 - 36.0 g/dL Final    RDW 02/05/2025 17.2 (H)  11.5 - 14.5 % Final    Platelets 02/05/2025 399  150 - 450 x10*3/uL Final    Neutrophils % 02/05/2025 43.8  40.0 - 80.0 % Final    Immature Granulocytes %, Automated 02/05/2025 1.4 (H)  0.0 - 0.9 % Final    Lymphocytes % 02/05/2025 42.6  13.0 - 44.0 % Final    Monocytes % 02/05/2025 11.2  2.0 - 10.0 % Final    Eosinophils % 02/05/2025 0.5  0.0 - 6.0 % Final    Basophils % 02/05/2025 0.5  0.0 - 2.0 % Final    Neutrophils Absolute 02/05/2025 2.58  1.20 - 7.70 x10*3/uL Final    Immature Granulocytes Absolute, Au* 02/05/2025 0.08  0.00 - 0.70 x10*3/uL Final    Lymphocytes Absolute 02/05/2025 2.51  1.20 - 4.80 x10*3/uL Final    Monocytes Absolute 02/05/2025 0.66  0.10 - 1.00 x10*3/uL Final    Eosinophils Absolute 02/05/2025 0.03  0.00 - 0.70 x10*3/uL Final    Basophils Absolute 02/05/2025 0.03  0.00 - 0.10 x10*3/uL Final    Glucose 02/05/2025 105 (H)  74 - 99 mg/dL Final    Sodium 02/05/2025 138  136 - 145 mmol/L Final    Potassium 02/05/2025 4.3  3.5 - 5.3 mmol/L Final    Chloride 02/05/2025 100  98 - 107 mmol/L Final    Bicarbonate 02/05/2025 29  21 - 32 mmol/L Final    Anion Gap 02/05/2025 13  10 - 20 mmol/L Final    Urea Nitrogen 02/05/2025 11  6 - 23 mg/dL Final    Creatinine 02/05/2025 0.80  0.50 - 1.05 mg/dL Final    eGFR 02/05/2025 84  >60 mL/min/1.73m*2 Final    Calcium 02/05/2025 9.3  8.6 -  10.3 mg/dL Final    Albumin 2025 4.5  3.4 - 5.0 g/dL Final    Alkaline Phosphatase 2025 108  33 - 136 U/L Final    Total Protein 2025 7.1  6.4 - 8.2 g/dL Final    AST 2025 16  9 - 39 U/L Final    Bilirubin, Total 2025 0.3  0.0 - 1.2 mg/dL Final    ALT 2025 14  7 - 45 U/L Final    LDH 2025 182  84 - 246 U/L Final   Office Visit on 2025   Component Date Value Ref Range Status    Reason for Study 2025 To identify mutations relevant to patient's cancer.   Final    Genetic Diseases Assessed 2025 Cancer   Final    Description of Ranges of DNA Seque* 2025 523 gene liquid biopsy   Final    Overall Interpretation 2025 positive   Final    [facility] 2025 [email or web address]   Final    Low Coverage Regions 2025 CHEK1, FOG91U7, DNMT1, FANCC, HDAC2, KDM5D, MAPK1, NOTCH2, PMS1, RAD51C, RHOA, RXRA, SDHAF2, TGFBR1, TP63   Final    Trial Count 2025 3   Final    Trial 1: Matched criteria 2025    Final                    Value:Clinical Trial NCT ID: [id number]  Clinical Trial Title: Study of ATRN-119 in Patients with Advanced Solid Tumors  Clinical Trial URL: [email or web address]://[email or web address].[email or web address]/[email or web address]  Clinical Phase: Phase 1/Phase 2  Clinical Trial Matches: TP53 p.R249G mutation, RB1 c.540-1G>T mutation  Clinical Trial Distance and Location: [place], [place]    Trial 2: Matched criteria 2025    Final                    Value:Clinical Trial NCT ID: [id number]  Clinical Trial Title: Study of AVZO-021 in Patients with Advanced Solid Tumors  Clinical Trial URL: [email or web address]://clinicaltrials.gov/[email or web address]/[email or web address]  Clinical Phase: Phase 1/Phase 2  Clinical Trial Matches: RB1 c.540-1G>T mutation  Clinical Trial Distance and Location: [place], [place]    Trial 3: Matched criteria 2025    Final                    Value:Clinical  Trial NCT ID: [id number]  Clinical Trial Title: Testing the Combination of Two Anti-cancer Drugs, Peposertib () and  for Advanced Solid Tumors  Clinical Trial URL: [email or web address]://clinicaltrials.gov/ct2/[email or web address]  Clinical Phase: Phase 1  Clinical Trial Matches: MYC amplification  Clinical Trial Distance and Location: [facility], [place], [place]    Tumor Mutational [place] 02/05/2025 6.6  m/MB Final    Microsatellite Instability Note 02/05/2025 MSI-High not detected   Final    Treatment Implications Note 02/05/2025 No reportable treatment options found.   Final   Lab on 01/29/2025   Component Date Value Ref Range Status    WBC 01/29/2025 2.6 (L)  4.4 - 11.3 x10*3/uL Final    nRBC 01/29/2025 0.0  0.0 - 0.0 /100 WBCs Final    RBC 01/29/2025 4.31  4.00 - 5.20 x10*6/uL Final    Hemoglobin 01/29/2025 12.9  12.0 - 16.0 g/dL Final    Hematocrit 01/29/2025 38.9  36.0 - 46.0 % Final    MCV 01/29/2025 90  80 - 100 fL Final    MCH 01/29/2025 29.9  26.0 - 34.0 pg Final    MCHC 01/29/2025 33.2  32.0 - 36.0 g/dL Final    RDW 01/29/2025 15.9 (H)  11.5 - 14.5 % Final    Platelets 01/29/2025 148 (L)  150 - 450 x10*3/uL Final    Immature Granulocytes %, Automated 01/29/2025 0.0  0.0 - 0.9 % Final    Immature Granulocytes Absolute, Au* 01/29/2025 0.00  0.00 - 0.70 x10*3/uL Final    Glucose 01/29/2025 101 (H)  74 - 99 mg/dL Final    Sodium 01/29/2025 140  136 - 145 mmol/L Final    Potassium 01/29/2025 4.0  3.5 - 5.3 mmol/L Final    Chloride 01/29/2025 105  98 - 107 mmol/L Final    Bicarbonate 01/29/2025 25  21 - 32 mmol/L Final    Anion Gap 01/29/2025 14  10 - 20 mmol/L Final    Urea Nitrogen 01/29/2025 10  6 - 23 mg/dL Final    Creatinine 01/29/2025 0.63  0.50 - 1.05 mg/dL Final    eGFR 01/29/2025 >90  >60 mL/min/1.73m*2 Final    Calcium 01/29/2025 9.4  8.6 - 10.6 mg/dL Final    Albumin 01/29/2025 4.3  3.4 - 5.0 g/dL Final    Alkaline Phosphatase 01/29/2025 105  33 - 136 U/L Final    Total Protein 01/29/2025 7.0  6.4 - 8.2 g/dL  Final    AST 01/29/2025 14  9 - 39 U/L Final    Bilirubin, Total 01/29/2025 0.2  0.0 - 1.2 mg/dL Final    ALT 01/29/2025 13  7 - 45 U/L Final    Neutrophils %, Manual 01/29/2025 26.0  40.0 - 80.0 % Final    Bands %, Manual 01/29/2025 2.0  0.0 - 5.0 % Final    Lymphocytes %, Manual 01/29/2025 57.0  13.0 - 44.0 % Final    Monocytes %, Manual 01/29/2025 12.0  2.0 - 10.0 % Final    Eosinophils %, Manual 01/29/2025 2.0  0.0 - 6.0 % Final    Basophils %, Manual 01/29/2025 1.0  0.0 - 2.0 % Final    Seg Neutrophils Absolute, Manual 01/29/2025 0.68 (L)  1.20 - 7.00 x10*3/uL Final    Bands Absolute, Manual 01/29/2025 0.05  0.00 - 0.70 x10*3/uL Final    Lymphocytes Absolute, Manual 01/29/2025 1.48  1.20 - 4.80 x10*3/uL Final    Monocytes Absolute, Manual 01/29/2025 0.31  0.10 - 1.00 x10*3/uL Final    Eosinophils Absolute, Manual 01/29/2025 0.05  0.00 - 0.70 x10*3/uL Final    Basophils Absolute, Manual 01/29/2025 0.03  0.00 - 0.10 x10*3/uL Final    Total Cells Counted 01/29/2025 100   Final    Neutrophils Absolute, Manual 01/29/2025 0.73 (L)  1.20 - 7.70 x10*3/uL Final    RBC Morphology 01/29/2025 No significant RBC morphology present   Final   There may be more visits with results that are not included.       CT pelvis w IV contrast    Result Date: 2/13/2025  Impression: Restaging of metastatic small-cell lung carcinoma prior to initiation of clinical trial (to be combined with CT chest and abdomen 01/24/2025): 1. Stable tumor burden as evidenced by stable mixed lytic-sclerotic osseous lesions throughout the pelvis and lower lumbar vertebrae. 2. No evidence of new disease within the pelvis.   I personally reviewed the images/study and I agree with the findings as stated. This study was interpreted at [place], [place].   MACRO: None   Signed by: Yao Parham 2/13/2025 9:25 PM Dictation workstation:   [id number]    MR brain w and wo IV contrast    Result Date:  2/6/2025  Impression: No new mass or pathologic enhancement was identified.   Nonspecific white matter changes most likely reflect chronic small-vessel ischemic disease given the age of the patient.   Likely right-sided proptosis stable in retrospect compared to the prior exam of 10/01/2024. Given prior masslike enlargement of the inferior rectus muscle on CT 10/07/2017 the finding which is not seen on the current limited exam of the orbits observation could nonetheless reflect residua of prior disease. Please correlate clinically and consider dedicated orbital MRI or follow-up orbital CT for further evaluation if clinically indicated   MACRO: None   Signed by: Emiliano Kong 2/6/2025 10:23 AM Dictation workstation:   [id number]    CT abdomen w IV contrast    Result Date: 1/24/2025  Impression: Restaging of metastatic small-cell lung carcinoma, as compared to CT 12/19/2024: 1. Stable to slightly increased disease burden as evidenced by gradually increasing size of the distal, left subhilar component of the primary neoplasm with stable size of hepatic metastases. 2. Stable mediastinal lymphadenopathy, including prominent paratracheal and aortopulmonary window nodes detailed above, as well as mixed lytic-sclerotic osseous lesions throughout the axial skeleton. 3. Increased postobstructive pneumonitis within the left lower lobe.   I personally reviewed the images/study and I agree with the findings as stated. This study was interpreted at [facility], [place], [place].   MACRO: None   Signed by: Yao Parham 1/24/2025 9:54 PM Dictation workstation:   [id number]    CT chest w IV contrast    Result Date: 1/24/2025  Impression: Restaging of metastatic small-cell lung carcinoma, as compared to CT 12/19/2024: 1. Stable to slightly increased disease burden as evidenced by gradually increasing size of the distal, left subhilar component of the primary neoplasm with stable size of hepatic  metastases. 2. Stable mediastinal lymphadenopathy, including prominent paratracheal and aortopulmonary window nodes detailed above, as well as mixed lytic-sclerotic osseous lesions throughout the axial skeleton. 3. Increased postobstructive pneumonitis within the left lower lobe.   I personally reviewed the images/study and I agree with the findings as stated. This study was interpreted at [facility], [place], [place].   MACRO: None   Signed by: Yao Parham 1/24/2025 9:54 PM Dictation workstation:   [id number]        Assessment/Plan     Ms. 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 Initial imaging post C2 reviewed by Dr Kohli appeared to have sub-optimal response to treatment. Unfortunately after C4 imaging confirmed she had progressive disease. In the last visit Dr Kohli discussed all treatment options which included SOC Taral[place]amab and also clinical trial [id number] with study drug ABBV-706 which is an ADC targeting SEZ6 with a topoisomerase 1 inhibitor payload. Patient opted to participate on study and completed screening requirements on study. Started treatment on 02.19.2025 on study and is in today for C1D8 follow up visit. Today she reports having worsening symptoms of fatigue, nausea, SOB with exertion, and headache. She also discussed about family member she is around not keeping well. She also discussed about problems with transportation. We set her up to see social service to help with transportation. We also give her IV fluids, antiemetic and pain meds to help with her symptoms. We also got labs to see if she has the flu or COVID. We discussed to call the office and research nurse if experiencing any new or worsening symptoms. RTC per protocol.      Cancer Staging   No matching staging information was found for the patient.      Oncology  History   SCLC (small cell lung carcinoma)   9/27/2024 Initial Diagnosis    SCLC (small cell lung carcinoma) (Multi)     10/4/2024 - 10/8/2024 Chemotherapy    CARBOplatin / Etoposide, 21 Day Cycles - Lung     10/4/2024 - 10/6/2024 Research Study Participant    ([facility]) [id number] - Atezolizumab + CARBOplatin / Etoposide / [id number], 21 Day Cycles  Plan Provider: JESSICA Hernandez  Treatment goal: Control  Line of treatment: First Line  Associated studies: (177Lu)[id number] with Carboplatin, Etoposide and Tislelizumab in Newly Diagnosed ES-SCLC     10/4/2024 - 1/17/2025 Chemotherapy    Durvalumab + CARBOplatin / Etoposide, 21 Day Cycles     10/28/2024 - 10/28/2024 Chemotherapy    Durvalumab + CARBOplatin / Etoposide, 21 Day Cycles     2/5/2025 - 2/5/2025 Chemotherapy    Durvalumab, 28 Day Cycles     2/19/2025 -  Research Study Participant    ([facility]) [id number] Part 1 - ABBV-706, 21 Day Cycles  Plan Provider: JESSICA Hernandez  Treatment goal: Control  Line of treatment: Second Line  Associated studies: ABBV-706 alone or in combination in subjects with advanced solid tumors

## 2025-02-26 NOTE — TELEPHONE ENCOUNTER
Called patient/ left Vm to discuss referral to supportive and palliative oncology. Sent [email or web address] message with information about referral as well.

## 2025-02-26 NOTE — RESEARCH NOTES
RSH><[id number]><C1D8><[id number]>    DCR[facility] NURSING VISIT NOTE  Study Name: [id number]- Part 1_Escalation- Monotherapy  IRB#: [id number]  [id number]  Protocol Version Dated:  6/15/2023  PI: Jd Kohli MD.      Time point: Cycle 1 - Day 8    Encounter Date: 02/26/2025  Encounter Time: 12:00 PM EST  Encounter Department: [place] [address] RESEARCH     #1     Phone Pager   Jazzy Guerrero RN [place]     #2 Phone Pager        Other Phone Pager            Study Regimen and Dosing   For Oncology study, Refer [place] Treatment Plan for orders   Part 1_Escalation_Monotherapy - patients with advanced recurrent or refractory solid tumors (small cell lung cancer, neuroendocrine tumors or brain tumors) will receive gradually increasing doses of ABBV-706 to determine MTD (Maximum Tolerated Dose).  Cycle = 21-days.  ABBV-706 administered IV Day 1 of each cycle.     Dietary Guidelines   Regular diet       Admission and Prior to Starting Study Activities   Notify  when patient arrives to unit.  Patient review/update [facility]CR[facility]/[place] intake form.  Obtain vital signs after sitting at least 3 minutes. Record on flow sheet & in EMR.  Perform venipuncture for sample collection procedures. Do Not draw research samples from mediport/central line if used for infusion  Physical Exam including pulmonary exam & neuro assessment Days 8 & 15.         Study Specific Instructions and Documentation   1- ECG  2-Safety Labs   3-Research Correlatives  4-Discharge         Subjective   Minal Sun is a 59 y.o. female and is here for a Research clinical visit.    Visit Provider: Jazzy Guerrero RN     Allergies:   Allergies   Allergen Reactions    Clindamycin Diarrhea    Erythromycin Diarrhea    Erythromycin Base Other and Nausea Only    Oxycodone-Acetaminophen Hives     anxiety       Objective     Vital Signs:    Vitals:    02/26/25 1211 02/26/25 1215   BP:  129/85    Pulse: (!) 122  Comment: Baldev Ferraro CNP aware and at bedside.    Resp: 20    Temp: 37.9 °C (100.2 °F) 37.9 °C (100.2 °F)   TempSrc: Oral Temporal   SpO2: 94%        Physical Exam     ASSESSMENT and PLAN:  Problem List Items Addressed This Visit          Hematology and Neoplasia    SCLC (small cell lung carcinoma) - Primary    Relevant Orders    CBC and Auto Differential    Comprehensive metabolic panel    Bilirubin, Direct    Gamma GT    Lactate dehydrogenase    Magnesium    Phosphorus    C-Reactive Protein    Research collection: 4mL Red Top - Research Collect ABBV-706 ADA/NADA; Post-Dose; Other (EOI +168 hours); Other (+/- 24 hours); Ambient; Other (5-8x); Allow to clot 30-60 minutes.    Research collection: 2mL Red Top - Research Collect ABBV-706 PK ADC/Total AB; Post-Dose; Other (EOI +168 hours); Other (+/- 24 hours); Ambient; Other (5-8x); Allow to clot 30-60 minutes.    Research collection: 3mL Lavender EDTA - Research Collect Free Top1 Inhibitor; Post-Dose; Other (EOI +168 hours); Other (+/- 24 hours); On ICE; 8-10x    Research collection: 5mL Gold SST - Research Collect Biomarker Serum; Post-Dose; Other (EOI +168 hours); Other (+/- 24 hours); Ambient; 5x; Keep upright and allow to clot 30 minutes.    Research collection: 10mL RareCyte AccuCyte - Reserach Collect CTC (SCLC Only); Post-Dose; Other (EOI +168 hours); Other (+/- 24 hours); 8-10x    Sars-CoV-2 and Influenza A/B PCR    C-Reactive Protein    Sars-CoV-2 and Influenza A/B PCR     Orders placed during today's visit:  Orders Placed This Encounter   Procedures    CBC and Auto Differential     Standing Status:   Standing     Number of Occurrences:   1     Order Specific Question:   Release result to MyChart     Answer:   Immediate [1]    Comprehensive metabolic panel     Standing Status:   Standing     Number of Occurrences:   1     Order Specific Question:   Release result to MyChart     Answer:   Immediate [1]    Bilirubin, Direct      Standing Status:   Standing     Number of Occurrences:   1     Order Specific Question:   Release result to MyChart     Answer:   Immediate [1]    Gamma GT     Standing Status:   Standing     Number of Occurrences:   1     Order Specific Question:   Release result to MyChart     Answer:   Immediate [1]    Lactate dehydrogenase     Standing Status:   Standing     Number of Occurrences:   1     Order Specific Question:   Release result to MyChart     Answer:   Immediate [1]    Magnesium     Standing Status:   Standing     Number of Occurrences:   1     Order Specific Question:   Release result to MyChart     Answer:   Immediate [1]    Phosphorus     Standing Status:   Standing     Number of Occurrences:   1     Order Specific Question:   Release result to MyChart     Answer:   Immediate [1]    C-Reactive Protein     Standing Status:   Future     Number of Occurrences:   1     Standing Expiration Date:   2/26/2026     Order Specific Question:   Release result to MyChart     Answer:   Immediate [1]    Research collection: 4mL Red Top - Research Collect ABBV-706 ADA/NADA; Post-Dose; Other (EOI +168 hours); Other (+/- 24 hours); Ambient; Other (5-8x); Allow to clot 30-60 minutes.     Standing Status:   Standing     Number of Occurrences:   1     Order Specific Question:   Test     Answer:   ABBV-706 ADA/NADA     Order Specific Question:   Timepoint     Answer:   Post-Dose     Order Specific Question:   Post-Dose     Answer:   Other     Comments:   EOI +168 hours     Order Specific Question:   Post-Dose Window     Answer:   Other     Comments:   +/- 24 hours     Order Specific Question:   Temperature     Answer:   Ambient     Order Specific Question:   Invert     Answer:   Other     Comments:   5-8x     Order Specific Question:   Handling     Answer:   Allow to clot 30-60 minutes.     Order Specific Question:   Optional?     Answer:   No    Research collection: 2mL Red Top - Research Collect ABBV-706 PK ADC/Total AB;  Post-Dose; Other (EOI +168 hours); Other (+/- 24 hours); Ambient; Other (5-8x); Allow to clot 30-60 minutes.     Standing Status:   Standing     Number of Occurrences:   1     Order Specific Question:   Test     Answer:   ABBV-706 PK ADC/Total AB     Order Specific Question:   Timepoint     Answer:   Post-Dose     Order Specific Question:   Post-Dose     Answer:   Other     Comments:   EOI +168 hours     Order Specific Question:   Post-Dose Window     Answer:   Other     Comments:   +/- 24 hours     Order Specific Question:   Temperature     Answer:   Ambient     Order Specific Question:   Invert     Answer:   Other     Comments:   5-8x     Order Specific Question:   Handling     Answer:   Allow to clot 30-60 minutes.     Order Specific Question:   Optional?     Answer:   No    Research collection: 3mL Lavender EDTA - Research Collect Free Top1 Inhibitor; Post-Dose; Other (EOI +168 hours); Other (+/- 24 hours); On ICE; 8-10x     Standing Status:   Standing     Number of Occurrences:   1     Order Specific Question:   Test     Answer:   Free Top1 Inhibitor     Order Specific Question:   Timepoint     Answer:   Post-Dose     Order Specific Question:   Post-Dose     Answer:   Other     Comments:   EOI +168 hours     Order Specific Question:   Post-Dose Window     Answer:   Other     Comments:   +/- 24 hours     Order Specific Question:   Temperature     Answer:   On ICE     Order Specific Question:   Invert     Answer:   8-10x     Order Specific Question:   Optional?     Answer:   No    Research collection: 5mL Gold SST - Research Collect Biomarker Serum; Post-Dose; Other (EOI +168 hours); Other (+/- 24 hours); Ambient; 5x; Keep upright and allow to clot 30 minutes.     Standing Status:   Standing     Number of Occurrences:   1     Order Specific Question:   Test     Answer:   Biomarker Serum     Order Specific Question:   Timepoint     Answer:   Post-Dose     Order Specific Question:   Post-Dose     Answer:   Other      Comments:   EOI +168 hours     Order Specific Question:   Post-Dose Window     Answer:   Other     Comments:   +/- 24 hours     Order Specific Question:   Temperature     Answer:   Ambient     Order Specific Question:   Invert     Answer:   5x     Order Specific Question:   Handling     Answer:   Keep upright and allow to clot 30 minutes.     Order Specific Question:   Optional?     Answer:   No    Research collection: 10mL RareCyte AccuCyte - Reserach Collect CTC (SCLC Only); Post-Dose; Other (EOI +168 hours); Other (+/- 24 hours); 8-10x     Standing Status:   Standing     Number of Occurrences:   1     Order Specific Question:   Test     Answer:   CTC (SCLC Only)     Order Specific Question:   Timepoint     Answer:   Post-Dose     Order Specific Question:   Post-Dose     Answer:   Other     Comments:   EOI +168 hours     Order Specific Question:   Post-Dose Window     Answer:   Other     Comments:   +/- 24 hours     Order Specific Question:   Invert     Answer:   8-10x     Order Specific Question:   Optional?     Answer:   No    Sars-CoV-2 and Influenza A/B PCR     Standing Status:   Future     Number of Occurrences:   1     Standing Expiration Date:   2/26/2026     Order Specific Question:   Release result to MyChart     Answer:   Immediate [1]    C-Reactive Protein     Standing Status:   Standing     Number of Occurrences:   1     Order Specific Question:   Release result to MyChart     Answer:   Immediate [1]    Sars-CoV-2 and Influenza A/B PCR     Standing Status:   Standing     Number of Occurrences:   1     Order Specific Question:   Release result to MyChart     Answer:   Immediate [1]        [id number] - ABBV-706 alone or in combination in subjects with advanced solid tumors    Patient has no adverse events documented in the Research Adverse Events activity.      Day 8 ECG   JOSEFA 150c Study-specific ECG Machine - rest supine or semi-recumbent at least 5 minutes prior:  Single     ECGs are time-matched to PK  sample collection & should be collected within 10 mins prior to PK collection.  ECGs occurring near meals will take place prior to meals  QTcF calculation: Fridericia's formula: S:\DCRU_Staff\Nursing\Protocols\QTcF calculator  MD/Provider to sign & date. Do not need to wait before starting treatment.  Contact MD/Provider if abnormal from baseline.    Time Point Rest Time  QTcF   +168 Hours after End of Infusion 1210 426        Day 8 Safety Labs   For Oncology study, Refer [place] Treatment Plan for orders    COMPLETED VIA 22G PIV AT 1241.     Labs collected later then 10 minutes after EKG due to patient not feeling well and awaiting orders from [facility].     Day 8 Research Correlatives:     Access Type: 22G PIV Location: Right Forearm   For Oncology study, Refer [place] Treatment Plan for orders  Enter Additional Information here not covered in the Treatment Plan:  Text  AFTER ECGs  Deliver to [facility] lab for processing    Time point Specimen Test Volume Tube Handling Draw Time    Day 8  +168 hrs (+/- 24 hrs) after EOI  (draw in order) ABBV-706 ADA/NADA 4 mL Red Top Serum Invert 5 - 8x; Ambient Allow to clot 30 -60 mins         1241    ABBV-706 PK ADC/Total AB 2 mL Red Top Serum Invert 5 - 8x; Ambient  Allow to clot 30 -60 mins     Free TOP1 Inhibitor 3 mL EDTA Lavender Invert 8 - 10x. On ICE     Biomarker Serum 5 mL Gold Top SST Invert 5x; Upright. Ambient Allow to clot 30 mins     CTC (SCLC Only)-If ordered see EPCI 10 mL RareCyte AccuCyte BCT See Below     CTC instructions  Keep patient's arm in the downward position during collection procedure.  Hold the tube with the stopper in the uppermost position so that the tube contents do not touch the stopper or the end of the needle during sample collection.  Release tourniquet once blood start to flow in the tube, or within 2 minutes of application.  Invert 8 - 10x.        Patient came in not feeling good, tachy, and low grade fever. Jazzy ROBLEDO RN. And Baldev Ferraro CNP  at bedside. See new orders.    Discharge Instructions   Discharge patient to home after study requirements completed or PK sample obtained.  Remind patient to return: on Day 15 for +336 hr PK & ECGs (+/- 24 hrs) after ABBV-706 EOI.  Discharge time: 1451     Kierra Hector RN  02/26/25

## 2025-02-27 NOTE — PROGRESS NOTES
Tried calling the patient this evening to confirm that she is positive for Influenza A and plan to start her to Tamiflu. Unable to reach the patient. Tried to call patient sister as well but went direct to voice mail. Will try calling tomorrow 2.27.2025 and confirm we send Rx for Tamiflu.

## 2025-02-27 NOTE — ADDENDUM NOTE
Encounter addended by: NANCY Hernandez-CNP on: 2/26/2025 10:04 PM   Actions taken: Clinical Note Signed, Order list changed, Diagnosis association updated

## 2025-02-28 ENCOUNTER — DOCUMENTATION (OUTPATIENT)
Dept: HEMATOLOGY/ONCOLOGY | Facility: HOSPITAL | Age: 60
End: 2025-02-28
Payer: MEDICARE

## 2025-02-28 ENCOUNTER — SOCIAL WORK (OUTPATIENT)
Dept: CASE MANAGEMENT | Facility: HOSPITAL | Age: 60
End: 2025-02-28
Payer: MEDICARE

## 2025-02-28 NOTE — RESEARCH NOTES
Research Note Non-Treatment Day    Minal M Dino is here today. Patient is on [id number]. Today is C1D3. Procedures completed per protocol. AE's and con-meds reviewed with patient. Patient c/o of fatigue today. Denies N/V/D/C at this time. Patient is aware of treatment plan and will return tomorrow 2/21 for C1D3.      Education Documentation  No documentation found.  Education Comments  No comments found.

## 2025-02-28 NOTE — PROGRESS NOTES
"This SW was asked by med onc research APRN to meet with pt who remains \"very stressed\" at not having a good plan for transportation for visits. This SW asked research RN to meet with us, to address any potential conflicting information. Pt was not feeling well, but willing to discuss briefly. Pt said she did call [employer] and learned she can get a discounted fare with a [place] card. This SW advised that it appears most of pt's appointments should be able to be accommodated by [employer] [facility], except for Day 1 every 3 weeks when she could possibly go later than the 4:30 pickup time. We discussed pt having a back up plan ie. Family member or Uber/Lyft. [place] her brother was able to drive her.   SW said team will work with her to do their best to accommodate these times.  SW to continue to follow.  "

## 2025-03-05 ENCOUNTER — HOSPITAL ENCOUNTER (OUTPATIENT)
Dept: RESEARCH | Facility: HOSPITAL | Age: 60
Discharge: HOME | End: 2025-03-05
Payer: MEDICARE

## 2025-03-05 ENCOUNTER — EDUCATION (OUTPATIENT)
Dept: HEMATOLOGY/ONCOLOGY | Facility: HOSPITAL | Age: 60
End: 2025-03-05

## 2025-03-05 VITALS
SYSTOLIC BLOOD PRESSURE: 111 MMHG | OXYGEN SATURATION: 94 % | HEART RATE: 94 BPM | DIASTOLIC BLOOD PRESSURE: 80 MMHG | TEMPERATURE: 97.2 F | RESPIRATION RATE: 16 BRPM

## 2025-03-05 DIAGNOSIS — C34.90 SMALL CELL CARCINOMA OF LUNG, UNSPECIFIED LATERALITY, UNSPECIFIED PART OF LUNG: ICD-10-CM

## 2025-03-05 LAB
ALBUMIN SERPL BCP-MCNC: 4.2 G/DL (ref 3.4–5)
ALP SERPL-CCNC: 108 U/L (ref 33–136)
ALT SERPL W P-5'-P-CCNC: 18 U/L (ref 7–45)
ANION GAP SERPL CALC-SCNC: 17 MMOL/L (ref 10–20)
AST SERPL W P-5'-P-CCNC: 15 U/L (ref 9–39)
BASOPHILS # BLD AUTO: 0.02 X10*3/UL (ref 0–0.1)
BASOPHILS NFR BLD AUTO: 0.4 %
BILIRUB DIRECT SERPL-MCNC: 0.1 MG/DL (ref 0–0.3)
BILIRUB SERPL-MCNC: 0.4 MG/DL (ref 0–1.2)
BUN SERPL-MCNC: 11 MG/DL (ref 6–23)
CALCIUM SERPL-MCNC: 8.8 MG/DL (ref 8.6–10.6)
CHLORIDE SERPL-SCNC: 100 MMOL/L (ref 98–107)
CO2 SERPL-SCNC: 24 MMOL/L (ref 21–32)
CREAT SERPL-MCNC: 0.65 MG/DL (ref 0.5–1.05)
EGFRCR SERPLBLD CKD-EPI 2021: >90 ML/MIN/1.73M*2
EOSINOPHIL # BLD AUTO: 0.09 X10*3/UL (ref 0–0.7)
EOSINOPHIL NFR BLD AUTO: 1.7 %
ERYTHROCYTE [DISTWIDTH] IN BLOOD BY AUTOMATED COUNT: 16.5 % (ref 11.5–14.5)
GGT SERPL-CCNC: 170 U/L (ref 5–55)
GLUCOSE SERPL-MCNC: 128 MG/DL (ref 74–99)
HCT VFR BLD AUTO: 39.2 % (ref 36–46)
HGB BLD-MCNC: 13.1 G/DL (ref 12–16)
HOLD SPECIMEN: NORMAL
IMM GRANULOCYTES # BLD AUTO: 0.03 X10*3/UL (ref 0–0.7)
IMM GRANULOCYTES NFR BLD AUTO: 0.6 % (ref 0–0.9)
LDH SERPL L TO P-CCNC: 239 U/L (ref 84–246)
LYMPHOCYTES # BLD AUTO: 1.6 X10*3/UL (ref 1.2–4.8)
LYMPHOCYTES NFR BLD AUTO: 29.9 %
MAGNESIUM SERPL-MCNC: 2.19 MG/DL (ref 1.6–2.4)
MCH RBC QN AUTO: 30.8 PG (ref 26–34)
MCHC RBC AUTO-ENTMCNC: 33.4 G/DL (ref 32–36)
MCV RBC AUTO: 92 FL (ref 80–100)
MONOCYTES # BLD AUTO: 0.3 X10*3/UL (ref 0.1–1)
MONOCYTES NFR BLD AUTO: 5.6 %
NEUTROPHILS # BLD AUTO: 3.32 X10*3/UL (ref 1.2–7.7)
NEUTROPHILS NFR BLD AUTO: 61.8 %
NRBC BLD-RTO: 0 /100 WBCS (ref 0–0)
PHOSPHATE SERPL-MCNC: 3.4 MG/DL (ref 2.5–4.9)
PLATELET # BLD AUTO: 405 X10*3/UL (ref 150–450)
POTASSIUM SERPL-SCNC: 4 MMOL/L (ref 3.5–5.3)
PROT SERPL-MCNC: 6.9 G/DL (ref 6.4–8.2)
RBC # BLD AUTO: 4.26 X10*6/UL (ref 4–5.2)
SODIUM SERPL-SCNC: 137 MMOL/L (ref 136–145)
WBC # BLD AUTO: 5.4 X10*3/UL (ref 4.4–11.3)

## 2025-03-05 PROCEDURE — 93005 ELECTROCARDIOGRAM TRACING: CPT | Mod: DCRU

## 2025-03-05 PROCEDURE — 84100 ASSAY OF PHOSPHORUS: CPT

## 2025-03-05 PROCEDURE — 2500000004 HC RX 250 GENERAL PHARMACY W/ HCPCS (ALT 636 FOR OP/ED)

## 2025-03-05 PROCEDURE — 85025 COMPLETE CBC W/AUTO DIFF WBC: CPT

## 2025-03-05 PROCEDURE — 36415 COLL VENOUS BLD VENIPUNCTURE: CPT

## 2025-03-05 PROCEDURE — 80053 COMPREHEN METABOLIC PANEL: CPT

## 2025-03-05 PROCEDURE — 82977 ASSAY OF GGT: CPT

## 2025-03-05 PROCEDURE — 83615 LACTATE (LD) (LDH) ENZYME: CPT

## 2025-03-05 PROCEDURE — 82248 BILIRUBIN DIRECT: CPT

## 2025-03-05 PROCEDURE — 96361 HYDRATE IV INFUSION ADD-ON: CPT

## 2025-03-05 PROCEDURE — 83735 ASSAY OF MAGNESIUM: CPT

## 2025-03-05 PROCEDURE — 96360 HYDRATION IV INFUSION INIT: CPT

## 2025-03-05 RX ADMIN — SODIUM CHLORIDE 1000 ML: 9 INJECTION, SOLUTION INTRAVENOUS at 13:15

## 2025-03-05 ASSESSMENT — ENCOUNTER SYMPTOMS
DIAPHORESIS: 0
MYALGIAS: 0
JOINT SWELLING: 0
ABDOMINAL PAIN: 0
FATIGUE: 1
CHANGE IN BOWEL HABIT: 0
HEADACHES: 0
VISUAL CHANGE: 0
NECK PAIN: 0
SWOLLEN GLANDS: 0
NUMBNESS: 0
FEVER: 0
COUGH: 1
SORE THROAT: 0
VERTIGO: 0
VOMITING: 0
ARTHRALGIAS: 0
NAUSEA: 0
ANOREXIA: 0
CHILLS: 0

## 2025-03-05 ASSESSMENT — PAIN SCALES - GENERAL: PAINLEVEL_OUTOF10: 5

## 2025-03-05 NOTE — RESEARCH NOTES
RSH><[id number]><C1D15><[id number]>  [facility] NURSING VISIT NOTE  Study Name: [id number]- Part 1_Escalation- Monotherapy  IRB#: [id number]  [id number]  Protocol Version Dated:  6/15/2023  PI: Jd Kohli MD.      Time point: Cycle 1 - Day 15    Encounter Date: 03/05/2025  Encounter Time: 12:00 PM EST  Encounter Department: [place] [address] RESEARCH     #1     Phone Pager   Chapis Busby [phone]    #2 Phone Pager        Other Phone Pager            Study Regimen and Dosing   For Oncology study, Refer [facility] [facility] Treatment Plan for orders   Part 1_Escalation_Monotherapy - patients with advanced recurrent or refractory solid tumors (small cell lung cancer, neuroendocrine tumors or brain tumors) will receive gradually increasing doses of ABBV-706 to determine MTD (Maximum Tolerated Dose).  Cycle = 21-days.  ABBV-706 administered IV Day 1 of each cycle.     Dietary Guidelines   Regular diet         Admission and Prior to Starting Study Activities   Notify  when patient arrives to unit.  Patient review/update [facility] intake form.  Obtain vital signs after sitting at least 3 minutes. Record on flow sheet & in EMR.  Perform venipuncture for sample collection procedures. Do Not draw research samples from mediport/central line if used for infusion  Physical Exam including pulmonary exam & neuro assessment Days 8 & 15.         Study Specific Instructions and Documentation   1- ECG  2-Safety Labs   3-Research Correlatives  4- Discharge       Subjective   Minal Sun is a 59 y.o. female and is here for a Research clinical visit.    Visit Provider: Jazzy Guerrero RN     Allergies:   Allergies   Allergen Reactions    Clindamycin Diarrhea    Erythromycin Diarrhea    Erythromycin Base Other and Nausea Only    Oxycodone-Acetaminophen Hives     anxiety       Objective     Vital Signs:    There were no vitals filed for this  visit.    Physical Exam     ASSESSMENT and PLAN:  Problem List Items Addressed This Visit    None       Medications as of the completion of today's visit:  Current Outpatient Medications   Medication Sig Dispense Refill    acetaminophen (TylenoL) 325 mg tablet Take 2 tablets (650 mg) by mouth every 6 hours if needed for mild pain (1 - 3) or moderate pain (4 - 6). 30 tablet 0    ALPRAZolam (Xanax) 0.5 mg tablet Take 2 tablets (1 mg) by mouth 2 times a day as needed for anxiety for up to 15 days. 30 tablet 0    apixaban (Eliquis) 5 mg tablet Take 1 tablet (5 mg) by mouth 2 times a day. 180 tablet 0    budesonide (Pulmicort) 0.5 mg/2 mL nebulizer solution Take 2 mL (0.5 mg) by nebulization 2 times a day.      buPROPion (Wellbutrin) 75 mg tablet Take 1 tablet (75 mg) by mouth 3 times a day.      carbinoxamine maleate 4 mg tablet Take 4 mg by mouth 2 times a day.      escitalopram (Lexapro) 20 mg tablet Take 1 tablet (20 mg) by mouth once daily.      levothyroxine (Synthroid) 50 mcg tablet Take 1 tablet (50 mcg) by mouth early in the morning.. Take on an empty stomach at the same time each day, either 30 to 60 minutes prior to breakfast 30 tablet 11    metFORMIN  mg 24 hr tablet Take 1 tablet (500 mg) by mouth 2 times a day. 180 tablet 3    naloxone (Narcan) 4 mg/0.1 mL nasal spray Administer 1 spray (4 mg) into affected nostril(s) if needed for opioid reversal. May repeat every 2-3 minutes if needed, alternating nostrils, until medical assistance becomes available. 2 each 0    OLANZapine (ZyPREXA) 5 mg tablet Take 1 tablet (5 mg) by mouth once daily at bedtime. For 4 days starting the evening of treatment 4 tablet 3    omeprazole (PriLOSEC) 40 mg DR capsule Take 1 capsule (40 mg) by mouth once daily. 90 capsule 3    ondansetron (Zofran) 8 mg tablet Take 1 tablet (8 mg) by mouth every 8 hours if needed for nausea or vomiting. 30 tablet 5    oxyCODONE (Roxicodone) 5 mg immediate release tablet Take 1 tablet (5 mg)  by mouth every 4 hours if needed for severe pain (7 - 10) for up to 60 doses. 60 tablet 0    ProAir HFA 90 mcg/actuation inhaler Inhale 2 puffs every 4 hours if needed.      prochlorperazine (Compazine) 10 mg tablet Take 1 tablet (10 mg) by mouth every 6 hours if needed for nausea or vomiting. 30 tablet 5    rosuvastatin (Crestor) 10 mg tablet Take 1 tablet (10 mg) by mouth once daily. 90 tablet 3    tiZANidine (Zanaflex) 2 mg capsule Take 1 capsule (2 mg) by mouth 3 times a day as needed for muscle spasms. 60 capsule 0    traZODone (Desyrel) 50 mg tablet Take 1 tablet (50 mg) by mouth once daily at bedtime.       No current facility-administered medications for this encounter.           Orders placed during today's visit:  No orders of the defined types were placed in this encounter.       [id number] - ABBV-706 alone or in combination in subjects with advanced solid tumors    Patient has no adverse events documented in the Research Adverse Events activity.      Day 15 ECG   JOSEFA 150c Study-specific ECG Machine - rest supine or semi-recumbent at least 5 minutes prior:  Single    ECGs are time-matched to PK sample collection & should be collected within 10 mins prior to PK collection.  ECGs occurring near meals will take place prior to meals  QTcF calculation: Fridericia's formula: S:\[facility]_Staff\Nursing\Protocols\QTcF calculator  MD/Provider to sign & date. Do not need to wait before starting treatment.  Contact MD/Provider if abnormal from baseline.    Time Point Rest Time  QTcF   +336 Hours after End of Infusion 1300 438        Day 15 Safety Labs   For Oncology study, Refer [facility] Treatment Plan for orders         Day 15 Research Correlatives:     Access Type: #22 PIV Location: [facility]   For Oncology study, Refer [facility] Treatment Plan for orders  Enter Additional Information here not covered in the Treatment Plan:  Text  AFTER ECGs  [facility] for processing    Time point Specimen Test Volume Tube  Handling Draw Time   Day 15  +336 hrs (+/- 24 hrs) after EOI  (draw in order) ABBV-706 PK ADC/Total AB 2 mL Red Top Serum Invert 5 - 8x; Ambient  Allow to clot 30 -60 mins 1310    Biomarker Serum 5 mL Gold Top SST Invert 5x; Upright. Ambient Allow to clot 30 mins 1310    Free TOP1 Inhibitor 3 mL EDTA Lavender Invert 8 - 10x. On ICE 1310    CTC ([facility] Only)-If ordered see EPCI 10 mL RareCyte AccuCyte BCT See Below 1310    CTC instructions  Keep patient's arm in the downward position during collection procedure.  Hold the tube with the stopper in the uppermost position so that the tube contents do not touch the stopper or the end of the needle during sample collection.  Release tourniquet once blood start to flow in the tube, or within 2 minutes of application.  Invert 8 - 10x.        Discharge Instructions   Discharge patient to home after study requirements completed or PK sample obtained.  Discharge time: 1525     Janice Ramirez RN  03/05/25

## 2025-03-05 NOTE — PROGRESS NOTES
Research Note Non-Treatment Day    Minal Sun is here today. Patient is on [id number]. Today is C1D15. Procedures completed per protocol. AE's and con-meds reviewed with patient. Please recall patient tested positive for the flu on 2/26. She is still experiencing chills but no fevers and cough. Still with occasional nausea but no vomiting. She has noticed that her appetite is lessening. Also, with intermittent constipation. Patient is taking her pain medication less often because the pain is more tolerable and she does not want to take it more then is absolute necessary. Lab work resulted and is WNL. Patient is receiving 1L bolus today just to provide extra hydration and possibly help with constipation. Patient is aware of treatment plan and will return on 3/12 for C2D1.      Education Documentation  Treatment Plan and Schedule, taught by Jazzy Guerrero RN at 3/5/2025 12:17 PM.  Learner: Family, Patient  Readiness: Acceptance  Method: Explanation  Response: Verbalizes Understanding    General Medication Information, taught by Jazzy Guerrero RN at 3/5/2025 12:17 PM.  Learner: Family, Patient  Readiness: Acceptance  Method: Explanation  Response: Verbalizes Understanding    Supportive Medications, taught by Jazzy Guerrero RN at 3/5/2025 12:17 PM.  Learner: Family, Patient  Readiness: Acceptance  Method: Explanation  Response: Verbalizes Understanding    Education Comments  No comments found.

## 2025-03-05 NOTE — H&P (VIEW-ONLY)
Patient ID: Minal Sun is a 59 y.o. female.    DIAGNOSIS     Small cell carcinoma of the lung.  Date of diagnosis is September 27, 2024 from a bronchoscopic biopsy of a subcarinal lymph node.  Immunohistochemistry positive for TTF-1, INSM1, synaptophysin and chromogranin, negative for p40, RB immunostain shows loss of nuclear expression.        STAGING     Clinical T4, N2, M1 C, stage IVc, extensive stage disease        CURRENT SITES OF DISEASE      Left lung, mediastinum, left hilum of the lung, liver, intra-abdominal lymph nodes in the portacaval and periportal region, bone metastases        MOLECULAR GENOMICS     Test Name: [employer] xF+ (XF.V3) dated October 2024     Molecular Findings:  * Gene: [id number], Variant: Missense variant (exon 1) - GOF, Potentially Actionable, p.R88Q (Allele Frequency - 0.3%)  * Gene: TP53, Variant: Missense variant - LOF, Biologically Relevant, p.R249G (Allele Frequency - 70.1%)  * Gene: RB1, Variant: Splice region variant - LOF, Biologically Relevant, c.540-1G>T, Splice Site (Allele Frequency - 56.8%)        Test Name: [employer] xT (XT.V4)  Laboratory Name: [facility]  Specimen Source: Lymph node, level 7  Collection Date: 27-Sep-2024     Molecular Findings:  * Gene: TP53, Variant: Missense variant - LOF, Biologically Relevant, p.R249G (Allele Frequency - 96.2%)  * Gene: RB1, Variant: Splice region variant - LOF, Biologically Relevant, c.540-1G>T, Splice Site (Allele Frequency - 96%)  * Gene: KMT2D, Variant: Stop gain - LOF, Biologically Relevant, p.*, Nonsense (Allele Frequency - 41.9%)  * Biomarker: Microsatellite Instability, Status: stable  * Biomarker: Tumor Mutation [place] (2.6 Muts/Mb, Percentile: 33)        SERUM TUMOR MARKERS     Baseline LDH within normal limits  C1D1 on Study LDH was 274        PRIOR THERAPY     Combination chemotherapy and immunotherapy.  Initially enrolled on a clinical trial of Lutathera and underwent octreotide scan which was positive.   However she decided to come off study and did not receive Lutathera with her chemo.  Chemotherapy started October 6, 2024.  Immunotherapy completed added with cycle #2 11.06.2024.  Minor response observed after 2 cycles of treatment. PD observed after C4.        CURRENT THERAPY     2.19.2025: Started on Phase 1 study [id number] with study drug [id number] which is an ADC targeting SEZ6 with a topoisomerase 1 inhibitor payload     CURRENT ONCOLOGICAL PROBLEMS    Cough and shortness of breath significantly improved with systemic treatment.  Pulmonary Embolism  Immunotherapy induced hypothyroidism  Neoplasm non cardiac chest/back pain        HISTORY OF PRESENT ILLNESS     This is a 59-year-old patient.  She states that she was feeling sick toward the end of spring of this year with some sinus problems.  Was seen by her allergist and was treated with antibiotics.  She was also seen at 1 point by primary care and steroids and antibiotics and inhalers were given.  She did not have any resolution and then ultimately developed a little bit of the hemoptysis which led to a chest x-ray showing an abnormality and finally a CT scan of the chest.The CT scan of the chest was done as a lung cancer screening low-dose CT and completed on September 20, 2024.  This demonstrated a extensive infiltrative mediastinal and hilar mass.  There was narrowing of the left mainstem bronchus and lower lobe bronchus secondary to extrinsic compression.  The predominant growth was within the left hilum and subcarinal region.  She underwent a bronchoscopy dated September 27, 2024 showing splaying of the main alanna.  There was mild erosion of a lymph node into the left mainstem bronchus.  There was erythema and mild erosions of an endobronchial tumor along the medial border.  Moderate to severe stenosis of the left lower lobe bronchus to 75%.        PAST MEDICAL HISTORY     Lumbar surgery about 30 years ago   hysterectomy in 2000  Diabetes  COPD  Right  eye exophthalmus  herpes zoster        SOCIAL HISTORY     Patient lives with her sister.  She lives in [facility].  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  She started smoking at the age of 14 and continues to smoke at the age of 59.  Has smoked for 45 years on average 1 pack/day.        CURRENT MEDS     See medication list, meds reviewed, includes metformin, rosuvastatin, been done so Nied inhalers, Wellbutrin escitalopram, trazodone        ALLERGIES     Patient denies any drug allergies        FAMILY HISTORY      Mother had bladder cancer and possibly breast cancer.     Subjective    Today 02.26.2025 Patient in clinic for C1D15 on [id number].   Patient reports this past week has been rough for her given she had symptoms of generalized pain, fatigue, weakness, cough and nausea. She also reports not eating much and having a poor appetite. Today she she reports she feels much better with all of her symptoms have improved and she has more energy than what she had last week. She denies need any help with her ADL's.  She  continues to have baseline symptoms of SOB with exertion, cough with clear sputum, noncardiac chest pain and back pain (neoplasm pain). She reports she has stopped taking her pain meds and also mentions her pain must have improved. Today she denies any denies any dizziness, lightheadedness, headaches, rash/itching, chills or fever, chest pain or chest tightness, painful inflammation/ulceration oral mucous membranes, vomiting, diarrhea/constipation, hematemesis /hemoptysis, hematuria/rectal bleeding, urinary symptoms, swelling extremities, weakness, or peripheral neuropathy. ROS as above. Remainder of 10 point review of systems elicited and otherwise unremarkable.     Cancer  Associated symptoms include coughing and fatigue. Pertinent negatives include no abdominal pain,  anorexia, arthralgias, change in bowel habit, chest pain, chills, congestion, diaphoresis, fever, headaches, joint swelling, myalgias, nausea, neck pain, numbness, rash, sore throat, swollen glands, urinary symptoms, vertigo, visual change or vomiting.     Objective    BSA: There is no height or weight on file to calculate BSA.  /80 (BP Location: Left arm, Patient Position: Sitting)   Pulse 94   Temp 36.2 °C (97.2 °F) (Temporal)   Resp 16   SpO2 94%      Daily Weight  02/19/25 : 85.1 kg (187 lb 9.8 oz)  02/10/25 : 85.4 kg (188 lb 4.4 oz)  02/06/25 : 81.6 kg (180 lb)  02/05/25 : 82.9 kg (182 lb 12.2 oz)  01/29/25 : 84.1 kg (185 lb 6.5 oz)         2/19/2025    12:14 PM 2/20/2025    12:12 PM 2/21/2025    12:30 PM 2/26/2025    12:11 PM 2/26/2025    12:15 PM 2/26/2025     2:19 PM 3/5/2025    12:55 PM   Vitals   Systolic 152 118 133 129  127 111   Diastolic 90 75 82 85  74 80   BP Location Right arm Right arm Right arm Left arm  Left arm Left arm   Heart Rate 70 97 102 122  100 94   Temp 36.8 °C (98.2 °F) 36.7 °C (98.1 °F) 36.7 °C (98.1 °F) 37.9 °C (100.2 °F) 37.9 °C (100.2 °F) 37.9 °C (100.2 °F) 36.2 °C (97.2 °F)   Resp 20 18 17 20  18 16       Physical Exam  Constitutional:       General: She is not in acute distress.     Appearance: Normal appearance. She is not ill-appearing, toxic-appearing or diaphoretic.   HENT:      Nose: No congestion or rhinorrhea.      Mouth/Throat:      Pharynx: No oropharyngeal exudate or posterior oropharyngeal erythema.   Eyes:      General: No scleral icterus.     Conjunctiva/sclera: Conjunctivae normal.   Cardiovascular:      Rate and Rhythm: Normal rate and regular rhythm.      Pulses: Normal pulses.      Heart sounds: Normal heart sounds. No murmur heard.     No friction rub. No gallop.   Pulmonary:      Effort: Pulmonary effort is normal. No respiratory distress.      Breath sounds: No stridor. No wheezing, rhonchi or rales.      Comments: Inspiratory wheezing  Chest:       Chest wall: No tenderness.   Abdominal:      General: There is no distension.      Palpations: There is no mass.      Tenderness: There is no abdominal tenderness. There is no guarding or rebound.   Musculoskeletal:      Cervical back: No tenderness.      Right lower leg: No edema.      Left lower leg: No edema.   Lymphadenopathy:      Cervical: No cervical adenopathy.   Skin:     General: Skin is warm.      Coloration: Skin is not jaundiced.      Findings: No bruising or lesion.   Neurological:      General: No focal deficit present.      Mental Status: She is oriented to person, place, and time.   Psychiatric:         Mood and Affect: Mood normal.         Behavior: Behavior normal.     Performance Status:  ECOG 1: Restricted in physically strenuous activity but ambulatory and able to carry out work of a light or sedentary nature, e.g., light house work, office work.     Hospital Outpatient Visit on 03/05/2025   Component Date Value Ref Range Status    WBC 03/05/2025 5.4  4.4 - 11.3 x10*3/uL Final    nRBC 03/05/2025 0.0  0.0 - 0.0 /100 WBCs Final    RBC 03/05/2025 4.26  4.00 - 5.20 x10*6/uL Final    Hemoglobin 03/05/2025 13.1  12.0 - 16.0 g/dL Final    Hematocrit 03/05/2025 39.2  36.0 - 46.0 % Final    MCV 03/05/2025 92  80 - 100 fL Final    MCH 03/05/2025 30.8  26.0 - 34.0 pg Final    MCHC 03/05/2025 33.4  32.0 - 36.0 g/dL Final    RDW 03/05/2025 16.5 (H)  11.5 - 14.5 % Final    Platelets 03/05/2025 405  150 - 450 x10*3/uL Final    Neutrophils % 03/05/2025 61.8  40.0 - 80.0 % Final    Immature Granulocytes %, Automated 03/05/2025 0.6  0.0 - 0.9 % Final    Lymphocytes % 03/05/2025 29.9  13.0 - 44.0 % Final    Monocytes % 03/05/2025 5.6  2.0 - 10.0 % Final    Eosinophils % 03/05/2025 1.7  0.0 - 6.0 % Final    Basophils % 03/05/2025 0.4  0.0 - 2.0 % Final    Neutrophils Absolute 03/05/2025 3.32  1.20 - 7.70 x10*3/uL Final    Immature Granulocytes Absolute, Au* 03/05/2025 0.03  0.00 - 0.70 x10*3/uL Final     Lymphocytes Absolute 03/05/2025 1.60  1.20 - 4.80 x10*3/uL Final    Monocytes Absolute 03/05/2025 0.30  0.10 - 1.00 x10*3/uL Final    Eosinophils Absolute 03/05/2025 0.09  0.00 - 0.70 x10*3/uL Final    Basophils Absolute 03/05/2025 0.02  0.00 - 0.10 x10*3/uL Final    Glucose 03/05/2025 128 (H)  74 - 99 mg/dL Final    Sodium 03/05/2025 137  136 - 145 mmol/L Final    Potassium 03/05/2025 4.0  3.5 - 5.3 mmol/L Final    Chloride 03/05/2025 100  98 - 107 mmol/L Final    Bicarbonate 03/05/2025 24  21 - 32 mmol/L Final    Anion Gap 03/05/2025 17  10 - 20 mmol/L Final    Urea Nitrogen 03/05/2025 11  6 - 23 mg/dL Final    Creatinine 03/05/2025 0.65  0.50 - 1.05 mg/dL Final    eGFR 03/05/2025 >90  >60 mL/min/1.73m*2 Final    Calcium 03/05/2025 8.8  8.6 - 10.6 mg/dL Final    Albumin 03/05/2025 4.2  3.4 - 5.0 g/dL Final    Alkaline Phosphatase 03/05/2025 108  33 - 136 U/L Final    Total Protein 03/05/2025 6.9  6.4 - 8.2 g/dL Final    AST 03/05/2025 15  9 - 39 U/L Final    Bilirubin, Total 03/05/2025 0.4  0.0 - 1.2 mg/dL Final    ALT 03/05/2025 18  7 - 45 U/L Final    Bilirubin, Direct 03/05/2025 0.1  0.0 - 0.3 mg/dL Final    GGT 03/05/2025 170 (H)  5 - 55 U/L Final    LDH 03/05/2025 239  84 - 246 U/L Final    Magnesium 03/05/2025 2.19  1.60 - 2.40 mg/dL Final    Phosphorus 03/05/2025 3.4  2.5 - 4.9 mg/dL Final    Extra Tube 03/05/2025 Hold for add-ons.   Final    Extra Tube 03/05/2025 Hold for add-ons.   Final    Extra Tube 03/05/2025 Hold for add-ons.   Final   Hospital Outpatient Visit on 02/26/2025   Component Date Value Ref Range Status    WBC 02/26/2025 5.4  4.4 - 11.3 x10*3/uL Final    nRBC 02/26/2025 0.0  0.0 - 0.0 /100 WBCs Final    RBC 02/26/2025 3.92 (L)  4.00 - 5.20 x10*6/uL Final    Hemoglobin 02/26/2025 12.2  12.0 - 16.0 g/dL Final    Hematocrit 02/26/2025 37.0  36.0 - 46.0 % Final    MCV 02/26/2025 94  80 - 100 fL Final    MCH 02/26/2025 31.1  26.0 - 34.0 pg Final    MCHC 02/26/2025 33.0  32.0 - 36.0 g/dL Final     RDW 02/26/2025 16.9 (H)  11.5 - 14.5 % Final    Platelets 02/26/2025 223  150 - 450 x10*3/uL Final    Neutrophils % 02/26/2025 88.8  40.0 - 80.0 % Final    Immature Granulocytes %, Automated 02/26/2025 0.4  0.0 - 0.9 % Final    Lymphocytes % 02/26/2025 3.7  13.0 - 44.0 % Final    Monocytes % 02/26/2025 5.2  2.0 - 10.0 % Final    Eosinophils % 02/26/2025 1.7  0.0 - 6.0 % Final    Basophils % 02/26/2025 0.2  0.0 - 2.0 % Final    Neutrophils Absolute 02/26/2025 4.80  1.20 - 7.70 x10*3/uL Final    Immature Granulocytes Absolute, Au* 02/26/2025 0.02  0.00 - 0.70 x10*3/uL Final    Lymphocytes Absolute 02/26/2025 0.20 (L)  1.20 - 4.80 x10*3/uL Final    Monocytes Absolute 02/26/2025 0.28  0.10 - 1.00 x10*3/uL Final    Eosinophils Absolute 02/26/2025 0.09  0.00 - 0.70 x10*3/uL Final    Basophils Absolute 02/26/2025 0.01  0.00 - 0.10 x10*3/uL Final    Glucose 02/26/2025 133 (H)  74 - 99 mg/dL Final    Sodium 02/26/2025 135 (L)  136 - 145 mmol/L Final    Potassium 02/26/2025 4.0  3.5 - 5.3 mmol/L Final    Chloride 02/26/2025 99  98 - 107 mmol/L Final    Bicarbonate 02/26/2025 24  21 - 32 mmol/L Final    Anion Gap 02/26/2025 16  10 - 20 mmol/L Final    Urea Nitrogen 02/26/2025 10  6 - 23 mg/dL Final    Creatinine 02/26/2025 0.73  0.50 - 1.05 mg/dL Final    eGFR 02/26/2025 >90  >60 mL/min/1.73m*2 Final    Calcium 02/26/2025 9.0  8.6 - 10.6 mg/dL Final    Albumin 02/26/2025 4.2  3.4 - 5.0 g/dL Final    Alkaline Phosphatase 02/26/2025 104  33 - 136 U/L Final    Total Protein 02/26/2025 6.3 (L)  6.4 - 8.2 g/dL Final    AST 02/26/2025 16  9 - 39 U/L Final    Bilirubin, Total 02/26/2025 0.4  0.0 - 1.2 mg/dL Final    ALT 02/26/2025 15  7 - 45 U/L Final    Bilirubin, Direct 02/26/2025 0.1  0.0 - 0.3 mg/dL Final    GGT 02/26/2025 163 (H)  5 - 55 U/L Final    LDH 02/26/2025 264 (H)  84 - 246 U/L Final    Magnesium 02/26/2025 1.83  1.60 - 2.40 mg/dL Final    Phosphorus 02/26/2025 3.5  2.5 - 4.9 mg/dL Final    Extra Tube 02/26/2025 Hold  for add-ons.   Final    Extra Tube 02/26/2025 Hold for add-ons.   Final    Extra Tube 02/26/2025 Hold for add-ons.   Final    Extra Tube 02/26/2025 Hold for add-ons.   Final    Extra Tube 02/26/2025 Hold for add-ons.   Final    C-Reactive Protein 02/26/2025 0.83  <1.00 mg/dL Final    Flu A Result 02/26/2025 Detected (A)  Not Detected Final    Flu B Result 02/26/2025 Not Detected  Not Detected Final    Coronavirus 2019, PCR 02/26/2025 Not Detected  Not Detected Final   Hospital Outpatient Visit on 02/21/2025   Component Date Value Ref Range Status    Extra Tube 02/21/2025 Hold for add-ons.   Final    Extra Tube 02/21/2025 Hold for add-ons.   Final    Extra Tube 02/21/2025 Hold for add-ons.   Final   Hospital Outpatient Visit on 02/20/2025   Component Date Value Ref Range Status    Extra Tube 02/20/2025 Hold for add-ons.   Final    Extra Tube 02/20/2025 Hold for add-ons.   Final   Hospital Outpatient Visit on 02/19/2025   Component Date Value Ref Range Status    Extra Tube 02/19/2025 Hold for add-ons.   Final    Extra Tube 02/19/2025 Hold for add-ons.   Final    Extra Tube 02/19/2025 Hold for add-ons.   Final    Extra Tube 02/19/2025 Hold for add-ons.   Final    Extra Tube 02/19/2025 Hold for add-ons.   Final    Extra Tube 02/19/2025 Hold for add-ons.   Final    Extra Tube 02/19/2025 Hold for add-ons.   Final    Extra Tube 02/19/2025 Hold for add-ons.   Final    Extra Tube 02/19/2025 Hold for add-ons.   Final    Extra Tube 02/19/2025 Hold for add-ons.   Final    Extra Tube 02/19/2025 Hold for add-ons.   Final    Extra Tube 02/19/2025 Hold for add-ons.   Final    Extra Tube 02/19/2025 Hold for add-ons.   Final    Extra Tube 02/19/2025 Hold for add-ons.   Final    Extra Tube 02/19/2025 Hold for add-ons.   Final   Lab on 02/18/2025   Component Date Value Ref Range Status    Color, Urine 02/18/2025 Light-Yellow  Light-Yellow, Yellow, Dark-Yellow Final    Appearance, Urine 02/18/2025 Clear  Clear Final    Specific Gravity,  Urine 02/18/2025 1.010  1.005 - 1.035 Final    pH, Urine 02/18/2025 5.5  5.0, 5.5, 6.0, 6.5, 7.0, 7.5, 8.0 Final    Protein, Urine 02/18/2025 NEGATIVE  NEGATIVE, 10 (TRACE), 20 (TRACE) mg/dL Final    Glucose, Urine 02/18/2025 Normal  Normal mg/dL Final    Blood, Urine 02/18/2025 NEGATIVE  NEGATIVE mg/dL Final    Ketones, Urine 02/18/2025 NEGATIVE  NEGATIVE mg/dL Final    Bilirubin, Urine 02/18/2025 NEGATIVE  NEGATIVE mg/dL Final    Urobilinogen, Urine 02/18/2025 Normal  Normal mg/dL Final    Nitrite, Urine 02/18/2025 NEGATIVE  NEGATIVE Final    Leukocyte Esterase, Urine 02/18/2025 NEGATIVE  NEGATIVE Final    Phosphorus 02/18/2025 4.3  2.5 - 4.9 mg/dL Final    Magnesium 02/18/2025 2.15  1.60 - 2.40 mg/dL Final    LDH 02/18/2025 274 (H)  84 - 246 U/L Final    GGT 02/18/2025 213 (H)  5 - 55 U/L Final    Bilirubin, Direct 02/18/2025 0.1  0.0 - 0.3 mg/dL Final    Glucose 02/18/2025 123 (H)  74 - 99 mg/dL Final    Sodium 02/18/2025 139  136 - 145 mmol/L Final    Potassium 02/18/2025 4.1  3.5 - 5.3 mmol/L Final    Chloride 02/18/2025 102  98 - 107 mmol/L Final    Bicarbonate 02/18/2025 27  21 - 32 mmol/L Final    Anion Gap 02/18/2025 14  10 - 20 mmol/L Final    Urea Nitrogen 02/18/2025 8  6 - 23 mg/dL Final    Creatinine 02/18/2025 0.83  0.50 - 1.05 mg/dL Final    eGFR 02/18/2025 81  >60 mL/min/1.73m*2 Final    Calcium 02/18/2025 9.4  8.6 - 10.3 mg/dL Final    Albumin 02/18/2025 4.4  3.4 - 5.0 g/dL Final    Alkaline Phosphatase 02/18/2025 129  33 - 136 U/L Final    Total Protein 02/18/2025 6.9  6.4 - 8.2 g/dL Final    AST 02/18/2025 35  9 - 39 U/L Final    Bilirubin, Total 02/18/2025 0.3  0.0 - 1.2 mg/dL Final    ALT 02/18/2025 33  7 - 45 U/L Final    WBC 02/18/2025 7.0  4.4 - 11.3 x10*3/uL Final    nRBC 02/18/2025 0.0  0.0 - 0.0 /100 WBCs Final    RBC 02/18/2025 4.34  4.00 - 5.20 x10*6/uL Final    Hemoglobin 02/18/2025 13.3  12.0 - 16.0 g/dL Final    Hematocrit 02/18/2025 41.1  36.0 - 46.0 % Final    MCV 02/18/2025 95  80  - 100 fL Final    MCH 02/18/2025 30.6  26.0 - 34.0 pg Final    MCHC 02/18/2025 32.4  32.0 - 36.0 g/dL Final    RDW 02/18/2025 17.2 (H)  11.5 - 14.5 % Final    Platelets 02/18/2025 237  150 - 450 x10*3/uL Final    Neutrophils % 02/18/2025 65.0  40.0 - 80.0 % Final    Immature Granulocytes %, Automated 02/18/2025 0.6  0.0 - 0.9 % Final    Lymphocytes % 02/18/2025 25.6  13.0 - 44.0 % Final    Monocytes % 02/18/2025 6.7  2.0 - 10.0 % Final    Eosinophils % 02/18/2025 1.7  0.0 - 6.0 % Final    Basophils % 02/18/2025 0.4  0.0 - 2.0 % Final    Neutrophils Absolute 02/18/2025 4.57  1.20 - 7.70 x10*3/uL Final    Immature Granulocytes Absolute, Au* 02/18/2025 0.04  0.00 - 0.70 x10*3/uL Final    Lymphocytes Absolute 02/18/2025 1.80  1.20 - 4.80 x10*3/uL Final    Monocytes Absolute 02/18/2025 0.47  0.10 - 1.00 x10*3/uL Final    Eosinophils Absolute 02/18/2025 0.12  0.00 - 0.70 x10*3/uL Final    Basophils Absolute 02/18/2025 0.03  0.00 - 0.10 x10*3/uL Final   Hospital Outpatient Visit on 02/13/2025   Component Date Value Ref Range Status    AV pk jose 02/13/2025 1.46  m/s Final    MV E/A ratio 02/13/2025 0.97   Final    LA vol index A/L 02/13/2025 18.5  ml/m2 Final    Tricuspid annular plane systolic e* 02/13/2025 1.5  cm Final    LV EF 02/13/2025 58  % Final    RV free wall pk S' 02/13/2025 7.33  cm/s Final    LVIDd 02/13/2025 3.47  cm Final    RVSP 02/13/2025 22.7  mmHg Final    AV pk grad 02/13/2025 8  mmHg Final    LV A4C EF 02/13/2025 61.8   Final   Hospital Outpatient Visit on 02/10/2025   Component Date Value Ref Range Status    Adrenocorticotropic Hormone (ACTH) 02/10/2025 5.5 (L)  7.2 - 63.3 pg/mL Final    aPTT 02/10/2025 49 (H)  27 - 38 seconds Final    Bilirubin, Direct 02/10/2025 0.0  0.0 - 0.3 mg/dL Final    WBC 02/10/2025 8.3  4.4 - 11.3 x10*3/uL Final    nRBC 02/10/2025 0.0  0.0 - 0.0 /100 WBCs Final    RBC 02/10/2025 4.28  4.00 - 5.20 x10*6/uL Final    Hemoglobin 02/10/2025 13.0  12.0 - 16.0 g/dL Final     Hematocrit 02/10/2025 38.8  36.0 - 46.0 % Final    MCV 02/10/2025 91  80 - 100 fL Final    MCH 02/10/2025 30.4  26.0 - 34.0 pg Final    MCHC 02/10/2025 33.5  32.0 - 36.0 g/dL Final    RDW 02/10/2025 16.5 (H)  11.5 - 14.5 % Final    Platelets 02/10/2025 374  150 - 450 x10*3/uL Final    Neutrophils % 02/10/2025 66.3  40.0 - 80.0 % Final    Immature Granulocytes %, Automated 02/10/2025 0.7  0.0 - 0.9 % Final    Lymphocytes % 02/10/2025 24.0  13.0 - 44.0 % Final    Monocytes % 02/10/2025 7.9  2.0 - 10.0 % Final    Eosinophils % 02/10/2025 0.5  0.0 - 6.0 % Final    Basophils % 02/10/2025 0.6  0.0 - 2.0 % Final    Neutrophils Absolute 02/10/2025 5.51  1.20 - 7.70 x10*3/uL Final    Immature Granulocytes Absolute, Au* 02/10/2025 0.06  0.00 - 0.70 x10*3/uL Final    Lymphocytes Absolute 02/10/2025 2.00  1.20 - 4.80 x10*3/uL Final    Monocytes Absolute 02/10/2025 0.66  0.10 - 1.00 x10*3/uL Final    Eosinophils Absolute 02/10/2025 0.04  0.00 - 0.70 x10*3/uL Final    Basophils Absolute 02/10/2025 0.05  0.00 - 0.10 x10*3/uL Final    Glucose 02/10/2025 112 (H)  74 - 99 mg/dL Final    Sodium 02/10/2025 137  136 - 145 mmol/L Final    Potassium 02/10/2025 4.0  3.5 - 5.3 mmol/L Final    Chloride 02/10/2025 102  98 - 107 mmol/L Final    Bicarbonate 02/10/2025 26  21 - 32 mmol/L Final    Anion Gap 02/10/2025 13  10 - 20 mmol/L Final    Urea Nitrogen 02/10/2025 9  6 - 23 mg/dL Final    Creatinine 02/10/2025 0.66  0.50 - 1.05 mg/dL Final    eGFR 02/10/2025 >90  >60 mL/min/1.73m*2 Final    Calcium 02/10/2025 9.2  8.6 - 10.6 mg/dL Final    Albumin 02/10/2025 4.2  3.4 - 5.0 g/dL Final    Alkaline Phosphatase 02/10/2025 105  33 - 136 U/L Final    Total Protein 02/10/2025 6.6  6.4 - 8.2 g/dL Final    AST 02/10/2025 23  9 - 39 U/L Final    Bilirubin, Total 02/10/2025 0.3  0.0 - 1.2 mg/dL Final    ALT 02/10/2025 15  7 - 45 U/L Final    Cortisol 02/10/2025 11.1  2.5 - 20.0 ug/dL Final    Follicle Stimulating Hormone 02/10/2025 75.6  IU/L Final     GGT 02/10/2025 134 (H)  5 - 55 U/L Final    LDH 02/10/2025 206  84 - 246 U/L Final    Magnesium 02/10/2025 2.16  1.60 - 2.40 mg/dL Final    Phosphorus 02/10/2025 3.9  2.5 - 4.9 mg/dL Final    Protime 02/10/2025 11.9  9.8 - 12.8 seconds Final    INR 02/10/2025 1.1  0.9 - 1.1 Final    Thyroid Stimulating Hormone 02/10/2025 84.17 (H)  0.44 - 3.98 mIU/L Final    Thyroxine, Free 02/10/2025 0.34 (L)  0.78 - 1.48 ng/dL Final    Triiodothyronine, Free 02/10/2025 1.1 (L)  2.3 - 4.2 pg/mL Final    Color, Urine 02/10/2025 Yellow  Light-Yellow, Yellow, Dark-Yellow Final    Appearance, Urine 02/10/2025 Clear  Clear Final    Specific Gravity, Urine 02/10/2025 1.016  1.005 - 1.035 Final    pH, Urine 02/10/2025 6.5  5.0, 5.5, 6.0, 6.5, 7.0, 7.5, 8.0 Final    Protein, Urine 02/10/2025 NEGATIVE  NEGATIVE, 10 (TRACE), 20 (TRACE) mg/dL Final    Glucose, Urine 02/10/2025 Normal  Normal mg/dL Final    Blood, Urine 02/10/2025 NEGATIVE  NEGATIVE mg/dL Final    Ketones, Urine 02/10/2025 NEGATIVE  NEGATIVE mg/dL Final    Bilirubin, Urine 02/10/2025 NEGATIVE  NEGATIVE mg/dL Final    Urobilinogen, Urine 02/10/2025 Normal  Normal mg/dL Final    Nitrite, Urine 02/10/2025 NEGATIVE  NEGATIVE Final    Leukocyte Esterase, Urine 02/10/2025 NEGATIVE  NEGATIVE Final          CT pelvis w IV contrast    Result Date: 2/13/2025  Impression: Restaging of metastatic small-cell lung carcinoma prior to initiation of clinical trial (to be combined with CT chest and abdomen 01/24/2025): 1. Stable tumor burden as evidenced by stable mixed lytic-sclerotic osseous lesions throughout the pelvis and lower lumbar vertebrae. 2. No evidence of new disease within the pelvis.   I personally reviewed the images/study and I agree with the findings as stated. This study was interpreted at [facility], [place], [place].   MACRO: None   Signed by: Yao Parham 2/13/2025 9:25 PM Dictation workstation:   [id number]    MR brain w and wo IV  contrast    Result Date: 2/6/2025  Impression: No new mass or pathologic enhancement was identified.   Nonspecific white matter changes most likely reflect chronic small-vessel ischemic disease given the age of the patient.   Likely right-sided proptosis stable in retrospect compared to the prior exam of 10/01/2024. Given prior masslike enlargement of the inferior rectus muscle on CT 10/07/2017 the finding which is not seen on the current limited exam of the orbits observation could nonetheless reflect residua of prior disease. Please correlate clinically and consider dedicated orbital MRI or follow-up orbital CT for further evaluation if clinically indicated   MACRO: None   Signed by: Emiliano Kong 2/6/2025 10:23 AM Dictation workstation:   [id number]    CT abdomen w IV contrast    Result Date: 1/24/2025  Impression: Restaging of metastatic small-cell lung carcinoma, as compared to CT 12/19/2024: 1. Stable to slightly increased disease burden as evidenced by gradually increasing size of the distal, left subhilar component of the primary neoplasm with stable size of hepatic metastases. 2. Stable mediastinal lymphadenopathy, including prominent paratracheal and aortopulmonary window nodes detailed above, as well as mixed lytic-sclerotic osseous lesions throughout the axial skeleton. 3. Increased postobstructive pneumonitis within the left lower lobe.   I personally reviewed the images/study and I agree with the findings as stated. This study was interpreted at [facility], [place], [place].   MACRO: None   Signed by: Yao Parham 1/24/2025 9:54 PM Dictation workstation:   [id number]    CT chest w IV contrast    Result Date: 1/24/2025  Impression: Restaging of metastatic small-cell lung carcinoma, as compared to CT 12/19/2024: 1. Stable to slightly increased disease burden as evidenced by gradually increasing size of the distal, left subhilar component of the primary neoplasm with  stable size of hepatic metastases. 2. Stable mediastinal lymphadenopathy, including prominent paratracheal and aortopulmonary window nodes detailed above, as well as mixed lytic-sclerotic osseous lesions throughout the axial skeleton. 3. Increased postobstructive pneumonitis within the left lower lobe.   I personally reviewed the images/study and I agree with the findings as stated. This study was interpreted at [facility], [place], [place].   MACRO: None   Signed by: Yao Parham 1/24/2025 9:54 PM Dictation workstation:   [id number]        Assessment/Plan     Ms. 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 Initial imaging post C2 reviewed by Dr Kohli appeared to have sub-optimal response to treatment. Unfortunately after C4 imaging confirmed she had progressive disease. In the last visit Dr Kohli discussed all treatment options which included SOC Taralatamab and also clinical trial [id number] with study drug ABBV-706 which is an ADC targeting SEZ6 with a topoisomerase 1 inhibitor payload. Patient opted to participate on study and completed screening requirements on study. Started treatment on 02.19.2025 on study, when she came in last week for C1D8 follow up visit. She complained of worsening symptoms of fatigue, nausea, SOB with exertion, and headache. She also discussed about family members around not keeping well. We tested her for Flu/COVID. She was positive for the flu and we started her on Tamiflu. Today she is in for C1D15 reports past week her symptoms of fatigue had worsen but also mentions over the past week her symptoms have started to improve. Given she has a poor appetite and poor PO intake we gave her IV fluids. She looks much better than last week with more energy levels. Plan discussed in detail with the patient. Patient in agreement with  the plan of care and is aware to call the office and research nurse if experiencing any new symptoms. RTC per protocol.         Cancer Staging   No matching staging information was found for the patient.      Oncology History   SCLC (small cell lung carcinoma)   9/27/2024 Initial Diagnosis    SCLC (small cell lung carcinoma) (Multi)     10/4/2024 - 10/8/2024 Chemotherapy    CARBOplatin / Etoposide, 21 Day Cycles - Lung     10/4/2024 - 10/6/2024 Research Study Participant    ([facility]) [id number] - Atezolizumab + CARBOplatin / Etoposide / [id number], 21 Day Cycles  Plan Provider: JESSICA Hernandez  Treatment goal: Control  Line of treatment: First Line  Associated studies: ([id number])[id number] with Carboplatin, Etoposide and Tislelizumab in Newly Diagnosed ES-SCLC     10/4/2024 - 1/17/2025 Chemotherapy    Durvalumab + CARBOplatin / Etoposide, 21 Day Cycles     10/28/2024 - 10/28/2024 Chemotherapy    Durvalumab + CARBOplatin / Etoposide, 21 Day Cycles     2/5/2025 - 2/5/2025 Chemotherapy    Durvalumab, 28 Day Cycles     2/19/2025 -  Research Study Participant    ([facility]) [id number] Part 1 - ABBV-706, 21 Day Cycles  Plan Provider: JESSICA Hernandez  Treatment goal: Control  Line of treatment: Second Line  Associated studies: ABBV-706 alone or in combination in subjects with advanced solid tumors

## 2025-03-05 NOTE — PROGRESS NOTES
Patient ID: Minal Sun is a 59 y.o. female.    DIAGNOSIS     Small cell carcinoma of the lung.  Date of diagnosis is September 27, 2024 from a bronchoscopic biopsy of a subcarinal lymph node.  Immunohistochemistry positive for TTF-1, INSM1, synaptophysin and chromogranin, negative for p40, RB immunostain shows loss of nuclear expression.        STAGING     Clinical T4, N2, M1 C, stage IVc, extensive stage disease        CURRENT SITES OF DISEASE      Left lung, mediastinum, left hilum of the lung, liver, intra-abdominal lymph nodes in the portacaval and periportal region, bone metastases        MOLECULAR GENOMICS     Test Name: Anna Lozabai xF+ (XF.V3) dated October 2024     Molecular Findings:  * Gene: [id number], Variant: Missense variant (exon 1) - GOF, Potentially Actionable, p.R88Q (Allele Frequency - 0.3%)  * Gene: TP53, Variant: Missense variant - LOF, Biologically Relevant, p.R249G (Allele Frequency - 70.1%)  * Gene: RB1, Variant: Splice region variant - LOF, Biologically Relevant, c.540-1G>T, Splice Site (Allele Frequency - 56.8%)        Test Name: Anna Lozabai xT (XT.V4)  Laboratory Name: [facility]  Specimen Source: Lymph node, level 7  Collection Date: 27-Sep-2024     Molecular Findings:  * Gene: TP53, Variant: Missense variant - LOF, Biologically Relevant, p.R249G (Allele Frequency - 96.2%)  * Gene: RB1, Variant: Splice region variant - LOF, Biologically Relevant, c.540-1G>T, Splice Site (Allele Frequency - 96%)  * Gene: KMT2D, Variant: Stop gain - LOF, Biologically Relevant, p.*, Nonsense (Allele Frequency - 41.9%)  * Biomarker: Microsatellite Instability, Status: stable  * Biomarker: Tumor Mutation [place] (2.6 Muts/Mb, Percentile: 33)        SERUM TUMOR MARKERS     Baseline LDH within normal limits  C1D1 on Study LDH was 274        PRIOR THERAPY     Combination chemotherapy and immunotherapy.  Initially enrolled on a clinical trial of Lutathera and underwent octreotide scan which was positive.   However she decided to come off study and did not receive Lutathera with her chemo.  Chemotherapy started October 6, 2024.  Immunotherapy completed added with cycle #2 11.06.2024.  Minor response observed after 2 cycles of treatment. PD observed after C4.        CURRENT THERAPY     2.19.2025: Started on Phase 1 study [id number] with study drug [id number] which is an ADC targeting SEZ6 with a topoisomerase 1 inhibitor payload     CURRENT ONCOLOGICAL PROBLEMS    Cough and shortness of breath significantly improved with systemic treatment.  Pulmonary Embolism  Immunotherapy induced hypothyroidism  Neoplasm non cardiac chest/back pain        HISTORY OF PRESENT ILLNESS     This is a 59-year-old patient.  She states that she was feeling sick toward the end of spring of this year with some sinus problems.  Was seen by her allergist and was treated with antibiotics.  She was also seen at 1 point by primary care and steroids and antibiotics and inhalers were given.  She did not have any resolution and then ultimately developed a little bit of the hemoptysis which led to a chest x-ray showing an abnormality and finally a CT scan of the chest.The CT scan of the chest was done as a lung cancer screening low-dose CT and completed on September 20, 2024.  This demonstrated a extensive infiltrative mediastinal and hilar mass.  There was narrowing of the left mainstem bronchus and lower lobe bronchus secondary to extrinsic compression.  The predominant growth was within the left hilum and subcarinal region.  She underwent a bronchoscopy dated September 27, 2024 showing splaying of the main alanna.  There was mild erosion of a lymph node into the left mainstem bronchus.  There was erythema and mild erosions of an endobronchial tumor along the medial border.  Moderate to severe stenosis of the left lower lobe bronchus to 75%.        PAST MEDICAL HISTORY     Lumbar surgery about 30 years ago   hysterectomy in 2000  Diabetes  COPD  Right  eye exophthalmus  herpes zoster        SOCIAL HISTORY     Patient lives with her sister.  She lives in [facility].  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  She started smoking at the age of 14 and continues to smoke at the age of 59.  Has smoked for 45 years on average 1 pack/day.        CURRENT MEDS     See medication list, meds reviewed, includes metformin, rosuvastatin, been done so Nied inhalers, Wellbutrin escitalopram, trazodone        ALLERGIES     Patient denies any drug allergies        FAMILY HISTORY      Mother had bladder cancer and possibly breast cancer.     Subjective    Today 02.26.2025 Patient in clinic for C1D15 on [id number].   Patient reports this past week has been rough for her given she had symptoms of generalized pain, fatigue, weakness, cough and nausea. She also reports not eating much and having a poor appetite. Today she she reports she feels much better with all of her symptoms have improved and she has more energy than what she had last week. She denies need any help with her ADL's.  She  continues to have baseline symptoms of SOB with exertion, cough with clear sputum, noncardiac chest pain and back pain (neoplasm pain). She reports she has stopped taking her pain meds and also mentions her pain must have improved. Today she denies any denies any dizziness, lightheadedness, headaches, rash/itching, chills or fever, chest pain or chest tightness, painful inflammation/ulceration oral mucous membranes, vomiting, diarrhea/constipation, hematemesis /hemoptysis, hematuria/rectal bleeding, urinary symptoms, swelling extremities, weakness, or peripheral neuropathy. ROS as above. Remainder of 10 point review of systems elicited and otherwise unremarkable.     Cancer  Associated symptoms include coughing and fatigue. Pertinent negatives include no abdominal pain,  anorexia, arthralgias, change in bowel habit, chest pain, chills, congestion, diaphoresis, fever, headaches, joint swelling, myalgias, nausea, neck pain, numbness, rash, sore throat, swollen glands, urinary symptoms, vertigo, visual change or vomiting.     Objective    BSA: There is no height or weight on file to calculate BSA.  /80 (BP Location: Left arm, Patient Position: Sitting)   Pulse 94   Temp 36.2 °C (97.2 °F) (Temporal)   Resp 16   SpO2 94%      Daily Weight  02/19/25 : 85.1 kg (187 lb 9.8 oz)  02/10/25 : 85.4 kg (188 lb 4.4 oz)  02/06/25 : 81.6 kg (180 lb)  02/05/25 : 82.9 kg (182 lb 12.2 oz)  01/29/25 : 84.1 kg (185 lb 6.5 oz)         2/19/2025    12:14 PM 2/20/2025    12:12 PM 2/21/2025    12:30 PM 2/26/2025    12:11 PM 2/26/2025    12:15 PM 2/26/2025     2:19 PM 3/5/2025    12:55 PM   Vitals   Systolic 152 118 133 129  127 111   Diastolic 90 75 82 85  74 80   BP Location Right arm Right arm Right arm Left arm  Left arm Left arm   Heart Rate 70 97 102 122  100 94   Temp 36.8 °C (98.2 °F) 36.7 °C (98.1 °F) 36.7 °C (98.1 °F) 37.9 °C (100.2 °F) 37.9 °C (100.2 °F) 37.9 °C (100.2 °F) 36.2 °C (97.2 °F)   Resp 20 18 17 20  18 16       Physical Exam  Constitutional:       General: She is not in acute distress.     Appearance: Normal appearance. She is not ill-appearing, toxic-appearing or diaphoretic.   HENT:      Nose: No congestion or rhinorrhea.      Mouth/Throat:      Pharynx: No oropharyngeal exudate or posterior oropharyngeal erythema.   Eyes:      General: No scleral icterus.     Conjunctiva/sclera: Conjunctivae normal.   Cardiovascular:      Rate and Rhythm: Normal rate and regular rhythm.      Pulses: Normal pulses.      Heart sounds: Normal heart sounds. No murmur heard.     No friction rub. No gallop.   Pulmonary:      Effort: Pulmonary effort is normal. No respiratory distress.      Breath sounds: No stridor. No wheezing, rhonchi or rales.      Comments: Inspiratory wheezing  Chest:       Chest wall: No tenderness.   Abdominal:      General: There is no distension.      Palpations: There is no mass.      Tenderness: There is no abdominal tenderness. There is no guarding or rebound.   Musculoskeletal:      Cervical back: No tenderness.      Right lower leg: No edema.      Left lower leg: No edema.   Lymphadenopathy:      Cervical: No cervical adenopathy.   Skin:     General: Skin is warm.      Coloration: Skin is not jaundiced.      Findings: No bruising or lesion.   Neurological:      General: No focal deficit present.      Mental Status: She is oriented to person, place, and time.   Psychiatric:         Mood and Affect: Mood normal.         Behavior: Behavior normal.     Performance Status:  ECOG 1: Restricted in physically strenuous activity but ambulatory and able to carry out work of a light or sedentary nature, e.g., light house work, office work.     Hospital Outpatient Visit on 03/05/2025   Component Date Value Ref Range Status    WBC 03/05/2025 5.4  4.4 - 11.3 x10*3/uL Final    nRBC 03/05/2025 0.0  0.0 - 0.0 /100 WBCs Final    RBC 03/05/2025 4.26  4.00 - 5.20 x10*6/uL Final    Hemoglobin 03/05/2025 13.1  12.0 - 16.0 g/dL Final    Hematocrit 03/05/2025 39.2  36.0 - 46.0 % Final    MCV 03/05/2025 92  80 - 100 fL Final    MCH 03/05/2025 30.8  26.0 - 34.0 pg Final    MCHC 03/05/2025 33.4  32.0 - 36.0 g/dL Final    RDW 03/05/2025 16.5 (H)  11.5 - 14.5 % Final    Platelets 03/05/2025 405  150 - 450 x10*3/uL Final    Neutrophils % 03/05/2025 61.8  40.0 - 80.0 % Final    Immature Granulocytes %, Automated 03/05/2025 0.6  0.0 - 0.9 % Final    Lymphocytes % 03/05/2025 29.9  13.0 - 44.0 % Final    Monocytes % 03/05/2025 5.6  2.0 - 10.0 % Final    Eosinophils % 03/05/2025 1.7  0.0 - 6.0 % Final    Basophils % 03/05/2025 0.4  0.0 - 2.0 % Final    Neutrophils Absolute 03/05/2025 3.32  1.20 - 7.70 x10*3/uL Final    Immature Granulocytes Absolute, Au* 03/05/2025 0.03  0.00 - 0.70 x10*3/uL Final     Lymphocytes Absolute 03/05/2025 1.60  1.20 - 4.80 x10*3/uL Final    Monocytes Absolute 03/05/2025 0.30  0.10 - 1.00 x10*3/uL Final    Eosinophils Absolute 03/05/2025 0.09  0.00 - 0.70 x10*3/uL Final    Basophils Absolute 03/05/2025 0.02  0.00 - 0.10 x10*3/uL Final    Glucose 03/05/2025 128 (H)  74 - 99 mg/dL Final    Sodium 03/05/2025 137  136 - 145 mmol/L Final    Potassium 03/05/2025 4.0  3.5 - 5.3 mmol/L Final    Chloride 03/05/2025 100  98 - 107 mmol/L Final    Bicarbonate 03/05/2025 24  21 - 32 mmol/L Final    Anion Gap 03/05/2025 17  10 - 20 mmol/L Final    Urea Nitrogen 03/05/2025 11  6 - 23 mg/dL Final    Creatinine 03/05/2025 0.65  0.50 - 1.05 mg/dL Final    eGFR 03/05/2025 >90  >60 mL/min/1.73m*2 Final    Calcium 03/05/2025 8.8  8.6 - 10.6 mg/dL Final    Albumin 03/05/2025 4.2  3.4 - 5.0 g/dL Final    Alkaline Phosphatase 03/05/2025 108  33 - 136 U/L Final    Total Protein 03/05/2025 6.9  6.4 - 8.2 g/dL Final    AST 03/05/2025 15  9 - 39 U/L Final    Bilirubin, Total 03/05/2025 0.4  0.0 - 1.2 mg/dL Final    ALT 03/05/2025 18  7 - 45 U/L Final    Bilirubin, Direct 03/05/2025 0.1  0.0 - 0.3 mg/dL Final    GGT 03/05/2025 170 (H)  5 - 55 U/L Final    LDH 03/05/2025 239  84 - 246 U/L Final    Magnesium 03/05/2025 2.19  1.60 - 2.40 mg/dL Final    Phosphorus 03/05/2025 3.4  2.5 - 4.9 mg/dL Final    Extra Tube 03/05/2025 Hold for add-ons.   Final    Extra Tube 03/05/2025 Hold for add-ons.   Final    Extra Tube 03/05/2025 Hold for add-ons.   Final   Hospital Outpatient Visit on 02/26/2025   Component Date Value Ref Range Status    WBC 02/26/2025 5.4  4.4 - 11.3 x10*3/uL Final    nRBC 02/26/2025 0.0  0.0 - 0.0 /100 WBCs Final    RBC 02/26/2025 3.92 (L)  4.00 - 5.20 x10*6/uL Final    Hemoglobin 02/26/2025 12.2  12.0 - 16.0 g/dL Final    Hematocrit 02/26/2025 37.0  36.0 - 46.0 % Final    MCV 02/26/2025 94  80 - 100 fL Final    MCH 02/26/2025 31.1  26.0 - 34.0 pg Final    MCHC 02/26/2025 33.0  32.0 - 36.0 g/dL Final     RDW 02/26/2025 16.9 (H)  11.5 - 14.5 % Final    Platelets 02/26/2025 223  150 - 450 x10*3/uL Final    Neutrophils % 02/26/2025 88.8  40.0 - 80.0 % Final    Immature Granulocytes %, Automated 02/26/2025 0.4  0.0 - 0.9 % Final    Lymphocytes % 02/26/2025 3.7  13.0 - 44.0 % Final    Monocytes % 02/26/2025 5.2  2.0 - 10.0 % Final    Eosinophils % 02/26/2025 1.7  0.0 - 6.0 % Final    Basophils % 02/26/2025 0.2  0.0 - 2.0 % Final    Neutrophils Absolute 02/26/2025 4.80  1.20 - 7.70 x10*3/uL Final    Immature Granulocytes Absolute, Au* 02/26/2025 0.02  0.00 - 0.70 x10*3/uL Final    Lymphocytes Absolute 02/26/2025 0.20 (L)  1.20 - 4.80 x10*3/uL Final    Monocytes Absolute 02/26/2025 0.28  0.10 - 1.00 x10*3/uL Final    Eosinophils Absolute 02/26/2025 0.09  0.00 - 0.70 x10*3/uL Final    Basophils Absolute 02/26/2025 0.01  0.00 - 0.10 x10*3/uL Final    Glucose 02/26/2025 133 (H)  74 - 99 mg/dL Final    Sodium 02/26/2025 135 (L)  136 - 145 mmol/L Final    Potassium 02/26/2025 4.0  3.5 - 5.3 mmol/L Final    Chloride 02/26/2025 99  98 - 107 mmol/L Final    Bicarbonate 02/26/2025 24  21 - 32 mmol/L Final    Anion Gap 02/26/2025 16  10 - 20 mmol/L Final    Urea Nitrogen 02/26/2025 10  6 - 23 mg/dL Final    Creatinine 02/26/2025 0.73  0.50 - 1.05 mg/dL Final    eGFR 02/26/2025 >90  >60 mL/min/1.73m*2 Final    Calcium 02/26/2025 9.0  8.6 - 10.6 mg/dL Final    Albumin 02/26/2025 4.2  3.4 - 5.0 g/dL Final    Alkaline Phosphatase 02/26/2025 104  33 - 136 U/L Final    Total Protein 02/26/2025 6.3 (L)  6.4 - 8.2 g/dL Final    AST 02/26/2025 16  9 - 39 U/L Final    Bilirubin, Total 02/26/2025 0.4  0.0 - 1.2 mg/dL Final    ALT 02/26/2025 15  7 - 45 U/L Final    Bilirubin, Direct 02/26/2025 0.1  0.0 - 0.3 mg/dL Final    GGT 02/26/2025 163 (H)  5 - 55 U/L Final    LDH 02/26/2025 264 (H)  84 - 246 U/L Final    Magnesium 02/26/2025 1.83  1.60 - 2.40 mg/dL Final    Phosphorus 02/26/2025 3.5  2.5 - 4.9 mg/dL Final    Extra Tube 02/26/2025 Hold  for add-ons.   Final    Extra Tube 02/26/2025 Hold for add-ons.   Final    Extra Tube 02/26/2025 Hold for add-ons.   Final    Extra Tube 02/26/2025 Hold for add-ons.   Final    Extra Tube 02/26/2025 Hold for add-ons.   Final    C-Reactive Protein 02/26/2025 0.83  <1.00 mg/dL Final    Flu A Result 02/26/2025 Detected (A)  Not Detected Final    Flu B Result 02/26/2025 Not Detected  Not Detected Final    Coronavirus 2019, PCR 02/26/2025 Not Detected  Not Detected Final   Hospital Outpatient Visit on 02/21/2025   Component Date Value Ref Range Status    Extra Tube 02/21/2025 Hold for add-ons.   Final    Extra Tube 02/21/2025 Hold for add-ons.   Final    Extra Tube 02/21/2025 Hold for add-ons.   Final   Hospital Outpatient Visit on 02/20/2025   Component Date Value Ref Range Status    Extra Tube 02/20/2025 Hold for add-ons.   Final    Extra Tube 02/20/2025 Hold for add-ons.   Final   Hospital Outpatient Visit on 02/19/2025   Component Date Value Ref Range Status    Extra Tube 02/19/2025 Hold for add-ons.   Final    Extra Tube 02/19/2025 Hold for add-ons.   Final    Extra Tube 02/19/2025 Hold for add-ons.   Final    Extra Tube 02/19/2025 Hold for add-ons.   Final    Extra Tube 02/19/2025 Hold for add-ons.   Final    Extra Tube 02/19/2025 Hold for add-ons.   Final    Extra Tube 02/19/2025 Hold for add-ons.   Final    Extra Tube 02/19/2025 Hold for add-ons.   Final    Extra Tube 02/19/2025 Hold for add-ons.   Final    Extra Tube 02/19/2025 Hold for add-ons.   Final    Extra Tube 02/19/2025 Hold for add-ons.   Final    Extra Tube 02/19/2025 Hold for add-ons.   Final    Extra Tube 02/19/2025 Hold for add-ons.   Final    Extra Tube 02/19/2025 Hold for add-ons.   Final    Extra Tube 02/19/2025 Hold for add-ons.   Final   Lab on 02/18/2025   Component Date Value Ref Range Status    Color, Urine 02/18/2025 Light-Yellow  Light-Yellow, Yellow, Dark-Yellow Final    Appearance, Urine 02/18/2025 Clear  Clear Final    Specific Gravity,  Urine 02/18/2025 1.010  1.005 - 1.035 Final    pH, Urine 02/18/2025 5.5  5.0, 5.5, 6.0, 6.5, 7.0, 7.5, 8.0 Final    Protein, Urine 02/18/2025 NEGATIVE  NEGATIVE, 10 (TRACE), 20 (TRACE) mg/dL Final    Glucose, Urine 02/18/2025 Normal  Normal mg/dL Final    Blood, Urine 02/18/2025 NEGATIVE  NEGATIVE mg/dL Final    Ketones, Urine 02/18/2025 NEGATIVE  NEGATIVE mg/dL Final    Bilirubin, Urine 02/18/2025 NEGATIVE  NEGATIVE mg/dL Final    Urobilinogen, Urine 02/18/2025 Normal  Normal mg/dL Final    Nitrite, Urine 02/18/2025 NEGATIVE  NEGATIVE Final    Leukocyte Esterase, Urine 02/18/2025 NEGATIVE  NEGATIVE Final    Phosphorus 02/18/2025 4.3  2.5 - 4.9 mg/dL Final    Magnesium 02/18/2025 2.15  1.60 - 2.40 mg/dL Final    LDH 02/18/2025 274 (H)  84 - 246 U/L Final    GGT 02/18/2025 213 (H)  5 - 55 U/L Final    Bilirubin, Direct 02/18/2025 0.1  0.0 - 0.3 mg/dL Final    Glucose 02/18/2025 123 (H)  74 - 99 mg/dL Final    Sodium 02/18/2025 139  136 - 145 mmol/L Final    Potassium 02/18/2025 4.1  3.5 - 5.3 mmol/L Final    Chloride 02/18/2025 102  98 - 107 mmol/L Final    Bicarbonate 02/18/2025 27  21 - 32 mmol/L Final    Anion Gap 02/18/2025 14  10 - 20 mmol/L Final    Urea Nitrogen 02/18/2025 8  6 - 23 mg/dL Final    Creatinine 02/18/2025 0.83  0.50 - 1.05 mg/dL Final    eGFR 02/18/2025 81  >60 mL/min/1.73m*2 Final    Calcium 02/18/2025 9.4  8.6 - 10.3 mg/dL Final    Albumin 02/18/2025 4.4  3.4 - 5.0 g/dL Final    Alkaline Phosphatase 02/18/2025 129  33 - 136 U/L Final    Total Protein 02/18/2025 6.9  6.4 - 8.2 g/dL Final    AST 02/18/2025 35  9 - 39 U/L Final    Bilirubin, Total 02/18/2025 0.3  0.0 - 1.2 mg/dL Final    ALT 02/18/2025 33  7 - 45 U/L Final    WBC 02/18/2025 7.0  4.4 - 11.3 x10*3/uL Final    nRBC 02/18/2025 0.0  0.0 - 0.0 /100 WBCs Final    RBC 02/18/2025 4.34  4.00 - 5.20 x10*6/uL Final    Hemoglobin 02/18/2025 13.3  12.0 - 16.0 g/dL Final    Hematocrit 02/18/2025 41.1  36.0 - 46.0 % Final    MCV 02/18/2025 95  80  - 100 fL Final    MCH 02/18/2025 30.6  26.0 - 34.0 pg Final    MCHC 02/18/2025 32.4  32.0 - 36.0 g/dL Final    RDW 02/18/2025 17.2 (H)  11.5 - 14.5 % Final    Platelets 02/18/2025 237  150 - 450 x10*3/uL Final    Neutrophils % 02/18/2025 65.0  40.0 - 80.0 % Final    Immature Granulocytes %, Automated 02/18/2025 0.6  0.0 - 0.9 % Final    Lymphocytes % 02/18/2025 25.6  13.0 - 44.0 % Final    Monocytes % 02/18/2025 6.7  2.0 - 10.0 % Final    Eosinophils % 02/18/2025 1.7  0.0 - 6.0 % Final    Basophils % 02/18/2025 0.4  0.0 - 2.0 % Final    Neutrophils Absolute 02/18/2025 4.57  1.20 - 7.70 x10*3/uL Final    Immature Granulocytes Absolute, Au* 02/18/2025 0.04  0.00 - 0.70 x10*3/uL Final    Lymphocytes Absolute 02/18/2025 1.80  1.20 - 4.80 x10*3/uL Final    Monocytes Absolute 02/18/2025 0.47  0.10 - 1.00 x10*3/uL Final    Eosinophils Absolute 02/18/2025 0.12  0.00 - 0.70 x10*3/uL Final    Basophils Absolute 02/18/2025 0.03  0.00 - 0.10 x10*3/uL Final   Hospital Outpatient Visit on 02/13/2025   Component Date Value Ref Range Status    AV pk jose 02/13/2025 1.46  m/s Final    MV E/A ratio 02/13/2025 0.97   Final    LA vol index A/L 02/13/2025 18.5  ml/m2 Final    Tricuspid annular plane systolic e* 02/13/2025 1.5  cm Final    LV EF 02/13/2025 58  % Final    RV free wall pk S' 02/13/2025 7.33  cm/s Final    LVIDd 02/13/2025 3.47  cm Final    RVSP 02/13/2025 22.7  mmHg Final    AV pk grad 02/13/2025 8  mmHg Final    LV A4C EF 02/13/2025 61.8   Final   Hospital Outpatient Visit on 02/10/2025   Component Date Value Ref Range Status    Adrenocorticotropic Hormone (ACTH) 02/10/2025 5.5 (L)  7.2 - 63.3 pg/mL Final    aPTT 02/10/2025 49 (H)  27 - 38 seconds Final    Bilirubin, Direct 02/10/2025 0.0  0.0 - 0.3 mg/dL Final    WBC 02/10/2025 8.3  4.4 - 11.3 x10*3/uL Final    nRBC 02/10/2025 0.0  0.0 - 0.0 /100 WBCs Final    RBC 02/10/2025 4.28  4.00 - 5.20 x10*6/uL Final    Hemoglobin 02/10/2025 13.0  12.0 - 16.0 g/dL Final     Hematocrit 02/10/2025 38.8  36.0 - 46.0 % Final    MCV 02/10/2025 91  80 - 100 fL Final    MCH 02/10/2025 30.4  26.0 - 34.0 pg Final    MCHC 02/10/2025 33.5  32.0 - 36.0 g/dL Final    RDW 02/10/2025 16.5 (H)  11.5 - 14.5 % Final    Platelets 02/10/2025 374  150 - 450 x10*3/uL Final    Neutrophils % 02/10/2025 66.3  40.0 - 80.0 % Final    Immature Granulocytes %, Automated 02/10/2025 0.7  0.0 - 0.9 % Final    Lymphocytes % 02/10/2025 24.0  13.0 - 44.0 % Final    Monocytes % 02/10/2025 7.9  2.0 - 10.0 % Final    Eosinophils % 02/10/2025 0.5  0.0 - 6.0 % Final    Basophils % 02/10/2025 0.6  0.0 - 2.0 % Final    Neutrophils Absolute 02/10/2025 5.51  1.20 - 7.70 x10*3/uL Final    Immature Granulocytes Absolute, Au* 02/10/2025 0.06  0.00 - 0.70 x10*3/uL Final    Lymphocytes Absolute 02/10/2025 2.00  1.20 - 4.80 x10*3/uL Final    Monocytes Absolute 02/10/2025 0.66  0.10 - 1.00 x10*3/uL Final    Eosinophils Absolute 02/10/2025 0.04  0.00 - 0.70 x10*3/uL Final    Basophils Absolute 02/10/2025 0.05  0.00 - 0.10 x10*3/uL Final    Glucose 02/10/2025 112 (H)  74 - 99 mg/dL Final    Sodium 02/10/2025 137  136 - 145 mmol/L Final    Potassium 02/10/2025 4.0  3.5 - 5.3 mmol/L Final    Chloride 02/10/2025 102  98 - 107 mmol/L Final    Bicarbonate 02/10/2025 26  21 - 32 mmol/L Final    Anion Gap 02/10/2025 13  10 - 20 mmol/L Final    Urea Nitrogen 02/10/2025 9  6 - 23 mg/dL Final    Creatinine 02/10/2025 0.66  0.50 - 1.05 mg/dL Final    eGFR 02/10/2025 >90  >60 mL/min/1.73m*2 Final    Calcium 02/10/2025 9.2  8.6 - 10.6 mg/dL Final    Albumin 02/10/2025 4.2  3.4 - 5.0 g/dL Final    Alkaline Phosphatase 02/10/2025 105  33 - 136 U/L Final    Total Protein 02/10/2025 6.6  6.4 - 8.2 g/dL Final    AST 02/10/2025 23  9 - 39 U/L Final    Bilirubin, Total 02/10/2025 0.3  0.0 - 1.2 mg/dL Final    ALT 02/10/2025 15  7 - 45 U/L Final    Cortisol 02/10/2025 11.1  2.5 - 20.0 ug/dL Final    Follicle Stimulating Hormone 02/10/2025 75.6  IU/L Final     GGT 02/10/2025 134 (H)  5 - 55 U/L Final    LDH 02/10/2025 206  84 - 246 U/L Final    Magnesium 02/10/2025 2.16  1.60 - 2.40 mg/dL Final    Phosphorus 02/10/2025 3.9  2.5 - 4.9 mg/dL Final    Protime 02/10/2025 11.9  9.8 - 12.8 seconds Final    INR 02/10/2025 1.1  0.9 - 1.1 Final    Thyroid Stimulating Hormone 02/10/2025 84.17 (H)  0.44 - 3.98 mIU/L Final    Thyroxine, Free 02/10/2025 0.34 (L)  0.78 - 1.48 ng/dL Final    Triiodothyronine, Free 02/10/2025 1.1 (L)  2.3 - 4.2 pg/mL Final    Color, Urine 02/10/2025 Yellow  Light-Yellow, Yellow, Dark-Yellow Final    Appearance, Urine 02/10/2025 Clear  Clear Final    Specific Gravity, Urine 02/10/2025 1.016  1.005 - 1.035 Final    pH, Urine 02/10/2025 6.5  5.0, 5.5, 6.0, 6.5, 7.0, 7.5, 8.0 Final    Protein, Urine 02/10/2025 NEGATIVE  NEGATIVE, 10 (TRACE), 20 (TRACE) mg/dL Final    Glucose, Urine 02/10/2025 Normal  Normal mg/dL Final    Blood, Urine 02/10/2025 NEGATIVE  NEGATIVE mg/dL Final    Ketones, Urine 02/10/2025 NEGATIVE  NEGATIVE mg/dL Final    Bilirubin, Urine 02/10/2025 NEGATIVE  NEGATIVE mg/dL Final    Urobilinogen, Urine 02/10/2025 Normal  Normal mg/dL Final    Nitrite, Urine 02/10/2025 NEGATIVE  NEGATIVE Final    Leukocyte Esterase, Urine 02/10/2025 NEGATIVE  NEGATIVE Final          CT pelvis w IV contrast    Result Date: 2/13/2025  Impression: Restaging of metastatic small-cell lung carcinoma prior to initiation of clinical trial (to be combined with CT chest and abdomen 01/24/2025): 1. Stable tumor burden as evidenced by stable mixed lytic-sclerotic osseous lesions throughout the pelvis and lower lumbar vertebrae. 2. No evidence of new disease within the pelvis.   I personally reviewed the images/study and I agree with the findings as stated. This study was interpreted at [facility], [place], [place].   MACRO: None   Signed by: Yao Parham 2/13/2025 9:25 PM Dictation workstation:   [id number]    MR brain w and wo IV  contrast    Result Date: 2/6/2025  Impression: No new mass or pathologic enhancement was identified.   Nonspecific white matter changes most likely reflect chronic small-vessel ischemic disease given the age of the patient.   Likely right-sided proptosis stable in retrospect compared to the prior exam of 10/01/2024. Given prior masslike enlargement of the inferior rectus muscle on CT 10/07/2017 the finding which is not seen on the current limited exam of the orbits observation could nonetheless reflect residua of prior disease. Please correlate clinically and consider dedicated orbital MRI or follow-up orbital CT for further evaluation if clinically indicated   MACRO: None   Signed by: Emiliano Kong 2/6/2025 10:23 AM Dictation workstation:   [id number]    CT abdomen w IV contrast    Result Date: 1/24/2025  Impression: Restaging of metastatic small-cell lung carcinoma, as compared to CT 12/19/2024: 1. Stable to slightly increased disease burden as evidenced by gradually increasing size of the distal, left subhilar component of the primary neoplasm with stable size of hepatic metastases. 2. Stable mediastinal lymphadenopathy, including prominent paratracheal and aortopulmonary window nodes detailed above, as well as mixed lytic-sclerotic osseous lesions throughout the axial skeleton. 3. Increased postobstructive pneumonitis within the left lower lobe.   I personally reviewed the images/study and I agree with the findings as stated. This study was interpreted at [facility], [place], [place].   MACRO: None   Signed by: Yao Parham 1/24/2025 9:54 PM Dictation workstation:   [id number]    CT chest w IV contrast    Result Date: 1/24/2025  Impression: Restaging of metastatic small-cell lung carcinoma, as compared to CT 12/19/2024: 1. Stable to slightly increased disease burden as evidenced by gradually increasing size of the distal, left subhilar component of the primary neoplasm with  stable size of hepatic metastases. 2. Stable mediastinal lymphadenopathy, including prominent paratracheal and aortopulmonary window nodes detailed above, as well as mixed lytic-sclerotic osseous lesions throughout the axial skeleton. 3. Increased postobstructive pneumonitis within the left lower lobe.   I personally reviewed the images/study and I agree with the findings as stated. This study was interpreted at [facility], [place], [place].   MACRO: None   Signed by: Yao Parham 1/24/2025 9:54 PM Dictation workstation:   [id number]        Assessment/Plan     Ms. 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 Initial imaging post C2 reviewed by Dr Kohli appeared to have sub-optimal response to treatment. Unfortunately after C4 imaging confirmed she had progressive disease. In the last visit Dr Kohli discussed all treatment options which included SOC Taralatamab and also clinical trial [id number] with study drug ABBV-706 which is an ADC targeting SEZ6 with a topoisomerase 1 inhibitor payload. Patient opted to participate on study and completed screening requirements on study. Started treatment on 02.19.2025 on study, when she came in last week for C1D8 follow up visit. She complained of worsening symptoms of fatigue, nausea, SOB with exertion, and headache. She also discussed about family members around not keeping well. We tested her for Flu/COVID. She was positive for the flu and we started her on Tamiflu. Today she is in for C1D15 reports past week her symptoms of fatigue had worsen but also mentions over the past week her symptoms have started to improve. Given she has a poor appetite and poor PO intake we gave her IV fluids. She looks much better than last week with more energy levels. Plan discussed in detail with the patient. Patient in agreement with  the plan of care and is aware to call the office and research nurse if experiencing any new symptoms. RTC per protocol.         Cancer Staging   No matching staging information was found for the patient.      Oncology History   SCLC (small cell lung carcinoma)   9/27/2024 Initial Diagnosis    SCLC (small cell lung carcinoma) (Multi)     10/4/2024 - 10/8/2024 Chemotherapy    CARBOplatin / Etoposide, 21 Day Cycles - Lung     10/4/2024 - 10/6/2024 Research Study Participant    ([facility]) [id number] - Atezolizumab + CARBOplatin / Etoposide / [id number], 21 Day Cycles  Plan Provider: JESSICA Hernandez  Treatment goal: Control  Line of treatment: First Line  Associated studies: ([id number])[id number] with Carboplatin, Etoposide and Tislelizumab in Newly Diagnosed ES-SCLC     10/4/2024 - 1/17/2025 Chemotherapy    Durvalumab + CARBOplatin / Etoposide, 21 Day Cycles     10/28/2024 - 10/28/2024 Chemotherapy    Durvalumab + CARBOplatin / Etoposide, 21 Day Cycles     2/5/2025 - 2/5/2025 Chemotherapy    Durvalumab, 28 Day Cycles     2/19/2025 -  Research Study Participant    ([facility]) [id number] Part 1 - ABBV-706, 21 Day Cycles  Plan Provider: JESSICA Hernandez  Treatment goal: Control  Line of treatment: Second Line  Associated studies: ABBV-706 alone or in combination in subjects with advanced solid tumors

## 2025-03-05 NOTE — ADDENDUM NOTE
Encounter addended by: NANCY Hernandez-CNP on: 3/5/2025 3:47 PM   Actions taken: Level of Service modified, Clinical Note Signed

## 2025-03-12 ENCOUNTER — EDUCATION (OUTPATIENT)
Dept: HEMATOLOGY/ONCOLOGY | Facility: HOSPITAL | Age: 60
End: 2025-03-12

## 2025-03-12 ENCOUNTER — HOSPITAL ENCOUNTER (OUTPATIENT)
Dept: RESEARCH | Facility: HOSPITAL | Age: 60
Discharge: HOME | End: 2025-03-12
Payer: MEDICARE

## 2025-03-12 VITALS
BODY MASS INDEX: 32.19 KG/M2 | TEMPERATURE: 98.1 F | RESPIRATION RATE: 18 BRPM | WEIGHT: 181.66 LBS | DIASTOLIC BLOOD PRESSURE: 81 MMHG | OXYGEN SATURATION: 94 % | SYSTOLIC BLOOD PRESSURE: 123 MMHG | HEART RATE: 88 BPM

## 2025-03-12 DIAGNOSIS — C34.90 SMALL CELL CARCINOMA OF LUNG, UNSPECIFIED LATERALITY, UNSPECIFIED PART OF LUNG: Primary | ICD-10-CM

## 2025-03-12 LAB
ALBUMIN SERPL BCP-MCNC: 4.3 G/DL (ref 3.4–5)
ALP SERPL-CCNC: 98 U/L (ref 33–136)
ALT SERPL W P-5'-P-CCNC: 10 U/L (ref 7–45)
ANION GAP SERPL CALC-SCNC: 17 MMOL/L (ref 10–20)
APPEARANCE UR: CLEAR
AST SERPL W P-5'-P-CCNC: 15 U/L (ref 9–39)
BASOPHILS # BLD AUTO: 0.03 X10*3/UL (ref 0–0.1)
BASOPHILS NFR BLD AUTO: 0.5 %
BILIRUB DIRECT SERPL-MCNC: 0.1 MG/DL (ref 0–0.3)
BILIRUB SERPL-MCNC: 0.5 MG/DL (ref 0–1.2)
BILIRUB UR STRIP.AUTO-MCNC: NEGATIVE MG/DL
BUN SERPL-MCNC: 13 MG/DL (ref 6–23)
CALCIUM SERPL-MCNC: 9.1 MG/DL (ref 8.6–10.6)
CHLORIDE SERPL-SCNC: 101 MMOL/L (ref 98–107)
CO2 SERPL-SCNC: 25 MMOL/L (ref 21–32)
COLOR UR: YELLOW
CREAT SERPL-MCNC: 0.78 MG/DL (ref 0.5–1.05)
EGFRCR SERPLBLD CKD-EPI 2021: 87 ML/MIN/1.73M*2
EOSINOPHIL # BLD AUTO: 0.1 X10*3/UL (ref 0–0.7)
EOSINOPHIL NFR BLD AUTO: 1.8 %
ERYTHROCYTE [DISTWIDTH] IN BLOOD BY AUTOMATED COUNT: 16.3 % (ref 11.5–14.5)
GGT SERPL-CCNC: 111 U/L (ref 5–55)
GLUCOSE SERPL-MCNC: 135 MG/DL (ref 74–99)
GLUCOSE UR STRIP.AUTO-MCNC: NORMAL MG/DL
HCT VFR BLD AUTO: 39.5 % (ref 36–46)
HGB BLD-MCNC: 13 G/DL (ref 12–16)
HOLD SPECIMEN: NORMAL
IMM GRANULOCYTES # BLD AUTO: 0.02 X10*3/UL (ref 0–0.7)
IMM GRANULOCYTES NFR BLD AUTO: 0.4 % (ref 0–0.9)
KETONES UR STRIP.AUTO-MCNC: ABNORMAL MG/DL
LDH SERPL L TO P-CCNC: 192 U/L (ref 84–246)
LEUKOCYTE ESTERASE UR QL STRIP.AUTO: NEGATIVE
LYMPHOCYTES # BLD AUTO: 2.23 X10*3/UL (ref 1.2–4.8)
LYMPHOCYTES NFR BLD AUTO: 39.8 %
MAGNESIUM SERPL-MCNC: 2.14 MG/DL (ref 1.6–2.4)
MCH RBC QN AUTO: 30.7 PG (ref 26–34)
MCHC RBC AUTO-ENTMCNC: 32.9 G/DL (ref 32–36)
MCV RBC AUTO: 93 FL (ref 80–100)
MONOCYTES # BLD AUTO: 0.35 X10*3/UL (ref 0.1–1)
MONOCYTES NFR BLD AUTO: 6.3 %
MUCOUS THREADS #/AREA URNS AUTO: NORMAL /LPF
NEUTROPHILS # BLD AUTO: 2.87 X10*3/UL (ref 1.2–7.7)
NEUTROPHILS NFR BLD AUTO: 51.2 %
NITRITE UR QL STRIP.AUTO: NEGATIVE
NRBC BLD-RTO: 0 /100 WBCS (ref 0–0)
PH UR STRIP.AUTO: 5.5 [PH]
PHOSPHATE SERPL-MCNC: 4.8 MG/DL (ref 2.5–4.9)
PLATELET # BLD AUTO: 378 X10*3/UL (ref 150–450)
POTASSIUM SERPL-SCNC: 4.5 MMOL/L (ref 3.5–5.3)
PROT SERPL-MCNC: 6.7 G/DL (ref 6.4–8.2)
PROT UR STRIP.AUTO-MCNC: ABNORMAL MG/DL
RBC # BLD AUTO: 4.24 X10*6/UL (ref 4–5.2)
RBC # UR STRIP.AUTO: NEGATIVE MG/DL
RBC #/AREA URNS AUTO: NORMAL /HPF
SODIUM SERPL-SCNC: 138 MMOL/L (ref 136–145)
SP GR UR STRIP.AUTO: 1.03
SQUAMOUS #/AREA URNS AUTO: NORMAL /HPF
UROBILINOGEN UR STRIP.AUTO-MCNC: ABNORMAL MG/DL
WBC # BLD AUTO: 5.6 X10*3/UL (ref 4.4–11.3)
WBC #/AREA URNS AUTO: NORMAL /HPF

## 2025-03-12 PROCEDURE — 81001 URINALYSIS AUTO W/SCOPE: CPT

## 2025-03-12 PROCEDURE — 83615 LACTATE (LD) (LDH) ENZYME: CPT

## 2025-03-12 PROCEDURE — 2500000004 HC RX 250 GENERAL PHARMACY W/ HCPCS (ALT 636 FOR OP/ED)

## 2025-03-12 PROCEDURE — 82248 BILIRUBIN DIRECT: CPT

## 2025-03-12 PROCEDURE — 93005 ELECTROCARDIOGRAM TRACING: CPT | Mod: DCRU

## 2025-03-12 PROCEDURE — 2560000001 HC RX 256 EXPERIMENTAL DRUGS

## 2025-03-12 PROCEDURE — 82977 ASSAY OF GGT: CPT

## 2025-03-12 PROCEDURE — 2500000002 HC RX 250 W HCPCS SELF ADMINISTERED DRUGS (ALT 637 FOR MEDICARE OP, ALT 636 FOR OP/ED)

## 2025-03-12 PROCEDURE — 83735 ASSAY OF MAGNESIUM: CPT

## 2025-03-12 PROCEDURE — 84075 ASSAY ALKALINE PHOSPHATASE: CPT

## 2025-03-12 PROCEDURE — 96413 CHEMO IV INFUSION 1 HR: CPT

## 2025-03-12 PROCEDURE — 85025 COMPLETE CBC W/AUTO DIFF WBC: CPT

## 2025-03-12 PROCEDURE — 36415 COLL VENOUS BLD VENIPUNCTURE: CPT

## 2025-03-12 PROCEDURE — 84100 ASSAY OF PHOSPHORUS: CPT

## 2025-03-12 RX ORDER — IPRATROPIUM BROMIDE AND ALBUTEROL SULFATE 2.5; .5 MG/3ML; MG/3ML
3 SOLUTION RESPIRATORY (INHALATION)
Status: DISCONTINUED | OUTPATIENT
Start: 2025-03-12 | End: 2025-03-12

## 2025-03-12 RX ORDER — DIPHENHYDRAMINE HYDROCHLORIDE 50 MG/ML
50 INJECTION INTRAMUSCULAR; INTRAVENOUS AS NEEDED
Status: DISCONTINUED | OUTPATIENT
Start: 2025-03-12 | End: 2025-03-13 | Stop reason: HOSPADM

## 2025-03-12 RX ORDER — FAMOTIDINE 10 MG/ML
20 INJECTION, SOLUTION INTRAVENOUS ONCE AS NEEDED
Status: DISCONTINUED | OUTPATIENT
Start: 2025-03-12 | End: 2025-03-13 | Stop reason: HOSPADM

## 2025-03-12 RX ORDER — PROCHLORPERAZINE MALEATE 10 MG
10 TABLET ORAL EVERY 6 HOURS PRN
Status: DISCONTINUED | OUTPATIENT
Start: 2025-03-12 | End: 2025-03-13 | Stop reason: HOSPADM

## 2025-03-12 RX ORDER — GUAIFENESIN 600 MG/1
1200 TABLET, EXTENDED RELEASE ORAL 2 TIMES DAILY
Qty: 120 TABLET | Refills: 11 | Status: SHIPPED | OUTPATIENT
Start: 2025-03-12 | End: 2026-03-12

## 2025-03-12 RX ORDER — HEPARIN SODIUM,PORCINE/PF 10 UNIT/ML
50 SYRINGE (ML) INTRAVENOUS AS NEEDED
OUTPATIENT
Start: 2025-03-12

## 2025-03-12 RX ORDER — GUAIFENESIN 600 MG/1
600 TABLET, EXTENDED RELEASE ORAL ONCE
Status: COMPLETED | OUTPATIENT
Start: 2025-03-12 | End: 2025-03-12

## 2025-03-12 RX ORDER — HEPARIN 100 UNIT/ML
500 SYRINGE INTRAVENOUS AS NEEDED
OUTPATIENT
Start: 2025-03-12

## 2025-03-12 RX ORDER — PROCHLORPERAZINE EDISYLATE 5 MG/ML
10 INJECTION INTRAMUSCULAR; INTRAVENOUS EVERY 6 HOURS PRN
Status: DISCONTINUED | OUTPATIENT
Start: 2025-03-12 | End: 2025-03-13 | Stop reason: HOSPADM

## 2025-03-12 RX ORDER — IPRATROPIUM BROMIDE AND ALBUTEROL SULFATE 2.5; .5 MG/3ML; MG/3ML
3 SOLUTION RESPIRATORY (INHALATION) ONCE
Status: COMPLETED | OUTPATIENT
Start: 2025-03-12 | End: 2025-03-12

## 2025-03-12 RX ORDER — EPINEPHRINE 1 MG/ML
0.3 INJECTION, SOLUTION, CONCENTRATE INTRAVENOUS EVERY 5 MIN PRN
Status: DISCONTINUED | OUTPATIENT
Start: 2025-03-12 | End: 2025-03-13 | Stop reason: HOSPADM

## 2025-03-12 RX ORDER — ALBUTEROL SULFATE 0.83 MG/ML
3 SOLUTION RESPIRATORY (INHALATION) AS NEEDED
Status: DISCONTINUED | OUTPATIENT
Start: 2025-03-12 | End: 2025-03-13 | Stop reason: HOSPADM

## 2025-03-12 RX ADMIN — IPRATROPIUM BROMIDE AND ALBUTEROL SULFATE 3 ML: .5; 3 SOLUTION RESPIRATORY (INHALATION) at 11:40

## 2025-03-12 RX ADMIN — Medication 110.6 MG: at 12:42

## 2025-03-12 RX ADMIN — GUAIFENESIN 600 MG: 600 TABLET ORAL at 11:38

## 2025-03-12 ASSESSMENT — ENCOUNTER SYMPTOMS
VISUAL CHANGE: 0
JOINT SWELLING: 0
VERTIGO: 0
NUMBNESS: 0
CHILLS: 0
ARTHRALGIAS: 0
FATIGUE: 1
VOMITING: 0
SORE THROAT: 0
ANOREXIA: 0
ABDOMINAL PAIN: 0
NAUSEA: 0
DIAPHORESIS: 0
SWOLLEN GLANDS: 0
FEVER: 0
HEADACHES: 0
COUGH: 1
MYALGIAS: 0
CHANGE IN BOWEL HABIT: 0
NECK PAIN: 0

## 2025-03-12 NOTE — PROGRESS NOTES
Patient ID: Minal Sun is a 59 y.o. female.    DIAGNOSIS     Small cell carcinoma of the lung.  Date of diagnosis is September 27, 2024 from a bronchoscopic biopsy of a subcarinal lymph node.  Immunohistochemistry positive for TTF-1, INSM1, synaptophysin and chromogranin, negative for p40, RB immunostain shows loss of nuclear expression.        STAGING     Clinical T4, N2, M1 C, stage IVc, extensive stage disease        CURRENT SITES OF DISEASE      Left lung, mediastinum, left hilum of the lung, liver, intra-abdominal lymph nodes in the portacaval and periportal region, bone metastases        MOLECULAR GENOMICS     Test Name: ZAOZAO xF+ (XF.V3) dated October 2024     Molecular Findings:  * Gene: PIK3CA, Variant: Missense variant (exon 1) - GOF, Potentially Actionable, p.R88Q (Allele Frequency - 0.3%)  * Gene: TP53, Variant: Missense variant - LOF, Biologically Relevant, p.R249G (Allele Frequency - 70.1%)  * Gene: RB1, Variant: Splice region variant - LOF, Biologically Relevant, c.540-1G>T, Splice Site (Allele Frequency - 56.8%)        Test Name: ZAOZAO xT (XT.V4)  Laboratory Name: [facility]  Specimen Source: Lymph node, level 7  Collection Date: 27-Sep-2024     Molecular Findings:  * Gene: TP53, Variant: Missense variant - LOF, Biologically Relevant, p.R249G (Allele Frequency - 96.2%)  * Gene: RB1, Variant: Splice region variant - LOF, Biologically Relevant, c.540-1G>T, Splice Site (Allele Frequency - 96%)  * Gene: KMT2D, Variant: Stop gain - LOF, Biologically Relevant, p.*, Nonsense (Allele Frequency - 41.9%)  * Biomarker: Microsatellite Instability, Status: stable  * Biomarker: Tumor Mutation [place] (2.6 Muts/Mb, Percentile: 33)        SERUM TUMOR MARKERS     Baseline LDH within normal limits  C1D1 on Study LDH was 274. Trending down at C2D1         PRIOR THERAPY     Combination chemotherapy and immunotherapy.  Initially enrolled on a clinical trial of Lutathera and underwent octreotide scan  which was positive.  However she decided to come off study and did not receive Lutathera with her chemo.  Chemotherapy started October 6, 2024.  Immunotherapy completed added with cycle #2 11.06.2024.  Minor response observed after 2 cycles of treatment. PD observed after C4.        CURRENT THERAPY     2.19.2025: Started on Phase 1 study [id number] with study drug [id number] which is an ADC targeting SEZ6 with a topoisomerase 1 inhibitor payload     CURRENT ONCOLOGICAL PROBLEMS    Cough and shortness of breath significantly improved with systemic treatment.  Pulmonary Embolism  Immunotherapy induced hypothyroidism  Neoplasm non cardiac chest/back pain        HISTORY OF PRESENT ILLNESS     This is a 59-year-old patient.  She states that she was feeling sick toward the end of spring of this year with some sinus problems.  Was seen by her allergist and was treated with antibiotics.  She was also seen at 1 point by primary care and steroids and antibiotics and inhalers were given.  She did not have any resolution and then ultimately developed a little bit of the hemoptysis which led to a chest x-ray showing an abnormality and finally a CT scan of the chest.The CT scan of the chest was done as a lung cancer screening low-dose CT and completed on September 20, 2024.  This demonstrated a extensive infiltrative mediastinal and hilar mass.  There was narrowing of the left mainstem bronchus and lower lobe bronchus secondary to extrinsic compression.  The predominant growth was within the left hilum and subcarinal region.  She underwent a bronchoscopy dated September 27, 2024 showing splaying of the main alanna.  There was mild erosion of a lymph node into the left mainstem bronchus.  There was erythema and mild erosions of an endobronchial tumor along the medial border.  Moderate to severe stenosis of the left lower lobe bronchus to 75%.        PAST MEDICAL HISTORY     Lumbar surgery about 30 years ago   hysterectomy in  2000  Diabetes  COPD  Right eye exophthalmus  herpes zoster        SOCIAL HISTORY     Patient lives with her sister.  She lives in [place].  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  She started smoking at the age of 14 and continues to smoke at the age of 59.  Has smoked for 45 years on average 1 pack/day.        CURRENT MEDS     See medication list, meds reviewed, includes metformin, rosuvastatin, been done so Nied inhalers, Wellbutrin escitalopram, trazodone        ALLERGIES     Patient denies any drug allergies        FAMILY HISTORY      Mother had bladder cancer and possibly breast cancer.     Subjective    Today 3.12.2025 Patient in clinic for C2D1 on [id number].   Patient reports over the past 2 week her symptoms of generalized pain, fatigue, weakness, and nausea have resolved. She however reports she continues to have SOB with exertion with a cough and with yellowish sputum.  She also reports having a poor appetite given taste alteration. Today she reports she feels much better with all of her symptoms have improved and she has more energy than what she had over past couple of week. She denies need any help with her ADL's.  She continues to have noncardiac chest pain and back pain (neoplasm pain). Today she denies any denies any dizziness, lightheadedness, headaches, rash/itching, chills or fever, chest pain or chest tightness, painful inflammation/ulceration oral mucous membranes, vomiting, diarrhea/constipation, hematemesis /hemoptysis, hematuria/rectal bleeding, urinary symptoms, swelling extremities, weakness, or peripheral neuropathy. ROS as above. Remainder of 10 point review of systems elicited and otherwise unremarkable.     Cancer  Associated symptoms include coughing and fatigue. Pertinent negatives include no abdominal pain, anorexia, arthralgias, change in bowel  habit, chest pain, chills, congestion, diaphoresis, fever, headaches, joint swelling, myalgias, nausea, neck pain, numbness, rash, sore throat, swollen glands, urinary symptoms, vertigo, visual change or vomiting.     Objective    BSA: 1.91 meters squared  /90 (BP Location: Left arm, Patient Position: Sitting)   Pulse 98   Temp 36.9 °C (98.4 °F) (Oral)   Resp 18   Wt 82.4 kg (181 lb 10.5 oz)   SpO2 94%   BMI 32.19 kg/m²      Daily Weight  03/12/25 : 82.4 kg (181 lb 10.5 oz)  02/19/25 : 85.1 kg (187 lb 9.8 oz)  02/10/25 : 85.4 kg (188 lb 4.4 oz)  02/06/25 : 81.6 kg (180 lb)  02/05/25 : 82.9 kg (182 lb 12.2 oz)         2/20/2025    12:12 PM 2/21/2025    12:30 PM 2/26/2025    12:11 PM 2/26/2025    12:15 PM 2/26/2025     2:19 PM 3/5/2025    12:55 PM 3/12/2025     9:07 AM   Vitals   Systolic 118 133 129  127 111 141   Diastolic 75 82 85  74 80 90   BP Location Right arm Right arm Left arm  Left arm Left arm Left arm   Heart Rate 97 102 122  100 94 98   Temp 36.7 °C (98.1 °F) 36.7 °C (98.1 °F) 37.9 °C (100.2 °F) 37.9 °C (100.2 °F) 37.9 °C (100.2 °F) 36.2 °C (97.2 °F) 36.9 °C (98.4 °F)   Resp 18 17 20  18 16 18   Weight (lb)       181.66   BMI       32.19 kg/m2   BSA (m2)       1.91 m2       Physical Exam  Constitutional:       General: She is not in acute distress.     Appearance: Normal appearance. She is not ill-appearing, toxic-appearing or diaphoretic.   HENT:      Nose: No congestion or rhinorrhea.      Mouth/Throat:      Pharynx: No oropharyngeal exudate or posterior oropharyngeal erythema.   Eyes:      General: No scleral icterus.     Conjunctiva/sclera: Conjunctivae normal.   Cardiovascular:      Rate and Rhythm: Normal rate and regular rhythm.      Pulses: Normal pulses.      Heart sounds: Normal heart sounds. No murmur heard.     No friction rub. No gallop.   Pulmonary:      Effort: Pulmonary effort is normal. No respiratory distress.      Breath sounds: No stridor. No wheezing, rhonchi or rales.       Comments: Inspiratory wheezing  Chest:      Chest wall: No tenderness.   Abdominal:      General: There is no distension.      Palpations: There is no mass.      Tenderness: There is no abdominal tenderness. There is no guarding or rebound.   Musculoskeletal:      Cervical back: No tenderness.      Right lower leg: No edema.      Left lower leg: No edema.   Lymphadenopathy:      Cervical: No cervical adenopathy.   Skin:     General: Skin is warm.      Coloration: Skin is not jaundiced.      Findings: No bruising or lesion.   Neurological:      General: No focal deficit present.      Mental Status: She is oriented to person, place, and time.   Psychiatric:         Mood and Affect: Mood normal.         Behavior: Behavior normal.     Performance Status:  ECOG 1: Restricted in physically strenuous activity but ambulatory and able to carry out work of a light or sedentary nature, e.g., light house work, office work.     Hospital Outpatient Visit on 03/12/2025   Component Date Value Ref Range Status    WBC 03/12/2025 5.6  4.4 - 11.3 x10*3/uL Final    nRBC 03/12/2025 0.0  0.0 - 0.0 /100 WBCs Final    RBC 03/12/2025 4.24  4.00 - 5.20 x10*6/uL Final    Hemoglobin 03/12/2025 13.0  12.0 - 16.0 g/dL Final    Hematocrit 03/12/2025 39.5  36.0 - 46.0 % Final    MCV 03/12/2025 93  80 - 100 fL Final    MCH 03/12/2025 30.7  26.0 - 34.0 pg Final    MCHC 03/12/2025 32.9  32.0 - 36.0 g/dL Final    RDW 03/12/2025 16.3 (H)  11.5 - 14.5 % Final    Platelets 03/12/2025 378  150 - 450 x10*3/uL Final    Neutrophils % 03/12/2025 51.2  40.0 - 80.0 % Final    Immature Granulocytes %, Automated 03/12/2025 0.4  0.0 - 0.9 % Final    Lymphocytes % 03/12/2025 39.8  13.0 - 44.0 % Final    Monocytes % 03/12/2025 6.3  2.0 - 10.0 % Final    Eosinophils % 03/12/2025 1.8  0.0 - 6.0 % Final    Basophils % 03/12/2025 0.5  0.0 - 2.0 % Final    Neutrophils Absolute 03/12/2025 2.87  1.20 - 7.70 x10*3/uL Final    Immature Granulocytes Absolute, Au* 03/12/2025  0.02  0.00 - 0.70 x10*3/uL Final    Lymphocytes Absolute 03/12/2025 2.23  1.20 - 4.80 x10*3/uL Final    Monocytes Absolute 03/12/2025 0.35  0.10 - 1.00 x10*3/uL Final    Eosinophils Absolute 03/12/2025 0.10  0.00 - 0.70 x10*3/uL Final    Basophils Absolute 03/12/2025 0.03  0.00 - 0.10 x10*3/uL Final    Glucose 03/12/2025 135 (H)  74 - 99 mg/dL Final    Sodium 03/12/2025 138  136 - 145 mmol/L Final    Potassium 03/12/2025 4.5  3.5 - 5.3 mmol/L Final    Chloride 03/12/2025 101  98 - 107 mmol/L Final    Bicarbonate 03/12/2025 25  21 - 32 mmol/L Final    Anion Gap 03/12/2025 17  10 - 20 mmol/L Final    Urea Nitrogen 03/12/2025 13  6 - 23 mg/dL Final    Creatinine 03/12/2025 0.78  0.50 - 1.05 mg/dL Final    eGFR 03/12/2025 87  >60 mL/min/1.73m*2 Final    Calcium 03/12/2025 9.1  8.6 - 10.6 mg/dL Final    Albumin 03/12/2025 4.3  3.4 - 5.0 g/dL Final    Alkaline Phosphatase 03/12/2025 98  33 - 136 U/L Final    Total Protein 03/12/2025 6.7  6.4 - 8.2 g/dL Final    AST 03/12/2025 15  9 - 39 U/L Final    Bilirubin, Total 03/12/2025 0.5  0.0 - 1.2 mg/dL Final    ALT 03/12/2025 10  7 - 45 U/L Final    Bilirubin, Direct 03/12/2025 0.1  0.0 - 0.3 mg/dL Final    GGT 03/12/2025 111 (H)  5 - 55 U/L Final    LDH 03/12/2025 192  84 - 246 U/L Final    Magnesium 03/12/2025 2.14  1.60 - 2.40 mg/dL Final    Phosphorus 03/12/2025 4.8  2.5 - 4.9 mg/dL Final    Color, Urine 03/12/2025 Yellow  Light-Yellow, Yellow, Dark-Yellow Final    Appearance, Urine 03/12/2025 Clear  Clear Final    Specific Gravity, Urine 03/12/2025 1.027  1.005 - 1.035 Final    pH, Urine 03/12/2025 5.5  5.0, 5.5, 6.0, 6.5, 7.0, 7.5, 8.0 Final    Protein, Urine 03/12/2025 20 (TRACE)  NEGATIVE, 10 (TRACE), 20 (TRACE) mg/dL Final    Glucose, Urine 03/12/2025 Normal  Normal mg/dL Final    Blood, Urine 03/12/2025 NEGATIVE  NEGATIVE mg/dL Final    Ketones, Urine 03/12/2025 TRACE (A)  NEGATIVE mg/dL Final    Bilirubin, Urine 03/12/2025 NEGATIVE  NEGATIVE mg/dL Final     Urobilinogen, Urine 03/12/2025 2 (1+) (A)  Normal mg/dL Final    Nitrite, Urine 03/12/2025 NEGATIVE  NEGATIVE Final    Leukocyte Esterase, Urine 03/12/2025 NEGATIVE  NEGATIVE Final    WBC, Urine 03/12/2025 1-5  1-5, NONE /HPF Final    RBC, Urine 03/12/2025 1-2  NONE, 1-2, 3-5 /HPF Final    Squamous Epithelial Cells, Urine 03/12/2025 1-9 (SPARSE)  Reference range not established. /HPF Final    Mucus, Urine 03/12/2025 3+  Reference range not established. /LPF Final   Hospital Outpatient Visit on 03/05/2025   Component Date Value Ref Range Status    WBC 03/05/2025 5.4  4.4 - 11.3 x10*3/uL Final    nRBC 03/05/2025 0.0  0.0 - 0.0 /100 WBCs Final    RBC 03/05/2025 4.26  4.00 - 5.20 x10*6/uL Final    Hemoglobin 03/05/2025 13.1  12.0 - 16.0 g/dL Final    Hematocrit 03/05/2025 39.2  36.0 - 46.0 % Final    MCV 03/05/2025 92  80 - 100 fL Final    MCH 03/05/2025 30.8  26.0 - 34.0 pg Final    MCHC 03/05/2025 33.4  32.0 - 36.0 g/dL Final    RDW 03/05/2025 16.5 (H)  11.5 - 14.5 % Final    Platelets 03/05/2025 405  150 - 450 x10*3/uL Final    Neutrophils % 03/05/2025 61.8  40.0 - 80.0 % Final    Immature Granulocytes %, Automated 03/05/2025 0.6  0.0 - 0.9 % Final    Lymphocytes % 03/05/2025 29.9  13.0 - 44.0 % Final    Monocytes % 03/05/2025 5.6  2.0 - 10.0 % Final    Eosinophils % 03/05/2025 1.7  0.0 - 6.0 % Final    Basophils % 03/05/2025 0.4  0.0 - 2.0 % Final    Neutrophils Absolute 03/05/2025 3.32  1.20 - 7.70 x10*3/uL Final    Immature Granulocytes Absolute, Au* 03/05/2025 0.03  0.00 - 0.70 x10*3/uL Final    Lymphocytes Absolute 03/05/2025 1.60  1.20 - 4.80 x10*3/uL Final    Monocytes Absolute 03/05/2025 0.30  0.10 - 1.00 x10*3/uL Final    Eosinophils Absolute 03/05/2025 0.09  0.00 - 0.70 x10*3/uL Final    Basophils Absolute 03/05/2025 0.02  0.00 - 0.10 x10*3/uL Final    Glucose 03/05/2025 128 (H)  74 - 99 mg/dL Final    Sodium 03/05/2025 137  136 - 145 mmol/L Final    Potassium 03/05/2025 4.0  3.5 - 5.3 mmol/L Final     Chloride 03/05/2025 100  98 - 107 mmol/L Final    Bicarbonate 03/05/2025 24  21 - 32 mmol/L Final    Anion Gap 03/05/2025 17  10 - 20 mmol/L Final    Urea Nitrogen 03/05/2025 11  6 - 23 mg/dL Final    Creatinine 03/05/2025 0.65  0.50 - 1.05 mg/dL Final    eGFR 03/05/2025 >90  >60 mL/min/1.73m*2 Final    Calcium 03/05/2025 8.8  8.6 - 10.6 mg/dL Final    Albumin 03/05/2025 4.2  3.4 - 5.0 g/dL Final    Alkaline Phosphatase 03/05/2025 108  33 - 136 U/L Final    Total Protein 03/05/2025 6.9  6.4 - 8.2 g/dL Final    AST 03/05/2025 15  9 - 39 U/L Final    Bilirubin, Total 03/05/2025 0.4  0.0 - 1.2 mg/dL Final    ALT 03/05/2025 18  7 - 45 U/L Final    Bilirubin, Direct 03/05/2025 0.1  0.0 - 0.3 mg/dL Final    GGT 03/05/2025 170 (H)  5 - 55 U/L Final    LDH 03/05/2025 239  84 - 246 U/L Final    Magnesium 03/05/2025 2.19  1.60 - 2.40 mg/dL Final    Phosphorus 03/05/2025 3.4  2.5 - 4.9 mg/dL Final    Extra Tube 03/05/2025 Hold for add-ons.   Final    Extra Tube 03/05/2025 Hold for add-ons.   Final    Extra Tube 03/05/2025 Hold for add-ons.   Final   Hospital Outpatient Visit on 02/26/2025   Component Date Value Ref Range Status    WBC 02/26/2025 5.4  4.4 - 11.3 x10*3/uL Final    nRBC 02/26/2025 0.0  0.0 - 0.0 /100 WBCs Final    RBC 02/26/2025 3.92 (L)  4.00 - 5.20 x10*6/uL Final    Hemoglobin 02/26/2025 12.2  12.0 - 16.0 g/dL Final    Hematocrit 02/26/2025 37.0  36.0 - 46.0 % Final    MCV 02/26/2025 94  80 - 100 fL Final    MCH 02/26/2025 31.1  26.0 - 34.0 pg Final    MCHC 02/26/2025 33.0  32.0 - 36.0 g/dL Final    RDW 02/26/2025 16.9 (H)  11.5 - 14.5 % Final    Platelets 02/26/2025 223  150 - 450 x10*3/uL Final    Neutrophils % 02/26/2025 88.8  40.0 - 80.0 % Final    Immature Granulocytes %, Automated 02/26/2025 0.4  0.0 - 0.9 % Final    Lymphocytes % 02/26/2025 3.7  13.0 - 44.0 % Final    Monocytes % 02/26/2025 5.2  2.0 - 10.0 % Final    Eosinophils % 02/26/2025 1.7  0.0 - 6.0 % Final    Basophils % 02/26/2025 0.2  0.0 - 2.0  % Final    Neutrophils Absolute 02/26/2025 4.80  1.20 - 7.70 x10*3/uL Final    Immature Granulocytes Absolute, Au* 02/26/2025 0.02  0.00 - 0.70 x10*3/uL Final    Lymphocytes Absolute 02/26/2025 0.20 (L)  1.20 - 4.80 x10*3/uL Final    Monocytes Absolute 02/26/2025 0.28  0.10 - 1.00 x10*3/uL Final    Eosinophils Absolute 02/26/2025 0.09  0.00 - 0.70 x10*3/uL Final    Basophils Absolute 02/26/2025 0.01  0.00 - 0.10 x10*3/uL Final    Glucose 02/26/2025 133 (H)  74 - 99 mg/dL Final    Sodium 02/26/2025 135 (L)  136 - 145 mmol/L Final    Potassium 02/26/2025 4.0  3.5 - 5.3 mmol/L Final    Chloride 02/26/2025 99  98 - 107 mmol/L Final    Bicarbonate 02/26/2025 24  21 - 32 mmol/L Final    Anion Gap 02/26/2025 16  10 - 20 mmol/L Final    Urea Nitrogen 02/26/2025 10  6 - 23 mg/dL Final    Creatinine 02/26/2025 0.73  0.50 - 1.05 mg/dL Final    eGFR 02/26/2025 >90  >60 mL/min/1.73m*2 Final    Calcium 02/26/2025 9.0  8.6 - 10.6 mg/dL Final    Albumin 02/26/2025 4.2  3.4 - 5.0 g/dL Final    Alkaline Phosphatase 02/26/2025 104  33 - 136 U/L Final    Total Protein 02/26/2025 6.3 (L)  6.4 - 8.2 g/dL Final    AST 02/26/2025 16  9 - 39 U/L Final    Bilirubin, Total 02/26/2025 0.4  0.0 - 1.2 mg/dL Final    ALT 02/26/2025 15  7 - 45 U/L Final    Bilirubin, Direct 02/26/2025 0.1  0.0 - 0.3 mg/dL Final    GGT 02/26/2025 163 (H)  5 - 55 U/L Final    LDH 02/26/2025 264 (H)  84 - 246 U/L Final    Magnesium 02/26/2025 1.83  1.60 - 2.40 mg/dL Final    Phosphorus 02/26/2025 3.5  2.5 - 4.9 mg/dL Final    Extra Tube 02/26/2025 Hold for add-ons.   Final    Extra Tube 02/26/2025 Hold for add-ons.   Final    Extra Tube 02/26/2025 Hold for add-ons.   Final    Extra Tube 02/26/2025 Hold for add-ons.   Final    Extra Tube 02/26/2025 Hold for add-ons.   Final    C-Reactive Protein 02/26/2025 0.83  <1.00 mg/dL Final    Flu A Result 02/26/2025 Detected (A)  Not Detected Final    Flu B Result 02/26/2025 Not Detected  Not Detected Final    Coronavirus 2019,  PCR 02/26/2025 Not Detected  Not Detected Final   Hospital Outpatient Visit on 02/21/2025   Component Date Value Ref Range Status    Extra Tube 02/21/2025 Hold for add-ons.   Final    Extra Tube 02/21/2025 Hold for add-ons.   Final    Extra Tube 02/21/2025 Hold for add-ons.   Final   Hospital Outpatient Visit on 02/20/2025   Component Date Value Ref Range Status    Extra Tube 02/20/2025 Hold for add-ons.   Final    Extra Tube 02/20/2025 Hold for add-ons.   Final   Hospital Outpatient Visit on 02/19/2025   Component Date Value Ref Range Status    Extra Tube 02/19/2025 Hold for add-ons.   Final    Extra Tube 02/19/2025 Hold for add-ons.   Final    Extra Tube 02/19/2025 Hold for add-ons.   Final    Extra Tube 02/19/2025 Hold for add-ons.   Final    Extra Tube 02/19/2025 Hold for add-ons.   Final    Extra Tube 02/19/2025 Hold for add-ons.   Final    Extra Tube 02/19/2025 Hold for add-ons.   Final    Extra Tube 02/19/2025 Hold for add-ons.   Final    Extra Tube 02/19/2025 Hold for add-ons.   Final    Extra Tube 02/19/2025 Hold for add-ons.   Final    Extra Tube 02/19/2025 Hold for add-ons.   Final    Extra Tube 02/19/2025 Hold for add-ons.   Final    Extra Tube 02/19/2025 Hold for add-ons.   Final    Extra Tube 02/19/2025 Hold for add-ons.   Final    Extra Tube 02/19/2025 Hold for add-ons.   Final   Lab on 02/18/2025   Component Date Value Ref Range Status    Color, Urine 02/18/2025 Light-Yellow  Light-Yellow, Yellow, Dark-Yellow Final    Appearance, Urine 02/18/2025 Clear  Clear Final    Specific Gravity, Urine 02/18/2025 1.010  1.005 - 1.035 Final    pH, Urine 02/18/2025 5.5  5.0, 5.5, 6.0, 6.5, 7.0, 7.5, 8.0 Final    Protein, Urine 02/18/2025 NEGATIVE  NEGATIVE, 10 (TRACE), 20 (TRACE) mg/dL Final    Glucose, Urine 02/18/2025 Normal  Normal mg/dL Final    Blood, Urine 02/18/2025 NEGATIVE  NEGATIVE mg/dL Final    Ketones, Urine 02/18/2025 NEGATIVE  NEGATIVE mg/dL Final    Bilirubin, Urine 02/18/2025 NEGATIVE  NEGATIVE  mg/dL Final    Urobilinogen, Urine 02/18/2025 Normal  Normal mg/dL Final    Nitrite, Urine 02/18/2025 NEGATIVE  NEGATIVE Final    Leukocyte Esterase, Urine 02/18/2025 NEGATIVE  NEGATIVE Final    Phosphorus 02/18/2025 4.3  2.5 - 4.9 mg/dL Final    Magnesium 02/18/2025 2.15  1.60 - 2.40 mg/dL Final    LDH 02/18/2025 274 (H)  84 - 246 U/L Final    GGT 02/18/2025 213 (H)  5 - 55 U/L Final    Bilirubin, Direct 02/18/2025 0.1  0.0 - 0.3 mg/dL Final    Glucose 02/18/2025 123 (H)  74 - 99 mg/dL Final    Sodium 02/18/2025 139  136 - 145 mmol/L Final    Potassium 02/18/2025 4.1  3.5 - 5.3 mmol/L Final    Chloride 02/18/2025 102  98 - 107 mmol/L Final    Bicarbonate 02/18/2025 27  21 - 32 mmol/L Final    Anion Gap 02/18/2025 14  10 - 20 mmol/L Final    Urea Nitrogen 02/18/2025 8  6 - 23 mg/dL Final    Creatinine 02/18/2025 0.83  0.50 - 1.05 mg/dL Final    eGFR 02/18/2025 81  >60 mL/min/1.73m*2 Final    Calcium 02/18/2025 9.4  8.6 - 10.3 mg/dL Final    Albumin 02/18/2025 4.4  3.4 - 5.0 g/dL Final    Alkaline Phosphatase 02/18/2025 129  33 - 136 U/L Final    Total Protein 02/18/2025 6.9  6.4 - 8.2 g/dL Final    AST 02/18/2025 35  9 - 39 U/L Final    Bilirubin, Total 02/18/2025 0.3  0.0 - 1.2 mg/dL Final    ALT 02/18/2025 33  7 - 45 U/L Final    WBC 02/18/2025 7.0  4.4 - 11.3 x10*3/uL Final    nRBC 02/18/2025 0.0  0.0 - 0.0 /100 WBCs Final    RBC 02/18/2025 4.34  4.00 - 5.20 x10*6/uL Final    Hemoglobin 02/18/2025 13.3  12.0 - 16.0 g/dL Final    Hematocrit 02/18/2025 41.1  36.0 - 46.0 % Final    MCV 02/18/2025 95  80 - 100 fL Final    MCH 02/18/2025 30.6  26.0 - 34.0 pg Final    MCHC 02/18/2025 32.4  32.0 - 36.0 g/dL Final    RDW 02/18/2025 17.2 (H)  11.5 - 14.5 % Final    Platelets 02/18/2025 237  150 - 450 x10*3/uL Final    Neutrophils % 02/18/2025 65.0  40.0 - 80.0 % Final    Immature Granulocytes %, Automated 02/18/2025 0.6  0.0 - 0.9 % Final    Lymphocytes % 02/18/2025 25.6  13.0 - 44.0 % Final    Monocytes % 02/18/2025 6.7   2.0 - 10.0 % Final    Eosinophils % 02/18/2025 1.7  0.0 - 6.0 % Final    Basophils % 02/18/2025 0.4  0.0 - 2.0 % Final    Neutrophils Absolute 02/18/2025 4.57  1.20 - 7.70 x10*3/uL Final    Immature Granulocytes Absolute, Au* 02/18/2025 0.04  0.00 - 0.70 x10*3/uL Final    Lymphocytes Absolute 02/18/2025 1.80  1.20 - 4.80 x10*3/uL Final    Monocytes Absolute 02/18/2025 0.47  0.10 - 1.00 x10*3/uL Final    Eosinophils Absolute 02/18/2025 0.12  0.00 - 0.70 x10*3/uL Final    Basophils Absolute 02/18/2025 0.03  0.00 - 0.10 x10*3/uL Final   Hospital Outpatient Visit on 02/13/2025   Component Date Value Ref Range Status    AV pk jose 02/13/2025 1.46  m/s Final    MV E/A ratio 02/13/2025 0.97   Final    LA vol index A/L 02/13/2025 18.5  ml/m2 Final    Tricuspid annular plane systolic e* 02/13/2025 1.5  cm Final    LV EF 02/13/2025 58  % Final    RV free wall pk S' 02/13/2025 7.33  cm/s Final    LVIDd 02/13/2025 3.47  cm Final    RVSP 02/13/2025 22.7  mmHg Final    AV pk grad 02/13/2025 8  mmHg Final    LV A4C EF 02/13/2025 61.8   Final            CT pelvis w IV contrast    Result Date: 2/13/2025  Impression: Restaging of metastatic small-cell lung carcinoma prior to initiation of clinical trial (to be combined with CT chest and abdomen 01/24/2025): 1. Stable tumor burden as evidenced by stable mixed lytic-sclerotic osseous lesions throughout the pelvis and lower lumbar vertebrae. 2. No evidence of new disease within the pelvis.   I personally reviewed the images/study and I agree with the findings as stated. This study was interpreted at [facility], [place], [place].   MACRO: None   Signed by: Yao Parham 2/13/2025 9:25 PM Dictation workstation:   [id number]    MR brain w and wo IV contrast    Result Date: 2/6/2025  Impression: No new mass or pathologic enhancement was identified.   Nonspecific white matter changes most likely reflect chronic small-vessel ischemic disease given the age  of the patient.   Likely right-sided proptosis stable in retrospect compared to the prior exam of 10/01/2024. Given prior masslike enlargement of the inferior rectus muscle on CT 10/07/2017 the finding which is not seen on the current limited exam of the orbits observation could nonetheless reflect residua of prior disease. Please correlate clinically and consider dedicated orbital MRI or follow-up orbital CT for further evaluation if clinically indicated   MACRO: None   Signed by: Emiliano Kong 2/6/2025 10:23 AM Dictation workstation:   [id number]    CT abdomen w IV contrast    Result Date: 1/24/2025  Impression: Restaging of metastatic small-cell lung carcinoma, as compared to CT 12/19/2024: 1. Stable to slightly increased disease burden as evidenced by gradually increasing size of the distal, left subhilar component of the primary neoplasm with stable size of hepatic metastases. 2. Stable mediastinal lymphadenopathy, including prominent paratracheal and aortopulmonary window nodes detailed above, as well as mixed lytic-sclerotic osseous lesions throughout the axial skeleton. 3. Increased postobstructive pneumonitis within the left lower lobe.   I personally reviewed the images/study and I agree with the findings as stated. This study was interpreted at [place], [place].   MACRO: None   Signed by: Yao Parham 1/24/2025 9:54 PM Dictation workstation:   [id number]    CT chest w IV contrast    Result Date: 1/24/2025  Impression: Restaging of metastatic small-cell lung carcinoma, as compared to CT 12/19/2024: 1. Stable to slightly increased disease burden as evidenced by gradually increasing size of the distal, left subhilar component of the primary neoplasm with stable size of hepatic metastases. 2. Stable mediastinal lymphadenopathy, including prominent paratracheal and aortopulmonary window nodes detailed above, as well as mixed lytic-sclerotic osseous lesions  throughout the axial skeleton. 3. Increased postobstructive pneumonitis within the left lower lobe.   I personally reviewed the images/study and I agree with the findings as stated. This study was interpreted at [facility], [place], [place].   MACRO: None   Signed by: Yao Parham 1/24/2025 9:54 PM Dictation workstation:   [id number]        Assessment/Plan     Ms. 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 Initial imaging post C2 reviewed by Dr Kohli appeared to have sub-optimal response to treatment. Unfortunately after C4 imaging confirmed she had progressive disease. In the last visit Dr Kohli discussed all treatment options which included SOC Taralatamab and also clinical trial [id number] with study drug ABBV-706 which is an ADC targeting SEZ6 with a topoisomerase 1 inhibitor payload. Patient opted to participate on study and completed screening requirements on study. Started treatment on 02.19.2025 on study, when she came in for C1D8 follow up visit. She complained of worsening symptoms of fatigue, nausea, SOB with exertion, and headache. She also discussed about family members around here were not keeping well. We tested her for Flu/COVID. She was positive for the flu and we started her on Tamiflu. Today she is in for C2D1 reports over past couple of weeks her symptoms of fatigue has improved but she continues to have a poor appetite and cough. Over all she looks clinically good we will proceed with C2D1 treatment today. Plan discussed in detail with the patient. Patient in agreement with the plan of care and is aware to call the office and research nurse if experiencing any new symptoms. RTC per protocol.         Cancer Staging   No matching staging information was found for the patient.      Oncology History   SCLC (small cell lung carcinoma)    9/27/2024 Initial Diagnosis    SCLC (small cell lung carcinoma) (Multi)     10/4/2024 - 10/8/2024 Chemotherapy    CARBOplatin / Etoposide, 21 Day Cycles - Lung     10/4/2024 - 10/6/2024 Research Study Participant    ([facility]) [id number] - Atezolizumab + CARBOplatin / Etoposide / [id number], 21 Day Cycles  Plan Provider: JESSICA Hernandez  Treatment goal: Control  Line of treatment: First Line  Associated studies: ([id number])[id number] with Carboplatin, Etoposide and Tislelizumab in Newly Diagnosed ES-SCLC     10/4/2024 - 1/17/2025 Chemotherapy    Durvalumab + CARBOplatin / Etoposide, 21 Day Cycles     10/28/2024 - 10/28/2024 Chemotherapy    Durvalumab + CARBOplatin / Etoposide, 21 Day Cycles     2/5/2025 - 2/5/2025 Chemotherapy    Durvalumab, 28 Day Cycles     2/19/2025 -  Research Study Participant    ([facility]) [id number] Part 1 - ABBV-706, 21 Day Cycles  Plan Provider: JESSICA Hernandez  Treatment goal: Control  Line of treatment: Second Line  Associated studies: ABBV-706 alone or in combination in subjects with advanced solid tumors

## 2025-03-12 NOTE — RESEARCH NOTES
[id number]><[id number]>    DCRU NURSING VISIT NOTE  Study Name: [id number]- Part 1_Escalation- Monotherapy  IRB#: [id number]  DCRU#: (DCRU # [id number])  Protocol Version Dated:  6/15/2023  PI: Jd Kohli MD.      Time point: Cycle 2 - Day 1    Encounter Date: 03/12/2025  Encounter Time:  9:00 AM EDT  Encounter Department: [place] [address] RESEARCH     #1     Phone Pager   Chapis Busby [phone]    #2 Phone Pager        Other Phone Pager            Study Regimen and Dosing   For Oncology study, Refer [facility] Treatment Plan for orders   Part 1_Escalation_Monotherapy - patients with advanced recurrent or refractory solid tumors (small cell lung cancer, neuroendocrine tumors or brain tumors) will receive gradually increasing doses of ABBV-706 to determine MTD (Maximum Tolerated Dose).  Cycle = 21-days.  ABBV-706 administered IV Day 1 of each cycle.     Dietary Guidelines   Regular diet     Admission and Prior to Starting Study Activities   Notify  when patient arrives to unit.  Complete DCRU/[facility] intake form in EMR.  Confirm DCRU Standing Orders (provided by Study Team/) are signed & available on chart (Expires after 1 year).  Obtain weight in kilograms - with shoes off & heavy items removed.  Obtain vital signs after sitting at least 3 minutes. Record in EMR.  Insert one peripheral IV line for sample collection procedures (flush line with 5 - 10 mL normal saline following each blood draw). Access mediport (if available) otherwise insert second peripheral line in opposite arm for Investigative drug administration (if peripheral line, flush line with 5 - 10 mL normal saline before & after infusion)  Do Not draw research samples from the same line the investigational drug is infused through.  Physical Exam including pulmonary exam & neuro assessment.     Criteria to Treat   DCRU RN reviewed and meets  eligibility to proceed with treatment plan   Time team notified: 1058 am  DCRU RN notifies study team to review eligibility and approval before dosing procedures  Time team approves: 1058 am     Study Specific Instructions and Documentation   1- Safety Labs  2- Premeds (PRN)  3- ECG   4- Vital Signs  5-Research Correlatives  6-Study Drug  7-Vital signs  8-ECG  9-Research Correlatives  10-Discharge     PRE-DOSE Safety Labs   For Oncology study, Refer [facility] Treatment Plan for orders     Subjective   Minal Sun is a 59 y.o. female and is here for a Research clinical visit.    Visit Provider: Jazzy Guerrero RN     Allergies:   Allergies   Allergen Reactions    Clindamycin Diarrhea    Erythromycin Diarrhea    Erythromycin Base Other and Nausea Only    Oxycodone-Acetaminophen Hives     anxiety       Objective     Vital Signs:    Vitals:    03/12/25 0907 03/12/25 1100   BP: 141/90 104/69   Pulse: 98 92   Resp: 18 18   Temp: 36.9 °C (98.4 °F) 37 °C (98.6 °F)   TempSrc: Oral Oral   SpO2: 94% 93%   Weight: 82.4 kg (181 lb 10.5 oz)        Physical Exam     ASSESSMENT and PLAN:  Problem List Items Addressed This Visit          Hematology and Neoplasia    SCLC (small cell lung carcinoma) - Primary    Relevant Medications    prochlorperazine (Compazine) tablet 10 mg    prochlorperazine (Compazine) injection 10 mg    Study [id number] ABBV-706 110.6 mg in sodium chloride 0.9% 100mL IV    sodium chloride 0.9 % bolus 500 mL    dextrose 5 % in water (D5W) bolus 500 mL    diphenhydrAMINE (BENADryl) injection 50 mg    methylPREDNISolone sod succinate (SOLU-Medrol) 40 mg/mL injection 40 mg    famotidine PF (Pepcid) injection 20 mg    EPINEPHrine HCl (PF) (Adrenalin) injection 0.3 mg    albuterol 2.5 mg /3 mL (0.083 %) nebulizer solution 3 mL    guaiFENesin (Mucinex) 600 mg 12 hr tablet    Other Relevant Orders    CBC and Auto Differential (Completed)    Comprehensive metabolic panel (Completed)    Bilirubin, Direct (Completed)     Gamma GT (Completed)    Lactate dehydrogenase (Completed)    Magnesium (Completed)    Phosphorus (Completed)    Urinalysis with Reflex Microscopic (Completed)    Clinic Appointment Request    Infusion Appointment Request    CBC and Auto Differential (Completed)    Comprehensive metabolic panel (Completed)    Bilirubin, Direct (Completed)    Gamma GT (Completed)    Lactate dehydrogenase (Completed)    Magnesium (Completed)    Phosphorus (Completed)    Urinalysis with Reflex Microscopic (Completed)    Research collection: 4mL Red Top - Research Collect ABBV-706 ADA/NADA; Pre-Dose; Within 2 hours; Ambient; Other (5-8x); Allow to clot 30-60 minutes.    Research collection: 2mL Red Top - Research Collect ABBV-706 PK ADC/Total AB; Pre-Dose; Within 2 hours; Ambient; Other (5-8x); Allow to clot 30-60 minutes.    Research collection: 5mL Gold SST - Research Collect Biomarker Serum; Pre-Dose; Within 2 hours; Ambient; 5x; Allow to clot 30 minutes    Research collection: 3mL Lavender EDTA - Research Collect Free Top1 Inhibitor; Pre-Dose; Within 2 hours; On ICE; 8-10x    Research collection: 10mL RareCyte AccuCyte - [place]er[place] Collect CTC (SCLC Only); Pre-Dose; Within 2 hours; 8-10x    Research collection: 10mL Streck - Research Collect ctDNA Plasma/WB; Pre-Dose; Within 2 hours; 8-10x    Research collection: 10mL Streck - Research Collect ctDNA Plasma/WB; Pre-Dose; Within 2 hours; 8-10x    Research collection: 2mL Red Top - Research Collect ABBV-706 PK ADC/Total AB; Post-Dose; Other (EOI +15 minutes); Other (+/- 5 minutes); Ambient; Other (5-8x); Allow to clot 30-60 minutes.    Research collection: 3mL Lavender EDTA - Research Collect Free Top1 Inhibitor; Post-Dose; Other (EOI +15 minutes); Other (+/- 5 minutes); On ICE; 8-10x    Research collection: 1mL Lavender EDTA - Reserach Collect C3a (FC3Ar); Post-Dose; Other (Within 2 hours after first sign of reaction.)    Research collection: 1mL SST - Research Collect C5 (345);  Post-Dose; Other (Within 2 hours after first sign of reaction.)    Research collection: 1mL SST - Research Collect IgE; Post-Dose; Other (Within 2 hours after first sign of reaction.)    Research collection: 2mL Red Top - Research Collect Tryptase; Post-Dose; Other (Within 2 hours after first sign of reaction)    Infusion Appointment Request    Research collection: 2mL Red Top - Research Collect ABBV-706 PK ADC/Total AB; Post-Dose; EOI +24 hours; +/- 2 hours; Ambient; Other (5-8x); Allow to clot 30-60 minutes.    Research collection: 3mL Lavender EDTA - Research Collect Free Top1 Inhibitor; Post-Dose; EOI +24 hours; +/- 2 hours; On ICE; 8-10x    Clinic Appointment Request    Infusion Appointment Request    CBC and Auto Differential    Comprehensive metabolic panel    Bilirubin, Direct    Gamma GT    Lactate dehydrogenase    Magnesium    Phosphorus    Clinic Appointment Request    Infusion Appointment Request    CBC and Auto Differential    Comprehensive metabolic panel    Bilirubin, Direct    Gamma GT    Lactate dehydrogenase    Magnesium    Phosphorus    Adult diet Regular    Research Communication (Completed)    Treatment Conditions (Completed)    Provider Communication - [id number] (Completed)    Nursing Communication - Hypersensitivity Management, Moderate (Completed)    Nursing Communication - Hypersensitivity Management, Severe (Completed)    Nursing Communication - Respiratory Management (Completed)    Pulse oximetry, continuous (Completed)    Microscopic Only, Urine (Completed)    Research collection: 4mL Red Top - Research Collect ABBV-706 ADA/NADA; Pre-Dose; Within 2 hours; Ambient; Other (5-8x); Allow to clot 30-60 minutes.    Research collection: 2mL Red Top - Research Collect ABBV-706 PK ADC/Total AB; Pre-Dose; Within 2 hours; Ambient; Other (5-8x); Allow to clot 30-60 minutes.    Research collection: 5mL [facility] SST - Research Collect Biomarker Serum; Pre-Dose; Within 2 hours; Ambient; 5x; Allow to clot 30  minutes    Research collection: 3mL Lavender EDTA - Research Collect Free Top1 Inhibitor; Pre-Dose; Within 2 hours; On ICE; 8-10x    Research collection: 10mL RareCyte AccuCyte - Reserach Collect CTC (SCLC Only); Pre-Dose; Within 2 hours; 8-10x    Research collection: 10mL Streck - Research Collect ctDNA Plasma/WB; Pre-Dose; Within 2 hours; 8-10x    Research collection: 10mL Streck - Research Collect ctDNA Plasma/WB; Pre-Dose; Within 2 hours; 8-10x        Medications as of the completion of today's visit:  Current Outpatient Medications   Medication Sig Dispense Refill    acetaminophen (TylenoL) 325 mg tablet Take 2 tablets (650 mg) by mouth every 6 hours if needed for mild pain (1 - 3) or moderate pain (4 - 6). 30 tablet 0    ALPRAZolam (Xanax) 0.5 mg tablet Take 2 tablets (1 mg) by mouth 2 times a day as needed for anxiety for up to 15 days. 30 tablet 0    apixaban (Eliquis) 5 mg tablet Take 1 tablet (5 mg) by mouth 2 times a day. 180 tablet 0    budesonide (Pulmicort) 0.5 mg/2 mL nebulizer solution Take 2 mL (0.5 mg) by nebulization 2 times a day.      buPROPion (Wellbutrin) 75 mg tablet Take 1 tablet (75 mg) by mouth 3 times a day.      carbinoxamine maleate 4 mg tablet Take 4 mg by mouth 2 times a day.      escitalopram (Lexapro) 20 mg tablet Take 1 tablet (20 mg) by mouth once daily.      guaiFENesin (Mucinex) 600 mg 12 hr tablet Take 2 tablets (1,200 mg) by mouth 2 times a day. Do not crush, chew, or split. 120 tablet 11    levothyroxine (Synthroid) 50 mcg tablet Take 1 tablet (50 mcg) by mouth early in the morning.. Take on an empty stomach at the same time each day, either 30 to 60 minutes prior to breakfast 30 tablet 11    metFORMIN  mg 24 hr tablet Take 1 tablet (500 mg) by mouth 2 times a day. 180 tablet 3    naloxone (Narcan) 4 mg/0.1 mL nasal spray Administer 1 spray (4 mg) into affected nostril(s) if needed for opioid reversal. May repeat every 2-3 minutes if needed, alternating nostrils, until  medical assistance becomes available. 2 each 0    OLANZapine (ZyPREXA) 5 mg tablet Take 1 tablet (5 mg) by mouth once daily at bedtime. For 4 days starting the evening of treatment 4 tablet 3    omeprazole (PriLOSEC) 40 mg DR capsule Take 1 capsule (40 mg) by mouth once daily. 90 capsule 3    ondansetron (Zofran) 8 mg tablet Take 1 tablet (8 mg) by mouth every 8 hours if needed for nausea or vomiting. 30 tablet 5    oxyCODONE (Roxicodone) 5 mg immediate release tablet Take 1 tablet (5 mg) by mouth every 4 hours if needed for severe pain (7 - 10) for up to 60 doses. 60 tablet 0    ProAir HFA 90 mcg/actuation inhaler Inhale 2 puffs every 4 hours if needed.      prochlorperazine (Compazine) 10 mg tablet Take 1 tablet (10 mg) by mouth every 6 hours if needed for nausea or vomiting. 30 tablet 5    rosuvastatin (Crestor) 10 mg tablet Take 1 tablet (10 mg) by mouth once daily. 90 tablet 3    tiZANidine (Zanaflex) 2 mg capsule Take 1 capsule (2 mg) by mouth 3 times a day as needed for muscle spasms. 60 capsule 0    traZODone (Desyrel) 50 mg tablet Take 1 tablet (50 mg) by mouth once daily at bedtime.       Current Facility-Administered Medications   Medication Dose Route Frequency Provider Last Rate Last Admin    albuterol 2.5 mg /3 mL (0.083 %) nebulizer solution 3 mL  3 mL nebulization PRN NANCY Hernandez-CNP        dextrose 5 % in water (D5W) bolus 500 mL  500 mL intravenous PRN Baldev Ferraro APRN-CNP        diphenhydrAMINE (BENADryl) injection 50 mg  50 mg intravenous PRN Baldev Ferraro APRN-GUZMAN        EPINEPHrine HCl (PF) (Adrenalin) injection 0.3 mg  0.3 mg intramuscular q5 min PRN NANCY Hernandez-CNP        famotidine PF (Pepcid) injection 20 mg  20 mg intravenous Once PRN NANCY Hernandez-CNP        methylPREDNISolone sod succinate (SOLU-Medrol) 40 mg/mL injection 40 mg  40 mg intravenous PRN Baldev J Goolamier, APRN-CNP        prochlorperazine (Compazine) injection 10 mg  10 mg  intravenous q6h PRN JESSICA Hernandez        prochlorperazine (Compazine) tablet 10 mg  10 mg oral q6h PRN JESSICA Hernandez        sodium chloride 0.9 % bolus 500 mL  500 mL intravenous PRN JESSICA Hernandez        Study [id number] ABBV-706 110.6 mg in sodium chloride 0.9% 100mL IV  1.3 mg/kg (Order-Specific) intravenous Once JESSICA Hernandez   110.6 mg at 03/12/25 1242       Administrations This Visit       guaiFENesin (Mucinex) 12 hr tablet 600 mg       Admin Date  03/12/2025 Action  Given Dose  600 mg Route  oral Documented By  Yi Alonso RN              ipratropium-albuteroL (Duo-Neb) 0.5-2.5 mg/3 mL nebulizer solution 3 mL       Admin Date  03/12/2025 Action  Given Dose  3 mL Route  nebulization Documented By  Yi Alonso RN              Study [id number] ABBV-706 110.6 mg in sodium chloride 0.9% 100mL IV       Admin Date  03/12/2025 Action  New Bag Dose  110.6 mg Route  intravenous Documented By  Yi Alonso RN                    Orders placed during today's visit:  Orders Placed This Encounter   Procedures    CBC and Auto Differential     Standing Status:   Future     Number of Occurrences:   1     Standing Expiration Date:   3/12/2026     Order Specific Question:   Release result to MyChart     Answer:   Immediate [1]    Comprehensive metabolic panel     Standing Status:   Future     Number of Occurrences:   1     Standing Expiration Date:   3/12/2026     Order Specific Question:   Release result to MyChart     Answer:   Immediate [1]    Bilirubin, Direct     Standing Status:   Future     Number of Occurrences:   1     Standing Expiration Date:   3/12/2026     Order Specific Question:   Release result to MyChart     Answer:   Immediate [1]    Gamma GT     Standing Status:   Future     Number of Occurrences:   1     Standing Expiration Date:   3/12/2026     Order Specific Question:   Release result to MyChart     Answer:   Immediate [1]    Lactate  dehydrogenase     Standing Status:   Future     Number of Occurrences:   1     Standing Expiration Date:   3/12/2026     Order Specific Question:   Release result to MyChart     Answer:   Immediate [1]    Magnesium     Standing Status:   Future     Number of Occurrences:   1     Standing Expiration Date:   3/12/2026     Order Specific Question:   Release result to MyChart     Answer:   Immediate [1]    Phosphorus     Standing Status:   Future     Number of Occurrences:   1     Standing Expiration Date:   3/12/2026     Order Specific Question:   Release result to MyChart     Answer:   Immediate [1]    Urinalysis with Reflex Microscopic     Standing Status:   Future     Number of Occurrences:   1     Standing Expiration Date:   3/12/2026     Order Specific Question:   Release result to MyChart     Answer:   Immediate [1]    CBC and Auto Differential     Standing Status:   Standing     Number of Occurrences:   1     Order Specific Question:   Release result to MyChart     Answer:   Immediate [1]    Comprehensive metabolic panel     Standing Status:   Standing     Number of Occurrences:   1     Order Specific Question:   Release result to MyChart     Answer:   Immediate [1]    Bilirubin, Direct     Standing Status:   Standing     Number of Occurrences:   1     Order Specific Question:   Release result to MyChart     Answer:   Immediate [1]    Gamma GT     Standing Status:   Standing     Number of Occurrences:   1     Order Specific Question:   Release result to MyChart     Answer:   Immediate [1]    Lactate dehydrogenase     Standing Status:   Standing     Number of Occurrences:   1     Order Specific Question:   Release result to MyChart     Answer:   Immediate [1]    Magnesium     Standing Status:   Standing     Number of Occurrences:   1     Order Specific Question:   Release result to MyChart     Answer:   Immediate [1]    Phosphorus     Standing Status:   Standing     Number of Occurrences:   1     Order Specific  Question[facility]   [facility]     Answer:   Immediate [1]    Urinalysis with Reflex Microscopic     Standing Status:   Standing     Number of Occurrences:   1     Order Specific Question:   Release result to MyChart     Answer:   Immediate [1]    Research collection: 4mL Red Top - Research Collect ABBV-706 ADA/NADA; Pre-Dose; Within 2 hours; Ambient; Other (5-8x); Allow to clot 30-60 minutes.     Standing Status:   Future     Number of Occurrences:   1     Standing Expiration Date:   3/12/2026     Order Specific Question:   Test     Answer:   ABBV-706 ADA/NADA     Order Specific Question:   Timepoint     Answer:   Pre-Dose     Order Specific Question:   Pre-Dose     Answer:   Within 2 hours     Order Specific Question:   Temperature     Answer:   Ambient     Order Specific Question:   Invert     Answer:   Other     Comments:   5-8x     Order Specific Question:   Handling     Answer:   Allow to clot 30-60 minutes.     Order Specific Question:   Optional?     Answer:   No    Research collection: 2mL Red [facility] - Research Collect ABBV-706 PK ADC/Total AB; Pre-Dose; Within 2 hours; Ambient; Other (5-8x); Allow to clot 30-60 minutes.     Standing Status:   Future     Number of Occurrences:   1     Standing Expiration Date:   3/12/2026     Order Specific Question:   Test     Answer:   ABBV-706 PK ADC/Total AB     Order Specific Question:   Timepoint     Answer:   Pre-Dose     Order Specific Question:   Pre-Dose     Answer:   Within 2 hours     Order Specific Question:   Temperature     Answer:   Ambient     Order Specific Question:   Invert     Answer:   Other     Comments:   5-8x     Order Specific Question:   Handling     Answer:   Allow to clot 30-60 minutes.     Order Specific Question:   Optional?     Answer:   No    Research collection: 5mL Gold SST - Research Collect Biomarker Serum; Pre-Dose; Within 2 hours; Ambient; 5x; Allow to clot 30 minutes     Standing Status:   Future     Number of Occurrences:   1      Standing Expiration Date:   3/12/2026     Order Specific Question:   Test     Answer:   Biomarker Serum     Order Specific Question:   Timepoint     Answer:   Pre-Dose     Order Specific Question:   Pre-Dose     Answer:   Within 2 hours     Order Specific Question:   Temperature     Answer:   Ambient     Order Specific Question:   Invert     Answer:   5x     Order Specific Question:   Handling     Answer:   Allow to clot 30 minutes    Research collection: 3mL Lavender EDTA - Research Collect Free Top1 Inhibitor; Pre-Dose; Within 2 hours; On ICE; 8-10x     Standing Status:   Future     Number of Occurrences:   1     Standing Expiration Date:   3/12/2026     Order Specific Question:   Test     Answer:   Free Top1 Inhibitor     Order Specific Question:   Timepoint     Answer:   Pre-Dose     Order Specific Question:   Pre-Dose     Answer:   Within 2 hours     Order Specific Question:   Temperature     Answer:   On ICE     Order Specific Question:   Invert     Answer:   8-10x     Order Specific Question:   Optional?     Answer:   No    Research collection: 10mL RareCyte AccuCyte - Reserach Collect CTC (SCLC Only); Pre-Dose; Within 2 hours; 8-10x     Standing Status:   Future     Number of Occurrences:   1     Standing Expiration Date:   3/12/2026     Order Specific Question:   Test     Answer:   CTC (SCLC Only)     Order Specific Question:   Timepoint     Answer:   Pre-Dose     Order Specific Question:   Pre-Dose     Answer:   Within 2 hours     Order Specific Question:   Invert     Answer:   8-10x     Order Specific Question:   Optional?     Answer:   No    Research collection: 10mL Streck - Research Collect ctDNA Plasma/WB; Pre-Dose; Within 2 hours; 8-10x     Standing Status:   Future     Number of Occurrences:   1     Standing Expiration Date:   3/12/2026     Order Specific Question:   Test     Answer:   ctDNA Plasma/WB     Order Specific Question:   Timepoint     Answer:   Pre-Dose     Order Specific Question:    Pre-Dose     Answer:   Within 2 hours     Order Specific Question:   Invert     Answer:   8-10x     Order Specific Question:   Optional?     Answer:   No    Research collection: 10mL Streck - Research Collect ctDNA Plasma/WB; Pre-Dose; Within 2 hours; 8-10x     Standing Status:   Future     Number of Occurrences:   1     Standing Expiration Date:   3/12/2026     Order Specific Question:   Test     Answer:   ctDNA Plasma/WB     Order Specific Question:   Timepoint     Answer:   Pre-Dose     Order Specific Question:   Pre-Dose     Answer:   Within 2 hours     Order Specific Question:   Invert     Answer:   8-10x     Order Specific Question:   Optional?     Answer:   No    Research collection: 2mL Red Top - Research Collect ABBV-706 PK ADC/Total AB; Post-Dose; Other (EOI +15 minutes); Other (+/- 5 minutes); Ambient; Other (5-8x); Allow to clot 30-60 minutes.     Standing Status:   Future     Standing Expiration Date:   3/12/2026     Order Specific Question:   Test     Answer:   ABBV-706 PK ADC/Total AB     Order Specific Question:   Timepoint     Answer:   Post-Dose     Order Specific Question:   Post-Dose     Answer:   Other     Comments:   EOI +15 minutes     Order Specific Question:   Post-Dose Window     Answer:   Other     Comments:   +/- 5 minutes     Order Specific Question:   Temperature     Answer:   Ambient     Order Specific Question:   Invert     Answer:   Other     Comments:   5-8x     Order Specific Question:   Handling     Answer:   Allow to clot 30-60 minutes.     Order Specific Question:   Optional?     Answer:   No    Research collection: 3mL Lavender EDTA - Research Collect Free Top1 Inhibitor; Post-Dose; Other (EOI +15 minutes); Other (+/- 5 minutes); On ICE; 8-10x     Standing Status:   Future     Standing Expiration Date:   3/12/2026     Order Specific Question:   Test     Answer:   Free Top1 Inhibitor     Order Specific Question:   Timepoint     Answer:   Post-Dose     Order Specific Question:    Post-Dose     Answer:   Other     Comments:   EOI +15 minutes     Order Specific Question:   Post-Dose Window     Answer:   Other     Comments:   +/- 5 minutes     Order Specific Question:   Temperature     Answer:   On ICE     Order Specific Question:   Invert     Answer:   8-10x     Order Specific Question:   Optional?     Answer:   No    Research collection: 1mL Lavender EDTA - Reserach Collect C3a (FC3Ar); Post-Dose; Other (Within 2 hours after first sign of reaction.)     Standing Status:   Future     Standing Expiration Date:   3/12/2026     Order Specific Question:   Test     Answer:   C3a (FC3Ar)     Order Specific Question:   Timepoint     Answer:   Post-Dose     Order Specific Question:   Post-Dose     Answer:   Other     Comments:   Within 2 hours after first sign of reaction.     Order Specific Question:   Optional?     Answer:   Yes    Research collection: 1mL SST - Research Collect C5 (345); Post-Dose; Other (Within 2 hours after first sign of reaction.)     Standing Status:   Future     Standing Expiration Date:   3/12/2026     Order Specific Question:   Test     Answer:   C5 (345)     Order Specific Question:   Timepoint     Answer:   Post-Dose     Order Specific Question:   Post-Dose     Answer:   Other     Comments:   Within 2 hours after first sign of reaction.     Order Specific Question:   Optional?     Answer:   Yes    Research collection: 1mL SST - Research Collect IgE; Post-Dose; Other (Within 2 hours after first sign of reaction.)     Standing Status:   Future     Standing Expiration Date:   3/12/2026     Order Specific Question:   Test     Answer:   IgE     Order Specific Question:   Timepoint     Answer:   Post-Dose     Order Specific Question:   Post-Dose     Answer:   Other     Comments:   Within 2 hours after first sign of reaction.     Order Specific Question:   Optional?     Answer:   Yes    Research collection: 2mL Red [facility] - Research Collect Tryptase; Post-Dose; Other (Within 2 hours  after first sign of reaction)     Standing Status:   Future     Standing Expiration Date:   3/12/2026     Order Specific Question:   Test     Answer:   Tryptase     Order Specific Question:   Timepoint     Answer:   Post-Dose     Order Specific Question:   Post-Dose     Answer:   Other     Comments:   Within 2 hours after first sign of reaction     Order Specific Question:   Optional?     Answer:   Yes    Research collection: 2mL Red Top - Research Collect ABBV-706 PK ADC/Total AB; Post-Dose; EOI +24 hours; +/- 2 hours; Ambient; Other (5-8x); Allow to clot 30-60 minutes.     Standing Status:   Future     Standing Expiration Date:   3/13/2026     Order Specific Question:   Test     Answer:   ABBV-706 PK ADC/Total AB     Order Specific Question:   Timepoint     Answer:   Post-Dose     Order Specific Question:   Post-Dose     Answer:   EOI +24 hours     Order Specific Question:   Post-Dose Window     Answer:   +/- 2 hours     Order Specific Question:   Temperature     Answer:   Ambient     Order Specific Question:   Invert     Answer:   Other     Comments:   5-8x     Order Specific Question:   Handling     Answer:   Allow to clot 30-60 minutes.     Order Specific Question:   Optional?     Answer:   No    Research collection: 3mL Lavender EDTA - Research Collect Free Top1 Inhibitor; Post-Dose; EOI +24 hours; +/- 2 hours; On ICE; 8-10x     Standing Status:   Future     Standing Expiration Date:   3/13/2026     Order Specific Question:   Test     Answer:   Free Top1 Inhibitor     Order Specific Question:   Timepoint     Answer:   Post-Dose     Order Specific Question:   Post-Dose     Answer:   EOI +24 hours     Order Specific Question:   Post-Dose Window     Answer:   +/- 2 hours     Order Specific Question:   Temperature     Answer:   On ICE     Order Specific Question:   Invert     Answer:   8-10x     Order Specific Question:   Optional?     Answer:   No    CBC and Auto Differential     Standing Status:   Future      Standing Expiration Date:   3/19/2026     Order Specific Question:   Release result to MyChart     Answer:   Immediate [1]    Comprehensive metabolic panel     Standing Status:   Future     Standing Expiration Date:   3/19/2026     Order Specific Question:   Release result to MyChart     Answer:   Immediate [1]    Bilirubin, Direct     Standing Status:   Future     Standing Expiration Date:   3/19/2026     Order Specific Question:   Release result to MyChart     Answer:   Immediate [1]    Gamma GT     Standing Status:   Future     Standing Expiration Date:   3/19/2026     Order Specific Question:   Release result to MyChart     Answer:   Immediate [1]    Lactate dehydrogenase     Standing Status:   Future     Standing Expiration Date:   3/19/2026     Order Specific Question:   Release result to MyChart     Answer:   Immediate [1]    Magnesium     Standing Status:   Future     Standing Expiration Date:   3/19/2026     Order Specific Question:   Release result to MyChart     Answer:   Immediate [1]    Phosphorus     Standing Status:   Future     Standing Expiration Date:   3/19/2026     Order Specific Question:   Release result to MyChart     Answer:   Immediate [1]    CBC and Auto Differential     Standing Status:   Future     Standing Expiration Date:   3/26/2026     Order Specific Question:   Release result to MyChart     Answer:   Immediate [1]    Comprehensive metabolic panel     Standing Status:   Future     Standing Expiration Date:   3/26/2026     Order Specific Question:   Release result to MyChart     Answer:   Immediate [1]    Bilirubin, Direct     Standing Status:   Future     Standing Expiration Date:   3/26/2026     Order Specific Question:   Release result to MyChart     Answer:   Immediate [1]    Gamma GT     Standing Status:   Future     Standing Expiration Date:   3/26/2026     Order Specific Question:   Release result to MyChart     Answer:   Immediate [1]    Lactate dehydrogenase     Standing  Status:   Future     Standing Expiration Date:   3/26/2026     Order Specific Question:   Release result to MyChart     Answer:   Immediate [1]    Magnesium     Standing Status:   Future     Standing Expiration Date:   3/26/2026     Order Specific Question:   Release result to MyChart     Answer:   Immediate [1]    Phosphorus     Standing Status:   Future     Standing Expiration Date:   3/26/2026     Order Specific Question:   Release result to MyChart     Answer:   Immediate [1]    Microscopic Only, Urine     Standing Status:   Standing     Number of Occurrences:   1     Order Specific Question:   Release result to MyChart     Answer:   Immediate [1]    Research collection: 4mL Red Top - Research Collect ABBV-706 ADA/NADA; Pre-Dose; Within 2 hours; Ambient; Other (5-8x); Allow to clot 30-60 minutes.     Standing Status:   Standing     Number of Occurrences:   1     Order Specific Question:   Test     Answer:   ABBV-706 ADA/NADA     Order Specific Question:   Timepoint     Answer:   Pre-Dose     Order Specific Question:   Pre-Dose     Answer:   Within 2 hours     Order Specific Question:   Temperature     Answer:   Ambient     Order Specific Question:   Invert     Answer:   Other     Comments:   5-8x     Order Specific Question:   Handling     Answer:   Allow to clot 30-60 minutes.     Order Specific Question:   Optional?     Answer:   No    Research collection: 2mL Red Top - Research Collect ABBV-706 PK ADC/Total AB; Pre-Dose; Within 2 hours; Ambient; Other (5-8x); Allow to clot 30-60 minutes.     Standing Status:   Standing     Number of Occurrences:   1     Order Specific Question:   Test     Answer:   ABBV-706 PK ADC/Total AB     Order Specific Question:   Timepoint     Answer:   Pre-Dose     Order Specific Question:   Pre-Dose     Answer:   Within 2 hours     Order Specific Question:   Temperature     Answer:   Ambient     Order Specific Question:   Invert     Answer:   Other     Comments:   5-8x     Order  Specific Question:   Handling     Answer:   Allow to clot 30-60 minutes.     Order Specific Question:   Optional?     Answer:   No    Research collection: 5mL Gold SST - Research Collect Biomarker Serum; Pre-Dose; Within 2 hours; Ambient; 5x; Allow to clot 30 minutes     Standing Status:   Standing     Number of Occurrences:   1     Order Specific Question:   Test     Answer:   Biomarker Serum     Order Specific Question:   Timepoint     Answer:   Pre-Dose     Order Specific Question:   Pre-Dose     Answer:   Within 2 hours     Order Specific Question:   Temperature     Answer:   Ambient     Order Specific Question:   Invert     Answer:   5x     Order Specific Question:   Handling     Answer:   Allow to clot 30 minutes    Research collection: 3mL Lavender EDTA - Research Collect Free Top1 Inhibitor; Pre-Dose; Within 2 hours; On ICE; 8-10x     Standing Status:   Standing     Number of Occurrences:   1     Order Specific Question:   Test     Answer:   Free Top1 Inhibitor     Order Specific Question:   Timepoint     Answer:   Pre-Dose     Order Specific Question:   Pre-Dose     Answer:   Within 2 hours     Order Specific Question:   Temperature     Answer:   On ICE     Order Specific Question:   Invert     Answer:   8-10x     Order Specific Question:   Optional?     Answer:   No    Research collection: 10mL RareCyte AccuCyte - Reserach Collect CTC (S[facility] Only); Pre-Dose; Within 2 hours; 8-10x     Standing Status:   Standing     Number of Occurrences:   1     Order Specific Question:   Test     Answer:   CTC (S[facility] Only)     Order Specific Question:   Timepoint     Answer:   Pre-Dose     Order Specific Question:   Pre-Dose     Answer:   Within 2 hours     Order Specific Question:   Invert     Answer:   8-10x     Order Specific Question:   Optional?     Answer:   No    Research collection: 10mL Streck - Research Collect ctDNA Plasma/WB; Pre-Dose; Within 2 hours; 8-10x     Standing Status:   Standing     Number of  Occurrences:   1     Order Specific Question:   Test     Answer:   ctDNA Plasma/WB     Order Specific Question:   Timepoint     Answer:   Pre-Dose     Order Specific Question:   Pre-Dose     Answer:   Within 2 hours     Order Specific Question:   Invert     Answer:   8-10x     Order Specific Question:   Optional?     Answer:   No    Research collection: 10mL Streck - Research Collect ctDNA Plasma/WB; Pre-Dose; Within 2 hours; 8-10x     Standing Status:   Standing     Number of Occurrences:   1     Order Specific Question:   Test     Answer:   ctDNA Plasma/WB     Order Specific Question:   Timepoint     Answer:   Pre-Dose     Order Specific Question:   Pre-Dose     Answer:   Within 2 hours     Order Specific Question:   Invert     Answer:   8-10x     Order Specific Question:   Optional?     Answer:   No    Adult diet Regular     Standing Status:   Standing     Number of Occurrences:   1     Order Specific Question:   Diet type     Answer:   Regular    Research Communication     Cohort: 1A  Dose Level: ABBV - 706 1.3 mg/kg     Standing Status:   Standing     Number of Occurrences:   1    Treatment Conditions     Hold treatment and notify provider if:   ANC LESS THAN 1.5 x 10*3/uL   Platelets LESS THAN 75 x 10*3/uL   AST/ALT GREATER THAN 5 x ULN   Total Bilirubin GREATER THAN 1.5 x ULN   Peripheral Neuropathy GREATER THAN OR EQUAL TO Grade 2   Pneumonitis GREATER THAN OR EQUAL TO Grade 1   Dermatologic Toxicity GREATER THAN OR EQUAL TO Grade 2    Adverse Event GREATER THAN OR EQUAL TO Grade 3     Standing Status:   Standing     Number of Occurrences:   1    Provider Communication - [id number]      Dose Level 1             ABBV-076 1.3 mg/kg   Dose Level 2             ABBV-076 2.5 mg/kg   Dose Level 3             ABBV-076 3.5 mg/kg   Dose Level 4             ABBV-076 5 mg/kg   Dose Level 5             ABBV-076 6.4 mg/kg   Dose Level 6             ABBV-076 8 mg/kg   Dose Level 7             ABBV-076 10 mg/kg     Standing  Status:   Standing     Number of Occurrences:   1    Nursing Communication - Hypersensitivity Management, Moderate     Flushing, rash, pruritus, dyspnea, chest discomfort, back pain, angioedema, SBP LESS THAN 90 mmHg, and/or change in mental status above or below patient's baseline. In the event of moderate or severe hypersensitivity reaction to any medication:   Stop infusion.  Assess vital signs, pulse oximetry, nursing assessment, and patient complaints.  Administer medications per Hypersensitivity Reaction Medication Protocol.   Notify physician.     Standing Status:   Standing     Number of Occurrences:   1    Nursing Communication - Hypersensitivity Management, Severe     Worsening of moderate reaction symptoms, SBP LESS THAN 80 mmHg, and/or respiratory distress (respirations GREATER THAN 40 breaths per minute, wheezing, life-threatening symptoms). In the event of moderate or severe hypersensitivity reaction to any medication:   Stop infusion.  Assess vital signs, pulse oximetry, nursing assessment, and patient complaints.  Administer medications per Hypersensitivity Reaction Medication Protocol.   Notify physician.     Standing Status:   Standing     Number of Occurrences:   1    Nursing Communication - Respiratory Management     Oxygen via nasal cannula at 4 L per minute for respiratory rate GREATER THAN OR EQUAL TO to 28 breaths/minute and/or wheezing. Pulse Oximetry continuous monitoring.     Standing Status:   Standing     Number of Occurrences:   1    Pulse oximetry, continuous     Continuous monitoring to maintain SPO2 GREATER THAN 90%     Standing Status:   Standing     Number of Occurrences:   1        [id number] - ABBV-706 alone or in combination in subjects with advanced solid tumors    Patient has no adverse events documented in the Research Adverse Events activity.      Day 1 Premedication 30 - 60 minutes prior to ABBV-706    Refer to [place] Treatment Plan for orders  (if previous infusion reaction)      PRE-DOSE ECG   JOSEFA 150c Study-specific ECG Machine - rest supine or semi-recumbent at least 5 minutes prior:  Single     ECGs are time-matched to PK sample collection & should be collected within 10 mins prior to PK collection.  ECGs occurring near meals will take place prior to meals  QTcF calculation: Frideralireza's formula: S:\[facility]_Staff\Nursing\Protocols\QTcF calculator  MD/Provider to sign & date. Do not need to wait before starting treatment.  Contact MD/Provider if abnormal from baseline.  Time Point Rest Time  QTcF   Pre-Dose 1200 424        PRE-DOSE Vital Signs     Temp, Heart Rate, Respiration, Blood Pressure: rest sitting at least 3 minutes prior to obtaining  /69 (BP Location: Left arm, Patient Position: Sitting)   Pulse 92   Temp 37 °C (98.6 °F) (Oral)   Resp 18   Wt 82.4 kg (181 lb 10.5 oz)   SpO2 93%   BMI 32.19 kg/m²       PRE-DOSE Research Correlatives:     Access Type: 22 G PIV Location: [facility]   For Oncology study, Refer [place] Treatment Plan for orders  AFTER ECGs  Deliver to [facility] lab for processing  Time point Specimen Test Volume Tube Handling Draw Time    Pre-dose (within 2 hours) (draw in order)      ABBV-706 ADA/NADA 4 mL Red Top Serum Invert 5 - 8x; Ambient Allow to clot 30 -60 mins 1221    ABBV-706 PK ADC/Total AB 2 mL Red Top Serum Invert 5 - 8x; Ambient  Allow to clot 30 -60 mins 1221    Biomarker Serum 5 mL Gold Top SST Invert 5x; Upright. Ambient Allow to clot 30 mins 1221    Free TOP1 Inhibitor 3 mL EDTA Lavender Invert 8 - 10x. On ICE 1221    CTC (S[facility] Only)-If ordered see EPCI 10 mL RareCyte AccuCyte BCT See Below 1221    ctDNA Plasma/WB 2 x 10 Streck Cell-Free DNA  1221    CTC & ctDNA Plasma/WB instructions  Keep patient's arm in the downward position during collection procedure.  Hold the tube with the stopper in the uppermost position so that the tube contents do not touch the stopper or the end of the needle during sample collection.  Release tourniquet once  blood start to flow in the tube, or within 2 minutes of application.  Invert 8 - 10x.        Safety Parameters and Special Instructions   ABBV-706  ABBV-706 is an antibody-drug conjugate (ADC) with an anti-seizure-related antibody (SEZ6) & topoisomerase inhibitor  Potential Side Effects/Adverse Events: GI effects (diarrhea, nausea, vomiting, stool abnormalities), peripheral neuropathy, myelosuppression, rash, infusion-related reaction.  Administer infusion over 60 mins (+/- 10 mins).  Observation post-dose 1 hour for Cycle 1 Day 1. If no reaction, no observation for subsequent visits.     Weight-Based Dosing   Baseline (screening) weight (kg) 85.4 kg  Date measured 2/10/25 ( Refer Previous encounters to enter)  Text :  Actual weight   Vitals:    03/12/25 0907   Weight: 82.4 kg (181 lb 10.5 oz)     (Weight on Day 1 of cycle)   Date Measured 03/12/2025  Enter Information here  Dose based on Cycle 1 Day 1 weight.  May use Cycle 1 Day 1 weight unless noted weight difference of 10% weight gain or loss.  The minimum dose of ABBV-706 is 50 mg.  Any calculated dose < 50 mg, patient to be administered 50 mg.  Body weight capped at 120 kg. 120 kg will be utilized to calculate the dose.     Research Drug Administration   Document medication administration in MAR activity. Document Research specific instructions below.  Enter Information here  ABBV-706  Infuse over 60 minutes (+ 10 minutes).  If any Infusion-Related Reaction or Suspected Allergic-Type Reaction specific safety labs to be drawn- see EPIC     Infusion-Related Reactions   Review Safety Parameters on the medication Order. Note specific instructions below.    - SOC Hypersensivity Orders     Þ If any Infusion-Related Reaction or Suspected Allergic-Type Reaction Þ  Clinical Safety Labs  Z-Req#:    Timepoint Blood Test Collection Priority Order of Origin       Within 2 hours after 1st sign of reaction C3a (FC3AR) (1 mL Lavender top) STAT Z-req (send to [facility])    C5 (354) (1 mL SST) STAT Z-req (send to [place])    IgE STAT Z-req    Tryptase (2 mL Red Top) STAT Z-req     DURING-DOSE Vital Signs     Temp, Heart Rate, Respiration, Blood Pressure: rest sitting at least 3 minutes prior to obtaining  30 minutes (+/-10 minutes) after the start of infusion      POST-DOSE Vital Signs      Temp, Heart Rate, Respiration, Blood Pressure: rest sitting at least 3 minutes prior to obtaining  End of infusion      POST-DOSE ECG   JOSEFA 150c Study-specific ECG Machine - rest supine or semi-recumbent at least 5 minutes prior:  Single   ECGs are time-matched to PK sample collection & should be collected within 10 mins prior to PK collection.  ECGs occurring near meals will take place prior to meals  The first ECG obtained in any triplicate post-dose will be used for clinical safety assessment.  QTcF calculation: Fridericia's formula: S:\[facility]_Staff\Nursing\Protocols\QTcF calculator  Contact MD/Provider if abnormal from baseline.    Time Point Rest Time  QTcF   +15 min after End of Infusion 1354 414        Day 1 Post-Dose Research Labs: AFTER ECGs  Deliver to [facility] for Processing    Access Type: 23 G BF Location: [facility]   For Oncology study, Refer [place] Treatment Plan for orders    Time point Specimen Test Volume Tube Handling Draw Time    +15 mins after EOI (+/- 5 mins ) (draw in order)   ABBV-706 PK ADC/Total AB 2 mL Red Top Serum Invert 5 - 8x; Ambient Allow to clot 30 -60 mins 1401    Free TOP1 Inhibitor 3 mL EDTA Lavender Invert 8 - 10x. On ICE 1401        Discharge Instructions   Discharge patient to home after study requirements completed or PK sample obtained.  Remind patient to return: on Day 2 for +24 hr PK & ECGs (+/- 2 hrs) after ABBV-706 EOI  Discharge time: 1414     Yi Alonso RN  03/12/25

## 2025-03-12 NOTE — RESEARCH NOTES
".    Research Note Treatment Day    Minal Sun is here today for treatment on [id number]. Today is C2D1. Procedures completed per protocol. AE's and con-meds reviewed with patient. Patient is still having intermittent constipation and is taking Miralax. She is still coughing and bringing up yellow mucus. She is still having pain and has been taking her pain medication again (please remember last week she was saying that she was taking her pain medication less). She states that her appetite seems better this past week. Sometimes food does have a \"funky taste\". Lab work resulted and is ok to treat today. Patient is aware of treatment plan and will return tomorrow 3/13 for C2D2.      [x]   Received treatment as planned   OR  []    Treatment delayed; patient calendar updated as required   Treatment delayed because:    []   AE    []   Physician Discretion    []   Clinical Deterioration or Progression     []   Other    Education Documentation  Pain Management, taught by Jazzy uGerrero RN at 3/12/2025  9:25 AM.  Learner: Patient  Readiness: Acceptance  Method: Explanation  Response: Verbalizes Understanding    Treatment Plan and Schedule, taught by Jazzy Guerrero RN at 3/12/2025  9:25 AM.  Learner: Patient  Readiness: Acceptance  Method: Explanation  Response: Verbalizes Understanding    General Medication Information, taught by Jazzy Guerrero RN at 3/12/2025  9:25 AM.  Learner: Patient  Readiness: Acceptance  Method: Explanation  Response: Verbalizes Understanding    Supportive Medications, taught by Jazzy Guerrero RN at 3/12/2025  9:25 AM.  Learner: Patient  Readiness: Acceptance  Method: Explanation  Response: Verbalizes Understanding    Comprehensive Metabolic Panel (CMP), taught by Jazzy Guerrero RN at 3/12/2025  9:25 AM.  Learner: Patient  Readiness: Acceptance  Method: Explanation  Response: Verbalizes Understanding    Complete Blood Count with Differential (CBC w/ Diff), taught by Jazzy Guerrero" Goals: 1. Continue current exercise routine; increasing as able  2. Work on eating three meals per day; no skipping meals - can use protein shake as a meal replacement or snack (so you can drink up to 2 per day)  3.  Start a food journal either in a notebook or an marsha on your phone where you can star or highlight foods that give you trouble - also can track grams of protein and grams of carbs RN at 3/12/2025  9:25 AM.  Learner: Patient  Readiness: Acceptance  Method: Explanation  Response: Verbalizes Understanding    Education Comments  No comments found.

## 2025-03-13 ENCOUNTER — SOCIAL WORK (OUTPATIENT)
Dept: CASE MANAGEMENT | Facility: HOSPITAL | Age: 60
End: 2025-03-13
Payer: MEDICARE

## 2025-03-13 ENCOUNTER — EDUCATION (OUTPATIENT)
Dept: HEMATOLOGY/ONCOLOGY | Facility: HOSPITAL | Age: 60
End: 2025-03-13
Payer: MEDICARE

## 2025-03-13 ENCOUNTER — HOSPITAL ENCOUNTER (OUTPATIENT)
Dept: RESEARCH | Facility: HOSPITAL | Age: 60
Discharge: HOME | End: 2025-03-13
Payer: MEDICARE

## 2025-03-13 VITALS
HEART RATE: 106 BPM | DIASTOLIC BLOOD PRESSURE: 83 MMHG | RESPIRATION RATE: 20 BRPM | TEMPERATURE: 99 F | SYSTOLIC BLOOD PRESSURE: 121 MMHG | OXYGEN SATURATION: 94 %

## 2025-03-13 DIAGNOSIS — C34.90 SMALL CELL CARCINOMA OF LUNG, UNSPECIFIED LATERALITY, UNSPECIFIED PART OF LUNG: ICD-10-CM

## 2025-03-13 LAB
HOLD SPECIMEN: NORMAL
HOLD SPECIMEN: NORMAL

## 2025-03-13 PROCEDURE — 93005 ELECTROCARDIOGRAM TRACING: CPT | Mod: DCRU

## 2025-03-13 PROCEDURE — 36415 COLL VENOUS BLD VENIPUNCTURE: CPT

## 2025-03-13 NOTE — PROGRESS NOTES
This SW followed up on 03/12/25 with research RN and APRN to see how pt is managing with her transportation, which has been a major cause of stress for pt. APRN asked that SW see pt yesterday if possible. SW met with pt at research location, and pt was positive and engaging. SW observed with pt her mood change and pt attributed to the recent dov days. Pt verbalized acceptance of her diagnosis and the challenges that have come with it. Pt reported that she gets quite a bit of positive effects from sun exposure. When asked, she said she has tried light therapy with hardly any improvement; SW also advised that antidepressants can help as well, which pt acknowledged. Pt appears to be settling in to tx protocol. Pt offered that she has this SW's contact info and will use it if needed.  SW to continue to follow.

## 2025-03-13 NOTE — RESEARCH NOTES
[id number]><[id number]><[id number]><[id number]>    DCRU NURSING VISIT NOTE  Study Name: [id number] Part 1_Escalation- Monotherapy  IRB#: [id number]  DCRU#: ([facility] # [id number])  Protocol Version Dated:  6/15/2023  PI: Jd Kohli MD.      Time point: Cycle 2 - Day 2    Encounter Date: 03/13/2025  Encounter Time: 12:00 PM EDT  Encounter Department: [place] [id number] RESEARCH      Study Regimen and Dosing   For Oncology study, Refer [place] Treatment Plan for orders   Part 1_Escalation_Monotherapy - patients with advanced recurrent or refractory solid tumors (small cell lung cancer, neuroendocrine tumors or brain tumors) will receive gradually increasing doses of ABBV-706 to determine MTD (Maximum Tolerated Dose).  Cycle = 21-days.  ABBV-706 administered IV Day 1 of each cycle.     Dietary Guidelines   Regular diet       Admission and Prior to Starting Study Activities   Notify  when patient arrives to unit.  Patient review/update DCRU/[facility] intake form.  Obtain vital signs after sitting at least 3 minutes. Record on flow sheet & in EMR.  Perform venipuncture for sample collection procedures. Do Not draw research samples from mediport/central line if used for infusion         Study Specific Instructions and Documentation   If available enter a snapshot of performable actions:  ECG   Research Correlatives  Discharge       Subjective   Minal Sun is a 59 y.o. female and is here for a Research clinical visit.    Visit Provider: Jazzy Guerrero RN     Allergies:   Allergies   Allergen Reactions    Clindamycin Diarrhea    Erythromycin Diarrhea    Erythromycin Base Other and Nausea Only    Oxycodone-Acetaminophen Hives     anxiety       Objective     Vital Signs:    Vitals:    03/13/25 1210   BP: 121/83   Pulse: 106   Resp: 20   Temp: 37.2 °C (99 °F)   TempSrc: Oral   SpO2: 94%       Physical Exam     ASSESSMENT and PLAN:  Problem List Items Addressed This Visit          Hematology and  Neoplasia    SCLC (small cell lung carcinoma)    Relevant Orders    Research collection: 2mL Red Top - Research Collect ABBV-706 PK ADC/Total AB; Post-Dose; EOI +24 hours; +/- 2 hours; Ambient; Other (5-8x); Allow to clot 30-60 minutes.    Research collection: 3mL Lavender EDTA - Research Collect Free Top1 Inhibitor; Post-Dose; EOI +24 hours; +/- 2 hours; On ICE; 8-10x        Medications as of the completion of today's visit:  Current Outpatient Medications   Medication Sig Dispense Refill    acetaminophen (TylenoL) 325 mg tablet Take 2 tablets (650 mg) by mouth every 6 hours if needed for mild pain (1 - 3) or moderate pain (4 - 6). 30 tablet 0    ALPRAZolam (Xanax) 0.5 mg tablet Take 2 tablets (1 mg) by mouth 2 times a day as needed for anxiety for up to 15 days. 30 tablet 0    apixaban (Eliquis) 5 mg tablet Take 1 tablet (5 mg) by mouth 2 times a day. 180 tablet 0    budesonide (Pulmicort) 0.5 mg/2 mL nebulizer solution Take 2 mL (0.5 mg) by nebulization 2 times a day.      buPROPion (Wellbutrin) 75 mg tablet Take 1 tablet (75 mg) by mouth 3 times a day.      carbinoxamine maleate 4 mg tablet Take 4 mg by mouth 2 times a day.      escitalopram (Lexapro) 20 mg tablet Take 1 tablet (20 mg) by mouth once daily.      guaiFENesin (Mucinex) 600 mg 12 hr tablet Take 2 tablets (1,200 mg) by mouth 2 times a day. Do not crush, chew, or split. 120 tablet 11    levothyroxine (Synthroid) 50 mcg tablet Take 1 tablet (50 mcg) by mouth early in the morning.. Take on an empty stomach at the same time each day, either 30 to 60 minutes prior to breakfast 30 tablet 11    metFORMIN  mg 24 hr tablet Take 1 tablet (500 mg) by mouth 2 times a day. 180 tablet 3    naloxone (Narcan) 4 mg/0.1 mL nasal spray Administer 1 spray (4 mg) into affected nostril(s) if needed for opioid reversal. May repeat every 2-3 minutes if needed, alternating nostrils, until medical assistance becomes available. 2 each 0    OLANZapine (ZyPREXA) 5 mg  tablet Take 1 tablet (5 mg) by mouth once daily at bedtime. For 4 days starting the evening of treatment 4 tablet 3    omeprazole (PriLOSEC) 40 mg DR capsule Take 1 capsule (40 mg) by mouth once daily. 90 capsule 3    ondansetron (Zofran) 8 mg tablet Take 1 tablet (8 mg) by mouth every 8 hours if needed for nausea or vomiting. 30 tablet 5    oxyCODONE (Roxicodone) 5 mg immediate release tablet Take 1 tablet (5 mg) by mouth every 4 hours if needed for severe pain (7 - 10) for up to 60 doses. 60 tablet 0    ProAir HFA 90 mcg/actuation inhaler Inhale 2 puffs every 4 hours if needed.      prochlorperazine (Compazine) 10 mg tablet Take 1 tablet (10 mg) by mouth every 6 hours if needed for nausea or vomiting. 30 tablet 5    rosuvastatin (Crestor) 10 mg tablet Take 1 tablet (10 mg) by mouth once daily. 90 tablet 3    tiZANidine (Zanaflex) 2 mg capsule Take 1 capsule (2 mg) by mouth 3 times a day as needed for muscle spasms. 60 capsule 0    traZODone (Desyrel) 50 mg tablet Take 1 tablet (50 mg) by mouth once daily at bedtime.       No current facility-administered medications for this encounter.           Orders placed during today's visit:  Orders Placed This Encounter   Procedures    Research collection: 2mL Red Top - Research Collect ABBV-706 PK ADC/Total AB; Post-Dose; EOI +24 hours; +/- 2 hours; Ambient; Other (5-8x); Allow to clot 30-60 minutes.     Standing Status:   Standing     Number of Occurrences:   1     Order Specific Question:   Test     Answer:   ABBV-706 PK ADC/Total AB     Order Specific Question:   Timepoint     Answer:   Post-Dose     Order Specific Question:   Post-Dose     Answer:   EOI +24 hours     Order Specific Question:   Post-Dose Window     Answer:   +/- 2 hours     Order Specific Question:   Temperature     Answer:   Ambient     Order Specific Question:   Invert     Answer:   Other     Comments:   5-8x     Order Specific Question:   Handling     Answer:   Allow to clot 30-60 minutes.     Order  Specific Question:   Optional?     Answer:   No    Research collection: 3mL Lavender EDTA - Research Collect Free Top1 Inhibitor; Post-Dose; EOI +24 hours; +/- 2 hours; On ICE; 8-10x     Standing Status:   Standing     Number of Occurrences:   1     Order Specific Question:   Test     Answer:   Free Top1 Inhibitor     Order Specific Question:   Timepoint     Answer:   Post-Dose     Order Specific Question:   Post-Dose     Answer:   EOI +24 hours     Order Specific Question:   Post-Dose Window     Answer:   +/- 2 hours     Order Specific Question:   Temperature     Answer:   On ICE     Order Specific Question:   Invert     Answer:   8-10x     Order Specific Question:   Optional?     Answer:   No        [id number] - ABBV-706 alone or in combination in subjects with advanced solid tumors    Patient has no adverse events documented in the Research Adverse Events activity.          Day 2 ECG (completed at 1228)-   JOSEFA 150c Study-specific ECG Machine - rest supine or semi-recumbent at least 5 minutes prior:  Single     ECGs are time-matched to PK sample collection & should be collected within 10 mins prior to PK collection.  ECGs occurring near meals will take place prior to meals  QTcF calculation: Fridericia's formula: S:\DCRU_Staff\Nursing\Protocols\QTcF calculator  MD/Provider to sign & date. Do not need to wait before starting treatment.  Contact MD/Provider if abnormal from baseline.    Time Point Rest Time  QTcF   +24 hours after end of infusion 1215 430        Day 2 Research Correlatives:     Access Type: 23G butterfly Location: Right hand   For Oncology study, Refer [place] Treatment Plan for order  AFTER ECGs  Deliver to [facility]CRU/[facility] lab for processing    Time point Specimen Test Volume Tube Handling Draw Time   Day 2  +24 hrs (+/- 2 hrs) after EOI  (draw in order) ABBV-706 PK ADC/Total AB 2 mL Red Top Serum Invert 5 - 8x; Ambient  Allow to clot 30 -60 mins 1231    Free TOP1 Inhibitor 3 mL EDTA Lavender Invert 8 -  10x. On ICE 1231        Discharge Instructions   Discharge patient to home after study requirements completed or PK sample obtained.  Remind patient to return: on Days 8 & 15 for physical exam, vital signs & safety labs.  Discharge time: 1252     Janice Cervantes RN  03/13/25

## 2025-03-13 NOTE — PROGRESS NOTES
Research Note Non-Treatment Day    Minal Sun is here today. Patient is on [id number]. Today is C2D2. Procedures completed per protocol. AE's and con-meds reviewed with patient. Patient states that she has been having nausea yesterday and today. Instructed patient to take home prochlorperazine. No other new or worsening AE's at this time. Patient is aware of treatment plan and will return 3/19 for C2D8.      Education Documentation  Treatment Plan and Schedule, taught by Jazzy Guerrero RN at 3/13/2025 12:09 PM.  Learner: Significant Other, Patient  Readiness: Acceptance  Method: Explanation  Response: Verbalizes Understanding    General Medication Information, taught by Jazzy Guerrero RN at 3/13/2025 12:09 PM.  Learner: Significant Other, Patient  Readiness: Acceptance  Method: Explanation  Response: Verbalizes Understanding    Supportive Medications, taught by Jazzy Guerrero RN at 3/13/2025 12:09 PM.  Learner: Significant Other, Patient  Readiness: Acceptance  Method: Explanation  Response: Verbalizes Understanding    Education Comments  No comments found.

## 2025-03-17 ENCOUNTER — TELEPHONE (OUTPATIENT)
Dept: PREOP | Facility: HOSPITAL | Age: 60
End: 2025-03-17

## 2025-03-18 ENCOUNTER — HOSPITAL ENCOUNTER (OUTPATIENT)
Dept: CARDIOLOGY | Facility: HOSPITAL | Age: 60
Discharge: HOME | End: 2025-03-18
Payer: MEDICARE

## 2025-03-18 VITALS
WEIGHT: 181 LBS | TEMPERATURE: 36.5 F | HEIGHT: 62 IN | RESPIRATION RATE: 13 BRPM | OXYGEN SATURATION: 98 % | DIASTOLIC BLOOD PRESSURE: 67 MMHG | BODY MASS INDEX: 33.31 KG/M2 | SYSTOLIC BLOOD PRESSURE: 137 MMHG | HEART RATE: 82 BPM

## 2025-03-18 DIAGNOSIS — C34.90 SMALL CELL CARCINOMA OF LUNG, UNSPECIFIED LATERALITY, UNSPECIFIED PART OF LUNG: ICD-10-CM

## 2025-03-18 LAB
GLUCOSE BLD MANUAL STRIP-MCNC: 125 MG/DL (ref 74–99)
POCT INTERNATIONAL NORMALIZATION RATIO: 1.1
POCT PROTHROMBIN TIME: 12.3 SECONDS

## 2025-03-18 PROCEDURE — 36561 INSERT TUNNELED CV CATH: CPT | Mod: RT

## 2025-03-18 PROCEDURE — C1788 PORT, INDWELLING, IMP: HCPCS

## 2025-03-18 PROCEDURE — C1894 INTRO/SHEATH, NON-LASER: HCPCS

## 2025-03-18 PROCEDURE — 76937 US GUIDE VASCULAR ACCESS: CPT

## 2025-03-18 PROCEDURE — 7100000010 HC PHASE TWO TIME - EACH INCREMENTAL 1 MINUTE

## 2025-03-18 PROCEDURE — 2500000004 HC RX 250 GENERAL PHARMACY W/ HCPCS (ALT 636 FOR OP/ED): Performed by: NURSE PRACTITIONER

## 2025-03-18 PROCEDURE — 2780000003 HC OR 278 NO HCPCS

## 2025-03-18 PROCEDURE — 82947 ASSAY GLUCOSE BLOOD QUANT: CPT

## 2025-03-18 PROCEDURE — 2720000007 HC OR 272 NO HCPCS

## 2025-03-18 PROCEDURE — 99152 MOD SED SAME PHYS/QHP 5/>YRS: CPT

## 2025-03-18 PROCEDURE — 2500000004 HC RX 250 GENERAL PHARMACY W/ HCPCS (ALT 636 FOR OP/ED): Performed by: RADIOLOGY

## 2025-03-18 PROCEDURE — 77001 FLUOROGUIDE FOR VEIN DEVICE: CPT

## 2025-03-18 PROCEDURE — 7100000009 HC PHASE TWO TIME - INITIAL BASE CHARGE

## 2025-03-18 RX ORDER — LIDOCAINE HYDROCHLORIDE 10 MG/ML
INJECTION, SOLUTION EPIDURAL; INFILTRATION; INTRACAUDAL; PERINEURAL AS NEEDED
Status: DISCONTINUED | OUTPATIENT
Start: 2025-03-18 | End: 2025-03-18 | Stop reason: HOSPADM

## 2025-03-18 RX ORDER — CEFAZOLIN SODIUM 2 G/100ML
2 INJECTION, SOLUTION INTRAVENOUS ONCE
Status: COMPLETED | OUTPATIENT
Start: 2025-03-18 | End: 2025-03-18

## 2025-03-18 RX ORDER — MIDAZOLAM HYDROCHLORIDE 1 MG/ML
INJECTION, SOLUTION INTRAMUSCULAR; INTRAVENOUS AS NEEDED
Status: DISCONTINUED | OUTPATIENT
Start: 2025-03-18 | End: 2025-03-18 | Stop reason: HOSPADM

## 2025-03-18 RX ORDER — LIDOCAINE HYDROCHLORIDE AND EPINEPHRINE 10; 10 UG/ML; MG/ML
INJECTION, SOLUTION INFILTRATION; PERINEURAL AS NEEDED
Status: DISCONTINUED | OUTPATIENT
Start: 2025-03-18 | End: 2025-03-18 | Stop reason: HOSPADM

## 2025-03-18 RX ORDER — FENTANYL CITRATE 50 UG/ML
INJECTION, SOLUTION INTRAMUSCULAR; INTRAVENOUS AS NEEDED
Status: DISCONTINUED | OUTPATIENT
Start: 2025-03-18 | End: 2025-03-18 | Stop reason: HOSPADM

## 2025-03-18 RX ADMIN — LIDOCAINE HYDROCHLORIDE AND EPINEPHRINE 5 ML: 10; 10 INJECTION, SOLUTION INFILTRATION; PERINEURAL at 14:06

## 2025-03-18 RX ADMIN — MIDAZOLAM 1 MG: 1 INJECTION INTRAMUSCULAR; INTRAVENOUS at 14:07

## 2025-03-18 RX ADMIN — MIDAZOLAM 1 MG: 1 INJECTION INTRAMUSCULAR; INTRAVENOUS at 14:05

## 2025-03-18 RX ADMIN — CEFAZOLIN SODIUM 2 G: 2 INJECTION, SOLUTION INTRAVENOUS at 13:29

## 2025-03-18 RX ADMIN — LIDOCAINE HYDROCHLORIDE AND EPINEPHRINE 5 ML: 10; 10 INJECTION, SOLUTION INFILTRATION; PERINEURAL at 14:08

## 2025-03-18 RX ADMIN — FENTANYL CITRATE 50 MCG: 50 INJECTION, SOLUTION INTRAMUSCULAR; INTRAVENOUS at 14:05

## 2025-03-18 ASSESSMENT — PAIN SCALES - GENERAL
PAINLEVEL_OUTOF10: 0 - NO PAIN

## 2025-03-18 ASSESSMENT — PAIN - FUNCTIONAL ASSESSMENT
PAIN_FUNCTIONAL_ASSESSMENT: 0-10
PAIN_FUNCTIONAL_ASSESSMENT: 0-10

## 2025-03-18 NOTE — NURSING NOTE
END OF PROCEDURE MONITORING CRITERIA  01. BP is within +/- 20% of preprocedure  02. Oxygen sat is at 92% or above on RA, or existing order for O2 treatment, or at pre-sedation levels, otherwise new O2 order needed.  03. Unless the patient has a pre-procedure history of diminished level of consciousness, s/he is easily arousable and when aroused is able to responds appropriately for his/her age.  04. Significant complications related to the specific procedure are absent, have been controlled, or have been evaluated, including:      A. Pain.      B. Wound drainage.      C. All drains and tubes are patent.      D. Nausea and Vomiting. Vomiting is not persisten and has not occurred within 15 minutes prior to discharge.      E. Bladder distention. Voided bladder and/or no symptoms or urinary retention (e.g., bladder distention, frequent voiding in small amounts).      F. Neurovascular status.      G. Level of Consciousness consistent with pre procedural status.        sister(s)  affirmed that they were driving the patient home.

## 2025-03-18 NOTE — DISCHARGE INSTRUCTIONS
What is a Central Venous Access Port? Patient and Family Education    What is a Central Venous Access Port?  A central venous access port is a small device made up of 2 parts:  ? The port, which is a hollow metal or plastic disk with a rubber center and  ? The tube (also called a catheter) that connects to the port. The catheter is  threaded through a vein that leads to your heart.  A doctor places the port under the skin in the chest near the collarbone, or in the arm. The port  feels like a small bump. Once your port site heals it should not hurt. A port provides easy  access to a vein. A port is useful if you need frequent intravenous (IV) treatments, medicines,  blood tests or blood products. A port can stay in for months or years if it is cared for correctly  and working as it should. A port may also be called a Mediport, Power Port or Port-a-cath.    Before your Port is Placed:  ? If you take any medicine that thins your blood or helps prevent clots, talk with  your doctor. Before your port is placed, ask your doctor if your dose of these  medicines needs to be changed to help prevent bleeding problems. If your doctor tells  you to stop taking these medicines, ask him or her when you should restart them. If  your port placement is cancelled, call your doctor and ask if you need to restart your  medicine or change your dose. These medicines include, but are not limited to:  Warfarin (Coumadin), Lovenox (enoxaparin), Eliquis (apixaban), Plavix (clopidogrel),  Pradaxa (dabigatran) and Xarelto (rivaroxaban).  ? Do not eat or drink anything 8 hours before your port placement. If you have  been told to take certain medicines, take them with a sip of water.  ? Take your daily medicines, especially any cardiac (heart), high blood pressure, seizure,  and antibiotic medicines. If you take insulin for diabetes, ask your doctor how to adjust  your insulin dose the morning of your port placement. Be sure to tell your  doctor that  you have to fast for 8 hours before the port is placed.  ? Talk with your doctor, nurse or dietitian before taking probiotics. Ask if it’s safe for  you to take them when you have a central line. Some patients should avoid taking  certain probiotics because they may increase their chance of getting an infection.    The Day your Port is Placed:  ? A doctor in Radiology will place your port. The port placement takes about 60 to 90  minutes but plan on being here for 3 to 4 hours. This allows time for your check-in and  recovery.  ? A staff member will talk with you about the procedure, ask you questions and help  answer your questions. For women: Tell your doctor or technologist if there is any  chance you are pregnant.  ? You will be asked to change into a hospital gown for the procedure. You must remove  necklaces and earrings because they can get in the way during your port placement. An  IV will be placed in your arm and you will be asked to lay on a cart. Once we are  ready, we will take you to the procedure room. A family member may stay in our  waiting room while your port is placed.    In the Procedure Room:  You will get medicine through your IV to help you relax. While the port is placed, some  people fall asleep and some are awake but feel very relaxed. We will wash the area where the  port is being placed with a germ killing solution. This solution helps lower your chances of  getting an infection. Next, the doctor injects a numbing medicine into your skin, around the  port site. Once your skin is numb, the doctor makes 2 small incisions (cuts) to place the port  under your skin. The incisions are closed with skin glue or sutures and paper Band-Aids called  steri-strips. A gauze bandage is then placed over the port site.    After the Port is Placed:  ? You will be taken to the recovery area. We will check your pulse and blood pressure  often and check your port site for bleeding and swelling.  Some bruising is normal. If  you see bleeding, apply pressure with your fingertips and call your nurse right away. If  you feel swelling or more pain at the site, call your nurse.  ? You may have some soreness where your port is placed but it should improve each  day as the site heals. The incisions used to place the port are small and should heal in  10 to 14 days.  ? Once healed, you do not need to have a dressing over the site unless the port has a  needle in it.    If You go Home After Your Port is Placed:  ? You must have someone drive you home. We cannot let you drive or travel home alone in  a cab or on a bus. Do not drive for 24 hours after your port is placed.  ? Someone should stay with you through the night.  ? Resume your routine normal diet. You may resume your normal medicines but if you  take blood-thinning medicine, ask your doctor when you should start taking it again.  ? Rest until morning.   ? Lifting your arm on the same side of the mediport should be restricted to 10-15 pounds   or less for 1 week.  ? Avoid pushing, pulling, straining, or any strenuous activities.  ? You may climb stairs.  ? You may return to work when you feel you are ready at any point after surgery.  If you are not able to contact your doctor, go to the nearest Emergency Room.    Caring for Your Incisions:  ? Remove the dressing after 7 days. You do not need to replace it. Don’t try to peel  off the surgical glue or steri-strips. Let them fall off on their own, which often happens  after 2 weeks.  ? Do not let your incisions get wet until they are fully healed, which often takes 10 to 14  days. When you shower, cover your incisions with a dressing made from plastic wrap  (like Saran wrap or Glad Press n’ Seal) or a plastic bag and tape to keep the area dry.  After you shower, remove the plastic wrap and pat the incisions dry. Don’t swim or soak  in a tub or Jacuzzi until your incisions are fully healed.  ? Do not get your  port site wet if it has a needle in it. Follow the steps above to make  sure your port site is covered with plastic wrap or a plastic bag when you shower.  ? Look at your incisions each day. Call your doctor right away if you have any of the signs  of infection that are listed on the next page.    Caring for Your Port:  -A trained nurse must use a special non-coring needle, called a Maharaj needle, each time they  access your port. The needle is placed through your skin and into the rubber center of the port.  -You will likely feel slight pricking when your nurse inserts the needle. The needle stays in  place for your treatments and can be removed afterwards.  -Your port needs to be flushed every 4 to 6 weeks to help prevent blood clots  from forming in the catheter. If blood clots form and cause a blockage, your  port may need to be removed. Your nurse will flush your port with saline. If  needed, they may also flush it with a blood thinning medicine called heparin.  -Port flushes are done right before the needle is taken out. Some patients are  taught how to do these flushes at home. If you need to flush your port at home,  your nurse will show you how. If your port is not being used at least once  every 4 to 6 weeks, talk with your doctor or nurse about scheduling port  flushes.    Sedation:  If you received medication for sedation, it will be active in your body for approximately 24  hours. So you may feel a little sleepy. Therefore, for the next 24 hours:  ? DO NOT drive a car, operate machinery or power tools. It is recommended that a   responsible adult be with you for the first 24 hours.  ? DO NOT drink any alcoholic beverages or take any non-prescriptive medications that   contain alcohol.  ? DO NOT make any important decisions or sign any legal/important documents.    When to Call Your Doctor:  ? Redness, swelling or drainage near the port or over the port site.  ? Drainage from the port site.  ? Shake or  chills.  ? Temperature of 100.4°F (38°C) or higher.  ? Pain, warm or soreness at the port site.  ? Swelling of your arm, check at the port site.  ? Also call if the port has been moved  ? If you have any questions about your port.     How to Reach your Doctor:    Call referring provider Baldev Ferraro CNP at [phone] with problems or questions    This info is a general resource. It is not meant to replace your health care provider’s advice.  Ask your doctor or health care team any questions. Always follow their instructions.

## 2025-03-19 ENCOUNTER — EDUCATION (OUTPATIENT)
Dept: HEMATOLOGY/ONCOLOGY | Facility: HOSPITAL | Age: 60
End: 2025-03-19

## 2025-03-19 ENCOUNTER — HOSPITAL ENCOUNTER (OUTPATIENT)
Dept: RESEARCH | Facility: HOSPITAL | Age: 60
Discharge: HOME | End: 2025-03-19
Payer: MEDICARE

## 2025-03-19 VITALS
DIASTOLIC BLOOD PRESSURE: 86 MMHG | OXYGEN SATURATION: 93 % | RESPIRATION RATE: 18 BRPM | HEART RATE: 104 BPM | SYSTOLIC BLOOD PRESSURE: 133 MMHG | TEMPERATURE: 96.4 F

## 2025-03-19 DIAGNOSIS — C34.90 SMALL CELL CARCINOMA OF LUNG, UNSPECIFIED LATERALITY, UNSPECIFIED PART OF LUNG: Primary | ICD-10-CM

## 2025-03-19 DIAGNOSIS — C34.90 SMALL CELL CARCINOMA OF LUNG, UNSPECIFIED LATERALITY, UNSPECIFIED PART OF LUNG: ICD-10-CM

## 2025-03-19 LAB
ALBUMIN SERPL BCP-MCNC: 4.3 G/DL (ref 3.4–5)
ALP SERPL-CCNC: 95 U/L (ref 33–136)
ALT SERPL W P-5'-P-CCNC: 10 U/L (ref 7–45)
ANION GAP SERPL CALC-SCNC: 16 MMOL/L (ref 10–20)
AST SERPL W P-5'-P-CCNC: 13 U/L (ref 9–39)
BASOPHILS # BLD AUTO: 0.03 X10*3/UL (ref 0–0.1)
BASOPHILS NFR BLD AUTO: 0.6 %
BILIRUB DIRECT SERPL-MCNC: 0.1 MG/DL (ref 0–0.3)
BILIRUB SERPL-MCNC: 0.5 MG/DL (ref 0–1.2)
BUN SERPL-MCNC: 13 MG/DL (ref 6–23)
CALCIUM SERPL-MCNC: 9.2 MG/DL (ref 8.6–10.6)
CHLORIDE SERPL-SCNC: 101 MMOL/L (ref 98–107)
CO2 SERPL-SCNC: 25 MMOL/L (ref 21–32)
CREAT SERPL-MCNC: 0.65 MG/DL (ref 0.5–1.05)
EGFRCR SERPLBLD CKD-EPI 2021: >90 ML/MIN/1.73M*2
EOSINOPHIL # BLD AUTO: 0.07 X10*3/UL (ref 0–0.7)
EOSINOPHIL NFR BLD AUTO: 1.4 %
ERYTHROCYTE [DISTWIDTH] IN BLOOD BY AUTOMATED COUNT: 16 % (ref 11.5–14.5)
GGT SERPL-CCNC: 82 U/L (ref 5–55)
GLUCOSE SERPL-MCNC: 142 MG/DL (ref 74–99)
HCT VFR BLD AUTO: 38.1 % (ref 36–46)
HGB BLD-MCNC: 12.6 G/DL (ref 12–16)
IMM GRANULOCYTES # BLD AUTO: 0.02 X10*3/UL (ref 0–0.7)
IMM GRANULOCYTES NFR BLD AUTO: 0.4 % (ref 0–0.9)
LDH SERPL L TO P-CCNC: 145 U/L (ref 84–246)
LYMPHOCYTES # BLD AUTO: 1.43 X10*3/UL (ref 1.2–4.8)
LYMPHOCYTES NFR BLD AUTO: 28.4 %
MAGNESIUM SERPL-MCNC: 2.09 MG/DL (ref 1.6–2.4)
MCH RBC QN AUTO: 31.2 PG (ref 26–34)
MCHC RBC AUTO-ENTMCNC: 33.1 G/DL (ref 32–36)
MCV RBC AUTO: 94 FL (ref 80–100)
MONOCYTES # BLD AUTO: 0.3 X10*3/UL (ref 0.1–1)
MONOCYTES NFR BLD AUTO: 6 %
NEUTROPHILS # BLD AUTO: 3.19 X10*3/UL (ref 1.2–7.7)
NEUTROPHILS NFR BLD AUTO: 63.2 %
NRBC BLD-RTO: 0 /100 WBCS (ref 0–0)
PHOSPHATE SERPL-MCNC: 4.3 MG/DL (ref 2.5–4.9)
PLATELET # BLD AUTO: 254 X10*3/UL (ref 150–450)
POTASSIUM SERPL-SCNC: 4.1 MMOL/L (ref 3.5–5.3)
PROT SERPL-MCNC: 6.6 G/DL (ref 6.4–8.2)
RBC # BLD AUTO: 4.04 X10*6/UL (ref 4–5.2)
SODIUM SERPL-SCNC: 138 MMOL/L (ref 136–145)
WBC # BLD AUTO: 5 X10*3/UL (ref 4.4–11.3)

## 2025-03-19 PROCEDURE — 36415 COLL VENOUS BLD VENIPUNCTURE: CPT

## 2025-03-19 PROCEDURE — 83615 LACTATE (LD) (LDH) ENZYME: CPT

## 2025-03-19 PROCEDURE — 84100 ASSAY OF PHOSPHORUS: CPT

## 2025-03-19 PROCEDURE — 83735 ASSAY OF MAGNESIUM: CPT

## 2025-03-19 PROCEDURE — 84075 ASSAY ALKALINE PHOSPHATASE: CPT

## 2025-03-19 PROCEDURE — 82248 BILIRUBIN DIRECT: CPT

## 2025-03-19 PROCEDURE — 85025 COMPLETE CBC W/AUTO DIFF WBC: CPT

## 2025-03-19 PROCEDURE — 82977 ASSAY OF GGT: CPT

## 2025-03-19 RX ORDER — LIDOCAINE AND PRILOCAINE 25; 25 MG/G; MG/G
CREAM TOPICAL
Qty: 30 G | Refills: 0 | Status: SHIPPED | OUTPATIENT
Start: 2025-03-19

## 2025-03-19 ASSESSMENT — PAIN SCALES - GENERAL: PAINLEVEL_OUTOF10: 0-NO PAIN

## 2025-03-19 ASSESSMENT — ENCOUNTER SYMPTOMS
VERTIGO: 0
FATIGUE: 1
NAUSEA: 0
JOINT SWELLING: 0
NECK PAIN: 0
VOMITING: 0
CHILLS: 0
DIAPHORESIS: 0
ARTHRALGIAS: 0
NUMBNESS: 0
COUGH: 1
CHANGE IN BOWEL HABIT: 0
VISUAL CHANGE: 0
ABDOMINAL PAIN: 0
SWOLLEN GLANDS: 0
ANOREXIA: 0
MYALGIAS: 0
FEVER: 0
SORE THROAT: 0
HEADACHES: 0

## 2025-03-19 NOTE — RESEARCH NOTES
"Research Note Non-Treatment Day    Minal Sun is here today. Patient is on [id number]. Today is C2D8. Procedures completed per protocol. AE's and con-meds reviewed with patient. Patient states that she had her mediport placed yesterday and the area is tender. She has been having nausea w/out vomiting. This is resolved with oral Compazine. Her typical \"cancer pain\" seems to be better and she has not used her pain medication in a few days. She has been constipated and has not had a BM since 4 days ago. She has Miralax and Senekot at home but she reports not taking it as much as she should. She still has a productive cough with white sputum and also feels that she can do more activity without being dyspneic. She also reports dysgeusia with some food and liquids. Patient is aware of treatment plan and will return on 3/26 for C2D15.      Education Documentation  Treatment Plan and Schedule, taught by Jazzy Guerrero RN at 3/19/2025 12:53 PM.  Learner: Family, Patient  Readiness: Acceptance  Method: Explanation  Response: Verbalizes Understanding    General Medication Information, taught by Jazzy Guerrero RN at 3/19/2025 12:53 PM.  Learner: Family, Patient  Readiness: Acceptance  Method: Explanation  Response: Verbalizes Understanding    Comprehensive Metabolic Panel (CMP), taught by Jazzy Guerrero RN at 3/19/2025 12:53 PM.  Learner: Family, Patient  Readiness: Acceptance  Method: Explanation  Response: Verbalizes Understanding    Complete Blood Count with Differential (CBC w/ Diff), taught by Jazzy Guerrero RN at 3/19/2025 12:53 PM.  Learner: Family, Patient  Readiness: Acceptance  Method: Explanation  Response: Verbalizes Understanding    Education Comments  No comments found.       "

## 2025-03-19 NOTE — PROGRESS NOTES
Patient ID: Minal Sun is a 59 y.o. female.    DIAGNOSIS     Small cell carcinoma of the lung.  Date of diagnosis is September 27, 2024 from a bronchoscopic biopsy of a subcarinal lymph node.  Immunohistochemistry positive for TTF-1, INSM1, synaptophysin and chromogranin, negative for p40, RB immunostain shows loss of nuclear expression.        STAGING     Clinical T4, N2, M1 C, stage IVc, extensive stage disease        CURRENT SITES OF DISEASE      Left lung, mediastinum, left hilum of the lung, liver, intra-abdominal lymph nodes in the portacaval and periportal region, bone metastases        MOLECULAR GENOMICS     Test Name: CRV xF+ (XF.V3) dated October 2024     Molecular Findings:  * Gene: [id number], Variant: Missense variant (exon 1) - GOF, Potentially Actionable, p.R88Q (Allele Frequency - 0.3%)  * Gene: TP53, Variant: Missense variant - LOF, Biologically Relevant, p.R249G (Allele Frequency - 70.1%)  * Gene: RB1, Variant: Splice region variant - LOF, Biologically Relevant, c.540-1G>T, Splice Site (Allele Frequency - 56.8%)        Test Name: CRV xT (XT.V4)  Laboratory Name: [facility]  Specimen Source: Lymph node, level 7  Collection Date: 27-Sep-2024     Molecular Findings:  * Gene: TP53, Variant: Missense variant - LOF, Biologically Relevant, p.R249G (Allele Frequency - 96.2%)  * Gene: RB1, Variant: Splice region variant - LOF, Biologically Relevant, c.540-1G>T, Splice Site (Allele Frequency - 96%)  * Gene: KMT2D, Variant: Stop gain - LOF, Biologically Relevant, p.*, Nonsense (Allele Frequency - 41.9%)  * Biomarker: Microsatellite Instability, Status: stable  * Biomarker: Tumor Mutation [place] (2.6 Muts/Mb, Percentile: 33)        SERUM TUMOR MARKERS     Baseline LDH within normal limits  C1D1 on Study LDH was 274. Trending down at C2D1         PRIOR THERAPY     Combination chemotherapy and immunotherapy.  Initially enrolled on a clinical trial of Lutathera and underwent octreotide scan  which was positive.  However she decided to come off study and did not receive Lutathera with her chemo.  Chemotherapy started October 6, 2024.  Immunotherapy completed added with cycle #2 11.06.2024.  Minor response observed after 2 cycles of treatment. PD observed after C4.        CURRENT THERAPY     2.19.2025: Started on Phase 1 study [id number] with study drug [id number] which is an ADC targeting SEZ6 with a topoisomerase 1 inhibitor payload     CURRENT ONCOLOGICAL PROBLEMS    Cough and shortness of breath significantly improved with systemic treatment.  Pulmonary Embolism  Immunotherapy induced hypothyroidism  Neoplasm non cardiac chest/back pain        HISTORY OF PRESENT ILLNESS     This is a 59-year-old patient.  She states that she was feeling sick toward the end of spring of this year with some sinus problems.  Was seen by her allergist and was treated with antibiotics.  She was also seen at 1 point by primary care and steroids and antibiotics and inhalers were given.  She did not have any resolution and then ultimately developed a little bit of the hemoptysis which led to a chest x-ray showing an abnormality and finally a CT scan of the chest.The CT scan of the chest was done as a lung cancer screening low-dose CT and completed on September 20, 2024.  This demonstrated a extensive infiltrative mediastinal and hilar mass.  There was narrowing of the left mainstem bronchus and lower lobe bronchus secondary to extrinsic compression.  The predominant growth was within the left hilum and subcarinal region.  She underwent a bronchoscopy dated September 27, 2024 showing splaying of the main alanna.  There was mild erosion of a lymph node into the left mainstem bronchus.  There was erythema and mild erosions of an endobronchial tumor along the medial border.  Moderate to severe stenosis of the left lower lobe bronchus to 75%.        PAST MEDICAL HISTORY     Lumbar surgery about 30 years ago   hysterectomy in  2000  Diabetes  COPD  Right eye exophthalmus  herpes zoster        SOCIAL HISTORY     Patient lives with her sister.  She lives in [facility].  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  She started smoking at the age of 14 and continues to smoke at the age of 59.  Has smoked for 45 years on average 1 pack/day.        CURRENT MEDS     See medication list, meds reviewed, includes metformin, rosuvastatin, been done so Nied inhalers, Wellbutrin escitalopram, trazodone        ALLERGIES     Patient denies any drug allergies        FAMILY HISTORY      Mother had bladder cancer and possibly breast cancer.     Subjective    Today 3.19.2025 Patient in clinic for C2D8 on [id number].   Patient reports she feel better and has tolerated the treatment well. She continues to have a cough but mentions it is much better. She also reports symptoms of taste alteration, poor appetite, nausea and constipation. She reports her noncardiac chest pain and back pain (neoplasm pain). She reports she  is not SOB any more. Today she denies any denies any dizziness, lightheadedness, headaches, rash/itching, chills or fever, chest pain or chest tightness, painful inflammation/ulceration oral mucous membranes, vomiting, diarrhea/constipation, hematemesis /hemoptysis, hematuria/rectal bleeding, urinary symptoms, swelling extremities, weakness, or peripheral neuropathy. ROS as above. Remainder of 10 point review of systems elicited and otherwise unremarkable.     Cancer  Associated symptoms include coughing and fatigue. Pertinent negatives include no abdominal pain, anorexia, arthralgias, change in bowel habit, chest pain, chills, congestion, diaphoresis, fever, headaches, joint swelling, myalgias, nausea, neck pain, numbness, rash, sore throat, swollen glands, urinary symptoms, vertigo, visual change or vomiting.  "    Objective    BSA: There is no height or weight on file to calculate BSA.  /86 (BP Location: Left arm, Patient Position: Sitting)   Pulse 104   Temp 35.8 °C (96.4 °F) (Temporal)   Resp 18   SpO2 93%      Daily Weight  03/18/25 : 82.1 kg (181 lb)  03/12/25 : 82.4 kg (181 lb 10.5 oz)  02/19/25 : 85.1 kg (187 lb 9.8 oz)  02/10/25 : 85.4 kg (188 lb 4.4 oz)  02/06/25 : 81.6 kg (180 lb)         3/12/2025     1:44 PM 3/13/2025    12:10 PM 3/18/2025    12:17 PM 3/18/2025    12:20 PM 3/18/2025     1:33 PM 3/18/2025     2:21 PM 3/19/2025    12:31 PM   Vitals   Systolic 123 121 113  166 137 133   Diastolic 81 83 86  99 67 86   BP Location Left arm Left arm     Left arm   Heart Rate 88 106 109  84 82 104   Temp 36.7 °C (98.1 °F) 37.2 °C (99 °F) 36.4 °C (97.5 °F) 2.5 °C (36.5 °F)   35.8 °C (96.4 °F)   Resp 18 20 17  14 13 18   Height   1.575 m (5' 2\")       Weight (lb)   181       BMI   33.11 kg/m2       BSA (m2)   1.9 m2           Physical Exam  Constitutional:       General: She is not in acute distress.     Appearance: Normal appearance. She is not ill-appearing, toxic-appearing or diaphoretic.   HENT:      Nose: No congestion or rhinorrhea.      Mouth/Throat:      Pharynx: No oropharyngeal exudate or posterior oropharyngeal erythema.   Eyes:      General: No scleral icterus.     Conjunctiva/sclera: Conjunctivae normal.   Cardiovascular:      Rate and Rhythm: Normal rate and regular rhythm.      Pulses: Normal pulses.      Heart sounds: Normal heart sounds. No murmur heard.     No friction rub. No gallop.   Pulmonary:      Effort: Pulmonary effort is normal. No respiratory distress.      Breath sounds: No stridor. No wheezing, rhonchi or rales.      Comments: Inspiratory wheezing  Chest:      Chest wall: No tenderness.   Abdominal:      General: There is no distension.      Palpations: There is no mass.      Tenderness: There is no abdominal tenderness. There is no guarding or rebound.   Musculoskeletal:      " Cervical back: No tenderness.      Right lower leg: No edema.      Left lower leg: No edema.   Lymphadenopathy:      Cervical: No cervical adenopathy.   Skin:     General: Skin is warm.      Coloration: Skin is not jaundiced.      Findings: No bruising or lesion.   Neurological:      General: No focal deficit present.      Mental Status: She is oriented to person, place, and time.   Psychiatric:         Mood and Affect: Mood normal.         Behavior: Behavior normal.       Performance Status:  ECOG 1: Restricted in physically strenuous activity but ambulatory and able to carry out work of a light or sedentary nature, e.g., light house work, office work.     Hospital Outpatient Visit on 03/19/2025   Component Date Value Ref Range Status    WBC 03/19/2025 5.0  4.4 - 11.3 x10*3/uL Final    nRBC 03/19/2025 0.0  0.0 - 0.0 /100 WBCs Final    RBC 03/19/2025 4.04  4.00 - 5.20 x10*6/uL Final    Hemoglobin 03/19/2025 12.6  12.0 - 16.0 g/dL Final    Hematocrit 03/19/2025 38.1  36.0 - 46.0 % Final    MCV 03/19/2025 94  80 - 100 fL Final    MCH 03/19/2025 31.2  26.0 - 34.0 pg Final    MCHC 03/19/2025 33.1  32.0 - 36.0 g/dL Final    RDW 03/19/2025 16.0 (H)  11.5 - 14.5 % Final    Platelets 03/19/2025 254  150 - 450 x10*3/uL Final    Neutrophils % 03/19/2025 63.2  40.0 - 80.0 % Final    Immature Granulocytes %, Automated 03/19/2025 0.4  0.0 - 0.9 % Final    Lymphocytes % 03/19/2025 28.4  13.0 - 44.0 % Final    Monocytes % 03/19/2025 6.0  2.0 - 10.0 % Final    Eosinophils % 03/19/2025 1.4  0.0 - 6.0 % Final    Basophils % 03/19/2025 0.6  0.0 - 2.0 % Final    Neutrophils Absolute 03/19/2025 3.19  1.20 - 7.70 x10*3/uL Final    Immature Granulocytes Absolute, Au* 03/19/2025 0.02  0.00 - 0.70 x10*3/uL Final    Lymphocytes Absolute 03/19/2025 1.43  1.20 - 4.80 x10*3/uL Final    Monocytes Absolute 03/19/2025 0.30  0.10 - 1.00 x10*3/uL Final    Eosinophils Absolute 03/19/2025 0.07  0.00 - 0.70 x10*3/uL Final    Basophils Absolute  03/19/2025 0.03  0.00 - 0.10 x10*3/uL Final    Glucose 03/19/2025 142 (H)  74 - 99 mg/dL Final    Sodium 03/19/2025 138  136 - 145 mmol/L Final    Potassium 03/19/2025 4.1  3.5 - 5.3 mmol/L Final    Chloride 03/19/2025 101  98 - 107 mmol/L Final    Bicarbonate 03/19/2025 25  21 - 32 mmol/L Final    Anion Gap 03/19/2025 16  10 - 20 mmol/L Final    Urea Nitrogen 03/19/2025 13  6 - 23 mg/dL Final    Creatinine 03/19/2025 0.65  0.50 - 1.05 mg/dL Final    eGFR 03/19/2025 >90  >60 mL/min/1.73m*2 Final    Calcium 03/19/2025 9.2  8.6 - 10.6 mg/dL Final    Albumin 03/19/2025 4.3  3.4 - 5.0 g/dL Final    Alkaline Phosphatase 03/19/2025 95  33 - 136 U/L Final    Total Protein 03/19/2025 6.6  6.4 - 8.2 g/dL Final    AST 03/19/2025 13  9 - 39 U/L Final    Bilirubin, Total 03/19/2025 0.5  0.0 - 1.2 mg/dL Final    ALT 03/19/2025 10  7 - 45 U/L Final    Bilirubin, Direct 03/19/2025 0.1  0.0 - 0.3 mg/dL Final    GGT 03/19/2025 82 (H)  5 - 55 U/L Final    LDH 03/19/2025 145  84 - 246 U/L Final    Magnesium 03/19/2025 2.09  1.60 - 2.40 mg/dL Final    Phosphorus 03/19/2025 4.3  2.5 - 4.9 mg/dL Final   Hospital Outpatient Visit on 03/18/2025   Component Date Value Ref Range Status    POCT Prothrombin time 03/18/2025 12.3  seconds In process    POCT INR 03/18/2025 1.1   In process    POCT Glucose 03/18/2025 125 (H)  74 - 99 mg/dL Final   Hospital Outpatient Visit on 03/13/2025   Component Date Value Ref Range Status    Extra Tube 03/13/2025 Hold for add-ons.   Final    Extra Tube 03/13/2025 Hold for add-ons.   Final   Hospital Outpatient Visit on 03/12/2025   Component Date Value Ref Range Status    WBC 03/12/2025 5.6  4.4 - 11.3 x10*3/uL Final    nRBC 03/12/2025 0.0  0.0 - 0.0 /100 WBCs Final    RBC 03/12/2025 4.24  4.00 - 5.20 x10*6/uL Final    Hemoglobin 03/12/2025 13.0  12.0 - 16.0 g/dL Final    Hematocrit 03/12/2025 39.5  36.0 - 46.0 % Final    MCV 03/12/2025 93  80 - 100 fL Final    MCH 03/12/2025 30.7  26.0 - 34.0 pg Final    MCHC  03/12/2025 32.9  32.0 - 36.0 g/dL Final    RDW 03/12/2025 16.3 (H)  11.5 - 14.5 % Final    Platelets 03/12/2025 378  150 - 450 x10*3/uL Final    Neutrophils % 03/12/2025 51.2  40.0 - 80.0 % Final    Immature Granulocytes %, Automated 03/12/2025 0.4  0.0 - 0.9 % Final    Lymphocytes % 03/12/2025 39.8  13.0 - 44.0 % Final    Monocytes % 03/12/2025 6.3  2.0 - 10.0 % Final    Eosinophils % 03/12/2025 1.8  0.0 - 6.0 % Final    Basophils % 03/12/2025 0.5  0.0 - 2.0 % Final    Neutrophils Absolute 03/12/2025 2.87  1.20 - 7.70 x10*3/uL Final    Immature Granulocytes Absolute, Au* 03/12/2025 0.02  0.00 - 0.70 x10*3/uL Final    Lymphocytes Absolute 03/12/2025 2.23  1.20 - 4.80 x10*3/uL Final    Monocytes Absolute 03/12/2025 0.35  0.10 - 1.00 x10*3/uL Final    Eosinophils Absolute 03/12/2025 0.10  0.00 - 0.70 x10*3/uL Final    Basophils Absolute 03/12/2025 0.03  0.00 - 0.10 x10*3/uL Final    Glucose 03/12/2025 135 (H)  74 - 99 mg/dL Final    Sodium 03/12/2025 138  136 - 145 mmol/L Final    Potassium 03/12/2025 4.5  3.5 - 5.3 mmol/L Final    Chloride 03/12/2025 101  98 - 107 mmol/L Final    Bicarbonate 03/12/2025 25  21 - 32 mmol/L Final    Anion Gap 03/12/2025 17  10 - 20 mmol/L Final    Urea Nitrogen 03/12/2025 13  6 - 23 mg/dL Final    Creatinine 03/12/2025 0.78  0.50 - 1.05 mg/dL Final    eGFR 03/12/2025 87  >60 mL/min/1.73m*2 Final    Calcium 03/12/2025 9.1  8.6 - 10.6 mg/dL Final    Albumin 03/12/2025 4.3  3.4 - 5.0 g/dL Final    Alkaline Phosphatase 03/12/2025 98  33 - 136 U/L Final    Total Protein 03/12/2025 6.7  6.4 - 8.2 g/dL Final    AST 03/12/2025 15  9 - 39 U/L Final    Bilirubin, Total 03/12/2025 0.5  0.0 - 1.2 mg/dL Final    ALT 03/12/2025 10  7 - 45 U/L Final    Bilirubin, Direct 03/12/2025 0.1  0.0 - 0.3 mg/dL Final    GGT 03/12/2025 111 (H)  5 - 55 U/L Final    LDH 03/12/2025 192  84 - 246 U/L Final    Magnesium 03/12/2025 2.14  1.60 - 2.40 mg/dL Final    Phosphorus 03/12/2025 4.8  2.5 - 4.9 mg/dL Final     Color, Urine 03/12/2025 Yellow  Light-Yellow, Yellow, Dark-Yellow Final    Appearance, Urine 03/12/2025 Clear  Clear Final    Specific Gravity, Urine 03/12/2025 1.027  1.005 - 1.035 Final    pH, Urine 03/12/2025 5.5  5.0, 5.5, 6.0, 6.5, 7.0, 7.5, 8.0 Final    Protein, Urine 03/12/2025 20 (TRACE)  NEGATIVE, 10 (TRACE), 20 (TRACE) mg/dL Final    Glucose, Urine 03/12/2025 Normal  Normal mg/dL Final    Blood, Urine 03/12/2025 NEGATIVE  NEGATIVE mg/dL Final    Ketones, Urine 03/12/2025 TRACE (A)  NEGATIVE mg/dL Final    Bilirubin, Urine 03/12/2025 NEGATIVE  NEGATIVE mg/dL Final    Urobilinogen, Urine 03/12/2025 2 (1+) (A)  Normal mg/dL Final    Nitrite, Urine 03/12/2025 NEGATIVE  NEGATIVE Final    Leukocyte Esterase, Urine 03/12/2025 NEGATIVE  NEGATIVE Final    WBC, Urine 03/12/2025 1-5  1-5, NONE /HPF Final    RBC, Urine 03/12/2025 1-2  NONE, 1-2, 3-5 /HPF Final    Squamous Epithelial Cells, Urine 03/12/2025 1-9 (SPARSE)  Reference range not established. /HPF Final    Mucus, Urine 03/12/2025 3+  Reference range not established. /LPF Final    Extra Tube 03/12/2025 Hold for add-ons.   Final    Extra Tube 03/12/2025 Hold for add-ons.   Final    Extra Tube 03/12/2025 Hold for add-ons.   Final    Extra Tube 03/12/2025 Hold for add-ons.   Final    Extra Tube 03/12/2025 Hold for add-ons.   Final    Extra Tube 03/12/2025 Hold for add-ons.   Final    Extra Tube 03/12/2025 Hold for add-ons.   Final    Extra Tube 03/12/2025 Hold for add-ons.   Final    Extra Tube 03/12/2025 Hold for add-ons.   Final   Hospital Outpatient Visit on 03/05/2025   Component Date Value Ref Range Status    WBC 03/05/2025 5.4  4.4 - 11.3 x10*3/uL Final    nRBC 03/05/2025 0.0  0.0 - 0.0 /100 WBCs Final    RBC 03/05/2025 4.26  4.00 - 5.20 x10*6/uL Final    Hemoglobin 03/05/2025 13.1  12.0 - 16.0 g/dL Final    Hematocrit 03/05/2025 39.2  36.0 - 46.0 % Final    MCV 03/05/2025 92  80 - 100 fL Final    MCH 03/05/2025 30.8  26.0 - 34.0 pg Final    MCHC  03/05/2025 33.4  32.0 - 36.0 g/dL Final    RDW 03/05/2025 16.5 (H)  11.5 - 14.5 % Final    Platelets 03/05/2025 405  150 - 450 x10*3/uL Final    Neutrophils % 03/05/2025 61.8  40.0 - 80.0 % Final    Immature Granulocytes %, Automated 03/05/2025 0.6  0.0 - 0.9 % Final    Lymphocytes % 03/05/2025 29.9  13.0 - 44.0 % Final    Monocytes % 03/05/2025 5.6  2.0 - 10.0 % Final    Eosinophils % 03/05/2025 1.7  0.0 - 6.0 % Final    Basophils % 03/05/2025 0.4  0.0 - 2.0 % Final    Neutrophils Absolute 03/05/2025 3.32  1.20 - 7.70 x10*3/uL Final    Immature Granulocytes Absolute, Au* 03/05/2025 0.03  0.00 - 0.70 x10*3/uL Final    Lymphocytes Absolute 03/05/2025 1.60  1.20 - 4.80 x10*3/uL Final    Monocytes Absolute 03/05/2025 0.30  0.10 - 1.00 x10*3/uL Final    Eosinophils Absolute 03/05/2025 0.09  0.00 - 0.70 x10*3/uL Final    Basophils Absolute 03/05/2025 0.02  0.00 - 0.10 x10*3/uL Final    Glucose 03/05/2025 128 (H)  74 - 99 mg/dL Final    Sodium 03/05/2025 137  136 - 145 mmol/L Final    Potassium 03/05/2025 4.0  3.5 - 5.3 mmol/L Final    Chloride 03/05/2025 100  98 - 107 mmol/L Final    Bicarbonate 03/05/2025 24  21 - 32 mmol/L Final    Anion Gap 03/05/2025 17  10 - 20 mmol/L Final    Urea Nitrogen 03/05/2025 11  6 - 23 mg/dL Final    Creatinine 03/05/2025 0.65  0.50 - 1.05 mg/dL Final    eGFR 03/05/2025 >90  >60 mL/min/1.73m*2 Final    Calcium 03/05/2025 8.8  8.6 - 10.6 mg/dL Final    Albumin 03/05/2025 4.2  3.4 - 5.0 g/dL Final    Alkaline Phosphatase 03/05/2025 108  33 - 136 U/L Final    Total Protein 03/05/2025 6.9  6.4 - 8.2 g/dL Final    AST 03/05/2025 15  9 - 39 U/L Final    Bilirubin, Total 03/05/2025 0.4  0.0 - 1.2 mg/dL Final    ALT 03/05/2025 18  7 - 45 U/L Final    Bilirubin, Direct 03/05/2025 0.1  0.0 - 0.3 mg/dL Final    GGT 03/05/2025 170 (H)  5 - 55 U/L Final    LDH 03/05/2025 239  84 - 246 U/L Final    Magnesium 03/05/2025 2.19  1.60 - 2.40 mg/dL Final    Phosphorus 03/05/2025 3.4  2.5 - 4.9 mg/dL Final     Extra Tube 03/05/2025 Hold for add-ons.   Final    Extra Tube 03/05/2025 Hold for add-ons.   Final    Extra Tube 03/05/2025 Hold for add-ons.   Final   Hospital Outpatient Visit on 02/26/2025   Component Date Value Ref Range Status    WBC 02/26/2025 5.4  4.4 - 11.3 x10*3/uL Final    nRBC 02/26/2025 0.0  0.0 - 0.0 /100 WBCs Final    RBC 02/26/2025 3.92 (L)  4.00 - 5.20 x10*6/uL Final    Hemoglobin 02/26/2025 12.2  12.0 - 16.0 g/dL Final    Hematocrit 02/26/2025 37.0  36.0 - 46.0 % Final    MCV 02/26/2025 94  80 - 100 fL Final    MCH 02/26/2025 31.1  26.0 - 34.0 pg Final    MCHC 02/26/2025 33.0  32.0 - 36.0 g/dL Final    RDW 02/26/2025 16.9 (H)  11.5 - 14.5 % Final    Platelets 02/26/2025 223  150 - 450 x10*3/uL Final    Neutrophils % 02/26/2025 88.8  40.0 - 80.0 % Final    Immature Granulocytes %, Automated 02/26/2025 0.4  0.0 - 0.9 % Final    Lymphocytes % 02/26/2025 3.7  13.0 - 44.0 % Final    Monocytes % 02/26/2025 5.2  2.0 - 10.0 % Final    Eosinophils % 02/26/2025 1.7  0.0 - 6.0 % Final    Basophils % 02/26/2025 0.2  0.0 - 2.0 % Final    Neutrophils Absolute 02/26/2025 4.80  1.20 - 7.70 x10*3/uL Final    Immature Granulocytes Absolute, Au* 02/26/2025 0.02  0.00 - 0.70 x10*3/uL Final    Lymphocytes Absolute 02/26/2025 0.20 (L)  1.20 - 4.80 x10*3/uL Final    Monocytes Absolute 02/26/2025 0.28  0.10 - 1.00 x10*3/uL Final    Eosinophils Absolute 02/26/2025 0.09  0.00 - 0.70 x10*3/uL Final    Basophils Absolute 02/26/2025 0.01  0.00 - 0.10 x10*3/uL Final    Glucose 02/26/2025 133 (H)  74 - 99 mg/dL Final    Sodium 02/26/2025 135 (L)  136 - 145 mmol/L Final    Potassium 02/26/2025 4.0  3.5 - 5.3 mmol/L Final    Chloride 02/26/2025 99  98 - 107 mmol/L Final    Bicarbonate 02/26/2025 24  21 - 32 mmol/L Final    Anion Gap 02/26/2025 16  10 - 20 mmol/L Final    Urea Nitrogen 02/26/2025 10  6 - 23 mg/dL Final    Creatinine 02/26/2025 0.73  0.50 - 1.05 mg/dL Final    eGFR 02/26/2025 >90  >60 mL/min/1.73m*2 Final    Calcium  02/26/2025 9.0  8.6 - 10.6 mg/dL Final    Albumin 02/26/2025 4.2  3.4 - 5.0 g/dL Final    Alkaline Phosphatase 02/26/2025 104  33 - 136 U/L Final    Total Protein 02/26/2025 6.3 (L)  6.4 - 8.2 g/dL Final    AST 02/26/2025 16  9 - 39 U/L Final    Bilirubin, Total 02/26/2025 0.4  0.0 - 1.2 mg/dL Final    ALT 02/26/2025 15  7 - 45 U/L Final    Bilirubin, Direct 02/26/2025 0.1  0.0 - 0.3 mg/dL Final    GGT 02/26/2025 163 (H)  5 - 55 U/L Final    LDH 02/26/2025 264 (H)  84 - 246 U/L Final    Magnesium 02/26/2025 1.83  1.60 - 2.40 mg/dL Final    Phosphorus 02/26/2025 3.5  2.5 - 4.9 mg/dL Final    Extra Tube 02/26/2025 Hold for add-ons.   Final    Extra Tube 02/26/2025 Hold for add-ons.   Final    Extra Tube 02/26/2025 Hold for add-ons.   Final    Extra Tube 02/26/2025 Hold for add-ons.   Final    Extra Tube 02/26/2025 Hold for add-ons.   Final    C-Reactive Protein 02/26/2025 0.83  <1.00 mg/dL Final    Flu A Result 02/26/2025 Detected (A)  Not Detected Final    Flu B Result 02/26/2025 Not Detected  Not Detected Final    Coronavirus 2019, PCR 02/26/2025 Not Detected  Not Detected Final   Hospital Outpatient Visit on 02/21/2025   Component Date Value Ref Range Status    Extra Tube 02/21/2025 Hold for add-ons.   Final    Extra Tube 02/21/2025 Hold for add-ons.   Final    Extra Tube 02/21/2025 Hold for add-ons.   Final   Hospital Outpatient Visit on 02/20/2025   Component Date Value Ref Range Status    Extra Tube 02/20/2025 Hold for add-ons.   Final    Extra Tube 02/20/2025 Hold for add-ons.   Final          CT pelvis w IV contrast    Result Date: 2/13/2025  Impression: Restaging of metastatic small-cell lung carcinoma prior to initiation of clinical trial (to be combined with CT chest and abdomen 01/24/2025): 1. Stable tumor burden as evidenced by stable mixed lytic-sclerotic osseous lesions throughout the pelvis and lower lumbar vertebrae. 2. No evidence of new disease within the pelvis.   I personally reviewed the  images/study and I agree with the findings as stated. This study was interpreted at [facility], [place], [place].   MACRO: None   Signed by: Yao Parham 2/13/2025 9:25 PM Dictation workstation:   [id number]    MR brain w and wo IV contrast    Result Date: 2/6/2025  Impression: No new mass or pathologic enhancement was identified.   Nonspecific white matter changes most likely reflect chronic small-vessel ischemic disease given the age of the patient.   Likely right-sided proptosis stable in retrospect compared to the prior exam of 10/01/2024. Given prior masslike enlargement of the inferior rectus muscle on CT 10/07/2017 the finding which is not seen on the current limited exam of the orbits observation could nonetheless reflect residua of prior disease. Please correlate clinically and consider dedicated orbital MRI or follow-up orbital CT for further evaluation if clinically indicated   MACRO: None   Signed by: Emiliano Kong 2/6/2025 10:23 AM Dictation workstation:   [id number]    CT abdomen w IV contrast    Result Date: 1/24/2025  Impression: Restaging of metastatic small-cell lung carcinoma, as compared to CT 12/19/2024: 1. Stable to slightly increased disease burden as evidenced by gradually increasing size of the distal, left subhilar component of the primary neoplasm with stable size of hepatic metastases. 2. Stable mediastinal lymphadenopathy, including prominent paratracheal and aortopulmonary window nodes detailed above, as well as mixed lytic-sclerotic osseous lesions throughout the axial skeleton. 3. Increased postobstructive pneumonitis within the left lower lobe.   I personally reviewed the images/study and I agree with the findings as stated. This study was interpreted at [facility], [place], [place].   MACRO: None   Signed by: Yao Parham 1/24/2025 9:54 PM Dictation workstation:   [id number]    CT chest w IV contrast    Result  Date: 1/24/2025  Impression: Restaging of metastatic small-cell lung carcinoma, as compared to CT 12/19/2024: 1. Stable to slightly increased disease burden as evidenced by gradually increasing size of the distal, left subhilar component of the primary neoplasm with stable size of hepatic metastases. 2. Stable mediastinal lymphadenopathy, including prominent paratracheal and aortopulmonary window nodes detailed above, as well as mixed lytic-sclerotic osseous lesions throughout the axial skeleton. 3. Increased postobstructive pneumonitis within the left lower lobe.   I personally reviewed the images/study and I agree with the findings as stated. This study was interpreted at [place], [place].   MACRO: None   Signed by: Yao Parham 1/24/2025 9:54 PM Dictation workstation:   [id number]        Assessment/Plan     Ms. 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 Initial imaging post C2 reviewed by Dr Kohli appeared to have sub-optimal response to treatment. Unfortunately after C4 imaging confirmed she had progressive disease. In the last visit Dr Kohli discussed all treatment options which included SOC [place] and also clinical trial [id number] with study drug ABBV-706 which is an ADC targeting SEZ6 with a topoisomerase 1 inhibitor payload. Patient opted to participate on study and completed screening requirements on study. Started treatment on 02.19.2025 on study and received couple of treatment today. Tolerated treatment well with the exception of getting diagnosed with the Flu during C1. Today he is in C2D8 follow up visit, she reports she feel better but has symptoms of constipation, taste alteration, cough, poor appetite and nausea. Today we discussed about management of symptoms. Patient over all looks clinically good we will proceed with  C2D1 treatment today. Plan discussed in detail with the patient. Patient in agreement with the plan of care and is aware to call the office and research nurse if experiencing any new symptoms. RTC per protocol.         Cancer Staging   No matching staging information was found for the patient.      Oncology History   SCLC (small cell lung carcinoma)   9/27/2024 Initial Diagnosis    SCLC (small cell lung carcinoma) (Multi)     10/4/2024 - 10/8/2024 Chemotherapy    CARBOplatin / Etoposide, 21 Day Cycles - Lung     10/4/2024 - 10/6/2024 Research Study Participant    ([facility]) [id number] - Atezolizumab + CARBOplatin / Etoposide / [id number], 21 Day Cycles  Plan Provider: JESSICA Hernandez  Treatment goal: Control  Line of treatment: First Line  Associated studies: (177Lu)[id number] with Carboplatin, Etoposide and Tislelizumab in Newly Diagnosed ES-SCLC     10/4/2024 - 1/17/2025 Chemotherapy    Durvalumab + CARBOplatin / Etoposide, 21 Day Cycles     10/28/2024 - 10/28/2024 Chemotherapy    Durvalumab + CARBOplatin / Etoposide, 21 Day Cycles     2/5/2025 - 2/5/2025 Chemotherapy    Durvalumab, 28 Day Cycles     2/19/2025 -  Research Study Participant    ([facility]) [id number] Part 1 - ABBV-706, 21 Day Cycles  Plan Provider: JESSICA Hernandez  Treatment goal: Control  Line of treatment: Second Line  Associated studies: ABBV-706 alone or in combination in subjects with advanced solid tumors

## 2025-03-19 NOTE — RESEARCH NOTES
RSH><[id number]><C2D8><[id number]>      DCRU NURSING VISIT NOTE  Study Name: [id number]- Part 1_Escalation- Monotherapy  IRB#: [id number]  [id number]  Protocol Version Dated:  6/15/2023  PI: Jd Kohli MD.      Time point: Cycle 2 - Day 8    Encounter Date: 03/19/2025  Encounter Time: 12:00 PM EDT  Encounter Department: [place] [id number] RESEARCH    Study Regimen and Dosing   For Oncology study, Refer [place] Treatment Plan for orders   Part 1_Escalation_Monotherapy - patients with advanced recurrent or refractory solid tumors (small cell lung cancer, neuroendocrine tumors or brain tumors) will receive gradually increasing doses of ABBV-706 to determine MTD (Maximum Tolerated Dose).  Cycle = 21-days.  ABBV-706 administered IV Day 1 of each cycle.     Dietary Guidelines   Regular diet         Admission and Prior to Starting Study Activities   Notify  when patient arrives to unit.  Patient review/update DCRU/[place] intake form.  Obtain vital signs after sitting at least 3 minutes. Record on flow sheet & in EMR.  Perform venipuncture for sample collection procedures. Do Not draw research samples from mediport/central line if used for infusion  Physical Exam including pulmonary exam & neuro assessment Days 8 & 15.         Study Specific Instructions and Documentation   If available enter a snapshot of performable actions:  1-Safety Labs   2-Discharge           @Subjective   Minal Sun is a 59 y.o. female and is here for a Research clinical visit.    Visit Provider: Jazzy Guerrero RN     Allergies:   Allergies   Allergen Reactions    Clindamycin Diarrhea    Erythromycin Diarrhea    Erythromycin Base Other and Nausea Only    Oxycodone-Acetaminophen Hives     anxiety       Objective     Vital Signs:    Vitals:    03/19/25 1231   BP: 133/86   Pulse: 104   Resp: 18   Temp: 35.8 °C (96.4 °F)   TempSrc: Temporal   SpO2: 93%   PainSc: 0-No pain       Physical  Exam     ASSESSMENT and PLAN:  Problem List Items Addressed This Visit       SCLC (small cell lung carcinoma)    Relevant Orders    CBC and Auto Differential    Comprehensive metabolic panel    Bilirubin, Direct    Gamma GT    Lactate dehydrogenase    Magnesium    Phosphorus    Adult diet Regular        Medications as of the completion of today's visit:  Current Outpatient Medications   Medication Sig Dispense Refill    acetaminophen (TylenoL) 325 mg tablet Take 2 tablets (650 mg) by mouth every 6 hours if needed for mild pain (1 - 3) or moderate pain (4 - 6). 30 tablet 0    ALPRAZolam (Xanax) 0.5 mg tablet Take 2 tablets (1 mg) by mouth 2 times a day as needed for anxiety for up to 15 days. 30 tablet 0    apixaban (Eliquis) 5 mg tablet Take 1 tablet (5 mg) by mouth 2 times a day. 180 tablet 0    budesonide (Pulmicort) 0.5 mg/2 mL nebulizer solution Take 2 mL (0.5 mg) by nebulization 2 times a day.      buPROPion (Wellbutrin) 75 mg tablet Take 1 tablet (75 mg) by mouth 3 times a day. (Patient not taking: Reported on 3/18/2025)      carbinoxamine maleate 4 mg tablet Take 4 mg by mouth 2 times a day.      escitalopram (Lexapro) 20 mg tablet Take 1 tablet (20 mg) by mouth once daily.      guaiFENesin (Mucinex) 600 mg 12 hr tablet Take 2 tablets (1,200 mg) by mouth 2 times a day. Do not crush, chew, or split. 120 tablet 11    levothyroxine (Synthroid) 50 mcg tablet Take 1 tablet (50 mcg) by mouth early in the morning.. Take on an empty stomach at the same time each day, either 30 to 60 minutes prior to breakfast 30 tablet 11    metFORMIN  mg 24 hr tablet Take 1 tablet (500 mg) by mouth 2 times a day. 180 tablet 3    naloxone (Narcan) 4 mg/0.1 mL nasal spray Administer 1 spray (4 mg) into affected nostril(s) if needed for opioid reversal. May repeat every 2-3 minutes if needed, alternating nostrils, until medical assistance becomes available. 2 each 0    OLANZapine (ZyPREXA) 5 mg tablet Take 1 tablet (5 mg) by  mouth once daily at bedtime. For 4 days starting the evening of treatment 4 tablet 3    omeprazole (PriLOSEC) 40 mg DR capsule Take 1 capsule (40 mg) by mouth once daily. 90 capsule 3    ondansetron (Zofran) 8 mg tablet Take 1 tablet (8 mg) by mouth every 8 hours if needed for nausea or vomiting. 30 tablet 5    oxyCODONE (Roxicodone) 5 mg immediate release tablet Take 1 tablet (5 mg) by mouth every 4 hours if needed for severe pain (7 - 10) for up to 60 doses. 60 tablet 0    ProAir HFA 90 mcg/actuation inhaler Inhale 2 puffs every 4 hours if needed.      prochlorperazine (Compazine) 10 mg tablet Take 1 tablet (10 mg) by mouth every 6 hours if needed for nausea or vomiting. 30 tablet 5    rosuvastatin (Crestor) 10 mg tablet Take 1 tablet (10 mg) by mouth once daily. 90 tablet 3    tiZANidine (Zanaflex) 2 mg capsule Take 1 capsule (2 mg) by mouth 3 times a day as needed for muscle spasms. 60 capsule 0    traZODone (Desyrel) 50 mg tablet Take 1 tablet (50 mg) by mouth once daily at bedtime.       No current facility-administered medications for this encounter.           Orders placed during today's visit:  Orders Placed This Encounter   Procedures    CBC and Auto Differential     Standing Status:   Standing     Number of Occurrences:   1     Order Specific Question:   Release result to MyChart     Answer:   Immediate [1]    Comprehensive metabolic panel     Standing Status:   Standing     Number of Occurrences:   1     Order Specific Question:   Release result to MyChart     Answer:   Immediate [1]    Bilirubin, Direct     Standing Status:   Standing     Number of Occurrences:   1     Order Specific Question:   Release result to MyChart     Answer:   Immediate [1]    Gamma GT     Standing Status:   Standing     Number of Occurrences:   1     Order Specific Question:   Release result to MyChart     Answer:   Immediate [1]    Lactate dehydrogenase     Standing Status:   Standing     Number of Occurrences:   1     Order  Specific Question:   Release result to MyChart     Answer:   Immediate [1]    Magnesium     Standing Status:   Standing     Number of Occurrences:   1     Order Specific Question:   Release result to MyChart     Answer:   Immediate [1]    Phosphorus     Standing Status:   Standing     Number of Occurrences:   1     Order Specific Question:   Release result to MyChart     Answer:   Immediate [1]    Adult diet Regular     Standing Status:   Standing     Number of Occurrences:   1     Order Specific Question:   Diet type     Answer:   Regular        [id number] - ABBV-706 alone or in combination in subjects with advanced solid tumors    Patient has no adverse events documented in the Research Adverse Events activity.        Day 8 Safety Labs   For Oncology study, Refer [place] Treatment Plan for orders         Drawn at 1241 per 23g butterfly in left forearm    Discharge Instructions   Discharge patient to home after study requirements completed or PK sample obtained.  Remind patient to return: on Day 15 for physical exam, vital signs & safety labs.  Discharge time: 1245     Elba Srivastava RN  03/19/25

## 2025-03-19 NOTE — ADDENDUM NOTE
Encounter addended by: NANCY Hernandez-GUZMAN on: 3/19/2025 2:27 PM   Actions taken: Level of Service modified, Clinical Note Signed, SmartForm saved

## 2025-03-21 ENCOUNTER — PHARMACY VISIT (OUTPATIENT)
Dept: PHARMACY | Facility: CLINIC | Age: 60
End: 2025-03-21
Payer: COMMERCIAL

## 2025-03-21 PROCEDURE — RXMED WILLOW AMBULATORY MEDICATION CHARGE

## 2025-03-26 ENCOUNTER — HOSPITAL ENCOUNTER (OUTPATIENT)
Dept: RESEARCH | Facility: HOSPITAL | Age: 60
Discharge: HOME | End: 2025-03-26
Payer: MEDICARE

## 2025-03-26 ENCOUNTER — HOSPITAL ENCOUNTER (OUTPATIENT)
Dept: RADIOLOGY | Facility: HOSPITAL | Age: 60
Discharge: HOME | End: 2025-03-26
Payer: MEDICARE

## 2025-03-26 VITALS
RESPIRATION RATE: 18 BRPM | DIASTOLIC BLOOD PRESSURE: 86 MMHG | OXYGEN SATURATION: 96 % | BODY MASS INDEX: 33.91 KG/M2 | HEART RATE: 100 BPM | TEMPERATURE: 97 F | WEIGHT: 185.41 LBS | SYSTOLIC BLOOD PRESSURE: 124 MMHG

## 2025-03-26 DIAGNOSIS — C34.90 SMALL CELL CARCINOMA OF LUNG, UNSPECIFIED LATERALITY, UNSPECIFIED PART OF LUNG: ICD-10-CM

## 2025-03-26 DIAGNOSIS — C34.90 SMALL CELL CARCINOMA OF LUNG, UNSPECIFIED LATERALITY, UNSPECIFIED PART OF LUNG: Primary | ICD-10-CM

## 2025-03-26 LAB
ALBUMIN SERPL BCP-MCNC: 4.1 G/DL (ref 3.4–5)
ALP SERPL-CCNC: 96 U/L (ref 33–136)
ALT SERPL W P-5'-P-CCNC: 10 U/L (ref 7–45)
ANION GAP SERPL CALC-SCNC: 13 MMOL/L (ref 10–20)
AST SERPL W P-5'-P-CCNC: 13 U/L (ref 9–39)
BASOPHILS # BLD AUTO: 0.03 X10*3/UL (ref 0–0.1)
BASOPHILS NFR BLD AUTO: 0.6 %
BILIRUB DIRECT SERPL-MCNC: 0 MG/DL (ref 0–0.3)
BILIRUB SERPL-MCNC: 0.3 MG/DL (ref 0–1.2)
BUN SERPL-MCNC: 12 MG/DL (ref 6–23)
CALCIUM SERPL-MCNC: 9.1 MG/DL (ref 8.6–10.6)
CHLORIDE SERPL-SCNC: 103 MMOL/L (ref 98–107)
CO2 SERPL-SCNC: 26 MMOL/L (ref 21–32)
CREAT SERPL-MCNC: 0.73 MG/DL (ref 0.5–1.05)
EGFRCR SERPLBLD CKD-EPI 2021: >90 ML/MIN/1.73M*2
EOSINOPHIL # BLD AUTO: 0.16 X10*3/UL (ref 0–0.7)
EOSINOPHIL NFR BLD AUTO: 3.3 %
ERYTHROCYTE [DISTWIDTH] IN BLOOD BY AUTOMATED COUNT: 17.2 % (ref 11.5–14.5)
GGT SERPL-CCNC: 71 U/L (ref 5–55)
GLUCOSE SERPL-MCNC: 131 MG/DL (ref 74–99)
HCT VFR BLD AUTO: 37.2 % (ref 36–46)
HGB BLD-MCNC: 12.4 G/DL (ref 12–16)
IMM GRANULOCYTES # BLD AUTO: 0 X10*3/UL (ref 0–0.7)
IMM GRANULOCYTES NFR BLD AUTO: 0 % (ref 0–0.9)
LDH SERPL L TO P-CCNC: 132 U/L (ref 84–246)
LYMPHOCYTES # BLD AUTO: 1.7 X10*3/UL (ref 1.2–4.8)
LYMPHOCYTES NFR BLD AUTO: 34.9 %
MAGNESIUM SERPL-MCNC: 2.23 MG/DL (ref 1.6–2.4)
MCH RBC QN AUTO: 31.7 PG (ref 26–34)
MCHC RBC AUTO-ENTMCNC: 33.3 G/DL (ref 32–36)
MCV RBC AUTO: 95 FL (ref 80–100)
MONOCYTES # BLD AUTO: 0.38 X10*3/UL (ref 0.1–1)
MONOCYTES NFR BLD AUTO: 7.8 %
NEUTROPHILS # BLD AUTO: 2.6 X10*3/UL (ref 1.2–7.7)
NEUTROPHILS NFR BLD AUTO: 53.4 %
NRBC BLD-RTO: 0 /100 WBCS (ref 0–0)
PHOSPHATE SERPL-MCNC: 4.3 MG/DL (ref 2.5–4.9)
PLATELET # BLD AUTO: 273 X10*3/UL (ref 150–450)
POTASSIUM SERPL-SCNC: 4 MMOL/L (ref 3.5–5.3)
PROT SERPL-MCNC: 6.8 G/DL (ref 6.4–8.2)
RBC # BLD AUTO: 3.91 X10*6/UL (ref 4–5.2)
SODIUM SERPL-SCNC: 138 MMOL/L (ref 136–145)
WBC # BLD AUTO: 4.9 X10*3/UL (ref 4.4–11.3)

## 2025-03-26 PROCEDURE — 85025 COMPLETE CBC W/AUTO DIFF WBC: CPT

## 2025-03-26 PROCEDURE — 84100 ASSAY OF PHOSPHORUS: CPT

## 2025-03-26 PROCEDURE — 74177 CT ABD & PELVIS W/CONTRAST: CPT

## 2025-03-26 PROCEDURE — 83735 ASSAY OF MAGNESIUM: CPT

## 2025-03-26 PROCEDURE — 2550000001 HC RX 255 CONTRASTS

## 2025-03-26 PROCEDURE — 83615 LACTATE (LD) (LDH) ENZYME: CPT

## 2025-03-26 PROCEDURE — 36415 COLL VENOUS BLD VENIPUNCTURE: CPT

## 2025-03-26 PROCEDURE — 82248 BILIRUBIN DIRECT: CPT

## 2025-03-26 PROCEDURE — 82977 ASSAY OF GGT: CPT

## 2025-03-26 PROCEDURE — 80053 COMPREHEN METABOLIC PANEL: CPT

## 2025-03-26 RX ADMIN — IOHEXOL 75 ML: 350 INJECTION, SOLUTION INTRAVENOUS at 13:44

## 2025-03-26 ASSESSMENT — ENCOUNTER SYMPTOMS
CHILLS: 0
ANOREXIA: 0
MYALGIAS: 0
FATIGUE: 1
ARTHRALGIAS: 0
SWOLLEN GLANDS: 0
COUGH: 1
SORE THROAT: 0
NUMBNESS: 0
NECK PAIN: 0
VISUAL CHANGE: 0
VERTIGO: 0
FEVER: 0
HEADACHES: 0
JOINT SWELLING: 0
ABDOMINAL PAIN: 0
DIAPHORESIS: 0
NAUSEA: 0
VOMITING: 0
CHANGE IN BOWEL HABIT: 0

## 2025-03-26 ASSESSMENT — PAIN SCALES - GENERAL: PAINLEVEL_OUTOF10: 5

## 2025-03-26 NOTE — RESEARCH NOTES
RSH><[id number]>C2D15><[id number]>      DCRU NURSING VISIT NOTE  Study Name: [id number]- Part 1_Escalation- Monotherapy  IRB#: [id number]  [id number]  Protocol Version Dated:  6/15/2023  PI: Jd Kohli MD.      Time point: Cycle 2 - Day 15    Encounter Date: 03/26/2025  Encounter Time: 1209  Encounter Department: [place] [id number] RESEARCH      Study Regimen and Dosing   For Oncology study, Refer [place] Treatment Plan for orders   Part 1_Escalation_Monotherapy - patients with advanced recurrent or refractory solid tumors (small cell lung cancer, neuroendocrine tumors or brain tumors) will receive gradually increasing doses of ABBV-706 to determine MTD (Maximum Tolerated Dose).  Cycle = 21-days.  ABBV-706 administered IV Day 1 of each cycle.     Dietary Guidelines   Regular diet     Admission and Prior to Starting Study Activities   Notify  when patient arrives to unit.  Patient review/update DCRU/[place] intake form.  Obtain vital signs after sitting at least 3 minutes. Record on flow sheet & in EMR.  Perform venipuncture for sample collection procedures. Do Not draw research samples from mediport/central line if used for infusion  Physical Exam including pulmonary exam & neuro assessment Days 8 & 15.       Study Specific Instructions and Documentation   If available enter a snapshot of performable actions:  1-Safety Labs   2-Discharge     Subjective   Minal Sun is a 59 y.o. female and is here for a Research clinical visit.    Visit Provider: NANCY Hernandez-CNP     Allergies:   Allergies   Allergen Reactions    Clindamycin Diarrhea    Erythromycin Diarrhea    Erythromycin Base Other and Nausea Only    Oxycodone-Acetaminophen Hives     anxiety       Objective     Vital Signs:    Vitals:    03/26/25 1212   BP: 124/86   Pulse: 100   Resp: 18   Temp: 36.1 °C (97 °F)   TempSrc: Temporal   SpO2: 96%   Weight: 84.1 kg (185 lb 6.5 oz)   PainSc:   5        Physical Exam     ASSESSMENT and PLAN:  Problem List Items Addressed This Visit       SCLC (small cell lung carcinoma)    Relevant Orders    Adult diet Regular    CBC and Auto Differential    Comprehensive metabolic panel    Bilirubin, Direct    Gamma GT    Lactate dehydrogenase    Magnesium    Phosphorus        Medications as of the completion of today's visit:  Current Outpatient Medications   Medication Sig Dispense Refill    acetaminophen (TylenoL) 325 mg tablet Take 2 tablets (650 mg) by mouth every 6 hours if needed for mild pain (1 - 3) or moderate pain (4 - 6). 30 tablet 0    ALPRAZolam (Xanax) 0.5 mg tablet Take 2 tablets (1 mg) by mouth 2 times a day as needed for anxiety for up to 15 days. 30 tablet 0    apixaban (Eliquis) 5 mg tablet Take 1 tablet (5 mg) by mouth 2 times a day. 180 tablet 0    budesonide (Pulmicort) 0.5 mg/2 mL nebulizer solution Take 2 mL (0.5 mg) by nebulization 2 times a day.      buPROPion (Wellbutrin) 75 mg tablet Take 1 tablet (75 mg) by mouth 3 times a day. (Patient not taking: Reported on 3/18/2025)      carbinoxamine maleate 4 mg tablet Take 4 mg by mouth 2 times a day.      escitalopram (Lexapro) 20 mg tablet Take 1 tablet (20 mg) by mouth once daily.      guaiFENesin (Mucinex) 600 mg 12 hr tablet Take 2 tablets (1,200 mg) by mouth 2 times a day. Do not crush, chew, or split. 120 tablet 11    levothyroxine (Synthroid) 50 mcg tablet Take 1 tablet (50 mcg) by mouth early in the morning.. Take on an empty stomach at the same time each day, either 30 to 60 minutes prior to breakfast 30 tablet 11    lidocaine-prilocaine (Emla) 2.5-2.5 % cream Apply topically a thin film of medication to [facility] site 45 mts prior to being accessed and cover with band aid 30 g 0    metFORMIN  mg 24 hr tablet Take 1 tablet (500 mg) by mouth 2 times a day. 180 tablet 3    naloxone (Narcan) 4 mg/0.1 mL nasal spray Administer 1 spray (4 mg) into affected nostril(s) if needed for opioid  reversal. May repeat every 2-3 minutes if needed, alternating nostrils, until medical assistance becomes available. 2 each 0    OLANZapine (ZyPREXA) 5 mg tablet Take 1 tablet (5 mg) by mouth once daily at bedtime. For 4 days starting the evening of treatment 4 tablet 3    omeprazole (PriLOSEC) 40 mg DR capsule Take 1 capsule (40 mg) by mouth once daily. 90 capsule 3    ondansetron (Zofran) 8 mg tablet Take 1 tablet (8 mg) by mouth every 8 hours if needed for nausea or vomiting. 30 tablet 5    oxyCODONE (Roxicodone) 5 mg immediate release tablet Take 1 tablet (5 mg) by mouth every 4 hours if needed for severe pain (7 - 10) for up to 60 doses. 60 tablet 0    ProAir HFA 90 mcg/actuation inhaler Inhale 2 puffs every 4 hours if needed.      prochlorperazine (Compazine) 10 mg tablet Take 1 tablet (10 mg) by mouth every 6 hours if needed for nausea or vomiting. 30 tablet 5    rosuvastatin (Crestor) 10 mg tablet Take 1 tablet (10 mg) by mouth once daily. 90 tablet 3    tiZANidine (Zanaflex) 2 mg capsule Take 1 capsule (2 mg) by mouth 3 times a day as needed for muscle spasms. 60 capsule 0    traZODone (Desyrel) 50 mg tablet Take 1 tablet (50 mg) by mouth once daily at bedtime.       No current facility-administered medications for this encounter.           Orders placed during today's visit:  Orders Placed This Encounter   Procedures    CBC and Auto Differential     Standing Status:   Standing     Number of Occurrences:   1     Order Specific Question:   Release result to MyChart     Answer:   Immediate [1]    Comprehensive metabolic panel     Standing Status:   Standing     Number of Occurrences:   1     Order Specific Question:   Release result to MyChart     Answer:   Immediate [1]    Bilirubin, Direct     Standing Status:   Standing     Number of Occurrences:   1     Order Specific Question:   Release result to MyChart     Answer:   Immediate [1]    Gamma GT     Standing Status:   Standing     Number of Occurrences:   1      Order Specific Question:   Release result to MyChart     Answer:   Immediate [1]    Lactate dehydrogenase     Standing Status:   Standing     Number of Occurrences:   1     Order Specific Question:   Release result to MyChart     Answer:   Immediate [1]    Magnesium     Standing Status:   Standing     Number of Occurrences:   1     Order Specific Question:   Release result to MyChart     Answer:   Immediate [1]    Phosphorus     Standing Status:   Standing     Number of Occurrences:   1     Order Specific Question:   Release result to MyChart     Answer:   Immediate [1]    Adult diet Regular     Standing Status:   Standing     Number of Occurrences:   1     Order Specific Question:   Diet type     Answer:   Regular         [id number] - ABBV-706 alone or in combination in subjects with advanced solid tumors    Patient has no adverse events documented in the Research Adverse Events activity.        Day 15 Safety Labs   For Oncology study, Refer [place] Treatment Plan for orders   Drawn at 1225 per left wrist with 23g butterfly  Discharge Instructions   Discharge patient to home after study requirements completed or PK sample obtained.  Discharge time: 1240     Elba Srivastava RN  03/26/25

## 2025-03-26 NOTE — PROGRESS NOTES
Patient ID: Minal Sun is a 59 y.o. female.    DIAGNOSIS     Small cell carcinoma of the lung.  Date of diagnosis is September 27, 2024 from a bronchoscopic biopsy of a subcarinal lymph node.  Immunohistochemistry positive for TTF-1, INSM1, synaptophysin and chromogranin, negative for p40, RB immunostain shows loss of nuclear expression.        STAGING     Clinical T4, N2, M1 C, stage IVc, extensive stage disease        CURRENT SITES OF DISEASE      Left lung, mediastinum, left hilum of the lung, liver, intra-abdominal lymph nodes in the portacaval and periportal region, bone metastases        MOLECULAR GENOMICS     Test Name: [employer] xF+ (XF.V3) dated October 2024     Molecular Findings:  * Gene: [id number], Variant: Missense variant (exon 1) - GOF, Potentially Actionable, p.R88Q (Allele Frequency - 0.3%)  * Gene: TP53, Variant: Missense variant - LOF, Biologically Relevant, p.R249G (Allele Frequency - 70.1%)  * Gene: RB1, Variant: Splice region variant - LOF, Biologically Relevant, c.540-1G>T, Splice Site (Allele Frequency - 56.8%)        Test Name: [employer] xT (XT.V4)  Laboratory Name: [facility]  Specimen Source: Lymph node, level 7  Collection Date: 27-Sep-2024     Molecular Findings:  * Gene: TP53, Variant: Missense variant - LOF, Biologically Relevant, p.R249G (Allele Frequency - 96.2%)  * Gene: RB1, Variant: Splice region variant - LOF, Biologically Relevant, c.540-1G>T, Splice Site (Allele Frequency - 96%)  * Gene: KMT2D, Variant: Stop gain - LOF, Biologically Relevant, p.*, Nonsense (Allele Frequency - 41.9%)  * Biomarker: Microsatellite Instability, Status: stable  * Biomarker: Tumor Mutation [place] (2.6 Muts/Mb, Percentile: 33)        SERUM TUMOR MARKERS     Baseline LDH within normal limits  C1D1 on Study LDH was 274. Trending down at C2D1         PRIOR THERAPY     Combination chemotherapy and immunotherapy.  Initially enrolled on a clinical trial of Lutathera and underwent octreotide scan  which was positive.  However she decided to come off study and did not receive Lutathera with her chemo.  Chemotherapy started October 6, 2024.  Immunotherapy completed added with cycle #2 11.06.2024.  Minor response observed after 2 cycles of treatment. PD observed after C4.        CURRENT THERAPY     2.19.2025: Started on Phase 1 study [id number] with study drug [id number] which is an ADC targeting SEZ6 with a topoisomerase 1 inhibitor payload     CURRENT ONCOLOGICAL PROBLEMS    Cough and shortness of breath significantly improved with systemic treatment.  Pulmonary Embolism  Immunotherapy induced hypothyroidism  Neoplasm non cardiac chest/back pain        HISTORY OF PRESENT ILLNESS     This is a 59-year-old patient.  She states that she was feeling sick toward the end of spring of this year with some sinus problems.  Was seen by her allergist and was treated with antibiotics.  She was also seen at 1 point by primary care and steroids and antibiotics and inhalers were given.  She did not have any resolution and then ultimately developed a little bit of the hemoptysis which led to a chest x-ray showing an abnormality and finally a CT scan of the chest.The CT scan of the chest was done as a lung cancer screening low-dose CT and completed on September 20, 2024.  This demonstrated a extensive infiltrative mediastinal and hilar mass.  There was narrowing of the left mainstem bronchus and lower lobe bronchus secondary to extrinsic compression.  The predominant growth was within the left hilum and subcarinal region.  She underwent a bronchoscopy dated September 27, 2024 showing splaying of the main alanna.  There was mild erosion of a lymph node into the left mainstem bronchus.  There was erythema and mild erosions of an endobronchial tumor along the medial border.  Moderate to severe stenosis of the left lower lobe bronchus to 75%.        PAST MEDICAL HISTORY     Lumbar surgery about 30 years ago   hysterectomy in  2000  Diabetes  COPD  Right eye exophthalmus  herpes zoster        SOCIAL HISTORY     Patient lives with her sister.  She lives in [facility].  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  She started smoking at the age of 14 and continues to smoke at the age of 59.  Has smoked for 45 years on average 1 pack/day.        CURRENT MEDS     See medication list, meds reviewed, includes metformin, rosuvastatin, been done so Nied inhalers, Wellbutrin escitalopram, trazodone        ALLERGIES     Patient denies any drug allergies        FAMILY HISTORY      Mother had bladder cancer and possibly breast cancer.     Subjective    Today 3.26.2025 Patient in clinic for C2D15 on [id number].   Patient reports she continues to feel better. Denies any in her symptoms of nausea and constipation. She reports her noncardiac chest pain is improved, she continues to have left upper quadrant and back pain. Today she denies any denies any dizziness, lightheadedness, headaches, rash/itching, chills or fever, chest pain or chest tightness, painful inflammation/ulceration oral mucous membranes, vomiting, diarrhea/constipation, hematemesis /hemoptysis, hematuria/rectal bleeding, urinary symptoms, swelling extremities, weakness, or peripheral neuropathy. ROS as above. Remainder of 10 point review of systems elicited and otherwise unremarkable.     Cancer  Associated symptoms include coughing and fatigue. Pertinent negatives include no abdominal pain, anorexia, arthralgias, change in bowel habit, chest pain, chills, congestion, diaphoresis, fever, headaches, joint swelling, myalgias, nausea, neck pain, numbness, rash, sore throat, swollen glands, urinary symptoms, vertigo, visual change or vomiting.     Objective    BSA: 1.92 meters squared  /86 (BP Location: Left arm, Patient Position: Sitting)   Pulse 100    "Temp 36.1 °C (97 °F) (Temporal)   Resp 18   Wt 84.1 kg (185 lb 6.5 oz)   SpO2 96%   BMI 33.91 kg/m²      Daily Weight  03/26/25 : 84.1 kg (185 lb 6.5 oz)  03/18/25 : 82.1 kg (181 lb)  03/12/25 : 82.4 kg (181 lb 10.5 oz)  02/19/25 : 85.1 kg (187 lb 9.8 oz)  02/10/25 : 85.4 kg (188 lb 4.4 oz)         3/13/2025    12:10 PM 3/18/2025    12:17 PM 3/18/2025    12:20 PM 3/18/2025     1:33 PM 3/18/2025     2:21 PM 3/19/2025    12:31 PM 3/26/2025    12:12 PM   Vitals   Systolic 121 113  166 137 133 124   Diastolic 83 86  99 67 86 86   BP Location Left arm     Left arm Left arm   Heart Rate 106 109  84 82 104 100   Temp 37.2 °C (99 °F) 36.4 °C (97.5 °F) 2.5 °C (36.5 °F)   35.8 °C (96.4 °F) 36.1 °C (97 °F)   Resp 20 17  14 13 18 18   Height  1.575 m (5' 2\")        Weight (lb)  181     185.41   BMI  33.11 kg/m2     33.91 kg/m2   BSA (m2)  1.9 m2     1.92 m2       Physical Exam  Constitutional:       General: She is not in acute distress.     Appearance: Normal appearance. She is not ill-appearing, toxic-appearing or diaphoretic.   HENT:      Nose: No congestion or rhinorrhea.      Mouth/Throat:      Pharynx: No oropharyngeal exudate or posterior oropharyngeal erythema.   Eyes:      General: No scleral icterus.     Conjunctiva/sclera: Conjunctivae normal.   Cardiovascular:      Rate and Rhythm: Normal rate and regular rhythm.      Pulses: Normal pulses.      Heart sounds: Normal heart sounds. No murmur heard.     No friction rub. No gallop.   Pulmonary:      Effort: Pulmonary effort is normal. No respiratory distress.      Breath sounds: No stridor. No wheezing, rhonchi or rales.      Comments: Inspiratory wheezing  Chest:      Chest wall: No tenderness.   Abdominal:      General: There is no distension.      Palpations: There is no mass.      Tenderness: There is no abdominal tenderness. There is no guarding or rebound.   Musculoskeletal:      Cervical back: No tenderness.      Right lower leg: No edema.      Left lower " leg: No edema.   Lymphadenopathy:      Cervical: No cervical adenopathy.   Skin:     General: Skin is warm.      Coloration: Skin is not jaundiced.      Findings: No bruising or lesion.   Neurological:      General: No focal deficit present.      Mental Status: She is oriented to person, place, and time.   Psychiatric:         Mood and Affect: Mood normal.         Behavior: Behavior normal.     Performance Status:  ECOG 1: Restricted in physically strenuous activity but ambulatory and able to carry out work of a light or sedentary nature, e.g., light house work, office work.     Hospital Outpatient Visit on 03/26/2025   Component Date Value Ref Range Status    WBC 03/26/2025 4.9  4.4 - 11.3 x10*3/uL Final    nRBC 03/26/2025 0.0  0.0 - 0.0 /100 WBCs Final    RBC 03/26/2025 3.91 (L)  4.00 - 5.20 x10*6/uL Final    Hemoglobin 03/26/2025 12.4  12.0 - 16.0 g/dL Final    Hematocrit 03/26/2025 37.2  36.0 - 46.0 % Final    MCV 03/26/2025 95  80 - 100 fL Final    MCH 03/26/2025 31.7  26.0 - 34.0 pg Final    MCHC 03/26/2025 33.3  32.0 - 36.0 g/dL Final    RDW 03/26/2025 17.2 (H)  11.5 - 14.5 % Final    Platelets 03/26/2025 273  150 - 450 x10*3/uL Final    Neutrophils % 03/26/2025 53.4  40.0 - 80.0 % Final    Immature Granulocytes %, Automated 03/26/2025 0.0  0.0 - 0.9 % Final    Lymphocytes % 03/26/2025 34.9  13.0 - 44.0 % Final    Monocytes % 03/26/2025 7.8  2.0 - 10.0 % Final    Eosinophils % 03/26/2025 3.3  0.0 - 6.0 % Final    Basophils % 03/26/2025 0.6  0.0 - 2.0 % Final    Neutrophils Absolute 03/26/2025 2.60  1.20 - 7.70 x10*3/uL Final    Immature Granulocytes Absolute, Au* 03/26/2025 0.00  0.00 - 0.70 x10*3/uL Final    Lymphocytes Absolute 03/26/2025 1.70  1.20 - 4.80 x10*3/uL Final    Monocytes Absolute 03/26/2025 0.38  0.10 - 1.00 x10*3/uL Final    Eosinophils Absolute 03/26/2025 0.16  0.00 - 0.70 x10*3/uL Final    Basophils Absolute 03/26/2025 0.03  0.00 - 0.10 x10*3/uL Final    Glucose 03/26/2025 131 (H)  74 - 99  mg/dL Final    Sodium 03/26/2025 138  136 - 145 mmol/L Final    Potassium 03/26/2025 4.0  3.5 - 5.3 mmol/L Final    Chloride 03/26/2025 103  98 - 107 mmol/L Final    Bicarbonate 03/26/2025 26  21 - 32 mmol/L Final    Anion Gap 03/26/2025 13  10 - 20 mmol/L Final    Urea Nitrogen 03/26/2025 12  6 - 23 mg/dL Final    Creatinine 03/26/2025 0.73  0.50 - 1.05 mg/dL Final    eGFR 03/26/2025 >90  >60 mL/min/1.73m*2 Final    Calcium 03/26/2025 9.1  8.6 - 10.6 mg/dL Final    Albumin 03/26/2025 4.1  3.4 - 5.0 g/dL Final    Alkaline Phosphatase 03/26/2025 96  33 - 136 U/L Final    Total Protein 03/26/2025 6.8  6.4 - 8.2 g/dL Final    AST 03/26/2025 13  9 - 39 U/L Final    Bilirubin, Total 03/26/2025 0.3  0.0 - 1.2 mg/dL Final    ALT 03/26/2025 10  7 - 45 U/L Final    Bilirubin, Direct 03/26/2025 0.0  0.0 - 0.3 mg/dL Final    GGT 03/26/2025 71 (H)  5 - 55 U/L Final    LDH 03/26/2025 132  84 - 246 U/L Final    Magnesium 03/26/2025 2.23  1.60 - 2.40 mg/dL Final    Phosphorus 03/26/2025 4.3  2.5 - 4.9 mg/dL Final   Hospital Outpatient Visit on 03/19/2025   Component Date Value Ref Range Status    WBC 03/19/2025 5.0  4.4 - 11.3 x10*3/uL Final    nRBC 03/19/2025 0.0  0.0 - 0.0 /100 WBCs Final    RBC 03/19/2025 4.04  4.00 - 5.20 x10*6/uL Final    Hemoglobin 03/19/2025 12.6  12.0 - 16.0 g/dL Final    Hematocrit 03/19/2025 38.1  36.0 - 46.0 % Final    MCV 03/19/2025 94  80 - 100 fL Final    MCH 03/19/2025 31.2  26.0 - 34.0 pg Final    MCHC 03/19/2025 33.1  32.0 - 36.0 g/dL Final    RDW 03/19/2025 16.0 (H)  11.5 - 14.5 % Final    Platelets 03/19/2025 254  150 - 450 x10*3/uL Final    Neutrophils % 03/19/2025 63.2  40.0 - 80.0 % Final    Immature Granulocytes %, Automated 03/19/2025 0.4  0.0 - 0.9 % Final    Lymphocytes % 03/19/2025 28.4  13.0 - 44.0 % Final    Monocytes % 03/19/2025 6.0  2.0 - 10.0 % Final    Eosinophils % 03/19/2025 1.4  0.0 - 6.0 % Final    Basophils % 03/19/2025 0.6  0.0 - 2.0 % Final    Neutrophils Absolute  03/19/2025 3.19  1.20 - 7.70 x10*3/uL Final    Immature Granulocytes Absolute, Au* 03/19/2025 0.02  0.00 - 0.70 x10*3/uL Final    Lymphocytes Absolute 03/19/2025 1.43  1.20 - 4.80 x10*3/uL Final    Monocytes Absolute 03/19/2025 0.30  0.10 - 1.00 x10*3/uL Final    Eosinophils Absolute 03/19/2025 0.07  0.00 - 0.70 x10*3/uL Final    Basophils Absolute 03/19/2025 0.03  0.00 - 0.10 x10*3/uL Final    Glucose 03/19/2025 142 (H)  74 - 99 mg/dL Final    Sodium 03/19/2025 138  136 - 145 mmol/L Final    Potassium 03/19/2025 4.1  3.5 - 5.3 mmol/L Final    Chloride 03/19/2025 101  98 - 107 mmol/L Final    Bicarbonate 03/19/2025 25  21 - 32 mmol/L Final    Anion Gap 03/19/2025 16  10 - 20 mmol/L Final    Urea Nitrogen 03/19/2025 13  6 - 23 mg/dL Final    Creatinine 03/19/2025 0.65  0.50 - 1.05 mg/dL Final    eGFR 03/19/2025 >90  >60 mL/min/1.73m*2 Final    Calcium 03/19/2025 9.2  8.6 - 10.6 mg/dL Final    Albumin 03/19/2025 4.3  3.4 - 5.0 g/dL Final    Alkaline Phosphatase 03/19/2025 95  33 - 136 U/L Final    Total Protein 03/19/2025 6.6  6.4 - 8.2 g/dL Final    AST 03/19/2025 13  9 - 39 U/L Final    Bilirubin, Total 03/19/2025 0.5  0.0 - 1.2 mg/dL Final    ALT 03/19/2025 10  7 - 45 U/L Final    Bilirubin, Direct 03/19/2025 0.1  0.0 - 0.3 mg/dL Final    GGT 03/19/2025 82 (H)  5 - 55 U/L Final    LDH 03/19/2025 145  84 - 246 U/L Final    Magnesium 03/19/2025 2.09  1.60 - 2.40 mg/dL Final    Phosphorus 03/19/2025 4.3  2.5 - 4.9 mg/dL Final   Hospital Outpatient Visit on 03/18/2025   Component Date Value Ref Range Status    POCT Prothrombin time 03/18/2025 12.3  seconds In process    POCT INR 03/18/2025 1.1   In process    POCT Glucose 03/18/2025 125 (H)  74 - 99 mg/dL Final   Hospital Outpatient Visit on 03/13/2025   Component Date Value Ref Range Status    Extra Tube 03/13/2025 Hold for add-ons.   Final    Extra Tube 03/13/2025 Hold for add-ons.   Final   Hospital Outpatient Visit on 03/12/2025   Component Date Value Ref Range  Status    WBC 03/12/2025 5.6  4.4 - 11.3 x10*3/uL Final    nRBC 03/12/2025 0.0  0.0 - 0.0 /100 WBCs Final    RBC 03/12/2025 4.24  4.00 - 5.20 x10*6/uL Final    Hemoglobin 03/12/2025 13.0  12.0 - 16.0 g/dL Final    Hematocrit 03/12/2025 39.5  36.0 - 46.0 % Final    MCV 03/12/2025 93  80 - 100 fL Final    MCH 03/12/2025 30.7  26.0 - 34.0 pg Final    MCHC 03/12/2025 32.9  32.0 - 36.0 g/dL Final    RDW 03/12/2025 16.3 (H)  11.5 - 14.5 % Final    Platelets 03/12/2025 378  150 - 450 x10*3/uL Final    Neutrophils % 03/12/2025 51.2  40.0 - 80.0 % Final    Immature Granulocytes %, Automated 03/12/2025 0.4  0.0 - 0.9 % Final    Lymphocytes % 03/12/2025 39.8  13.0 - 44.0 % Final    Monocytes % 03/12/2025 6.3  2.0 - 10.0 % Final    Eosinophils % 03/12/2025 1.8  0.0 - 6.0 % Final    Basophils % 03/12/2025 0.5  0.0 - 2.0 % Final    Neutrophils Absolute 03/12/2025 2.87  1.20 - 7.70 x10*3/uL Final    Immature Granulocytes Absolute, Au* 03/12/2025 0.02  0.00 - 0.70 x10*3/uL Final    Lymphocytes Absolute 03/12/2025 2.23  1.20 - 4.80 x10*3/uL Final    Monocytes Absolute 03/12/2025 0.35  0.10 - 1.00 x10*3/uL Final    Eosinophils Absolute 03/12/2025 0.10  0.00 - 0.70 x10*3/uL Final    Basophils Absolute 03/12/2025 0.03  0.00 - 0.10 x10*3/uL Final    Glucose 03/12/2025 135 (H)  74 - 99 mg/dL Final    Sodium 03/12/2025 138  136 - 145 mmol/L Final    Potassium 03/12/2025 4.5  3.5 - 5.3 mmol/L Final    Chloride 03/12/2025 101  98 - 107 mmol/L Final    Bicarbonate 03/12/2025 25  21 - 32 mmol/L Final    Anion Gap 03/12/2025 17  10 - 20 mmol/L Final    Urea Nitrogen 03/12/2025 13  6 - 23 mg/dL Final    Creatinine 03/12/2025 0.78  0.50 - 1.05 mg/dL Final    eGFR 03/12/2025 87  >60 mL/min/1.73m*2 Final    Calcium 03/12/2025 9.1  8.6 - 10.6 mg/dL Final    Albumin 03/12/2025 4.3  3.4 - 5.0 g/dL Final    Alkaline Phosphatase 03/12/2025 98  33 - 136 U/L Final    Total Protein 03/12/2025 6.7  6.4 - 8.2 g/dL Final    AST 03/12/2025 15  9 - 39 U/L  Final    Bilirubin, Total 03/12/2025 0.5  0.0 - 1.2 mg/dL Final    ALT 03/12/2025 10  7 - 45 U/L Final    Bilirubin, Direct 03/12/2025 0.1  0.0 - 0.3 mg/dL Final    GGT 03/12/2025 111 (H)  5 - 55 U/L Final    LDH 03/12/2025 192  84 - 246 U/L Final    Magnesium 03/12/2025 2.14  1.60 - 2.40 mg/dL Final    Phosphorus 03/12/2025 4.8  2.5 - 4.9 mg/dL Final    Color, Urine 03/12/2025 Yellow  Light-Yellow, Yellow, Dark-Yellow Final    Appearance, Urine 03/12/2025 Clear  Clear Final    Specific Gravity, Urine 03/12/2025 1.027  1.005 - 1.035 Final    pH, Urine 03/12/2025 5.5  5.0, 5.5, 6.0, 6.5, 7.0, 7.5, 8.0 Final    Protein, Urine 03/12/2025 20 (TRACE)  NEGATIVE, 10 (TRACE), 20 (TRACE) mg/dL Final    Glucose, Urine 03/12/2025 Normal  Normal mg/dL Final    Blood, Urine 03/12/2025 NEGATIVE  NEGATIVE mg/dL Final    Ketones, Urine 03/12/2025 TRACE (A)  NEGATIVE mg/dL Final    Bilirubin, Urine 03/12/2025 NEGATIVE  NEGATIVE mg/dL Final    Urobilinogen, Urine 03/12/2025 2 (1+) (A)  Normal mg/dL Final    Nitrite, Urine 03/12/2025 NEGATIVE  NEGATIVE Final    Leukocyte Esterase, Urine 03/12/2025 NEGATIVE  NEGATIVE Final    WBC, Urine 03/12/2025 1-5  1-5, NONE /HPF Final    RBC, Urine 03/12/2025 1-2  NONE, 1-2, 3-5 /HPF Final    Squamous Epithelial Cells, Urine 03/12/2025 1-9 (SPARSE)  Reference range not established. /HPF Final    Mucus, Urine 03/12/2025 3+  Reference range not established. /LPF Final    Extra Tube 03/12/2025 Hold for add-ons.   Final    Extra Tube 03/12/2025 Hold for add-ons.   Final    Extra Tube 03/12/2025 Hold for add-ons.   Final    Extra Tube 03/12/2025 Hold for add-ons.   Final    Extra Tube 03/12/2025 Hold for add-ons.   Final    Extra Tube 03/12/2025 Hold for add-ons.   Final    Extra Tube 03/12/2025 Hold for add-ons.   Final    Extra Tube 03/12/2025 Hold for add-ons.   Final    Extra Tube 03/12/2025 Hold for add-ons.   Final   Hospital Outpatient Visit on 03/05/2025   Component Date Value Ref Range Status     WBC 03/05/2025 5.4  4.4 - 11.3 x10*3/uL Final    nRBC 03/05/2025 0.0  0.0 - 0.0 /100 WBCs Final    RBC 03/05/2025 4.26  4.00 - 5.20 x10*6/uL Final    Hemoglobin 03/05/2025 13.1  12.0 - 16.0 g/dL Final    Hematocrit 03/05/2025 39.2  36.0 - 46.0 % Final    MCV 03/05/2025 92  80 - 100 fL Final    MCH 03/05/2025 30.8  26.0 - 34.0 pg Final    MCHC 03/05/2025 33.4  32.0 - 36.0 g/dL Final    RDW 03/05/2025 16.5 (H)  11.5 - 14.5 % Final    Platelets 03/05/2025 405  150 - 450 x10*3/uL Final    Neutrophils % 03/05/2025 61.8  40.0 - 80.0 % Final    Immature Granulocytes %, Automated 03/05/2025 0.6  0.0 - 0.9 % Final    Lymphocytes % 03/05/2025 29.9  13.0 - 44.0 % Final    Monocytes % 03/05/2025 5.6  2.0 - 10.0 % Final    Eosinophils % 03/05/2025 1.7  0.0 - 6.0 % Final    Basophils % 03/05/2025 0.4  0.0 - 2.0 % Final    Neutrophils Absolute 03/05/2025 3.32  1.20 - 7.70 x10*3/uL Final    Immature Granulocytes Absolute, Au* 03/05/2025 0.03  0.00 - 0.70 x10*3/uL Final    Lymphocytes Absolute 03/05/2025 1.60  1.20 - 4.80 x10*3/uL Final    Monocytes Absolute 03/05/2025 0.30  0.10 - 1.00 x10*3/uL Final    Eosinophils Absolute 03/05/2025 0.09  0.00 - 0.70 x10*3/uL Final    Basophils Absolute 03/05/2025 0.02  0.00 - 0.10 x10*3/uL Final    Glucose 03/05/2025 128 (H)  74 - 99 mg/dL Final    Sodium 03/05/2025 137  136 - 145 mmol/L Final    Potassium 03/05/2025 4.0  3.5 - 5.3 mmol/L Final    Chloride 03/05/2025 100  98 - 107 mmol/L Final    Bicarbonate 03/05/2025 24  21 - 32 mmol/L Final    Anion Gap 03/05/2025 17  10 - 20 mmol/L Final    Urea Nitrogen 03/05/2025 11  6 - 23 mg/dL Final    Creatinine 03/05/2025 0.65  0.50 - 1.05 mg/dL Final    eGFR 03/05/2025 >90  >60 mL/min/1.73m*2 Final    Calcium 03/05/2025 8.8  8.6 - 10.6 mg/dL Final    Albumin 03/05/2025 4.2  3.4 - 5.0 g/dL Final    Alkaline Phosphatase 03/05/2025 108  33 - 136 U/L Final    Total Protein 03/05/2025 6.9  6.4 - 8.2 g/dL Final    AST 03/05/2025 15  9 - 39 U/L Final     Bilirubin, Total 03/05/2025 0.4  0.0 - 1.2 mg/dL Final    ALT 03/05/2025 18  7 - 45 U/L Final    Bilirubin, Direct 03/05/2025 0.1  0.0 - 0.3 mg/dL Final    GGT 03/05/2025 170 (H)  5 - 55 U/L Final    LDH 03/05/2025 239  84 - 246 U/L Final    Magnesium 03/05/2025 2.19  1.60 - 2.40 mg/dL Final    Phosphorus 03/05/2025 3.4  2.5 - 4.9 mg/dL Final    Extra Tube 03/05/2025 Hold for add-ons.   Final    Extra Tube 03/05/2025 Hold for add-ons.   Final    Extra Tube 03/05/2025 Hold for add-ons.   Final          CT pelvis w IV contrast    Result Date: 2/13/2025  Impression: Restaging of metastatic small-cell lung carcinoma prior to initiation of clinical trial (to be combined with CT chest and abdomen 01/24/2025): 1. Stable tumor burden as evidenced by stable mixed lytic-sclerotic osseous lesions throughout the pelvis and lower lumbar vertebrae. 2. No evidence of new disease within the pelvis.   I personally reviewed the images/study and I agree with the findings as stated. This study was interpreted at [place], [place].   MACRO: None   Signed by: Yao Parham 2/13/2025 9:25 PM Dictation workstation:   [id number]    MR brain w and wo IV contrast    Result Date: 2/6/2025  Impression: No new mass or pathologic enhancement was identified.   Nonspecific white matter changes most likely reflect chronic small-vessel ischemic disease given the age of the patient.   Likely right-sided proptosis stable in retrospect compared to the prior exam of 10/01/2024. Given prior masslike enlargement of the inferior rectus muscle on CT 10/07/2017 the finding which is not seen on the current limited exam of the orbits observation could nonetheless reflect residua of prior disease. Please correlate clinically and consider dedicated orbital MRI or follow-up orbital CT for further evaluation if clinically indicated   MACRO: None   Signed by: Emiliano Kong 2/6/2025 10:23 AM Dictation workstation:    [id number]    CT abdomen w IV contrast    Result Date: 1/24/2025  Impression: Restaging of metastatic small-cell lung carcinoma, as compared to CT 12/19/2024: 1. Stable to slightly increased disease burden as evidenced by gradually increasing size of the distal, left subhilar component of the primary neoplasm with stable size of hepatic metastases. 2. Stable mediastinal lymphadenopathy, including prominent paratracheal and aortopulmonary window nodes detailed above, as well as mixed lytic-sclerotic osseous lesions throughout the axial skeleton. 3. Increased postobstructive pneumonitis within the left lower lobe.   I personally reviewed the images/study and I agree with the findings as stated. This study was interpreted at [place], [place].   MACRO: None   Signed by: Yao Parham 1/24/2025 9:54 PM Dictation workstation:   [id number]    CT chest w IV contrast    Result Date: 1/24/2025  Impression: Restaging of metastatic small-cell lung carcinoma, as compared to CT 12/19/2024: 1. Stable to slightly increased disease burden as evidenced by gradually increasing size of the distal, left subhilar component of the primary neoplasm with stable size of hepatic metastases. 2. Stable mediastinal lymphadenopathy, including prominent paratracheal and aortopulmonary window nodes detailed above, as well as mixed lytic-sclerotic osseous lesions throughout the axial skeleton. 3. Increased postobstructive pneumonitis within the left lower lobe.   I personally reviewed the images/study and I agree with the findings as stated. This study was interpreted at [place], [place].   MACRO: None   Signed by: Yao Parham 1/24/2025 9:54 PM Dictation workstation:   [id number]        Assessment/Plan     Ms. Sun is a very nice 59-year-old patient with extensive stage small cell carcinoma s/p 4 cycles of chemo-immunotherapy with last treatment  date was on 01.17.2025. Initial imaging post C2 reviewed by Dr Kohli appeared to have sub-optimal response to treatment. Unfortunately after C4 imaging confirmed she had progressive disease. In the last visit Dr Kohli discussed all treatment options which included SOC Taralatamab and also clinical trial [id number] with study drug [id number] which is an ADC targeting [id number] with a topoisomerase 1 inhibitor payload. Patient opted to participate on study and completed screening requirements on study. Started treatment on 02.19.2025 on study and received couple of treatment today. Tolerated treatment well with the exception of getting diagnosed with the Flu during C1. Today he is in C2D15 follow up visit, she reports she feel better but continues to have symptoms of constipation, cough, pain and nausea. Patient over all looks clinically good we will proceed protocol. Plan for scans prior to next visit. Plan discussed in detail with the patient. Patient in agreement with the plan of care and is aware to call the office and research nurse if experiencing any new symptoms. RTC per protocol.         Cancer Staging   No matching staging information was found for the patient.      Oncology History   SCLC (small cell lung carcinoma)   9/27/2024 Initial Diagnosis    SCLC (small cell lung carcinoma) (Multi)     10/4/2024 - 10/8/2024 Chemotherapy    CARBOplatin / Etoposide, 21 Day Cycles - Lung     10/4/2024 - 10/6/2024 Research Study Participant    ([facility]) [id number] - Atezolizumab + CARBOplatin / Etoposide / [id number], 21 Day Cycles  Plan Provider: JESSICA Hernandez  Treatment goal: Control  Line of treatment: First Line  Associated studies: (177Lu)[id number] with Carboplatin, Etoposide and Tislelizumab in Newly Diagnosed ES-SCLC     10/4/2024 - 1/17/2025 Chemotherapy    Durvalumab + CARBOplatin / Etoposide, 21 Day Cycles     10/28/2024 - 10/28/2024 Chemotherapy    Durvalumab + CARBOplatin / Etoposide, 21 Day Cycles      2/5/2025 - 2/5/2025 Chemotherapy    Durvalumab, 28 Day Cycles     2/19/2025 -  Research Study Participant    ([facility]) [id number] Part 1 - ABBV-706, 21 Day Cycles  Plan Provider: NANCY Hernandez-CNP  Treatment goal: Control  Line of treatment: Second Line  Associated studies: ABBV-706 alone or in combination in subjects with advanced solid tumors

## 2025-03-31 ENCOUNTER — DOCUMENTATION (OUTPATIENT)
Dept: HEMATOLOGY/ONCOLOGY | Facility: HOSPITAL | Age: 60
End: 2025-03-31
Payer: MEDICARE

## 2025-03-31 DIAGNOSIS — C34.90 SMALL CELL CARCINOMA OF LUNG, UNSPECIFIED LATERALITY, UNSPECIFIED PART OF LUNG: ICD-10-CM

## 2025-03-31 DIAGNOSIS — C34.90 SMALL CELL CARCINOMA OF LUNG, UNSPECIFIED LATERALITY, UNSPECIFIED PART OF LUNG: Primary | ICD-10-CM

## 2025-03-31 RX ORDER — HYDROCODONE BITARTRATE AND HOMATROPINE METHYLBROMIDE ORAL SOLUTION 5; 1.5 MG/5ML; MG/5ML
5 LIQUID ORAL EVERY 6 HOURS PRN
Qty: 100 ML | Refills: 0 | Status: SHIPPED | OUTPATIENT
Start: 2025-03-31 | End: 2025-04-05

## 2025-03-31 NOTE — PROGRESS NOTES
"Reached out to Ms Sun to see how she was feeling today. She states that her typical oncological chest pain seems to have gotten worse during the night. Sometime last night she reported that while she was coughing she felt a \"pop\" on the left side of her chest. Pain does decrease when she is laying still and slightly on her left side but the pain increases when she coughs. She states that she has been taking her oxycodone for pain but she does not feel that this is as effective as it was when she first started taking it. She has also been taking the guaifenesin as prescribed. She denies fever/chills/SOB/hemoptysis/  cardiac chest pain. She is unable to tell if pain is bone or musculoskeletal. Suggested that she may want to go to ER for further evaluation but patient responded \"Are they going to be able to do anything to fix this pain? If it's a broken rib, there is nothing they can do\".   Baldev Ferraro to prescribe hycodan to help eliminate cough.                  "
Negative Screen

## 2025-04-01 ENCOUNTER — TELEPHONE (OUTPATIENT)
Dept: ADMISSION | Facility: HOSPITAL | Age: 60
End: 2025-04-01
Payer: MEDICARE

## 2025-04-01 DIAGNOSIS — C34.90 SMALL CELL CARCINOMA OF LUNG, UNSPECIFIED LATERALITY, UNSPECIFIED PART OF LUNG: ICD-10-CM

## 2025-04-01 DIAGNOSIS — I26.94 MULTIPLE SUBSEGMENTAL PULMONARY EMBOLI WITHOUT ACUTE COR PULMONALE: ICD-10-CM

## 2025-04-01 RX ORDER — AMOXICILLIN AND CLAVULANATE POTASSIUM 875; 125 MG/1; MG/1
875 TABLET, FILM COATED ORAL 2 TIMES DAILY
Qty: 20 TABLET | Refills: 0 | Status: SHIPPED | OUTPATIENT
Start: 2025-04-01 | End: 2025-04-11

## 2025-04-01 RX ORDER — METHYLPREDNISOLONE 4 MG/1
TABLET ORAL
Qty: 21 TABLET | Refills: 0 | Status: SHIPPED | OUTPATIENT
Start: 2025-04-01 | End: 2025-04-07

## 2025-04-01 RX ORDER — OXYCODONE HYDROCHLORIDE 5 MG/1
5 TABLET ORAL EVERY 4 HOURS PRN
Qty: 20 TABLET | Refills: 0 | Status: SHIPPED | OUTPATIENT
Start: 2025-04-01

## 2025-04-01 NOTE — TELEPHONE ENCOUNTER
Kelly from [facility] called and said they need clarification on a RX you placed for the Oxycodone 5mg. They said it said up to 60 doses and 20 doses prescribed. Also, 1 or 2 pills. They are asking you call them back at [phone] to clarify some questions they have. Messaged Baldev Ferraro.  PLEASE CALL AFTER 2 SINCE THEY ARE ON LUNCH PER PHARMACY.

## 2025-04-02 ENCOUNTER — TELEPHONE (OUTPATIENT)
Dept: PALLIATIVE MEDICINE | Facility: HOSPITAL | Age: 60
End: 2025-04-02

## 2025-04-02 ENCOUNTER — EDUCATION (OUTPATIENT)
Dept: HEMATOLOGY/ONCOLOGY | Facility: HOSPITAL | Age: 60
End: 2025-04-02

## 2025-04-02 ENCOUNTER — NURSE TRIAGE (OUTPATIENT)
Dept: HEMATOLOGY/ONCOLOGY | Facility: HOSPITAL | Age: 60
End: 2025-04-02

## 2025-04-02 ENCOUNTER — OFFICE VISIT (OUTPATIENT)
Dept: HEMATOLOGY/ONCOLOGY | Facility: HOSPITAL | Age: 60
End: 2025-04-02
Payer: MEDICARE

## 2025-04-02 ENCOUNTER — OFFICE VISIT (OUTPATIENT)
Dept: PALLIATIVE MEDICINE | Facility: HOSPITAL | Age: 60
End: 2025-04-02
Payer: MEDICARE

## 2025-04-02 ENCOUNTER — HOSPITAL ENCOUNTER (OUTPATIENT)
Dept: RESEARCH | Facility: HOSPITAL | Age: 60
Discharge: HOME | End: 2025-04-02
Payer: MEDICARE

## 2025-04-02 ENCOUNTER — HOSPITAL ENCOUNTER (OUTPATIENT)
Dept: CARDIOLOGY | Facility: HOSPITAL | Age: 60
Discharge: HOME | End: 2025-04-02
Payer: MEDICARE

## 2025-04-02 VITALS
DIASTOLIC BLOOD PRESSURE: 80 MMHG | OXYGEN SATURATION: 92 % | RESPIRATION RATE: 18 BRPM | BODY MASS INDEX: 33.75 KG/M2 | TEMPERATURE: 97.3 F | SYSTOLIC BLOOD PRESSURE: 143 MMHG | HEART RATE: 79 BPM | WEIGHT: 184.53 LBS

## 2025-04-02 DIAGNOSIS — G89.3 CANCER RELATED PAIN: ICD-10-CM

## 2025-04-02 DIAGNOSIS — C34.90 SMALL CELL CARCINOMA OF LUNG, UNSPECIFIED LATERALITY, UNSPECIFIED PART OF LUNG: ICD-10-CM

## 2025-04-02 DIAGNOSIS — R63.0 DECREASED APPETITE: ICD-10-CM

## 2025-04-02 DIAGNOSIS — I31.39 OTHER PERICARDIAL EFFUSION (NONINFLAMMATORY) (HHS-HCC): ICD-10-CM

## 2025-04-02 DIAGNOSIS — Z51.5 PALLIATIVE CARE ENCOUNTER: ICD-10-CM

## 2025-04-02 DIAGNOSIS — C34.90 SMALL CELL CARCINOMA OF LUNG, UNSPECIFIED LATERALITY, UNSPECIFIED PART OF LUNG: Primary | ICD-10-CM

## 2025-04-02 DIAGNOSIS — K59.03 CONSTIPATION DUE TO OPIOID THERAPY: Primary | ICD-10-CM

## 2025-04-02 DIAGNOSIS — R11.0 NAUSEA: ICD-10-CM

## 2025-04-02 DIAGNOSIS — T40.2X5A CONSTIPATION DUE TO OPIOID THERAPY: Primary | ICD-10-CM

## 2025-04-02 LAB
ALBUMIN SERPL BCP-MCNC: 4.2 G/DL (ref 3.4–5)
ALP SERPL-CCNC: 91 U/L (ref 33–136)
ALT SERPL W P-5'-P-CCNC: 9 U/L (ref 7–45)
ANION GAP SERPL CALC-SCNC: 14 MMOL/L (ref 10–20)
AORTIC VALVE MEAN GRADIENT: 4 MMHG
AORTIC VALVE PEAK VELOCITY: 1.42 M/S
APPEARANCE UR: CLEAR
AST SERPL W P-5'-P-CCNC: 13 U/L (ref 9–39)
AV PEAK GRADIENT: 8 MMHG
AVA (PEAK VEL): 2.17 CM2
AVA (VTI): 1.95 CM2
BASOPHILS # BLD AUTO: 0.01 X10*3/UL (ref 0–0.1)
BASOPHILS NFR BLD AUTO: 0.2 %
BILIRUB DIRECT SERPL-MCNC: 0.1 MG/DL (ref 0–0.3)
BILIRUB SERPL-MCNC: 0.4 MG/DL (ref 0–1.2)
BILIRUB UR STRIP.AUTO-MCNC: NEGATIVE MG/DL
BUN SERPL-MCNC: 13 MG/DL (ref 6–23)
CALCIUM SERPL-MCNC: 9.3 MG/DL (ref 8.6–10.6)
CHLORIDE SERPL-SCNC: 99 MMOL/L (ref 98–107)
CO2 SERPL-SCNC: 29 MMOL/L (ref 21–32)
COLOR UR: YELLOW
CREAT SERPL-MCNC: 0.8 MG/DL (ref 0.5–1.05)
EGFRCR SERPLBLD CKD-EPI 2021: 84 ML/MIN/1.73M*2
EJECTION FRACTION APICAL 4 CHAMBER: 63.6
EJECTION FRACTION: 64 %
EOSINOPHIL # BLD AUTO: 0.04 X10*3/UL (ref 0–0.7)
EOSINOPHIL NFR BLD AUTO: 0.6 %
ERYTHROCYTE [DISTWIDTH] IN BLOOD BY AUTOMATED COUNT: 17.2 % (ref 11.5–14.5)
GGT SERPL-CCNC: 53 U/L (ref 5–55)
GLUCOSE SERPL-MCNC: 141 MG/DL (ref 74–99)
GLUCOSE UR STRIP.AUTO-MCNC: NORMAL MG/DL
HCT VFR BLD AUTO: 37.6 % (ref 36–46)
HGB BLD-MCNC: 12.4 G/DL (ref 12–16)
HOLD SPECIMEN: NORMAL
HYALINE CASTS #/AREA URNS AUTO: ABNORMAL /LPF
IMM GRANULOCYTES # BLD AUTO: 0.02 X10*3/UL (ref 0–0.7)
IMM GRANULOCYTES NFR BLD AUTO: 0.3 % (ref 0–0.9)
KETONES UR STRIP.AUTO-MCNC: ABNORMAL MG/DL
LDH SERPL L TO P-CCNC: 144 U/L (ref 84–246)
LEFT ATRIUM VOLUME AREA LENGTH INDEX BSA: 27.8 ML/M2
LEFT VENTRICLE INTERNAL DIMENSION DIASTOLE: 4.2 CM (ref 3.5–6)
LEFT VENTRICULAR OUTFLOW TRACT DIAMETER: 2 CM
LEUKOCYTE ESTERASE UR QL STRIP.AUTO: ABNORMAL
LYMPHOCYTES # BLD AUTO: 1.57 X10*3/UL (ref 1.2–4.8)
LYMPHOCYTES NFR BLD AUTO: 25.1 %
MAGNESIUM SERPL-MCNC: 2.07 MG/DL (ref 1.6–2.4)
MCH RBC QN AUTO: 31.7 PG (ref 26–34)
MCHC RBC AUTO-ENTMCNC: 33 G/DL (ref 32–36)
MCV RBC AUTO: 96 FL (ref 80–100)
MITRAL VALVE E/A RATIO: 0.72
MONOCYTES # BLD AUTO: 0.35 X10*3/UL (ref 0.1–1)
MONOCYTES NFR BLD AUTO: 5.6 %
MUCOUS THREADS #/AREA URNS AUTO: ABNORMAL /LPF
NEUTROPHILS # BLD AUTO: 4.26 X10*3/UL (ref 1.2–7.7)
NEUTROPHILS NFR BLD AUTO: 68.2 %
NITRITE UR QL STRIP.AUTO: NEGATIVE
NRBC BLD-RTO: 0 /100 WBCS (ref 0–0)
PH UR STRIP.AUTO: 5.5 [PH]
PHOSPHATE SERPL-MCNC: 4.3 MG/DL (ref 2.5–4.9)
PLATELET # BLD AUTO: 356 X10*3/UL (ref 150–450)
POTASSIUM SERPL-SCNC: 4.1 MMOL/L (ref 3.5–5.3)
PROT SERPL-MCNC: 7 G/DL (ref 6.4–8.2)
PROT UR STRIP.AUTO-MCNC: ABNORMAL MG/DL
RBC # BLD AUTO: 3.91 X10*6/UL (ref 4–5.2)
RBC # UR STRIP.AUTO: NEGATIVE MG/DL
RBC #/AREA URNS AUTO: ABNORMAL /HPF
RIGHT VENTRICLE FREE WALL PEAK S': 11.9 CM/S
RIGHT VENTRICLE PEAK SYSTOLIC PRESSURE: 28 MMHG
SODIUM SERPL-SCNC: 138 MMOL/L (ref 136–145)
SP GR UR STRIP.AUTO: 1.03
SQUAMOUS #/AREA URNS AUTO: ABNORMAL /HPF
TRICUSPID ANNULAR PLANE SYSTOLIC EXCURSION: 2.6 CM
UROBILINOGEN UR STRIP.AUTO-MCNC: ABNORMAL MG/DL
WBC # BLD AUTO: 6.3 X10*3/UL (ref 4.4–11.3)
WBC #/AREA URNS AUTO: ABNORMAL /HPF

## 2025-04-02 PROCEDURE — 83735 ASSAY OF MAGNESIUM: CPT

## 2025-04-02 PROCEDURE — 83615 LACTATE (LD) (LDH) ENZYME: CPT

## 2025-04-02 PROCEDURE — 76376 3D RENDER W/INTRP POSTPROCES: CPT

## 2025-04-02 PROCEDURE — 84100 ASSAY OF PHOSPHORUS: CPT

## 2025-04-02 PROCEDURE — 80053 COMPREHEN METABOLIC PANEL: CPT

## 2025-04-02 PROCEDURE — 2560000001 HC RX 256 EXPERIMENTAL DRUGS

## 2025-04-02 PROCEDURE — 82977 ASSAY OF GGT: CPT

## 2025-04-02 PROCEDURE — 96413 CHEMO IV INFUSION 1 HR: CPT

## 2025-04-02 PROCEDURE — 99215 OFFICE O/P EST HI 40 MIN: CPT | Mod: 25

## 2025-04-02 PROCEDURE — 81001 URINALYSIS AUTO W/SCOPE: CPT

## 2025-04-02 PROCEDURE — 99215 OFFICE O/P EST HI 40 MIN: CPT | Mod: 25 | Performed by: INTERNAL MEDICINE

## 2025-04-02 PROCEDURE — 82248 BILIRUBIN DIRECT: CPT

## 2025-04-02 PROCEDURE — 93005 ELECTROCARDIOGRAM TRACING: CPT | Mod: DCRU

## 2025-04-02 PROCEDURE — 85025 COMPLETE CBC W/AUTO DIFF WBC: CPT

## 2025-04-02 RX ORDER — DIPHENHYDRAMINE HYDROCHLORIDE 50 MG/ML
50 INJECTION, SOLUTION INTRAMUSCULAR; INTRAVENOUS AS NEEDED
Status: DISCONTINUED | OUTPATIENT
Start: 2025-04-02 | End: 2025-04-03 | Stop reason: HOSPADM

## 2025-04-02 RX ORDER — FAMOTIDINE 10 MG/ML
20 INJECTION, SOLUTION INTRAVENOUS ONCE AS NEEDED
Status: DISCONTINUED | OUTPATIENT
Start: 2025-04-02 | End: 2025-04-03 | Stop reason: HOSPADM

## 2025-04-02 RX ORDER — HEPARIN 100 UNIT/ML
500 SYRINGE INTRAVENOUS AS NEEDED
Status: DISCONTINUED | OUTPATIENT
Start: 2025-04-02 | End: 2025-04-03 | Stop reason: HOSPADM

## 2025-04-02 RX ORDER — PROCHLORPERAZINE MALEATE 10 MG
10 TABLET ORAL EVERY 6 HOURS PRN
Status: DISCONTINUED | OUTPATIENT
Start: 2025-04-02 | End: 2025-04-03 | Stop reason: HOSPADM

## 2025-04-02 RX ORDER — EPINEPHRINE 1 MG/ML
0.3 INJECTION, SOLUTION, CONCENTRATE INTRAVENOUS EVERY 5 MIN PRN
Status: DISCONTINUED | OUTPATIENT
Start: 2025-04-02 | End: 2025-04-03 | Stop reason: HOSPADM

## 2025-04-02 RX ORDER — HEPARIN SODIUM,PORCINE/PF 10 UNIT/ML
50 SYRINGE (ML) INTRAVENOUS AS NEEDED
OUTPATIENT
Start: 2025-04-02

## 2025-04-02 RX ORDER — OLANZAPINE 5 MG/1
5 TABLET ORAL
Qty: 30 TABLET | Refills: 0 | Status: SHIPPED | OUTPATIENT
Start: 2025-04-02 | End: 2025-05-02

## 2025-04-02 RX ORDER — AMOXICILLIN 250 MG
3 CAPSULE ORAL 2 TIMES DAILY
Qty: 180 TABLET | Refills: 11 | Status: SHIPPED | OUTPATIENT
Start: 2025-04-02 | End: 2026-04-02

## 2025-04-02 RX ORDER — OXYCODONE HYDROCHLORIDE 5 MG/1
5-10 TABLET ORAL EVERY 4 HOURS PRN
Qty: 360 TABLET | Refills: 0 | Status: SHIPPED | OUTPATIENT
Start: 2025-04-02 | End: 2025-05-02

## 2025-04-02 RX ORDER — HEPARIN 100 UNIT/ML
500 SYRINGE INTRAVENOUS AS NEEDED
OUTPATIENT
Start: 2025-04-02

## 2025-04-02 RX ORDER — PROCHLORPERAZINE EDISYLATE 5 MG/ML
10 INJECTION INTRAMUSCULAR; INTRAVENOUS EVERY 6 HOURS PRN
Status: DISCONTINUED | OUTPATIENT
Start: 2025-04-02 | End: 2025-04-03 | Stop reason: HOSPADM

## 2025-04-02 RX ORDER — ADHESIVE BANDAGE
5 BANDAGE TOPICAL DAILY PRN
Qty: 360 ML | Refills: 0 | Status: SHIPPED | OUTPATIENT
Start: 2025-04-02 | End: 2025-04-12

## 2025-04-02 RX ORDER — ALBUTEROL SULFATE 0.83 MG/ML
3 SOLUTION RESPIRATORY (INHALATION) AS NEEDED
Status: DISCONTINUED | OUTPATIENT
Start: 2025-04-02 | End: 2025-04-03 | Stop reason: HOSPADM

## 2025-04-02 RX ADMIN — Medication 110.6 MG: at 13:29

## 2025-04-02 ASSESSMENT — ENCOUNTER SYMPTOMS
HEADACHES: 0
ARTHRALGIAS: 0
NECK PAIN: 0
NAUSEA: 0
VERTIGO: 0
ABDOMINAL PAIN: 0
CHANGE IN BOWEL HABIT: 0
NUMBNESS: 0
VOMITING: 0
FEVER: 0
ANOREXIA: 0
SORE THROAT: 0
MYALGIAS: 0
JOINT SWELLING: 0
COUGH: 1
DIAPHORESIS: 0
SWOLLEN GLANDS: 0
CHILLS: 0
VISUAL CHANGE: 0
FATIGUE: 1

## 2025-04-02 NOTE — RESEARCH NOTES
Research Note Treatment Day    Minal Sun is here today for treatment on [id number]. Today is C3D1. Procedures completed per protocol. AE's and con-meds reviewed with patient. Patient states that her cough has become better with the addition of Hycodan. She states that she did have a small bowel movement yesterday and has been taking Senna S/Miralax. She did vomit yesterday x1 episode. Pain is 5/10 right now. Lab work resulted and is ok to treat. Patient will have ECHO performed today to assess pericardial effusion. Patient will also be evaluated by our paliative care team today for her constipation/pain medication management. Patient is aware of treatment plan and will return tomorrow 4/3 for C3D2.    [x]   Received treatment as planned   OR  []    Treatment delayed; patient calendar updated as required   Treatment delayed because:    []   AE    []   Physician Discretion    []   Clinical Deterioration or Progression     []   Other    Education Documentation  Constipation, taught by Jazzy Guerrero RN at 4/2/2025  9:10 AM.  Learner: Patient  Readiness: Acceptance  Method: Explanation  Response: Verbalizes Understanding    Treatment Plan and Schedule, taught by Jazzy Guerrero RN at 4/2/2025  9:10 AM.  Learner: Patient  Readiness: Acceptance  Method: Explanation  Response: Verbalizes Understanding    General Medication Information, taught by Jazzy Guerrero RN at 4/2/2025  9:10 AM.  Learner: Patient  Readiness: Acceptance  Method: Explanation  Response: Verbalizes Understanding    Supportive Medications, taught by Jazzy Guerrero RN at 4/2/2025  9:10 AM.  Learner: Patient  Readiness: Acceptance  Method: Explanation  Response: Verbalizes Understanding    Comprehensive Metabolic Panel (CMP), taught by Jazzy Guerrero RN at 4/2/2025  9:10 AM.  Learner: Patient  Readiness: Acceptance  Method: Explanation  Response: Verbalizes Understanding    Complete Blood Count with Differential (CBC w/ Diff), taught by  Jazzy Guerrero RN at 4/2/2025  9:10 AM.  Learner: Patient  Readiness: Acceptance  Method: Explanation  Response: Verbalizes Understanding    Education Comments  No comments found.

## 2025-04-02 NOTE — TELEPHONE ENCOUNTER
Pa required for oxycodone increased dose. Sent to [facility] for processing. Patient has some medication at home.

## 2025-04-02 NOTE — PROGRESS NOTES
SUPPORTIVE AND PALLIATIVE ONCOLOGY CONSULT - OUTPATIENT      SERVICE DATE: 4/2/2025    Medical Oncologist: Jd Kohli MD   Radiation Oncologist: No care team member to display  Primary Physician: Christopher D'Amico  [phone]    REASON FOR CONSULT/CHIEF CONSULT COMPLAINT: pain management, other symptom control  (decreased appetite, nausea, constipation), and Introduction to Supportive and Palliative Oncology Services    Subjective   HISTORY OF PRESENT ILLNESS: Minal Sun is a 59 y.o. female who presents with  history of COPD, PE, SCC of the lung, diagnosed 9/2024 (mets: mediastinum, liver, bone). On 2/5/2025 she was started on the Phase 1 study [id number] with study drug ABBV-706 (ADC targeting SEZ6 with a topoisomerase 1 inhibitor payload).     Pain Assessment:  Pain Score:  6  Location:  L rib area  Education:  review of current regimen      Symptom Assessment:  Pain:somewhat- encouraged to trial the Oxycodone 5mg 1-2 tab dosing started by the clinical trials team  Headache: none  Dizziness:none  Lack of energy: a little  Difficulty sleeping: somewhat- chronic difficulty falling asleep  Worrying: none  Anxiety: none  Depression: a little- well managed with Escitalopram  Pain in mouth/swallowing: none  Dry mouth: none  Taste changes: none  Shortness of breath: none  Lack of appetite: somewhat   Nausea: somewhat  Vomiting: none  Constipation: very much- taking Senna 2 tabs BID  Diarrhea: none  Sore muscles: none  Numbness or tingling in hands/feet/other: none  Weight loss: none  Other: none      Information obtained from: chart review and interview of patient  ______________________________________________________________________     Oncology History   SCLC (small cell lung carcinoma)   9/27/2024 Initial Diagnosis    SCLC (small cell lung carcinoma) (Multi)     10/4/2024 - 10/8/2024 Chemotherapy    CARBOplatin / Etoposide, 21 Day Cycles - Lung     10/4/2024 - 10/6/2024 Research Study Participant     ([facility]) [id number] - Atezolizumab + CARBOplatin / Etoposide / [id number], 21 Day Cycles  Plan Provider: JESSICA Hernandez  Treatment goal: Control  Line of treatment: First Line  Associated studies: (177Lu)[id number] with Carboplatin, Etoposide and Tislelizumab in Newly Diagnosed ES-SCLC     10/4/2024 - 1/17/2025 Chemotherapy    Durvalumab + CARBOplatin / Etoposide, 21 Day Cycles     10/28/2024 - 10/28/2024 Chemotherapy    Durvalumab + CARBOplatin / Etoposide, 21 Day Cycles     2/5/2025 - 2/5/2025 Chemotherapy    Durvalumab, 28 Day Cycles     2/19/2025 -  Research Study Participant    ([facility]) [id number] Part 1 - ABBV-706, 21 Day Cycles  Plan Provider: JESSICA Hernandez  Treatment goal: Control  Line of treatment: Second Line  Associated studies: ABBV-706 alone or in combination in subjects with advanced solid tumors         Past Medical History:   Diagnosis Date    Chronic obstructive pulmonary disease, unspecified     Chronic obstructive pulmonary disease    Encounter for general adult medical examination without abnormal findings 11/18/2020    Encounter for preventive health examination    Personal history of other endocrine, nutritional and metabolic disease     History of diabetes mellitus    Post-traumatic stress disorder, unspecified     PTSD (post-traumatic stress disorder)    Type 2 diabetes mellitus      Past Surgical History:   Procedure Laterality Date    BACK SURGERY  09/17/2013    Lower Back Surgery    HYSTERECTOMY  07/01/2016    Hysterectomy     No family history on file.     SOCIAL HISTORY  Support system - lives with sisterRosy   Social History:  reports that she quit smoking about 4 years ago. Her smoking use included cigarettes. She started smoking about 42 years ago. She has a 38.3 pack-year smoking history. She has never used smokeless tobacco. She reports that she does not drink alcohol and does not use drugs.  retired    Zoroastrian and Importance of Zoroastrian:  unknown    REVIEW OF SYSTEMS  Review of systems negative unless noted in HPI.       Objective     Palliative Performance Scale % (PPS)       Current Outpatient Medications   Medication Instructions    acetaminophen (TYLENOL) 650 mg, oral, Every 6 hours PRN    ALPRAZolam (XANAX) 1 mg, oral, 2 times daily PRN    amoxicillin-pot clavulanate (Augmentin) 875-125 mg tablet 875 mg, oral, 2 times daily    budesonide (PULMICORT) 0.5 mg, 2 times daily RT    buPROPion (WELLBUTRIN) 75 mg, 3 times daily    carbinoxamine maleate 4 mg, 2 times daily    Eliquis 5 mg, oral, 2 times daily    escitalopram (LEXAPRO) 20 mg, Daily RT    guaiFENesin (MUCINEX) 1,200 mg, oral, 2 times daily, Do not crush, chew, or split.    hydrocodone-homatropine (Hycodan) 5-1.5 mg/5 mL syrup 5 mL, oral, Every 6 hours PRN    levothyroxine (SYNTHROID) 50 mcg, oral, Daily, Take on an empty stomach at the same time each day, either 30 to 60 minutes prior to breakfast    lidocaine-prilocaine (Emla) 2.5-2.5 % cream Apply topically a thin film of medication to [facility] site 45 mts prior to being accessed and cover with band aid    metFORMIN XR (GLUCOPHAGE-XR) 500 mg, oral, 2 times daily    methylPREDNISolone (Medrol Dospak) 4 mg tablets Follow schedule on package instructions    naloxone (NARCAN) 4 mg, nasal, As needed, May repeat every 2-3 minutes if needed, alternating nostrils, until medical assistance becomes available.    omeprazole (PRILOSEC) 40 mg, oral, Daily RT    ondansetron (ZOFRAN) 8 mg, oral, Every 8 hours PRN    oxyCODONE (ROXICODONE) 5 mg, oral, Every 4 hours PRN    oxyCODONE (ROXICODONE) 5 mg, oral, Every 4 hours PRN, Can take 2 pill if need for severe pain (7-10)    ProAir HFA 90 mcg/actuation inhaler 2 puffs, Every 4 hours PRN    prochlorperazine (COMPAZINE) 10 mg, oral, Every 6 hours PRN    rosuvastatin (CRESTOR) 10 mg, oral, Daily RT    traZODone (DESYREL) 50 mg, Nightly       Allergies:   Allergies   Allergen Reactions    Clindamycin  Diarrhea    Erythromycin Diarrhea    Erythromycin Base Other and Nausea Only    Oxycodone-Acetaminophen Hives     anxiety     Results for orders placed or performed during the hospital encounter of 04/02/25 (from the past 96 hours)   Onco-Echo Complete (Strain & 3D)   Result Value Ref Range    AV pk jose 1.42 m/s    AV mn grad 4 mmHg    LVOT diam 2.00 cm    MV E/A ratio 0.72     LA vol index A/L 27.8 ml/m2    Tricuspid annular plane systolic excursion 2.6 cm    LV EF 64 %    RV free wall pk S' 11.90 cm/s    LVIDd 4.20 cm    RVSP 28.0 mmHg    Aortic Valve Area by Continuity of VTI 1.95 cm2    Aortic Valve Area by Continuity of Peak Velocity 2.17 cm2    AV pk grad 8 mmHg    LV A4C EF 63.6      *Note: Due to a large number of results and/or encounters for the requested time period, some results have not been displayed. A complete set of results can be found in Results Review.                PHYSICAL EXAMINATION  Vital Signs:       3/18/2025    12:20 PM 3/18/2025     1:33 PM 3/18/2025     2:21 PM 3/19/2025    12:31 PM 3/26/2025    12:12 PM 4/2/2025     9:07 AM 4/2/2025     1:16 PM   Vitals   Systolic  166 137 133 124 126 123   Diastolic  99 67 86 86 75 84   BP Location    Left arm Left arm Right arm Right arm   Heart Rate  84 82 104 100 98 78   Temp 2.5 °C (36.5 °F)   35.8 °C (96.4 °F) 36.1 °C (97 °F) 36.6 °C (97.9 °F) 36.7 °C (98.1 °F)   Resp  14 13 18 18 20 20   Weight (lb)     185.41 184.53    BMI     33.91 kg/m2 33.75 kg/m2    BSA (m2)     1.92 m2 1.91 m2    Visit Report      Report Report       Vital signs reviewed     Physical Exam  Constitutional:       Appearance: She is ill-appearing.   HENT:      Mouth/Throat:      Mouth: Mucous membranes are moist.   Eyes:      Extraocular Movements: Extraocular movements intact.      Pupils: Pupils are equal, round, and reactive to light.   Cardiovascular:      Rate and Rhythm: Normal rate.   Pulmonary:      Effort: Pulmonary effort is normal.   Abdominal:      Palpations:  Abdomen is soft.   Musculoskeletal:         General: Normal range of motion.   Skin:     General: Skin is warm and dry.   Neurological:      Mental Status: She is alert and oriented to person, place, and time.   Psychiatric:         Mood and Affect: Mood normal.         Behavior: Behavior normal.         ASSESSMENT/PLAN    Pain  Pain is: cancer related pain  Type: visceral  Pain control: sub-optimally controlled  Home regimen:   Oxycodone 1-2 tabs q4h as needed (taking 1 tab ~3x/day, but understands to take more for breakthrough pain)  Discussed possibly adding ER opiate in the future  Intolerances/previously tried: n/a  Personalized pain goal: feel comfortable throughout the day    Opioid Use  Medication Management:   - OARRS report reviewed with no aberrant behavior; consistent with  prescriptions/records and patient history  - MED ~20.  Overdose Risk Score 310.   This has been discussed with patient.   - We will continue to closely monitor the patient for signs of prescription misuse including UDS, OARRS review and subjective reports at each visit.  - Yes concurrent benzodiazepine use   - I am a provider who either is or has consulted and collaborated with a provider certified in Hospice and Palliative Medicine and have conducted a face-face visit and examination for this patient.  - Routine Urine Drug Screen complete next visit  - Controlled Substance Agreement complete next visit  - Specifically discussed that controlled substance prescriptions will only be provided by our group as outlined in the completed agreement  - Prescribed naloxone pending  - Red Flags: n/a     Nausea   Intermittent nausea with vomiting related to chemotherapy, opioids, and constipation   Decreased appetite  Related to malignancy, chemotherapy, and taste changes  Nutrition consult- consider at future visit  Current regimen:    Olanzapine 5mg daily in the evening to help with appetite, nausea, and sleep- encouraged to take in the  evening, can adjust this dose in the future, as appropriate  Prochlorperazine 10mg q6h as needed    Constipation   At risk for constipation related to opioids,  currently constipated   Current regimen:   Up Senna to 3 tabs BID  Miralax 17g daily as needed  Start Milk of Magnesia 400mg (5ml) daily as needed when it has been 2+ days without a BM  Encouraged adequate hydration, fiber intake, and physical activity to promote bowel motility     Altered Mood  Chronic anxiety and depression related to health concerns   controlled with home regimen  Current regimen:   Escitalopram 20mg daily  See Olanzapine note    Sleeping Difficulty:  Impaired sleep related to stress, pain  See Olanzapine note    Supportive Interventions: n/a    Introduction to Supportive and Palliative Oncology:  Spoke with patient   Introduced the role and philosophy of Supportive and Palliative oncology in the evaluation and management of symptoms during cancer treatment  Palliative care was introduced as a service for patients with serious illness to help with symptoms, assist with goals of care conversations, navigate complex decision making, improve quality of life for patients, and provide support both patients and families.  Patient seemed to appreciate the extra layer of support.    Medical Decision Making/Goals of Care/Advance Care Planning:  Patient's current clinical condition, including diagnosis, prognosis, and management plan, and goals of care were discussed.   Life limiting disease: metastatic malignancy  Family: Supportive sister  Goals: symptom control and cancer directed therapy    Advance Directives  Existence of Advance Directives:Yes, documentation or copy in medical record  Decision maker: HCPOA is pt is unsure  Code Status: Full code    Next Follow-Up Visit:  Return to clinic in 3 weeks in the [facility]    Signature and billing  Thank you for allowing us to participate in the care of this patient. Recommendations will be communicated  back to the consulting service by way of shared electronic medical record or face-to-face.    Medical complexity was high level due to due to complexity of problems, extensive data review, and high risk of management/treatment.  Time was spent on the following: Prep Time, Time Directly with Patient/Family/Caregiver, Documentation Time. Total time spent: 70min      DATA   Diagnostic tests and information reviewed for today's visit:  Conversation with primary team, Most recent labs and imaging results, Medications     4/2/25: changes to plan indicated in bold. Continue all other regimens as listed.    Some elements copied from n/a note on n/a, the elements have been updated and all reflect current decision making from today, 4/2/2025.      Plan of Care discussed with: Provider, RN, Patient      SIGNATURE: JESSICA Campbell    Contact information:  Supportive and Palliative Oncology  Monday-Friday 8 AM-5 PM  Phone:  [phone], press option #5, then option #1.   Or Epic Secure Chat

## 2025-04-02 NOTE — PROGRESS NOTES
Patient ID: Minal Sun is a 59 y.o. female.    DIAGNOSIS     Small cell carcinoma of the lung.  Date of diagnosis is September 27, 2024 from a bronchoscopic biopsy of a subcarinal lymph node.  Immunohistochemistry positive for TTF-1, INSM1, synaptophysin and chromogranin, negative for p40, RB immunostain shows loss of nuclear expression.        STAGING     Clinical T4, N2, M1 C, stage IVc, extensive stage disease        CURRENT SITES OF DISEASE      Left lung, mediastinum, left hilum of the lung, liver, intra-abdominal lymph nodes in the portacaval and periportal region, bone metastases        MOLECULAR GENOMICS     Test Name: [employer] xF+ (XF.V3) dated October 2024     Molecular Findings:  * Gene: [id number], Variant: Missense variant (exon 1) - GOF, Potentially Actionable, p.R88Q (Allele Frequency - 0.3%)  * Gene: TP53, Variant: Missense variant - LOF, Biologically Relevant, p.R249G (Allele Frequency - 70.1%)  * Gene: RB1, Variant: Splice region variant - LOF, Biologically Relevant, c.540-1G>T, Splice Site (Allele Frequency - 56.8%)        Test Name: Game [employer]ight xT (XT.V4)  Laboratory Name: [facility]  Specimen Source: Lymph node, level 7  Collection Date: 27-Sep-2024     Molecular Findings:  * Gene: TP53, Variant: Missense variant - LOF, Biologically Relevant, p.R249G (Allele Frequency - 96.2%)  * Gene: RB1, Variant: Splice region variant - LOF, Biologically Relevant, c.540-1G>T, Splice Site (Allele Frequency - 96%)  * Gene: KMT2D, Variant: Stop gain - LOF, Biologically Relevant, p.*, Nonsense (Allele Frequency - 41.9%)  * Biomarker: Microsatellite Instability, Status: stable  * Biomarker: Tumor Mutation [place] (2.6 Muts/Mb, Percentile: 33)        SERUM TUMOR MARKERS     Baseline LDH within normal limits  C1D1 on Study LDH was 274. Trending down at C2D1         PRIOR THERAPY     Combination chemotherapy and immunotherapy.  Initially enrolled on a clinical trial of Lutathera and underwent octreotide scan  which was positive.  However she decided to come off study and did not receive Lutathera with her chemo.  Chemotherapy started October 6, 2024.  Immunotherapy completed added with cycle #2 11.06.2024.  Minor response observed after 2 cycles of treatment. PD observed after C4.        CURRENT THERAPY     2.19.2025: Started on Phase 1 study [id number] with study drug [id number] which is an ADC targeting SEZ6 with a topoisomerase 1 inhibitor payload     CURRENT ONCOLOGICAL PROBLEMS    Cough and shortness of breath significantly improved with systemic treatment.  Pulmonary Embolism  Immunotherapy induced hypothyroidism  Neoplasm non cardiac chest/back pain        HISTORY OF PRESENT ILLNESS     This is a 59-year-old patient.  She states that she was feeling sick toward the end of spring of this year with some sinus problems.  Was seen by her allergist and was treated with antibiotics.  She was also seen at 1 point by primary care and steroids and antibiotics and inhalers were given.  She did not have any resolution and then ultimately developed a little bit of the hemoptysis which led to a chest x-ray showing an abnormality and finally a CT scan of the chest.The CT scan of the chest was done as a lung cancer screening low-dose CT and completed on September 20, 2024.  This demonstrated a extensive infiltrative mediastinal and hilar mass.  There was narrowing of the left mainstem bronchus and lower lobe bronchus secondary to extrinsic compression.  The predominant growth was within the left hilum and subcarinal region.  She underwent a bronchoscopy dated September 27, 2024 showing splaying of the main alanna.  There was mild erosion of a lymph node into the left mainstem bronchus.  There was erythema and mild erosions of an endobronchial tumor along the medial border.  Moderate to severe stenosis of the left lower lobe bronchus to 75%.        PAST MEDICAL HISTORY     Lumbar surgery about 30 years ago   hysterectomy in  2000  Diabetes  COPD  Right eye exophthalmus  herpes zoster        SOCIAL HISTORY     Patient lives with her sister.  She lives in [facility] [place].  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  She started smoking at the age of 14 and continues to smoke at the age of 59.  Has smoked for 45 years on average 1 pack/day.        CURRENT MEDS     See medication list, meds reviewed, includes metformin, rosuvastatin, been done so Nied inhalers, Wellbutrin escitalopram, trazodone        ALLERGIES     Patient denies any drug allergies        FAMILY HISTORY      Mother had bladder cancer and possibly breast cancer.     Subjective    Today 4.2.2025 Patient in clinic for C3D1 on [id number].   Patient reports tolerated treatment well. She continue to have  noncardiac chest and back pain which has improved  bit with pain meds increased. Continues to experience intermittent episodes of nausea/vomiting, SOB with exertion, cough, constipation and fatigue. Today she denies any denies any dizziness, lightheadedness, headaches, rash/itching, chills or fever, chest pain or chest tightness, painful inflammation/ulceration oral mucous membranes, diarrhea, hematemesis /hemoptysis, hematuria/rectal bleeding, urinary symptoms, swelling extremities, weakness, or peripheral neuropathy. ROS as above. Remainder of 10 point review of systems elicited and otherwise unremarkable.     Cancer  Associated symptoms include coughing and fatigue. Pertinent negatives include no abdominal pain, anorexia, arthralgias, change in bowel habit, chest pain, chills, congestion, diaphoresis, fever, headaches, joint swelling, myalgias, nausea, neck pain, numbness, rash, sore throat, swollen glands, urinary symptoms, vertigo, visual change or vomiting.     Objective    BSA: There is no height or weight on file to calculate BSA.  There were no  "vitals taken for this visit.     Daily Weight  04/02/25 : 83.7 kg (184 lb 8.4 oz)  03/26/25 : 84.1 kg (185 lb 6.5 oz)  03/18/25 : 82.1 kg (181 lb)  03/12/25 : 82.4 kg (181 lb 10.5 oz)  02/19/25 : 85.1 kg (187 lb 9.8 oz)         3/18/2025    12:17 PM 3/18/2025    12:20 PM 3/18/2025     1:33 PM 3/18/2025     2:21 PM 3/19/2025    12:31 PM 3/26/2025    12:12 PM 4/2/2025     9:07 AM   Vitals   Systolic 113  166 137 133 124 126   Diastolic 86  99 67 86 86 75   BP Location     Left arm Left arm Right arm   Heart Rate 109  84 82 104 100 98   Temp 36.4 °C (97.5 °F) 2.5 °C (36.5 °F)   35.8 °C (96.4 °F) 36.1 °C (97 °F) 36.6 °C (97.9 °F)   Resp 17  14 13 18 18 20   Height 1.575 m (5' 2\")         Weight (lb) 181     185.41 184.53   BMI 33.11 kg/m2     33.91 kg/m2 33.75 kg/m2   BSA (m2) 1.9 m2     1.92 m2 1.91 m2       Physical Exam  Constitutional:       General: She is not in acute distress.     Appearance: Normal appearance. She is not ill-appearing, toxic-appearing or diaphoretic.   HENT:      Nose: No congestion or rhinorrhea.      Mouth/Throat:      Pharynx: No oropharyngeal exudate or posterior oropharyngeal erythema.   Eyes:      General: No scleral icterus.     Conjunctiva/sclera: Conjunctivae normal.   Cardiovascular:      Rate and Rhythm: Normal rate and regular rhythm.      Pulses: Normal pulses.      Heart sounds: Normal heart sounds. No murmur heard.     No friction rub. No gallop.   Pulmonary:      Effort: Pulmonary effort is normal. No respiratory distress.      Breath sounds: No stridor. Rhonchi present. No wheezing or rales.   Chest:      Chest wall: No tenderness.   Abdominal:      General: There is no distension.      Palpations: There is no mass.      Tenderness: There is no abdominal tenderness. There is no guarding or rebound.   Musculoskeletal:      Cervical back: No tenderness.      Right lower leg: No edema.      Left lower leg: No edema.   Lymphadenopathy:      Cervical: No cervical adenopathy. "   Skin:     General: Skin is warm.      Coloration: Skin is not jaundiced.      Findings: No bruising or lesion.   Neurological:      General: No focal deficit present.      Mental Status: She is oriented to person, place, and time.   Psychiatric:         Mood and Affect: Mood normal.         Behavior: Behavior normal.       Performance Status:  ECOG 1: Restricted in physically strenuous activity but ambulatory and able to carry out work of a light or sedentary nature, e.g., light house work, office work.        Hospital Outpatient Visit on 04/02/2025   Component Date Value Ref Range Status    WBC 04/02/2025 6.3  4.4 - 11.3 x10*3/uL Final    nRBC 04/02/2025 0.0  0.0 - 0.0 /100 WBCs Final    RBC 04/02/2025 3.91 (L)  4.00 - 5.20 x10*6/uL Final    Hemoglobin 04/02/2025 12.4  12.0 - 16.0 g/dL Final    Hematocrit 04/02/2025 37.6  36.0 - 46.0 % Final    MCV 04/02/2025 96  80 - 100 fL Final    MCH 04/02/2025 31.7  26.0 - 34.0 pg Final    MCHC 04/02/2025 33.0  32.0 - 36.0 g/dL Final    RDW 04/02/2025 17.2 (H)  11.5 - 14.5 % Final    Platelets 04/02/2025 356  150 - 450 x10*3/uL Final    Neutrophils % 04/02/2025 68.2  40.0 - 80.0 % Final    Immature Granulocytes %, Automated 04/02/2025 0.3  0.0 - 0.9 % Final    Lymphocytes % 04/02/2025 25.1  13.0 - 44.0 % Final    Monocytes % 04/02/2025 5.6  2.0 - 10.0 % Final    Eosinophils % 04/02/2025 0.6  0.0 - 6.0 % Final    Basophils % 04/02/2025 0.2  0.0 - 2.0 % Final    Neutrophils Absolute 04/02/2025 4.26  1.20 - 7.70 x10*3/uL Final    Immature Granulocytes Absolute, Au* 04/02/2025 0.02  0.00 - 0.70 x10*3/uL Final    Lymphocytes Absolute 04/02/2025 1.57  1.20 - 4.80 x10*3/uL Final    Monocytes Absolute 04/02/2025 0.35  0.10 - 1.00 x10*3/uL Final    Eosinophils Absolute 04/02/2025 0.04  0.00 - 0.70 x10*3/uL Final    Basophils Absolute 04/02/2025 0.01  0.00 - 0.10 x10*3/uL Final    Glucose 04/02/2025 141 (H)  74 - 99 mg/dL Final    Sodium 04/02/2025 138  136 - 145 mmol/L Final     Potassium 04/02/2025 4.1  3.5 - 5.3 mmol/L Final    Chloride 04/02/2025 99  98 - 107 mmol/L Final    Bicarbonate 04/02/2025 29  21 - 32 mmol/L Final    Anion Gap 04/02/2025 14  10 - 20 mmol/L Final    Urea Nitrogen 04/02/2025 13  6 - 23 mg/dL Final    Creatinine 04/02/2025 0.80  0.50 - 1.05 mg/dL Final    eGFR 04/02/2025 84  >60 mL/min/1.73m*2 Final    Calcium 04/02/2025 9.3  8.6 - 10.6 mg/dL Final    Albumin 04/02/2025 4.2  3.4 - 5.0 g/dL Final    Alkaline Phosphatase 04/02/2025 91  33 - 136 U/L Final    Total Protein 04/02/2025 7.0  6.4 - 8.2 g/dL Final    AST 04/02/2025 13  9 - 39 U/L Final    Bilirubin, Total 04/02/2025 0.4  0.0 - 1.2 mg/dL Final    ALT 04/02/2025 9  7 - 45 U/L Final    Bilirubin, Direct 04/02/2025 0.1  0.0 - 0.3 mg/dL Final    GGT 04/02/2025 53  5 - 55 U/L Final    LDH 04/02/2025 144  84 - 246 U/L Final    Magnesium 04/02/2025 2.07  1.60 - 2.40 mg/dL Final    Phosphorus 04/02/2025 4.3  2.5 - 4.9 mg/dL Final    Color, Urine 04/02/2025 Yellow  Light-Yellow, Yellow, Dark-Yellow Final    Appearance, Urine 04/02/2025 Clear  Clear Final    Specific Gravity, Urine 04/02/2025 1.030  1.005 - 1.035 Final    pH, Urine 04/02/2025 5.5  5.0, 5.5, 6.0, 6.5, 7.0, 7.5, 8.0 Final    Protein, Urine 04/02/2025 30 (1+) (A)  NEGATIVE, 10 (TRACE), 20 (TRACE) mg/dL Final    Glucose, Urine 04/02/2025 Normal  Normal mg/dL Final    Blood, Urine 04/02/2025 NEGATIVE  NEGATIVE mg/dL Final    Ketones, Urine 04/02/2025 TRACE (A)  NEGATIVE mg/dL Final    Bilirubin, Urine 04/02/2025 NEGATIVE  NEGATIVE mg/dL Final    Urobilinogen, Urine 04/02/2025 3 (1+) (A)  Normal mg/dL Final    Nitrite, Urine 04/02/2025 NEGATIVE  NEGATIVE Final    Leukocyte Esterase, Urine 04/02/2025 25 [place]/uL (A)  NEGATIVE Final    WBC, Urine 04/02/2025 1-5  1-5, NONE /HPF Final    RBC, Urine 04/02/2025 NONE  NONE, 1-2, 3-5 /HPF Final    Squamous Epithelial Cells, Urine 04/02/2025 1-9 (SPARSE)  Reference range not established. /HPF Final    Mucus, Urine  04/02/2025 3+  Reference range not established. /LPF Final    Hyaline Casts, Urine 04/02/2025 2+ (A)  NONE /LPF Final   Hospital Outpatient Visit on 03/26/2025   Component Date Value Ref Range Status    WBC 03/26/2025 4.9  4.4 - 11.3 x10*3/uL Final    nRBC 03/26/2025 0.0  0.0 - 0.0 /100 WBCs Final    RBC 03/26/2025 3.91 (L)  4.00 - 5.20 x10*6/uL Final    Hemoglobin 03/26/2025 12.4  12.0 - 16.0 g/dL Final    Hematocrit 03/26/2025 37.2  36.0 - 46.0 % Final    MCV 03/26/2025 95  80 - 100 fL Final    MCH 03/26/2025 31.7  26.0 - 34.0 pg Final    MCHC 03/26/2025 33.3  32.0 - 36.0 g/dL Final    RDW 03/26/2025 17.2 (H)  11.5 - 14.5 % Final    Platelets 03/26/2025 273  150 - 450 x10*3/uL Final    Neutrophils % 03/26/2025 53.4  40.0 - 80.0 % Final    Immature Granulocytes %, Automated 03/26/2025 0.0  0.0 - 0.9 % Final    Lymphocytes % 03/26/2025 34.9  13.0 - 44.0 % Final    Monocytes % 03/26/2025 7.8  2.0 - 10.0 % Final    Eosinophils % 03/26/2025 3.3  0.0 - 6.0 % Final    Basophils % 03/26/2025 0.6  0.0 - 2.0 % Final    Neutrophils Absolute 03/26/2025 2.60  1.20 - 7.70 x10*3/uL Final    Immature Granulocytes Absolute, Au* 03/26/2025 0.00  0.00 - 0.70 x10*3/uL Final    Lymphocytes Absolute 03/26/2025 1.70  1.20 - 4.80 x10*3/uL Final    Monocytes Absolute 03/26/2025 0.38  0.10 - 1.00 x10*3/uL Final    Eosinophils Absolute 03/26/2025 0.16  0.00 - 0.70 x10*3/uL Final    Basophils Absolute 03/26/2025 0.03  0.00 - 0.10 x10*3/uL Final    Glucose 03/26/2025 131 (H)  74 - 99 mg/dL Final    Sodium 03/26/2025 138  136 - 145 mmol/L Final    Potassium 03/26/2025 4.0  3.5 - 5.3 mmol/L Final    Chloride 03/26/2025 103  98 - 107 mmol/L Final    Bicarbonate 03/26/2025 26  21 - 32 mmol/L Final    Anion Gap 03/26/2025 13  10 - 20 mmol/L Final    Urea Nitrogen 03/26/2025 12  6 - 23 mg/dL Final    Creatinine 03/26/2025 0.73  0.50 - 1.05 mg/dL Final    eGFR 03/26/2025 >90  >60 mL/min/1.73m*2 Final    Calcium 03/26/2025 9.1  8.6 - 10.6 mg/dL Final     Albumin 03/26/2025 4.1  3.4 - 5.0 g/dL Final    Alkaline Phosphatase 03/26/2025 96  33 - 136 U/L Final    Total Protein 03/26/2025 6.8  6.4 - 8.2 g/dL Final    AST 03/26/2025 13  9 - 39 U/L Final    Bilirubin, Total 03/26/2025 0.3  0.0 - 1.2 mg/dL Final    ALT 03/26/2025 10  7 - 45 U/L Final    Bilirubin, Direct 03/26/2025 0.0  0.0 - 0.3 mg/dL Final    GGT 03/26/2025 71 (H)  5 - 55 U/L Final    LDH 03/26/2025 132  84 - 246 U/L Final    Magnesium 03/26/2025 2.23  1.60 - 2.40 mg/dL Final    Phosphorus 03/26/2025 4.3  2.5 - 4.9 mg/dL Final   Hospital Outpatient Visit on 03/19/2025   Component Date Value Ref Range Status    WBC 03/19/2025 5.0  4.4 - 11.3 x10*3/uL Final    nRBC 03/19/2025 0.0  0.0 - 0.0 /100 WBCs Final    RBC 03/19/2025 4.04  4.00 - 5.20 x10*6/uL Final    Hemoglobin 03/19/2025 12.6  12.0 - 16.0 g/dL Final    Hematocrit 03/19/2025 38.1  36.0 - 46.0 % Final    MCV 03/19/2025 94  80 - 100 fL Final    MCH 03/19/2025 31.2  26.0 - 34.0 pg Final    MCHC 03/19/2025 33.1  32.0 - 36.0 g/dL Final    RDW 03/19/2025 16.0 (H)  11.5 - 14.5 % Final    Platelets 03/19/2025 254  150 - 450 x10*3/uL Final    Neutrophils % 03/19/2025 63.2  40.0 - 80.0 % Final    Immature Granulocytes %, Automated 03/19/2025 0.4  0.0 - 0.9 % Final    Lymphocytes % 03/19/2025 28.4  13.0 - 44.0 % Final    Monocytes % 03/19/2025 6.0  2.0 - 10.0 % Final    Eosinophils % 03/19/2025 1.4  0.0 - 6.0 % Final    Basophils % 03/19/2025 0.6  0.0 - 2.0 % Final    Neutrophils Absolute 03/19/2025 3.19  1.20 - 7.70 x10*3/uL Final    Immature Granulocytes Absolute, Au* 03/19/2025 0.02  0.00 - 0.70 x10*3/uL Final    Lymphocytes Absolute 03/19/2025 1.43  1.20 - 4.80 x10*3/uL Final    Monocytes Absolute 03/19/2025 0.30  0.10 - 1.00 x10*3/uL Final    Eosinophils Absolute 03/19/2025 0.07  0.00 - 0.70 x10*3/uL Final    Basophils Absolute 03/19/2025 0.03  0.00 - 0.10 x10*3/uL Final    Glucose 03/19/2025 142 (H)  74 - 99 mg/dL Final    Sodium 03/19/2025 138  136 -  145 mmol/L Final    Potassium 03/19/2025 4.1  3.5 - 5.3 mmol/L Final    Chloride 03/19/2025 101  98 - 107 mmol/L Final    Bicarbonate 03/19/2025 25  21 - 32 mmol/L Final    Anion Gap 03/19/2025 16  10 - 20 mmol/L Final    Urea Nitrogen 03/19/2025 13  6 - 23 mg/dL Final    Creatinine 03/19/2025 0.65  0.50 - 1.05 mg/dL Final    eGFR 03/19/2025 >90  >60 mL/min/1.73m*2 Final    Calcium 03/19/2025 9.2  8.6 - 10.6 mg/dL Final    Albumin 03/19/2025 4.3  3.4 - 5.0 g/dL Final    Alkaline Phosphatase 03/19/2025 95  33 - 136 U/L Final    Total Protein 03/19/2025 6.6  6.4 - 8.2 g/dL Final    AST 03/19/2025 13  9 - 39 U/L Final    Bilirubin, Total 03/19/2025 0.5  0.0 - 1.2 mg/dL Final    ALT 03/19/2025 10  7 - 45 U/L Final    Bilirubin, Direct 03/19/2025 0.1  0.0 - 0.3 mg/dL Final    GGT 03/19/2025 82 (H)  5 - 55 U/L Final    LDH 03/19/2025 145  84 - 246 U/L Final    Magnesium 03/19/2025 2.09  1.60 - 2.40 mg/dL Final    Phosphorus 03/19/2025 4.3  2.5 - 4.9 mg/dL Final   Hospital Outpatient Visit on 03/18/2025   Component Date Value Ref Range Status    POCT Prothrombin time 03/18/2025 12.3  seconds In process    POCT INR 03/18/2025 1.1   In process    POCT Glucose 03/18/2025 125 (H)  74 - 99 mg/dL Final   Hospital Outpatient Visit on 03/13/2025   Component Date Value Ref Range Status    Extra Tube 03/13/2025 Hold for add-ons.   Final    Extra Tube 03/13/2025 Hold for add-ons.   Final   Hospital Outpatient Visit on 03/12/2025   Component Date Value Ref Range Status    WBC 03/12/2025 5.6  4.4 - 11.3 x10*3/uL Final    nRBC 03/12/2025 0.0  0.0 - 0.0 /100 WBCs Final    RBC 03/12/2025 4.24  4.00 - 5.20 x10*6/uL Final    Hemoglobin 03/12/2025 13.0  12.0 - 16.0 g/dL Final    Hematocrit 03/12/2025 39.5  36.0 - 46.0 % Final    MCV 03/12/2025 93  80 - 100 fL Final    MCH 03/12/2025 30.7  26.0 - 34.0 pg Final    MCHC 03/12/2025 32.9  32.0 - 36.0 g/dL Final    RDW 03/12/2025 16.3 (H)  11.5 - 14.5 % Final    Platelets 03/12/2025 378  150 - 450  x10*3/uL Final    Neutrophils % 03/12/2025 51.2  40.0 - 80.0 % Final    Immature Granulocytes %, Automated 03/12/2025 0.4  0.0 - 0.9 % Final    Lymphocytes % 03/12/2025 39.8  13.0 - 44.0 % Final    Monocytes % 03/12/2025 6.3  2.0 - 10.0 % Final    Eosinophils % 03/12/2025 1.8  0.0 - 6.0 % Final    Basophils % 03/12/2025 0.5  0.0 - 2.0 % Final    Neutrophils Absolute 03/12/2025 2.87  1.20 - 7.70 x10*3/uL Final    Immature Granulocytes Absolute, Au* 03/12/2025 0.02  0.00 - 0.70 x10*3/uL Final    Lymphocytes Absolute 03/12/2025 2.23  1.20 - 4.80 x10*3/uL Final    Monocytes Absolute 03/12/2025 0.35  0.10 - 1.00 x10*3/uL Final    Eosinophils Absolute 03/12/2025 0.10  0.00 - 0.70 x10*3/uL Final    Basophils Absolute 03/12/2025 0.03  0.00 - 0.10 x10*3/uL Final    Glucose 03/12/2025 135 (H)  74 - 99 mg/dL Final    Sodium 03/12/2025 138  136 - 145 mmol/L Final    Potassium 03/12/2025 4.5  3.5 - 5.3 mmol/L Final    Chloride 03/12/2025 101  98 - 107 mmol/L Final    Bicarbonate 03/12/2025 25  21 - 32 mmol/L Final    Anion Gap 03/12/2025 17  10 - 20 mmol/L Final    Urea Nitrogen 03/12/2025 13  6 - 23 mg/dL Final    Creatinine 03/12/2025 0.78  0.50 - 1.05 mg/dL Final    eGFR 03/12/2025 87  >60 mL/min/1.73m*2 Final    Calcium 03/12/2025 9.1  8.6 - 10.6 mg/dL Final    Albumin 03/12/2025 4.3  3.4 - 5.0 g/dL Final    Alkaline Phosphatase 03/12/2025 98  33 - 136 U/L Final    Total Protein 03/12/2025 6.7  6.4 - 8.2 g/dL Final    AST 03/12/2025 15  9 - 39 U/L Final    Bilirubin, Total 03/12/2025 0.5  0.0 - 1.2 mg/dL Final    ALT 03/12/2025 10  7 - 45 U/L Final    Bilirubin, Direct 03/12/2025 0.1  0.0 - 0.3 mg/dL Final    GGT 03/12/2025 111 (H)  5 - 55 U/L Final    LDH 03/12/2025 192  84 - 246 U/L Final    Magnesium 03/12/2025 2.14  1.60 - 2.40 mg/dL Final    Phosphorus 03/12/2025 4.8  2.5 - 4.9 mg/dL Final    Color, Urine 03/12/2025 Yellow  Light-Yellow, Yellow, Dark-Yellow Final    Appearance, Urine 03/12/2025 Clear  Clear Final     Specific Gravity, Urine 03/12/2025 1.027  1.005 - 1.035 Final    pH, Urine 03/12/2025 5.5  5.0, 5.5, 6.0, 6.5, 7.0, 7.5, 8.0 Final    Protein, Urine 03/12/2025 20 (TRACE)  NEGATIVE, 10 (TRACE), 20 (TRACE) mg/dL Final    Glucose, Urine 03/12/2025 Normal  Normal mg/dL Final    Blood, Urine 03/12/2025 NEGATIVE  NEGATIVE mg/dL Final    Ketones, Urine 03/12/2025 TRACE (A)  NEGATIVE mg/dL Final    Bilirubin, Urine 03/12/2025 NEGATIVE  NEGATIVE mg/dL Final    Urobilinogen, Urine 03/12/2025 2 (1+) (A)  Normal mg/dL Final    Nitrite, Urine 03/12/2025 NEGATIVE  NEGATIVE Final    Leukocyte Esterase, Urine 03/12/2025 NEGATIVE  NEGATIVE Final    WBC, Urine 03/12/2025 1-5  1-5, NONE /HPF Final    RBC, Urine 03/12/2025 1-2  NONE, 1-2, 3-5 /HPF Final    Squamous Epithelial Cells, Urine 03/12/2025 1-9 (SPARSE)  Reference range not established. /HPF Final    Mucus, Urine 03/12/2025 3+  Reference range not established. /LPF Final    Extra Tube 03/12/2025 Hold for add-ons.   Final    Extra Tube 03/12/2025 Hold for add-ons.   Final    Extra Tube 03/12/2025 Hold for add-ons.   Final    Extra Tube 03/12/2025 Hold for add-ons.   Final    Extra Tube 03/12/2025 Hold for add-ons.   Final    Extra Tube 03/12/2025 Hold for add-ons.   Final    Extra Tube 03/12/2025 Hold for add-ons.   Final    Extra Tube 03/12/2025 Hold for add-ons.   Final    Extra Tube 03/12/2025 Hold for add-ons.   Final   Hospital Outpatient Visit on 03/05/2025   Component Date Value Ref Range Status    WBC 03/05/2025 5.4  4.4 - 11.3 x10*3/uL Final    nRBC 03/05/2025 0.0  0.0 - 0.0 /100 WBCs Final    RBC 03/05/2025 4.26  4.00 - 5.20 x10*6/uL Final    Hemoglobin 03/05/2025 13.1  12.0 - 16.0 g/dL Final    Hematocrit 03/05/2025 39.2  36.0 - 46.0 % Final    MCV 03/05/2025 92  80 - 100 fL Final    MCH 03/05/2025 30.8  26.0 - 34.0 pg Final    MCHC 03/05/2025 33.4  32.0 - 36.0 g/dL Final    RDW 03/05/2025 16.5 (H)  11.5 - 14.5 % Final    Platelets 03/05/2025 405  150 - 450 x10*3/uL  Final    Neutrophils % 03/05/2025 61.8  40.0 - 80.0 % Final    Immature Granulocytes %, Automated 03/05/2025 0.6  0.0 - 0.9 % Final    Lymphocytes % 03/05/2025 29.9  13.0 - 44.0 % Final    Monocytes % 03/05/2025 5.6  2.0 - 10.0 % Final    Eosinophils % 03/05/2025 1.7  0.0 - 6.0 % Final    Basophils % 03/05/2025 0.4  0.0 - 2.0 % Final    Neutrophils Absolute 03/05/2025 3.32  1.20 - 7.70 x10*3/uL Final    Immature Granulocytes Absolute, Au* 03/05/2025 0.03  0.00 - 0.70 x10*3/uL Final    Lymphocytes Absolute 03/05/2025 1.60  1.20 - 4.80 x10*3/uL Final    Monocytes Absolute 03/05/2025 0.30  0.10 - 1.00 x10*3/uL Final    Eosinophils Absolute 03/05/2025 0.09  0.00 - 0.70 x10*3/uL Final    Basophils Absolute 03/05/2025 0.02  0.00 - 0.10 x10*3/uL Final    Glucose 03/05/2025 128 (H)  74 - 99 mg/dL Final    Sodium 03/05/2025 137  136 - 145 mmol/L Final    Potassium 03/05/2025 4.0  3.5 - 5.3 mmol/L Final    Chloride 03/05/2025 100  98 - 107 mmol/L Final    Bicarbonate 03/05/2025 24  21 - 32 mmol/L Final    Anion Gap 03/05/2025 17  10 - 20 mmol/L Final    Urea Nitrogen 03/05/2025 11  6 - 23 mg/dL Final    Creatinine 03/05/2025 0.65  0.50 - 1.05 mg/dL Final    eGFR 03/05/2025 >90  >60 mL/min/1.73m*2 Final    Calcium 03/05/2025 8.8  8.6 - 10.6 mg/dL Final    Albumin 03/05/2025 4.2  3.4 - 5.0 g/dL Final    Alkaline Phosphatase 03/05/2025 108  33 - 136 U/L Final    Total Protein 03/05/2025 6.9  6.4 - 8.2 g/dL Final    AST 03/05/2025 15  9 - 39 U/L Final    Bilirubin, Total 03/05/2025 0.4  0.0 - 1.2 mg/dL Final    ALT 03/05/2025 18  7 - 45 U/L Final    Bilirubin, Direct 03/05/2025 0.1  0.0 - 0.3 mg/dL Final    GGT 03/05/2025 170 (H)  5 - 55 U/L Final    LDH 03/05/2025 239  84 - 246 U/L Final    Magnesium 03/05/2025 2.19  1.60 - 2.40 mg/dL Final    Phosphorus 03/05/2025 3.4  2.5 - 4.9 mg/dL Final    Extra Tube 03/05/2025 Hold for add-ons.   Final    Extra Tube 03/05/2025 Hold for add-ons.   Final    Extra Tube 03/05/2025 Hold for  add-ons.   Final     CT chest abdomen pelvis w IV contrast    Result Date: 3/26/2025  Impression: Small-cell lung carcinoma restaging exam. Compared to prior 01/24/2025 CT: 1. Slightly improved disease burden as evidenced by minimally decreased size of the left infrahilar component of the mediastinal/hilar neoplasm as well as a left paratracheal node. Hepatic and widespread sclerotic osseous metastases are similar to prior. 2. New/Increased moderate pericardial effusion which may be treatment or infection related. Echocardiogram is recommended for further evaluation.   I personally reviewed the images/study and I agree with the resident Mika Rico's findings as stated. This study was interpreted at [place], [place].   MACRO: None   Signed by: Yao Parham 3/26/2025 6:32 PM Dictation workstation:   [id number]    IR CVC port placement    Result Date: 3/19/2025  Impression: Insertion of PowerPort ClearVue Slim Implantable Port via right internal jugular vein.   Signed by: Jay Nieto 3/19/2025 10:58 AM Dictation workstation:   [id number]       Assessment/Plan     Ms. 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 Initial imaging post C2 reviewed by Dr Kohli appeared to have sub-optimal response to treatment. Unfortunately after C4 imaging confirmed she had progressive disease. In the last visit Dr Kohli discussed all treatment options which included SOC Taralatamab and also clinical trial [id number] with study drug ABBV-706 which is an ADC targeting SEZ6 with a topoisomerase 1 inhibitor payload. Patient opted to participate on study and completed screening requirements on study. Started treatment on 02.19.2025 on study and received couple of treatment today. Tolerated treatment well with the exception of getting diagnosed with the Flu  during C1. Today she is in for C3D1 and most recent imaging suggest she is benefiting from current treatment. Today she reports she feels better. Plan to consult palliative medicine, MRI liver and pulmonology. Started on Medrol pack and Augmentin for COPD exacerbation, increased pain meds and educated about management of constipation.  Given she feel clinically good we will proceed with C3 treatment per protocol. Plan discussed in detail with the patient. Patient in agreement with the plan of care and is aware to call the office and research nurse if experiencing any new symptoms. RTC per protocol.         Cancer Staging   No matching staging information was found for the patient.      Oncology History   SCLC (small cell lung carcinoma)   9/27/2024 Initial Diagnosis    SCLC (small cell lung carcinoma) (Multi)     10/4/2024 - 10/8/2024 Chemotherapy    CARBOplatin / Etoposide, 21 Day Cycles - Lung     10/4/2024 - 10/6/2024 Research Study Participant    ([facility]) [id number] - Atezolizumab + CARBOplatin / Etoposide / [id number], 21 Day Cycles  Plan Provider: JESSICA Hernandez  Treatment goal: Control  Line of treatment: First Line  Associated studies: (177Lu)[id number] with Carboplatin, Etoposide and Tislelizumab in Newly Diagnosed ES-SCLC     10/4/2024 - 1/17/2025 Chemotherapy    Durvalumab + CARBOplatin / Etoposide, 21 Day Cycles     10/28/2024 - 10/28/2024 Chemotherapy    Durvalumab + CARBOplatin / Etoposide, 21 Day Cycles     2/5/2025 - 2/5/2025 Chemotherapy    Durvalumab, 28 Day Cycles     2/19/2025 -  Research Study Participant    ([facility]) [id number] Part 1 - ABBV-706, 21 Day Cycles  Plan Provider: JESSICA Hernandez  Treatment goal: Control  Line of treatment: Second Line  Associated studies: ABBV-706 alone or in combination in subjects with advanced solid tumors

## 2025-04-02 NOTE — RESEARCH NOTES
RSH><[id number]><[id number]><PART1/ESCMONO>      DCRU NURSING VISIT NOTE  Study Name: [id number]- Part 1_Escalation- Monotherapy  IRB#: [id number]  DCRU#: ([facility] # [id number])  Protocol Version Dated:  6/15/2023  PI: Jd Kohli MD.      Time point: Cycle 3 - Day 1    Encounter Date: 04/02/2025  Encounter Time:  9:00 AM EDT  Encounter Department: [place] [address] RESEARCH        Study Regimen and Dosing   For Oncology study, Refer [facility] Treatment Plan for orders   Part 1_Escalation_Monotherapy - patients with advanced recurrent or refractory solid tumors (small cell lung cancer, neuroendocrine tumors or brain tumors) will receive gradually increasing doses of ABBV-706 to determine MTD (Maximum Tolerated Dose).  Cycle = 21-days.  ABBV-706 administered IV Day 1 of each cycle.     Dietary Guidelines   Regular diet     Admission and Prior to Starting Study Activities   Notify  when patient arrives to unit.  Complete DCRU/[facility] intake form in EMR.  Confirm DCRU Standing Orders (provided by Study Team/) are signed & available on chart (Expires after 1 year).  Obtain weight in kilograms - with shoes off & heavy items removed.  Obtain vital signs after sitting at least 3 minutes. Record in EMR.  Insert one peripheral IV line for sample collection procedures (flush line with 5 - 10 mL normal saline following each blood draw). Access mediport (if available) otherwise insert second peripheral line in opposite arm for Investigative drug administration (if peripheral line, flush line with 5 - 10 mL normal saline before & after infusion)  Do Not draw research samples from the same line the investigational drug is infused through.  Physical Exam including pulmonary exam & neuro assessment.       Jesus Sun is a 59 y.o. female and is here for a Research clinical visit.    Visit Provider: Jazzy Guerrero RN     Allergies:   Allergies   Allergen Reactions     Clindamycin Diarrhea    Erythromycin Diarrhea    Erythromycin Base Other and Nausea Only    Oxycodone-Acetaminophen Hives     anxiety       Objective     Vital Signs:    Vitals:    04/02/25 0907 04/02/25 1316 04/02/25 1359 04/02/25 1429   BP: 126/75 123/84 127/83 126/86   Pulse: 98 78 77 74   Resp: 20 20 18 18   Temp: 36.6 °C (97.9 °F) 36.7 °C (98.1 °F) 36.4 °C (97.5 °F) 36.4 °C (97.5 °F)   TempSrc: Temporal Temporal Temporal Temporal   SpO2: 94% 93% 94% 93%   Weight: 83.7 kg (184 lb 8.4 oz)       04/02/25 1634   BP: 143/80   Pulse: 79   Resp: 18   Temp: 36.3 °C (97.3 °F)   TempSrc: Temporal   SpO2: 92%   Weight:        Physical Exam     ASSESSMENT and PLAN:  Problem List Items Addressed This Visit          Hematology and Neoplasia    SCLC (small cell lung carcinoma) - Primary    Relevant Medications    prochlorperazine (Compazine) tablet 10 mg    prochlorperazine (Compazine) injection 10 mg    sodium chloride 0.9 % bolus 500 mL    dextrose 5 % in water (D5W) bolus 500 mL    diphenhydrAMINE (BENADryl) injection 50 mg    methylPREDNISolone sod succinate (SOLU-Medrol) 40 mg/mL injection 40 mg    famotidine PF (Pepcid) injection 20 mg    EPINEPHrine HCl (PF) (Adrenalin) injection 0.3 mg    albuterol 2.5 mg /3 mL (0.083 %) nebulizer solution 3 mL    Study [id number] ABBV-706 110.6 mg in sodium chloride 0.9% 100 mL IV (Completed)    heparin flush 100 unit/mL syringe 500 Units    Other Relevant Orders    Clinic Appointment Request    Infusion Appointment Request    Research collection: 4mL Red Top - Research Collect ABBV-706 ADA/NADA; Pre-Dose; Within 2 hours; Ambient; Other (5-8x); Allow to clot 30-60 minutes. (Completed)    Research collection: 2mL Red Top - Research Collect ABBV-706 PK ADC/Total AB; Pre-Dose; Within 2 hours; Ambient; Other (5-8x); Allow to clot 30-60 minutes. (Completed)    Research collection: 5mL Gold SST - Research Collect Biomarker Serum; Pre-Dose; Within 2 hours; Ambient; 5x; Allow to clot 30  minutes (Completed)    Research collection: 3mL Lavender EDTA - Research Collect Free Top1 Inhibitor; Pre-Dose; Within 2 hours; On ICE; 8-10x (Completed)    Research collection: 10mL RareCyte AccuCyte - Reserach Collect CTC (SCLC Only); Pre-Dose; Within 2 hours; 8-10x (Completed)    Research collection: 10mL Streck - Research Collect ctDNA Plasma/WB; Pre-Dose; Within 2 hours; 8-10x (Completed)    Research collection: 10mL Streck - Research Collect ctDNA Plasma/WB; Pre-Dose; Within 2 hours; 8-10x (Completed)    Research collection: 2mL Red Top - Research Collect ABBV-706 PK ADC/Total AB; Post-Dose; Other (EOI +15 minutes); Other (+/- 5 minutes); Ambient; Other (5-8x); Allow to clot 30-60 minutes. (Completed)    Research collection: 3mL Lavender EDTA - Research Collect Free Top1 Inhibitor; Post-Dose; Other (EOI +15 minutes); Other (+/- 5 minutes); On ICE; 8-10x (Completed)    Research collection: 2mL Red Top - Research Collect ABBV-706 PK ADC/Total AB; Post-Dose; Other (EOI +4 hours); +/- 15 minutes; Ambient; Other (5-8x); Allow to clot 30-60 minutes.    Research collection: 3mL Lavender EDTA - Research Collect Free Top1 Inhibitor; Post-Dose; Other (EOI +4 hours); +/- 15 minutes (+/- 5 minutes); On ICE; 8-10x    Research collection: 1mL Lavender EDTA - Reserach Collect C3a (FC3Ar); Post-Dose; Other (Within 2 hours after first sign of reaction.)    Research collection: 1mL SST - Research Collect C5 (345); Post-Dose; Other (Within 2 hours after first sign of reaction.)    Research collection: 1mL SST - Research Collect IgE; Post-Dose; Other (Within 2 hours after first sign of reaction.)    Research collection: 2mL Red Top - Research Collect Tryptase; Post-Dose; Other (Within 2 hours after first sign of reaction)    Infusion Appointment Request    Research collection: 2mL Red Top - Research Collect ABBV-706 PK ADC/Total AB; Post-Dose; EOI +24 hours; +/- 2 hours; Ambient; Other (5-8x); Allow to clot 30-60 minutes.     Research collection: 3mL Lavender EDTA - Research Collect Free Top1 Inhibitor; Post-Dose; EOI +24 hours; +/- 2 hours; On ICE; 8-10x    Infusion Appointment Request    Research collection: 2mL Red Top - Research Collect ABBV-706 PK ADC/Total AB; Post-Dose; EOI +48 hours; +/- 2 hours; Ambient; Other (5-8x); Allow to clot 30-60 minutes.    Research collection: 3mL Lavender EDTA - Research Collect Free Top1 Inhibitor; Post-Dose; EOI +48 hours; +/- 2 hours; On ICE; 8-10x    Infusion Appointment Request    CBC and Auto Differential    Comprehensive metabolic panel    Bilirubin, Direct    Gamma GT    Lactate dehydrogenase    Magnesium    Phosphorus    Research collection: 2mL Red Top - Research Collect ABBV-706 PK ADC/Total AB; Post-Dose; Other (EOI +336 hours); (+/- 24 hours); Ambient; Other (5-8x); Allow to clot 30-60 minutes.    Research collection: 3mL Lavender EDTA - Research Collect Free Top1 Inhibitor; Post-Dose; Other (EOI +336 hours); Other (+/- 24 hours); On ICE; 8-10x    Infusion Appointment Request    CBC and Auto Differential    Comprehensive metabolic panel    Bilirubin, Direct    Gamma GT    Lactate dehydrogenase    Magnesium    Phosphorus    Research collection: 2mL Red Top - Research Collect ABBV-706 PK ADC/Total AB; Post-Dose; Other (EOI +168 hours); Other (+/- 24 hours); Ambient; Other (5-8x); Allow to clot 30-60 minutes.    Research collection: 3mL Lavender EDTA - Research Collect Free Top1 Inhibitor; Post-Dose; Other (EOI +168 hours); Other (+/- 24 hours); On ICE; 8-10x    CBC and Auto Differential (Completed)    Comprehensive metabolic panel (Completed)    Bilirubin, Direct (Completed)    Gamma GT (Completed)    Lactate dehydrogenase (Completed)    Magnesium (Completed)    Phosphorus (Completed)    Urinalysis with Reflex Microscopic (Completed)    Adult diet Regular    Research Communication (Completed)    Treatment Conditions (Completed)    Provider Communication - [id number] (Completed)    Microscopic  Only, Urine (Completed)    Nursing Communication - Hypersensitivity Management, Moderate (Completed)    Nursing Communication - Hypersensitivity Management, Severe (Completed)    Nursing Communication - Respiratory Management (Completed)    Pulse oximetry, continuous    Research collection: 4mL Red Top - Research Collect ABBV-706 ADA/NADA; Pre-Dose; Within 2 hours; Ambient; Other (5-8x); Allow to clot 30-60 minutes. (Completed)    Research collection: 2mL Red Top - Research Collect ABBV-706 PK ADC/Total AB; Pre-Dose; Within 2 hours; Ambient; Other (5-8x); Allow to clot 30-60 minutes. (Completed)    Research collection: 5mL Gold SST - Research Collect Biomarker Serum; Pre-Dose; Within 2 hours; Ambient; 5x; Allow to clot 30 minutes (Completed)    Research collection: 3mL Lavender EDTA - Research Collect Free Top1 Inhibitor; Pre-Dose; Within 2 hours; On ICE; 8-10x (Completed)    Research collection: 10mL RareCyte AccuCyte - Reserach Collect CTC (SCLC Only); Pre-Dose; Within 2 hours; 8-10x (Completed)    Research collection: 10mL Streck - Research Collect ctDNA Plasma/WB; Pre-Dose; Within 2 hours; 8-10x (Completed)    Research collection: 10mL Streck - Research Collect ctDNA Plasma/WB; Pre-Dose; Within 2 hours; 8-10x (Completed)    Research collection: 2mL Red Top - Research Collect ABBV-706 PK ADC/Total AB; Post-Dose; Other (EOI +15 minutes); Other (+/- 5 minutes); Ambient; Other (5-8x); Allow to clot 30-60 minutes. (Completed)    Research collection: 3mL Lavender EDTA - Research Collect Free Top1 Inhibitor; Post-Dose; Other (EOI +15 minutes); Other (+/- 5 minutes); On ICE; 8-10x (Completed)    Research collection: 2mL Red Top - Research Collect ABBV-706 PK ADC/Total AB; Post-Dose; Other (EOI +4 hours); +/- 15 minutes; Ambient; Other (5-8x); Allow to clot 30-60 minutes.    Research collection: 3mL Lavender EDTA - Research Collect Free Top1 Inhibitor; Post-Dose; Other (EOI +4 hours); +/- 15 minutes (+/- 5 minutes); On  ICE; 8-10x        Medications as of the completion of today's visit:  Current Outpatient Medications   Medication Sig Dispense Refill    acetaminophen (TylenoL) 325 mg tablet Take 2 tablets (650 mg) by mouth every 6 hours if needed for mild pain (1 - 3) or moderate pain (4 - 6). 30 tablet 0    ALPRAZolam (Xanax) 0.5 mg tablet Take 2 tablets (1 mg) by mouth 2 times a day as needed for anxiety for up to 15 days. 30 tablet 0    amoxicillin-pot clavulanate (Augmentin) 875-125 mg tablet Take 1 tablet (875 mg) by mouth 2 times a day for 10 days. 20 tablet 0    apixaban (Eliquis) 5 mg tablet Take 1 tablet (5 mg) by mouth 2 times a day. 180 tablet 0    budesonide (Pulmicort) 0.5 mg/2 mL nebulizer solution Take 2 mL (0.5 mg) by nebulization 2 times a day.      carbinoxamine maleate 4 mg tablet Take 4 mg by mouth 2 times a day.      escitalopram (Lexapro) 20 mg tablet Take 1 tablet (20 mg) by mouth once daily.      guaiFENesin (Mucinex) 600 mg 12 hr tablet Take 2 tablets (1,200 mg) by mouth 2 times a day. Do not crush, chew, or split. 120 tablet 11    hydrocodone-homatropine (Hycodan) 5-1.5 mg/5 mL syrup Take 5 mL by mouth every 6 hours if needed for cough for up to 5 days. 100 mL 0    levothyroxine (Synthroid) 50 mcg tablet Take 1 tablet (50 mcg) by mouth early in the morning.. Take on an empty stomach at the same time each day, either 30 to 60 minutes prior to breakfast 30 tablet 11    lidocaine-prilocaine (Emla) 2.5-2.5 % cream Apply topically a thin [facility] 45 [facility] prior to being accessed and cover with band aid 30 g 0    magnesium hydroxide (Milk of Magnesia) 400 mg/5 mL suspension Take 5 mL by mouth once daily as needed for constipation for up to 10 days. Take this when it has been 2 or more days without a bowel movement. 360 mL 0    metFORMIN  mg 24 hr tablet Take 1 tablet (500 mg) by mouth 2 times a day. 180 tablet 3    methylPREDNISolone (Medrol Dospak) 4 mg tablets Follow schedule on  package instructions 21 tablet 0    naloxone (Narcan) 4 mg/0.1 mL nasal spray Administer 1 spray (4 mg) into affected nostril(s) if needed for opioid reversal. May repeat every 2-3 minutes if needed, alternating nostrils, until medical assistance becomes available. 2 each 0    OLANZapine (ZyPREXA) 5 mg tablet Take 1 tablet (5 mg) by mouth once daily in the evening. Take with meals. This is to help with appetite, nausea, and sleep. 30 tablet 0    omeprazole (PriLOSEC) 40 mg DR capsule Take 1 capsule (40 mg) by mouth once daily. 90 capsule 3    ondansetron (Zofran) 8 mg tablet Take 1 tablet (8 mg) by mouth every 8 hours if needed for nausea or vomiting. 30 tablet 5    oxyCODONE (Roxicodone) 5 mg immediate release tablet Take 1 tablet (5 mg) by mouth every 4 hours if needed for moderate pain (4 - 6) for up to 60 doses. Can take 2 pill if need for severe pain (7-10) 20 tablet 0    oxyCODONE (Roxicodone) 5 mg immediate release tablet Take 1-2 tablets (5-10 mg) by mouth every 4 hours if needed for severe pain (7 - 10). 360 tablet 0    ProAir HFA 90 mcg/actuation inhaler Inhale 2 puffs every 4 hours if needed.      prochlorperazine (Compazine) 10 mg tablet Take 1 tablet (10 mg) by mouth every 6 hours if needed for nausea or vomiting. 30 tablet 5    rosuvastatin (Crestor) 10 mg tablet Take 1 tablet (10 mg) by mouth once daily. 90 tablet 3    sennosides-docusate sodium (Marzena-Colace) 8.6-50 mg tablet Take 3 tablets by mouth 2 times a day. This is to help manage constipation. 180 tablet 11    traZODone (Desyrel) 50 mg tablet Take 1 tablet (50 mg) by mouth once daily at bedtime.       Current Facility-Administered Medications   Medication Dose Route Frequency Provider Last Rate Last Admin    albuterol 2.5 mg /3 mL (0.083 %) nebulizer solution 3 mL  3 mL nebulization PRN NANCY Hernandez-CNP        dextrose 5 % in water (D5W) bolus 500 mL  500 mL intravenous PRN Baldev J Goolamier, APRN-CNP        diphenhydrAMINE  (BENADryl) injection 50 mg  50 mg intravenous PRN JESSICA Hernandez        EPINEPHrine HCl (PF) (Adrenalin) injection 0.3 mg  0.3 mg intramuscular q5 min PRN NANCY Hernandez-CNP        famotidine PF (Pepcid) injection 20 mg  20 mg intravenous Once PRN NANCY Hernandez-CNP        heparin flush 100 unit/mL syringe 500 Units  500 Units intra-catheter PRN Jd Kohli MD        methylPREDNISolone sod succinate (SOLU-Medrol) 40 mg/mL injection 40 mg  40 mg intravenous PRN NANCY Hernandez-CNP        prochlorperazine (Compazine) injection 10 mg  10 mg intravenous q6h PRN NANCY Hernandez-CNP        prochlorperazine (Compazine) tablet 10 mg  10 mg oral q6h PRN NANCY Hernandez-CNP        sodium chloride 0.9 % bolus 500 mL  500 mL intravenous PRN JESSICA Hernandez           Administrations This Visit       Study [id number] ABBV-706 110.6 mg in sodium chloride 0.9% 100 mL IV       Admin Date  04/02/2025 Action  New Bag Dose  110.6 mg Route  intravenous Documented By  Janice Cervantes RN                    Orders placed during today's visit:  Orders Placed This Encounter   Procedures    Research collection: 4mL Red Top - Research Collect ABBV-706 ADA/NADA; Pre-Dose; Within 2 hours; Ambient; Other (5-8x); Allow to clot 30-60 minutes.     Standing Status:   Future     Number of Occurrences:   1     Standing Expiration Date:   4/2/2026     Order Specific Question:   Test     Answer:   ABBV-706 ADA/NADA     Order Specific Question:   Timepoint     Answer:   Pre-Dose     Order Specific Question:   Pre-Dose     Answer:   Within 2 hours     Order Specific Question:   Temperature     Answer:   Ambient     Order Specific Question:   Invert     Answer:   Other     Comments:   5-8x     Order Specific Question:   Handling     Answer:   Allow to clot 30-60 minutes.     Order Specific Question:   Optional?     Answer:   No    Research collection: 2mL Red Top - Research Collect  ABBV-706 PK ADC/Total AB; Pre-Dose; Within 2 hours; Ambient; Other (5-8x); Allow to clot 30-60 minutes.     Standing Status:   Future     Number of Occurrences:   1     Standing Expiration Date:   4/2/2026     Order Specific Question:   Test     Answer:   ABBV-706 PK ADC/Total AB     Order Specific Question:   Timepoint     Answer:   Pre-Dose     Order Specific Question:   Pre-Dose     Answer:   Within 2 hours     Order Specific Question:   Temperature     Answer:   Ambient     Order Specific Question:   Invert     Answer:   Other     Comments:   5-8x     Order Specific Question:   Handling     Answer:   Allow to clot 30-60 minutes.     Order Specific Question:   Optional?     Answer:   No    Research collection: 5mL Gold SST - Research Collect Biomarker Serum; Pre-Dose; Within 2 hours; Ambient; 5x; Allow to clot 30 minutes     Standing Status:   Future     Number of Occurrences:   1     Standing Expiration Date:   4/2/2026     Order Specific Question:   Test     Answer:   Biomarker Serum     Order Specific Question:   Timepoint     Answer:   Pre-Dose     Order Specific Question:   Pre-Dose     Answer:   Within 2 hours     Order Specific Question:   Temperature     Answer:   Ambient     Order Specific Question:   Invert     Answer:   5x     Order Specific Question:   Handling     Answer:   Allow to clot 30 minutes    Research collection: 3mL Lavender EDTA - Research Collect Free Top1 Inhibitor; Pre-Dose; Within 2 hours; On ICE; 8-10x     Standing Status:   Future     Number of Occurrences:   1     Standing Expiration Date:   4/2/2026     Order Specific Question:   Test     Answer:   Free Top1 Inhibitor     Order Specific Question:   Timepoint     Answer:   Pre-Dose     Order Specific Question:   Pre-Dose     Answer:   Within 2 hours     Order Specific Question:   Temperature     Answer:   On ICE     Order Specific Question:   Invert     Answer:   8-10x     Order Specific Question:   Optional?     Answer:   No     Research collection: 10mL RareCyte AccuCyte - Reserach Collect CTC (SCLC Only); Pre-Dose; Within 2 hours; 8-10x     Standing Status:   Future     Number of Occurrences:   1     Standing Expiration Date:   4/2/2026     Order Specific Question:   Test     Answer:   CTC (SCLC Only)     Order Specific Question:   Timepoint     Answer:   Pre-Dose     Order Specific Question:   Pre-Dose     Answer:   Within 2 hours     Order Specific Question:   Invert     Answer:   8-10x     Order Specific Question:   Optional?     Answer:   No    Research collection: 10mL Streck - Research Collect ctDNA Plasma/WB; Pre-Dose; Within 2 hours; 8-10x     Standing Status:   Future     Number of Occurrences:   1     Standing Expiration Date:   4/2/2026     Order Specific Question:   Test     Answer:   ctDNA Plasma/WB     Order Specific Question:   Timepoint     Answer:   Pre-Dose     Order Specific Question:   Pre-Dose     Answer:   Within 2 hours     Order Specific Question:   Invert     Answer:   8-10x     Order Specific Question:   Optional?     Answer:   No    Research collection: 10mL Streck - Research Collect ctDNA Plasma/WB; Pre-Dose; Within 2 hours; 8-10x     Standing Status:   Future     Number of Occurrences:   1     Standing Expiration Date:   4/2/2026     Order Specific Question:   Test     Answer:   ctDNA Plasma/WB     Order Specific Question:   Timepoint     Answer:   Pre-Dose     Order Specific Question:   Pre-Dose     Answer:   Within 2 hours     Order Specific Question:   Invert     Answer:   8-10x     Order Specific Question:   Optional?     Answer:   No    Research collection: 2mL appsplit Collect ABBV-706 PK ADC/Total AB; Post-Dose; Other (EOI +15 minutes); Other (+/- 5 minutes); Ambient; Other (5-8x); Allow to clot 30-60 minutes.     Standing Status:   Future     Number of Occurrences:   1     Standing Expiration Date:   4/2/2026     Order Specific Question:   Test     Answer:   ABBV-706 PK ADC/Total AB     Order  Specific Question:   Timepoint     Answer:   Post-Dose     Order Specific Question:   Post-Dose     Answer:   Other     Comments:   EOI +15 minutes     Order Specific Question:   Post-Dose Window     Answer:   Other     Comments:   +/- 5 minutes     Order Specific Question:   Temperature     Answer:   Ambient     Order Specific Question:   Invert     Answer:   Other     Comments:   5-8x     Order Specific Question:   Handling     Answer:   Allow to clot 30-60 minutes.     Order Specific Question:   Optional?     Answer:   No    Research collection: 3mL Lavender EDTA - Research Collect Free Top1 Inhibitor; Post-Dose; Other (EOI +15 minutes); Other (+/- 5 minutes); On ICE; 8-10x     Standing Status:   Future     Number of Occurrences:   1     Standing Expiration Date:   4/2/2026     Order Specific Question:   Test     Answer:   Free Top1 Inhibitor     Order Specific Question:   Timepoint     Answer:   Post-Dose     Order Specific Question:   Post-Dose     Answer:   Other     Comments:   EOI +15 minutes     Order Specific Question:   Post-Dose Window     Answer:   Other     Comments:   +/- 5 minutes     Order Specific Question:   Temperature     Answer:   On ICE     Order Specific Question:   Invert     Answer:   8-10x     Order Specific Question:   Optional?     Answer:   No    Research collection: 2mL Red Top - Research Collect ABBV-706 PK ADC/Total AB; Post-Dose; Other (EOI +4 hours); +/- 15 minutes; Ambient; Other (5-8x); Allow to clot 30-60 minutes.     Standing Status:   Future     Number of Occurrences:   1     Standing Expiration Date:   4/2/2026     Order Specific Question:   Test     Answer:   ABBV-706 PK ADC/Total AB     Order Specific Question:   Timepoint     Answer:   Post-Dose     Order Specific Question:   Post-Dose     Answer:   Other     Comments:   EOI +4 hours     Order Specific Question:   Post-Dose Window     Answer:   +/- 15 minutes     Order Specific Question:   Temperature     Answer:   Ambient      Order Specific Question:   Invert     Answer:   Other     Comments:   5-8x     Order Specific Question:   Handling     Answer:   Allow to clot 30-60 minutes.     Order Specific Question:   Optional?     Answer:   No    Research collection: 3mL Lavender EDTA - Research Collect Free Top1 Inhibitor; Post-Dose; Other (EOI +4 hours); +/- 15 minutes (+/- 5 minutes); On ICE; 8-10x     Standing Status:   Future     Number of Occurrences:   1     Standing Expiration Date:   4/2/2026     Order Specific Question:   Test     Answer:   Free Top1 Inhibitor     Order Specific Question:   Timepoint     Answer:   Post-Dose     Order Specific Question:   Post-Dose     Answer:   Other     Comments:   EOI +4 hours     Order Specific Question:   Post-Dose Window     Answer:   +/- 15 minutes     Comments:   +/- 5 minutes     Order Specific Question:   Temperature     Answer:   On ICE     Order Specific Question:   Invert     Answer:   8-10x     Order Specific Question:   Optional?     Answer:   No    Research collection: 1mL Lavender EDTA - Reserach Collect C3a (FC3Ar); Post-Dose; Other (Within 2 hours after first sign of reaction.)     Standing Status:   Future     Standing Expiration Date:   4/2/2026     Order Specific Question:   Test     Answer:   C3a (FC3Ar)     Order Specific Question:   Timepoint     Answer:   Post-Dose     Order Specific Question:   Post-Dose     Answer:   Other     Comments:   Within 2 hours after first sign of reaction.     Order Specific Question:   Optional?     Answer:   Yes    Research collection: 1mL SST - Research Collect C5 (345); Post-Dose; Other (Within 2 hours after first sign of reaction.)     Standing Status:   Future     Standing Expiration Date:   4/2/2026     Order Specific Question:   Test     Answer:   C5 (345)     Order Specific Question:   Timepoint     Answer:   Post-Dose     Order Specific Question:   Post-Dose     Answer:   Other     Comments:   Within 2 hours after first sign of  reaction.     Order Specific Question:   Optional?     Answer:   Yes    Research collection: 1mL SST - Research Collect IgE; Post-Dose; Other (Within 2 hours after first sign of reaction.)     Standing Status:   Future     Standing Expiration Date:   4/2/2026     Order Specific Question:   Test     Answer:   IgE     Order Specific Question:   Timepoint     Answer:   Post-Dose     Order Specific Question:   Post-Dose     Answer:   Other     Comments:   Within 2 hours after first sign of reaction.     Order Specific Question:   Optional?     Answer:   Yes    Research collection: 2mL Red Top - Research Collect Tryptase; Post-Dose; Other (Within 2 hours after first sign of reaction)     Standing Status:   Future     Standing Expiration Date:   4/2/2026     Order Specific Question:   Test     Answer:   Tryptase     Order Specific Question:   Timepoint     Answer:   Post-Dose     Order Specific Question:   Post-Dose     Answer:   Other     Comments:   Within 2 hours after first sign of reaction     Order Specific Question:   Optional?     Answer:   Yes    Research collection: 2mL Red Top - Research Collect ABBV-706 PK ADC/Total AB; Post-Dose; EOI +24 hours; +/- 2 hours; Ambient; Other (5-8x); Allow to clot 30-60 minutes.     Standing Status:   Future     Standing Expiration Date:   4/3/2026     Order Specific Question:   Test     Answer:   ABBV-706 PK ADC/Total AB     Order Specific Question:   Timepoint     Answer:   Post-Dose     Order Specific Question:   Post-Dose     Answer:   EOI +24 hours     Order Specific Question:   Post-Dose Window     Answer:   +/- 2 hours     Order Specific Question:   Temperature     Answer:   Ambient     Order Specific Question:   Invert     Answer:   Other     Comments:   5-8x     Order Specific Question:   Handling     Answer:   Allow to clot 30-60 minutes.     Order Specific Question:   Optional?     Answer:   No    Research collection: 3mL Lavender EDTA - Research Collect Free Top1  Inhibitor; Post-Dose; EOI +24 hours; +/- 2 hours; On ICE; 8-10x     Standing Status:   Future     Standing Expiration Date:   4/3/2026     Order Specific Question:   Test     Answer:   Free Top1 Inhibitor     Order Specific Question:   Timepoint     Answer:   Post-Dose     Order Specific Question:   Post-Dose     Answer:   EOI +24 hours     Order Specific Question:   Post-Dose Window     Answer:   +/- 2 hours     Order Specific Question:   Temperature     Answer:   On ICE     Order Specific Question:   Invert     Answer:   8-10x     Order Specific Question:   Optional?     Answer:   No    Research collection: 2mL Red Top - Research Collect ABBV-706 PK ADC/Total AB; Post-Dose; EOI +48 hours; +/- 2 hours; Ambient; Other (5-8x); Allow to clot 30-60 minutes.     Standing Status:   Future     Standing Expiration Date:   4/4/2026     Order Specific Question:   Test     Answer:   ABBV-706 PK ADC/Total AB     Order Specific Question:   Timepoint     Answer:   Post-Dose     Order Specific Question:   Post-Dose     Answer:   EOI +48 hours     Order Specific Question:   Post-Dose Window     Answer:   +/- 2 hours     Order Specific Question:   Temperature     Answer:   Ambient     Order Specific Question:   Invert     Answer:   Other     Comments:   5-8x     Order Specific Question:   Handling     Answer:   Allow to clot 30-60 minutes.     Order Specific Question:   Optional?     Answer:   No    Research collection: 3mL Lavender EDTA - Research Collect Free Top1 Inhibitor; Post-Dose; EOI +48 hours; +/- 2 hours; On ICE; 8-10x     Standing Status:   Future     Standing Expiration Date:   4/4/2026     Order Specific Question:   Test     Answer:   Free Top1 Inhibitor     Order Specific Question:   Timepoint     Answer:   Post-Dose     Order Specific Question:   Post-Dose     Answer:   EOI +48 hours     Order Specific Question:   Post-Dose Window     Answer:   +/- 2 hours     Order Specific Question:   Temperature     Answer:   On  ICE     Order Specific Question:   Invert     Answer:   8-10x     Order Specific Question:   Optional?     Answer:   No    CBC and Auto Differential     Standing Status:   Future     Standing Expiration Date:   4/16/2026     Order Specific Question:   Release result to MyChart     Answer:   Immediate [1]    Comprehensive metabolic panel     Standing Status:   Future     Standing Expiration Date:   4/16/2026     Order Specific Question:   Release result to MyChart     Answer:   Immediate [1]    Bilirubin, Direct     Standing Status:   Future     Standing Expiration Date:   4/16/2026     Order Specific Question:   Release result to MyChart     Answer:   Immediate [1]    Gamma GT     Standing Status:   Future     Standing Expiration Date:   4/16/2026     Order Specific Question:   Release result to MyChart     Answer:   Immediate [1]    Lactate dehydrogenase     Standing Status:   Future     Standing Expiration Date:   4/16/2026     Order Specific Question:   Release result to MyChart     Answer:   Immediate [1]    Magnesium     Standing Status:   Future     Standing Expiration Date:   4/16/2026     Order Specific Question:   Release result to MyChart     Answer:   Immediate [1]    Phosphorus     Standing Status:   Future     Standing Expiration Date:   4/16/2026     Order Specific Question:   Release result to MyChart     Answer:   Immediate [1]    Research collection: 2mL Red Top - Research Collect ABBV-706 PK ADC/Total AB; Post-Dose; Other (EOI +336 hours); (+/- 24 hours); Ambient; Other (5-8x); Allow to clot 30-60 minutes.     Standing Status:   Future     Standing Expiration Date:   4/16/2026     Order Specific Question:   Test     Answer:   ABBV-706 PK ADC/Total AB     Order Specific Question:   Timepoint     Answer:   Post-Dose     Order Specific Question:   Post-Dose     Answer:   Other     Comments:   EOI +336 hours     Order Specific Question:   Temperature     Answer:   Ambient     Order Specific Question:    Invert     Answer:   Other     Comments:   5-8x     Order Specific Question:   Handling     Answer:   Allow to clot 30-60 minutes.     Order Specific Question:   Optional?     Answer:   No    Research collection: 3mL Lavender EDTA - Research Collect Free Top1 Inhibitor; Post-Dose; Other (EOI +336 hours); Other (+/- 24 hours); On ICE; 8-10x     Standing Status:   Future     Standing Expiration Date:   4/16/2026     Order Specific Question:   Test     Answer:   Free Top1 Inhibitor     Order Specific Question:   Timepoint     Answer:   Post-Dose     Order Specific Question:   Post-Dose     Answer:   Other     Comments:   EOI +336 hours     Order Specific Question:   Post-Dose Window     Answer:   Other     Comments:   +/- 24 hours     Order Specific Question:   Temperature     Answer:   On ICE     Order Specific Question:   Invert     Answer:   8-10x     Order Specific Question:   Optional?     Answer:   No    CBC and Auto Differential     Standing Status:   Future     Standing Expiration Date:   4/9/2026     Order Specific Question:   Release result to MyChart     Answer:   Immediate [1]    Comprehensive metabolic panel     Standing Status:   Future     Standing Expiration Date:   4/9/2026     Order Specific Question:   Release result to MyChart     Answer:   Immediate [1]    Bilirubin, Direct     Standing Status:   Future     Standing Expiration Date:   4/9/2026     Order Specific Question:   Release result to MyChart     Answer:   Immediate [1]    Gamma GT     Standing Status:   Future     Standing Expiration Date:   4/9/2026     Order Specific Question:   Release result to MyChart     Answer:   Immediate [1]    Lactate dehydrogenase     Standing Status:   Future     Standing Expiration Date:   4/9/2026     Order Specific Question:   Release result to MyChart     Answer:   Immediate [1]    Magnesium     Standing Status:   Future     Standing Expiration Date:   4/9/2026     Order Specific Question:   Release result  to MyChart     Answer:   Immediate [1]    Phosphorus     Standing Status:   Future     Standing Expiration Date:   4/9/2026     Order Specific Question:   Release result to MyChart     Answer:   Immediate [1]    Research collection: 2mL Red Top - Research Collect ABBV-706 PK ADC/Total AB; Post-Dose; Other (EOI +168 hours); Other (+/- 24 hours); Ambient; Other (5-8x); Allow to clot 30-60 minutes.     Standing Status:   Future     Standing Expiration Date:   4/9/2026     Order Specific Question:   Test     Answer:   ABBV-706 PK ADC/Total AB     Order Specific Question:   Timepoint     Answer:   Post-Dose     Order Specific Question:   Post-Dose     Answer:   Other     Comments:   EOI +168 hours     Order Specific Question:   Post-Dose Window     Answer:   Other     Comments:   +/- 24 hours     Order Specific Question:   Temperature     Answer:   Ambient     Order Specific Question:   Invert     Answer:   Other     Comments:   5-8x     Order Specific Question:   Handling     Answer:   Allow to clot 30-60 minutes.     Order Specific Question:   Optional?     Answer:   No    Research collection: 3mL Lavender EDTA - Research Collect Free Top1 Inhibitor; Post-Dose; Other (EOI +168 hours); Other (+/- 24 hours); On ICE; 8-10x     Standing Status:   Future     Standing Expiration Date:   4/9/2026     Order Specific Question:   Test     Answer:   Free Top1 Inhibitor     Order Specific Question:   Timepoint     Answer:   Post-Dose     Order Specific Question:   Post-Dose     Answer:   Other     Comments:   EOI +168 hours     Order Specific Question:   Post-Dose Window     Answer:   Other     Comments:   +/- 24 hours     Order Specific Question:   Temperature     Answer:   On ICE     Order Specific Question:   Invert     Answer:   8-10x     Order Specific Question:   Optional?     Answer:   No    CBC and Auto Differential     Standing Status:   Standing     Number of Occurrences:   1     Order Specific Question:   Release result  to MyChart     Answer:   Immediate [1]    Comprehensive metabolic panel     Standing Status:   Standing     Number of Occurrences:   1     Order Specific Question:   Release result to MyChart     Answer:   Immediate [1]    Bilirubin, Direct     Standing Status:   Standing     Number of Occurrences:   1     Order Specific Question:   Release result to MyChart     Answer:   Immediate [1]    Gamma GT     Standing Status:   Standing     Number of Occurrences:   1     Order Specific Question:   Release result to MyChart     Answer:   Immediate [1]    Lactate dehydrogenase     Standing Status:   Standing     Number of Occurrences:   1     Order Specific Question:   Release result to MyChart     Answer:   Immediate [1]    Magnesium     Standing Status:   Standing     Number of Occurrences:   1     Order Specific Question:   Release result to MyChart     Answer:   Immediate [1]    Phosphorus     Standing Status:   Standing     Number of Occurrences:   1     Order Specific Question:   Release result to MyChart     Answer:   Immediate [1]    Urinalysis with Reflex Microscopic     Standing Status:   Standing     Number of Occurrences:   1     Order Specific Question:   Release result to MyChart     Answer:   Immediate [1]    Microscopic Only, Urine     Standing Status:   Standing     Number of Occurrences:   1     Order Specific Question:   Release result to MyChart     Answer:   Immediate [1]    Research collection: 4mL Red Top - Research Collect ABBV-706 ADA/NADA; Pre-Dose; Within 2 hours; Ambient; Other (5-8x); Allow to clot 30-60 minutes.     Standing Status:   Standing     Number of Occurrences:   1     Order Specific Question:   Test     Answer:   ABBV-706 ADA/NADA     Order Specific Question:   Timepoint     Answer:   Pre-Dose     Order Specific Question:   Pre-Dose     Answer:   Within 2 hours     Order Specific Question:   Temperature     Answer:   Ambient     Order Specific Question:   Invert     Answer:   Other      Comments:   5-8x     Order Specific Question:   Handling     Answer:   Allow to clot 30-60 minutes.     Order Specific Question:   Optional?     Answer:   No    Research collection: 2mL Red Top - Research Collect ABBV-706 PK ADC/Total AB; Pre-Dose; Within 2 hours; Ambient; Other (5-8x); Allow to clot 30-60 minutes.     Standing Status:   Standing     Number of Occurrences:   1     Order Specific Question:   Test     Answer:   ABBV-706 PK ADC/Total AB     Order Specific Question:   Timepoint     Answer:   Pre-Dose     Order Specific Question:   Pre-Dose     Answer:   Within 2 hours     Order Specific Question:   Temperature     Answer:   Ambient     Order Specific Question:   Invert     Answer:   Other     Comments:   5-8x     Order Specific Question:   Handling     Answer:   Allow to clot 30-60 minutes.     Order Specific Question:   Optional?     Answer:   No    Research collection: 5mL Gold SST - Research Collect Biomarker Serum; Pre-Dose; Within 2 hours; Ambient; 5x; Allow to clot 30 minutes     Standing Status:   Standing     Number of Occurrences:   1     Order Specific Question:   Test     Answer:   Biomarker Serum     Order Specific Question:   Timepoint     Answer:   Pre-Dose     Order Specific Question:   Pre-Dose     Answer:   Within 2 hours     Order Specific Question:   Temperature     Answer:   Ambient     Order Specific Question:   Invert     Answer:   5x     Order Specific Question:   Handling     Answer:   Allow to clot 30 minutes    Research collection: 3mL Lavender EDTA - Research Collect Free Top1 Inhibitor; Pre-Dose; Within 2 hours; On ICE; 8-10x     Standing Status:   Standing     Number of Occurrences:   1     Order Specific Question:   Test     Answer:   Free Top1 Inhibitor     Order Specific Question:   Timepoint     Answer:   Pre-Dose     Order Specific Question:   Pre-Dose     Answer:   Within 2 hours     Order Specific Question:   Temperature     Answer:   On ICE     Order Specific  Question:   Invert     Answer:   8-10x     Order Specific Question:   Optional?     Answer:   No    Research collection: 10mL RareCyte AccuCyte - Reserach Collect CTC ([facility] Only); Pre-Dose; Within 2 hours; 8-10x     Standing Status:   Standing     Number of Occurrences:   1     Order Specific Question:   Test     Answer:   CTC ([facility] Only)     Order Specific Question:   Timepoint     Answer:   Pre-Dose     Order Specific Question:   Pre-Dose     Answer:   Within 2 hours     Order Specific Question:   Invert     Answer:   8-10x     Order Specific Question:   Optional?     Answer:   No    Research collection: 10mL Streck - Research Collect ctDNA Plasma/WB; Pre-Dose; Within 2 hours; 8-10x     Standing Status:   Standing     Number of Occurrences:   1     Order Specific Question:   Test     Answer:   ctDNA Plasma/WB     Order Specific Question:   Timepoint     Answer:   Pre-Dose     Order Specific Question:   Pre-Dose     Answer:   Within 2 hours     Order Specific Question:   Invert     Answer:   8-10x     Order Specific Question:   Optional?     Answer:   No    Research collection: 10mL Streck - Research Collect ctDNA Plasma/WB; Pre-Dose; Within 2 hours; 8-10x     Standing Status:   Standing     Number of Occurrences:   1     Order Specific Question:   Test     Answer:   ctDNA Plasma/WB     Order Specific Question:   Timepoint     Answer:   Pre-Dose     Order Specific Question:   Pre-Dose     Answer:   Within 2 hours     Order Specific Question:   Invert     Answer:   8-10x     Order Specific Question:   Optional?     Answer:   No    Research collection: 2mL Red Top - Research Collect ABBV-706 PK ADC/Total AB; Post-Dose; Other (EOI +15 minutes); Other (+/- 5 minutes); Ambient; Other (5-8x); Allow to clot 30-60 minutes.     Standing Status:   Standing     Number of Occurrences:   1     Order Specific Question:   Test     Answer:   ABBV-706 PK ADC/Total AB     Order Specific Question:   Timepoint     Answer:   Post-Dose      Order Specific Question:   Post-Dose     Answer:   Other     Comments:   EOI +15 minutes     Order Specific Question:   Post-Dose Window     Answer:   Other     Comments:   +/- 5 minutes     Order Specific Question:   Temperature     Answer:   Ambient     Order Specific Question:   Invert     Answer:   Other     Comments:   5-8x     Order Specific Question:   Handling     Answer:   Allow to clot 30-60 minutes.     Order Specific Question:   Optional?     Answer:   No    Research collection: 3mL Lavender EDTA - Research Collect Free Top1 Inhibitor; Post-Dose; Other (EOI +15 minutes); Other (+/- 5 minutes); On ICE; 8-10x     Standing Status:   Standing     Number of Occurrences:   1     Order Specific Question:   Test     Answer:   Free Top1 Inhibitor     Order Specific Question:   Timepoint     Answer:   Post-Dose     Order Specific Question:   Post-Dose     Answer:   Other     Comments:   EOI +15 minutes     Order Specific Question:   Post-Dose Window     Answer:   Other     Comments:   +/- 5 minutes     Order Specific Question:   Temperature     Answer:   On ICE     Order Specific Question:   Invert     Answer:   8-10x     Order Specific Question:   Optional?     Answer:   No    Research collection: 2mL Red Top - Research Collect ABBV-706 PK ADC/Total AB; Post-Dose; Other (EOI +4 hours); +/- 15 minutes; Ambient; Other (5-8x); Allow to clot 30-60 minutes.     Standing Status:   Standing     Number of Occurrences:   1     Order Specific Question:   Test     Answer:   ABBV-706 PK ADC/Total AB     Order Specific Question:   Timepoint     Answer:   Post-Dose     Order Specific Question:   Post-Dose     Answer:   Other     Comments:   EOI +4 hours     Order Specific Question:   Post-Dose Window     Answer:   +/- 15 minutes     Order Specific Question:   Temperature     Answer:   Ambient     Order Specific Question:   Invert     Answer:   Other     Comments:   5-8x     Order Specific Question:   Handling     Answer:    Allow to clot 30-60 minutes.     Order Specific Question:   Optional?     Answer:   No    Research collection: 3mL Lavender EDTA - Research Collect Free Top1 Inhibitor; Post-Dose; Other (EOI +4 hours); +/- 15 minutes (+/- 5 minutes); On ICE; 8-10x     Standing Status:   Standing     Number of Occurrences:   1     Order Specific Question:   Test     Answer:   Free Top1 Inhibitor     Order Specific Question:   Timepoint     Answer:   Post-Dose     Order Specific Question:   Post-Dose     Answer:   Other     Comments:   EOI +4 hours     Order Specific Question:   Post-Dose Window     Answer:   +/- 15 minutes     Comments:   +/- 5 minutes     Order Specific Question:   Temperature     Answer:   On ICE     Order Specific Question:   Invert     Answer:   8-10x     Order Specific Question:   Optional?     Answer:   No    Adult diet Regular     Standing Status:   Standing     Number of Occurrences:   1     Order Specific Question:   Diet type     Answer:   Regular    Research Communication     Cohort: 1A  Dose Level: ABBV - 706 1.3 mg/kg     Standing Status:   Standing     Number of Occurrences:   1    Treatment Conditions     Hold treatment and notify provider if:   ANC LESS THAN 1.5 x 10*3/uL   Platelets LESS THAN 75 x 10*3/uL   AST/ALT GREATER THAN 5 x ULN   Total Bilirubin GREATER THAN 1.5 x ULN   Peripheral Neuropathy GREATER THAN OR EQUAL TO Grade 2   Pneumonitis GREATER THAN OR EQUAL TO Grade 1   Dermatologic Toxicity GREATER THAN OR EQUAL TO Grade 2    Adverse Event GREATER THAN OR EQUAL TO Grade 3     Standing Status:   Standing     Number of Occurrences:   1    Provider Communication - [id number]      Dose Level 1             ABBV-076 1.3 mg/kg   Dose Level 2             ABBV-076 2.5 mg/kg   Dose Level 3             ABBV-076 3.5 mg/kg   Dose Level 4             ABBV-076 5 mg/kg   Dose Level 5             ABBV-076 6.4 mg/kg   Dose Level 6             ABBV-076 8 mg/kg   Dose Level 7             ABBV-076 10 mg/kg      Standing Status:   Standing     Number of Occurrences:   1    Nursing Communication - Hypersensitivity Management, Moderate     Flushing, rash, pruritus, dyspnea, chest discomfort, back pain, angioedema, SBP LESS THAN 90 mmHg, and/or change in mental status above or below patient's baseline. In the event of moderate or severe hypersensitivity reaction to any medication:   Stop infusion.  Assess vital signs, pulse oximetry, nursing assessment, and patient complaints.  Administer medications per Hypersensitivity Reaction Medication Protocol.   Notify physician.     Standing Status:   Standing     Number of Occurrences:   1    Nursing Communication - Hypersensitivity Management, Severe     Worsening of moderate reaction symptoms, SBP LESS THAN 80 mmHg, and/or respiratory distress (respirations GREATER THAN 40 breaths per minute, wheezing, life-threatening symptoms). In the event of moderate or severe hypersensitivity reaction to any medication:   Stop infusion.  Assess vital signs, pulse oximetry, nursing assessment, and patient complaints.  Administer medications per Hypersensitivity Reaction Medication Protocol.   Notify physician.     Standing Status:   Standing     Number of Occurrences:   1    Nursing Communication - Respiratory Management     Oxygen via nasal cannula at 4 L per minute for respiratory rate GREATER THAN OR EQUAL TO to 28 breaths/minute and/or wheezing. Pulse Oximetry continuous monitoring.     Standing Status:   Standing     Number of Occurrences:   1    Pulse oximetry, continuous     Continuous monitoring to maintain SPO2 GREATER THAN 90%     Standing Status:   Standing     Number of Occurrences:   1        [id number] - ABBV-706 alone or in combination in subjects with advanced solid tumors    Patient has no adverse events documented in the Research Adverse Events activity.      Study Specific Instructions and Documentation   If available enter a snapshot of performable actions:  1- Safety Labs  2-  Premeds (PRN)  3- ECG   4- Vital Signs  5-Research Correlatives  6-Study Drug  7-Vital signs  8-ECG  9-Research Correlatives  10-Discharge     PRE-DOSE Safety Labs   For Oncology study, Refer [facility] Plan for orders     Criteria to Treat   [facility] RN reviewed and meets eligibility to proceed with treatment plan   Time team notified: 1100   [facility] RN notifies study team to review eligibility and approval before dosing procedures  Time team approves: 1100      Safety Parameters and Special Instructions   ABBV-706  ABBV-706 is an antibody-drug conjugate (ADC) with an anti-seizure-related antibody (SEZ6) & topoisomerase inhibitor  Potential Side Effects/Adverse Events: GI effects (diarrhea, nausea, vomiting, stool abnormalities), peripheral neuropathy, myelosuppression, rash, infusion-related reaction.  Administer infusion over 60 mins (+/- 10 mins).  Observation post-dose 1 hour for Cycle 1 Day 1. If no reaction, no observation for subsequent visits.     Day 1 Premedication 30 - 60 minutes prior to ABBV-706    Refer to [facility] for orders  (if previous infusion reaction)     PRE-DOSE ECG   JOSEFA 150c Study-specific ECG Machine - rest supine or semi-recumbent at least 5 minutes prior:  Single     ECGs are time-matched to PK sample collection & should be collected within 10 mins prior to PK collection.  ECGs occurring near meals will take place prior to meals  QTcF calculation: Fridericia's formula: S:\[facility]_Staff\Nursing\Protocols\QTcF calculator  MD/Provider to sign & date. Do not need to wait before starting treatment.  Contact MD/Provider if abnormal from baseline.  Time Point Rest Time  QTcF   Pre-Dose 1300 433        PRE-DOSE Vital Signs     Temp, Heart Rate, Respiration, Blood Pressure: rest sitting at least 3 minutes prior to obtaining     Access Type: 22G IV Location: RFA   For Oncology study, Refer [facility] Plan for orders  AFTER ECGs  Deliver to [facility] lab for processing    Time point  Specimen Test Volume Tube Handling Draw Time    Pre-dose (within 2 hours) (draw in order)      ABBV-706 ADA/NADA 4 mL Red Top Serum Invert 5 - 8x; Ambient Allow to clot 30 -60 mins 1310    ABBV-706 PK ADC/Total AB 2 mL Red Top Serum Invert 5 - 8x; Ambient  Allow to clot 30 -60 mins 1310    Biomarker Serum 5 mL Gold Top SST Invert 5x; Upright. Ambient Allow to clot 30 mins 1310    Free TOP1 Inhibitor 3 mL EDTA Lavender Invert 8 - 10x. On ICE 1310    CTC ([facility] Only)-If ordered see EPCI 10 mL RareCyte AccuCyte BCT See Below 1310    ctDNA Plasma/WB 2 x 10 Streck Cell-Free DNA  1310    CTC & ctDNA Plasma/WB instructions  Keep patient's arm in the downward position during collection procedure.  Hold the tube with the stopper in the uppermost position so that the tube contents do not touch the stopper or the end of the needle during sample collection.  Release tourniquet once blood start to flow in the tube, or within 2 minutes of application.  Invert 8 - 10x.        Weight-Based Dosing   Baseline (screening) weight (kg) 85.1 (on C1D1. Screening weight not seen).  Date measured 2/19/25  Text :  Actual weight   Vitals:    04/02/25 0907   Weight: 83.7 kg (184 lb 8.4 oz)     (Weight on Day 1 of cycle 3)   Date Measured 04/02/2025  Enter Information here  Dose based on Cycle 1 Day 1 weight.  May use Cycle 1 Day 1 weight unless noted weight difference of 10% weight gain or loss.  The minimum dose of ABBV-706 is 50 mg.  Any calculated dose < 50 mg, patient to be administered 50 mg.  Body weight capped at 120 kg. 120 kg will be utilized to calculate the dose.     Research Drug Administration   Document medication administration in MAR activity. Document Research specific instructions below.  Enter Information here  ABBV-706  Infuse over 60 minutes (+ 10 minutes).  If any Infusion-Related Reaction or Suspected Allergic-Type Reaction specific safety labs to be drawn- see EPIC     Infusion-Related Reactions   Review Safety  Parameters on the medication Order. Note specific instructions below.    - SOC Hypersensivity Orders      Þ If any Infusion-Related Reaction or Suspected Allergic-Type Reaction Þ  Clinical Safety Labs  Z-Req#:    Timepoint Blood Test Collection Priority Order of Origin       Within 2 hours after 1st sign of reaction C3a (FC3AR) (1 mL Lavender top) STAT Z-req (send to [facility])    C5 (354) (1 mL SST) STAT Z-req (send to [place])    IgE STAT Z-req    Tryptase (2 mL Red Top) STAT Z-req     DURING-DOSE Vital Signs     Temp, Heart Rate, Respiration, Blood Pressure: rest sitting at least 3 minutes prior to obtaining  30 minutes (+/-10 minutes) after the start of infusion      POST-DOSE Vital Signs      Temp, Heart Rate, Respiration, Blood Pressure: rest sitting at least 3 minutes prior to obtaining  End of Infusion (Time “0”)     POST-DOSE ECG   JOSEFA 150c Study-specific ECG Machine - rest supine or semi-recumbent at least 5 minutes prior:  Single   ECGs are time-matched to PK sample collection & should be collected within 10 mins prior to PK collection.  ECGs occurring near meals will take place prior to meals.  QTcF calculation: Fridericia's formula: S:\DCRU_Staff\Nursing\Protocols\QTcF calculator  Contact MD/Provider if abnormal from baseline.    Time Point Rest Time  QTcF   +15 min after End of Infusion 1435 437   +2 Hours after End of Infusion [phone]      +2 HR EOI ECG done in triplicate per request of Jazzy Guerrero RN.  Protocol changing from single to triplicate ECGs.  POST-DOSE Research Correlatives:     Access Type: 22G IV Location: RFA   Deliver to DCRU/TRPC lab for processing    Time point Specimen Test Volume Tube Handling Draw Time    +15 mins after EOI (+/- 5 mins ) (draw in order)   ABBV-706 PK ADC/Total AB 2 mL Red Top Serum Invert 5 - 8x; Ambient Allow to clot 30 -60 mins 1445    Free TOP1 Inhibitor 3 mL EDTA Lavender Invert 8 - 10x. On ICE 1445   +2 hrs after EOI (+/- 15 mins )  (draw in order)    ABBV-706 PK ADC/Total AB 2 mL Red Top Serum Invert 5 - 8x; Ambient Allow to clot 30 -60 mins 1632    Free TOP1 Inhibitor 3 mL EDTA Lavender Invert 8 - 10x. On ICE 1632        Discharge Instructions   Discharge patient to home after study requirements completed or PK sample obtained.  Remind patient to return: on Day 2 for +24 hr PK & ECGs (+/- 2 hrs) after ABBV-706 EOI  Discharge time: 1650     Janice Cervantes RN  04/02/25

## 2025-04-03 ENCOUNTER — HOSPITAL ENCOUNTER (OUTPATIENT)
Dept: RESEARCH | Facility: HOSPITAL | Age: 60
Discharge: HOME | End: 2025-04-03
Payer: MEDICARE

## 2025-04-03 ENCOUNTER — EDUCATION (OUTPATIENT)
Dept: HEMATOLOGY/ONCOLOGY | Facility: HOSPITAL | Age: 60
End: 2025-04-03
Payer: MEDICARE

## 2025-04-03 VITALS
DIASTOLIC BLOOD PRESSURE: 83 MMHG | TEMPERATURE: 98.8 F | RESPIRATION RATE: 18 BRPM | HEART RATE: 109 BPM | SYSTOLIC BLOOD PRESSURE: 97 MMHG | OXYGEN SATURATION: 93 %

## 2025-04-03 DIAGNOSIS — C34.90 SMALL CELL CARCINOMA OF LUNG, UNSPECIFIED LATERALITY, UNSPECIFIED PART OF LUNG: ICD-10-CM

## 2025-04-03 LAB
HOLD SPECIMEN: NORMAL
HOLD SPECIMEN: NORMAL

## 2025-04-03 PROCEDURE — 93005 ELECTROCARDIOGRAM TRACING: CPT | Mod: DCRU

## 2025-04-03 PROCEDURE — 36415 COLL VENOUS BLD VENIPUNCTURE: CPT

## 2025-04-03 NOTE — PROGRESS NOTES
Research Note Non-Treatment Day    Minal Sun is here today. Patient is on [id number]. Today is C3D2. Procedures completed per protocol. AE's and con-meds reviewed with patient. Patient states that she still has not had a bowel movement since Tuesday. She has not picked of Milk of Magnesia from the pharmacy let. Denies N/V/D at this time. Patient is aware of treatment plan and will return tomorrow 4/4 for C3D3.      Education Documentation  Treatment Plan and Schedule, taught by Jazzy Guerrero RN at 4/3/2025  1:10 PM.  Learner: Patient  Readiness: Acceptance  Method: Explanation  Response: Verbalizes Understanding    General Medication Information, taught by Jazzy Guerrero RN at 4/3/2025  1:10 PM.  Learner: Patient  Readiness: Acceptance  Method: Explanation  Response: Verbalizes Understanding    Education Comments  No comments found.

## 2025-04-03 NOTE — RESEARCH NOTES
RSH><[id number]><C3D2><[id number]>    DCRU NURSING VISIT NOTE  Study Name: [id number]- Part 1_Escalation- Monotherapy  IRB#: [id number]  DCRU#: ([facility] # [id number])  Protocol Version Dated:  6/15/2023  PI: Jd Kohli MD.      Time point: Cycle 3 - Day 2    Encounter Date: 04/03/2025  Encounter Time: 12:00 PM EDT  Encounter Department: [place] [id number] RESEARCH        Study Regimen and Dosing   For Oncology study, Refer [place] Treatment Plan for orders   Part 1_Escalation_Monotherapy - patients with advanced recurrent or refractory solid tumors (small cell lung cancer, neuroendocrine tumors or brain tumors) will receive gradually increasing doses of ABBV-706 to determine MTD (Maximum Tolerated Dose).  Cycle = 21-days.  ABBV-706 administered IV Day 1 of each cycle.     Dietary Guidelines   Regular diet       Admission and Prior to Starting Study Activities   Notify  when patient arrives to unit.  Patient review/update DCRU/[place] intake form.  Obtain vital signs after sitting at least 3 minutes. Record on flow sheet & in EMR.  Perform venipuncture for sample collection procedures. Do Not draw research samples from mediport/central line if used for infusion         Study Specific Instructions and Documentation   If available enter a snapshot of performable actions:  ECG   Research Correlatives  Discharge       Subjective   Minal Sun is a 59 y.o. female and is here for a Research clinical visit.    Visit Provider: Jazzy Guerreor RN     Allergies:   Allergies   Allergen Reactions    Clindamycin Diarrhea    Erythromycin Diarrhea    Erythromycin Base Other and Nausea Only    Oxycodone-Acetaminophen Hives     anxiety       Objective     Vital Signs:    Vitals:    04/03/25 1311   BP: 97/83   Pulse: 109   Resp: 18   Temp: 37.1 °C (98.8 °F)   TempSrc: Oral   SpO2: 93%       Physical Exam     ASSESSMENT and PLAN:  Problem List Items Addressed This Visit    None        Medications as of the completion of today's visit:  Current Outpatient Medications   Medication Sig Dispense Refill    acetaminophen (TylenoL) 325 mg tablet Take 2 tablets (650 mg) by mouth every 6 hours if needed for mild pain (1 - 3) or moderate pain (4 - 6). 30 tablet 0    ALPRAZolam (Xanax) 0.5 mg tablet Take 2 tablets (1 mg) by mouth 2 times a day as needed for anxiety for up to 15 days. 30 tablet 0    amoxicillin-pot clavulanate (Augmentin) 875-125 mg tablet Take 1 tablet (875 mg) by mouth 2 times a day for 10 days. 20 tablet 0    apixaban (Eliquis) 5 mg tablet Take 1 tablet (5 mg) by mouth 2 times a day. 180 tablet 0    budesonide (Pulmicort) 0.5 mg/2 mL nebulizer solution Take 2 mL (0.5 mg) by nebulization 2 times a day.      carbinoxamine maleate 4 mg tablet Take 4 mg by mouth 2 times a day.      escitalopram (Lexapro) 20 mg tablet Take 1 tablet (20 mg) by mouth once daily.      guaiFENesin (Mucinex) 600 mg 12 hr tablet Take 2 tablets (1,200 mg) by mouth 2 times a day. Do not crush, chew, or split. 120 tablet 11    hydrocodone-homatropine (Hycodan) 5-1.5 mg/5 mL syrup Take 5 mL by mouth every 6 hours if needed for cough for up to 5 days. 100 mL 0    levothyroxine (Synthroid) 50 mcg tablet Take 1 tablet (50 mcg) by mouth early in the morning.. Take on an empty stomach at the same time each day, either 30 to 60 minutes prior to breakfast 30 tablet 11    lidocaine-prilocaine (Emla) 2.5-2.5 % cream Apply topically a thin film of medication to [facility] site 45 mts prior to being accessed and cover with band aid 30 g 0    magnesium hydroxide (Milk of Magnesia) 400 mg/5 mL suspension Take 5 mL by mouth once daily as needed for constipation for up to 10 days. Take this when it has been 2 or more days without a bowel movement. 360 mL 0    metFORMIN  mg 24 hr tablet Take 1 tablet (500 mg) by mouth 2 times a day. 180 tablet 3    methylPREDNISolone (Medrol Dospak) 4 mg tablets Follow schedule on package  instructions 21 tablet 0    naloxone (Narcan) 4 mg/0.1 mL nasal spray Administer 1 spray (4 mg) into affected nostril(s) if needed for opioid reversal. May repeat every 2-3 minutes if needed, alternating nostrils, until medical assistance becomes available. 2 each 0    OLANZapine (ZyPREXA) 5 mg tablet Take 1 tablet (5 mg) by mouth once daily in the evening. Take with meals. This is to help with appetite, nausea, and sleep. 30 tablet 0    omeprazole (PriLOSEC) 40 mg DR capsule Take 1 capsule (40 mg) by mouth once daily. 90 capsule 3    ondansetron (Zofran) 8 mg tablet Take 1 tablet (8 mg) by mouth every 8 hours if needed for nausea or vomiting. 30 tablet 5    oxyCODONE (Roxicodone) 5 mg immediate release tablet Take 1 tablet (5 mg) by mouth every 4 hours if needed for moderate pain (4 - 6) for up to 60 doses. Can take 2 pill if need for severe pain (7-10) 20 tablet 0    oxyCODONE (Roxicodone) 5 mg immediate release tablet Take 1-2 tablets (5-10 mg) by mouth every 4 hours if needed for severe pain (7 - 10). 360 tablet 0    ProAir HFA 90 mcg/actuation inhaler Inhale 2 puffs every 4 hours if needed.      prochlorperazine (Compazine) 10 mg tablet Take 1 tablet (10 mg) by mouth every 6 hours if needed for nausea or vomiting. 30 tablet 5    rosuvastatin (Crestor) 10 mg tablet Take 1 tablet (10 mg) by mouth once daily. 90 tablet 3    sennosides-docusate sodium (Marzena-Colace) 8.6-50 mg tablet Take 3 tablets by mouth 2 times a day. This is to help manage constipation. 180 tablet 11    traZODone (Desyrel) 50 mg tablet Take 1 tablet (50 mg) by mouth once daily at bedtime.       No current facility-administered medications for this encounter.           Orders placed during today's visit:  No orders of the defined types were placed in this encounter.       [id number] - ABBV-706 alone or in combination in subjects with advanced solid tumors    Patient has no adverse events documented in the Research Adverse Events  activity.        Day 2 ECG   JOSEFA 150c Study-specific ECG Machine - rest supine or semi-recumbent at least 5 minutes prior:  Triplicate    ECGs are time-matched to PK sample collection & should be collected within 10 mins prior to PK collection.  ECGs occurring near meals will take place prior to meals  QTcF calculation: Fridericia's formula: S:\DCRU_Staff\Nursing\Protocols\QTcF calculator  MD/Provider to sign & date. Do not need to wait before starting treatment.  Contact MD/Provider if abnormal from baseline.  Time Point Rest Time  QTcF   +24 hours after end of infusion 1320    PER KOKO BARDALES RN TRIPLICATES ARE 2 MINUTES APART. #1: 13:31:10  QTcF-427  #2: 13:33:10  QTcF-420  #3:  13:35:11  QTcF-418        Day 2 Research Correlatives:       Access Type: 23G BUTTERFLY Location: RIGHT HAND     AFTER ECGs  Deliver to DCRU/TRPC lab for processing    Time point Specimen Test Volume Tube Handling Draw Time   Day 2  +24 hrs (+/- 2 hrs) after EOI  (draw in order) ABBV-706 PK ADC/Total AB 2 mL Red Top Serum Invert 5 - 8x; Ambient  Allow to clot 30 -60 mins   1340    Free TOP1 Inhibitor 3 mL EDTA Lavender Invert 8 - 10x. On ICE           Discharge Instructions   Discharge patient to home after study requirements completed or PK sample obtained.  Remind patient to return: on Day 3 for +48 hr PK & ECGs (+/- 2 hrs) after ABBV-706 EOI.  Discharge time: 1345     Kierra Hector RN  04/03/25

## 2025-04-04 ENCOUNTER — HOSPITAL ENCOUNTER (OUTPATIENT)
Dept: RESEARCH | Facility: HOSPITAL | Age: 60
Discharge: HOME | End: 2025-04-04
Payer: MEDICARE

## 2025-04-04 ENCOUNTER — TELEPHONE (OUTPATIENT)
Dept: PALLIATIVE MEDICINE | Facility: HOSPITAL | Age: 60
End: 2025-04-04
Payer: MEDICARE

## 2025-04-04 ENCOUNTER — TELEPHONE (OUTPATIENT)
Dept: PALLIATIVE MEDICINE | Facility: CLINIC | Age: 60
End: 2025-04-04
Payer: MEDICARE

## 2025-04-04 ENCOUNTER — APPOINTMENT (OUTPATIENT)
Dept: PALLIATIVE MEDICINE | Facility: HOSPITAL | Age: 60
End: 2025-04-04
Payer: MEDICARE

## 2025-04-04 VITALS
HEART RATE: 100 BPM | RESPIRATION RATE: 20 BRPM | TEMPERATURE: 98.1 F | SYSTOLIC BLOOD PRESSURE: 126 MMHG | OXYGEN SATURATION: 93 % | DIASTOLIC BLOOD PRESSURE: 88 MMHG

## 2025-04-04 DIAGNOSIS — C34.90 SMALL CELL CARCINOMA OF LUNG, UNSPECIFIED LATERALITY, UNSPECIFIED PART OF LUNG: ICD-10-CM

## 2025-04-04 LAB
HOLD SPECIMEN: NORMAL
HOLD SPECIMEN: NORMAL

## 2025-04-04 PROCEDURE — 36415 COLL VENOUS BLD VENIPUNCTURE: CPT

## 2025-04-04 PROCEDURE — 93005 ELECTROCARDIOGRAM TRACING: CPT | Mod: DCRU

## 2025-04-04 NOTE — PROGRESS NOTES
Research Note Non-Treatment Day    Minal Sun is here today. Patient is on [id number]. Today is C3D3. Procedures completed per protocol. AE's and con-meds reviewed with patient. Patient is aware of treatment plan.  -      Education Documentation  Pain Management, taught by Jazzy Guerrero RN at 4/4/2025  1:46 PM.  Learner: Family, Patient  Readiness: Acceptance  Method: Explanation  Response: Verbalizes Understanding    Constipation, taught by Jazzy Guerrero RN at 4/4/2025  1:46 PM.  Learner: Family, Patient  Readiness: Acceptance  Method: Explanation  Response: Verbalizes Understanding    Treatment Plan and Schedule, taught by Jazzy Guerrero RN at 4/4/2025  1:46 PM.  Learner: Family, Patient  Readiness: Acceptance  Method: Explanation  Response: Verbalizes Understanding    General Medication Information, taught by Jazzy Guerrero RN at 4/4/2025  1:46 PM.  Learner: Family, Patient  Readiness: Acceptance  Method: Explanation  Response: Verbalizes Understanding    Supportive Medications, taught by Jazzy Guerrero RN at 4/4/2025  1:46 PM.  Learner: Family, Patient  Readiness: Acceptance  Method: Explanation  Response: Verbalizes Understanding    Education Comments  No comments found.

## 2025-04-04 NOTE — TELEPHONE ENCOUNTER
Prior Authorization for Oxycodone has been received. Called [facility], will fill today    Coverage has been approved for this patient’s medication from 3/3/25 until 10/3/25.

## 2025-04-04 NOTE — TELEPHONE ENCOUNTER
Called patient to see how often she was taking oxycodone 5 mg and what stool softeners/ laxatives she was taking and how often.  No answer.  Left a voicemail message for patient to call supportive oncology office back with more information to discuss with Rosa M Dean.  [facility] My chart message left for patient as well.

## 2025-04-04 NOTE — PATIENT COMMUNICATION
Pt has clinical research appt today.   Message sent to research team to advise  
Low Suspicion of COVID-19

## 2025-04-04 NOTE — RESEARCH NOTES
RSH><[id number]><C3D3><[id number]>  DCRU NURSING VISIT NOTE  Study Name: [id number]- Part 1_Escalation- Monotherapy  IRB#: [id number]  DCRU#: ([facility] # [id number])  Protocol Version Dated:  6/15/2023  PI: Jd Kohli MD.      Time point: Cycle 3 - Day 3    Encounter Date: 04/04/2025  Encounter Time: 12:30 PM EDT  Encounter Department: [place] [address] RESEARCH        Study Regimen and Dosing   For Oncology study, Refer [place] Treatment Plan for orders   Part 1_Escalation_Monotherapy - patients with advanced recurrent or refractory solid tumors (small cell lung cancer, neuroendocrine tumors or brain tumors) will receive gradually increasing doses of ABBV-706 to determine MTD (Maximum Tolerated Dose).  Cycle = 21-days.  ABBV-706 administered IV Day 1 of each cycle.     Dietary Guidelines   Regular diet       Admission and Prior to Starting Study Activities   Notify  when patient arrives to unit.  Patient review/update DCRU/[place] intake form.  Obtain vital signs after sitting at least 3 minutes. Record on flow sheet & in EMR.  Perform venipuncture for sample collection procedures. Do Not draw research samples from mediport/central line if used for infusion         Study Specific Instructions and Documentation   If available enter a snapshot of performable actions:  ECG   Research Correlatives  Discharge       Subjective   Minal Sun is a 59 y.o. female and is here for a Research clinical visit.    Visit Provider: Janice Ramirez RN     Allergies:   Allergies   Allergen Reactions    Clindamycin Diarrhea    Erythromycin Diarrhea    Erythromycin Base Other and Nausea Only    Oxycodone-Acetaminophen Hives     anxiety       Objective     Vital Signs:    There were no vitals filed for this visit.    Physical Exam     ASSESSMENT and PLAN:  Problem List Items Addressed This Visit    None       Medications as of the completion of today's visit:  Current Outpatient Medications    Medication Sig Dispense Refill    acetaminophen (TylenoL) 325 mg tablet Take 2 tablets (650 mg) by mouth every 6 hours if needed for mild pain (1 - 3) or moderate pain (4 - 6). 30 tablet 0    ALPRAZolam (Xanax) 0.5 mg tablet Take 2 tablets (1 mg) by mouth 2 times a day as needed for anxiety for up to 15 days. 30 tablet 0    amoxicillin-pot clavulanate (Augmentin) 875-125 mg tablet Take 1 tablet (875 mg) by mouth 2 times a day for 10 days. 20 tablet 0    apixaban (Eliquis) 5 mg tablet Take 1 tablet (5 mg) by mouth 2 times a day. 180 tablet 0    budesonide (Pulmicort) 0.5 mg/2 mL nebulizer solution Take 2 mL (0.5 mg) by nebulization 2 times a day.      carbinoxamine maleate 4 mg tablet Take 4 mg by mouth 2 times a day.      escitalopram (Lexapro) 20 mg tablet Take 1 tablet (20 mg) by mouth once daily.      guaiFENesin (Mucinex) 600 mg 12 hr tablet Take 2 tablets (1,200 mg) by mouth 2 times a day. Do not crush, chew, or split. 120 tablet 11    hydrocodone-homatropine (Hycodan) 5-1.5 mg/5 mL syrup Take 5 mL by mouth every 6 hours if needed for cough for up to 5 days. 100 mL 0    levothyroxine (Synthroid) 50 mcg tablet Take 1 tablet (50 mcg) by mouth early in the morning.. Take on an empty stomach at the same time each day, either 30 to 60 minutes prior to breakfast 30 tablet 11    lidocaine-prilocaine (Emla) 2.5-2.5 % cream Apply topically a thin film of medication to [facility] site 45 mts prior to being accessed and cover with band aid 30 g 0    magnesium hydroxide (Milk of Magnesia) 400 mg/5 mL suspension Take 5 mL by mouth once daily as needed for constipation for up to 10 days. Take this when it has been 2 or more days without a bowel movement. 360 mL 0    metFORMIN  mg 24 hr tablet Take 1 tablet (500 mg) by mouth 2 times a day. 180 tablet 3    methylPREDNISolone (Medrol Dospak) 4 mg tablets Follow schedule on package instructions 21 tablet 0    naloxone (Narcan) 4 mg/0.1 mL nasal spray Administer 1 spray (4  mg) into affected nostril(s) if needed for opioid reversal. May repeat every 2-3 minutes if needed, alternating nostrils, until medical assistance becomes available. 2 each 0    OLANZapine (ZyPREXA) 5 mg tablet Take 1 tablet (5 mg) by mouth once daily in the evening. Take with meals. This is to help with appetite, nausea, and sleep. 30 tablet 0    omeprazole (PriLOSEC) 40 mg DR capsule Take 1 capsule (40 mg) by mouth once daily. 90 capsule 3    ondansetron (Zofran) 8 mg tablet Take 1 tablet (8 mg) by mouth every 8 hours if needed for nausea or vomiting. 30 tablet 5    oxyCODONE (Roxicodone) 5 mg immediate release tablet Take 1 tablet (5 mg) by mouth every 4 hours if needed for moderate pain (4 - 6) for up to 60 doses. Can take 2 pill if need for severe pain (7-10) 20 tablet 0    oxyCODONE (Roxicodone) 5 mg immediate release tablet Take 1-2 tablets (5-10 mg) by mouth every 4 hours if needed for severe pain (7 - 10). 360 tablet 0    ProAir HFA 90 mcg/actuation inhaler Inhale 2 puffs every 4 hours if needed.      prochlorperazine (Compazine) 10 mg tablet Take 1 tablet (10 mg) by mouth every 6 hours if needed for nausea or vomiting. 30 tablet 5    rosuvastatin (Crestor) 10 mg tablet Take 1 tablet (10 mg) by mouth once daily. 90 tablet 3    sennosides-docusate sodium (Marzena-Colace) 8.6-50 mg tablet Take 3 tablets by mouth 2 times a day. This is to help manage constipation. 180 tablet 11    traZODone (Desyrel) 50 mg tablet Take 1 tablet (50 mg) by mouth once daily at bedtime.       No current facility-administered medications for this visit.           Orders placed during today's visit:  No orders of the defined types were placed in this encounter.       [id number] - ABBV-706 alone or in combination in subjects with advanced solid tumors    Patient has no adverse events documented in the Research Adverse Events activity.            Day 3 ECG   JOSEFA 150c Study-specific ECG Machine - rest supine or semi-recumbent at least 5  minutes prior:  Triplicate    ECGs are time-matched to PK sample collection & should be collected within 10 mins prior to PK collection.  ECGs occurring near meals will take place prior to meals  QTcF calculation: Fridericia's formula: S:\DCRU_Staff\Nursing\Protocols\QTcF calculator  MD/Provider to sign & date. Do not need to wait before starting treatment.  Contact MD/Provider if abnormal from baseline.  Time Point Rest Time  QTcF   +48 Hours after End of Infusion 1313 428    1315 433    1317 440        Day 3 Research Correlatives:     Access Type: #21 butterfly Location: [facility]   For Oncology study, Refer [place] Treatment Plan for orders  Enter Additional Information here not covered in the Treatment Plan:  Text  AFTER ECGs  Deliver to DCRU/TRPC lab for processing    Time point Specimen Test Volume Tube Handling Draw Time      Day 3  +48 hrs (+/- 2 hrs) after EOI  (draw in order)) ABBV-706 PK ADC/Total AB 2 mL Red Top Serum Invert 5 - 8x; Ambient  Allow to clot 30 -60 mins 1325    Free TOP1 Inhibitor 3 mL EDTA Lavender Invert 8 - 10x. On ICE 1325          Discharge Instructions   Discharge patient to home after study requirements completed or PK sample obtained.  Remind patient to return: on Day 8 for +168 hr PK & ECGs (+/- 24 hrs) after ABBV-706 EOI.  Discharge time: ***     Janice Ramirez RN  04/04/25

## 2025-04-07 DIAGNOSIS — K21.9 GASTROESOPHAGEAL REFLUX DISEASE, UNSPECIFIED WHETHER ESOPHAGITIS PRESENT: ICD-10-CM

## 2025-04-07 DIAGNOSIS — E78.5 HYPERLIPIDEMIA, UNSPECIFIED HYPERLIPIDEMIA TYPE: ICD-10-CM

## 2025-04-07 RX ORDER — OMEPRAZOLE 40 MG/1
40 CAPSULE, DELAYED RELEASE ORAL DAILY
Qty: 100 CAPSULE | Refills: 1 | Status: SHIPPED | OUTPATIENT
Start: 2025-04-07 | End: 2025-04-08 | Stop reason: SDUPTHER

## 2025-04-07 RX ORDER — ROSUVASTATIN CALCIUM 10 MG/1
10 TABLET, COATED ORAL DAILY
Qty: 100 TABLET | Refills: 1 | Status: SHIPPED | OUTPATIENT
Start: 2025-04-07 | End: 2025-04-08 | Stop reason: SDUPTHER

## 2025-04-07 ASSESSMENT — PROMIS GLOBAL HEALTH SCALE
RATE_AVERAGE_FATIGUE: MODERATE
CARRYOUT_PHYSICAL_ACTIVITIES: MODERATELY
RATE_GENERAL_HEALTH: GOOD
RATE_PHYSICAL_HEALTH: FAIR
RATE_MENTAL_HEALTH: FAIR
RATE_QUALITY_OF_LIFE: FAIR
EMOTIONAL_PROBLEMS: SOMETIMES
RATE_AVERAGE_PAIN: 5
CARRYOUT_SOCIAL_ACTIVITIES: FAIR
RATE_SOCIAL_SATISFACTION: GOOD

## 2025-04-08 ENCOUNTER — APPOINTMENT (OUTPATIENT)
Dept: PRIMARY CARE | Facility: CLINIC | Age: 60
End: 2025-04-08
Payer: MEDICARE

## 2025-04-08 VITALS
HEIGHT: 62 IN | WEIGHT: 182 LBS | SYSTOLIC BLOOD PRESSURE: 113 MMHG | DIASTOLIC BLOOD PRESSURE: 82 MMHG | OXYGEN SATURATION: 92 % | BODY MASS INDEX: 33.49 KG/M2 | HEART RATE: 90 BPM

## 2025-04-08 DIAGNOSIS — J44.9 CHRONIC OBSTRUCTIVE PULMONARY DISEASE, UNSPECIFIED COPD TYPE (MULTI): ICD-10-CM

## 2025-04-08 DIAGNOSIS — E11.9 TYPE 2 DIABETES MELLITUS WITHOUT COMPLICATION, WITHOUT LONG-TERM CURRENT USE OF INSULIN: Primary | ICD-10-CM

## 2025-04-08 DIAGNOSIS — E03.8 SUBCLINICAL HYPOTHYROIDISM: ICD-10-CM

## 2025-04-08 DIAGNOSIS — K21.9 GASTROESOPHAGEAL REFLUX DISEASE, UNSPECIFIED WHETHER ESOPHAGITIS PRESENT: ICD-10-CM

## 2025-04-08 DIAGNOSIS — G47.00 INSOMNIA, UNSPECIFIED TYPE: ICD-10-CM

## 2025-04-08 DIAGNOSIS — C34.90 MALIGNANT NEOPLASM OF LUNG, UNSPECIFIED LATERALITY, UNSPECIFIED PART OF LUNG (MULTI): ICD-10-CM

## 2025-04-08 DIAGNOSIS — F41.9 ANXIETY: ICD-10-CM

## 2025-04-08 DIAGNOSIS — C34.90 SMALL CELL CARCINOMA OF LUNG, UNSPECIFIED LATERALITY, UNSPECIFIED PART OF LUNG: ICD-10-CM

## 2025-04-08 DIAGNOSIS — C77.1 SECONDARY AND UNSPECIFIED MALIGNANT NEOPLASM OF INTRATHORACIC LYMPH NODES (MULTI): ICD-10-CM

## 2025-04-08 DIAGNOSIS — E78.5 HYPERLIPIDEMIA, UNSPECIFIED HYPERLIPIDEMIA TYPE: ICD-10-CM

## 2025-04-08 DIAGNOSIS — Z12.31 ENCOUNTER FOR SCREENING MAMMOGRAM FOR MALIGNANT NEOPLASM OF BREAST: ICD-10-CM

## 2025-04-08 PROBLEM — Z86.39 HISTORY OF DIABETES MELLITUS: Status: ACTIVE | Noted: 2025-04-08

## 2025-04-08 PROBLEM — M75.40 IMPINGEMENT SYNDROME OF SHOULDER REGION: Status: ACTIVE | Noted: 2025-04-08

## 2025-04-08 RX ORDER — AZELASTINE 1 MG/ML
SPRAY, METERED NASAL
COMMUNITY
Start: 2025-03-20

## 2025-04-08 RX ORDER — HYDROCODONE BITARTRATE AND HOMATROPINE METHYLBROMIDE ORAL SOLUTION 5; 1.5 MG/5ML; MG/5ML
5 LIQUID ORAL EVERY 6 HOURS PRN
Qty: 473 ML | Refills: 0 | Status: SHIPPED | OUTPATIENT
Start: 2025-04-08 | End: 2025-04-08 | Stop reason: SDUPTHER

## 2025-04-08 RX ORDER — ESCITALOPRAM OXALATE 10 MG/1
15 TABLET ORAL DAILY
Qty: 150 TABLET | Refills: 1 | Status: SHIPPED | OUTPATIENT
Start: 2025-04-08 | End: 2025-10-25

## 2025-04-08 RX ORDER — ESCITALOPRAM OXALATE 10 MG/1
TABLET ORAL
COMMUNITY
Start: 2025-03-25 | End: 2025-04-08 | Stop reason: SDUPTHER

## 2025-04-08 RX ORDER — IPRATROPIUM BROMIDE AND ALBUTEROL SULFATE 2.5; .5 MG/3ML; MG/3ML
SOLUTION RESPIRATORY (INHALATION)
COMMUNITY
Start: 2025-03-20

## 2025-04-08 RX ORDER — HYDROCODONE BITARTRATE AND HOMATROPINE METHYLBROMIDE ORAL SOLUTION 5; 1.5 MG/5ML; MG/5ML
5 LIQUID ORAL EVERY 6 HOURS PRN
Qty: 473 ML | Refills: 0 | Status: SHIPPED | OUTPATIENT
Start: 2025-04-08 | End: 2025-05-01

## 2025-04-08 RX ORDER — OMEPRAZOLE 40 MG/1
40 CAPSULE, DELAYED RELEASE ORAL DAILY
Qty: 100 CAPSULE | Refills: 1 | Status: SHIPPED | OUTPATIENT
Start: 2025-04-08

## 2025-04-08 RX ORDER — LEVOTHYROXINE SODIUM 50 UG/1
50 TABLET ORAL DAILY
Qty: 100 TABLET | Refills: 1 | Status: SHIPPED | OUTPATIENT
Start: 2025-04-08 | End: 2025-04-11 | Stop reason: SDUPTHER

## 2025-04-08 RX ORDER — ROSUVASTATIN CALCIUM 10 MG/1
10 TABLET, COATED ORAL DAILY
Qty: 100 TABLET | Refills: 1 | Status: SHIPPED | OUTPATIENT
Start: 2025-04-08

## 2025-04-08 RX ORDER — METFORMIN HYDROCHLORIDE 500 MG/1
500 TABLET, EXTENDED RELEASE ORAL 2 TIMES DAILY
Qty: 200 TABLET | Refills: 1 | Status: SHIPPED | OUTPATIENT
Start: 2025-04-08 | End: 2025-10-25

## 2025-04-08 ASSESSMENT — ENCOUNTER SYMPTOMS
OCCASIONAL FEELINGS OF UNSTEADINESS: 0
LOSS OF SENSATION IN FEET: 0
DEPRESSION: 0

## 2025-04-08 NOTE — TELEPHONE ENCOUNTER
Hycodan rx transmission failed asked provider to resend.       Pharmacy did have oxycodone ready for pickup, patient updated.

## 2025-04-08 NOTE — PROGRESS NOTES
59-year-old female presenting for follow-up on multiple concerns, CPE.  Has been diagnosed with small cell lung cancer in the interval.  Following with oncology.  On clinical trials.  States that she is feeling relatively well I am positive.    DMII  Stable, tolerates current regimen well.    HLD  Stable, tolerates current regimen well.    GERD  Stable, tolerates current regimen well.    COPD  Stable, tolerates current regimen well.     Insomnia, anxiety  Stable, tolerates current regimen well.  Following with psychiatry who manages medications.    SocHx:   - Smoking: About 40 pack years, quit 4 years ago    12 point ROS reviewed and negative other than as stated in HPI    General: Alert, oriented, pleasant, in no acute distress  HEENT:      Head: normocephalic, atraumatic;      eyes: EOMI, no scleral icterus;   Neck: soft, supple, non-tender, no masses appreciated  CV: Heart with regular rate and rhythm, normal S1/S2, no murmurs  Lungs: CTAB without wheezing, rhonchi or rales; good respiratory effort, no increased work of breathing  Abdomen: soft, non-tender, non-distended, no masses appreciated  Extremities: no edema, no cyanosis  Neuro: Cranial nerves grossly intact; alert and oriented  Psych: Appropriate mood and affect    # HM  -Extensive labs through oncology, will add on what is needed  -Vaccines:       Flu: Out of season      Shingrix: Recommended, will defer since she is on systemic steroids currently      Pneumococcal: UTD, next shot at 64      Tdap: UTD 2024  -[facility] w/ elisabeth: Ordered  -Last PAP: Follow-up with GYN  -Lung cancer screening with low-dose CT:  about 40 pack years, quit 4 years ago, has lung cancer, following with oncology  -Colonoscopy: 2023 [facility], 10 year plan  -Osteoporosis screening: At 64    #DMII  -Last A1c 6.4  - Currently on metformin  mg twice daily, sometimes only once daily because she is not eating enough  - Diabetic eye exam: Ophthalmology referral given  - Diabetic foot exam  negative in office 04/2025  - Following with [facility] pharmacist  - Repeat A1c    #HLD  -Continue rosuvastatin 10 mg daily  - Repeat lipid panel    #GERD  -Continue omeprazole 40 mg daily    #COPD # carcinoma of the lung  -Continue Pulmicort twice daily, albuterol as needed  - Continue to follow with oncology, in clinical trial    #Sublicincal hypothyroid  -Currently on levothyroxine 50 mcg daily  - Repeat TSH    #Insomnia #anxiety  - Lexapro 15 mg daily, alprazolam 1 mg as needed    F/U 6 months, sooner if indicated    Chris D'Amico, DO

## 2025-04-08 NOTE — TELEPHONE ENCOUNTER
Patient last filled hycodan cough medicine on 3/31, 5 day supply 100ml. Per last script patient to take 5ml q6h prn. RX refill pended to covering provider to be sent to patients pharmacy.

## 2025-04-08 NOTE — TELEPHONE ENCOUNTER
Received message that pt needs refill of oxycodone and hycodan. Oxycodone was sent to her pharmacy and PA approved last week. Will call pharmacy when they open to see if this is ready for .

## 2025-04-09 ENCOUNTER — EDUCATION (OUTPATIENT)
Dept: HEMATOLOGY/ONCOLOGY | Facility: HOSPITAL | Age: 60
End: 2025-04-09
Payer: MEDICARE

## 2025-04-09 ENCOUNTER — HOSPITAL ENCOUNTER (OUTPATIENT)
Dept: RESEARCH | Facility: HOSPITAL | Age: 60
Discharge: HOME | End: 2025-04-09
Payer: MEDICARE

## 2025-04-09 VITALS
BODY MASS INDEX: 33.67 KG/M2 | RESPIRATION RATE: 18 BRPM | DIASTOLIC BLOOD PRESSURE: 77 MMHG | SYSTOLIC BLOOD PRESSURE: 129 MMHG | OXYGEN SATURATION: 98 % | HEART RATE: 92 BPM | WEIGHT: 184.08 LBS | TEMPERATURE: 96.8 F

## 2025-04-09 DIAGNOSIS — C34.90 SMALL CELL CARCINOMA OF LUNG, UNSPECIFIED LATERALITY, UNSPECIFIED PART OF LUNG: ICD-10-CM

## 2025-04-09 LAB
ALBUMIN SERPL BCP-MCNC: 4 G/DL (ref 3.4–5)
ALP SERPL-CCNC: 85 U/L (ref 33–136)
ALT SERPL W P-5'-P-CCNC: 10 U/L (ref 7–45)
ANION GAP SERPL CALC-SCNC: 14 MMOL/L (ref 10–20)
AST SERPL W P-5'-P-CCNC: 12 U/L (ref 9–39)
BASOPHILS # BLD AUTO: 0.02 X10*3/UL (ref 0–0.1)
BASOPHILS NFR BLD AUTO: 0.4 %
BILIRUB DIRECT SERPL-MCNC: 0.1 MG/DL (ref 0–0.3)
BILIRUB SERPL-MCNC: 0.3 MG/DL (ref 0–1.2)
BUN SERPL-MCNC: 15 MG/DL (ref 6–23)
CALCIUM SERPL-MCNC: 8.7 MG/DL (ref 8.6–10.6)
CHLORIDE SERPL-SCNC: 102 MMOL/L (ref 98–107)
CO2 SERPL-SCNC: 25 MMOL/L (ref 21–32)
CREAT SERPL-MCNC: 0.75 MG/DL (ref 0.5–1.05)
EGFRCR SERPLBLD CKD-EPI 2021: >90 ML/MIN/1.73M*2
EOSINOPHIL # BLD AUTO: 0.07 X10*3/UL (ref 0–0.7)
EOSINOPHIL NFR BLD AUTO: 1.5 %
ERYTHROCYTE [DISTWIDTH] IN BLOOD BY AUTOMATED COUNT: 17.4 % (ref 11.5–14.5)
GGT SERPL-CCNC: 45 U/L (ref 5–55)
GLUCOSE SERPL-MCNC: 128 MG/DL (ref 74–99)
HCT VFR BLD AUTO: 37 % (ref 36–46)
HGB BLD-MCNC: 12.1 G/DL (ref 12–16)
HOLD SPECIMEN: NORMAL
HOLD SPECIMEN: NORMAL
IMM GRANULOCYTES # BLD AUTO: 0.02 X10*3/UL (ref 0–0.7)
IMM GRANULOCYTES NFR BLD AUTO: 0.4 % (ref 0–0.9)
LDH SERPL L TO P-CCNC: 132 U/L (ref 84–246)
LYMPHOCYTES # BLD AUTO: 1.52 X10*3/UL (ref 1.2–4.8)
LYMPHOCYTES NFR BLD AUTO: 32.3 %
MAGNESIUM SERPL-MCNC: 1.99 MG/DL (ref 1.6–2.4)
MCH RBC QN AUTO: 31.2 PG (ref 26–34)
MCHC RBC AUTO-ENTMCNC: 32.7 G/DL (ref 32–36)
MCV RBC AUTO: 95 FL (ref 80–100)
MONOCYTES # BLD AUTO: 0.28 X10*3/UL (ref 0.1–1)
MONOCYTES NFR BLD AUTO: 5.9 %
NEUTROPHILS # BLD AUTO: 2.8 X10*3/UL (ref 1.2–7.7)
NEUTROPHILS NFR BLD AUTO: 59.5 %
NRBC BLD-RTO: 0 /100 WBCS (ref 0–0)
PHOSPHATE SERPL-MCNC: 3.8 MG/DL (ref 2.5–4.9)
PLATELET # BLD AUTO: 305 X10*3/UL (ref 150–450)
POTASSIUM SERPL-SCNC: 4.1 MMOL/L (ref 3.5–5.3)
PROT SERPL-MCNC: 6.6 G/DL (ref 6.4–8.2)
RBC # BLD AUTO: 3.88 X10*6/UL (ref 4–5.2)
SODIUM SERPL-SCNC: 137 MMOL/L (ref 136–145)
WBC # BLD AUTO: 4.7 X10*3/UL (ref 4.4–11.3)

## 2025-04-09 PROCEDURE — 83615 LACTATE (LD) (LDH) ENZYME: CPT

## 2025-04-09 PROCEDURE — 83735 ASSAY OF MAGNESIUM: CPT

## 2025-04-09 PROCEDURE — 82248 BILIRUBIN DIRECT: CPT

## 2025-04-09 PROCEDURE — 93005 ELECTROCARDIOGRAM TRACING: CPT | Mod: DCRU

## 2025-04-09 PROCEDURE — 80053 COMPREHEN METABOLIC PANEL: CPT

## 2025-04-09 PROCEDURE — 85025 COMPLETE CBC W/AUTO DIFF WBC: CPT

## 2025-04-09 PROCEDURE — 84100 ASSAY OF PHOSPHORUS: CPT

## 2025-04-09 PROCEDURE — 82977 ASSAY OF GGT: CPT

## 2025-04-09 PROCEDURE — 36415 COLL VENOUS BLD VENIPUNCTURE: CPT

## 2025-04-09 ASSESSMENT — PAIN SCALES - GENERAL: PAINLEVEL_OUTOF10: 0-NO PAIN

## 2025-04-09 NOTE — RESEARCH NOTES
Research Note Non-Treatment Day    Minal Sun is here today. Patient is on [id number]. Today is C3D8. Procedures completed per protocol. AE's and con-meds reviewed with patient. Patient's bowel movements have become more regular with the addition of prune juice to her diet. Her last bm was last night. Still reports productive cough (yellow sputum), but frequency has lessened. She is trying to drink more oral fluids and have bought Smart water to try to promote drinking. She did have 2 episodes of vomiting in the past week. Reports that her typical pain is better today and rates it 3-4/10. She has been taking oxycodone. She is scheduled for 4/16 for C3D15.      Education Documentation  Constipation, taught by Jazzy Guerrero RN at 4/9/2025 12:21 PM.  Learner: Patient  Readiness: Acceptance  Method: Explanation  Response: Verbalizes Understanding    Treatment Plan and Schedule, taught by Jazzy Guerrero RN at 4/9/2025 12:21 PM.  Learner: Patient  Readiness: Acceptance  Method: Explanation  Response: Verbalizes Understanding    General Medication Information, taught by Jazzy Guerrero RN at 4/9/2025 12:21 PM.  Learner: Patient  Readiness: Acceptance  Method: Explanation  Response: Verbalizes Understanding    Supportive Medications, taught by Jazzy Guerrero RN at 4/9/2025 12:21 PM.  Learner: Patient  Readiness: Acceptance  Method: Explanation  Response: Verbalizes Understanding    Education Comments  No comments found.

## 2025-04-09 NOTE — RESEARCH NOTES
RSH><[id number]><C3D8><[id number]>  DCRU NURSING VISIT NOTE  Study Name: [id number]- Part 1_Escalation- Monotherapy  IRB#: [id number]  DCRU#: ([facility] # [id number])  Protocol Version Dated:  6/15/2023  PI: Jd Kohli MD.      Time point: Cycle 3 - Day 8    Encounter Date: 04/09/2025  Encounter Time: 12:00 PM EDT  Encounter Department: [place] [address] RESEARCH        Study Regimen and Dosing   For Oncology study, Refer [place] Treatment Plan for orders   Part 1_Escalation_Monotherapy - patients with advanced recurrent or refractory solid tumors (small cell lung cancer, neuroendocrine tumors or brain tumors) will receive gradually increasing doses of ABBV-706 to determine MTD (Maximum Tolerated Dose).  Cycle = 21-days.  ABBV-706 administered IV Day 1 of each cycle.     Dietary Guidelines   Regular diet         Admission and Prior to Starting Study Activities   Notify  when patient arrives to unit.  Patient review/update DCRU/[place] intake form.  Obtain vital signs after sitting at least 3 minutes. Record on flow sheet & in EMR.  Perform venipuncture for sample collection procedures. Do Not draw research samples from mediport/central line if used for infusion  Physical Exam including pulmonary exam & neuro assessment Days 8 & 15.         Study Specific Instructions and Documentation   If available enter a snapshot of performable actions:  1- ECG  2-Labs   3-Research Correlatives  4-Discharge       Subjective   Minal Sun is a 59 y.o. female and is here for a Research clinical visit.    Visit Provider: Jazzy Guerrero RN     Allergies:   Allergies   Allergen Reactions    Clindamycin Diarrhea    Erythromycin Diarrhea    Erythromycin Base Other and Nausea Only       Objective     Vital Signs:    Vitals:    04/09/25 1212   BP: 129/77   Pulse: 92   Resp: 18   Temp: 36 °C (96.8 °F)   TempSrc: Temporal   SpO2: 98%   Weight: 83.5 kg (184 lb 1.4 oz)   PainSc: 0-No pain        Physical Exam     ASSESSMENT and PLAN:  Problem List Items Addressed This Visit       SCLC (small cell lung carcinoma)    Relevant Orders    CBC and Auto Differential (Completed)    Comprehensive metabolic panel    Bilirubin, Direct    Gamma GT    Lactate dehydrogenase    Magnesium    Phosphorus    Research collection: 2mL Red Top - Research Collect ABBV-706 PK ADC/Total AB; Post-Dose; Other (EOI +168 hours); Other (+/- 24 hours); Ambient; Other (5-8x); Allow to clot 30-60 minutes.    Research collection: 3mL Lavender EDTA - Research Collect Free Top1 Inhibitor; Post-Dose; Other (EOI +168 hours); Other (+/- 24 hours); On ICE; 8-10x    Research Triplicate ECG    Adult diet Regular        Medications as of the completion of today's visit:  Current Outpatient Medications   Medication Sig Dispense Refill    acetaminophen (TylenoL) 325 mg tablet Take 2 tablets (650 mg) by mouth every 6 hours if needed for mild pain (1 - 3) or moderate pain (4 - 6). 30 tablet 0    ALPRAZolam (Xanax) 0.5 mg tablet Take 2 tablets (1 mg) by mouth 2 times a day as needed for anxiety for up to 15 days. 30 tablet 0    amoxicillin-pot clavulanate (Augmentin) 875-125 mg tablet Take 1 tablet (875 mg) by mouth 2 times a day for 10 days. 20 tablet 0    apixaban (Eliquis) 5 mg tablet Take 1 tablet (5 mg) by mouth 2 times a day. 180 tablet 0    azelastine (Astelin) 137 mcg (0.1 %) nasal spray       carbinoxamine maleate 4 mg tablet Take 4 mg by mouth 2 times a day.      escitalopram (Lexapro) 10 mg tablet Take 1.5 tablets (15 mg) by mouth once daily. 150 tablet 1    guaiFENesin (Mucinex) 600 mg 12 hr tablet Take 2 tablets (1,200 mg) by mouth 2 times a day. Do not crush, chew, or split. 120 tablet 11    hydrocodone-homatropine (Hycodan) 5-1.5 mg/5 mL syrup Take 5 mL by mouth every 6 hours if needed for cough for up to 23 days. 473 mL 0    ipratropium-albuteroL (Duo-Neb) 0.5-2.5 mg/3 mL nebulizer solution       levothyroxine (Synthroid) 50 mcg  tablet Take 1 tablet (50 mcg) by mouth early in the morning.. Take on an empty stomach at the same time each day, either 30 to 60 minutes prior to breakfast 100 tablet 1    lidocaine-prilocaine (Emla) 2.5-2.5 % cream Apply topically a thin film of medication to [facility] site 45 mts prior to being accessed and cover with band aid 30 g 0    magnesium hydroxide (Milk of Magnesia) 400 mg/5 mL suspension Take 5 mL by mouth once daily as needed for constipation for up to 10 days. Take this when it has been 2 or more days without a bowel movement. 360 mL 0    metFORMIN  mg 24 hr tablet Take 1 tablet (500 mg) by mouth 2 times a day. 200 tablet 1    naloxone (Narcan) 4 mg/0.1 mL nasal spray Administer 1 spray (4 mg) into affected nostril(s) if needed for opioid reversal. May repeat every 2-3 minutes if needed, alternating nostrils, until medical assistance becomes available. 2 each 0    OLANZapine (ZyPREXA) 5 mg tablet Take 1 tablet (5 mg) by mouth once daily in the evening. Take with meals. This is to help with appetite, nausea, and sleep. 30 tablet 0    omeprazole (PriLOSEC) 40 mg DR capsule Take 1 capsule (40 mg) by mouth once daily. 100 capsule 1    ondansetron (Zofran) 8 mg tablet Take 1 tablet (8 mg) by mouth every 8 hours if needed for nausea or vomiting. 30 tablet 5    oxyCODONE (Roxicodone) 5 mg immediate release tablet Take 1 tablet (5 mg) by mouth every 4 hours if needed for moderate pain (4 - 6) for up to 60 doses. Can take 2 pill if need for severe pain (7-10) 20 tablet 0    oxyCODONE (Roxicodone) 5 mg immediate release tablet Take 1-2 tablets (5-10 mg) by mouth every 4 hours if needed for severe pain (7 - 10). 360 tablet 0    ProAir HFA 90 mcg/actuation inhaler Inhale 2 puffs every 4 hours if needed.      prochlorperazine (Compazine) 10 mg tablet Take 1 tablet (10 mg) by mouth every 6 hours if needed for nausea or vomiting. 30 tablet 5    rosuvastatin (Crestor) 10 mg tablet Take 1 tablet (10 mg) by mouth  once daily. 100 tablet 1    sennosides-docusate sodium (Marzena-Colace) 8.6-50 mg tablet Take 3 tablets by mouth 2 times a day. This is to help manage constipation. 180 tablet 11     No current facility-administered medications for this encounter.           Orders placed during today's visit:  Orders Placed This Encounter   Procedures    CBC and Auto Differential     Standing Status:   Standing     Number of Occurrences:   1     Order Specific Question:   Release result to MyChart     Answer:   Immediate [1]    Comprehensive metabolic panel     Standing Status:   Standing     Number of Occurrences:   1     Order Specific Question:   Release result to MyChart     Answer:   Immediate [1]    Bilirubin, Direct     Standing Status:   Standing     Number of Occurrences:   1     Order Specific Question:   Release result to MyChart     Answer:   Immediate [1]    Gamma GT     Standing Status:   Standing     Number of Occurrences:   1     Order Specific Question:   Release result to MyChart     Answer:   Immediate [1]    Lactate dehydrogenase     Standing Status:   Standing     Number of Occurrences:   1     Order Specific Question:   Release result to MyChart     Answer:   Immediate [1]    Magnesium     Standing Status:   Standing     Number of Occurrences:   1     Order Specific Question:   Release result to MyChart     Answer:   Immediate [1]    Phosphorus     Standing Status:   Standing     Number of Occurrences:   1     Order Specific Question:   Release result to MyChart     Answer:   Immediate [1]    Research collection: 2mL Red Top - Research Collect ABBV-706 PK ADC/Total AB; Post-Dose; Other (EOI +168 hours); Other (+/- 24 hours); Ambient; Other (5-8x); Allow to clot 30-60 minutes.     Standing Status:   Standing     Number of Occurrences:   1     Order Specific Question:   Test     Answer:   ABBV-706 PK ADC/Total AB     Order Specific Question:   Timepoint     Answer:   Post-Dose     Order Specific Question:    Post-Dose     Answer:   Other     Comments:   EOI +168 hours     Order Specific Question:   Post-Dose Window     Answer:   Other     Comments:   +/- 24 hours     Order Specific Question:   Temperature     Answer:   Ambient     Order Specific Question:   Invert     Answer:   Other     Comments:   5-8x     Order Specific Question:   Handling     Answer:   Allow to clot 30-60 minutes.     Order Specific Question:   Optional?     Answer:   No    Research collection: 3mL Lavender EDTA - Research Collect Free Top1 Inhibitor; Post-Dose; Other (EOI +168 hours); Other (+/- 24 hours); On ICE; 8-10x     Standing Status:   Standing     Number of Occurrences:   1     Order Specific Question:   Test     Answer:   Free Top1 Inhibitor     Order Specific Question:   Timepoint     Answer:   Post-Dose     Order Specific Question:   Post-Dose     Answer:   Other     Comments:   EOI +168 hours     Order Specific Question:   Post-Dose Window     Answer:   Other     Comments:   +/- 24 hours     Order Specific Question:   Temperature     Answer:   On ICE     Order Specific Question:   Invert     Answer:   8-10x     Order Specific Question:   Optional?     Answer:   No    Adult diet Regular     Standing Status:   Standing     Number of Occurrences:   1     Order Specific Question:   Diet type     Answer:   Regular    Research Triplicate ECG     [email or web address] Mac 5500 ECG machine   Rest supine at least 5 minutes prior  Triplicate each 1-5 min apart        [id number] - ABBV-706 alone or in combination in subjects with advanced solid tumors    Patient has no adverse events documented in the Research Adverse Events activity.      Day 8 ECG   JOSEFA 150c Study-specific ECG Machine - rest supine or semi-recumbent at least 5 minutes prior:  Triplicate     ECGs are time-matched to PK sample collection & should be collected within 10 mins prior to PK collection.  ECGs occurring near meals will take place prior to meal  QTcF calculation: Shey  formula: S:\DCRU_Staff\Nursing\Protocols\QTcF calculator  MD/Provider to sign & date. Do not need to wait before starting treatment.  Contact MD/Provider if abnormal from baseline.  Time Point Rest Time  QTcF   +168 Hours after End of Infusion 1250 431, 425, 422          Day 8 Safety Labs   For Oncology study, Refer [place] Treatment Plan for orders           Day 8 Research Correlatives:     Access Type: 23g butterfly Location: left wrist   For Oncology study, Refer [place] Treatment Plan for orders  AFTER ECGs  Deliver to DCRU/TRPC lab for processing    Time point Specimen Test Volume Tube Handling Draw Time   Day 8  +168 hrs (+/- 24 hrs) after EOI  (draw in order) ABBV-706 PK ADC/Total AB 2 mL Red Top Serum Invert 5 - 8x; Ambient  Allow to clot 30 -60 mins 1318    Free TOP1 Inhibitor 3 mL EDTA Lavender Invert 8 - 10x. On ICE 1318            Discharge Instructions   Discharge patient to home after study requirements completed or PK sample obtained.  Remind patient to return: on Day 15 for +336 hr PK & ECGs (+/- 24 hrs) after ABBV-706 EOI.  Discharge time: 1323     Elba Srivastava RN  04/09/25              None

## 2025-04-11 DIAGNOSIS — C34.32 SMALL CELL CARCINOMA OF LOWER LOBE OF LEFT LUNG: Primary | ICD-10-CM

## 2025-04-11 RX ORDER — LEVOTHYROXINE SODIUM 50 UG/1
50 TABLET ORAL DAILY
Qty: 30 TABLET | Refills: 11 | Status: SHIPPED | OUTPATIENT
Start: 2025-04-11 | End: 2026-04-11

## 2025-04-13 DIAGNOSIS — C34.90 SMALL CELL CARCINOMA OF LUNG, UNSPECIFIED LATERALITY, UNSPECIFIED PART OF LUNG: ICD-10-CM

## 2025-04-13 DIAGNOSIS — I26.99 OTHER PULMONARY EMBOLISM WITHOUT ACUTE COR PULMONALE, UNSPECIFIED CHRONICITY (MULTI): ICD-10-CM

## 2025-04-16 ENCOUNTER — HOSPITAL ENCOUNTER (OUTPATIENT)
Dept: RESEARCH | Facility: HOSPITAL | Age: 60
Discharge: HOME | End: 2025-04-16
Payer: MEDICARE

## 2025-04-16 ENCOUNTER — EDUCATION (OUTPATIENT)
Dept: HEMATOLOGY/ONCOLOGY | Facility: HOSPITAL | Age: 60
End: 2025-04-16
Payer: MEDICARE

## 2025-04-16 VITALS
TEMPERATURE: 98.2 F | RESPIRATION RATE: 16 BRPM | OXYGEN SATURATION: 95 % | DIASTOLIC BLOOD PRESSURE: 80 MMHG | SYSTOLIC BLOOD PRESSURE: 115 MMHG | HEART RATE: 89 BPM

## 2025-04-16 DIAGNOSIS — C34.90 SMALL CELL CARCINOMA OF LUNG, UNSPECIFIED LATERALITY, UNSPECIFIED PART OF LUNG: ICD-10-CM

## 2025-04-16 LAB
ALBUMIN SERPL BCP-MCNC: 3.7 G/DL (ref 3.4–5)
ALP SERPL-CCNC: 72 U/L (ref 33–136)
ALT SERPL W P-5'-P-CCNC: 9 U/L (ref 7–45)
ANION GAP SERPL CALC-SCNC: 14 MMOL/L (ref 10–20)
AST SERPL W P-5'-P-CCNC: 12 U/L (ref 9–39)
BASOPHILS # BLD AUTO: 0.03 X10*3/UL (ref 0–0.1)
BASOPHILS NFR BLD AUTO: 0.5 %
BILIRUB DIRECT SERPL-MCNC: 0.1 MG/DL (ref 0–0.3)
BILIRUB SERPL-MCNC: 0.3 MG/DL (ref 0–1.2)
BUN SERPL-MCNC: 15 MG/DL (ref 6–23)
CALCIUM SERPL-MCNC: 8.7 MG/DL (ref 8.6–10.6)
CHLORIDE SERPL-SCNC: 103 MMOL/L (ref 98–107)
CO2 SERPL-SCNC: 26 MMOL/L (ref 21–32)
CREAT SERPL-MCNC: 0.8 MG/DL (ref 0.5–1.05)
EGFRCR SERPLBLD CKD-EPI 2021: 84 ML/MIN/1.73M*2
EOSINOPHIL # BLD AUTO: 0.15 X10*3/UL (ref 0–0.7)
EOSINOPHIL NFR BLD AUTO: 2.4 %
ERYTHROCYTE [DISTWIDTH] IN BLOOD BY AUTOMATED COUNT: 17.9 % (ref 11.5–14.5)
GGT SERPL-CCNC: 34 U/L (ref 5–55)
GLUCOSE SERPL-MCNC: 119 MG/DL (ref 74–99)
HCT VFR BLD AUTO: 37 % (ref 36–46)
HGB BLD-MCNC: 11.8 G/DL (ref 12–16)
HOLD SPECIMEN: NORMAL
HOLD SPECIMEN: NORMAL
IMM GRANULOCYTES # BLD AUTO: 0.02 X10*3/UL (ref 0–0.7)
IMM GRANULOCYTES NFR BLD AUTO: 0.3 % (ref 0–0.9)
LDH SERPL L TO P-CCNC: 133 U/L (ref 84–246)
LYMPHOCYTES # BLD AUTO: 1.77 X10*3/UL (ref 1.2–4.8)
LYMPHOCYTES NFR BLD AUTO: 28.3 %
MAGNESIUM SERPL-MCNC: 1.96 MG/DL (ref 1.6–2.4)
MCH RBC QN AUTO: 31.6 PG (ref 26–34)
MCHC RBC AUTO-ENTMCNC: 31.9 G/DL (ref 32–36)
MCV RBC AUTO: 99 FL (ref 80–100)
MONOCYTES # BLD AUTO: 0.34 X10*3/UL (ref 0.1–1)
MONOCYTES NFR BLD AUTO: 5.4 %
NEUTROPHILS # BLD AUTO: 3.95 X10*3/UL (ref 1.2–7.7)
NEUTROPHILS NFR BLD AUTO: 63.1 %
NRBC BLD-RTO: 0 /100 WBCS (ref 0–0)
PHOSPHATE SERPL-MCNC: 4.9 MG/DL (ref 2.5–4.9)
PLATELET # BLD AUTO: 262 X10*3/UL (ref 150–450)
POTASSIUM SERPL-SCNC: 3.7 MMOL/L (ref 3.5–5.3)
PROT SERPL-MCNC: 6 G/DL (ref 6.4–8.2)
RBC # BLD AUTO: 3.74 X10*6/UL (ref 4–5.2)
SODIUM SERPL-SCNC: 139 MMOL/L (ref 136–145)
WBC # BLD AUTO: 6.3 X10*3/UL (ref 4.4–11.3)

## 2025-04-16 PROCEDURE — 83735 ASSAY OF MAGNESIUM: CPT

## 2025-04-16 PROCEDURE — 83615 LACTATE (LD) (LDH) ENZYME: CPT

## 2025-04-16 PROCEDURE — 82248 BILIRUBIN DIRECT: CPT

## 2025-04-16 PROCEDURE — 93005 ELECTROCARDIOGRAM TRACING: CPT | Mod: DCRU

## 2025-04-16 PROCEDURE — 84100 ASSAY OF PHOSPHORUS: CPT

## 2025-04-16 PROCEDURE — 84075 ASSAY ALKALINE PHOSPHATASE: CPT

## 2025-04-16 PROCEDURE — 85025 COMPLETE CBC W/AUTO DIFF WBC: CPT

## 2025-04-16 PROCEDURE — 36415 COLL VENOUS BLD VENIPUNCTURE: CPT

## 2025-04-16 PROCEDURE — 82977 ASSAY OF GGT: CPT

## 2025-04-16 NOTE — RESEARCH NOTES
RSH><[id number]><C3D15><[id number]>    DCRU NURSING VISIT NOTE  Study Name: [id number]- Part 1_Escalation- Monotherapy  IRB#: [id number]  [facility]#: ([id number]  Protocol Version Dated:  6/15/2023  PI: Jd Kohli MD.      Time point: Cycle 3 - Day 15    Encounter Date: 04/16/2025  Encounter Time: 12:00 PM EDT  Encounter Department: [place] [address] RESEARCH      Study Regimen and Dosing   For Oncology study, Refer [place] Treatment Plan for orders   Part 1_Escalation_Monotherapy - patients with advanced recurrent or refractory solid tumors (small cell lung cancer, neuroendocrine tumors or brain tumors) will receive gradually increasing doses of ABBV-706 to determine MTD (Maximum Tolerated Dose).  Cycle = 21-days.  ABBV-706 administered IV Day 1 of each cycle.     Dietary Guidelines   Regular diet         Admission and Prior to Starting Study Activities   Notify  when patient arrives to unit.  Patient review/update DCRU/[place] intake form.  Obtain vital signs after sitting at least 3 minutes. Record on flow sheet & in EMR.  Perform venipuncture for sample collection procedures. Do Not draw research samples from mediport/central line if used for infusion  Physical Exam including pulmonary exam & neuro assessment Days 8 & 15.         Study Specific Instructions and Documentation   1- ECG  2-Safety Labs   3-Research Correlatives  4-Discharge         Subjective   Minal Sun is a 59 y.o. female and is here for a Research clinical visit.    Visit Provider: Jazzy Guerrero RN     Allergies: Allergies[1]    Objective     Vital Signs:    Vitals:    04/16/25 1221   BP: 115/80   Pulse: 89   Resp: 16   Temp: 36.8 °C (98.2 °F)   TempSrc: Temporal   SpO2: 95%       Physical Exam     ASSESSMENT and PLAN:  Problem List Items Addressed This Visit       SCLC (small cell lung carcinoma)    Relevant Orders    CBC and Auto Differential (Completed)    Comprehensive metabolic panel  (Completed)    Bilirubin, Direct (Completed)    Gamma GT (Completed)    Lactate dehydrogenase (Completed)    Magnesium (Completed)    Phosphorus (Completed)    Research collection: 2mL Red Top - Research Collect ABBV-706 PK ADC/Total AB; Post-Dose; Other (EOI +336 hours); (+/- 24 hours); Ambient; Other (5-8x); Allow to clot 30-60 minutes. (Completed)    Research collection: 3mL Lavender EDTA - Research Collect Free Top1 Inhibitor; Post-Dose; Other (EOI +336 hours); Other (+/- 24 hours); On ICE; 8-10x (Completed)    Research Triplicate ECG    Adult diet Regular        Medications as of the completion of today's visit:  Current Medications[2]        Orders placed during today's visit:  Orders Placed This Encounter   Procedures    CBC and Auto Differential     Standing Status:   Standing     Number of Occurrences:   1     Release result to MyChart:   Immediate [1]    Comprehensive metabolic panel     Standing Status:   Standing     Number of Occurrences:   1     Release result to MyChart:   Immediate [1]    Bilirubin, Direct     Standing Status:   Standing     Number of Occurrences:   1     Release result to MyChart:   Immediate [1]    Gamma GT     Standing Status:   Standing     Number of Occurrences:   1     Release result to MyChart:   Immediate [1]    Lactate dehydrogenase     Standing Status:   Standing     Number of Occurrences:   1     Release result to MyChart:   Immediate [1]    Magnesium     Standing Status:   Standing     Number of Occurrences:   1     Release result to MyChart:   Immediate [1]    Phosphorus     Standing Status:   Standing     Number of Occurrences:   1     Release result to MyChart:   Immediate [1]    Research collection: 2mL Red Top - Research Collect ABBV-706 PK ADC/Total AB; Post-Dose; Other (EOI +336 hours); (+/- 24 hours); Ambient; Other (5-8x); Allow to clot 30-60 minutes.     Standing Status:   Standing     Number of Occurrences:   1     Test:   ABBV-706 PK ADC/Total AB      Timepoint:   Post-Dose     Post-Dose:   Other              EOI +336 hours     Temperature:   Ambient     Invert:   Other              5-8x     Handling:   Allow to clot 30-60 minutes.     Optional?:   No    Research collection: 3mL Lavender EDTA - Research Collect Free Top1 Inhibitor; Post-Dose; Other (EOI +336 hours); Other (+/- 24 hours); On ICE; 8-10x     Standing Status:   Standing     Number of Occurrences:   1     Test:   Free Top1 Inhibitor     Timepoint:   Post-Dose     Post-Dose:   Other              EOI +336 hours     Post-Dose Window:   Other              +/- 24 hours     Temperature:   On ICE     Invert:   8-10x     Optional?:   No    Adult diet Regular     Standing Status:   Standing     Number of Occurrences:   1     Diet type:   Regular    Research Triplicate ECG     DCRU/[employer] 5500 ECG machine   Rest supine at least 5 minutes prior  Triplicate each 1-5 min apart        [id number] - ABBV-706 alone or in combination in subjects with advanced solid tumors    Patient has no adverse events documented in the Research Adverse Events activity.        Day 15 ECG   JOSEFA 150c Study-specific ECG Machine - rest supine or semi-recumbent at least 5 minutes prior:  Triplicate    ECGs are time-matched to PK sample collection & should be collected within 10 mins prior to PK collection.  ECGs occurring near meals will take place prior to meals  QTcF calculation: Fridericia's formula: S:\DCRU_Staff\Nursing\Protocols\QTcF calculator  MD/Provider to sign & date. Do not need to wait before starting treatment.  Contact MD/Provider if abnormal from baseline.    Time Point Rest Time  QTcF   +336 Hours after End of Infusion 1215 427 ms     433 ms     438 ms        Day 15 Safety Labs   For Oncology study, Refer [place] Treatment Plan for orders   Drawn @ 1241 with research labs     Day 15 Research Correlatives:     Access Type: 23g butterfly Location: R [facility]    For Oncology study, Refer [place] Treatment Plan for orders  AFTER  ECGs  Deliver to DCRU/[facility] lab for processing    Time point Specimen Test Volume Tube Handling Draw Time   Day 15  +336 hrs (+/- 24 hrs) after EOI  (draw in order) ABBV-706 PK ADC/Total AB 2 mL Red Top Serum Invert 5 - 8x; Ambient  Allow to clot 30 -60 mins 1241    Free TOP1 Inhibitor 3 mL EDTA Lavender Invert 8 - 10x. On ICE 1241        Discharge Instructions   Discharge patient to home after study requirements completed or PK sample obtained.  Discharge time: 1248     Marcelino Byrne RN  04/16/25           [1]   Allergies  Allergen Reactions    Clindamycin Diarrhea    Erythromycin Diarrhea    Erythromycin Base Other and Nausea Only   [2]   Current Outpatient Medications   Medication Sig Dispense Refill    acetaminophen (TylenoL) 325 mg tablet Take 2 tablets (650 mg) by mouth every 6 hours if needed for mild pain (1 - 3) or moderate pain (4 - 6). 30 tablet 0    ALPRAZolam (Xanax) 0.5 mg tablet Take 2 tablets (1 mg) by mouth 2 times a day as needed for anxiety for up to 15 days. 30 tablet 0    apixaban (Eliquis) 5 mg tablet Take 1 tablet (5 mg) by mouth 2 times a day. 180 tablet 0    azelastine (Astelin) 137 mcg (0.1 %) nasal spray       carbinoxamine maleate 4 mg tablet Take 4 mg by mouth 2 times a day.      escitalopram (Lexapro) 10 mg tablet Take 1.5 tablets (15 mg) by mouth once daily. 150 tablet 1    guaiFENesin (Mucinex) 600 mg 12 hr tablet Take 2 tablets (1,200 mg) by mouth 2 times a day. Do not crush, chew, or split. 120 tablet 11    hydrocodone-homatropine (Hycodan) 5-1.5 mg/5 mL syrup Take 5 mL by mouth every 6 hours if needed for cough for up to 23 days. 473 mL 0    ipratropium-albuteroL (Duo-Neb) 0.5-2.5 mg/3 mL nebulizer solution       levothyroxine (Synthroid, Levoxyl) 50 mcg tablet Take 1 tablet (50 mcg) by mouth early in the morning.. Take on an empty stomach at the same time each day, either 30 to 60 minutes prior to breakfast 30 tablet 11    lidocaine-prilocaine (Emla) 2.5-2.5 % cream Apply  topically a thin film of medication to [place] site 45 mts prior to being accessed and cover with band aid 30 g 0    magnesium hydroxide (Milk of Magnesia) 400 mg/5 mL suspension Take 5 mL by mouth once daily as needed for constipation for up to 10 days. Take this when it has been 2 or more days without a bowel movement. 360 mL 0    metFORMIN  mg 24 hr tablet Take 1 tablet (500 mg) by mouth 2 times a day. 200 tablet 1    naloxone (Narcan) 4 mg/0.1 mL nasal spray Administer 1 spray (4 mg) into affected nostril(s) if needed for opioid reversal. May repeat every 2-3 minutes if needed, alternating nostrils, until medical assistance becomes available. 2 each 0    OLANZapine (ZyPREXA) 5 mg tablet Take 1 tablet (5 mg) by mouth once daily in the evening. Take with meals. This is to help with appetite, nausea, and sleep. 30 tablet 0    omeprazole (PriLOSEC) 40 mg DR capsule Take 1 capsule (40 mg) by mouth once daily. 100 capsule 1    ondansetron (Zofran) 8 mg tablet Take 1 tablet (8 mg) by mouth every 8 hours if needed for nausea or vomiting. 30 tablet 5    oxyCODONE (Roxicodone) 5 mg immediate release tablet Take 1 tablet (5 mg) by mouth every 4 hours if needed for moderate pain (4 - 6) for up to 60 doses. Can take 2 pill if need for severe pain (7-10) 20 tablet 0    oxyCODONE (Roxicodone) 5 mg immediate release tablet Take 1-2 tablets (5-10 mg) by mouth every 4 hours if needed for severe pain (7 - 10). 360 tablet 0    ProAir HFA 90 mcg/actuation inhaler Inhale 2 puffs every 4 hours if needed.      prochlorperazine (Compazine) 10 mg tablet Take 1 tablet (10 mg) by mouth every 6 hours if needed for nausea or vomiting. 30 tablet 5    rosuvastatin (Crestor) 10 mg tablet Take 1 tablet (10 mg) by mouth once daily. 100 tablet 1    sennosides-docusate sodium (Marznea-Colace) 8.6-50 mg tablet Take 3 tablets by mouth 2 times a day. This is to help manage constipation. 180 tablet 11     No current facility-administered  medications for this encounter.

## 2025-04-16 NOTE — RESEARCH NOTES
Research Note Non-Treatment Day    Minal Sun is here today. Patient is on [id number] Today is C3D15. Procedures completed per protocol. AE's and con-meds reviewed with patient. Patient states that she has been having bowel movements daily or every other day. She feels that her pain is finally under control and rates it 3/10 today. She is still having some nausea but no vomiting. Appetite is still low and she reports drinking 48+/- oz of oral liquids daily. Patient is aware of treatment plan and will return 4/23 for C4D1.      Education Documentation  Treatment Plan and Schedule, taught by Jazzy Guerrero RN at 4/16/2025 12:19 PM.  Learner: Patient  Readiness: Acceptance  Method: Explanation  Response: Verbalizes Understanding    General Medication Information, taught by Jazzy Guerrero RN at 4/16/2025 12:19 PM.  Learner: Patient  Readiness: Acceptance  Method: Explanation  Response: Verbalizes Understanding    Supportive Medications, taught by Jazzy Guerrero RN at 4/16/2025 12:19 PM.  Learner: Patient  Readiness: Acceptance  Method: Explanation  Response: Verbalizes Understanding    Comprehensive Metabolic Panel (CMP), taught by Jazzy Guerrero RN at 4/16/2025 12:19 PM.  Learner: Patient  Readiness: Acceptance  Method: Explanation  Response: Verbalizes Understanding    Complete Blood Count with Differential (CBC w/ Diff), taught by Jazzy Guerrero RN at 4/16/2025 12:19 PM.  Learner: Patient  Readiness: Acceptance  Method: Explanation  Response: Verbalizes Understanding    Education Comments  No comments found.

## 2025-04-18 ENCOUNTER — APPOINTMENT (OUTPATIENT)
Dept: RADIOLOGY | Facility: CLINIC | Age: 60
End: 2025-04-18
Payer: MEDICARE

## 2025-04-18 DIAGNOSIS — C34.90 SMALL CELL CARCINOMA OF LUNG, UNSPECIFIED LATERALITY, UNSPECIFIED PART OF LUNG: ICD-10-CM

## 2025-04-21 DIAGNOSIS — C34.90 SMALL CELL CARCINOMA OF LUNG, UNSPECIFIED LATERALITY, UNSPECIFIED PART OF LUNG: Primary | ICD-10-CM

## 2025-04-21 PROCEDURE — RXMED WILLOW AMBULATORY MEDICATION CHARGE

## 2025-04-22 DIAGNOSIS — E11.9 TYPE 2 DIABETES MELLITUS WITHOUT COMPLICATION, WITHOUT LONG-TERM CURRENT USE OF INSULIN: Primary | ICD-10-CM

## 2025-04-23 ENCOUNTER — TELEPHONE (OUTPATIENT)
Dept: HEMATOLOGY/ONCOLOGY | Facility: HOSPITAL | Age: 60
End: 2025-04-23

## 2025-04-23 ENCOUNTER — HOSPITAL ENCOUNTER (OUTPATIENT)
Dept: RESEARCH | Facility: HOSPITAL | Age: 60
Discharge: HOME | End: 2025-04-23
Payer: MEDICARE

## 2025-04-23 ENCOUNTER — APPOINTMENT (OUTPATIENT)
Dept: HEMATOLOGY/ONCOLOGY | Facility: HOSPITAL | Age: 60
End: 2025-04-23
Payer: MEDICARE

## 2025-04-23 ENCOUNTER — PHARMACY VISIT (OUTPATIENT)
Dept: PHARMACY | Facility: CLINIC | Age: 60
End: 2025-04-23
Payer: COMMERCIAL

## 2025-04-23 ENCOUNTER — OFFICE VISIT (OUTPATIENT)
Dept: PALLIATIVE MEDICINE | Facility: HOSPITAL | Age: 60
End: 2025-04-23
Payer: MEDICARE

## 2025-04-23 VITALS
WEIGHT: 186.51 LBS | BODY MASS INDEX: 34.11 KG/M2 | DIASTOLIC BLOOD PRESSURE: 92 MMHG | TEMPERATURE: 96.4 F | SYSTOLIC BLOOD PRESSURE: 127 MMHG | RESPIRATION RATE: 18 BRPM | HEART RATE: 73 BPM | OXYGEN SATURATION: 96 %

## 2025-04-23 DIAGNOSIS — T40.2X5A CONSTIPATION DUE TO OPIOID THERAPY: ICD-10-CM

## 2025-04-23 DIAGNOSIS — R11.0 NAUSEA: ICD-10-CM

## 2025-04-23 DIAGNOSIS — G89.3 CANCER RELATED PAIN: ICD-10-CM

## 2025-04-23 DIAGNOSIS — Z79.891 ENCOUNTER FOR LONG-TERM OPIATE ANALGESIC USE: ICD-10-CM

## 2025-04-23 DIAGNOSIS — K59.03 CONSTIPATION DUE TO OPIOID THERAPY: ICD-10-CM

## 2025-04-23 DIAGNOSIS — C34.90 SMALL CELL CARCINOMA OF LUNG, UNSPECIFIED LATERALITY, UNSPECIFIED PART OF LUNG: Primary | ICD-10-CM

## 2025-04-23 DIAGNOSIS — Z51.5 PALLIATIVE CARE ENCOUNTER: ICD-10-CM

## 2025-04-23 DIAGNOSIS — C34.90 SMALL CELL CARCINOMA OF LUNG, UNSPECIFIED LATERALITY, UNSPECIFIED PART OF LUNG: ICD-10-CM

## 2025-04-23 DIAGNOSIS — G47.00 INSOMNIA, UNSPECIFIED TYPE: ICD-10-CM

## 2025-04-23 DIAGNOSIS — Z79.891 ENCOUNTER FOR LONG-TERM OPIATE ANALGESIC USE: Primary | ICD-10-CM

## 2025-04-23 LAB
ALBUMIN SERPL BCP-MCNC: 3.8 G/DL (ref 3.4–5)
ALP SERPL-CCNC: 86 U/L (ref 33–136)
ALT SERPL W P-5'-P-CCNC: 12 U/L (ref 7–45)
AMPHETAMINES UR QL SCN: ABNORMAL
ANION GAP SERPL CALC-SCNC: 13 MMOL/L (ref 10–20)
APPEARANCE UR: CLEAR
AST SERPL W P-5'-P-CCNC: 14 U/L (ref 9–39)
BARBITURATES UR QL SCN: ABNORMAL
BASOPHILS # BLD AUTO: 0.03 X10*3/UL (ref 0–0.1)
BASOPHILS NFR BLD AUTO: 0.6 %
BENZODIAZ UR QL SCN: ABNORMAL
BILIRUB DIRECT SERPL-MCNC: 0.1 MG/DL (ref 0–0.3)
BILIRUB SERPL-MCNC: 0.3 MG/DL (ref 0–1.2)
BILIRUB UR STRIP.AUTO-MCNC: NEGATIVE MG/DL
BUN SERPL-MCNC: 13 MG/DL (ref 6–23)
BZE UR QL SCN: ABNORMAL
CALCIUM SERPL-MCNC: 8.6 MG/DL (ref 8.6–10.6)
CANNABINOIDS UR QL SCN: ABNORMAL
CHLORIDE SERPL-SCNC: 106 MMOL/L (ref 98–107)
CO2 SERPL-SCNC: 27 MMOL/L (ref 21–32)
COLOR UR: YELLOW
CREAT SERPL-MCNC: 0.79 MG/DL (ref 0.5–1.05)
EGFRCR SERPLBLD CKD-EPI 2021: 86 ML/MIN/1.73M*2
EOSINOPHIL # BLD AUTO: 0.2 X10*3/UL (ref 0–0.7)
EOSINOPHIL NFR BLD AUTO: 4 %
ERYTHROCYTE [DISTWIDTH] IN BLOOD BY AUTOMATED COUNT: 17.8 % (ref 11.5–14.5)
FENTANYL+NORFENTANYL UR QL SCN: ABNORMAL
GGT SERPL-CCNC: 34 U/L (ref 5–55)
GLUCOSE SERPL-MCNC: 122 MG/DL (ref 74–99)
GLUCOSE UR STRIP.AUTO-MCNC: NORMAL MG/DL
HCT VFR BLD AUTO: 36.4 % (ref 36–46)
HGB BLD-MCNC: 11.8 G/DL (ref 12–16)
HOLD SPECIMEN: NORMAL
IMM GRANULOCYTES # BLD AUTO: 0.02 X10*3/UL (ref 0–0.7)
IMM GRANULOCYTES NFR BLD AUTO: 0.4 % (ref 0–0.9)
KETONES UR STRIP.AUTO-MCNC: NEGATIVE MG/DL
LDH SERPL L TO P-CCNC: 150 U/L (ref 84–246)
LEUKOCYTE ESTERASE UR QL STRIP.AUTO: NEGATIVE
LYMPHOCYTES # BLD AUTO: 2.19 X10*3/UL (ref 1.2–4.8)
LYMPHOCYTES NFR BLD AUTO: 44 %
MAGNESIUM SERPL-MCNC: 2.07 MG/DL (ref 1.6–2.4)
MCH RBC QN AUTO: 32 PG (ref 26–34)
MCHC RBC AUTO-ENTMCNC: 32.4 G/DL (ref 32–36)
MCV RBC AUTO: 99 FL (ref 80–100)
METHADONE UR QL SCN: ABNORMAL
MONOCYTES # BLD AUTO: 0.36 X10*3/UL (ref 0.1–1)
MONOCYTES NFR BLD AUTO: 7.2 %
NEUTROPHILS # BLD AUTO: 2.18 X10*3/UL (ref 1.2–7.7)
NEUTROPHILS NFR BLD AUTO: 43.8 %
NITRITE UR QL STRIP.AUTO: NEGATIVE
NRBC BLD-RTO: 0 /100 WBCS (ref 0–0)
OPIATES UR QL SCN: ABNORMAL
OXYCODONE+OXYMORPHONE UR QL SCN: ABNORMAL
PCP UR QL SCN: ABNORMAL
PH UR STRIP.AUTO: 5 [PH]
PHOSPHATE SERPL-MCNC: 4.9 MG/DL (ref 2.5–4.9)
PLATELET # BLD AUTO: 277 X10*3/UL (ref 150–450)
POTASSIUM SERPL-SCNC: 4.2 MMOL/L (ref 3.5–5.3)
PROT SERPL-MCNC: 6.1 G/DL (ref 6.4–8.2)
PROT UR STRIP.AUTO-MCNC: NEGATIVE MG/DL
RBC # BLD AUTO: 3.69 X10*6/UL (ref 4–5.2)
RBC # UR STRIP.AUTO: NEGATIVE MG/DL
SODIUM SERPL-SCNC: 142 MMOL/L (ref 136–145)
SP GR UR STRIP.AUTO: 1.02
UROBILINOGEN UR STRIP.AUTO-MCNC: NORMAL MG/DL
WBC # BLD AUTO: 5 X10*3/UL (ref 4.4–11.3)

## 2025-04-23 PROCEDURE — 83735 ASSAY OF MAGNESIUM: CPT

## 2025-04-23 PROCEDURE — 80365 DRUG SCREENING OXYCODONE: CPT

## 2025-04-23 PROCEDURE — 80307 DRUG TEST PRSMV CHEM ANLYZR: CPT

## 2025-04-23 PROCEDURE — 84100 ASSAY OF PHOSPHORUS: CPT

## 2025-04-23 PROCEDURE — 83615 LACTATE (LD) (LDH) ENZYME: CPT

## 2025-04-23 PROCEDURE — 81003 URINALYSIS AUTO W/O SCOPE: CPT

## 2025-04-23 PROCEDURE — 82977 ASSAY OF GGT: CPT

## 2025-04-23 PROCEDURE — 36415 COLL VENOUS BLD VENIPUNCTURE: CPT

## 2025-04-23 PROCEDURE — 2560000001 HC RX 256 EXPERIMENTAL DRUGS

## 2025-04-23 PROCEDURE — 82248 BILIRUBIN DIRECT: CPT

## 2025-04-23 PROCEDURE — 80346 BENZODIAZEPINES1-12: CPT

## 2025-04-23 PROCEDURE — 96413 CHEMO IV INFUSION 1 HR: CPT

## 2025-04-23 PROCEDURE — 80053 COMPREHEN METABOLIC PANEL: CPT

## 2025-04-23 PROCEDURE — 2500000004 HC RX 250 GENERAL PHARMACY W/ HCPCS (ALT 636 FOR OP/ED): Mod: JZ | Performed by: INTERNAL MEDICINE

## 2025-04-23 PROCEDURE — 99214 OFFICE O/P EST MOD 30 MIN: CPT

## 2025-04-23 PROCEDURE — 85025 COMPLETE CBC W/AUTO DIFF WBC: CPT

## 2025-04-23 RX ORDER — PROCHLORPERAZINE MALEATE 10 MG
10 TABLET ORAL EVERY 6 HOURS PRN
Status: DISCONTINUED | OUTPATIENT
Start: 2025-04-23 | End: 2025-04-24 | Stop reason: HOSPADM

## 2025-04-23 RX ORDER — HEPARIN 100 UNIT/ML
500 SYRINGE INTRAVENOUS AS NEEDED
OUTPATIENT
Start: 2025-04-23

## 2025-04-23 RX ORDER — ALBUTEROL SULFATE 0.83 MG/ML
3 SOLUTION RESPIRATORY (INHALATION) AS NEEDED
Status: DISCONTINUED | OUTPATIENT
Start: 2025-04-23 | End: 2025-04-24 | Stop reason: HOSPADM

## 2025-04-23 RX ORDER — EPINEPHRINE 1 MG/ML
0.3 INJECTION, SOLUTION, CONCENTRATE INTRAVENOUS EVERY 5 MIN PRN
Status: DISCONTINUED | OUTPATIENT
Start: 2025-04-23 | End: 2025-04-24 | Stop reason: HOSPADM

## 2025-04-23 RX ORDER — OLANZAPINE 7.5 MG/1
7.5 TABLET, FILM COATED ORAL
Qty: 30 TABLET | Refills: 2 | Status: SHIPPED | OUTPATIENT
Start: 2025-04-23 | End: 2025-07-22

## 2025-04-23 RX ORDER — MORPHINE SULFATE 15 MG/1
15 TABLET, FILM COATED, EXTENDED RELEASE ORAL NIGHTLY
Qty: 30 TABLET | Refills: 0 | Status: SHIPPED | OUTPATIENT
Start: 2025-04-23 | End: 2025-05-23

## 2025-04-23 RX ORDER — HEPARIN SODIUM,PORCINE/PF 10 UNIT/ML
50 SYRINGE (ML) INTRAVENOUS AS NEEDED
OUTPATIENT
Start: 2025-04-23

## 2025-04-23 RX ORDER — PROCHLORPERAZINE EDISYLATE 5 MG/ML
10 INJECTION INTRAMUSCULAR; INTRAVENOUS EVERY 6 HOURS PRN
Status: DISCONTINUED | OUTPATIENT
Start: 2025-04-23 | End: 2025-04-24 | Stop reason: HOSPADM

## 2025-04-23 RX ORDER — FAMOTIDINE 10 MG/ML
20 INJECTION, SOLUTION INTRAVENOUS ONCE AS NEEDED
Status: DISCONTINUED | OUTPATIENT
Start: 2025-04-23 | End: 2025-04-24 | Stop reason: HOSPADM

## 2025-04-23 RX ORDER — DIPHENHYDRAMINE HYDROCHLORIDE 50 MG/ML
50 INJECTION, SOLUTION INTRAMUSCULAR; INTRAVENOUS AS NEEDED
Status: DISCONTINUED | OUTPATIENT
Start: 2025-04-23 | End: 2025-04-24 | Stop reason: HOSPADM

## 2025-04-23 RX ORDER — HEPARIN 100 UNIT/ML
500 SYRINGE INTRAVENOUS AS NEEDED
Status: DISCONTINUED | OUTPATIENT
Start: 2025-04-23 | End: 2025-04-24 | Stop reason: HOSPADM

## 2025-04-23 RX ADMIN — HEPARIN 500 UNITS: 100 SYRINGE at 14:48

## 2025-04-23 RX ADMIN — Medication 110.6 MG: at 12:46

## 2025-04-23 ASSESSMENT — ENCOUNTER SYMPTOMS
CHANGE IN BOWEL HABIT: 0
CHILLS: 0
NECK PAIN: 0
VISUAL CHANGE: 0
SWOLLEN GLANDS: 0
FATIGUE: 1
ARTHRALGIAS: 0
VERTIGO: 0
DIAPHORESIS: 0
ABDOMINAL PAIN: 0
NUMBNESS: 0
HEADACHES: 0
VOMITING: 0
MYALGIAS: 0
NAUSEA: 0
ANOREXIA: 0
FEVER: 0
COUGH: 1
JOINT SWELLING: 0
SORE THROAT: 0

## 2025-04-23 ASSESSMENT — PAIN SCALES - GENERAL: PAINLEVEL_OUTOF10: 0-NO PAIN

## 2025-04-23 NOTE — RESEARCH NOTES
Research Note Treatment Day    Minal Sun is here today for treatment on <insert clinical trial name>. Today is C_D_. Procedures completed per protocol. AE's and con-meds reviewed with patient. Patient is aware of treatment plan.    []   Received treatment as planned   OR  []    Treatment delayed; patient calendar updated as required   Treatment delayed because:    []   AE    []   Physician Discretion    []   Clinical Deterioration or Progression     []   Other    Education Documentation  No documentation found.  Education Comments  No comments found.

## 2025-04-23 NOTE — RESEARCH NOTES
RSH><[id number]><C4+D1><PART1/ESCMONO>    DCRU NURSING VISIT NOTE  Study Name: [id number]- Part 1_Escalation- Monotherapy  IRB#: [id number]  [id number]  Protocol Version Dated:  6/15/2023  PI: Jd Kohli MD.      Time point: Cycle 4 and Beyond - Day 1  CYCLE 4    Encounter Date: 04/23/2025  Encounter Time:  9:00 AM EDT  Encounter Department: [place] [address] RESEARCH        Study Regimen and Dosing   For Oncology study, Refer [place] Treatment Plan for orders   Part 1_Escalation_Monotherapy - patients with advanced recurrent or refractory solid tumors (small cell lung cancer, neuroendocrine tumors or brain tumors) will receive gradually increasing doses of ABBV-706 to determine MTD (Maximum Tolerated Dose).  Cycle = 21-days.  ABBV-706 administered IV Day 1 of each cycle.     Dietary Guidelines   Regular diet       Admission and Prior to Starting Study Activities   Notify  when patient arrives to unit.  Complete DCRU/[facility] intake form in EMR.  Confirm DCRU Standing Orders (provided by Study Team/) are signed & available on chart (Expires after 1 year).  Obtain weight in kilograms - with shoes off & heavy items removed.  Obtain vital signs after sitting at least 3 minutes. Record in EMR.  Insert one peripheral IV line for sample collection procedures (flush line with 5 - 10 mL normal saline following each blood draw). Access mediport (if available) otherwise insert second peripheral line in opposite arm for Investigative drug administration (if peripheral line, flush line with 5 - 10 mL normal saline before & after infusion)  Do Not draw research samples from the same line the investigational drug is infused through.  Physical Exam including pulmonary exam & neuro assessment.       Study Specific Instructions and Documentation   1- Safety Labs  2- Vital Signs  3-Research Correlatives  4-Study Drug  5-Vital Signs  6-Discharge     PRE-DOSE  Safety Labs   For Oncology study, Refer [place] Treatment Plan for orders       Drawn at 0925  Criteria to Treat   DCRU RN reviewed and meets eligibility to proceed with treatment plan   Time team notified: 1140   DCRU RN notifies study team to review eligibility and approval before dosing procedures  Time team approves: 1145      Subjective   Minal Sun is a 59 y.o. female and is here for a Research clinical visit.    Visit Provider: Jazzy Guerrero RN     Allergies: Allergies[1]    Objective     Vital Signs:    Vitals:    04/23/25 0907 04/23/25 1245 04/23/25 1317 04/23/25 1346   BP: 121/81 152/86 (!) 135/95 (!) 127/92   Pulse: 99 76 76 73   Resp: 16 18 16 18   Temp: 35.8 °C (96.4 °F) 35.9 °C (96.6 °F) 35.7 °C (96.3 °F) 35.8 °C (96.4 °F)   TempSrc: Temporal Temporal Temporal Temporal   SpO2: 94% 94% 96% 96%   Weight: 84.6 kg (186 lb 8.2 oz)      PainSc: 0-No pain          Physical Exam     ASSESSMENT and PLAN:  Problem List Items Addressed This Visit       SCLC (small cell lung carcinoma) - Primary    Relevant Medications    heparin flush 100 unit/mL syringe 500 Units    prochlorperazine (Compazine) tablet 10 mg    prochlorperazine (Compazine) injection 10 mg    Study [id number] ABBV-706 110.6 mg in sodium chloride 0.9% 100 mL IV (Completed)    sodium chloride 0.9 % bolus 500 mL    dextrose 5 % in water (D5W) bolus 500 mL    diphenhydrAMINE (BENADryl) injection 50 mg    methylPREDNISolone sod succinate (SOLU-Medrol) 40 mg/mL injection 40 mg    famotidine PF (Pepcid) injection 20 mg    EPINEPHrine HCl (PF) (Adrenalin) injection 0.3 mg    albuterol 2.5 mg /3 mL (0.083 %) nebulizer solution 3 mL    Other Relevant Orders    CBC and Auto Differential (Completed)    Comprehensive metabolic panel (Completed)    Bilirubin, Direct (Completed)    Gamma GT (Completed)    Lactate dehydrogenase (Completed)    Magnesium (Completed)    Phosphorus (Completed)    Urinalysis with Reflex Microscopic (Completed)    Venous  Access, CVAD    Research collection: 4mL Red Top - Research Collect ABBV-706 ADA/NADA; Pre-Dose; Within 2 hours; Ambient; Other (5-8x); Allow to clot 30-60 minutes. (Completed)    Research collection: 2mL Red Top - Research Collect ABBV-706 PK ADC/Total AB; Pre-Dose; Within 2 hours; Ambient; Other (5-8x); Allow to clot 30-60 minutes. (Completed)    Research collection: 3mL Lavender EDTA - Research Collect Free Top1 Inhibitor; Pre-Dose; Within 2 hours; On ICE; 8-10x (Completed)    Research collection: 10mL RareCyte AccuCyte - Reserach Collect CTC (SCLC Only); Pre-Dose; Within 2 hours; 8-10x (Completed)    Infusion Appointment Request    CBC and Auto Differential    Comprehensive metabolic panel    Bilirubin, Direct    Gamma GT    Lactate dehydrogenase    Magnesium    Phosphorus    Infusion Appointment Request    CBC and Auto Differential    Comprehensive metabolic panel    Bilirubin, Direct    Gamma GT    Lactate dehydrogenase    Magnesium    Phosphorus    Adult diet Regular    Research Communication (Completed)    Treatment Conditions (Completed)    Provider Communication - [id number] (Completed)    Nursing Communication - Hypersensitivity Management, Moderate (Completed)    Nursing Communication - Hypersensitivity Management, Severe (Completed)    Nursing Communication - Respiratory Management (Completed)    Pulse oximetry, continuous (Completed)    Research collection: 4mL Red Top - Research Collect ABBV-706 ADA/NADA; Pre-Dose; Within 2 hours; Ambient; Other (5-8x); Allow to clot 30-60 minutes. (Completed)    Research collection: 2mL Red Top - Research Collect ABBV-706 PK ADC/Total AB; Pre-Dose; Within 2 hours; Ambient; Other (5-8x); Allow to clot 30-60 minutes. (Completed)    Research collection: 3mL Lavender EDTA - Research Collect Free Top1 Inhibitor; Pre-Dose; Within 2 hours; On ICE; 8-10x (Completed)    Research collection: 10mL RareCyte AccuCyte - Reserach Collect CTC (SCLC Only); Pre-Dose; Within 2 hours;  8-10x (Completed)     Other Visit Diagnoses         Encounter for long-term opiate analgesic use        Relevant Orders    Drug Screen, Urine With Reflex to Confirmation (Completed)    OOB Internal Tracking    Benzodiazepine Confirmation, Urine    Opiate Confirmation, Urine             Medications as of the completion of today's visit:  Current Medications[2]    Administrations This Visit       heparin flush 100 unit/mL syringe 500 Units       Admin Date  04/23/2025 Action  Given Dose  500 Units Route  intra-catheter Documented By  Elba Srivastava RN              Study [id number] ABBV-706 110.6 mg in sodium chloride 0.9% 100 mL IV       Admin Date  04/23/2025 Action  New Bag Dose  110.6 mg Route  intravenous Documented By  Elba Srivastava RN                    Orders placed during today's visit:  Orders Placed This Encounter   Procedures    CBC and Auto Differential     Standing Status:   Standing     Number of Occurrences:   1     Release result to [facility]:   Immediate [1]    Comprehensive metabolic panel     Standing Status:   Standing     Number of Occurrences:   1     [facility]    Bilirubin, Direct     Standing Status:   Standing     Number of Occurrences:   1     [facility] [1[facility]    Gamma GT     Standing Status:   Standing     Number of Occurrences:   1     [facility]   [facility] [1]    Lactate dehydrogenase     Standing Status:   Standing     Number of Occurrences:   1     [facility] to [facility]:   Immediate [1]    Magnesium     Standing Status:   Standing     Number of Occurrences:   1     [facility]   [facility] [1]    Phosphorus     Standing Status:   Standing     Number of Occurrences:   1     [facility]   [facility] [1]    Urinalysis with Reflex Microscopic     Standing Status:   Standing     Number of Occurrences:   1     [facility]   [facility] [1]    Drug Screen, Urine With Reflex to  Confirmation     Standing Status:   Standing     Number of Occurrences:   1     [facility]   [facility]    OOB Internal Tracking     Standing Status:   Standing     Number of Occurrences:   1     [facility] to [facility]:   Immediate [1]    Benzodiazepine Confirmation, Urine     Standing Status:   Standing     Number of Occurrences:   1     Release result to MyChart:   Immediate [1]    Opiate Confirmation, Urine     Standing Status:   Standing     Number of Occurrences:   1     [facility]   [facility] [1]    Research collection: 4mL Red Top - Research Collect ABBV-706 ADA/NADA; Pre-Dose; Within 2 hours; Ambient; Other (5-8x); Allow to clot 30-60 minutes.     Standing Status:   Future     Number of Occurrences:   1     Expected Date:   4/23/2025     Expiration Date:   4/23/2026     Test:   ABBV-706 ADA/NADA     Timepoint:   Pre-Dose     Pre-Dose:   Within 2 hours     Temperature:   Ambient     Invert:   Other              5-8x     Handling:   Allow to clot 30-60 minutes.     Optional?:   No    Research collection: 2mL Red Top - Research Collect ABBV-706 PK ADC/Total AB; Pre-Dose; Within 2 hours; Ambient; Other (5-8x); Allow to clot 30-60 minutes.     Standing Status:   Future     Number of Occurrences:   1     Expected Date:   4/23/2025     Expiration Date:   4/23/2026     Test:   ABBV-706 PK ADC/Total AB     Timepoint:   Pre-Dose     Pre-Dose:   Within 2 hours     Temperature:   Ambient     Invert:   Other              5-8x     Handling:   Allow to clot 30-60 minutes.     Optional?:   No    Research collection: 3mL Lavender EDTA - Research Collect Free Top1 Inhibitor; Pre-Dose; Within 2 hours; On ICE; 8-10x     Standing Status:   Future     Number of Occurrences:   1     Expected Date:   4/23/2025     Expiration Date:   4/23/2026     Test:   Free Top1 Inhibitor     Timepoint:   Pre-Dose     Pre-Dose:   Within 2 hours     Temperature:   On ICE     Invert:   8-10x     Optional?:   No     Research collection: 10mL RareCyte AccuCyte - Reserach Collect CTC (SCLC Only); Pre-Dose; Within 2 hours; 8-10x     Standing Status:   Future     Number of Occurrences:   1     Expected Date:   4/23/2025     Expiration Date:   4/23/2026     Test:   CTC (SCLC Only)     Timepoint:   Pre-Dose     Pre-Dose:   Within 2 hours     Invert:   8-10x     Optional?:   No    CBC and Auto Differential     Standing Status:   Future     Expected Date:   4/30/2025     Expiration Date:   4/30/2026     Release result to MyChart:   Immediate [1]    Comprehensive metabolic panel     Standing Status:   Future     Expected Date:   4/30/2025     Expiration Date:   4/30/2026     Release result to MyChart:   Immediate [1]    Bilirubin, Direct     Standing Status:   Future     Expected Date:   4/30/2025     Expiration Date:   4/30/2026     Release result to MyChart:   Immediate [1]    Gamma GT     Standing Status:   Future     Expected Date:   4/30/2025     Expiration Date:   4/30/2026     Release result to MyChart:   Immediate [1]    Lactate dehydrogenase     Standing Status:   Future     Expected Date:   4/30/2025     Expiration Date:   4/30/2026     Release result to MyChart:   Immediate [1]    Magnesium     Standing Status:   Future     Expected Date:   4/30/2025     Expiration Date:   4/30/2026     Release result to MyChart:   Immediate [1]    Phosphorus     Standing Status:   Future     Expected Date:   4/30/2025     Expiration Date:   4/30/2026     Release result to MyChart:   Immediate [1]    CBC and Auto Differential     Standing Status:   Future     Expected Date:   5/7/2025     Expiration Date:   5/7/2026     Release result to MyChart:   Immediate [1]    Comprehensive metabolic panel     Standing Status:   Future     Expected Date:   5/7/2025     Expiration Date:   5/7/2026     Release result to MyChart:   Immediate [1]    Bilirubin, Direct     Standing Status:   Future     Expected Date:   5/7/2025     Expiration Date:    5/7/2026     Release result to MyChart:   Immediate [1]    Gamma GT     Standing Status:   Future     Expected Date:   5/7/2025     Expiration Date:   5/7/2026     Release result to MyChart:   Immediate [1]    Lactate dehydrogenase     Standing Status:   Future     Expected Date:   5/7/2025     Expiration Date:   5/7/2026     Release result to MyChart:   Immediate [1]    Magnesium     Standing Status:   Future     Expected Date:   5/7/2025     Expiration Date:   5/7/2026     Release result to MyChart:   Immediate [1]    Phosphorus     Standing Status:   Future     Expected Date:   5/7/2025     Expiration Date:   5/7/2026     Release result to MyChart:   Immediate [1]    Research collection: 4mL Red Top - Research Collect ABBV-706 ADA/NADA; Pre-Dose; Within 2 hours; Ambient; Other (5-8x); Allow to clot 30-60 minutes.     Standing Status:   Standing     Number of Occurrences:   1     Test:   ABBV-706 ADA/NADA     Timepoint:   Pre-Dose     Pre-Dose:   Within 2 hours     Temperature:   Ambient     Invert:   Other              5-8x     Handling:   Allow to clot 30-60 minutes.     Optional?:   No    Research collection: 2mL Red Top - Research Collect ABBV-706 PK ADC/Total AB; Pre-Dose; Within 2 hours; Ambient; Other (5-8x); Allow to clot 30-60 minutes.     Standing Status:   Standing     Number of Occurrences:   1     Test:   ABBV-706 PK ADC/Total AB     Timepoint:   Pre-Dose     Pre-Dose:   Within 2 hours     Temperature:   Ambient     Invert:   Other              5-8x     Handling:   Allow to clot 30-60 minutes.     Optional?:   No    Research collection: 3mL Lavender EDTA - Research Collect Free Top1 Inhibitor; Pre-Dose; Within 2 hours; On ICE; 8-10x     Standing Status:   Standing     Number of Occurrences:   1     Test:   Free Top1 Inhibitor     Timepoint:   Pre-Dose     Pre-Dose:   Within 2 hours     Temperature:   On ICE     Invert:   8-10x     Optional?:   No    Research collection: 10mL RareCyte AccuCyte -  Reserach Collect CTC (SCLC Only); Pre-Dose; Within 2 hours; 8-10x     Standing Status:   Standing     Number of Occurrences:   1     Test:   CTC (SCLC Only)     Timepoint:   Pre-Dose     Pre-Dose:   Within 2 hours     Invert:   8-10x     Optional?:   No    Adult diet Regular     Standing Status:   Standing     Number of Occurrences:   1     Diet type:   Regular    Research Communication     Cohort: 1A  Dose Level: ABBV - 706 1.3 mg/kg     Standing Status:   Standing     Number of Occurrences:   1    Treatment Conditions     Hold treatment and notify provider if:   ANC LESS THAN 1.5 x 10*3/uL   Platelets LESS THAN 75 x 10*3/uL   AST/ALT GREATER THAN 5 x ULN   Total Bilirubin GREATER THAN 1.5 x ULN   Peripheral Neuropathy GREATER THAN OR EQUAL TO Grade 2   Pneumonitis GREATER THAN OR EQUAL TO Grade 1   Dermatologic Toxicity GREATER THAN OR EQUAL TO Grade 2    Adverse Event GREATER THAN OR EQUAL TO Grade 3     Standing Status:   Standing     Number of Occurrences:   1    Provider Communication - [id number]      Dose Level 1             ABBV-076 1.3 mg/kg   Dose Level 2             ABBV-076 2.5 mg/kg   Dose Level 3             ABBV-076 3.5 mg/kg   Dose Level 4             ABBV-076 5 mg/kg   Dose Level 5             ABBV-076 6.4 mg/kg   Dose Level 6             ABBV-076 8 mg/kg   Dose Level 7             ABBV-076 10 mg/kg     Standing Status:   Standing     Number of Occurrences:   1    Nursing Communication - Hypersensitivity Management, Moderate     Flushing, rash, pruritus, dyspnea, chest discomfort, back pain, angioedema, SBP LESS THAN 90 mmHg, and/or change in mental status above or below patient's baseline. In the event of moderate or severe hypersensitivity reaction to any medication:   Stop infusion.  Assess vital signs, pulse oximetry, nursing assessment, and patient complaints.  Administer medications per Hypersensitivity Reaction Medication Protocol.   Notify physician.     Standing Status:   Standing     Number  of Occurrences:   1    Nursing Communication - Hypersensitivity Management, Severe     Worsening of moderate reaction symptoms, SBP LESS THAN 80 mmHg, and/or respiratory distress (respirations GREATER THAN 40 breaths per minute, wheezing, life-threatening symptoms). In the event of moderate or severe hypersensitivity reaction to any medication:   Stop infusion.  Assess vital signs, pulse oximetry, nursing assessment, and patient complaints.  Administer medications per Hypersensitivity Reaction Medication Protocol.   Notify physician.     Standing Status:   Standing     Number of Occurrences:   1    Nursing Communication - Respiratory Management     Oxygen via nasal cannula at 4 L per minute for respiratory rate GREATER THAN OR EQUAL TO to 28 breaths/minute and/or wheezing. Pulse Oximetry continuous monitoring.     Standing Status:   Standing     Number of Occurrences:   1    Pulse oximetry, continuous     Continuous monitoring to maintain SPO2 GREATER THAN 90%     Standing Status:   Standing     Number of Occurrences:   1    Venous Access, CVAD     Standing Status:   Standing     Number of Occurrences:   1        [id number] - ABBV-706 alone or in combination in subjects with advanced solid tumors    Patient has no adverse events documented in the Research Adverse Events activity.      Safety Parameters and Special Instructions   ABBV-706  ABBV-706 is an antibody-drug conjugate (ADC) with an anti-seizure-related antibody (SEZ6) & topoisomerase inhibitor  Potential Side Effects/Adverse Events: GI effects (diarrhea, nausea, vomiting, stool abnormalities), peripheral neuropathy, myelosuppression, rash, infusion-related reaction.  Administer infusion over 60 mins (+/- 10 mins).  Observation post-dose 1 hour for Cycle 1 Day 1. If no reaction, no observation for subsequent visits.     PRE-DOSE Vital Signs     Temp, Heart Rate, Respiration, Blood Pressure: rest sitting at least 3 minutes prior to obtaining     Access Type:  21g butterfly Location: right forearm   For Oncology study, Refer [place] Treatment Plan for orders  AFTER ECGs  Deliver to DCRU/TR[facility] lab for processing    Time point Specimen Test Cycle Volume Tube Handling Draw Time    Pre-dose (within 2 hours) (draw in order)      ABBV-706 ADA/NADA 4, 6, 9, 12 4 mL Red Top Serum Invert 5 - 8x; Ambient Allow to clot 30 -60 mins 1158    ABBV-706 PK ADC/Total AB 4, 6, 9, 12 2 mL Red Top Serum Invert 5 - 8x; Ambient  Allow to clot 30 -60 mins 1158    Biomarker Serum ODD 5 mL Gold Top SST Invert 5x; Upright. Ambient Allow to clot 30 mins na    Free TOP1 Inhibitor 4, 6, 9,12 3 mL EDTA Lavender Invert 8 - 10x. On ICE 1158    CTC (SCLC Only)-If ordered see EPCI All 10 mL RareCyte AccuCyte BCT See Below 1158    ctDNA Plasma/WB ODD 2 x 10 Streck Cell-Free DNA  na     CTC & ctDNA Plasma/WB instructions  Keep patient's arm in the downward position during collection procedure.  Hold the tube with the stopper in the uppermost position so that the tube contents do not touch the stopper or the end of the needle during sample collection.  Release tourniquet once blood start to flow in the tube, or within 2 minutes of application.  Invert 8 - 10x.          Weight-Based Dosing   Baseline (screening) weight (kg) 85.1kg  Date measured  ( Refer Previous encounters to enter)  Text :  Actual weight   Vitals:    04/23/25 0907   Weight: 84.6 kg (186 lb 8.2 oz)     (Weight on Day 1 of cycle)   Date Measured 04/23/2025  Enter Information here  Dose based on Cycle 1 Day 1 weight.  May use Cycle 1 Day 1 weight unless noted weight difference of 10% weight gain or loss.  The minimum dose of ABBV-706 is 50 mg.  Any calculated dose < 50 mg, patient to be administered 50 mg.  Body weight capped at 120 kg. 120 kg will be utilized to calculate the dose.       Research Drug Administration   Document medication administration in MAR activity. Document Research specific instructions below.  Enter Information  here  ABBV-706  Infuse over 60 minutes (+ 10 minutes).  Observation time 1 hour post-dose.  If any Infusion-Related Reaction or Suspected Allergic-Type Reaction specific safety labs to be drawn- see EPIC   Infusion 1246 to 1346    Infusion-Related Reactions   Review Safety Parameters on the medication Order. Note specific instructions below.    - SOC Hypersensivity Orders             Þ If any Infusion-Related Reaction or Suspected Allergic-Type Reaction Þ  Clinical Safety Labs  Z-Req#:    Timepoint Blood Test Collection Priority Order of Origin     Within 2 hours after 1st sign of reaction C3a (FC3AR) (1 mL Lavender top) STAT Z-req (send to [facility])    C5 (354) (1 mL SST) STAT Z-req (send to [place])    IgE STAT ð EMR or ð Z-req    Tryptase (2 mL Red Top) STAT ð EMR or ð Z-req       DURING-DOSE Vital Signs     Temp, Heart Rate, Respiration, Blood Pressure: rest sitting at least 3 minutes prior to obtaining  30 minutes (+/-10 minutes) after the start of infusion    See above at 1317    POST-DOSE Vital Signs      Temp, Heart Rate, Respiration, Blood Pressure: rest sitting at least 3 minutes prior to obtaining  End of Infusion (Time “0”)     See above at 1346    Discharge Instructions   Discharge patient to home after study requirements completed or PK sample obtained.  Remind patient to return: on Day 8 for vital signs & safety labs  Discharge time: 1450     Elba Srivastava RN  04/23/25                  [1]   Allergies  Allergen Reactions    Clindamycin Diarrhea    Erythromycin Diarrhea    Erythromycin Base Other and Nausea Only   [2]   Current Outpatient Medications   Medication Sig Dispense Refill    acetaminophen (TylenoL) 325 mg tablet Take 2 tablets (650 mg) by mouth every 6 hours if needed for mild pain (1 - 3) or moderate pain (4 - 6). 30 tablet 0    ALPRAZolam (Xanax) 0.5 mg tablet Take 2 tablets (1 mg) by mouth 2 times a day as needed for anxiety for up to 15 days. 30 tablet 0    apixaban (Eliquis) 5  mg tablet Take 1 tablet (5 mg) by mouth 2 times a day. 60 tablet 11    azelastine (Astelin) 137 mcg (0.1 %) nasal spray       carbinoxamine maleate 4 mg tablet Take 4 mg by mouth 2 times a day.      escitalopram (Lexapro) 10 mg tablet Take 1.5 tablets (15 mg) by mouth once daily. 150 tablet 1    guaiFENesin (Mucinex) 600 mg 12 hr tablet Take 2 tablets (1,200 mg) by mouth 2 times a day. Do not crush, chew, or split. 120 tablet 11    hydrocodone-homatropine (Hycodan) 5-1.5 mg/5 mL syrup Take 5 mL by mouth every 6 hours if needed for cough for up to 23 days. 473 mL 0    ipratropium-albuteroL (Duo-Neb) 0.5-2.5 mg/3 mL nebulizer solution       levothyroxine (Synthroid, Levoxyl) 50 mcg tablet Take 1 tablet (50 mcg) by mouth early in the morning.. Take on an empty stomach at the same time each day, either 30 to 60 minutes prior to breakfast 30 tablet 11    lidocaine-prilocaine (Emla) 2.5-2.5 % cream Apply topically a thin film of medication to [facility] site 45 mts prior to being accessed and cover with band aid 30 g 0    magnesium hydroxide (Milk of Magnesia) 400 mg/5 mL suspension Take 5 mL by mouth once daily as needed for constipation for up to 10 days. Take this when it has been 2 or more days without a bowel movement. 360 mL 0    metFORMIN  mg 24 hr tablet Take 1 tablet (500 mg) by mouth 2 times a day. 200 tablet 1    morphine CR (MS Contin) 15 mg 12 hr tablet Take 1 tablet (15 mg) by mouth once daily at bedtime. Do not crush, chew, or split. This is your LONG-ACTING pain medicine. 30 tablet 0    naloxone (Narcan) 4 mg/0.1 mL nasal spray Administer 1 spray (4 mg) into affected nostril(s) if needed for opioid reversal. May repeat every 2-3 minutes if needed, alternating nostrils, until medical assistance becomes available. 2 each 0    OLANZapine (ZyPREXA) 7.5 mg tablet Take 1 tablet (7.5 mg) by mouth once daily in the evening. Take with meals. This is to help with appetite, nausea, and sleep. This is a dose  increase. 30 tablet 2    omeprazole (PriLOSEC) 40 mg DR capsule Take 1 capsule (40 mg) by mouth once daily. 100 capsule 1    ondansetron (Zofran) 8 mg tablet Take 1 tablet (8 mg) by mouth every 8 hours if needed for nausea or vomiting. 30 tablet 5    oxyCODONE (Roxicodone) 5 mg immediate release tablet Take 1 tablet (5 mg) by mouth every 4 hours if needed for moderate pain (4 - 6) for up to 60 doses. Can take 2 pill if need for severe pain (7-10) 20 tablet 0    oxyCODONE (Roxicodone) 5 mg immediate release tablet Take 1-2 tablets (5-10 mg) by mouth every 4 hours if needed for severe pain (7 - 10). 360 tablet 0    ProAir HFA 90 mcg/actuation inhaler Inhale 2 puffs every 4 hours if needed.      prochlorperazine (Compazine) 10 mg tablet Take 1 tablet (10 mg) by mouth every 6 hours if needed for nausea or vomiting. 30 tablet 5    rosuvastatin (Crestor) 10 mg tablet Take 1 tablet (10 mg) by mouth once daily. 100 tablet 1    sennosides-docusate sodium (Marzena-Colace) 8.6-50 mg tablet Take 3 tablets by mouth 2 times a day. This is to help manage constipation. 180 tablet 11     Current Facility-Administered Medications   Medication Dose Route Frequency Provider Last Rate Last Admin    albuterol 2.5 mg /3 mL (0.083 %) nebulizer solution 3 mL  3 mL nebulization PRN JESSICA Hernandez        dextrose 5 % in water (D5W) bolus 500 mL  500 mL intravenous PRN NANCY Hernandez-CNP        diphenhydrAMINE (BENADryl) injection 50 mg  50 mg intravenous PRN JESSICA Hernandez        EPINEPHrine HCl (PF) (Adrenalin) injection 0.3 mg  0.3 mg intramuscular q5 min PRN JESSICA Hernandez        famotidine PF (Pepcid) injection 20 mg  20 mg intravenous Once PRN JESSICA Hernandez        heparin flush 100 unit/mL syringe 500 Units  500 Units intra-catheter PRN Jd Kohli MD   500 Units at 04/23/25 1448    methylPREDNISolone sod succinate (SOLU-Medrol) 40 mg/mL injection 40 mg  40 mg  intravenous PRN JESSICA Hernandez        prochlorperazine (Compazine) injection 10 mg  10 mg intravenous q6h PRN JESSICA Hernandez        prochlorperazine (Compazine) tablet 10 mg  10 mg oral q6h PRN JESSICA Hernandez        sodium chloride 0.9 % bolus 500 mL  500 mL intravenous PRN JESSICA Hernandez

## 2025-04-23 NOTE — PROGRESS NOTES
Patient ID: Minal Sun is a 59 y.o. female.    DIAGNOSIS     Small cell carcinoma of the lung.  Date of diagnosis is September 27, 2024 from a bronchoscopic biopsy of a subcarinal lymph node.  Immunohistochemistry positive for TTF-1, INSM1, synaptophysin and chromogranin, negative for p40, RB immunostain shows loss of nuclear expression.        STAGING     Clinical T4, N2, M1 C, stage IVc, extensive stage disease        CURRENT SITES OF DISEASE      Left lung, mediastinum, left hilum of the lung, liver, intra-abdominal lymph nodes in the portacaval and periportal region, bone metastases        MOLECULAR GENOMICS     Test Name: [facility] xF+ (XF.V3) dated October 2024     Molecular Findings:  * Gene: [id number], Variant: Missense variant (exon 1) - GOF, Potentially Actionable, p.R88Q (Allele Frequency - 0.3%)  * Gene: TP53, Variant: Missense variant - LOF, Biologically Relevant, p.R249G (Allele Frequency - 70.1%)  * Gene: RB1, Variant: Splice region variant - LOF, Biologically Relevant, c.540-1G>T, Splice Site (Allele Frequency - 56.8%)        Test Name: [facility] xT (XT.V4)  Laboratory Name: [facility]  Specimen Source: Lymph node, level 7  Collection Date: 27-Sep-2024     Molecular Findings:  * Gene: TP53, Variant: Missense variant - LOF, Biologically Relevant, p.R249G (Allele Frequency - 96.2%)  * Gene: RB1, Variant: Splice region variant - LOF, Biologically Relevant, c.540-1G>T, Splice Site (Allele Frequency - 96%)  * Gene: KMT2D, Variant: Stop gain - LOF, Biologically Relevant, p.*, Nonsense (Allele Frequency - 41.9%)  * Biomarker: Microsatellite Instability, Status: stable  * Biomarker: Tumor Mutation [place] (2.6 Muts/Mb, Percentile: 33)        SERUM TUMOR MARKERS     Baseline LDH within normal limits  C1D1 on Study LDH was 274. Trending down at C2D1         PRIOR THERAPY     Combination chemotherapy and immunotherapy.  Initially enrolled on a clinical trial of Lutathera and underwent octreotide scan  which was positive.  However she decided to come off study and did not receive Lutathera with her chemo.  Chemotherapy started October 6, 2024.  Immunotherapy completed added with cycle #2 11.06.2024.  Minor response observed after 2 cycles of treatment. PD observed after C4.        CURRENT THERAPY     2.19.2025: Started on Phase 1 study [id number] with study drug [id number] which is an ADC targeting SEZ6 with a topoisomerase 1 inhibitor payload     CURRENT ONCOLOGICAL PROBLEMS    Cough and shortness of breath significantly improved with systemic treatment.  Pulmonary Embolism  Immunotherapy induced hypothyroidism  Neoplasm non cardiac chest/back pain        HISTORY OF PRESENT ILLNESS     This is a 59-year-old patient.  She states that she was feeling sick toward the end of spring of this year with some sinus problems.  Was seen by her allergist and was treated with antibiotics.  She was also seen at 1 point by primary care and steroids and antibiotics and inhalers were given.  She did not have any resolution and then ultimately developed a little bit of the hemoptysis which led to a chest x-ray showing an abnormality and finally a CT scan of the chest.The CT scan of the chest was done as a lung cancer screening low-dose CT and completed on September 20, 2024.  This demonstrated a extensive infiltrative mediastinal and hilar mass.  There was narrowing of the left mainstem bronchus and lower lobe bronchus secondary to extrinsic compression.  The predominant growth was within the left hilum and subcarinal region.  She underwent a bronchoscopy dated September 27, 2024 showing splaying of the main alanna.  There was mild erosion of a lymph node into the left mainstem bronchus.  There was erythema and mild erosions of an endobronchial tumor along the medial border.  Moderate to severe stenosis of the left lower lobe bronchus to 75%.        PAST MEDICAL HISTORY     Lumbar surgery about 30 years ago   hysterectomy in  2000  Diabetes  COPD  Right eye exophthalmus  herpes zoster        SOCIAL HISTORY     Patient lives with her sister.  She lives in [facility].  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  She started smoking at the age of 14 and continues to smoke at the age of 59.  Has smoked for 45 years on average 1 pack/day.        CURRENT MEDS     See medication list, meds reviewed, includes metformin, rosuvastatin, been done so Nied inhalers, Wellbutrin escitalopram, trazodone        ALLERGIES     Patient denies any drug allergies        FAMILY HISTORY      Mother had bladder cancer and possibly breast cancer.     Subjective    Today 4.2.2025 Patient in clinic for C4D1 on [id number].   Patient reports tolerated treatment well and reports she feels much better. She continues to have fatigue but feels her energy has improved. Continues to have baseline symptoms intermittent episodes of nausea, SOB with exertion, cough, constipation and fatigue but reports these symptoms are better.  Reports she had a episode of lightheadedness yesterday which resolved with out any intervention  and did not experience any more episodes.  She reports her neoplasm Pain better controlled and she is trying to reduce the frequency of use of pain meds. Today she denies any denies any headaches, rash/itching, chills or fever, chest pain or chest tightness, painful inflammation/ulceration oral mucous membranes, vomiting, diarrhea, hematemesis /hemoptysis, hematuria/rectal bleeding, urinary symptoms, swelling extremities, weakness, or peripheral neuropathy. ROS as above. Remainder of 10 point review of systems elicited and otherwise unremarkable.     Cancer  Associated symptoms include coughing and fatigue. Pertinent negatives include no abdominal pain, anorexia, arthralgias, change in bowel habit, chest pain, chills,  "congestion, diaphoresis, fever, headaches, joint swelling, myalgias, nausea, neck pain, numbness, rash, sore throat, swollen glands, urinary symptoms, vertigo, visual change or vomiting.     Objective    BSA: 1.92 meters squared  /81 (BP Location: Left arm, Patient Position: Sitting)   Pulse 99   Temp 35.8 °C (96.4 °F) (Temporal)   Resp 16   Wt 84.6 kg (186 lb 8.2 oz)   SpO2 94%   BMI 34.11 kg/m²      Daily Weight  04/23/25 : 84.6 kg (186 lb 8.2 oz)  04/09/25 : 83.5 kg (184 lb 1.4 oz)  04/08/25 : 82.6 kg (182 lb)  04/02/25 : 83.7 kg (184 lb 8.4 oz)  03/26/25 : 84.1 kg (185 lb 6.5 oz)         4/2/2025     4:34 PM 4/3/2025     1:11 PM 4/4/2025    12:51 PM 4/8/2025     2:36 PM 4/9/2025    12:12 PM 4/16/2025    12:21 PM 4/23/2025     9:07 AM   Vitals   Systolic 143 97 126 113 129 115 121   Diastolic 80 83 88 82 77 80 81   BP Location Right arm Left arm Right arm  Right arm Right arm Left arm   Heart Rate 79 109 100 90 92 89 99   Temp 36.3 °C (97.3 °F) 37.1 °C (98.8 °F) 36.7 °C (98.1 °F)  36 °C (96.8 °F) 36.8 °C (98.2 °F) 35.8 °C (96.4 °F)   Resp 18 18 20  18 16 16   Height    1.575 m (5' 2\")      Weight (lb)    182 184.08  186.51   BMI    33.29 kg/m2 33.67 kg/m2  34.11 kg/m2   BSA (m2)    1.9 m2 1.91 m2  1.92 m2   Visit Report Report    Report   Report          Physical Exam  Constitutional:       General: She is not in acute distress.     Appearance: Normal appearance. She is not ill-appearing, toxic-appearing or diaphoretic.   HENT:      Nose: No congestion or rhinorrhea.      Mouth/Throat:      Pharynx: No oropharyngeal exudate or posterior oropharyngeal erythema.   Eyes:      General: No scleral icterus.     Conjunctiva/sclera: Conjunctivae normal.   Cardiovascular:      Rate and Rhythm: Normal rate and regular rhythm.      Pulses: Normal pulses.      Heart sounds: Normal heart sounds. No murmur heard.     No friction rub. No gallop.   Pulmonary:      Effort: Pulmonary effort is normal. No respiratory " distress.      Breath sounds: No stridor. Rhonchi present. No wheezing or rales.   Chest:      Chest wall: No tenderness.   Abdominal:      General: There is no distension.      Palpations: There is no mass.      Tenderness: There is no abdominal tenderness. There is no guarding or rebound.   Musculoskeletal:      Cervical back: No tenderness.      Right lower leg: No edema.      Left lower leg: No edema.   Lymphadenopathy:      Cervical: No cervical adenopathy.   Skin:     General: Skin is warm.      Coloration: Skin is not jaundiced.      Findings: No bruising or lesion.   Neurological:      General: No focal deficit present.      Mental Status: She is oriented to person, place, and time.   Psychiatric:         Mood and Affect: Mood normal.         Behavior: Behavior normal.     Performance Status:  ECOG 1: Restricted in physically strenuous activity but ambulatory and able to carry out work of a light or sedentary nature, e.g., light house work, office work.        Hospital Outpatient Visit on 04/16/2025   Component Date Value Ref Range Status    WBC 04/16/2025 6.3  4.4 - 11.3 x10*3/uL Final    nRBC 04/16/2025 0.0  0.0 - 0.0 /100 WBCs Final    RBC 04/16/2025 3.74 (L)  4.00 - 5.20 x10*6/uL Final    Hemoglobin 04/16/2025 11.8 (L)  12.0 - 16.0 g/dL Final    Hematocrit 04/16/2025 37.0  36.0 - 46.0 % Final    MCV 04/16/2025 99  80 - 100 fL Final    MCH 04/16/2025 31.6  26.0 - 34.0 pg Final    MCHC 04/16/2025 31.9 (L)  32.0 - 36.0 g/dL Final    RDW 04/16/2025 17.9 (H)  11.5 - 14.5 % Final    Platelets 04/16/2025 262  150 - 450 x10*3/uL Final    Neutrophils % 04/16/2025 63.1  40.0 - 80.0 % Final    Immature Granulocytes %, Automated 04/16/2025 0.3  0.0 - 0.9 % Final    Lymphocytes % 04/16/2025 28.3  13.0 - 44.0 % Final    Monocytes % 04/16/2025 5.4  2.0 - 10.0 % Final    Eosinophils % 04/16/2025 2.4  0.0 - 6.0 % Final    Basophils % 04/16/2025 0.5  0.0 - 2.0 % Final    Neutrophils Absolute 04/16/2025 3.95  1.20 - 7.70  x10*3/uL Final    Immature Granulocytes Absolute, Au* 04/16/2025 0.02  0.00 - 0.70 x10*3/uL Final    Lymphocytes Absolute 04/16/2025 1.77  1.20 - 4.80 x10*3/uL Final    Monocytes Absolute 04/16/2025 0.34  0.10 - 1.00 x10*3/uL Final    Eosinophils Absolute 04/16/2025 0.15  0.00 - 0.70 x10*3/uL Final    Basophils Absolute 04/16/2025 0.03  0.00 - 0.10 x10*3/uL Final    Glucose 04/16/2025 119 (H)  74 - 99 mg/dL Final    Sodium 04/16/2025 139  136 - 145 mmol/L Final    Potassium 04/16/2025 3.7  3.5 - 5.3 mmol/L Final    Chloride 04/16/2025 103  98 - 107 mmol/L Final    Bicarbonate 04/16/2025 26  21 - 32 mmol/L Final    Anion Gap 04/16/2025 14  10 - 20 mmol/L Final    Urea Nitrogen 04/16/2025 15  6 - 23 mg/dL Final    Creatinine 04/16/2025 0.80  0.50 - 1.05 mg/dL Final    eGFR 04/16/2025 84  >60 mL/min/1.73m*2 Final    Calcium 04/16/2025 8.7  8.6 - 10.6 mg/dL Final    Albumin 04/16/2025 3.7  3.4 - 5.0 g/dL Final    Alkaline Phosphatase 04/16/2025 72  33 - 136 U/L Final    Total Protein 04/16/2025 6.0 (L)  6.4 - 8.2 g/dL Final    AST 04/16/2025 12  9 - 39 U/L Final    Bilirubin, Total 04/16/2025 0.3  0.0 - 1.2 mg/dL Final    ALT 04/16/2025 9  7 - 45 U/L Final    Bilirubin, Direct 04/16/2025 0.1  0.0 - 0.3 mg/dL Final    GGT 04/16/2025 34  5 - 55 U/L Final    LDH 04/16/2025 133  84 - 246 U/L Final    Magnesium 04/16/2025 1.96  1.60 - 2.40 mg/dL Final    Phosphorus 04/16/2025 4.9  2.5 - 4.9 mg/dL Final    Extra Tube 04/16/2025 Hold for add-ons.   Final    Extra Tube 04/16/2025 Hold for add-ons.   Final   Hospital Outpatient Visit on 04/09/2025   Component Date Value Ref Range Status    WBC 04/09/2025 4.7  4.4 - 11.3 x10*3/uL Final    nRBC 04/09/2025 0.0  0.0 - 0.0 /100 WBCs Final    RBC 04/09/2025 3.88 (L)  4.00 - 5.20 x10*6/uL Final    Hemoglobin 04/09/2025 12.1  12.0 - 16.0 g/dL Final    Hematocrit 04/09/2025 37.0  36.0 - 46.0 % Final    MCV 04/09/2025 95  80 - 100 fL Final    MCH 04/09/2025 31.2  26.0 - 34.0 pg Final     MCHC 04/09/2025 32.7  32.0 - 36.0 g/dL Final    RDW 04/09/2025 17.4 (H)  11.5 - 14.5 % Final    Platelets 04/09/2025 305  150 - 450 x10*3/uL Final    Neutrophils % 04/09/2025 59.5  40.0 - 80.0 % Final    Immature Granulocytes %, Automated 04/09/2025 0.4  0.0 - 0.9 % Final    Lymphocytes % 04/09/2025 32.3  13.0 - 44.0 % Final    Monocytes % 04/09/2025 5.9  2.0 - 10.0 % Final    Eosinophils % 04/09/2025 1.5  0.0 - 6.0 % Final    Basophils % 04/09/2025 0.4  0.0 - 2.0 % Final    Neutrophils Absolute 04/09/2025 2.80  1.20 - 7.70 x10*3/uL Final    Immature Granulocytes Absolute, Au* 04/09/2025 0.02  0.00 - 0.70 x10*3/uL Final    Lymphocytes Absolute 04/09/2025 1.52  1.20 - 4.80 x10*3/uL Final    Monocytes Absolute 04/09/2025 0.28  0.10 - 1.00 x10*3/uL Final    Eosinophils Absolute 04/09/2025 0.07  0.00 - 0.70 x10*3/uL Final    Basophils Absolute 04/09/2025 0.02  0.00 - 0.10 x10*3/uL Final    Glucose 04/09/2025 128 (H)  74 - 99 mg/dL Final    Sodium 04/09/2025 137  136 - 145 mmol/L Final    Potassium 04/09/2025 4.1  3.5 - 5.3 mmol/L Final    Chloride 04/09/2025 102  98 - 107 mmol/L Final    Bicarbonate 04/09/2025 25  21 - 32 mmol/L Final    Anion Gap 04/09/2025 14  10 - 20 mmol/L Final    Urea Nitrogen 04/09/2025 15  6 - 23 mg/dL Final    Creatinine 04/09/2025 0.75  0.50 - 1.05 mg/dL Final    eGFR 04/09/2025 >90  >60 mL/min/1.73m*2 Final    Calcium 04/09/2025 8.7  8.6 - 10.6 mg/dL Final    Albumin 04/09/2025 4.0  3.4 - 5.0 g/dL Final    Alkaline Phosphatase 04/09/2025 85  33 - 136 U/L Final    Total Protein 04/09/2025 6.6  6.4 - 8.2 g/dL Final    AST 04/09/2025 12  9 - 39 U/L Final    Bilirubin, Total 04/09/2025 0.3  0.0 - 1.2 mg/dL Final    ALT 04/09/2025 10  7 - 45 U/L Final    Bilirubin, Direct 04/09/2025 0.1  0.0 - 0.3 mg/dL Final    GGT 04/09/2025 45  5 - 55 U/L Final    LDH 04/09/2025 132  84 - 246 U/L Final    Magnesium 04/09/2025 1.99  1.60 - 2.40 mg/dL Final    Phosphorus 04/09/2025 3.8  2.5 - 4.9 mg/dL Final     Extra Tube 04/09/2025 Hold for add-ons.   Final    Extra Tube 04/09/2025 Hold for add-ons.   Final   Hospital Outpatient Visit on 04/04/2025   Component Date Value Ref Range Status    Extra Tube 04/04/2025 Hold for add-ons.   Final    Extra Tube 04/04/2025 Hold for add-ons.   Final   Hospital Outpatient Visit on 04/03/2025   Component Date Value Ref Range Status    Extra Tube 04/03/2025 Hold for add-ons.   Final    Extra Tube 04/03/2025 Hold for add-ons.   Final   Hospital Outpatient Visit on 04/02/2025   Component Date Value Ref Range Status    AV pk jose 04/02/2025 1.42  m/s Final    AV mn grad 04/02/2025 4  mmHg Final    LVOT diam 04/02/2025 2.00  cm Final    MV E/A ratio 04/02/2025 0.72   Final    LA vol index A/L 04/02/2025 27.8  ml/m2 Final    Tricuspid annular plane systolic e* 04/02/2025 2.6  cm Final    LV EF 04/02/2025 64  % Final    RV free wall pk S' 04/02/2025 11.90  cm/s Final    LVIDd 04/02/2025 4.20  cm Final    RVSP 04/02/2025 28.0  mmHg Final    Aortic Valve Area by Continuity of* 04/02/2025 1.95  cm2 Final    Aortic Valve Area by Continuity of* 04/02/2025 2.17  cm2 Final    AV pk grad 04/02/2025 8  mmHg Final    LV A4C EF 04/02/2025 63.6   Final   Hospital Outpatient Visit on 04/02/2025   Component Date Value Ref Range Status    WBC 04/02/2025 6.3  4.4 - 11.3 x10*3/uL Final    nRBC 04/02/2025 0.0  0.0 - 0.0 /100 WBCs Final    RBC 04/02/2025 3.91 (L)  4.00 - 5.20 x10*6/uL Final    Hemoglobin 04/02/2025 12.4  12.0 - 16.0 g/dL Final    Hematocrit 04/02/2025 37.6  36.0 - 46.0 % Final    MCV 04/02/2025 96  80 - 100 fL Final    MCH 04/02/2025 31.7  26.0 - 34.0 pg Final    MCHC 04/02/2025 33.0  32.0 - 36.0 g/dL Final    RDW 04/02/2025 17.2 (H)  11.5 - 14.5 % Final    Platelets 04/02/2025 356  150 - 450 x10*3/uL Final    Neutrophils % 04/02/2025 68.2  40.0 - 80.0 % Final    Immature Granulocytes %, Automated 04/02/2025 0.3  0.0 - 0.9 % Final    Lymphocytes % 04/02/2025 25.1  13.0 - 44.0 % Final     Monocytes % 04/02/2025 5.6  2.0 - 10.0 % Final    Eosinophils % 04/02/2025 0.6  0.0 - 6.0 % Final    Basophils % 04/02/2025 0.2  0.0 - 2.0 % Final    Neutrophils Absolute 04/02/2025 4.26  1.20 - 7.70 x10*3/uL Final    Immature Granulocytes Absolute, Au* 04/02/2025 0.02  0.00 - 0.70 x10*3/uL Final    Lymphocytes Absolute 04/02/2025 1.57  1.20 - 4.80 x10*3/uL Final    Monocytes Absolute 04/02/2025 0.35  0.10 - 1.00 x10*3/uL Final    Eosinophils Absolute 04/02/2025 0.04  0.00 - 0.70 x10*3/uL Final    Basophils Absolute 04/02/2025 0.01  0.00 - 0.10 x10*3/uL Final    Glucose 04/02/2025 141 (H)  74 - 99 mg/dL Final    Sodium 04/02/2025 138  136 - 145 mmol/L Final    Potassium 04/02/2025 4.1  3.5 - 5.3 mmol/L Final    Chloride 04/02/2025 99  98 - 107 mmol/L Final    Bicarbonate 04/02/2025 29  21 - 32 mmol/L Final    Anion Gap 04/02/2025 14  10 - 20 mmol/L Final    Urea Nitrogen 04/02/2025 13  6 - 23 mg/dL Final    Creatinine 04/02/2025 0.80  0.50 - 1.05 mg/dL Final    eGFR 04/02/2025 84  >60 mL/min/1.73m*2 Final    Calcium 04/02/2025 9.3  8.6 - 10.6 mg/dL Final    Albumin 04/02/2025 4.2  3.4 - 5.0 g/dL Final    Alkaline Phosphatase 04/02/2025 91  33 - 136 U/L Final    Total Protein 04/02/2025 7.0  6.4 - 8.2 g/dL Final    AST 04/02/2025 13  9 - 39 U/L Final    Bilirubin, Total 04/02/2025 0.4  0.0 - 1.2 mg/dL Final    ALT 04/02/2025 9  7 - 45 U/L Final    Bilirubin, Direct 04/02/2025 0.1  0.0 - 0.3 mg/dL Final    GGT 04/02/2025 53  5 - 55 U/L Final    LDH 04/02/2025 144  84 - 246 U/L Final    Magnesium 04/02/2025 2.07  1.60 - 2.40 mg/dL Final    Phosphorus 04/02/2025 4.3  2.5 - 4.9 mg/dL Final    Color, Urine 04/02/2025 Yellow  Light-Yellow, Yellow, Dark-Yellow Final    Appearance, Urine 04/02/2025 Clear  Clear Final    Specific Gravity, Urine 04/02/2025 1.030  1.005 - 1.035 Final    pH, Urine 04/02/2025 5.5  5.0, 5.5, 6.0, 6.5, 7.0, 7.5, 8.0 Final    Protein, Urine 04/02/2025 30 (1+) (A)  NEGATIVE, 10 (TRACE), 20 (TRACE)  mg/dL Final    Glucose, Urine 04/02/2025 Normal  Normal mg/dL Final    Blood, Urine 04/02/2025 NEGATIVE  NEGATIVE mg/dL Final    Ketones, Urine 04/02/2025 TRACE (A)  NEGATIVE mg/dL Final    Bilirubin, Urine 04/02/2025 NEGATIVE  NEGATIVE mg/dL Final    Urobilinogen, Urine 04/02/2025 3 (1+) (A)  Normal mg/dL Final    Nitrite, Urine 04/02/2025 NEGATIVE  NEGATIVE Final    Leukocyte Esterase, Urine 04/02/2025 25 [place]/uL (A)  NEGATIVE Final    WBC, Urine 04/02/2025 1-5  1-5, NONE /HPF Final    RBC, Urine 04/02/2025 NONE  NONE, 1-2, 3-5 /HPF Final    Squamous Epithelial Cells, Urine 04/02/2025 1-9 (SPARSE)  Reference range not established. /HPF Final    Mucus, Urine 04/02/2025 3+  Reference range not established. /LPF Final    Hyaline Casts, Urine 04/02/2025 2+ (A)  NONE /LPF Final    Extra Tube 04/02/2025 Hold for add-ons.   Final    Extra Tube 04/02/2025 Hold for add-ons.   Final    Extra Tube 04/02/2025 Hold for add-ons.   Final    Extra Tube 04/02/2025 Hold for add-ons.   Final    Extra Tube 04/02/2025 Hold for add-ons.   Final    Extra Tube 04/02/2025 Hold for add-ons.   Final    Extra Tube 04/02/2025 Hold for add-ons.   Final    Extra Tube 04/02/2025 Hold for add-ons.   Final    Extra Tube 04/02/2025 Hold for add-ons.   Final    Extra Tube 04/02/2025 Hold for add-ons.   Final    Extra Tube 04/02/2025 Hold for add-ons.   Final   Hospital Outpatient Visit on 03/26/2025   Component Date Value Ref Range Status    WBC 03/26/2025 4.9  4.4 - 11.3 x10*3/uL Final    nRBC 03/26/2025 0.0  0.0 - 0.0 /100 WBCs Final    RBC 03/26/2025 3.91 (L)  4.00 - 5.20 x10*6/uL Final    Hemoglobin 03/26/2025 12.4  12.0 - 16.0 g/dL Final    Hematocrit 03/26/2025 37.2  36.0 - 46.0 % Final    MCV 03/26/2025 95  80 - 100 fL Final    MCH 03/26/2025 31.7  26.0 - 34.0 pg Final    MCHC 03/26/2025 33.3  32.0 - 36.0 g/dL Final    RDW 03/26/2025 17.2 (H)  11.5 - 14.5 % Final    Platelets 03/26/2025 273  150 - 450 x10*3/uL Final    Neutrophils % 03/26/2025  53.4  40.0 - 80.0 % Final    Immature Granulocytes %, Automated 03/26/2025 0.0  0.0 - 0.9 % Final    Lymphocytes % 03/26/2025 34.9  13.0 - 44.0 % Final    Monocytes % 03/26/2025 7.8  2.0 - 10.0 % Final    Eosinophils % 03/26/2025 3.3  0.0 - 6.0 % Final    Basophils % 03/26/2025 0.6  0.0 - 2.0 % Final    Neutrophils Absolute 03/26/2025 2.60  1.20 - 7.70 x10*3/uL Final    Immature Granulocytes Absolute, Au* 03/26/2025 0.00  0.00 - 0.70 x10*3/uL Final    Lymphocytes Absolute 03/26/2025 1.70  1.20 - 4.80 x10*3/uL Final    Monocytes Absolute 03/26/2025 0.38  0.10 - 1.00 x10*3/uL Final    Eosinophils Absolute 03/26/2025 0.16  0.00 - 0.70 x10*3/uL Final    Basophils Absolute 03/26/2025 0.03  0.00 - 0.10 x10*3/uL Final    Glucose 03/26/2025 131 (H)  74 - 99 mg/dL Final    Sodium 03/26/2025 138  136 - 145 mmol/L Final    Potassium 03/26/2025 4.0  3.5 - 5.3 mmol/L Final    Chloride 03/26/2025 103  98 - 107 mmol/L Final    Bicarbonate 03/26/2025 26  21 - 32 mmol/L Final    Anion Gap 03/26/2025 13  10 - 20 mmol/L Final    Urea Nitrogen 03/26/2025 12  6 - 23 mg/dL Final    Creatinine 03/26/2025 0.73  0.50 - 1.05 mg/dL Final    eGFR 03/26/2025 >90  >60 mL/min/1.73m*2 Final    Calcium 03/26/2025 9.1  8.6 - 10.6 mg/dL Final    Albumin 03/26/2025 4.1  3.4 - 5.0 g/dL Final    Alkaline Phosphatase 03/26/2025 96  33 - 136 U/L Final    Total Protein 03/26/2025 6.8  6.4 - 8.2 g/dL Final    AST 03/26/2025 13  9 - 39 U/L Final    Bilirubin, Total 03/26/2025 0.3  0.0 - 1.2 mg/dL Final    ALT 03/26/2025 10  7 - 45 U/L Final    Bilirubin, Direct 03/26/2025 0.0  0.0 - 0.3 mg/dL Final    GGT 03/26/2025 71 (H)  5 - 55 U/L Final    LDH 03/26/2025 132  84 - 246 U/L Final    Magnesium 03/26/2025 2.23  1.60 - 2.40 mg/dL Final    Phosphorus 03/26/2025 4.3  2.5 - 4.9 mg/dL Final     CT chest abdomen pelvis w IV contrast    Result Date: 3/26/2025  Impression: Small-cell lung carcinoma restaging exam. Compared to prior 01/24/2025 CT: 1. Slightly  improved disease burden as evidenced by minimally decreased size of the left infrahilar component of the mediastinal/hilar neoplasm as well as a left paratracheal node. Hepatic and widespread sclerotic osseous metastases are similar to prior. 2. New/Increased moderate pericardial effusion which may be treatment or infection related. Echocardiogram is recommended for further evaluation.   I personally reviewed the images/study and I agree with the resident Mika Rico's findings as stated. This study was interpreted at [place], [place].   MACRO: None   Signed by: Yao Parham 3/26/2025 6:32 PM Dictation workstation:   [id number]    IR CVC port placement    Result Date: 3/19/2025  Impression: Insertion of PowerPort ClearVue Slim Implantable Port via right internal jugular vein.   Signed by: Jay Nieto 3/19/2025 10:58 AM Dictation workstation:   [id number]       Assessment/Plan     Ms. 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 Initial imaging post C2 reviewed by Dr Kohli appeared to have sub-optimal response to treatment. Unfortunately after C4 imaging confirmed she had progressive disease. In the last visit Dr Kohli discussed all treatment options which included SOC Taralatamab and also clinical trial [id number] with study drug [id number] which is an ADC targeting SEZ6 with a topoisomerase 1 inhibitor payload. Patient opted to participate on study and completed screening requirements on study. Started treatment on 02.19.2025 on study and received couple of treatment today. Tolerated treatment well with the exception of getting diagnosed with the Flu during C1. Today she is in for C4D1 and most recent imaging suggest she is benefiting from current treatment. Today she reports she feels better. She continues to follow with supportive  oncology for pain management. Given she feel clinically good we will proceed with C4 treatment per protocol. Plan discussed in detail with the patient. Patient in agreement with the plan of care and is aware to call the office and research nurse if experiencing any new symptoms. RTC per protocol.         Cancer Staging   No matching staging information was found for the patient.      Oncology History   SCLC (small cell lung carcinoma)   9/27/2024 Initial Diagnosis    SCLC (small cell lung carcinoma) (Multi)     10/4/2024 - 10/8/2024 Chemotherapy    CARBOplatin / Etoposide, 21 Day Cycles - Lung     10/4/2024 - 10/6/2024 Research Study Participant    ([facility]) [id number] - Atezolizumab + CARBOplatin / Etoposide / [id number], 21 Day Cycles  Plan Provider: JESSICA Hernandez  Treatment goal: Control  Line of treatment: First Line  Associated studies: (177Lu)[id number] with Carboplatin, Etoposide and Tislelizumab in Newly Diagnosed ES-SCLC     10/4/2024 - 1/17/2025 Chemotherapy    Durvalumab + CARBOplatin / Etoposide, 21 Day Cycles     10/28/2024 - 10/28/2024 Chemotherapy    Durvalumab + CARBOplatin / Etoposide, 21 Day Cycles     2/5/2025 - 2/5/2025 Chemotherapy    Durvalumab, 28 Day Cycles     2/19/2025 -  Research Study Participant    ([facility]) [id number] Part 1 - ABBV-706, 21 Day Cycles  Plan Provider: JESSICA Hernandez  Treatment goal: Control  Line of treatment: Second Line  Associated studies: ABBV-706 alone or in combination in subjects with advanced solid tumors

## 2025-04-23 NOTE — RESEARCH NOTES
Research Note Treatment Day    Minal Sun is here today for treatment on <insert clinical trial name>. Today is C_D_. Procedures completed per protocol. AE's and con-meds reviewed with patient. Patient is aware of treatment plan.  -no vomiting past week, nausea intermittent  -Cough yellow sputum  -    []   Received treatment as planned   OR  []    Treatment delayed; patient calendar updated as required   Treatment delayed because:    []   AE    []   Physician Discretion    []   Clinical Deterioration or Progression     []   Other    Education Documentation  No documentation found.  Education Comments  No comments found.

## 2025-04-23 NOTE — PROGRESS NOTES
SUPPORTIVE AND PALLIATIVE ONCOLOGY CONSULT - OUTPATIENT      SERVICE DATE: 4/23/2025    Medical Oncologist: Jd Kohli MD   Radiation Oncologist: No care team member to display  Primary Physician: Christopher D'Amico  [phone]    REASON FOR CONSULT/CHIEF CONSULT COMPLAINT: pain management, other symptom control  (decreased appetite, nausea, constipation), and Introduction to Supportive and Palliative Oncology Services    Subjective   HISTORY OF PRESENT ILLNESS: Minal Sun is a 59 y.o. female who presents with  history of COPD, PE, SCC of the lung, diagnosed 9/2024 (mets: mediastinum, liver, bone). On 2/5/2025 she was started on the Phase 1 study [id number] with study drug ABBV-706 (ADC targeting SEZ6 with a topoisomerase 1 inhibitor payload).     Pain Assessment:  Pain Score:  5  Location:  L rib area  Education:  review of current regimen      Symptom Assessment:  Pain:somewhat- not needing Oxycodone as often  Headache: none  Dizziness:none  Lack of energy: a little  Difficulty sleeping: somewhat- chronic difficulty falling asleep  Worrying: none  Anxiety: none  Depression: a little- well managed with Escitalopram, though notes continued frustrations with cancer experience  Pain in mouth/swallowing: none  Dry mouth: none  Taste changes: none  Shortness of breath: none  Lack of appetite: somewhat   Nausea: a little- significantly improved  Vomiting: none  Constipation: somewhat- taking Senna 2 tabs BID  Diarrhea: none  Sore muscles: none  Numbness or tingling in hands/feet/other: none  Weight loss: none  Other: none      Information obtained from: chart review and interview of patient  ______________________________________________________________________     Oncology History   SCLC (small cell lung carcinoma)   9/27/2024 Initial Diagnosis    SCLC (small cell lung carcinoma) (Multi)     10/4/2024 - 10/8/2024 Chemotherapy    CARBOplatin / Etoposide, 21 Day Cycles - Lung     10/4/2024 - 10/6/2024  Research Study Participant    ([facility]) [id number] - Atezolizumab + CARBOplatin / Etoposide / [id number], 21 Day Cycles  Plan Provider: JESSICA Hernandez  Treatment goal: Control  Line of treatment: First Line  Associated studies: (177Lu)[id number] with Carboplatin, Etoposide and Tislelizumab in Newly Diagnosed ES-SCLC     10/4/2024 - 1/17/2025 Chemotherapy    Durvalumab + CARBOplatin / Etoposide, 21 Day Cycles     10/28/2024 - 10/28/2024 Chemotherapy    Durvalumab + CARBOplatin / Etoposide, 21 Day Cycles     2/5/2025 - 2/5/2025 Chemotherapy    Durvalumab, 28 Day Cycles     2/19/2025 -  Research Study Participant    ([facility]) [id number] Part 1 - ABBV-706, 21 Day Cycles  Plan Provider: JESSICA Hernandez  Treatment goal: Control  Line of treatment: Second Line  Associated studies: ABBV-706 alone or in combination in subjects with advanced solid tumors         Past Medical History:   Diagnosis Date    Chronic obstructive pulmonary disease, unspecified     Chronic obstructive pulmonary disease    Encounter for general adult medical examination without abnormal findings 11/18/2020    Encounter for preventive health examination    Personal history of other endocrine, nutritional and metabolic disease     History of diabetes mellitus    Post-traumatic stress disorder, unspecified     PTSD (post-traumatic stress disorder)    Type 2 diabetes mellitus      Past Surgical History:   Procedure Laterality Date    BACK SURGERY  09/17/2013    Lower Back Surgery    HYSTERECTOMY  07/01/2016    Hysterectomy     No family history on file.     SOCIAL HISTORY  Support system - lives with sisterRosy   Social History:  reports that she quit smoking about 4 years ago. Her smoking use included cigarettes. She started smoking about 42 years ago. She has a 38.3 pack-year smoking history. She has never used smokeless tobacco. She reports that she does not drink alcohol and does not use drugs.  retired    Confucianism and  Importance of Jew: unknown    REVIEW OF SYSTEMS  Review of systems negative unless noted in HPI.       Objective     Palliative Performance Scale % (PPS)       Current Outpatient Medications   Medication Instructions    acetaminophen (TYLENOL) 650 mg, oral, Every 6 hours PRN    ALPRAZolam (XANAX) 1 mg, oral, 2 times daily PRN    apixaban (ELIQUIS) 5 mg, oral, 2 times daily    azelastine (Astelin) 137 mcg (0.1 %) nasal spray     carbinoxamine maleate 4 mg, 2 times daily    escitalopram (LEXAPRO) 15 mg, oral, Daily    guaiFENesin (MUCINEX) 1,200 mg, oral, 2 times daily, Do not crush, chew, or split.    hydrocodone-homatropine (Hycodan) 5-1.5 mg/5 mL syrup 5 mL, oral, Every 6 hours PRN    ipratropium-albuteroL (Duo-Neb) 0.5-2.5 mg/3 mL nebulizer solution     levothyroxine (SYNTHROID, LEVOXYL) 50 mcg, oral, Daily, Take on an empty stomach at the same time each day, either 30 to 60 minutes prior to breakfast    lidocaine-prilocaine (Emla) 2.5-2.5 % cream Apply topically a thin film of medication to mediport site 45 mts prior to being accessed and cover with band aid    magnesium hydroxide (Milk of Magnesia) 400 mg/5 mL suspension 5 mL, oral, Daily PRN, Take this when it has been 2 or more days without a bowel movement.    metFORMIN XR (GLUCOPHAGE-XR) 500 mg, oral, 2 times daily    naloxone (NARCAN) 4 mg, nasal, As needed, May repeat every 2-3 minutes if needed, alternating nostrils, until medical assistance becomes available.    OLANZapine (ZYPREXA) 5 mg, oral, Daily with evening meal, This is to help with appetite, nausea, and sleep.    omeprazole (PRILOSEC) 40 mg, oral, Daily    ondansetron (ZOFRAN) 8 mg, oral, Every 8 hours PRN    oxyCODONE (ROXICODONE) 5 mg, oral, Every 4 hours PRN, Can take 2 pill if need for severe pain (7-10)    oxyCODONE (ROXICODONE) 5-10 mg, oral, Every 4 hours PRN    ProAir HFA 90 mcg/actuation inhaler 2 puffs, Every 4 hours PRN    prochlorperazine (COMPAZINE) 10 mg, oral, Every 6 hours  "PRN    rosuvastatin (CRESTOR) 10 mg, oral, Daily    sennosides-docusate sodium (Marzean-Colace) 8.6-50 mg tablet 3 tablets, oral, 2 times daily, This is to help manage constipation.       Allergies:   Allergies   Allergen Reactions    Clindamycin Diarrhea    Erythromycin Diarrhea    Erythromycin Base Other and Nausea Only     No results found for this or any previous visit (from the past 96 hours).               PHYSICAL EXAMINATION  Vital Signs:       4/2/2025     4:34 PM 4/3/2025     1:11 PM 4/4/2025    12:51 PM 4/8/2025     2:36 PM 4/9/2025    12:12 PM 4/16/2025    12:21 PM 4/23/2025     9:07 AM   Vitals   Systolic 143 97 126 113 129 115 121   Diastolic 80 83 88 82 77 80 81   BP Location Right arm Left arm Right arm  Right arm Right arm Left arm   Heart Rate 79 109 100 90 92 89 99   Temp 36.3 °C (97.3 °F) 37.1 °C (98.8 °F) 36.7 °C (98.1 °F)  36 °C (96.8 °F) 36.8 °C (98.2 °F) 35.8 °C (96.4 °F)   Resp 18 18 20  18 16 16   Height    1.575 m (5' 2\")      Weight (lb)    182 184.08  186.51   BMI    33.29 kg/m2 33.67 kg/m2  34.11 kg/m2   BSA (m2)    1.9 m2 1.91 m2  1.92 m2   Visit Report Report    Report   Report   Report     Vital signs reviewed     Physical Exam  Constitutional:       Appearance: She is ill-appearing.   HENT:      Mouth/Throat:      Mouth: Mucous membranes are moist.   Eyes:      Extraocular Movements: Extraocular movements intact.      Pupils: Pupils are equal, round, and reactive to light.   Cardiovascular:      Rate and Rhythm: Normal rate.   Pulmonary:      Effort: Pulmonary effort is normal.   Abdominal:      Palpations: Abdomen is soft.   Musculoskeletal:         General: Normal range of motion.   Skin:     General: Skin is warm and dry.   Neurological:      Mental Status: She is alert and oriented to person, place, and time.   Psychiatric:         Mood and Affect: Mood normal.         Behavior: Behavior normal.       ASSESSMENT/PLAN    Pain  Pain is: cancer related pain  Type: visceral  Pain " control: sub-optimally controlled  Home regimen:   Oxycodone 1-2 tabs q4h as needed (taking 1 tab ~3x/day, but understands to take more for breakthrough pain)  Start Morphine ER 15mg nightly  Intolerances/previously tried: n/a  Personalized pain goal: feel comfortable throughout the day    Opioid Use  Medication Management:   - OARRS report reviewed with no aberrant behavior; consistent with  prescriptions/records and patient history  - MED ~20.  Overdose Risk Score 330.   This has been discussed with patient.   - We will continue to closely monitor the patient for signs of prescription misuse including UDS, OARRS review and subjective reports at each visit.  - Yes concurrent benzodiazepine use   - I am a provider who either is or has consulted and collaborated with a provider certified in Hospice and Palliative Medicine and have conducted a face-face visit and examination for this patient.  - Routine Urine Drug Screen pending 4/23/25  - Controlled Substance Agreement complete next visit d/t time  - Specifically discussed that controlled substance prescriptions will only be provided by our group as outlined in the completed agreement  - Prescribed naloxone pending  - Red Flags: n/a     Nausea   Intermittent nausea with vomiting related to chemotherapy, opioids, and constipation   Decreased appetite  Related to malignancy, chemotherapy, and taste changes  Nutrition consult- consider at future visit  Current regimen:    Up Olanzapine to 7.5mg daily in the evening to help with appetite, nausea, and sleep  Prochlorperazine 10mg q6h as needed    Constipation   At risk for constipation related to opioids,  currently constipated   Current regimen:   Senna to 3 tabs BID- encouraged to take consistently  Miralax 17g daily as needed  Milk of Magnesia 400mg (5ml) daily as needed when it has been 2+ days without a BM  Encouraged adequate hydration, fiber intake, and physical activity to promote bowel motility     Altered  Mood  Chronic anxiety and depression related to health concerns   controlled with home regimen  Current regimen:   Escitalopram 20mg daily  See Olanzapine note    Sleeping Difficulty:  Impaired sleep related to stress, pain  See Olanzapine note    Supportive Interventions: n/a    Introduction to Supportive and Palliative Oncology:  Spoke with patient   Introduced the role and philosophy of Supportive and Palliative oncology in the evaluation and management of symptoms during cancer treatment  Palliative care was introduced as a service for patients with serious illness to help with symptoms, assist with goals of care conversations, navigate complex decision making, improve quality of life for patients, and provide support both patients and families.  Patient seemed to appreciate the extra layer of support.    Medical Decision Making/Goals of Care/Advance Care Planning:  Patient's current clinical condition, including diagnosis, prognosis, and management plan, and goals of care were discussed.   Life limiting disease: metastatic malignancy  Family: Supportive sister  Goals: symptom control and cancer directed therapy    Advance Directives  Existence of Advance Directives:Yes, documentation or copy in medical record  Decision maker: HCPOA is pt is unsure  Code Status: Full code    Next Follow-Up Visit:  Return to clinic in 3 weeks to align with lab draws    Signature and billing  Thank you for allowing us to participate in the care of this patient. Recommendations will be communicated back to the consulting service by way of shared electronic medical record or face-to-face.    Medical complexity was moderate level due to due to complexity of problems, extensive data review, and high risk of management/treatment.  Time was spent on the following: Prep Time, Time Directly with Patient/Family/Caregiver, Documentation Time. Total time spent: 35min      DATA   Diagnostic tests and information reviewed for today's visit:   Conversation with primary team, Most recent labs and imaging results, Medications     4/23/25: changes to plan indicated in bold. Continue all other regimens as listed.    Some elements copied from Supportive Oncology note on 4/2/25, the elements have been updated and all reflect current decision making from today, 4/23/2025.      Plan of Care discussed with: Provider, RN, Patient      SIGNATURE: JESSICA Campbell    Contact information:  Supportive and Palliative Oncology  Monday-Friday 8 AM-5 PM  Phone:  [phone], press option #5, then option #1.   Or Epic Secure Chat

## 2025-04-25 DIAGNOSIS — C34.90 SMALL CELL CARCINOMA OF LUNG, UNSPECIFIED LATERALITY, UNSPECIFIED PART OF LUNG: Primary | ICD-10-CM

## 2025-04-25 LAB
1OH-MIDAZOLAM UR CFM-MCNC: <25 NG/ML
6MAM UR CFM-MCNC: <25 NG/ML
7AMINOCLONAZEPAM UR CFM-MCNC: 440 NG/ML
A-OH ALPRAZ UR CFM-MCNC: 88 NG/ML
ALPRAZ UR CFM-MCNC: <25 NG/ML
CHLORDIAZEP UR CFM-MCNC: <25 NG/ML
CLONAZEPAM UR CFM-MCNC: 27 NG/ML
CODEINE UR CFM-MCNC: <50 NG/ML
DIAZEPAM UR CFM-MCNC: <25 NG/ML
HYDROCODONE CTO UR CFM-MCNC: >2500 NG/ML
HYDROMORPHONE UR CFM-MCNC: 567 NG/ML
LORAZEPAM UR CFM-MCNC: <25 NG/ML
MIDAZOLAM UR CFM-MCNC: <25 NG/ML
MORPHINE UR CFM-MCNC: <50 NG/ML
NORDIAZEPAM UR CFM-MCNC: <25 NG/ML
NORHYDROCODONE UR CFM-MCNC: >1000 NG/ML
NOROXYCODONE UR CFM-MCNC: >1000 NG/ML
OXAZEPAM UR CFM-MCNC: <25 NG/ML
OXYCODONE UR CFM-MCNC: 223 NG/ML
OXYMORPHONE UR CFM-MCNC: 711 NG/ML
TEMAZEPAM UR CFM-MCNC: <25 NG/ML

## 2025-04-28 ENCOUNTER — APPOINTMENT (OUTPATIENT)
Dept: RADIOLOGY | Facility: CLINIC | Age: 60
End: 2025-04-28
Payer: MEDICARE

## 2025-04-30 ENCOUNTER — EDUCATION (OUTPATIENT)
Dept: HEMATOLOGY/ONCOLOGY | Facility: HOSPITAL | Age: 60
End: 2025-04-30
Payer: MEDICARE

## 2025-04-30 ENCOUNTER — HOSPITAL ENCOUNTER (OUTPATIENT)
Dept: RESEARCH | Facility: HOSPITAL | Age: 60
Discharge: HOME | End: 2025-04-30
Payer: MEDICARE

## 2025-04-30 VITALS
OXYGEN SATURATION: 95 % | RESPIRATION RATE: 18 BRPM | HEART RATE: 79 BPM | SYSTOLIC BLOOD PRESSURE: 137 MMHG | TEMPERATURE: 98.1 F | DIASTOLIC BLOOD PRESSURE: 79 MMHG

## 2025-04-30 DIAGNOSIS — C34.90 MALIGNANT NEOPLASM OF LUNG, UNSPECIFIED LATERALITY, UNSPECIFIED PART OF LUNG (MULTI): ICD-10-CM

## 2025-04-30 DIAGNOSIS — C34.90 SMALL CELL CARCINOMA OF LUNG, UNSPECIFIED LATERALITY, UNSPECIFIED PART OF LUNG: ICD-10-CM

## 2025-04-30 LAB
ALBUMIN SERPL BCP-MCNC: 4 G/DL (ref 3.4–5)
ALP SERPL-CCNC: 82 U/L (ref 33–136)
ALT SERPL W P-5'-P-CCNC: 13 U/L (ref 7–45)
ANION GAP SERPL CALC-SCNC: 14 MMOL/L (ref 10–20)
AST SERPL W P-5'-P-CCNC: 16 U/L (ref 9–39)
BASOPHILS # BLD AUTO: 0.03 X10*3/UL (ref 0–0.1)
BASOPHILS NFR BLD AUTO: 0.6 %
BILIRUB DIRECT SERPL-MCNC: 0.1 MG/DL (ref 0–0.3)
BILIRUB SERPL-MCNC: 0.4 MG/DL (ref 0–1.2)
BUN SERPL-MCNC: 13 MG/DL (ref 6–23)
CALCIUM SERPL-MCNC: 9.1 MG/DL (ref 8.6–10.6)
CHLORIDE SERPL-SCNC: 104 MMOL/L (ref 98–107)
CO2 SERPL-SCNC: 27 MMOL/L (ref 21–32)
CREAT SERPL-MCNC: 0.75 MG/DL (ref 0.5–1.05)
EGFRCR SERPLBLD CKD-EPI 2021: >90 ML/MIN/1.73M*2
EOSINOPHIL # BLD AUTO: 0.16 X10*3/UL (ref 0–0.7)
EOSINOPHIL NFR BLD AUTO: 3.3 %
ERYTHROCYTE [DISTWIDTH] IN BLOOD BY AUTOMATED COUNT: 17.8 % (ref 11.5–14.5)
GGT SERPL-CCNC: 34 U/L (ref 5–55)
GLUCOSE SERPL-MCNC: 119 MG/DL (ref 74–99)
HCT VFR BLD AUTO: 37.6 % (ref 36–46)
HGB BLD-MCNC: 12 G/DL (ref 12–16)
HOLD SPECIMEN: NORMAL
IMM GRANULOCYTES # BLD AUTO: 0.01 X10*3/UL (ref 0–0.7)
IMM GRANULOCYTES NFR BLD AUTO: 0.2 % (ref 0–0.9)
LDH SERPL L TO P-CCNC: 176 U/L (ref 84–246)
LYMPHOCYTES # BLD AUTO: 1.93 X10*3/UL (ref 1.2–4.8)
LYMPHOCYTES NFR BLD AUTO: 40 %
MAGNESIUM SERPL-MCNC: 2.08 MG/DL (ref 1.6–2.4)
MCH RBC QN AUTO: 32 PG (ref 26–34)
MCHC RBC AUTO-ENTMCNC: 31.9 G/DL (ref 32–36)
MCV RBC AUTO: 100 FL (ref 80–100)
MONOCYTES # BLD AUTO: 0.3 X10*3/UL (ref 0.1–1)
MONOCYTES NFR BLD AUTO: 6.2 %
NEUTROPHILS # BLD AUTO: 2.39 X10*3/UL (ref 1.2–7.7)
NEUTROPHILS NFR BLD AUTO: 49.7 %
NRBC BLD-RTO: 0 /100 WBCS (ref 0–0)
PHOSPHATE SERPL-MCNC: 4.9 MG/DL (ref 2.5–4.9)
PLATELET # BLD AUTO: 300 X10*3/UL (ref 150–450)
POTASSIUM SERPL-SCNC: 3.9 MMOL/L (ref 3.5–5.3)
PROT SERPL-MCNC: 6.1 G/DL (ref 6.4–8.2)
RBC # BLD AUTO: 3.75 X10*6/UL (ref 4–5.2)
SODIUM SERPL-SCNC: 141 MMOL/L (ref 136–145)
WBC # BLD AUTO: 4.8 X10*3/UL (ref 4.4–11.3)

## 2025-04-30 PROCEDURE — 82977 ASSAY OF GGT: CPT

## 2025-04-30 PROCEDURE — 83735 ASSAY OF MAGNESIUM: CPT

## 2025-04-30 PROCEDURE — 84075 ASSAY ALKALINE PHOSPHATASE: CPT

## 2025-04-30 PROCEDURE — 82248 BILIRUBIN DIRECT: CPT

## 2025-04-30 PROCEDURE — 2500000004 HC RX 250 GENERAL PHARMACY W/ HCPCS (ALT 636 FOR OP/ED): Mod: JZ | Performed by: INTERNAL MEDICINE

## 2025-04-30 PROCEDURE — 84100 ASSAY OF PHOSPHORUS: CPT

## 2025-04-30 PROCEDURE — 83615 LACTATE (LD) (LDH) ENZYME: CPT

## 2025-04-30 PROCEDURE — 36591 DRAW BLOOD OFF VENOUS DEVICE: CPT

## 2025-04-30 PROCEDURE — 85025 COMPLETE CBC W/AUTO DIFF WBC: CPT

## 2025-04-30 RX ORDER — HEPARIN SODIUM,PORCINE/PF 10 UNIT/ML
50 SYRINGE (ML) INTRAVENOUS AS NEEDED
Status: DISCONTINUED | OUTPATIENT
Start: 2025-04-30 | End: 2025-05-01 | Stop reason: HOSPADM

## 2025-04-30 RX ORDER — HEPARIN 100 UNIT/ML
500 SYRINGE INTRAVENOUS AS NEEDED
OUTPATIENT
Start: 2025-04-30

## 2025-04-30 RX ORDER — HEPARIN 100 UNIT/ML
500 SYRINGE INTRAVENOUS AS NEEDED
Status: DISCONTINUED | OUTPATIENT
Start: 2025-04-30 | End: 2025-05-01 | Stop reason: HOSPADM

## 2025-04-30 RX ORDER — HEPARIN SODIUM,PORCINE/PF 10 UNIT/ML
50 SYRINGE (ML) INTRAVENOUS AS NEEDED
OUTPATIENT
Start: 2025-04-30

## 2025-04-30 RX ADMIN — HEPARIN 500 UNITS: 100 SYRINGE at 12:50

## 2025-04-30 NOTE — RESEARCH NOTES
RSH><[id number]><C4+D8><[id number]>    DCRU NURSING VISIT NOTE  Study Name: [id number]- Part 1_Escalation- Monotherapy  IRB#: [id number]  [id number]  Protocol Version Dated:  6/15/2023  PI: Jd Kohli MD.      Time point: Cycle 4 and beyond- Day 8  CYCLE 4    Encounter Date: 04/30/2025  Encounter Time: 12:00 PM EDT  Encounter Department: [place] [id number] RESEARCH        Study Regimen and Dosing   For Oncology study, Refer [place] Treatment Plan for orders   Part 1_Escalation_Monotherapy - patients with advanced recurrent or refractory solid tumors (small cell lung cancer, neuroendocrine tumors or brain tumors) will receive gradually increasing doses of ABBV-706 to determine MTD (Maximum Tolerated Dose).  Cycle = 21-days.  ABBV-706 administered IV Day 1 of each cycle.     Dietary Guidelines   Regular diet         Admission and Prior to Starting Study Activities   Notify  when patient arrives to unit.  Patient review/update DCRU/[facility] intake form.  Obtain vital signs after sitting at least 3 minutes. Record on flow sheet & in EMR.  Perform venipuncture for sample collection procedures. Do Not draw research samples from mediport/central line if used for infusion  Physical Exam including pulmonary exam & neuro assessment Days 8 & 15.         Study Specific Instructions and Documentation   1-Safety Labs   2-Discharge          Subjective   Minal Sun is a 59 y.o. female and is here for a Research clinical visit.    Visit Provider: Jazzy Guerrero RN     Allergies: Allergies[1]    Objective     Vital Signs:    Vitals:    04/30/25 1215   BP: 137/79   Pulse: 79   Resp: 18   Temp: 36.7 °C (98.1 °F)   TempSrc: Temporal   SpO2: 95%       Physical Exam     ASSESSMENT and PLAN:  Problem List Items Addressed This Visit       SCLC (small cell lung carcinoma)    Relevant Medications    heparin flush 10 unit/mL syringe 50 Units    heparin flush 100 unit/mL syringe  500 Units    alteplase (Cathflo Activase) injection 2 mg    Other Relevant Orders    CBC and Auto Differential    Comprehensive metabolic panel    Bilirubin, Direct    Gamma GT    Lactate dehydrogenase    Magnesium    Phosphorus    Nursing Communication - Vascular Access (Completed)    Venous Access, CVAD    CENTRAL VENOUS LINE DRESSING CHANGE - ADULT    Insert peripheral IV    Convert IV to saline lock     Other Visit Diagnoses         Malignant neoplasm of lung, unspecified laterality, unspecified part of lung (Multi)        Relevant Orders    Research collection: 10mL Lavender K2 EDTA - Clinical Collect    Research collection: 10mL Lavender K2 EDTA - Clinical Collect    Research collection: 10mL Lavender K2 EDTA - Clinical Collect    Research collection: 10mL Lavender K2 EDTA - Clinical Collect             Medications as of the completion of today's visit:  Current Medications[2]    Administrations This Visit       heparin flush 100 unit/mL syringe 500 Units       Admin Date  04/30/2025 Action  Given Dose  500 Units Route  intra-catheter Documented By  Maria Teresa Orlando RN                    Orders placed during today's visit:  Orders Placed This Encounter   Procedures    Research collection: 10mL Lavender K2 EDTA - Clinical Collect     Please DEANNA Torres for specimen pickup     Standing Status:   Standing     Number of Occurrences:   1    Research collection: 10mL Lavender K2 EDTA - Clinical Collect     Please DEANNA Torres for specimen pickup     Standing Status:   Standing     Number of Occurrences:   1    Research collection: 10mL Lavender K2 EDTA - Clinical Collect     Please DEANNA Torres for specimen pickup     Standing Status:   Standing     Number of Occurrences:   1    Research collection: 10mL Lavender K2 EDTA - Clinical Collect     Please DEANNA Torres for specimen pickup     Standing Status:   Standing     Number of Occurrences:   1    CBC and Auto Differential     Standing  Status:   Standing     Number of Occurrences:   1     Release result to MyChart:   Immediate [1]    Comprehensive metabolic panel     Standing Status:   Standing     Number of Occurrences:   1     Release result to MyChart:   Immediate [1]    Bilirubin, Direct     Standing Status:   Standing     Number of Occurrences:   1     Release result to MyChart:   Immediate [1]    Gamma GT     Standing Status:   Standing     Number of Occurrences:   1     Release result to MyChart:   Immediate [1]    Lactate dehydrogenase     Standing Status:   Standing     Number of Occurrences:   1     Release result to [facility]:   Immediate [1]    Magnesium     Standing Status:   Standing     Number of Occurrences:   1     Release result to [facility]:   Immediate [1]    Phosphorus     Standing Status:   Standing     Number of Occurrences:   1     Release result to [facility]:   Immediate [1]    Nursing Communication - Vascular Access     Refer to the vascular access device resource page and the following policies for additional information on line insertion, maintenance and removal.    CP-148 - Central Vascular Access Device Insertion, Maintenance, and Removal, Adult  NP-28 - Midline Cathter Maintenance & Removal, Adult  NP-34 - High-flow Catheters, (e.g. Hemodialysis, Apheresis, and Continuous Renal Replacement Therapy [CRRT]), Care and Utilization, Adult  NP-39 - Peripheral Intravenous Catheter (PIV) Insertion, Maintenance, Blood Collection & Removal - Adult     Standing Status:   Standing     Number of Occurrences:   1    CENTRAL VENOUS LINE DRESSING CHANGE - ADULT     Standing Status:   Standing     Number of Occurrences:   1    Venous Access, CVAD     Standing Status:   Standing     Number of Occurrences:   1    Insert peripheral IV     Obtain venous access for treatment via PIV if no CVAD access or if unable to obtain blood return from CVAD.     Standing Status:   Standing     Number of Occurrences:   1    Convert IV to saline lock      Only if venous access is required over consecutive days. Peripheral catheter can remain in place until completion of last consecutive day infusion, unless IV-related complications are encountered.     Standing Status:   Standing     Number of Occurrences:   1        [id number] - ABBV-706 alone or in combination in subjects with advanced solid tumors    Patient has no adverse events documented in the Research Adverse Events activity.      Day 8 Safety Labs (done @ 1246)   For Oncology study, Refer [place] Treatment Plan for orders     Discharge Instructions   Discharge patient to home after study requirements completed or PK sample obtained.  Remind patient to return: on Day 15 for physical exam, vital signs & safety labs.  Discharge time: 1258     Maria Teresa Orlando RN  04/30/25                      [1]   Allergies  Allergen Reactions    Clindamycin Diarrhea    Erythromycin Diarrhea    Erythromycin Base Other and Nausea Only   [2]   Current Outpatient Medications   Medication Sig Dispense Refill    acetaminophen (TylenoL) 325 mg tablet Take 2 tablets (650 mg) by mouth every 6 hours if needed for mild pain (1 - 3) or moderate pain (4 - 6). 30 tablet 0    ALPRAZolam (Xanax) 0.5 mg tablet Take 2 tablets (1 mg) by mouth 2 times a day as needed for anxiety for up to 15 days. 30 tablet 0    apixaban (Eliquis) 5 mg tablet Take 1 tablet (5 mg) by mouth 2 times a day. 60 tablet 11    azelastine (Astelin) 137 mcg (0.1 %) nasal spray       carbinoxamine maleate 4 mg tablet Take 4 mg by mouth 2 times a day.      escitalopram (Lexapro) 10 mg tablet Take 1.5 tablets (15 mg) by mouth once daily. 150 tablet 1    guaiFENesin (Mucinex) 600 mg 12 hr tablet Take 2 tablets (1,200 mg) by mouth 2 times a day. Do not crush, chew, or split. 120 tablet 11    hydrocodone-homatropine (Hycodan) 5-1.5 mg/5 mL syrup Take 5 mL by mouth every 6 hours if needed for cough for up to 23 days. 473 mL 0    ipratropium-albuteroL (Duo-Neb) 0.5-2.5 mg/3 mL nebulizer  solution       levothyroxine (Synthroid, Levoxyl) 50 mcg tablet Take 1 tablet (50 mcg) by mouth early in the morning.. Take on an empty stomach at the same time each day, either 30 to 60 minutes prior to breakfast 30 tablet 11    lidocaine-prilocaine (Emla) 2.5-2.5 % cream Apply topically a thin film of medication to [facility] site 45 mts prior to being accessed and cover with band aid 30 g 0    magnesium hydroxide (Milk of Magnesia) 400 mg/5 mL suspension Take 5 mL by mouth once daily as needed for constipation for up to 10 days. Take this when it has been 2 or more days without a bowel movement. 360 mL 0    metFORMIN  mg 24 hr tablet Take 1 tablet (500 mg) by mouth 2 times a day. 200 tablet 1    morphine CR (MS Contin) 15 mg 12 hr tablet Take 1 tablet (15 mg) by mouth once daily at bedtime. Do not crush, chew, or split. This is your LONG-ACTING pain medicine. 30 tablet 0    naloxone (Narcan) 4 mg/0.1 mL nasal spray Administer 1 spray (4 mg) into affected nostril(s) if needed for opioid reversal. May repeat every 2-3 minutes if needed, alternating nostrils, until medical assistance becomes available. 2 each 0    OLANZapine (ZyPREXA) 7.5 mg tablet Take 1 tablet (7.5 mg) by mouth once daily in the evening. Take with meals. This is to help with appetite, nausea, and sleep. This is a dose increase. 30 tablet 2    omeprazole (PriLOSEC) 40 mg DR capsule Take 1 capsule (40 mg) by mouth once daily. 100 capsule 1    ondansetron (Zofran) 8 mg tablet Take 1 tablet (8 mg) by mouth every 8 hours if needed for nausea or vomiting. 30 tablet 5    oxyCODONE (Roxicodone) 5 mg immediate release tablet Take 1 tablet (5 mg) by mouth every 4 hours if needed for moderate pain (4 - 6) for up to 60 doses. Can take 2 pill if need for severe pain (7-10) 20 tablet 0    oxyCODONE (Roxicodone) 5 mg immediate release tablet Take 1-2 tablets (5-10 mg) by mouth every 4 hours if needed for severe pain (7 - 10). 360 tablet 0    ProAir HFA 90  mcg/actuation inhaler Inhale 2 puffs every 4 hours if needed.      prochlorperazine (Compazine) 10 mg tablet Take 1 tablet (10 mg) by mouth every 6 hours if needed for nausea or vomiting. 30 tablet 5    rosuvastatin (Crestor) 10 mg tablet Take 1 tablet (10 mg) by mouth once daily. 100 tablet 1    sennosides-docusate sodium (Marzena-Colace) 8.6-50 mg tablet Take 3 tablets by mouth 2 times a day. This is to help manage constipation. 180 tablet 11     Current Facility-Administered Medications   Medication Dose Route Frequency Provider Last Rate Last Admin    alteplase (Cathflo Activase) injection 2 mg  2 mg intra-catheter PRN Jd Kohli MD        heparin flush 10 unit/mL syringe 50 Units  50 Units intra-catheter PRN Jd Kohli MD        heparin flush 100 unit/mL syringe 500 Units  500 Units intra-catheter PRN Jd Kohli MD   500 Units at 04/30/25 1250

## 2025-04-30 NOTE — PROGRESS NOTES
Research Note Non-Treatment Day    Minal Sun is here today. Patient is on [id number]. Today is C4D8. Procedures completed per protocol. AE's and con-meds reviewed with patient. Patient states that she feels well today and has been enjoying being outside. She still has nausea from time to time but has not vomited. Her last BM was yesterday and has been having them regularly. Pain is manageable and she did start taking her MS Contin that was prescribed by the supportive oncology team.  Patient is aware of treatment plan and will return 5/5 for MRI of liver and 5/7 for CT scans and C4D15.       Education Documentation  No documentation found.  Education Comments  No comments found.

## 2025-05-04 ENCOUNTER — PATIENT MESSAGE (OUTPATIENT)
Dept: PALLIATIVE MEDICINE | Facility: HOSPITAL | Age: 60
End: 2025-05-04
Payer: MEDICARE

## 2025-05-04 DIAGNOSIS — C34.90 SMALL CELL CARCINOMA OF LUNG, UNSPECIFIED LATERALITY, UNSPECIFIED PART OF LUNG: ICD-10-CM

## 2025-05-05 ENCOUNTER — HOSPITAL ENCOUNTER (OUTPATIENT)
Dept: RADIOLOGY | Facility: CLINIC | Age: 60
Discharge: HOME | End: 2025-05-05
Payer: MEDICARE

## 2025-05-05 DIAGNOSIS — C34.90 SMALL CELL CARCINOMA OF LUNG, UNSPECIFIED LATERALITY, UNSPECIFIED PART OF LUNG: ICD-10-CM

## 2025-05-05 PROCEDURE — 74183 MRI ABD W/O CNTR FLWD CNTR: CPT

## 2025-05-05 PROCEDURE — 2550000001 HC RX 255 CONTRASTS

## 2025-05-05 PROCEDURE — A9575 INJ GADOTERATE MEGLUMI 0.1ML: HCPCS

## 2025-05-05 RX ORDER — GADOTERATE MEGLUMINE 376.9 MG/ML
17 INJECTION INTRAVENOUS
Status: COMPLETED | OUTPATIENT
Start: 2025-05-05 | End: 2025-05-05

## 2025-05-05 RX ADMIN — GADOTERATE MEGLUMINE 17 ML: 376.9 INJECTION INTRAVENOUS at 15:32

## 2025-05-05 NOTE — PATIENT COMMUNICATION
"Patient reported not tolerating morphine ER. She has new \"pins and needles\" feeling in her hands. She also has had trouble sleeping and feels groggy during the day.     Discussed with Rosa M Dean who would like to switch patient to oxycontin, messaged patient to discuss further.  "

## 2025-05-06 RX ORDER — HYDROCODONE BITARTRATE AND HOMATROPINE METHYLBROMIDE ORAL SOLUTION 5; 1.5 MG/5ML; MG/5ML
5 LIQUID ORAL EVERY 6 HOURS PRN
Qty: 473 ML | Refills: 0 | Status: SHIPPED | OUTPATIENT
Start: 2025-05-06 | End: 2025-05-29

## 2025-05-06 NOTE — PATIENT COMMUNICATION
Minal would like to wait and not start oxycontin as her pain has improved. Req refill of hycodan. OARRS report reviewed and reflects  prescription history, no aberrancy noted. Per OARRS, patient last filled hycodan  23 day supply on 4/8. Patient with follow up visit scheduled with Rosa M on 5/21. Patient updated that medication will be sent to  Pharmacy. Refill request routed to provider.

## 2025-05-07 ENCOUNTER — HOSPITAL ENCOUNTER (OUTPATIENT)
Dept: RESEARCH | Facility: HOSPITAL | Age: 60
Discharge: HOME | End: 2025-05-07
Payer: MEDICARE

## 2025-05-07 ENCOUNTER — APPOINTMENT (OUTPATIENT)
Dept: RADIOLOGY | Facility: HOSPITAL | Age: 60
End: 2025-05-07
Payer: MEDICARE

## 2025-05-07 ENCOUNTER — EDUCATION (OUTPATIENT)
Dept: HEMATOLOGY/ONCOLOGY | Facility: HOSPITAL | Age: 60
End: 2025-05-07
Payer: MEDICARE

## 2025-05-07 ENCOUNTER — HOSPITAL ENCOUNTER (OUTPATIENT)
Dept: RADIOLOGY | Facility: HOSPITAL | Age: 60
Discharge: HOME | End: 2025-05-07
Payer: MEDICARE

## 2025-05-07 VITALS
DIASTOLIC BLOOD PRESSURE: 85 MMHG | HEART RATE: 85 BPM | OXYGEN SATURATION: 96 % | SYSTOLIC BLOOD PRESSURE: 131 MMHG | RESPIRATION RATE: 16 BRPM | TEMPERATURE: 97.9 F

## 2025-05-07 DIAGNOSIS — C34.90 SMALL CELL CARCINOMA OF LUNG, UNSPECIFIED LATERALITY, UNSPECIFIED PART OF LUNG: ICD-10-CM

## 2025-05-07 LAB
ALBUMIN SERPL BCP-MCNC: 3.9 G/DL (ref 3.4–5)
ALP SERPL-CCNC: 84 U/L (ref 33–136)
ALT SERPL W P-5'-P-CCNC: 11 U/L (ref 7–45)
ANION GAP SERPL CALC-SCNC: 12 MMOL/L (ref 10–20)
AST SERPL W P-5'-P-CCNC: 15 U/L (ref 9–39)
BASOPHILS # BLD AUTO: 0.03 X10*3/UL (ref 0–0.1)
BASOPHILS NFR BLD AUTO: 0.6 %
BILIRUB DIRECT SERPL-MCNC: 0.1 MG/DL (ref 0–0.3)
BILIRUB SERPL-MCNC: 0.3 MG/DL (ref 0–1.2)
BUN SERPL-MCNC: 11 MG/DL (ref 6–23)
CALCIUM SERPL-MCNC: 8.5 MG/DL (ref 8.6–10.6)
CHLORIDE SERPL-SCNC: 100 MMOL/L (ref 98–107)
CO2 SERPL-SCNC: 25 MMOL/L (ref 21–32)
CREAT SERPL-MCNC: 0.7 MG/DL (ref 0.5–1.05)
EGFRCR SERPLBLD CKD-EPI 2021: >90 ML/MIN/1.73M*2
EOSINOPHIL # BLD AUTO: 0.13 X10*3/UL (ref 0–0.7)
EOSINOPHIL NFR BLD AUTO: 2.6 %
ERYTHROCYTE [DISTWIDTH] IN BLOOD BY AUTOMATED COUNT: 18 % (ref 11.5–14.5)
GGT SERPL-CCNC: 34 U/L (ref 5–55)
GLUCOSE SERPL-MCNC: 102 MG/DL (ref 74–99)
HCT VFR BLD AUTO: 35.7 % (ref 36–46)
HGB BLD-MCNC: 11.7 G/DL (ref 12–16)
IMM GRANULOCYTES # BLD AUTO: 0.01 X10*3/UL (ref 0–0.7)
IMM GRANULOCYTES NFR BLD AUTO: 0.2 % (ref 0–0.9)
LDH SERPL L TO P-CCNC: 173 U/L (ref 84–246)
LYMPHOCYTES # BLD AUTO: 1.31 X10*3/UL (ref 1.2–4.8)
LYMPHOCYTES NFR BLD AUTO: 26.1 %
MAGNESIUM SERPL-MCNC: 2.07 MG/DL (ref 1.6–2.4)
MCH RBC QN AUTO: 32 PG (ref 26–34)
MCHC RBC AUTO-ENTMCNC: 32.8 G/DL (ref 32–36)
MCV RBC AUTO: 98 FL (ref 80–100)
MONOCYTES # BLD AUTO: 0.28 X10*3/UL (ref 0.1–1)
MONOCYTES NFR BLD AUTO: 5.6 %
NEUTROPHILS # BLD AUTO: 3.25 X10*3/UL (ref 1.2–7.7)
NEUTROPHILS NFR BLD AUTO: 64.9 %
NRBC BLD-RTO: 0 /100 WBCS (ref 0–0)
PHOSPHATE SERPL-MCNC: 4.1 MG/DL (ref 2.5–4.9)
PLATELET # BLD AUTO: 273 X10*3/UL (ref 150–450)
POTASSIUM SERPL-SCNC: 4.4 MMOL/L (ref 3.5–5.3)
PROT SERPL-MCNC: 6.2 G/DL (ref 6.4–8.2)
RBC # BLD AUTO: 3.66 X10*6/UL (ref 4–5.2)
SODIUM SERPL-SCNC: 133 MMOL/L (ref 136–145)
WBC # BLD AUTO: 5 X10*3/UL (ref 4.4–11.3)

## 2025-05-07 PROCEDURE — 80053 COMPREHEN METABOLIC PANEL: CPT

## 2025-05-07 PROCEDURE — 83735 ASSAY OF MAGNESIUM: CPT

## 2025-05-07 PROCEDURE — 36415 COLL VENOUS BLD VENIPUNCTURE: CPT

## 2025-05-07 PROCEDURE — 71260 CT THORAX DX C+: CPT

## 2025-05-07 PROCEDURE — 2550000001 HC RX 255 CONTRASTS

## 2025-05-07 PROCEDURE — 82977 ASSAY OF GGT: CPT

## 2025-05-07 PROCEDURE — 72193 CT PELVIS W/DYE: CPT

## 2025-05-07 PROCEDURE — 85025 COMPLETE CBC W/AUTO DIFF WBC: CPT

## 2025-05-07 PROCEDURE — 83615 LACTATE (LD) (LDH) ENZYME: CPT

## 2025-05-07 PROCEDURE — 82248 BILIRUBIN DIRECT: CPT

## 2025-05-07 PROCEDURE — 84100 ASSAY OF PHOSPHORUS: CPT

## 2025-05-07 RX ORDER — HEPARIN SODIUM,PORCINE/PF 10 UNIT/ML
50 SYRINGE (ML) INTRAVENOUS AS NEEDED
OUTPATIENT
Start: 2025-05-07

## 2025-05-07 RX ORDER — HEPARIN 100 UNIT/ML
500 SYRINGE INTRAVENOUS AS NEEDED
OUTPATIENT
Start: 2025-05-07

## 2025-05-07 RX ADMIN — IOHEXOL 120 ML: 350 INJECTION, SOLUTION INTRAVENOUS at 12:10

## 2025-05-07 NOTE — RESEARCH NOTES
RSH><[id number]><C4+D15><[id number]>    DCRU NURSING VISIT NOTE  Study Name: [id number]- Part 1_Escalation- Monotherapy  IRB#: [id number]  [id number]  Protocol Version Dated:  6/15/2023  PI: Jd Kohli MD.      Time point: Cycle 4 and beyond- Day 15  CYCLE 4    Encounter Date: 05/07/2025  Encounter Time: 12:00 PM EDT  Encounter Department: [place] [address] RESEARCH        Study Regimen and Dosing   For Oncology study, Refer [place] Treatment Plan for orders   Part 1_Escalation_Monotherapy - patients with advanced recurrent or refractory solid tumors (small cell lung cancer, neuroendocrine tumors or brain tumors) will receive gradually increasing doses of ABBV-706 to determine MTD (Maximum Tolerated Dose).  Cycle = 21-days.  ABBV-706 administered IV Day 1 of each cycle.     Dietary Guidelines   Regular diet         Admission and Prior to Starting Study Activities   Notify  when patient arrives to unit.  Patient review/update DCRU/[place] intake form.  Obtain vital signs after sitting at least 3 minutes. Record on flow sheet & in EMR.  Perform venipuncture for sample collection procedures. Do Not draw research samples from mediport/central line if used for infusion  Physical Exam including pulmonary exam & neuro assessment Days 8 & 15.         Study Specific Instructions and Documentation   1-Safety Labs   2-Discharge           Subjective   Minal Sun is a 59 y.o. female and is here for a Research clinical visit.    Visit Provider: Jazzy Guerrero RN     Allergies: Allergies[1]    Objective     Vital Signs:    Vitals:    05/07/25 1227   BP: 131/85   Pulse: 85   Resp: 16   Temp: 36.6 °C (97.9 °F)   TempSrc: Temporal   SpO2: 96%       Physical Exam     ASSESSMENT and PLAN:  Problem List Items Addressed This Visit          Hematology and Neoplasia    SCLC (small cell lung carcinoma)    Relevant Orders    CBC and Auto Differential    Comprehensive metabolic  panel    Bilirubin, Direct    Gamma GT    Lactate dehydrogenase    Magnesium    Phosphorus        Medications as of the completion of today's visit:  Current Medications[2]        Orders placed during today's visit:  Orders Placed This Encounter   Procedures    CBC and Auto Differential     Standing Status:   Standing     Number of Occurrences:   1     Release result to [facility]:   Immediate [1]    Comprehensive metabolic panel     Standing Status:   Standing     Number of Occurrences:   1     [facility]   [facility] [1]    Bilirubin, Direct     Standing Status:   Standing     Number of Occurrences:   1     [facility]   Immediate [1]    Gamma GT     Standing Status:   Standing     Number of Occurrences:   1     [facility]   Immediate [1]    Lactate dehydrogenase     Standing Status:   Standing     Number of Occurrences:   1     [facility]   Immediate [1]    Magnesium     Standing Status:   Standing     Number of Occurrences:   1     [facility]   Immediate [1]    Phosphorus     Standing Status:   Standing     Number of Occurrences:   1     [facility]   Immediate [1]        [id number] - ABBV-706 alone or in combination in subjects with advanced solid tumors    Patient has no adverse events documented in the Research Adverse Events activity.        Day 15 Safety Labs   For Oncology study, Refer [place] Treatment Plan for orders    COMPLETED AT 1234         Discharge Instructions   Discharge patient to home after study requirements completed or PK sample obtained.  Discharge time: 1250     Kierra Hector RN  05/07/25                     [1]   Allergies  Allergen Reactions    Clindamycin Diarrhea    Erythromycin Diarrhea    Erythromycin Base Other and Nausea Only   [2]   Current Outpatient Medications   Medication Sig Dispense Refill    acetaminophen (TylenoL) 325 mg tablet Take 2 tablets (650 mg) by mouth every 6 hours if  needed for mild pain (1 - 3) or moderate pain (4 - 6). 30 tablet 0    ALPRAZolam (Xanax) 0.5 mg tablet Take 2 tablets (1 mg) by mouth 2 times a day as needed for anxiety for up to 15 days. 30 tablet 0    apixaban (Eliquis) 5 mg tablet Take 1 tablet (5 mg) by mouth 2 times a day. 60 tablet 11    azelastine (Astelin) 137 mcg (0.1 %) nasal spray       carbinoxamine maleate 4 mg tablet Take 4 mg by mouth 2 times a day.      escitalopram (Lexapro) 10 mg tablet Take 1.5 tablets (15 mg) by mouth once daily. 150 tablet 1    guaiFENesin (Mucinex) 600 mg 12 hr tablet Take 2 tablets (1,200 mg) by mouth 2 times a day. Do not crush, chew, or split. 120 tablet 11    hydrocodone-homatropine (Hycodan) 5-1.5 mg/5 mL syrup Take 5 mL by mouth every 6 hours if needed for cough for up to 23 days. 473 mL 0    ipratropium-albuteroL (Duo-Neb) 0.5-2.5 mg/3 mL nebulizer solution       levothyroxine (Synthroid, Levoxyl) 50 mcg tablet Take 1 tablet (50 mcg) by mouth early in the morning.. Take on an empty stomach at the same time each day, either 30 to 60 minutes prior to breakfast 30 tablet 11    lidocaine-prilocaine (Emla) 2.5-2.5 % cream Apply topically a thin film of medication to [facility] site 45 mts prior to being accessed and cover with band aid 30 g 0    magnesium hydroxide (Milk of Magnesia) 400 mg/5 mL suspension Take 5 mL by mouth once daily as needed for constipation for up to 10 days. Take this when it has been 2 or more days without a bowel movement. 360 mL 0    metFORMIN  mg 24 hr tablet Take 1 tablet (500 mg) by mouth 2 times a day. 200 tablet 1    morphine CR (MS Contin) 15 mg 12 hr tablet Take 1 tablet (15 mg) by mouth once daily at bedtime. Do not crush, chew, or split. This is your LONG-ACTING pain medicine. 30 tablet 0    naloxone (Narcan) 4 mg/0.1 mL nasal spray Administer 1 spray (4 mg) into affected nostril(s) if needed for opioid reversal. May repeat every 2-3 minutes if needed, alternating nostrils, until  medical assistance becomes available. 2 each 0    OLANZapine (ZyPREXA) 7.5 mg tablet Take 1 tablet (7.5 mg) by mouth once daily in the evening. Take with meals. This is to help with appetite, nausea, and sleep. This is a dose increase. 30 tablet 2    omeprazole (PriLOSEC) 40 mg DR capsule Take 1 capsule (40 mg) by mouth once daily. 100 capsule 1    ondansetron (Zofran) 8 mg tablet Take 1 tablet (8 mg) by mouth every 8 hours if needed for nausea or vomiting. 30 tablet 5    oxyCODONE (Roxicodone) 5 mg immediate release tablet Take 1 tablet (5 mg) by mouth every 4 hours if needed for moderate pain (4 - 6) for up to 60 doses. Can take 2 pill if need for severe pain (7-10) 20 tablet 0    oxyCODONE (Roxicodone) 5 mg immediate release tablet Take 1-2 tablets (5-10 mg) by mouth every 4 hours if needed for severe pain (7 - 10). 360 tablet 0    ProAir HFA 90 mcg/actuation inhaler Inhale 2 puffs every 4 hours if needed.      prochlorperazine (Compazine) 10 mg tablet Take 1 tablet (10 mg) by mouth every 6 hours if needed for nausea or vomiting. 30 tablet 5    rosuvastatin (Crestor) 10 mg tablet Take 1 tablet (10 mg) by mouth once daily. 100 tablet 1    sennosides-docusate sodium (Marzena-Colace) 8.6-50 mg tablet Take 3 tablets by mouth 2 times a day. This is to help manage constipation. 180 tablet 11     No current facility-administered medications for this encounter.

## 2025-05-07 NOTE — ADDENDUM NOTE
Encounter addended by: Kierra Hector RN on: 5/7/2025 2:56 PM   Actions taken: Flowsheet accepted, LDA properties accepted

## 2025-05-07 NOTE — PROGRESS NOTES
Research Note Non-Treatment Day    Minal Sun is here today. Patient is on [id number]. Today is C4D15. Procedures completed per protocol. AE's and con-meds reviewed with patient. Patient states that her typical pain is very reduced rating it a 2-3/10 this past week. She has greatly cut back on the amount of pain medications that she is taking. Denies N/V/D/C/fatigue at this time. Patient is aware of treatment plan and will return 5/14 for C5D1.        Education Documentation  Treatment Plan and Schedule, taught by Jazzy Guerrero RN at 5/7/2025 12:25 PM.  Learner: Patient  Readiness: Acceptance  Method: Explanation  Response: Verbalizes Understanding    General Medication Information, taught by Jazzy Guerrero RN at 5/7/2025 12:25 PM.  Learner: Patient  Readiness: Acceptance  Method: Explanation  Response: Verbalizes Understanding    Comprehensive Metabolic Panel (CMP), taught by Jazzy Guerrero RN at 5/7/2025 12:25 PM.  Learner: Patient  Readiness: Acceptance  Method: Explanation  Response: Verbalizes Understanding    Complete Blood Count with Differential (CBC w/ Diff), taught by Jazzy Guerrero RN at 5/7/2025 12:25 PM.  Learner: Patient  Readiness: Acceptance  Method: Explanation  Response: Verbalizes Understanding    Education Comments  No comments found.

## 2025-05-09 DIAGNOSIS — C34.90 SMALL CELL CARCINOMA OF LUNG, UNSPECIFIED LATERALITY, UNSPECIFIED PART OF LUNG: ICD-10-CM

## 2025-05-14 ENCOUNTER — OFFICE VISIT (OUTPATIENT)
Dept: HEMATOLOGY/ONCOLOGY | Facility: HOSPITAL | Age: 60
End: 2025-05-14
Payer: MEDICARE

## 2025-05-14 ENCOUNTER — EDUCATION (OUTPATIENT)
Dept: HEMATOLOGY/ONCOLOGY | Facility: HOSPITAL | Age: 60
End: 2025-05-14

## 2025-05-14 ENCOUNTER — HOSPITAL ENCOUNTER (OUTPATIENT)
Dept: RESEARCH | Facility: HOSPITAL | Age: 60
Discharge: HOME | End: 2025-05-14
Payer: MEDICARE

## 2025-05-14 VITALS
TEMPERATURE: 97.2 F | RESPIRATION RATE: 18 BRPM | SYSTOLIC BLOOD PRESSURE: 115 MMHG | OXYGEN SATURATION: 96 % | WEIGHT: 187.39 LBS | HEART RATE: 78 BPM | BODY MASS INDEX: 34.27 KG/M2 | DIASTOLIC BLOOD PRESSURE: 79 MMHG

## 2025-05-14 DIAGNOSIS — C34.90 SMALL CELL CARCINOMA OF LUNG, UNSPECIFIED LATERALITY, UNSPECIFIED PART OF LUNG: ICD-10-CM

## 2025-05-14 DIAGNOSIS — C34.90 SMALL CELL CARCINOMA OF LUNG, UNSPECIFIED LATERALITY, UNSPECIFIED PART OF LUNG: Primary | ICD-10-CM

## 2025-05-14 LAB
ALBUMIN SERPL BCP-MCNC: 3.9 G/DL (ref 3.4–5)
ALP SERPL-CCNC: 85 U/L (ref 33–136)
ALT SERPL W P-5'-P-CCNC: 10 U/L (ref 7–45)
ANION GAP SERPL CALC-SCNC: 13 MMOL/L (ref 10–20)
APPEARANCE UR: CLEAR
AST SERPL W P-5'-P-CCNC: 13 U/L (ref 9–39)
BASOPHILS # BLD AUTO: 0.03 X10*3/UL (ref 0–0.1)
BASOPHILS NFR BLD AUTO: 0.6 %
BILIRUB DIRECT SERPL-MCNC: 0.1 MG/DL (ref 0–0.3)
BILIRUB SERPL-MCNC: 0.3 MG/DL (ref 0–1.2)
BILIRUB UR STRIP.AUTO-MCNC: NEGATIVE MG/DL
BUN SERPL-MCNC: 12 MG/DL (ref 6–23)
CALCIUM SERPL-MCNC: 8.9 MG/DL (ref 8.6–10.6)
CHLORIDE SERPL-SCNC: 105 MMOL/L (ref 98–107)
CO2 SERPL-SCNC: 26 MMOL/L (ref 21–32)
COLOR UR: NORMAL
CREAT SERPL-MCNC: 0.7 MG/DL (ref 0.5–1.05)
EGFRCR SERPLBLD CKD-EPI 2021: >90 ML/MIN/1.73M*2
EOSINOPHIL # BLD AUTO: 0.22 X10*3/UL (ref 0–0.7)
EOSINOPHIL NFR BLD AUTO: 4.2 %
ERYTHROCYTE [DISTWIDTH] IN BLOOD BY AUTOMATED COUNT: 18.3 % (ref 11.5–14.5)
GGT SERPL-CCNC: 32 U/L (ref 5–55)
GLUCOSE SERPL-MCNC: 109 MG/DL (ref 74–99)
GLUCOSE UR STRIP.AUTO-MCNC: NORMAL MG/DL
HCT VFR BLD AUTO: 36.6 % (ref 36–46)
HGB BLD-MCNC: 11.8 G/DL (ref 12–16)
HOLD SPECIMEN: NORMAL
IMM GRANULOCYTES # BLD AUTO: 0.01 X10*3/UL (ref 0–0.7)
IMM GRANULOCYTES NFR BLD AUTO: 0.2 % (ref 0–0.9)
KETONES UR STRIP.AUTO-MCNC: NEGATIVE MG/DL
LDH SERPL L TO P-CCNC: 148 U/L (ref 84–246)
LEUKOCYTE ESTERASE UR QL STRIP.AUTO: NEGATIVE
LYMPHOCYTES # BLD AUTO: 2.06 X10*3/UL (ref 1.2–4.8)
LYMPHOCYTES NFR BLD AUTO: 38.9 %
MAGNESIUM SERPL-MCNC: 2.18 MG/DL (ref 1.6–2.4)
MCH RBC QN AUTO: 31.6 PG (ref 26–34)
MCHC RBC AUTO-ENTMCNC: 32.2 G/DL (ref 32–36)
MCV RBC AUTO: 98 FL (ref 80–100)
MONOCYTES # BLD AUTO: 0.4 X10*3/UL (ref 0.1–1)
MONOCYTES NFR BLD AUTO: 7.6 %
NEUTROPHILS # BLD AUTO: 2.57 X10*3/UL (ref 1.2–7.7)
NEUTROPHILS NFR BLD AUTO: 48.5 %
NITRITE UR QL STRIP.AUTO: NEGATIVE
NRBC BLD-RTO: 0 /100 WBCS (ref 0–0)
PH UR STRIP.AUTO: 5.5 [PH]
PHOSPHATE SERPL-MCNC: 4.2 MG/DL (ref 2.5–4.9)
PLATELET # BLD AUTO: 270 X10*3/UL (ref 150–450)
POTASSIUM SERPL-SCNC: 3.8 MMOL/L (ref 3.5–5.3)
PROT SERPL-MCNC: 6.2 G/DL (ref 6.4–8.2)
PROT UR STRIP.AUTO-MCNC: NEGATIVE MG/DL
RBC # BLD AUTO: 3.73 X10*6/UL (ref 4–5.2)
RBC # UR STRIP.AUTO: NEGATIVE MG/DL
SODIUM SERPL-SCNC: 140 MMOL/L (ref 136–145)
SP GR UR STRIP.AUTO: 1.01
UROBILINOGEN UR STRIP.AUTO-MCNC: NORMAL MG/DL
WBC # BLD AUTO: 5.3 X10*3/UL (ref 4.4–11.3)

## 2025-05-14 PROCEDURE — 83735 ASSAY OF MAGNESIUM: CPT

## 2025-05-14 PROCEDURE — 82977 ASSAY OF GGT: CPT

## 2025-05-14 PROCEDURE — 84100 ASSAY OF PHOSPHORUS: CPT

## 2025-05-14 PROCEDURE — 2500000004 HC RX 250 GENERAL PHARMACY W/ HCPCS (ALT 636 FOR OP/ED): Mod: JZ | Performed by: INTERNAL MEDICINE

## 2025-05-14 PROCEDURE — 36415 COLL VENOUS BLD VENIPUNCTURE: CPT

## 2025-05-14 PROCEDURE — 80053 COMPREHEN METABOLIC PANEL: CPT

## 2025-05-14 PROCEDURE — 96413 CHEMO IV INFUSION 1 HR: CPT

## 2025-05-14 PROCEDURE — 82248 BILIRUBIN DIRECT: CPT

## 2025-05-14 PROCEDURE — 99215 OFFICE O/P EST HI 40 MIN: CPT | Mod: 25 | Performed by: INTERNAL MEDICINE

## 2025-05-14 PROCEDURE — 83615 LACTATE (LD) (LDH) ENZYME: CPT

## 2025-05-14 PROCEDURE — 2560000001 HC RX 256 EXPERIMENTAL DRUGS: Performed by: INTERNAL MEDICINE

## 2025-05-14 PROCEDURE — 85025 COMPLETE CBC W/AUTO DIFF WBC: CPT

## 2025-05-14 PROCEDURE — 81003 URINALYSIS AUTO W/O SCOPE: CPT

## 2025-05-14 RX ORDER — DIPHENHYDRAMINE HYDROCHLORIDE 50 MG/ML
50 INJECTION, SOLUTION INTRAMUSCULAR; INTRAVENOUS AS NEEDED
Status: DISCONTINUED | OUTPATIENT
Start: 2025-05-14 | End: 2025-05-15 | Stop reason: HOSPADM

## 2025-05-14 RX ORDER — EPINEPHRINE 1 MG/ML
0.3 INJECTION, SOLUTION, CONCENTRATE INTRAVENOUS EVERY 5 MIN PRN
Status: DISCONTINUED | OUTPATIENT
Start: 2025-05-14 | End: 2025-05-15 | Stop reason: HOSPADM

## 2025-05-14 RX ORDER — PROCHLORPERAZINE MALEATE 10 MG
10 TABLET ORAL EVERY 6 HOURS PRN
Status: CANCELLED | OUTPATIENT
Start: 2025-05-14

## 2025-05-14 RX ORDER — PROCHLORPERAZINE EDISYLATE 5 MG/ML
10 INJECTION INTRAMUSCULAR; INTRAVENOUS EVERY 6 HOURS PRN
Status: CANCELLED | OUTPATIENT
Start: 2025-05-14

## 2025-05-14 RX ORDER — HEPARIN SODIUM,PORCINE/PF 10 UNIT/ML
50 SYRINGE (ML) INTRAVENOUS AS NEEDED
OUTPATIENT
Start: 2025-05-14

## 2025-05-14 RX ORDER — ALBUTEROL SULFATE 0.83 MG/ML
3 SOLUTION RESPIRATORY (INHALATION) AS NEEDED
Status: CANCELLED | OUTPATIENT
Start: 2025-05-14

## 2025-05-14 RX ORDER — HEPARIN 100 UNIT/ML
500 SYRINGE INTRAVENOUS AS NEEDED
Status: DISCONTINUED | OUTPATIENT
Start: 2025-05-14 | End: 2025-05-15 | Stop reason: HOSPADM

## 2025-05-14 RX ORDER — PROCHLORPERAZINE MALEATE 10 MG
10 TABLET ORAL EVERY 6 HOURS PRN
Status: DISCONTINUED | OUTPATIENT
Start: 2025-05-14 | End: 2025-05-15 | Stop reason: HOSPADM

## 2025-05-14 RX ORDER — FAMOTIDINE 10 MG/ML
20 INJECTION, SOLUTION INTRAVENOUS ONCE AS NEEDED
Status: DISCONTINUED | OUTPATIENT
Start: 2025-05-14 | End: 2025-05-15 | Stop reason: HOSPADM

## 2025-05-14 RX ORDER — PROCHLORPERAZINE EDISYLATE 5 MG/ML
10 INJECTION INTRAMUSCULAR; INTRAVENOUS EVERY 6 HOURS PRN
Status: DISCONTINUED | OUTPATIENT
Start: 2025-05-14 | End: 2025-05-15 | Stop reason: HOSPADM

## 2025-05-14 RX ORDER — DIPHENHYDRAMINE HYDROCHLORIDE 50 MG/ML
50 INJECTION, SOLUTION INTRAMUSCULAR; INTRAVENOUS AS NEEDED
Status: CANCELLED | OUTPATIENT
Start: 2025-05-14

## 2025-05-14 RX ORDER — ALBUTEROL SULFATE 0.83 MG/ML
3 SOLUTION RESPIRATORY (INHALATION) AS NEEDED
Status: DISCONTINUED | OUTPATIENT
Start: 2025-05-14 | End: 2025-05-15 | Stop reason: HOSPADM

## 2025-05-14 RX ORDER — HEPARIN 100 UNIT/ML
500 SYRINGE INTRAVENOUS AS NEEDED
OUTPATIENT
Start: 2025-05-14

## 2025-05-14 RX ORDER — EPINEPHRINE 0.3 MG/.3ML
0.3 INJECTION SUBCUTANEOUS EVERY 5 MIN PRN
Status: CANCELLED | OUTPATIENT
Start: 2025-05-14

## 2025-05-14 RX ORDER — FAMOTIDINE 10 MG/ML
20 INJECTION, SOLUTION INTRAVENOUS ONCE AS NEEDED
Status: CANCELLED | OUTPATIENT
Start: 2025-05-14

## 2025-05-14 RX ADMIN — Medication 110.6 MG: at 12:50

## 2025-05-14 RX ADMIN — HEPARIN 500 UNITS: 100 SYRINGE at 14:00

## 2025-05-14 ASSESSMENT — ENCOUNTER SYMPTOMS
COUGH: 1
FATIGUE: 0
NECK PAIN: 0
JOINT SWELLING: 0
NAUSEA: 0
VERTIGO: 0
ARTHRALGIAS: 0
ANOREXIA: 0
CHILLS: 0
SWOLLEN GLANDS: 0
SORE THROAT: 0
MYALGIAS: 0
DIAPHORESIS: 0
VOMITING: 0
WEAKNESS: 0
ABDOMINAL PAIN: 0
CHANGE IN BOWEL HABIT: 0
FEVER: 0
HEADACHES: 0
VISUAL CHANGE: 0
NUMBNESS: 0

## 2025-05-14 ASSESSMENT — PAIN SCALES - GENERAL: PAINLEVEL_OUTOF10: 0-NO PAIN

## 2025-05-14 NOTE — PROGRESS NOTES
Patient ID: Minal Sun is a 59 y.o. female.    DIAGNOSIS     Small cell carcinoma of the lung.  Date of diagnosis is September 27, 2024 from a bronchoscopic biopsy of a subcarinal lymph node.  Immunohistochemistry positive for TTF-1, INSM1, synaptophysin and chromogranin, negative for p40, RB immunostain shows loss of nuclear expression.        STAGING     Clinical T4, N2, M1 C, stage IVc, extensive stage disease        CURRENT SITES OF DISEASE      Left lung, mediastinum, left hilum of the lung, liver, intra-abdominal lymph nodes in the portacaval and periportal region, bone metastases        MOLECULAR GENOMICS     Test Name: Seculert xF+ (XF.V3) dated October 2024     Molecular Findings:  * Gene: PIK3CA, Variant: Missense variant (exon 1) - GOF, Potentially Actionable, p.R88Q (Allele Frequency - 0.3%)  * Gene: TP53, Variant: Missense variant - LOF, Biologically Relevant, p.R249G (Allele Frequency - 70.1%)  * Gene: RB1, Variant: Splice region variant - LOF, Biologically Relevant, c.540-1G>T, Splice Site (Allele Frequency - 56.8%)        Test Name: Seculert xT (XT.V4)  Laboratory Name: [facility]  Specimen Source: Lymph node, level 7  Collection Date: 27-Sep-2024     Molecular Findings:  * Gene: TP53, Variant: Missense variant - LOF, Biologically Relevant, p.R249G (Allele Frequency - 96.2%)  * Gene: RB1, Variant: Splice region variant - LOF, Biologically Relevant, c.540-1G>T, Splice Site (Allele Frequency - 96%)  * Gene: KMT2D, Variant: Stop gain - LOF, Biologically Relevant, p.*, Nonsense (Allele Frequency - 41.9%)  * Biomarker: Microsatellite Instability, Status: stable  * Biomarker: Tumor Mutation Cuney (2.6 Muts/Mb, Percentile: 33)        SERUM TUMOR MARKERS     Baseline LDH within normal limits  C1D1 on Study LDH was 274. Trending down at C2D1         PRIOR THERAPY     Combination chemotherapy and immunotherapy.  Initially enrolled on a clinical trial of Lutathera and underwent octreotide scan  which was positive.  However she decided to come off study and did not receive Lutathera with her chemo.  Chemotherapy started October 6, 2024.  Immunotherapy completed added with cycle #2 11.06.2024.  Minor response observed after 2 cycles of treatment. PD observed after C4.        CURRENT THERAPY     2.19.2025: Started on Phase 1 study [id number] with study drug ABBV-706 which is an ADC targeting SEZ6 with a topoisomerase 1 inhibitor payload.  Documented response based on imaging of the May 7, 2025     CURRENT ONCOLOGICAL PROBLEMS     Cough and shortness of breath significantly improved with systemic treatment.  Pulmonary Embolism  Immunotherapy induced hypothyroidism  Neoplasm non cardiac chest/back pain, substantially improved May 2025        HISTORY OF PRESENT ILLNESS     This is a 59-year-old patient.  She states that she was feeling sick toward the end of spring of this year with some sinus problems.  Was seen by her allergist and was treated with antibiotics.  She was also seen at 1 point by primary care and steroids and antibiotics and inhalers were given.  She did not have any resolution and then ultimately developed a little bit of the hemoptysis which led to a chest x-ray showing an abnormality and finally a CT scan of the chest.The CT scan of the chest was done as a lung cancer screening low-dose CT and completed on September 20, 2024.  This demonstrated a extensive infiltrative mediastinal and hilar mass.  There was narrowing of the left mainstem bronchus and lower lobe bronchus secondary to extrinsic compression.  The predominant growth was within the left hilum and subcarinal region.  She underwent a bronchoscopy dated September 27, 2024 showing splaying of the main alanna.  There was mild erosion of a lymph node into the left mainstem bronchus.  There was erythema and mild erosions of an endobronchial tumor along the medial border.  Moderate to severe stenosis of the left lower lobe bronchus to 75%.         PAST MEDICAL HISTORY     Lumbar surgery about 30 years ago   hysterectomy in 2000  Diabetes  COPD  Right eye exophthalmus  herpes zoster  7.  Hemangioma of the right lobe of the liver, confirmed by imaging, MRI of the liver, May 2025       SOCIAL HISTORY     Patient lives with her sister.  She lives in [facility].  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  She started smoking at the age of 14 and continues to smoke at the age of 59.  Has smoked for 45 years on average 1 pack/day.        CURRENT MEDS     See medication list, meds reviewed, includes metformin, rosuvastatin, been done so Nied inhalers, Wellbutrin escitalopram, trazodone        ALLERGIES     Patient denies any drug allergies        FAMILY HISTORY      Mother had bladder cancer and possibly breast cancer    Subjective    Cancer  Associated symptoms include coughing. Pertinent negatives include no abdominal pain, anorexia, arthralgias, change in bowel habit, chest pain, chills, congestion, diaphoresis, fatigue, fever, headaches, joint swelling, myalgias, nausea, neck pain, numbness, rash, sore throat, swollen glands, urinary symptoms, vertigo, visual change, vomiting or weakness.     Patient states that she is feeling substantially better compared to 2 months ago when she started this new anticancer drug.  Her chest pain has dramatically improved, energy level is up, her appetite is better, her cough is improved but has a residual cough related to ongoing smoking.    Objective    BSA: There is no height or weight on file to calculate BSA.  There were no vitals taken for this visit.     Physical Exam  Constitutional:       General: She is not in acute distress.     Appearance: Normal appearance. She is not ill-appearing, toxic-appearing or diaphoretic.   HENT:      Mouth/Throat:      Pharynx: No oropharyngeal exudate  or posterior oropharyngeal erythema.   Eyes:      General: No scleral icterus.     Conjunctiva/sclera: Conjunctivae normal.   Cardiovascular:      Rate and Rhythm: Normal rate and regular rhythm.      Pulses: Normal pulses.      Heart sounds: Normal heart sounds. No murmur heard.     No friction rub. No gallop.   Pulmonary:      Effort: Pulmonary effort is normal. No respiratory distress.      Breath sounds: No stridor. Rhonchi present. No wheezing or rales.   Chest:      Chest wall: No tenderness.   Abdominal:      General: Abdomen is flat. Bowel sounds are normal. There is no distension.      Palpations: Abdomen is soft. There is no mass.      Tenderness: There is no abdominal tenderness. There is no guarding or rebound.   Musculoskeletal:      Cervical back: No tenderness.      Right lower leg: No edema.      Left lower leg: No edema.   Lymphadenopathy:      Cervical: No cervical adenopathy.   Skin:     General: Skin is warm.      Coloration: Skin is not jaundiced.   Neurological:      General: No focal deficit present.      Mental Status: She is oriented to person, place, and time.   Psychiatric:         Mood and Affect: Mood normal.         Behavior: Behavior normal.         Performance Status:  Asymptomatic    CT CHEST W IV CONTRAST; 5/7/2025   IMPRESSION:  1. Improving disease burden in regards to the mediastinal/left hilar  soft tissue mass as detailed above.  2. Few stable small bilateral noncalcified pulmonary nodules. No new  pulmonary nodules or masses..  3. Stable mediastinal/hilar lymphadenopathy without evidence of new  pathologically enlarged lymph nodes.  4. New fracture deformity along the lateral aspect of the 8th rib as  detailed above, not visualized on prior CT from 03/26/2025.  Differential for this finding includes a subacute fracture  versus  pathologic fracture due to underlying osseous metastatic disease.  Correlation with any acute traumatic injury to this area could prove  clinically  useful.  5. Ill-defined hypodense hepatic lesions compatible with patient's  known history of hepatic metastatic disease involvement, better  characterized on same day MRI of the liver from 05/05/2025.       Latest Reference Range & Units 05/14/25 09:20   GLUCOSE 74 - 99 mg/dL 109 (H)   SODIUM 136 - 145 mmol/L 140   POTASSIUM 3.5 - 5.3 mmol/L 3.8   CHLORIDE 98 - 107 mmol/L 105   Bicarbonate 21 - 32 mmol/L 26   Anion Gap 10 - 20 mmol/L 13   Blood Urea Nitrogen 6 - 23 mg/dL 12   Creatinine 0.50 - 1.05 mg/dL 0.70   EGFR >60 mL/min/1.73m*2 >90   Calcium 8.6 - 10.6 mg/dL 8.9   PHOSPHORUS 2.5 - 4.9 mg/dL 4.2   Albumin 3.4 - 5.0 g/dL 3.9   Alkaline Phosphatase 33 - 136 U/L 85   ALT 7 - 45 U/L 10   AST 9 - 39 U/L 13   Bilirubin Total 0.0 - 1.2 mg/dL 0.3   Bilirubin, Direct 0.0 - 0.3 mg/dL 0.1   GGT 5 - 55 U/L 32   Total Protein 6.4 - 8.2 g/dL 6.2 (L)   MAGNESIUM 1.60 - 2.40 mg/dL 2.18   LDH 84 - 246 U/L 148   WBC 4.4 - 11.3 x10*3/uL 5.3   nRBC 0.0 - 0.0 /100 WBCs 0.0   RBC 4.00 - 5.20 x10*6/uL 3.73 (L)   HEMOGLOBIN 12.0 - 16.0 g/dL 11.8 (L)   HEMATOCRIT 36.0 - 46.0 % 36.6   MCV 80 - 100 fL 98   MCH 26.0 - 34.0 pg 31.6   MCHC 32.0 - 36.0 g/dL 32.2   RED CELL DISTRIBUTION WIDTH 11.5 - 14.5 % 18.3 (H)   Platelets 150 - 450 x10*3/uL 270   Neutrophils % 40.0 - 80.0 % 48.5   Immature Granulocytes %, Automated 0.0 - 0.9 % 0.2   Lymphocytes % 13.0 - 44.0 % 38.9   Monocytes % 2.0 - 10.0 % 7.6   Eosinophils % 0.0 - 6.0 % 4.2   Basophils % 0.0 - 2.0 % 0.6   Neutrophils Absolute 1.20 - 7.70 x10*3/uL 2.57   Immature Granulocytes Absolute, Automated 0.00 - 0.70 x10*3/uL 0.01   Lymphocytes Absolute 1.20 - 4.80 x10*3/uL 2.06   Monocytes Absolute 0.10 - 1.00 x10*3/uL 0.40   Eosinophils Absolute 0.00 - 0.70 x10*3/uL 0.22   Basophils Absolute 0.00 - 0.10 x10*3/uL 0.03   Color, Urine Light-Yellow, Yellow, Dark-Yellow  Light-Yellow   Appearance, Urine Clear  Clear   Specific Gravity, Urine 1.005 - 1.035  1.015   pH, Urine 5.0, 5.5, 6.0, 6.5,  7.0, 7.5, 8.0  5.5   Protein, Urine NEGATIVE, 10 (TRACE), 20 (TRACE) mg/dL NEGATIVE   Glucose, Urine Normal mg/dL Normal   Blood, Urine NEGATIVE mg/dL NEGATIVE   Ketones, Urine NEGATIVE mg/dL NEGATIVE   Bilirubin, Urine NEGATIVE mg/dL NEGATIVE   Urobilinogen, Urine Normal mg/dL Normal   Nitrite, Urine NEGATIVE  NEGATIVE   Leukocyte Esterase, Urine NEGATIVE  NEGATIVE   (H): Data is abnormally high  (L): Data is abnormally low      Assessment/Plan     Patient with the relapse small cell carcinoma of the lung on an antibody drug conjugate as part of the clinical trial.  She is s/p 2 cycles and indeed not only clinically she is significantly improved but imaging shows improvement.  In addition a lesion in her liver that was not changing in size has been determined to be a hemangioma.  Other sites in her liver were cancerous and have responded to treatment.    Cancer Staging   No matching staging information was found for the patient.      Oncology History   SCLC (small cell lung carcinoma)   9/27/2024 Initial Diagnosis    SCLC (small cell lung carcinoma) (Multi)     10/4/2024 - 10/8/2024 Chemotherapy    CARBOplatin / Etoposide, 21 Day Cycles - Lung     10/4/2024 - 10/6/2024 Research Study Participant    ([facility]) [id number] - Atezolizumab + CARBOplatin / Etoposide / [id number], 21 Day Cycles  Plan Provider: JESSICA Hernandez  Treatment goal: Control  Line of treatment: First Line  Associated studies: ([id number])[id number] with Carboplatin, Etoposide and Tislelizumab in Newly Diagnosed ES-SCLC     10/4/2024 - 1/17/2025 Chemotherapy    Durvalumab + CARBOplatin / Etoposide, 21 Day Cycles     10/28/2024 - 10/28/2024 Chemotherapy    Durvalumab + CARBOplatin / Etoposide, 21 Day Cycles     2/5/2025 - 2/5/2025 Chemotherapy    Durvalumab, 28 Day Cycles     2/19/2025 -  Research Study Participant    ([facility]) [id number] Part 1 - ABBV-706, 21 Day Cycles  Plan Provider: JESSICA Hernandez  Treatment goal: Control  Line of  treatment: Second Line  Associated studies: ABBV-706 alone or in combination in subjects with advanced solid tumors                   Jd Kohli MD

## 2025-05-14 NOTE — RESEARCH NOTES
RSH><[id number]><C4+D1><PART1/ESCMONO>    DCRU NURSING VISIT NOTE  Study Name: [id number]- Part 1_Escalation- Monotherapy  IRB#: [id number]  [id number]  Protocol Version Dated:  6/15/2023  PI: Jd Kohli MD.      Time point: Cycle 4 and Beyond - Day 1  CYCLE 5    Encounter Date: 05/14/2025  Encounter Time:  9:00 AM EDT  Encounter Department: [place] [id number] RESEARCH        Study Regimen and Dosing   For Oncology study, Refer [facility] Treatment Plan for orders   Part 1_Escalation_Monotherapy - patients with advanced recurrent or refractory solid tumors (small cell lung cancer, neuroendocrine tumors or brain tumors) will receive gradually increasing doses of ABBV-706 to determine MTD (Maximum Tolerated Dose).  Cycle = 21-days.  ABBV-706 administered IV Day 1 of each cycle.     Dietary Guidelines   Regular diet       Admission and Prior to Starting Study Activities   Notify  when patient arrives to unit.  Complete DCRU/[facility] intake form in EMR.  Confirm DCRU Standing Orders (provided by Study Team/) are signed & available on chart (Expires after 1 year).  Obtain weight in kilograms - with shoes off & heavy items removed.  Obtain vital signs after sitting at least 3 minutes. Record in EMR.  Insert one peripheral IV line for sample collection procedures (flush line with 5 - 10 mL normal saline following each blood draw). Access mediport (if available) otherwise insert second peripheral line in opposite arm for Investigative drug administration (if peripheral line, flush line with 5 - 10 mL normal saline before & after infusion)  Do Not draw research samples from the same line the investigational drug is infused through.  Physical Exam including pulmonary exam & neuro assessment.       Study Specific Instructions and Documentation   1- Safety Labs  2- Vital Signs  3-Research Correlatives  4-Study Drug  5-Vital Signs  6-Discharge     PRE-DOSE  Safety Labs   For Oncology study, Refer [place] Treatment Plan for orders       See above at   Criteria to Treat   DCRU RN reviewed and meets eligibility to proceed with treatment plan   Time team notified: [id number]   DCRU RN notifies study team to review eligibility and approval before dosing procedures  Time team approves: [id number]      Subjective   Minal Sun is a 59 y.o. female and is here for a Research clinical visit.    Visit Provider: Jazzy Guerrero RN     Allergies: Allergies[1]    Objective     Vital Signs:    Vitals:    05/14/25 0906 05/14/25 1234 05/14/25 1320 05/14/25 1350   BP: (!) 138/95 127/87 129/85 115/79   Pulse: 99 82 85 78   Resp: 18 18 16 18   Temp: 35.9 °C (96.6 °F) 36.5 °C (97.7 °F) 36.3 °C (97.3 °F) 36.2 °C (97.2 °F)   TempSrc: Temporal Temporal Temporal Temporal   SpO2: 96% 97% 96% 96%   Weight: 85 kg (187 lb 6.3 oz)      PainSc: 0-No pain          Physical Exam     ASSESSMENT and PLAN:  Problem List Items Addressed This Visit       SCLC (small cell lung carcinoma)    Relevant Medications    heparin flush 100 unit/mL syringe 500 Units    prochlorperazine (Compazine) tablet 10 mg    prochlorperazine (Compazine) injection 10 mg    Study [id number] ABBV-706 110.6 mg in sodium chloride 0.9% 100 mL IV (Completed)    sodium chloride 0.9 % bolus 500 mL    dextrose 5 % in water (D5W) bolus 500 mL    diphenhydrAMINE (BENADryl) injection 50 mg    methylPREDNISolone sod succinate (SOLU-Medrol) 40 mg/mL injection 40 mg    famotidine PF (Pepcid) injection 20 mg    EPINEPHrine HCl (PF) (Adrenalin) injection 0.3 mg    albuterol 2.5 mg /3 mL (0.083 %) nebulizer solution 3 mL    Other Relevant Orders    CBC and Auto Differential (Completed)    Comprehensive metabolic panel (Completed)    Bilirubin, Direct (Completed)    Gamma GT (Completed)    Lactate dehydrogenase (Completed)    Magnesium (Completed)    Phosphorus (Completed)    Urinalysis with Reflex Microscopic (Completed)    Venous Access, CVAD    Adult  diet Regular    Research Communication (Completed)    Treatment Conditions (Completed)    Provider Communication - [id number] (Completed)    Nursing Communication - Hypersensitivity Management, Moderate (Completed)    Nursing Communication - Hypersensitivity Management, Severe (Completed)    Nursing Communication - Respiratory Management (Completed)    Pulse oximetry, continuous    Research collection: 5mL Gold SST - Research Collect Biomarker Serum; Pre-Dose; Within 2 hours; Ambient; 5x; Allow to clot 30 minutes (Completed)    Research collection: 10mL RareCyte AccuCyte - [place]erach Collect CTC (SCLC Only); Pre-Dose; Within 2 hours; 8-10x (Completed)    Research collection: 10mL Streck - Research Collect ctDNA Plasma/WB; Pre-Dose; Within 2 hours; 8-10x (Completed)    Research collection: 10mL Streck - Research Collect ctDNA Plasma/WB; Pre-Dose; Within 2 hours; 8-10x (Completed)        Medications as of the completion of today's visit:  Current Medications[2]    Administrations This Visit       heparin flush 100 unit/mL syringe 500 Units       Admin Date  05/14/2025 Action  Given Dose  500 Units Route  intra-catheter Documented By  Elba Srivastava RN              Study [id number] ABBV-706 110.6 mg in sodium chloride 0.9% 100 mL IV       Admin Date  05/14/2025 Action  New Bag Dose  110.6 mg Route  intravenous Documented By  Elba Srivastava RN                    Orders placed during today's visit:  Orders Placed This Encounter   Procedures    CBC and Auto Differential     Standing Status:   Standing     Number of Occurrences:   1     Release result to MyChart:   Immediate [1]    Comprehensive metabolic panel     Standing Status:   Standing     Number of Occurrences:   1     Release result to MyChart:   Immediate [1]    Bilirubin, Direct     Standing Status:   Standing     Number of Occurrences:   1     [facility]   Immediate [1]    Gamma GT     Standing Status:   Standing     Number of Occurrences:   1     Release  result to MyChart:   Immediate [1]    Lactate dehydrogenase     Standing Status:   Standing     Number of Occurrences:   1     Release result to MyChart:   Immediate [1]    Magnesium     Standing Status:   Standing     Number of Occurrences:   1     Release result to MyChart:   Immediate [1]    Phosphorus     Standing Status:   Standing     Number of Occurrences:   1     Release result to MyChart:   Immediate [1]    Urinalysis with Reflex Microscopic     Standing Status:   Standing     Number of Occurrences:   1     Release result to MyChart:   Immediate [1]    Research collection: 5mL Gold SST - Research Collect Biomarker Serum; Pre-Dose; Within 2 hours; Ambient; 5x; Allow to clot 30 minutes     Standing Status:   Standing     Number of Occurrences:   1     Test:   Biomarker Serum     Timepoint:   Pre-Dose     Pre-Dose:   Within 2 hours     Temperature:   Ambient     Invert:   5x     Handling:   Allow to clot 30 minutes    Research collection: 10mL [place] AccuCyte - Reserach Collect CTC (SCLC Only); Pre-Dose; Within 2 hours; 8-10x     Standing Status:   Standing     Number of Occurrences:   1     Test:   CTC (SCLC Only)     Timepoint:   Pre-Dose     Pre-Dose:   Within 2 hours     Invert:   8-10x     Optional?:   No    Research collection: 10mL Streck - Research Collect ctDNA Plasma/WB; Pre-Dose; Within 2 hours; 8-10x     Standing Status:   Standing     Number of Occurrences:   1     Test:   ctDNA Plasma/WB     Timepoint:   Pre-Dose     Pre-Dose:   Within 2 hours     Invert:   8-10x     Optional?:   No    Research collection: 10mL Streck - Research Collect ctDNA Plasma/WB; Pre-Dose; Within 2 hours; 8-10x     Standing Status:   Standing     Number of Occurrences:   1     Test:   ctDNA Plasma/WB     Timepoint:   Pre-Dose     Pre-Dose:   Within 2 hours     Invert:   8-10x     Optional?:   No    Adult diet Regular     Standing Status:   Standing     Number of Occurrences:   1     Diet type:   Regular    Research  Communication     Cohort: 1A  Dose Level: ABBV - 706 1.3 mg/kg     Standing Status:   Standing     Number of Occurrences:   1    Treatment Conditions     Hold treatment and notify provider if:   ANC LESS THAN 1.5 x 10*3/uL   Platelets LESS THAN 75 x 10*3/uL   AST/ALT GREATER THAN 5 x ULN   Total Bilirubin GREATER THAN 1.5 x ULN   Peripheral Neuropathy GREATER THAN OR EQUAL TO Grade 2   Pneumonitis GREATER THAN OR EQUAL TO Grade 1   Dermatologic Toxicity GREATER THAN OR EQUAL TO Grade 2    Adverse Event GREATER THAN OR EQUAL TO Grade 3     Standing Status:   Standing     Number of Occurrences:   1    Provider Communication - [id number]      Dose Level 1             ABBV-076 1.3 mg/kg   Dose Level 2             ABBV-076 2.5 mg/kg   Dose Level 3             ABBV-076 3.5 mg/kg   Dose Level 4             ABBV-076 5 mg/kg   Dose Level 5             ABBV-076 6.4 mg/kg   Dose Level 6             ABBV-076 8 mg/kg   Dose Level 7             ABBV-076 10 mg/kg     Standing Status:   Standing     Number of Occurrences:   1    Nursing Communication - Hypersensitivity Management, Moderate     Flushing, rash, pruritus, dyspnea, chest discomfort, back pain, angioedema, SBP LESS THAN 90 mmHg, and/or change in mental status above or below patient's baseline. In the event of moderate or severe hypersensitivity reaction to any medication:   Stop infusion.  Assess vital signs, pulse oximetry, nursing assessment, and patient complaints.  Administer medications per Hypersensitivity Reaction Medication Protocol.   Notify physician.     Standing Status:   Standing     Number of Occurrences:   1    Nursing Communication - Hypersensitivity Management, Severe     Worsening of moderate reaction symptoms, SBP LESS THAN 80 mmHg, and/or respiratory distress (respirations GREATER THAN 40 breaths per minute, wheezing, life-threatening symptoms). In the event of moderate or severe hypersensitivity reaction to any medication:   Stop infusion.  Assess  vital signs, pulse oximetry, nursing assessment, and patient complaints.  Administer medications per Hypersensitivity Reaction Medication Protocol.   Notify physician.     Standing Status:   Standing     Number of Occurrences:   1    Nursing Communication - Respiratory Management     Oxygen via nasal cannula at 4 L per minute for respiratory rate GREATER THAN OR EQUAL TO to 28 breaths/minute and/or wheezing. Pulse Oximetry continuous monitoring.     Standing Status:   Standing     Number of Occurrences:   1    Pulse oximetry, continuous     Continuous monitoring to maintain SPO2 GREATER THAN 90%     Standing Status:   Standing     Number of Occurrences:   1    Venous Access, CVAD     Standing Status:   Standing     Number of Occurrences:   1        [id number] - ABBV-706 alone or in combination in subjects with advanced solid tumors    Patient has no adverse events documented in the Research Adverse Events activity.      Safety Parameters and Special Instructions   ABBV-706  ABBV-706 is an antibody-drug conjugate (ADC) with an anti-seizure-related antibody (SEZ6) & topoisomerase inhibitor  Potential Side Effects/Adverse Events: GI effects (diarrhea, nausea, vomiting, stool abnormalities), peripheral neuropathy, myelosuppression, rash, infusion-related reaction.  Administer infusion over 60 mins (+/- 10 mins).  Observation post-dose 1 hour for Cycle 1 Day 1. If no reaction, no observation for subsequent visits.     PRE-DOSE Vital Signs     Temp, Heart Rate, Respiration, Blood Pressure: rest sitting at least 3 minutes prior to obtaining     Access Type: 232g butterfly Location: right hand   For Oncology study, Refer [facility] Treatment Plan for orders  AFTER ECGs  Deliver to DCRU/[facility] lab for processing    Time point Specimen Test Cycle Volume Tube Handling Draw Time    Pre-dose (within 2 hours) (draw in order)      ABBV-706 ADA/NADA 4, 6, 9, 12 4 mL Red Top Serum Invert 5 - 8x; Ambient Allow to clot 30 -60 mins na     ABBV-706 PK ADC/Total AB 4, 6, 9, 12 2 mL Red Top Serum Invert 5 - 8x; Ambient  Allow to clot 30 -60 mins na    Biomarker Serum ODD 5 mL Gold Top SST Invert 5x; Upright. Ambient Allow to clot 30 mins 1240    Free TOP1 Inhibitor 4, 6, 9,12 3 mL EDTA Lavender Invert 8 - 10x. On ICE na    CTC (SCLC Only)-If ordered see EPCI All 10 mL RareCyte AccuCyte BCT See Below 1240    ctDNA Plasma/WB ODD 2 x 10 Streck Cell-Free DNA  1240     CTC & ctDNA Plasma/WB instructions  Keep patient's arm in the downward position during collection procedure.  Hold the tube with the stopper in the uppermost position so that the tube contents do not touch the stopper or the end of the needle during sample collection.  Release tourniquet once blood start to flow in the tube, or within 2 minutes of application.  Invert 8 - 10x.          Weight-Based Dosing   Baseline (screening) weight (kg) 85.1kg  Date measured  Refer Previous encounters to enter)  Text :  Actual weight   Vitals:    05/14/25 0906   Weight: 85 kg (187 lb 6.3 oz)     (Weight on Day 114 of cycle)   Date Measured 05/14/2025  Enter Information here  Dose based on Cycle 1 Day 1 weight.  May use Cycle 1 Day 1 weight unless noted weight difference of 10% weight gain or loss.  The minimum dose of ABBV-706 is 50 mg.  Any calculated dose < 50 mg, patient to be administered 50 mg.  Body weight capped at 120 kg. 120 kg will be utilized to calculate the dose.       Research Drug Administration   Document medication administration in MAR activity. Document Research specific instructions below.  Enter Information here  ABBV-706  Infuse over 60 minutes (+ 10 minutes).  Observation time 1 hour post-dose.  If any Infusion-Related Reaction or Suspected Allergic-Type Reaction specific safety labs to be drawn- see EPIC     Infusion 1250 to 1350  Infusion-Related Reactions   Review Safety Parameters on the medication Order. Note specific instructions below.    - SOC Hypersensivity Orders              Þ If any Infusion-Related Reaction or Suspected Allergic-Type Reaction Þ  Clinical Safety Labs  Z-Req#:    Timepoint Blood Test Collection Priority Order of Origin     Within 2 hours after 1st sign of reaction C3a (FC3AR) (1 mL Lavender top) STAT Z-req (send to [facility])    C5 (354) (1 mL SST) STAT Z-req (send to [place])    IgE STAT ð EMR or ð Z-req    Tryptase (2 mL Red Top) STAT ð EMR or ð Z-req       DURING-DOSE Vital Signs     Temp, Heart Rate, Respiration, Blood Pressure: rest sitting at least 3 minutes prior to obtaining  30 minutes (+/-10 minutes) after the start of infusion   See above at 1320       POST-DOSE Vital Signs      Temp, Heart Rate, Respiration, Blood Pressure: rest sitting at least 3 minutes prior to obtaining  End of Infusion (Time “0”)     See above at 1350    Discharge Instructions   Discharge patient to home after study requirements completed or PK sample obtained.  Remind patient to return: on Day 8 for vital signs & safety labs  Discharge time: 1403     Elba Srivastava RN  05/14/25                  [1]   Allergies  Allergen Reactions    Clindamycin Diarrhea    Erythromycin Diarrhea    Erythromycin Base Other and Nausea Only   [2]   Current Outpatient Medications   Medication Sig Dispense Refill    acetaminophen (TylenoL) 325 mg tablet Take 2 tablets (650 mg) by mouth every 6 hours if needed for mild pain (1 - 3) or moderate pain (4 - 6). 30 tablet 0    ALPRAZolam (Xanax) 0.5 mg tablet Take 2 tablets (1 mg) by mouth 2 times a day as needed for anxiety for up to 15 days. 30 tablet 0    apixaban (Eliquis) 5 mg tablet Take 1 tablet (5 mg) by mouth 2 times a day. 60 tablet 11    azelastine (Astelin) 137 mcg (0.1 %) nasal spray       carbinoxamine maleate 4 mg tablet Take 4 mg by mouth 2 times a day.      escitalopram (Lexapro) 10 mg tablet Take 1.5 tablets (15 mg) by mouth once daily. 150 tablet 1    guaiFENesin (Mucinex) 600 mg 12 hr tablet Take 2 tablets (1,200 mg) by mouth 2 times  a day. Do not crush, chew, or split. 120 tablet 11    hydrocodone-homatropine (Hycodan) 5-1.5 mg/5 mL syrup Take 5 mL by mouth every 6 hours if needed for cough for up to 23 days. 473 mL 0    ipratropium-albuteroL (Duo-Neb) 0.5-2.5 mg/3 mL nebulizer solution       levothyroxine (Synthroid, Levoxyl) 50 mcg tablet Take 1 tablet (50 mcg) by mouth early in the morning.. Take on an empty stomach at the same time each day, either 30 to 60 minutes prior to breakfast 30 tablet 11    lidocaine-prilocaine (Emla) 2.5-2.5 % cream Apply topically a thin film of medication to [facility] site 45 mts prior to being accessed and cover with band aid 30 g 0    magnesium hydroxide (Milk of Magnesia) 400 mg/5 mL suspension Take 5 mL by mouth once daily as needed for constipation for up to 10 days. Take this when it has been 2 or more days without a bowel movement. 360 mL 0    metFORMIN  mg 24 hr tablet Take 1 tablet (500 mg) by mouth 2 times a day. 200 tablet 1    morphine CR (MS Contin) 15 mg 12 hr tablet Take 1 tablet (15 mg) by mouth once daily at bedtime. Do not crush, chew, or split. This is your LONG-ACTING pain medicine. 30 tablet 0    naloxone (Narcan) 4 mg/0.1 mL nasal spray Administer 1 spray (4 mg) into affected nostril(s) if needed for opioid reversal. May repeat every 2-3 minutes if needed, alternating nostrils, until medical assistance becomes available. 2 each 0    OLANZapine (ZyPREXA) 7.5 mg tablet Take 1 tablet (7.5 mg) by mouth once daily in the evening. Take with meals. This is to help with appetite, nausea, and sleep. This is a dose increase. 30 tablet 2    omeprazole (PriLOSEC) 40 mg DR capsule Take 1 capsule (40 mg) by mouth once daily. 100 capsule 1    ondansetron (Zofran) 8 mg tablet Take 1 tablet (8 mg) by mouth every 8 hours if needed for nausea or vomiting. 30 tablet 5    oxyCODONE (Roxicodone) 5 mg immediate release tablet Take 1 tablet (5 mg) by mouth every 4 hours if needed for moderate pain (4 - 6) for  up to 60 doses. Can take 2 pill if need for severe pain (7-10) 20 tablet 0    oxyCODONE (Roxicodone) 5 mg immediate release tablet Take 1-2 tablets (5-10 mg) by mouth every 4 hours if needed for severe pain (7 - 10). 360 tablet 0    ProAir HFA 90 mcg/actuation inhaler Inhale 2 puffs every 4 hours if needed.      prochlorperazine (Compazine) 10 mg tablet Take 1 tablet (10 mg) by mouth every 6 hours if needed for nausea or vomiting. 30 tablet 5    rosuvastatin (Crestor) 10 mg tablet Take 1 tablet (10 mg) by mouth once daily. 100 tablet 1    sennosides-docusate sodium (Marzena-Colace) 8.6-50 mg tablet Take 3 tablets by mouth 2 times a day. This is to help manage constipation. 180 tablet 11     Current Facility-Administered Medications   Medication Dose Route Frequency Provider Last Rate Last Admin    albuterol 2.5 mg /3 mL (0.083 %) nebulizer solution 3 mL  3 mL nebulization PRN Jd Kohli MD        dextrose 5 % in water (D5W) bolus 500 mL  500 mL intravenous PRN Jd Kohli MD        diphenhydrAMINE (BENADryl) injection 50 mg  50 mg intravenous PRN Jd Kohli MD        EPINEPHrine HCl (PF) (Adrenalin) injection 0.3 mg  0.3 mg intramuscular q5 min PRN Jd Kohli MD        famotidine PF (Pepcid) injection 20 mg  20 mg intravenous Once PRN Jd Kohli MD        heparin flush 100 unit/mL syringe 500 Units  500 Units intra-catheter PRN Jd Kohli MD   500 Units at 05/14/25 1400    methylPREDNISolone sod succinate (SOLU-Medrol) 40 mg/mL injection 40 mg  40 mg intravenous PRN Jd Kohli MD        prochlorperazine (Compazine) injection 10 mg  10 mg intravenous q6h PRN Jd Kohli MD        prochlorperazine (Compazine) tablet 10 mg  10 mg oral q6h PRN Jd Kohli MD        sodium chloride 0.9 % bolus 500 mL  500 mL intravenous PRN Jd Kohli MD

## 2025-05-14 NOTE — PROGRESS NOTES
Research Note Treatment Day    Minal Sun is here today for treatment on [id number]. Today is C5 D1. Procedures completed per protocol. AE's and con-meds reviewed with patient. Patient has no new or worsening AE's at this time. Labwork resulted and patient is ok to treat today. Tumor imaging showed continued response to treatment. Patient is aware of treatment plan and will return 5/21/25 for C5 D8.    [x]   Received treatment as planned   OR  []    Treatment delayed; patient calendar updated as required   Treatment delayed because:    []   AE    []   Physician Discretion    []   Clinical Deterioration or Progression     []   Other    Education Documentation  Treatment Plan and Schedule, taught by Jazzy Guerrero RN at 5/14/2025 12:03 PM.  Learner: Patient  Readiness: Eager  Method: Explanation  Response: Verbalizes Understanding    General Medication Information, taught by Jazzy Guerrero RN at 5/14/2025 12:03 PM.  Learner: Patient  Readiness: Eager  Method: Explanation  Response: Verbalizes Understanding    Diagnostic Studies, taught by Jazzy Guerrero RN at 5/14/2025 12:03 PM.  Learner: Patient  Readiness: Eager  Method: Explanation  Response: Verbalizes Understanding    Comprehensive Metabolic Panel (CMP), taught by Jazzy Guerrero RN at 5/14/2025 12:03 PM.  Learner: Patient  Readiness: Eager  Method: Explanation  Response: Verbalizes Understanding    Complete Blood Count with Differential (CBC w/ Diff), taught by Jazzy Guerrero RN at 5/14/2025 12:03 PM.  Learner: Patient  Readiness: Eager  Method: Explanation  Response: Verbalizes Understanding    Education Comments  No comments found.

## 2025-05-16 ENCOUNTER — TELEPHONE (OUTPATIENT)
Dept: HEMATOLOGY/ONCOLOGY | Facility: HOSPITAL | Age: 60
End: 2025-05-16
Payer: MEDICARE

## 2025-05-16 ENCOUNTER — PATIENT MESSAGE (OUTPATIENT)
Dept: PALLIATIVE MEDICINE | Facility: HOSPITAL | Age: 60
End: 2025-05-16
Payer: MEDICARE

## 2025-05-16 DIAGNOSIS — C34.90 SMALL CELL CARCINOMA OF LUNG, UNSPECIFIED LATERALITY, UNSPECIFIED PART OF LUNG: ICD-10-CM

## 2025-05-16 DIAGNOSIS — G89.3 CANCER RELATED PAIN: Primary | ICD-10-CM

## 2025-05-16 DIAGNOSIS — G89.3 CANCER RELATED PAIN: ICD-10-CM

## 2025-05-16 PROCEDURE — RXMED WILLOW AMBULATORY MEDICATION CHARGE

## 2025-05-16 RX ORDER — OXYCODONE HCL 10 MG/1
10 TABLET, FILM COATED, EXTENDED RELEASE ORAL NIGHTLY
Qty: 14 TABLET | Refills: 0 | Status: CANCELLED | OUTPATIENT
Start: 2025-05-16 | End: 2025-05-30

## 2025-05-16 RX ORDER — OLANZAPINE 10 MG/1
10 TABLET, FILM COATED ORAL
Qty: 30 TABLET | Refills: 2 | Status: SHIPPED | OUTPATIENT
Start: 2025-05-16 | End: 2025-08-14

## 2025-05-16 RX ORDER — OXYCODONE HCL 10 MG/1
10 TABLET, FILM COATED, EXTENDED RELEASE ORAL NIGHTLY
Qty: 14 TABLET | Refills: 0 | Status: SHIPPED | OUTPATIENT
Start: 2025-05-16 | End: 2025-05-16 | Stop reason: SDUPTHER

## 2025-05-16 RX ORDER — OXYCODONE HCL 10 MG/1
10 TABLET, FILM COATED, EXTENDED RELEASE ORAL NIGHTLY
Qty: 14 TABLET | Refills: 0 | Status: SHIPPED | OUTPATIENT
Start: 2025-05-16 | End: 2025-05-30

## 2025-05-16 NOTE — PATIENT COMMUNICATION
Patient messaged in with new back pain, stomach pain, and trouble eating/drinking since treatment Wednesday. Rosa M would like Minal to increase olanzapine to 10mg every evening and start Oxycontin in place of MSER.

## 2025-05-16 NOTE — TELEPHONE ENCOUNTER
Called patient to update we can send to  [facility] they do have her oxycontin in stock and said it will still be covered at $0

## 2025-05-16 NOTE — PROGRESS NOTES
Patient ID: Minal Sun is a 59 y.o. female.    DIAGNOSIS     Small cell carcinoma of the lung.  Date of diagnosis is September 27, 2024 from a bronchoscopic biopsy of a subcarinal lymph node.  Immunohistochemistry positive for TTF-1, INSM1, synaptophysin and chromogranin, negative for p40, RB immunostain shows loss of nuclear expression.        STAGING     Clinical T4, N2, M1 C, stage IVc, extensive stage disease        CURRENT SITES OF DISEASE      Left lung, mediastinum, left hilum of the lung, liver, intra-abdominal lymph nodes in the portacaval and periportal region, bone metastases        MOLECULAR GENOMICS     Test Name: TapCanvas xF+ (XF.V3) dated October 2024     Molecular Findings:  * Gene: PIK3CA, Variant: Missense variant (exon 1) - GOF, Potentially Actionable, p.R88Q (Allele Frequency - 0.3%)  * Gene: TP53, Variant: Missense variant - LOF, Biologically Relevant, p.R249G (Allele Frequency - 70.1%)  * Gene: RB1, Variant: Splice region variant - LOF, Biologically Relevant, c.540-1G>T, Splice Site (Allele Frequency - 56.8%)        Test Name: TapCanvas xT (XT.V4)  Laboratory Name: [facility]  Specimen Source: Lymph node, level 7  Collection Date: 27-Sep-2024     Molecular Findings:  * Gene: TP53, Variant: Missense variant - LOF, Biologically Relevant, p.R249G (Allele Frequency - 96.2%)  * Gene: RB1, Variant: Splice region variant - LOF, Biologically Relevant, c.540-1G>T, Splice Site (Allele Frequency - 96%)  * Gene: KMT2D, Variant: Stop gain - LOF, Biologically Relevant, p.*, Nonsense (Allele Frequency - 41.9%)  * Biomarker: Microsatellite Instability, Status: stable  * Biomarker: Tumor Mutation [place] (2.6 Muts/Mb, Percentile: 33)        SERUM TUMOR MARKERS     Baseline LDH within normal limits  C1D1 on Study LDH was 274. Trending down at C2D1         PRIOR THERAPY     Combination chemotherapy and immunotherapy.  Initially enrolled on a clinical trial of Lutathera and underwent octreotide scan  "which was positive.  However she decided to come off study and did not receive Lutathera with her chemo.  Chemotherapy started October 6, 2024.  Immunotherapy completed added with cycle #2 11.06.2024.  Minor response observed after 2 cycles of treatment. PD observed after C4.        CURRENT THERAPY     2.19.2025: Started on Phase 1 study [id number] with study drug ABBV-706 which is an ADC targeting SEZ6 with a topoisomerase 1 inhibitor payload     CURRENT ONCOLOGICAL PROBLEMS    Cough and shortness of breath significantly improved with systemic treatment.  Pulmonary Embolism  Immunotherapy induced hypothyroidism  Neoplasm non cardiac chest/back pain     Subjective:     HISTORY OF PRESENT ILLNESS     Spoke with the patient's sister who said Minal is having \"severe gnawing pain\" and states that she is feeling worse than she had after previous cycles.  She is D3 post treatment.  Denies N/V/D/C, F/C, syncope/near syncope, chest pain that is related to activity, worsening shortness of breath.  She spoke with supportive onc team earlier and they sent a script for oxycodone to her local pharmacy but they are on a  pharmaceutical plan that can only fill rx at [facility] pharmacy.  Patients sister also stated that she is not well herself and doesn't know how she can keep helping her sister.           PAST MEDICAL HISTORY     Lumbar surgery about 30 years ago   hysterectomy in 2000  Diabetes  COPD  Right eye exophthalmus  herpes zoster        SOCIAL HISTORY     Patient lives with her sister.  She lives in [place].  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  She started smoking at the age of 14 and continues to smoke at the age of 59.  Has smoked for 45 years on average 1 pack/day.        CURRENT MEDS     See medication list, meds reviewed, includes metformin, rosuvastatin, been done so Nied " inhalers, Wellbutrin escitalopram, trazodone        ALLERGIES     Patient denies any drug allergies        FAMILY HISTORY      Mother had bladder cancer and possibly breast cancer.     Objective     5/7/25 CT Chest w IV contrast  IMPRESSION:  1. Improving disease burden in regards to the mediastinal/left hilar  soft tissue mass as detailed above.  2. Few stable small bilateral noncalcified pulmonary nodules. No new  pulmonary nodules or masses..  3. Stable mediastinal/hilar lymphadenopathy without evidence of new  pathologically enlarged lymph nodes.  4. New fracture deformity along the lateral aspect of the 8th rib as  detailed above, not visualized on prior CT from 03/26/2025.  Differential for this finding includes a subacute fracture  versus  pathologic fracture due to underlying osseous metastatic disease.  Correlation with any acute traumatic injury to this area could prove  clinically useful.  5. Ill-defined hypodense hepatic lesions compatible with patient's  known history of hepatic metastatic disease involvement, better  characterized on same day MRI of the liver from 05/05/2025.      I personally reviewed the images/study and I agree with the findings  as stated by resident Luciano Alegria. This study was interpreted  at [facility], [place], [place].      Signed by: Orion Martinez 5/7/2025 5:03 PM  Dictation workstation:   [id number]      5/9/25 MR liver w/wo IV contrast  IMPRESSION:  1. Decreased size of multiple hepatic metastases in both the right  and left hepatic lobes when compared to the PET-CT on 10/01/2024,  however overall similar in size when compared to most recent CT on  03/26/2025 given the differences in technique. Findings are  consistent with response to treatment with residual viable neoplasm.  2. Dominant hepatic segment VII lesion with the MR imaging features  consistent with hemangioma. This lesion was photopenic on the prior  PET-CT of  10/01/2024.  3. Multiple enhancing lesions throughout the thoracic and upper  lumbar spine corresponding to sclerotic lesions on CT consistent with  known metastatic disease.      I personally reviewed the images/study and I agree with the findings  as stated by resident physician Dr. Enzo Mckeon. This  study was interpreted at [facility], [place], [place].      MACRO:  None      Signed by: Lokesh Ernandez 5/9/2025 8:42 AM  Dictation workstation:   [id number]         Assessment/Plan     #Pain  No N/V/D/C, No C on exertion, No F/C, no syncope.  Supportive oncology sent in a script for oxycodone but patient is having trouble getting the Rx.    -Meds per supportive onc. Trying to see if [facility] can deliver.   -ED for worsening pain, CP, shortness of breath or fever.     #Psychosocial needs  Put in a request for social work consult with Sabrina Andres via email.      Ladarius Whiteside MSN, APRN-CNP  Clinical Trials Unit  Phase I Oncology   [email or web address]   Ph. [phone]

## 2025-05-16 NOTE — TELEPHONE ENCOUNTER
Called patients pharmacy and she states her oxycontin is too expensive. Per pharmacist her insurance does not cover Oxycontin. Pt asked if we could send to  as she is eligible for an assistance program for her eliquis.    Discussed with provider who wants to try to send it to [facility] and see if it will still be expensive.

## 2025-05-19 ENCOUNTER — PHARMACY VISIT (OUTPATIENT)
Dept: PHARMACY | Facility: CLINIC | Age: 60
End: 2025-05-19
Payer: COMMERCIAL

## 2025-05-21 ENCOUNTER — HOSPITAL ENCOUNTER (OUTPATIENT)
Dept: RESEARCH | Facility: HOSPITAL | Age: 60
Discharge: HOME | End: 2025-05-21
Payer: MEDICARE

## 2025-05-21 ENCOUNTER — OFFICE VISIT (OUTPATIENT)
Dept: PALLIATIVE MEDICINE | Facility: HOSPITAL | Age: 60
End: 2025-05-21
Payer: MEDICARE

## 2025-05-21 ENCOUNTER — EDUCATION (OUTPATIENT)
Dept: HEMATOLOGY/ONCOLOGY | Facility: HOSPITAL | Age: 60
End: 2025-05-21
Payer: MEDICARE

## 2025-05-21 VITALS
TEMPERATURE: 97.5 F | SYSTOLIC BLOOD PRESSURE: 137 MMHG | OXYGEN SATURATION: 98 % | DIASTOLIC BLOOD PRESSURE: 85 MMHG | HEART RATE: 95 BPM | RESPIRATION RATE: 18 BRPM

## 2025-05-21 DIAGNOSIS — R63.0 DECREASED APPETITE: ICD-10-CM

## 2025-05-21 DIAGNOSIS — C34.90 SMALL CELL CARCINOMA OF LUNG, UNSPECIFIED LATERALITY, UNSPECIFIED PART OF LUNG: Primary | ICD-10-CM

## 2025-05-21 DIAGNOSIS — Z51.5 PALLIATIVE CARE ENCOUNTER: ICD-10-CM

## 2025-05-21 DIAGNOSIS — R11.0 NAUSEA: ICD-10-CM

## 2025-05-21 DIAGNOSIS — C34.90 SMALL CELL CARCINOMA OF LUNG, UNSPECIFIED LATERALITY, UNSPECIFIED PART OF LUNG: ICD-10-CM

## 2025-05-21 DIAGNOSIS — G89.3 CANCER RELATED PAIN: ICD-10-CM

## 2025-05-21 LAB
ALBUMIN SERPL BCP-MCNC: 4.1 G/DL (ref 3.4–5)
ALP SERPL-CCNC: 84 U/L (ref 33–136)
ALT SERPL W P-5'-P-CCNC: 9 U/L (ref 7–45)
ANION GAP SERPL CALC-SCNC: 13 MMOL/L (ref 10–20)
AST SERPL W P-5'-P-CCNC: 11 U/L (ref 9–39)
BASOPHILS # BLD AUTO: 0.03 X10*3/UL (ref 0–0.1)
BASOPHILS NFR BLD AUTO: 0.6 %
BILIRUB DIRECT SERPL-MCNC: 0.1 MG/DL (ref 0–0.3)
BILIRUB SERPL-MCNC: 0.3 MG/DL (ref 0–1.2)
BUN SERPL-MCNC: 14 MG/DL (ref 6–23)
CALCIUM SERPL-MCNC: 9.1 MG/DL (ref 8.6–10.6)
CHLORIDE SERPL-SCNC: 105 MMOL/L (ref 98–107)
CO2 SERPL-SCNC: 24 MMOL/L (ref 21–32)
CREAT SERPL-MCNC: 0.71 MG/DL (ref 0.5–1.05)
EGFRCR SERPLBLD CKD-EPI 2021: >90 ML/MIN/1.73M*2
EOSINOPHIL # BLD AUTO: 0.18 X10*3/UL (ref 0–0.7)
EOSINOPHIL NFR BLD AUTO: 3.7 %
ERYTHROCYTE [DISTWIDTH] IN BLOOD BY AUTOMATED COUNT: 17.6 % (ref 11.5–14.5)
GGT SERPL-CCNC: 35 U/L (ref 5–55)
GLUCOSE SERPL-MCNC: 135 MG/DL (ref 74–99)
HCT VFR BLD AUTO: 38.8 % (ref 36–46)
HGB BLD-MCNC: 12.6 G/DL (ref 12–16)
IMM GRANULOCYTES # BLD AUTO: 0 X10*3/UL (ref 0–0.7)
IMM GRANULOCYTES NFR BLD AUTO: 0 % (ref 0–0.9)
LDH SERPL L TO P-CCNC: 152 U/L (ref 84–246)
LYMPHOCYTES # BLD AUTO: 2.03 X10*3/UL (ref 1.2–4.8)
LYMPHOCYTES NFR BLD AUTO: 41.5 %
MAGNESIUM SERPL-MCNC: 2.2 MG/DL (ref 1.6–2.4)
MCH RBC QN AUTO: 32.2 PG (ref 26–34)
MCHC RBC AUTO-ENTMCNC: 32.5 G/DL (ref 32–36)
MCV RBC AUTO: 99 FL (ref 80–100)
MONOCYTES # BLD AUTO: 0.28 X10*3/UL (ref 0.1–1)
MONOCYTES NFR BLD AUTO: 5.7 %
NEUTROPHILS # BLD AUTO: 2.37 X10*3/UL (ref 1.2–7.7)
NEUTROPHILS NFR BLD AUTO: 48.5 %
NRBC BLD-RTO: 0 /100 WBCS (ref 0–0)
PHOSPHATE SERPL-MCNC: 3.9 MG/DL (ref 2.5–4.9)
PLATELET # BLD AUTO: 310 X10*3/UL (ref 150–450)
POTASSIUM SERPL-SCNC: 4 MMOL/L (ref 3.5–5.3)
PROT SERPL-MCNC: 6.6 G/DL (ref 6.4–8.2)
RBC # BLD AUTO: 3.91 X10*6/UL (ref 4–5.2)
SODIUM SERPL-SCNC: 138 MMOL/L (ref 136–145)
WBC # BLD AUTO: 4.9 X10*3/UL (ref 4.4–11.3)

## 2025-05-21 PROCEDURE — 99214 OFFICE O/P EST MOD 30 MIN: CPT

## 2025-05-21 PROCEDURE — 83735 ASSAY OF MAGNESIUM: CPT | Performed by: INTERNAL MEDICINE

## 2025-05-21 PROCEDURE — 80053 COMPREHEN METABOLIC PANEL: CPT | Performed by: INTERNAL MEDICINE

## 2025-05-21 PROCEDURE — 84100 ASSAY OF PHOSPHORUS: CPT | Performed by: INTERNAL MEDICINE

## 2025-05-21 PROCEDURE — 82248 BILIRUBIN DIRECT: CPT | Performed by: INTERNAL MEDICINE

## 2025-05-21 PROCEDURE — 36415 COLL VENOUS BLD VENIPUNCTURE: CPT

## 2025-05-21 PROCEDURE — 83615 LACTATE (LD) (LDH) ENZYME: CPT | Performed by: INTERNAL MEDICINE

## 2025-05-21 PROCEDURE — 85025 COMPLETE CBC W/AUTO DIFF WBC: CPT | Performed by: INTERNAL MEDICINE

## 2025-05-21 PROCEDURE — 82977 ASSAY OF GGT: CPT | Performed by: INTERNAL MEDICINE

## 2025-05-21 RX ORDER — METHOCARBAMOL 500 MG/1
500 TABLET, FILM COATED ORAL 4 TIMES DAILY PRN
Qty: 120 TABLET | Refills: 1 | Status: SHIPPED | OUTPATIENT
Start: 2025-05-21 | End: 2025-07-20

## 2025-05-21 NOTE — RESEARCH NOTES
Research Note Non-Treatment Day    Minal Sun is here today. Patient is on [id number]. Today is C5D8. Procedures completed per protocol. AE's and con-meds reviewed with patient. Patient is aware of treatment plan. Patient reports back pain, fatigue and decreased appetite after last treatment (5/15). No change in grade from previously reported pain, fatigue and appetite. Patient will return for next scheduled appointment.         Education Documentation  Pain Rating Scale, taught by Shea West RN at 5/21/2025 11:57 AM.  Learner: Patient  Readiness: Acceptance  Method: Explanation  Response: Verbalizes Understanding    Fatigue, taught by Shea West RN at 5/21/2025 11:57 AM.  Learner: Patient  Readiness: Acceptance  Method: Explanation  Response: Verbalizes Understanding    Material on New Diagnosis Provided, taught by Shea West RN at 5/21/2025 11:57 AM.  Learner: Patient  Readiness: Acceptance  Method: Explanation  Response: Verbalizes Understanding    Oriented to Facility, taught by Shea West RN at 5/21/2025 11:57 AM.  Learner: Patient  Readiness: Acceptance  Method: Explanation  Response: Verbalizes Understanding    Education Comments  No comments found.

## 2025-05-21 NOTE — RESEARCH NOTES
RSH><[id number]><C4+D8><[id number]>    DCRU NURSING VISIT NOTE  Study Name: [id number]- Part 1_Escalation- Monotherapy  IRB#: [id number]  [id number]  Protocol Version Dated:  6/15/2023  PI: Jd Kohli MD.      Time point: Cycle 4 and beyond- Day 8  CYCLE 5    Encounter Date: 05/21/2025  Encounter Time: 12:00 PM EDT  Encounter Department: [place] [id number] RESEARCH        Study Regimen and Dosing   For Oncology study, Refer [place] Treatment Plan for orders   Part 1_Escalation_Monotherapy - patients with advanced recurrent or refractory solid tumors (small cell lung cancer, neuroendocrine tumors or brain tumors) will receive gradually increasing doses of ABBV-706 to determine MTD (Maximum Tolerated Dose).  Cycle = 21-days.  ABBV-706 administered IV Day 1 of each cycle.     Dietary Guidelines   Regular diet         Admission and Prior to Starting Study Activities   Notify  when patient arrives to unit.  Patient review/update DCRU/[place] intake form.  Obtain vital signs after sitting at least 3 minutes. Record on flow sheet & in EMR.  Perform venipuncture for sample collection procedures. Do Not draw research samples from mediport/central line if used for infusion  Physical Exam including pulmonary exam & neuro assessment Days 8 & 15.         Study Specific Instructions and Documentation   1-Sfety Labs   2-Discharge          Subjective   Minal Sun is a 59 y.o. female and is here for a Research clinical visit.    Visit Provider: Jazzy Guerrero RN     Allergies: Allergies[1]    Objective     Vital Signs:    Vitals:    05/21/25 1141   BP: 137/85   Pulse: 95   Resp: 18   Temp: 36.4 °C (97.5 °F)   TempSrc: Temporal   SpO2: 98%       Physical Exam     ASSESSMENT and PLAN:  Problem List Items Addressed This Visit       SCLC (small cell lung carcinoma)    Relevant Orders    CBC and Auto Differential (Completed)    Comprehensive metabolic panel (Completed)     Bilirubin, Direct (Completed)    Gamma GT (Completed)    Lactate dehydrogenase (Completed)    Magnesium (Completed)    Phosphorus (Completed)        Medications as of the completion of today's visit:  Current Medications[2]        Orders placed during today's visit:  Orders Placed This Encounter   Procedures    CBC and Auto Differential     Standing Status:   Standing     Number of Occurrences:   1     [facility]   Immediate [1]    Comprehensive metabolic panel     Standing Status:   Standing     Number of Occurrences:   1     Release result to MyChart:   Immediate [1]    Bilirubin, Direct     Standing Status:   Standing     Number of Occurrences:   1     Release result to MyChart:   Immediate [1]    Gamma GT     Standing Status:   Standing     Number of Occurrences:   1     [facility]   Immediate [1]    Lactate dehydrogenase     Standing Status:   Standing     Number of Occurrences:   1     [facility]   Immediate [1]    Magnesium     Standing Status:   Standing     Number of Occurrences:   1     [facility]   Immediate [1]    Phosphorus     Standing Status:   Standing     Number of Occurrences:   1     [facility]   Immediate [1]        [id number] - ABBV-706 alone or in combination in subjects with advanced solid tumors    Patient has no adverse events documented in the Research Adverse Events activity.      Day 8 Safety Labs   For Oncology study, Refer [place] Treatment Plan for orders     Discharge Instructions   Discharge patient to home after study requirements completed or PK sample obtained.  Remind patient to return: on Day 15 for physical exam, vital signs & safety labs.  Discharge time: 1220     Maria Teresa Orlando RN  05/21/25                      [1]   Allergies  Allergen Reactions    Clindamycin Diarrhea    Erythromycin Diarrhea    Erythromycin Base Other and Nausea Only   [2]   Current Outpatient Medications   Medication Sig Dispense  Refill    acetaminophen (TylenoL) 325 mg tablet Take 2 tablets (650 mg) by mouth every 6 hours if needed for mild pain (1 - 3) or moderate pain (4 - 6). 30 tablet 0    ALPRAZolam (Xanax) 0.5 mg tablet Take 2 tablets (1 mg) by mouth 2 times a day as needed for anxiety for up to 15 days. 30 tablet 0    apixaban (Eliquis) 5 mg tablet Take 1 tablet (5 mg) by mouth 2 times a day. 60 tablet 11    azelastine (Astelin) 137 mcg (0.1 %) nasal spray       carbinoxamine maleate 4 mg tablet Take 4 mg by mouth 2 times a day.      escitalopram (Lexapro) 10 mg tablet Take 1.5 tablets (15 mg) by mouth once daily. 150 tablet 1    guaiFENesin (Mucinex) 600 mg 12 hr tablet Take 2 tablets (1,200 mg) by mouth 2 times a day. Do not crush, chew, or split. 120 tablet 11    hydrocodone-homatropine (Hycodan) 5-1.5 mg/5 mL syrup Take 5 mL by mouth every 6 hours if needed for cough for up to 23 days. 473 mL 0    ipratropium-albuteroL (Duo-Neb) 0.5-2.5 mg/3 mL nebulizer solution       levothyroxine (Synthroid, Levoxyl) 50 mcg tablet Take 1 tablet (50 mcg) by mouth early in the morning.. Take on an empty stomach at the same time each day, either 30 to 60 minutes prior to breakfast 30 tablet 11    lidocaine-prilocaine (Emla) 2.5-2.5 % cream Apply topically a thin film of medication to [facility] site 45 mts prior to being accessed and cover with band aid 30 g 0    magnesium hydroxide (Milk of Magnesia) 400 mg/5 mL suspension Take 5 mL by mouth once daily as needed for constipation for up to 10 days. Take this when it has been 2 or more days without a bowel movement. 360 mL 0    metFORMIN  mg 24 hr tablet Take 1 tablet (500 mg) by mouth 2 times a day. 200 tablet 1    methocarbamol (Robaxin) 500 mg tablet Take 1 tablet (500 mg) by mouth 4 times a day as needed for muscle spasms. 120 tablet 1    morphine CR (MS Contin) 15 mg 12 hr tablet Take 1 tablet (15 mg) by mouth once daily at bedtime. Do not crush, chew, or split. This is your LONG-ACTING  pain medicine. 30 tablet 0    naloxone (Narcan) 4 mg/0.1 mL nasal spray Administer 1 spray (4 mg) into affected nostril(s) if needed for opioid reversal. May repeat every 2-3 minutes if needed, alternating nostrils, until medical assistance becomes available. 2 each 0    OLANZapine (ZyPREXA) 10 mg tablet Take 1 tablet (10 mg) by mouth once daily in the evening. Take with meals. This is to help with appetite, nausea, and sleep. This is a dose increase. 30 tablet 2    omeprazole (PriLOSEC) 40 mg DR capsule Take 1 capsule (40 mg) by mouth once daily. 100 capsule 1    ondansetron (Zofran) 8 mg tablet Take 1 tablet (8 mg) by mouth every 8 hours if needed for nausea or vomiting. 30 tablet 5    oxyCODONE (Roxicodone) 5 mg immediate release tablet Take 1 tablet (5 mg) by mouth every 4 hours if needed for moderate pain (4 - 6) for up to 60 doses. Can take 2 pill if need for severe pain (7-10) 20 tablet 0    ProAir HFA 90 mcg/actuation inhaler Inhale 2 puffs every 4 hours if needed.      prochlorperazine (Compazine) 10 mg tablet Take 1 tablet (10 mg) by mouth every 6 hours if needed for nausea or vomiting. 30 tablet 5    rosuvastatin (Crestor) 10 mg tablet Take 1 tablet (10 mg) by mouth once daily. 100 tablet 1    sennosides-docusate sodium (Marzena-Colace) 8.6-50 mg tablet Take 3 tablets by mouth 2 times a day. This is to help manage constipation. 180 tablet 11     No current facility-administered medications for this encounter.

## 2025-05-21 NOTE — PROGRESS NOTES
" SUPPORTIVE AND PALLIATIVE ONCOLOGY CONSULT - OUTPATIENT      SERVICE DATE: 5/21/2025    Medical Oncologist: Jd Kohli MD   Radiation Oncologist: No care team member to display  Primary Physician: Christopher D'Amico  [phone]    REASON FOR CONSULT/CHIEF CONSULT COMPLAINT: pain management, other symptom control  (decreased appetite, nausea, constipation), and Introduction to Supportive and Palliative Oncology Services    Subjective   HISTORY OF PRESENT ILLNESS: Minal Sun is a 59 y.o. female who presents with  history of COPD, PE, SCC of the lung, diagnosed 9/2024 (mets: mediastinum, liver, bone). On 2/5/2025 she was started on the Phase 1 study [id number] with study drug ABBV-706 (ADC targeting SEZ6 with a topoisomerase 1 inhibitor payload).     Pain Assessment:  Pain Score:  3  Location:  R lower back, L rib area  Education:  changes to current regimen      Symptom Assessment:  Pain:somewhat- not needing Oxycodone, states that back pain feels like a \"pulled muscle\"  Headache: none  Dizziness:none  Lack of energy: a little  Difficulty sleeping: somewhat- chronic difficulty falling asleep  Worrying: none  Anxiety: none  Depression: a little- well managed with Escitalopram, though notes continued frustrations with cancer experience  Pain in mouth/swallowing: none  Dry mouth: none  Taste changes: none  Shortness of breath: none  Lack of appetite: somewhat- slowly improving  Nausea: a little- stable  Vomiting: none  Constipation: somewhat- taking Senna 2 tabs BID  Diarrhea: none  Sore muscles: none  Numbness or tingling in hands/feet/other: none  Weight loss: none  Other: none      Information obtained from: chart review and interview of patient  ______________________________________________________________________     Oncology History   SCLC (small cell lung carcinoma)   9/27/2024 Initial Diagnosis    SCLC (small cell lung carcinoma) (Multi)     10/4/2024 - 10/8/2024 Chemotherapy    CARBOplatin / " Etoposide, 21 Day Cycles - Lung     10/4/2024 - 10/6/2024 Research Study Participant    ([facility]) [id number] - Atezolizumab + CARBOplatin / Etoposide / [id number], 21 Day Cycles  Plan Provider: JESSICA Hernandez  Treatment goal: Control  Line of treatment: First Line  Associated studies: [id number] with Carboplatin, Etoposide and Tislelizumab in Newly Diagnosed ES-SCLC     10/4/2024 - 1/17/2025 Chemotherapy    Durvalumab + CARBOplatin / Etoposide, 21 Day Cycles     10/28/2024 - 10/28/2024 Chemotherapy    Durvalumab + CARBOplatin / Etoposide, 21 Day Cycles     2/5/2025 - 2/5/2025 Chemotherapy    Durvalumab, 28 Day Cycles     2/19/2025 -  Research Study Participant    ([facility]) [id number] Part 1 - ABBV-706, 21 Day Cycles  Plan Provider: JESSICA Hernandez  Treatment goal: Control  Line of treatment: Second Line  Associated studies: ABBV-706 alone or in combination in subjects with advanced solid tumors         Past Medical History:   Diagnosis Date    Chronic obstructive pulmonary disease, unspecified     Chronic obstructive pulmonary disease    Encounter for general adult medical examination without abnormal findings 11/18/2020    Encounter for preventive health examination    Personal history of other endocrine, nutritional and metabolic disease     History of diabetes mellitus    Post-traumatic stress disorder, unspecified     PTSD (post-traumatic stress disorder)    Type 2 diabetes mellitus      Past Surgical History:   Procedure Laterality Date    BACK SURGERY  09/17/2013    Lower Back Surgery    HYSTERECTOMY  07/01/2016    Hysterectomy     No family history on file.     SOCIAL HISTORY  Support system - lives with sisterRosy   Social History:  reports that she quit smoking about 4 years ago. Her smoking use included cigarettes. She started smoking about 42 years ago. She has a 38.3 pack-year smoking history. She has never used smokeless tobacco. She reports that she does not drink alcohol  and does not use drugs.  retired    Latter day and Importance of Latter day: unknown    REVIEW OF SYSTEMS  Review of systems negative unless noted in HPI.       Objective     Palliative Performance Scale % (PPS)       Current Outpatient Medications   Medication Instructions    acetaminophen (TYLENOL) 650 mg, oral, Every 6 hours PRN    ALPRAZolam (XANAX) 1 mg, oral, 2 times daily PRN    azelastine (Astelin) 137 mcg (0.1 %) nasal spray     carbinoxamine maleate 4 mg, 2 times daily    Eliquis 5 mg, oral, 2 times daily    escitalopram (LEXAPRO) 15 mg, oral, Daily    guaiFENesin (MUCINEX) 1,200 mg, oral, 2 times daily, Do not crush, chew, or split.    hydrocodone-homatropine (Hycodan) 5-1.5 mg/5 mL syrup 5 mL, oral, Every 6 hours PRN    ipratropium-albuteroL (Duo-Neb) 0.5-2.5 mg/3 mL nebulizer solution     levothyroxine (SYNTHROID, LEVOXYL) 50 mcg, oral, Daily, Take on an empty stomach at the same time each day, either 30 to 60 minutes prior to breakfast    lidocaine-prilocaine (Emla) 2.5-2.5 % cream Apply topically a thin film of medication to mediport site 45 mts prior to being accessed and cover with band aid    magnesium hydroxide (Milk of Magnesia) 400 mg/5 mL suspension 5 mL, oral, Daily PRN, Take this when it has been 2 or more days without a bowel movement.    metFORMIN XR (GLUCOPHAGE-XR) 500 mg, oral, 2 times daily    morphine CR (MS CONTIN) 15 mg, oral, Nightly, Do not crush, chew, or split. This is your LONG-ACTING pain medicine.    naloxone (NARCAN) 4 mg, nasal, As needed, May repeat every 2-3 minutes if needed, alternating nostrils, until medical assistance becomes available.    OLANZapine (ZYPREXA) 10 mg, oral, Daily with evening meal, This is to help with appetite, nausea, and sleep. This is a dose increase.    omeprazole (PRILOSEC) 40 mg, oral, Daily    ondansetron (ZOFRAN) 8 mg, oral, Every 8 hours PRN    oxyCODONE (ROXICODONE) 5 mg, oral, Every 4 hours PRN, Can take 2 pill if need for severe pain (7-10)     oxyCODONE (ROXICODONE) 5-10 mg, oral, Every 4 hours PRN    oxyCODONE ER (OXYCONTIN) 10 mg, oral, Nightly, Do not crush, chew, or split.    ProAir HFA 90 mcg/actuation inhaler 2 puffs, Every 4 hours PRN    prochlorperazine (COMPAZINE) 10 mg, oral, Every 6 hours PRN    rosuvastatin (CRESTOR) 10 mg, oral, Daily    sennosides-docusate sodium (Marzena-Colace) 8.6-50 mg tablet 3 tablets, oral, 2 times daily, This is to help manage constipation.       Allergies:   Allergies   Allergen Reactions    Clindamycin Diarrhea    Erythromycin Diarrhea    Erythromycin Base Other and Nausea Only     No results found for this or any previous visit (from the past 96 hours).               PHYSICAL EXAMINATION  Vital Signs:       4/30/2025    12:15 PM 5/7/2025    12:27 PM 5/14/2025     9:06 AM 5/14/2025    12:34 PM 5/14/2025     1:20 PM 5/14/2025     1:50 PM 5/21/2025    11:41 AM   Vitals   Systolic 137 131 138 127 129 115 137   Diastolic 79 85 95 87 85 79 85   BP Location Left arm Left arm Right arm Right arm Right arm Right arm Right arm   Heart Rate 79 85 99 82 85 78 95   Temp 36.7 °C (98.1 °F) 36.6 °C (97.9 °F) 35.9 °C (96.6 °F) 36.5 °C (97.7 °F) 36.3 °C (97.3 °F) 36.2 °C (97.2 °F) 36.4 °C (97.5 °F)   Resp 18 16 18 18 16 18 18   Weight (lb)   187.39       BMI   34.27 kg/m2       BSA (m2)   1.93 m2       Visit Report   Report Report Report Report        Vital signs reviewed     Physical Exam  Constitutional:       Appearance: She is ill-appearing.   HENT:      Mouth/Throat:      Mouth: Mucous membranes are moist.   Eyes:      Extraocular Movements: Extraocular movements intact.      Pupils: Pupils are equal, round, and reactive to light.   Cardiovascular:      Rate and Rhythm: Normal rate.   Pulmonary:      Effort: Pulmonary effort is normal.   Abdominal:      Palpations: Abdomen is soft.   Musculoskeletal:         General: Normal range of motion.   Skin:     General: Skin is warm and dry.   Neurological:      Mental Status: She is alert  and oriented to person, place, and time.   Psychiatric:         Mood and Affect: Mood normal.         Behavior: Behavior normal.       ASSESSMENT/PLAN    Pain  Pain is: cancer related pain  Type: visceral  Pain control: well-controlled  Home regimen:   Oxycodone 1-2 tabs q4h as needed (only taking occasionally now)  Did not  Oxycodone ER  Start Methocarbamol 500mg QID as needed for muscle pain/stiffness- pending approval with clinical trial  Intolerances/previously tried: n/a  Personalized pain goal: feel comfortable throughout the day    Opioid Use  Medication Management:   - OARRS report reviewed with no aberrant behavior; consistent with  prescriptions/records and patient history  - MED ~20.  Overdose Risk Score 420.   This has been discussed with patient.   - We will continue to closely monitor the patient for signs of prescription misuse including UDS, OARRS review and subjective reports at each visit.  - Yes concurrent benzodiazepine use   - I am a provider who either is or has consulted and collaborated with a provider certified in Hospice and Palliative Medicine and have conducted a face-face visit and examination for this patient.  - Routine Urine Drug Screen completed 4/23/25  - Controlled Substance Agreement complete next visit d/t pt leaving [facility] early  - Specifically discussed that controlled substance prescriptions will only be provided by our group as outlined in the completed agreement  - Prescribed naloxone pending  - Red Flags: n/a     Nausea   Intermittent nausea with vomiting related to chemotherapy, opioids, and constipation   Decreased appetite  Related to malignancy, chemotherapy, and taste changes  Nutrition consult- consider at future visit  Current regimen:    Olanzapine 10mg daily in the evening to help with appetite, nausea, and sleep- recent dose increase  Prochlorperazine 10mg q6h as needed    Constipation   At risk for constipation related to opioids,  currently constipated    Current regimen:   Senna to 3 tabs BID- encouraged to take consistently  Miralax 17g daily as needed  Milk of Magnesia 400mg (5ml) daily as needed when it has been 2+ days without a BM  Encouraged adequate hydration, fiber intake, and physical activity to promote bowel motility     Altered Mood  Chronic anxiety and depression related to health concerns   controlled with home regimen  Current regimen:   Escitalopram 20mg daily  See Olanzapine note    Sleeping Difficulty:  Impaired sleep related to stress, pain  See Olanzapine note    Supportive Interventions: n/a    Introduction to Supportive and Palliative Oncology:  Spoke with patient   Introduced the role and philosophy of Supportive and Palliative oncology in the evaluation and management of symptoms during cancer treatment  Palliative care was introduced as a service for patients with serious illness to help with symptoms, assist with goals of care conversations, navigate complex decision making, improve quality of life for patients, and provide support both patients and families.  Patient seemed to appreciate the extra layer of support.    Medical Decision Making/Goals of Care/Advance Care Planning:  Patient's current clinical condition, including diagnosis, prognosis, and management plan, and goals of care were discussed.   Life limiting disease: metastatic malignancy  Family: Supportive sister  Goals: symptom control and cancer directed therapy    Advance Directives  Existence of Advance Directives:Yes, documentation or copy in medical record  Decision maker: HCPOA is pt is unsure  Code Status: Full code    Next Follow-Up Visit:  Return to clinic in 1 month in the [facility]    Signature and billing  Thank you for allowing us to participate in the care of this patient. Recommendations will be communicated back to the consulting service by way of shared electronic medical record or face-to-face.    Medical complexity was moderate level due to due to complexity of  problems, extensive data review, and high risk of management/treatment.  Time was spent on the following: Prep Time, Time Directly with Patient/Family/Caregiver, Documentation Time. Total time spent: 35min      DATA   Diagnostic tests and information reviewed for today's visit:  Conversation with primary team, Most recent labs and imaging results, Medications     5/21/25: changes to plan indicated in bold. Continue all other regimens as listed.    Some elements copied from Supportive Oncology note on 4/23/25, the elements have been updated and all reflect current decision making from today, 5/21/2025.      Plan of Care discussed with: Provider, RN, Patient      SIGNATURE: NANCY Campbell-CNP    Contact information:  Supportive and Palliative Oncology  Monday-Friday 8 AM-5 PM  Phone:  [phone], press option #5, then option #1.   Or Epic Secure Chat

## 2025-05-28 ENCOUNTER — HOSPITAL ENCOUNTER (OUTPATIENT)
Dept: RESEARCH | Facility: HOSPITAL | Age: 60
Discharge: HOME | End: 2025-05-28
Payer: MEDICARE

## 2025-05-28 ENCOUNTER — APPOINTMENT (OUTPATIENT)
Dept: PHARMACY | Facility: HOSPITAL | Age: 60
End: 2025-05-28
Payer: MEDICARE

## 2025-05-28 VITALS
OXYGEN SATURATION: 97 % | HEART RATE: 80 BPM | TEMPERATURE: 98 F | SYSTOLIC BLOOD PRESSURE: 143 MMHG | DIASTOLIC BLOOD PRESSURE: 90 MMHG | RESPIRATION RATE: 18 BRPM

## 2025-05-28 DIAGNOSIS — C34.90 SMALL CELL CARCINOMA OF LUNG, UNSPECIFIED LATERALITY, UNSPECIFIED PART OF LUNG: ICD-10-CM

## 2025-05-28 LAB
ALBUMIN SERPL BCP-MCNC: 4.2 G/DL (ref 3.4–5)
ALP SERPL-CCNC: 99 U/L (ref 33–136)
ALT SERPL W P-5'-P-CCNC: 11 U/L (ref 7–45)
ANION GAP SERPL CALC-SCNC: 15 MMOL/L (ref 10–20)
AST SERPL W P-5'-P-CCNC: 12 U/L (ref 9–39)
BASOPHILS # BLD AUTO: 0.03 X10*3/UL (ref 0–0.1)
BASOPHILS NFR BLD AUTO: 0.6 %
BILIRUB DIRECT SERPL-MCNC: 0.1 MG/DL (ref 0–0.3)
BILIRUB SERPL-MCNC: 0.3 MG/DL (ref 0–1.2)
BUN SERPL-MCNC: 11 MG/DL (ref 6–23)
CALCIUM SERPL-MCNC: 9.3 MG/DL (ref 8.6–10.6)
CHLORIDE SERPL-SCNC: 103 MMOL/L (ref 98–107)
CO2 SERPL-SCNC: 23 MMOL/L (ref 21–32)
CREAT SERPL-MCNC: 0.63 MG/DL (ref 0.5–1.05)
EGFRCR SERPLBLD CKD-EPI 2021: >90 ML/MIN/1.73M*2
EOSINOPHIL # BLD AUTO: 0.18 X10*3/UL (ref 0–0.7)
EOSINOPHIL NFR BLD AUTO: 3.4 %
ERYTHROCYTE [DISTWIDTH] IN BLOOD BY AUTOMATED COUNT: 17.8 % (ref 11.5–14.5)
GGT SERPL-CCNC: 46 U/L (ref 5–55)
GLUCOSE SERPL-MCNC: 122 MG/DL (ref 74–99)
HCT VFR BLD AUTO: 37.4 % (ref 36–46)
HGB BLD-MCNC: 12.4 G/DL (ref 12–16)
IMM GRANULOCYTES # BLD AUTO: 0.01 X10*3/UL (ref 0–0.7)
IMM GRANULOCYTES NFR BLD AUTO: 0.2 % (ref 0–0.9)
LDH SERPL L TO P-CCNC: 138 U/L (ref 84–246)
LYMPHOCYTES # BLD AUTO: 1.68 X10*3/UL (ref 1.2–4.8)
LYMPHOCYTES NFR BLD AUTO: 31.8 %
MAGNESIUM SERPL-MCNC: 2.04 MG/DL (ref 1.6–2.4)
MCH RBC QN AUTO: 32.9 PG (ref 26–34)
MCHC RBC AUTO-ENTMCNC: 33.2 G/DL (ref 32–36)
MCV RBC AUTO: 99 FL (ref 80–100)
MONOCYTES # BLD AUTO: 0.31 X10*3/UL (ref 0.1–1)
MONOCYTES NFR BLD AUTO: 5.9 %
NEUTROPHILS # BLD AUTO: 3.08 X10*3/UL (ref 1.2–7.7)
NEUTROPHILS NFR BLD AUTO: 58.1 %
NRBC BLD-RTO: 0 /100 WBCS (ref 0–0)
PHOSPHATE SERPL-MCNC: 5.1 MG/DL (ref 2.5–4.9)
PLATELET # BLD AUTO: 274 X10*3/UL (ref 150–450)
POTASSIUM SERPL-SCNC: 4 MMOL/L (ref 3.5–5.3)
PROT SERPL-MCNC: 6.5 G/DL (ref 6.4–8.2)
RBC # BLD AUTO: 3.77 X10*6/UL (ref 4–5.2)
SODIUM SERPL-SCNC: 137 MMOL/L (ref 136–145)
WBC # BLD AUTO: 5.3 X10*3/UL (ref 4.4–11.3)

## 2025-05-28 PROCEDURE — 83735 ASSAY OF MAGNESIUM: CPT | Performed by: INTERNAL MEDICINE

## 2025-05-28 PROCEDURE — 82248 BILIRUBIN DIRECT: CPT | Performed by: INTERNAL MEDICINE

## 2025-05-28 PROCEDURE — 84100 ASSAY OF PHOSPHORUS: CPT | Performed by: INTERNAL MEDICINE

## 2025-05-28 PROCEDURE — 36415 COLL VENOUS BLD VENIPUNCTURE: CPT

## 2025-05-28 PROCEDURE — 85025 COMPLETE CBC W/AUTO DIFF WBC: CPT | Performed by: INTERNAL MEDICINE

## 2025-05-28 PROCEDURE — 82977 ASSAY OF GGT: CPT | Performed by: INTERNAL MEDICINE

## 2025-05-28 PROCEDURE — 83615 LACTATE (LD) (LDH) ENZYME: CPT | Performed by: INTERNAL MEDICINE

## 2025-05-28 PROCEDURE — 80053 COMPREHEN METABOLIC PANEL: CPT | Performed by: INTERNAL MEDICINE

## 2025-05-28 NOTE — RESEARCH NOTES
RSH><[id number]><C4+D15><[id number]>    [facility] NURSING VISIT NOTE  Study Name: [id number]- Part 1_Escalation- Monotherapy  IRB#: [id number]  [id number]  Protocol Version Dated:  6/15/2023  PI: Jd Kohli MD.      Time point: Cycle 4 and beyond- Day 15  CYCLE 5    Encounter Date: 05/28/2025  Encounter Time: 12:00 PM EDT  Encounter Department: [place] [address] RESEARCH        Study Regimen and Dosing   For Oncology study, Refer [facility] Treatment Plan for orders   Part 1_Escalation_Monotherapy - patients with advanced recurrent or refractory solid tumors (small cell lung cancer, neuroendocrine tumors or brain tumors) will receive gradually increasing doses of ABBV-706 to determine MTD (Maximum Tolerated Dose).  Cycle = 21-days.  ABBV-706 administered IV Day 1 of each cycle.     Dietary Guidelines   Regular diet         Admission and Prior to Starting Study Activities   Notify  when patient arrives to unit.  Patient review/update [facility]/[place] intake form.  Obtain vital signs after sitting at least 3 minutes. Record on flow sheet & in EMR.  Perform venipuncture for sample collection procedures. Do Not draw research samples from mediport/central line if used for infusion  Physical Exam including pulmonary exam & neuro assessment Days 8 & 15.         Study Specific Instructions and Documentation   1-Safety Labs   2-Discharge           Subjective   Minal Sun is a 59 y.o. female and is here for a Research clinical visit.    Visit Provider: Jazzy Guerrero RN     Allergies: Allergies[1]    Objective     Vital Signs:    Vitals:    05/28/25 1241   BP: 143/90   Pulse: 80   Resp: 18   Temp: 36.7 °C (98 °F)   TempSrc: Oral   SpO2: 97%       Physical Exam     ASSESSMENT and PLAN:  Problem List Items Addressed This Visit       SCLC (small cell lung carcinoma)    Relevant Orders    CBC and Auto Differential    Comprehensive metabolic panel    Bilirubin, Direct    Gamma  GT    Lactate dehydrogenase    Magnesium    Phosphorus        Medications as of the completion of today's visit:  Current Medications[2]        Orders placed during today's visit:  Orders Placed This Encounter   Procedures    CBC and Auto Differential     Standing Status:   Standing     Number of Occurrences:   1     Release result to MyChart:   Immediate [1]    Comprehensive metabolic panel     Standing Status:   Standing     Number of Occurrences:   1     Release result to MyChart:   Immediate [1]    Bilirubin, Direct     Standing Status:   Standing     Number of Occurrences:   1     Release result to MyChart:   Immediate [1]    Gamma GT     Standing Status:   Standing     Number of Occurrences:   1     Release result to MyChart:   Immediate [1]    Lactate dehydrogenase     Standing Status:   Standing     Number of Occurrences:   1     Release result to MyChart:   Immediate [1]    Magnesium     Standing Status:   Standing     Number of Occurrences:   1     Release result to MyChart:   Immediate [1]    Phosphorus     Standing Status:   Standing     Number of Occurrences:   1     Release result to MyChart:   Immediate [1]        UNIT1K16 - ABBV-706 alone or in combination in subjects with advanced solid tumors    Patient has no adverse events documented in the Research Adverse Events activity.        Day 15 Safety Labs   For Oncology study, Refer [place] Treatment Plan for orders   @ 1252 - 23g butterfly / R hand    Discharge Instructions   Discharge patient to home after study requirements completed or PK sample obtained.  Discharge time: 1255     Marcelino Byrne, RN  05/28/25           [1]   Allergies  Allergen Reactions    Clindamycin Diarrhea    Erythromycin Diarrhea    Erythromycin Base Other and Nausea Only   [2]   Current Outpatient Medications   Medication Sig Dispense Refill    acetaminophen (TylenoL) 325 mg tablet Take 2 tablets (650 mg) by mouth every 6 hours if needed for mild pain (1 - 3) or moderate pain  (4 - 6). 30 tablet 0    ALPRAZolam (Xanax) 0.5 mg tablet Take 2 tablets (1 mg) by mouth 2 times a day as needed for anxiety for up to 15 days. 30 tablet 0    apixaban (Eliquis) 5 mg tablet Take 1 tablet (5 mg) by mouth 2 times a day. 60 tablet 11    azelastine (Astelin) 137 mcg (0.1 %) nasal spray       carbinoxamine maleate 4 mg tablet Take 4 mg by mouth 2 times a day.      escitalopram (Lexapro) 10 mg tablet Take 1.5 tablets (15 mg) by mouth once daily. 150 tablet 1    guaiFENesin (Mucinex) 600 mg 12 hr tablet Take 2 tablets (1,200 mg) by mouth 2 times a day. Do not crush, chew, or split. 120 tablet 11    hydrocodone-homatropine (Hycodan) 5-1.5 mg/5 mL syrup Take 5 mL by mouth every 6 hours if needed for cough for up to 23 days. 473 mL 0    ipratropium-albuteroL (Duo-Neb) 0.5-2.5 mg/3 mL nebulizer solution       levothyroxine (Synthroid, Levoxyl) 50 mcg tablet Take 1 tablet (50 mcg) by mouth early in the morning.. Take on an empty stomach at the same time each day, either 30 to 60 minutes prior to breakfast 30 tablet 11    lidocaine-prilocaine (Emla) 2.5-2.5 % cream Apply topically a thin film of medication to [facility] site 45 mts prior to being accessed and cover with band aid 30 g 0    magnesium hydroxide (Milk of Magnesia) 400 mg/5 mL suspension Take 5 mL by mouth once daily as needed for constipation for up to 10 days. Take this when it has been 2 or more days without a bowel movement. 360 mL 0    metFORMIN  mg 24 hr tablet Take 1 tablet (500 mg) by mouth 2 times a day. 200 tablet 1    methocarbamol (Robaxin) 500 mg tablet Take 1 tablet (500 mg) by mouth 4 times a day as needed for muscle spasms. 120 tablet 1    morphine CR (MS Contin) 15 mg 12 hr tablet Take 1 tablet (15 mg) by mouth once daily at bedtime. Do not crush, chew, or split. This is your LONG-ACTING pain medicine. 30 tablet 0    naloxone (Narcan) 4 mg/0.1 mL nasal spray Administer 1 spray (4 mg) into affected nostril(s) if needed for opioid  reversal. May repeat every 2-3 minutes if needed, alternating nostrils, until medical assistance becomes available. 2 each 0    OLANZapine (ZyPREXA) 10 mg tablet Take 1 tablet (10 mg) by mouth once daily in the evening. Take with meals. This is to help with appetite, nausea, and sleep. This is a dose increase. 30 tablet 2    omeprazole (PriLOSEC) 40 mg DR capsule Take 1 capsule (40 mg) by mouth once daily. 100 capsule 1    ondansetron (Zofran) 8 mg tablet Take 1 tablet (8 mg) by mouth every 8 hours if needed for nausea or vomiting. 30 tablet 5    oxyCODONE (Roxicodone) 5 mg immediate release tablet Take 1 tablet (5 mg) by mouth every 4 hours if needed for moderate pain (4 - 6) for up to 60 doses. Can take 2 pill if need for severe pain (7-10) 20 tablet 0    ProAir HFA 90 mcg/actuation inhaler Inhale 2 puffs every 4 hours if needed.      prochlorperazine (Compazine) 10 mg tablet Take 1 tablet (10 mg) by mouth every 6 hours if needed for nausea or vomiting. 30 tablet 5    rosuvastatin (Crestor) 10 mg tablet Take 1 tablet (10 mg) by mouth once daily. 100 tablet 1    sennosides-docusate sodium (Marzena-Colace) 8.6-50 mg tablet Take 3 tablets by mouth 2 times a day. This is to help manage constipation. 180 tablet 11     No current facility-administered medications for this encounter.

## 2025-06-01 DIAGNOSIS — C34.90 SMALL CELL CARCINOMA OF LUNG, UNSPECIFIED LATERALITY, UNSPECIFIED PART OF LUNG: ICD-10-CM

## 2025-06-04 ENCOUNTER — EDUCATION (OUTPATIENT)
Dept: HEMATOLOGY/ONCOLOGY | Facility: HOSPITAL | Age: 60
End: 2025-06-04

## 2025-06-04 ENCOUNTER — HOSPITAL ENCOUNTER (OUTPATIENT)
Dept: RESEARCH | Facility: HOSPITAL | Age: 60
Discharge: HOME | End: 2025-06-04
Payer: MEDICARE

## 2025-06-04 VITALS
BODY MASS INDEX: 35.32 KG/M2 | HEART RATE: 75 BPM | OXYGEN SATURATION: 94 % | DIASTOLIC BLOOD PRESSURE: 84 MMHG | SYSTOLIC BLOOD PRESSURE: 125 MMHG | WEIGHT: 193.12 LBS | RESPIRATION RATE: 16 BRPM | TEMPERATURE: 97.2 F

## 2025-06-04 DIAGNOSIS — C34.90 SMALL CELL CARCINOMA OF LUNG, UNSPECIFIED LATERALITY, UNSPECIFIED PART OF LUNG: Primary | ICD-10-CM

## 2025-06-04 LAB
ALBUMIN SERPL BCP-MCNC: 3.9 G/DL (ref 3.4–5)
ALP SERPL-CCNC: 83 U/L (ref 33–136)
ALT SERPL W P-5'-P-CCNC: 14 U/L (ref 7–45)
ANION GAP SERPL CALC-SCNC: 12 MMOL/L (ref 10–20)
APPEARANCE UR: CLEAR
AST SERPL W P-5'-P-CCNC: 19 U/L (ref 9–39)
BASOPHILS # BLD AUTO: 0.03 X10*3/UL (ref 0–0.1)
BASOPHILS NFR BLD AUTO: 0.6 %
BILIRUB DIRECT SERPL-MCNC: 0 MG/DL (ref 0–0.3)
BILIRUB SERPL-MCNC: 0.2 MG/DL (ref 0–1.2)
BILIRUB UR STRIP.AUTO-MCNC: NEGATIVE MG/DL
BUN SERPL-MCNC: 14 MG/DL (ref 6–23)
CALCIUM SERPL-MCNC: 8.4 MG/DL (ref 8.6–10.6)
CHLORIDE SERPL-SCNC: 106 MMOL/L (ref 98–107)
CO2 SERPL-SCNC: 26 MMOL/L (ref 21–32)
COLOR UR: COLORLESS
CREAT SERPL-MCNC: 0.7 MG/DL (ref 0.5–1.05)
EGFRCR SERPLBLD CKD-EPI 2021: >90 ML/MIN/1.73M*2
EOSINOPHIL # BLD AUTO: 0.18 X10*3/UL (ref 0–0.7)
EOSINOPHIL NFR BLD AUTO: 3.4 %
ERYTHROCYTE [DISTWIDTH] IN BLOOD BY AUTOMATED COUNT: 17.9 % (ref 11.5–14.5)
GGT SERPL-CCNC: 53 U/L (ref 5–55)
GLUCOSE SERPL-MCNC: 117 MG/DL (ref 74–99)
GLUCOSE UR STRIP.AUTO-MCNC: NORMAL MG/DL
HCT VFR BLD AUTO: 37.6 % (ref 36–46)
HGB BLD-MCNC: 12 G/DL (ref 12–16)
HOLD SPECIMEN: NORMAL
IMM GRANULOCYTES # BLD AUTO: 0.02 X10*3/UL (ref 0–0.7)
IMM GRANULOCYTES NFR BLD AUTO: 0.4 % (ref 0–0.9)
KETONES UR STRIP.AUTO-MCNC: NEGATIVE MG/DL
LDH SERPL L TO P-CCNC: 166 U/L (ref 84–246)
LEUKOCYTE ESTERASE UR QL STRIP.AUTO: NEGATIVE
LYMPHOCYTES # BLD AUTO: 2.24 X10*3/UL (ref 1.2–4.8)
LYMPHOCYTES NFR BLD AUTO: 42.2 %
MAGNESIUM SERPL-MCNC: 2.12 MG/DL (ref 1.6–2.4)
MCH RBC QN AUTO: 33 PG (ref 26–34)
MCHC RBC AUTO-ENTMCNC: 31.9 G/DL (ref 32–36)
MCV RBC AUTO: 103 FL (ref 80–100)
MONOCYTES # BLD AUTO: 0.44 X10*3/UL (ref 0.1–1)
MONOCYTES NFR BLD AUTO: 8.3 %
NEUTROPHILS # BLD AUTO: 2.4 X10*3/UL (ref 1.2–7.7)
NEUTROPHILS NFR BLD AUTO: 45.1 %
NITRITE UR QL STRIP.AUTO: NEGATIVE
NRBC BLD-RTO: 0 /100 WBCS (ref 0–0)
PH UR STRIP.AUTO: 5.5 [PH]
PHOSPHATE SERPL-MCNC: 4.5 MG/DL (ref 2.5–4.9)
PLATELET # BLD AUTO: 263 X10*3/UL (ref 150–450)
POTASSIUM SERPL-SCNC: 3.9 MMOL/L (ref 3.5–5.3)
PROT SERPL-MCNC: 6.3 G/DL (ref 6.4–8.2)
PROT UR STRIP.AUTO-MCNC: NEGATIVE MG/DL
RBC # BLD AUTO: 3.64 X10*6/UL (ref 4–5.2)
RBC # UR STRIP.AUTO: NEGATIVE MG/DL
SODIUM SERPL-SCNC: 140 MMOL/L (ref 136–145)
SP GR UR STRIP.AUTO: 1.01
UROBILINOGEN UR STRIP.AUTO-MCNC: NORMAL MG/DL
WBC # BLD AUTO: 5.3 X10*3/UL (ref 4.4–11.3)

## 2025-06-04 PROCEDURE — 2560000001 HC RX 256 EXPERIMENTAL DRUGS

## 2025-06-04 PROCEDURE — 80053 COMPREHEN METABOLIC PANEL: CPT

## 2025-06-04 PROCEDURE — 2500000002 HC RX 250 W HCPCS SELF ADMINISTERED DRUGS (ALT 637 FOR MEDICARE OP, ALT 636 FOR OP/ED)

## 2025-06-04 PROCEDURE — 85025 COMPLETE CBC W/AUTO DIFF WBC: CPT

## 2025-06-04 PROCEDURE — 96413 CHEMO IV INFUSION 1 HR: CPT

## 2025-06-04 PROCEDURE — 81003 URINALYSIS AUTO W/O SCOPE: CPT

## 2025-06-04 PROCEDURE — 82977 ASSAY OF GGT: CPT

## 2025-06-04 PROCEDURE — 83615 LACTATE (LD) (LDH) ENZYME: CPT

## 2025-06-04 PROCEDURE — 83735 ASSAY OF MAGNESIUM: CPT

## 2025-06-04 PROCEDURE — 82248 BILIRUBIN DIRECT: CPT

## 2025-06-04 PROCEDURE — 2500000004 HC RX 250 GENERAL PHARMACY W/ HCPCS (ALT 636 FOR OP/ED): Performed by: INTERNAL MEDICINE

## 2025-06-04 PROCEDURE — 36415 COLL VENOUS BLD VENIPUNCTURE: CPT

## 2025-06-04 PROCEDURE — 84100 ASSAY OF PHOSPHORUS: CPT

## 2025-06-04 RX ORDER — HEPARIN 100 UNIT/ML
500 SYRINGE INTRAVENOUS AS NEEDED
Status: DISCONTINUED | OUTPATIENT
Start: 2025-06-04 | End: 2025-06-05 | Stop reason: HOSPADM

## 2025-06-04 RX ORDER — PROCHLORPERAZINE EDISYLATE 5 MG/ML
10 INJECTION INTRAMUSCULAR; INTRAVENOUS EVERY 6 HOURS PRN
Status: DISCONTINUED | OUTPATIENT
Start: 2025-06-04 | End: 2025-06-05 | Stop reason: HOSPADM

## 2025-06-04 RX ORDER — IPRATROPIUM BROMIDE AND ALBUTEROL SULFATE 2.5; .5 MG/3ML; MG/3ML
3 SOLUTION RESPIRATORY (INHALATION) ONCE
Status: COMPLETED | OUTPATIENT
Start: 2025-06-04 | End: 2025-06-04

## 2025-06-04 RX ORDER — EPINEPHRINE 1 MG/ML
0.3 INJECTION, SOLUTION, CONCENTRATE INTRAVENOUS EVERY 5 MIN PRN
Status: DISCONTINUED | OUTPATIENT
Start: 2025-06-04 | End: 2025-06-05 | Stop reason: HOSPADM

## 2025-06-04 RX ORDER — HEPARIN 100 UNIT/ML
500 SYRINGE INTRAVENOUS AS NEEDED
OUTPATIENT
Start: 2025-06-04

## 2025-06-04 RX ORDER — PROCHLORPERAZINE MALEATE 10 MG
10 TABLET ORAL EVERY 6 HOURS PRN
Status: DISCONTINUED | OUTPATIENT
Start: 2025-06-04 | End: 2025-06-05 | Stop reason: HOSPADM

## 2025-06-04 RX ORDER — ALBUTEROL SULFATE 0.83 MG/ML
3 SOLUTION RESPIRATORY (INHALATION) AS NEEDED
Status: COMPLETED | OUTPATIENT
Start: 2025-06-04 | End: 2025-06-04

## 2025-06-04 RX ORDER — FAMOTIDINE 10 MG/ML
20 INJECTION, SOLUTION INTRAVENOUS ONCE AS NEEDED
Status: DISCONTINUED | OUTPATIENT
Start: 2025-06-04 | End: 2025-06-05 | Stop reason: HOSPADM

## 2025-06-04 RX ORDER — DIPHENHYDRAMINE HYDROCHLORIDE 50 MG/ML
50 INJECTION, SOLUTION INTRAMUSCULAR; INTRAVENOUS AS NEEDED
Status: DISCONTINUED | OUTPATIENT
Start: 2025-06-04 | End: 2025-06-05 | Stop reason: HOSPADM

## 2025-06-04 RX ORDER — HEPARIN SODIUM,PORCINE/PF 10 UNIT/ML
50 SYRINGE (ML) INTRAVENOUS AS NEEDED
OUTPATIENT
Start: 2025-06-04

## 2025-06-04 RX ADMIN — HEPARIN 500 UNITS: 100 SYRINGE at 13:44

## 2025-06-04 RX ADMIN — ALBUTEROL SULFATE 3 ML: 2.5 SOLUTION RESPIRATORY (INHALATION) at 12:52

## 2025-06-04 RX ADMIN — Medication 110.6 MG: at 12:42

## 2025-06-04 RX ADMIN — IPRATROPIUM BROMIDE AND ALBUTEROL SULFATE 3 ML: .5; 3 SOLUTION RESPIRATORY (INHALATION) at 12:52

## 2025-06-04 ASSESSMENT — ENCOUNTER SYMPTOMS
MYALGIAS: 0
VISUAL CHANGE: 0
COUGH: 1
NAUSEA: 0
VOMITING: 0
NECK PAIN: 0
SWOLLEN GLANDS: 0
CHILLS: 0
VERTIGO: 0
FEVER: 0
ARTHRALGIAS: 0
DIAPHORESIS: 0
SORE THROAT: 0
JOINT SWELLING: 0
HEADACHES: 0
CHANGE IN BOWEL HABIT: 0
FATIGUE: 1
ABDOMINAL PAIN: 0
ANOREXIA: 0
NUMBNESS: 0

## 2025-06-04 ASSESSMENT — PAIN SCALES - GENERAL: PAINLEVEL_OUTOF10: 0-NO PAIN

## 2025-06-04 NOTE — RESEARCH NOTES
Research Note Treatment Day    Minal Sun is here today for treatment on NYTU1V20. Today is C6 D1. Procedures completed per protocol. AE's and con-meds reviewed with patient. Patient reports no new or worsening AE's. Patient is aware of treatment plan.    [x]   Received treatment as planned   OR  []    Treatment delayed; patient calendar updated as required   Treatment delayed because:    []   AE    []   Physician Discretion    []   Clinical Deterioration or Progression     []   Other    Education Documentation  Escort, Parking, Transportation Home, taught by Mei Vivas RN at 6/4/2025  2:47 PM.  Learner: Patient  Readiness: Acceptance  Method: Explanation  Response: Verbalizes Understanding    Financial Benefits Summary, taught by Mei Vivas RN at 6/4/2025  2:47 PM.  Learner: Patient  Readiness: Acceptance  Method: Explanation  Response: Verbalizes Understanding    Treatment Plan and Schedule, taught by Mei Vivas RN at 6/4/2025  2:47 PM.  Learner: Patient  Readiness: Acceptance  Method: Explanation  Response: Verbalizes Understanding    Supportive Medications, taught by Mei Vivas RN at 6/4/2025  2:47 PM.  Learner: Patient  Readiness: Acceptance  Method: Explanation  Response: Verbalizes Understanding    Comprehensive Metabolic Panel (CMP), taught by Mei Vivas RN at 6/4/2025  2:47 PM.  Learner: Patient  Readiness: Acceptance  Method: Explanation  Response: Verbalizes Understanding    Complete Blood Count with Differential (CBC w/ Diff), taught by Mei Vivas RN at 6/4/2025  2:47 PM.  Learner: Patient  Readiness: Acceptance  Method: Explanation  Response: Verbalizes Understanding    Education Comments  No comments found.

## 2025-06-04 NOTE — PROGRESS NOTES
Patient ID: Minal Sun is a 59 y.o. female.    DIAGNOSIS     Small cell carcinoma of the lung.  Date of diagnosis is September 27, 2024 from a bronchoscopic biopsy of a subcarinal lymph node.  Immunohistochemistry positive for TTF-1, INSM1, synaptophysin and chromogranin, negative for p40, RB immunostain shows loss of nuclear expression.        STAGING     Clinical T4, N2, M1 C, stage IVc, extensive stage disease        CURRENT SITES OF DISEASE      Left lung, mediastinum, left hilum of the lung, liver, intra-abdominal lymph nodes in the portacaval and periportal region, bone metastases        MOLECULAR GENOMICS     Test Name: Muut xF+ (XF.V3) dated October 2024     Molecular Findings:  * Gene: [id number], Variant: Missense variant (exon 1) - GOF, Potentially Actionable, p.R88Q (Allele Frequency - 0.3%)  * Gene: TP53, Variant: Missense variant - LOF, Biologically Relevant, p.R249G (Allele Frequency - 70.1%)  * Gene: RB1, Variant: Splice region variant - LOF, Biologically Relevant, c.540-1G>T, Splice Site (Allele Frequency - 56.8%)        Test Name: Muut xT (XT.V4)  Laboratory Name: [facility]  Specimen Source: Lymph node, level 7  Collection Date: 27-Sep-2024     Molecular Findings:  * Gene: TP53, Variant: Missense variant - LOF, Biologically Relevant, p.R249G (Allele Frequency - 96.2%)  * Gene: RB1, Variant: Splice region variant - LOF, Biologically Relevant, c.540-1G>T, Splice Site (Allele Frequency - 96%)  * Gene: KMT2D, Variant: Stop gain - LOF, Biologically Relevant, p.*, Nonsense (Allele Frequency - 41.9%)  * Biomarker: Microsatellite Instability, Status: stable  * Biomarker: Tumor Mutation Raymond (2.6 Muts/Mb, Percentile: 33)        SERUM TUMOR MARKERS     Baseline LDH within normal limits  C1D1 2.18.25 on study [id number] LDH was 274.   LDH has been WNL since 3.5.25.       PRIOR THERAPY     Combination chemotherapy and immunotherapy.  Initially enrolled on a clinical trial of Lutathera and  underwent octreotide scan which was positive.  However she decided to come off study and did not receive Lutathera with her chemo.  Chemotherapy started October 6, 2024.  Immunotherapy completed added with cycle #2 11.06.2024.  Minor response observed after 2 cycles of treatment. PD observed after C4.        CURRENT THERAPY     2.19.2025: Started on Phase 1 study [id number] with study drug [id number] which is an ADC targeting SEZ6 with a topoisomerase 1 inhibitor payload     CURRENT ONCOLOGICAL PROBLEMS    Cough and shortness of breath significantly improved with systemic treatment.  Pulmonary Embolism  Immunotherapy induced hypothyroidism  Neoplasm non cardiac chest/back pain        HISTORY OF PRESENT ILLNESS     This is a 59-year-old patient.  She states that she was feeling sick toward the end of spring of this year with some sinus problems.  Was seen by her allergist and was treated with antibiotics.  She was also seen at 1 point by primary care and steroids and antibiotics and inhalers were given.  She did not have any resolution and then ultimately developed a little bit of the hemoptysis which led to a chest x-ray showing an abnormality and finally a CT scan of the chest.The CT scan of the chest was done as a lung cancer screening low-dose CT and completed on September 20, 2024.  This demonstrated a extensive infiltrative mediastinal and hilar mass.  There was narrowing of the left mainstem bronchus and lower lobe bronchus secondary to extrinsic compression.  The predominant growth was within the left hilum and subcarinal region.  She underwent a bronchoscopy dated September 27, 2024 showing splaying of the main alanna.  There was mild erosion of a lymph node into the left mainstem bronchus.  There was erythema and mild erosions of an endobronchial tumor along the medial border.  Moderate to severe stenosis of the left lower lobe bronchus to 75%.        PAST MEDICAL HISTORY     Lumbar surgery about 30 years ago    hysterectomy in 2000  Diabetes  COPD  Right eye exophthalmus  herpes zoster        SOCIAL HISTORY     Patient lives with her sister.  She lives in [facility].  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  She started smoking at the age of 14 and continues to smoke at the age of 59.  Has smoked for 45 years on average 1 pack/day.        CURRENT MEDS     See medication list, meds reviewed, includes metformin, rosuvastatin, been done so Nied inhalers, Wellbutrin escitalopram, trazodone        ALLERGIES     Patient denies any drug allergies        FAMILY HISTORY      Mother had bladder cancer and possibly breast cancer.     Subjective    Today 6.4.2025 Patient in clinic for C6D1 on [id number].   Patient reports tolerated treatment well and reports she feels much better. She reports after the last treatment she has flu like symptoms which resolved. Today she reports she feels much better since she started on treatment. She reports improved energy levels.  Continues to have baseline symptoms intermittent episodes of nausea, SOB with exertion, cough, constipation and fatigue but reports these symptoms are much improved. Reports her neoplasm pain is significantly improved. Today she denies any denies any headaches, rash/itching, chills or fever, chest pain or chest tightness, painful inflammation/ulceration oral mucous membranes, vomiting, diarrhea, hematemesis /hemoptysis, hematuria/rectal bleeding, urinary symptoms, swelling extremities, weakness, or peripheral neuropathy. ROS as above. Remainder of 10 point review of systems elicited and otherwise unremarkable.     Cancer  Associated symptoms include coughing and fatigue. Pertinent negatives include no abdominal pain, anorexia, arthralgias, change in bowel habit, chest pain, chills, congestion, diaphoresis, fever, headaches, joint swelling,  myalgias, nausea, neck pain, numbness, rash, sore throat, swollen glands, urinary symptoms, vertigo, visual change or vomiting.     Objective    BSA: 1.96 meters squared  /88 (BP Location: Right arm, Patient Position: Sitting)   Pulse 86   Temp 35.8 °C (96.4 °F) (Temporal)   Resp 18   Wt 87.6 kg (193 lb 2 oz)   SpO2 96%   BMI 35.32 kg/m²      Daily Weight  06/04/25 : 87.6 kg (193 lb 2 oz)  05/14/25 : 85 kg (187 lb 6.3 oz)  04/23/25 : 84.6 kg (186 lb 8.2 oz)  04/09/25 : 83.5 kg (184 lb 1.4 oz)  04/08/25 : 82.6 kg (182 lb)         5/14/2025     9:06 AM 5/14/2025    12:34 PM 5/14/2025     1:20 PM 5/14/2025     1:50 PM 5/21/2025    11:41 AM 5/28/2025    12:41 PM 6/4/2025     9:17 AM   Vitals   Systolic 138 127 129 115 137 143 121   Diastolic 95 87 85 79 85 90 88   BP Location Right arm Right arm Right arm Right arm Right arm Right arm Right arm   Heart Rate 99 82 85 78 95 80 86   Temp 35.9 °C (96.6 °F) 36.5 °C (97.7 °F) 36.3 °C (97.3 °F) 36.2 °C (97.2 °F) 36.4 °C (97.5 °F) 36.7 °C (98 °F) 35.8 °C (96.4 °F)   Resp 18 18 16 18 18 18 18   Weight (lb) 187.39      193.12   BMI 34.27 kg/m2      35.32 kg/m2   BSA (m2) 1.93 m2      1.96 m2   Visit Report Report Report Report Report Report         Physical Exam  Constitutional:       General: She is not in acute distress.     Appearance: Normal appearance. She is not ill-appearing, toxic-appearing or diaphoretic.   HENT:      Nose: No congestion or rhinorrhea.      Mouth/Throat:      Pharynx: No oropharyngeal exudate or posterior oropharyngeal erythema.   Eyes:      General: No scleral icterus.     Conjunctiva/sclera: Conjunctivae normal.   Cardiovascular:      Rate and Rhythm: Normal rate and regular rhythm.      Pulses: Normal pulses.      Heart sounds: Normal heart sounds. No murmur heard.     No friction rub. No gallop.   Pulmonary:      Effort: Pulmonary effort is normal. No respiratory distress.      Breath sounds: No stridor. Wheezing present.   Chest:       Chest wall: No tenderness.   Abdominal:      General: There is no distension.      Palpations: There is no mass.      Tenderness: There is no abdominal tenderness. There is no guarding or rebound.   Musculoskeletal:      Cervical back: No tenderness.      Right lower leg: No edema.      Left lower leg: No edema.   Lymphadenopathy:      Cervical: No cervical adenopathy.   Skin:     General: Skin is warm.      Coloration: Skin is not jaundiced.      Findings: No bruising or lesion.   Neurological:      General: No focal deficit present.      Mental Status: She is oriented to person, place, and time.   Psychiatric:         Mood and Affect: Mood normal.         Behavior: Behavior normal.       Performance Status:  ECOG 1: Restricted in physically strenuous activity but ambulatory and able to carry out work of a light or sedentary nature, e.g., light house work, office work.        Hospital Outpatient Visit on 06/04/2025   Component Date Value Ref Range Status    WBC 06/04/2025 5.3  4.4 - 11.3 x10*3/uL Final    nRBC 06/04/2025 0.0  0.0 - 0.0 /100 WBCs Final    RBC 06/04/2025 3.64 (L)  4.00 - 5.20 x10*6/uL Final    Hemoglobin 06/04/2025 12.0  12.0 - 16.0 g/dL Final    Hematocrit 06/04/2025 37.6  36.0 - 46.0 % Final    MCV 06/04/2025 103 (H)  80 - 100 fL Final    MCH 06/04/2025 33.0  26.0 - 34.0 pg Final    MCHC 06/04/2025 31.9 (L)  32.0 - 36.0 g/dL Final    RDW 06/04/2025 17.9 (H)  11.5 - 14.5 % Final    Platelets 06/04/2025 263  150 - 450 x10*3/uL Final    Neutrophils % 06/04/2025 45.1  40.0 - 80.0 % Final    Immature Granulocytes %, Automated 06/04/2025 0.4  0.0 - 0.9 % Final    Lymphocytes % 06/04/2025 42.2  13.0 - 44.0 % Final    Monocytes % 06/04/2025 8.3  2.0 - 10.0 % Final    Eosinophils % 06/04/2025 3.4  0.0 - 6.0 % Final    Basophils % 06/04/2025 0.6  0.0 - 2.0 % Final    Neutrophils Absolute 06/04/2025 2.40  1.20 - 7.70 x10*3/uL Final    Immature Granulocytes Absolute, Au* 06/04/2025 0.02  0.00 - 0.70 x10*3/uL  Final    Lymphocytes Absolute 06/04/2025 2.24  1.20 - 4.80 x10*3/uL Final    Monocytes Absolute 06/04/2025 0.44  0.10 - 1.00 x10*3/uL Final    Eosinophils Absolute 06/04/2025 0.18  0.00 - 0.70 x10*3/uL Final    Basophils Absolute 06/04/2025 0.03  0.00 - 0.10 x10*3/uL Final    Glucose 06/04/2025 117 (H)  74 - 99 mg/dL Final    Sodium 06/04/2025 140  136 - 145 mmol/L Final    Potassium 06/04/2025 3.9  3.5 - 5.3 mmol/L Final    Chloride 06/04/2025 106  98 - 107 mmol/L Final    Bicarbonate 06/04/2025 26  21 - 32 mmol/L Final    Anion Gap 06/04/2025 12  10 - 20 mmol/L Final    Urea Nitrogen 06/04/2025 14  6 - 23 mg/dL Final    Creatinine 06/04/2025 0.70  0.50 - 1.05 mg/dL Final    eGFR 06/04/2025 >90  >60 mL/min/1.73m*2 Final    Calcium 06/04/2025 8.4 (L)  8.6 - 10.6 mg/dL Final    Albumin 06/04/2025 3.9  3.4 - 5.0 g/dL Final    Alkaline Phosphatase 06/04/2025 83  33 - 136 U/L Final    Total Protein 06/04/2025 6.3 (L)  6.4 - 8.2 g/dL Final    AST 06/04/2025 19  9 - 39 U/L Final    Bilirubin, Total 06/04/2025 0.2  0.0 - 1.2 mg/dL Final    ALT 06/04/2025 14  7 - 45 U/L Final    Bilirubin, Direct 06/04/2025 0.0  0.0 - 0.3 mg/dL Final    GGT 06/04/2025 53  5 - 55 U/L Final    LDH 06/04/2025 166  84 - 246 U/L Final    Magnesium 06/04/2025 2.12  1.60 - 2.40 mg/dL Final    Phosphorus 06/04/2025 4.5  2.5 - 4.9 mg/dL Final    Color, Urine 06/04/2025 Colorless (N)  Light-Yellow, Yellow, Dark-Yellow Final    Appearance, Urine 06/04/2025 Clear  Clear Final    Specific Gravity, Urine 06/04/2025 1.008  1.005 - 1.035 Final    pH, Urine 06/04/2025 5.5  5.0, 5.5, 6.0, 6.5, 7.0, 7.5, 8.0 Final    Protein, Urine 06/04/2025 NEGATIVE  NEGATIVE, 10 (TRACE), 20 (TRACE) mg/dL Final    Glucose, Urine 06/04/2025 Normal  Normal mg/dL Final    Blood, Urine 06/04/2025 NEGATIVE  NEGATIVE mg/dL Final    Ketones, Urine 06/04/2025 NEGATIVE  NEGATIVE mg/dL Final    Bilirubin, Urine 06/04/2025 NEGATIVE  NEGATIVE mg/dL Final    Urobilinogen, Urine  06/04/2025 Normal  Normal mg/dL Final    Nitrite, Urine 06/04/2025 NEGATIVE  NEGATIVE Final    Leukocyte Esterase, Urine 06/04/2025 NEGATIVE  NEGATIVE Final   Hospital Outpatient Visit on 05/28/2025   Component Date Value Ref Range Status    WBC 05/28/2025 5.3  4.4 - 11.3 x10*3/uL Final    nRBC 05/28/2025 0.0  0.0 - 0.0 /100 WBCs Final    RBC 05/28/2025 3.77 (L)  4.00 - 5.20 x10*6/uL Final    Hemoglobin 05/28/2025 12.4  12.0 - 16.0 g/dL Final    Hematocrit 05/28/2025 37.4  36.0 - 46.0 % Final    MCV 05/28/2025 99  80 - 100 fL Final    MCH 05/28/2025 32.9  26.0 - 34.0 pg Final    MCHC 05/28/2025 33.2  32.0 - 36.0 g/dL Final    RDW 05/28/2025 17.8 (H)  11.5 - 14.5 % Final    Platelets 05/28/2025 274  150 - 450 x10*3/uL Final    Neutrophils % 05/28/2025 58.1  40.0 - 80.0 % Final    Immature Granulocytes %, Automated 05/28/2025 0.2  0.0 - 0.9 % Final    Lymphocytes % 05/28/2025 31.8  13.0 - 44.0 % Final    Monocytes % 05/28/2025 5.9  2.0 - 10.0 % Final    Eosinophils % 05/28/2025 3.4  0.0 - 6.0 % Final    Basophils % 05/28/2025 0.6  0.0 - 2.0 % Final    Neutrophils Absolute 05/28/2025 3.08  1.20 - 7.70 x10*3/uL Final    Immature Granulocytes Absolute, Au* 05/28/2025 0.01  0.00 - 0.70 x10*3/uL Final    Lymphocytes Absolute 05/28/2025 1.68  1.20 - 4.80 x10*3/uL Final    Monocytes Absolute 05/28/2025 0.31  0.10 - 1.00 x10*3/uL Final    Eosinophils Absolute 05/28/2025 0.18  0.00 - 0.70 x10*3/uL Final    Basophils Absolute 05/28/2025 0.03  0.00 - 0.10 x10*3/uL Final    Glucose 05/28/2025 122 (H)  74 - 99 mg/dL Final    Sodium 05/28/2025 137  136 - 145 mmol/L Final    Potassium 05/28/2025 4.0  3.5 - 5.3 mmol/L Final    Chloride 05/28/2025 103  98 - 107 mmol/L Final    Bicarbonate 05/28/2025 23  21 - 32 mmol/L Final    Anion Gap 05/28/2025 15  10 - 20 mmol/L Final    Urea Nitrogen 05/28/2025 11  6 - 23 mg/dL Final    Creatinine 05/28/2025 0.63  0.50 - 1.05 mg/dL Final    eGFR 05/28/2025 >90  >60 mL/min/1.73m*2 Final    Calcium  05/28/2025 9.3  8.6 - 10.6 mg/dL Final    Albumin 05/28/2025 4.2  3.4 - 5.0 g/dL Final    Alkaline Phosphatase 05/28/2025 99  33 - 136 U/L Final    Total Protein 05/28/2025 6.5  6.4 - 8.2 g/dL Final    AST 05/28/2025 12  9 - 39 U/L Final    Bilirubin, Total 05/28/2025 0.3  0.0 - 1.2 mg/dL Final    ALT 05/28/2025 11  7 - 45 U/L Final    Bilirubin, Direct 05/28/2025 0.1  0.0 - 0.3 mg/dL Final    GGT 05/28/2025 46  5 - 55 U/L Final    LDH 05/28/2025 138  84 - 246 U/L Final    Magnesium 05/28/2025 2.04  1.60 - 2.40 mg/dL Final    Phosphorus 05/28/2025 5.1 (H)  2.5 - 4.9 mg/dL Final   Hospital Outpatient Visit on 05/21/2025   Component Date Value Ref Range Status    WBC 05/21/2025 4.9  4.4 - 11.3 x10*3/uL Final    nRBC 05/21/2025 0.0  0.0 - 0.0 /100 WBCs Final    RBC 05/21/2025 3.91 (L)  4.00 - 5.20 x10*6/uL Final    Hemoglobin 05/21/2025 12.6  12.0 - 16.0 g/dL Final    Hematocrit 05/21/2025 38.8  36.0 - 46.0 % Final    MCV 05/21/2025 99  80 - 100 fL Final    MCH 05/21/2025 32.2  26.0 - 34.0 pg Final    MCHC 05/21/2025 32.5  32.0 - 36.0 g/dL Final    RDW 05/21/2025 17.6 (H)  11.5 - 14.5 % Final    Platelets 05/21/2025 310  150 - 450 x10*3/uL Final    Neutrophils % 05/21/2025 48.5  40.0 - 80.0 % Final    Immature Granulocytes %, Automated 05/21/2025 0.0  0.0 - 0.9 % Final    Lymphocytes % 05/21/2025 41.5  13.0 - 44.0 % Final    Monocytes % 05/21/2025 5.7  2.0 - 10.0 % Final    Eosinophils % 05/21/2025 3.7  0.0 - 6.0 % Final    Basophils % 05/21/2025 0.6  0.0 - 2.0 % Final    Neutrophils Absolute 05/21/2025 2.37  1.20 - 7.70 x10*3/uL Final    Immature Granulocytes Absolute, Au* 05/21/2025 0.00  0.00 - 0.70 x10*3/uL Final    Lymphocytes Absolute 05/21/2025 2.03  1.20 - 4.80 x10*3/uL Final    Monocytes Absolute 05/21/2025 0.28  0.10 - 1.00 x10*3/uL Final    Eosinophils Absolute 05/21/2025 0.18  0.00 - 0.70 x10*3/uL Final    Basophils Absolute 05/21/2025 0.03  0.00 - 0.10 x10*3/uL Final    Glucose 05/21/2025 135 (H)  74 - 99  mg/dL Final    Sodium 05/21/2025 138  136 - 145 mmol/L Final    Potassium 05/21/2025 4.0  3.5 - 5.3 mmol/L Final    Chloride 05/21/2025 105  98 - 107 mmol/L Final    Bicarbonate 05/21/2025 24  21 - 32 mmol/L Final    Anion Gap 05/21/2025 13  10 - 20 mmol/L Final    Urea Nitrogen 05/21/2025 14  6 - 23 mg/dL Final    Creatinine 05/21/2025 0.71  0.50 - 1.05 mg/dL Final    eGFR 05/21/2025 >90  >60 mL/min/1.73m*2 Final    Calcium 05/21/2025 9.1  8.6 - 10.6 mg/dL Final    Albumin 05/21/2025 4.1  3.4 - 5.0 g/dL Final    Alkaline Phosphatase 05/21/2025 84  33 - 136 U/L Final    Total Protein 05/21/2025 6.6  6.4 - 8.2 g/dL Final    AST 05/21/2025 11  9 - 39 U/L Final    Bilirubin, Total 05/21/2025 0.3  0.0 - 1.2 mg/dL Final    ALT 05/21/2025 9  7 - 45 U/L Final    Bilirubin, Direct 05/21/2025 0.1  0.0 - 0.3 mg/dL Final    GGT 05/21/2025 35  5 - 55 U/L Final    LDH 05/21/2025 152  84 - 246 U/L Final    Magnesium 05/21/2025 2.20  1.60 - 2.40 mg/dL Final    Phosphorus 05/21/2025 3.9  2.5 - 4.9 mg/dL Final   Hospital Outpatient Visit on 05/14/2025   Component Date Value Ref Range Status    WBC 05/14/2025 5.3  4.4 - 11.3 x10*3/uL Final    nRBC 05/14/2025 0.0  0.0 - 0.0 /100 WBCs Final    RBC 05/14/2025 3.73 (L)  4.00 - 5.20 x10*6/uL Final    Hemoglobin 05/14/2025 11.8 (L)  12.0 - 16.0 g/dL Final    Hematocrit 05/14/2025 36.6  36.0 - 46.0 % Final    MCV 05/14/2025 98  80 - 100 fL Final    MCH 05/14/2025 31.6  26.0 - 34.0 pg Final    MCHC 05/14/2025 32.2  32.0 - 36.0 g/dL Final    RDW 05/14/2025 18.3 (H)  11.5 - 14.5 % Final    Platelets 05/14/2025 270  150 - 450 x10*3/uL Final    Neutrophils % 05/14/2025 48.5  40.0 - 80.0 % Final    Immature Granulocytes %, Automated 05/14/2025 0.2  0.0 - 0.9 % Final    Lymphocytes % 05/14/2025 38.9  13.0 - 44.0 % Final    Monocytes % 05/14/2025 7.6  2.0 - 10.0 % Final    Eosinophils % 05/14/2025 4.2  0.0 - 6.0 % Final    Basophils % 05/14/2025 0.6  0.0 - 2.0 % Final    Neutrophils Absolute  05/14/2025 2.57  1.20 - 7.70 x10*3/uL Final    Immature Granulocytes Absolute, Au* 05/14/2025 0.01  0.00 - 0.70 x10*3/uL Final    Lymphocytes Absolute 05/14/2025 2.06  1.20 - 4.80 x10*3/uL Final    Monocytes Absolute 05/14/2025 0.40  0.10 - 1.00 x10*3/uL Final    Eosinophils Absolute 05/14/2025 0.22  0.00 - 0.70 x10*3/uL Final    Basophils Absolute 05/14/2025 0.03  0.00 - 0.10 x10*3/uL Final    Glucose 05/14/2025 109 (H)  74 - 99 mg/dL Final    Sodium 05/14/2025 140  136 - 145 mmol/L Final    Potassium 05/14/2025 3.8  3.5 - 5.3 mmol/L Final    Chloride 05/14/2025 105  98 - 107 mmol/L Final    Bicarbonate 05/14/2025 26  21 - 32 mmol/L Final    Anion Gap 05/14/2025 13  10 - 20 mmol/L Final    Urea Nitrogen 05/14/2025 12  6 - 23 mg/dL Final    Creatinine 05/14/2025 0.70  0.50 - 1.05 mg/dL Final    eGFR 05/14/2025 >90  >60 mL/min/1.73m*2 Final    Calcium 05/14/2025 8.9  8.6 - 10.6 mg/dL Final    Albumin 05/14/2025 3.9  3.4 - 5.0 g/dL Final    Alkaline Phosphatase 05/14/2025 85  33 - 136 U/L Final    Total Protein 05/14/2025 6.2 (L)  6.4 - 8.2 g/dL Final    AST 05/14/2025 13  9 - 39 U/L Final    Bilirubin, Total 05/14/2025 0.3  0.0 - 1.2 mg/dL Final    ALT 05/14/2025 10  7 - 45 U/L Final    Bilirubin, Direct 05/14/2025 0.1  0.0 - 0.3 mg/dL Final    GGT 05/14/2025 32  5 - 55 U/L Final    LDH 05/14/2025 148  84 - 246 U/L Final    Magnesium 05/14/2025 2.18  1.60 - 2.40 mg/dL Final    Phosphorus 05/14/2025 4.2  2.5 - 4.9 mg/dL Final    Color, Urine 05/14/2025 Light-Yellow  Light-Yellow, Yellow, Dark-Yellow Final    Appearance, Urine 05/14/2025 Clear  Clear Final    Specific Gravity, Urine 05/14/2025 1.015  1.005 - 1.035 Final    pH, Urine 05/14/2025 5.5  5.0, 5.5, 6.0, 6.5, 7.0, 7.5, 8.0 Final    Protein, Urine 05/14/2025 NEGATIVE  NEGATIVE, 10 (TRACE), 20 (TRACE) mg/dL Final    Glucose, Urine 05/14/2025 Normal  Normal mg/dL Final    Blood, Urine 05/14/2025 NEGATIVE  NEGATIVE mg/dL Final    Ketones, Urine 05/14/2025 NEGATIVE   NEGATIVE mg/dL Final    Bilirubin, Urine 05/14/2025 NEGATIVE  NEGATIVE mg/dL Final    Urobilinogen, Urine 05/14/2025 Normal  Normal mg/dL Final    Nitrite, Urine 05/14/2025 NEGATIVE  NEGATIVE Final    Leukocyte Esterase, Urine 05/14/2025 NEGATIVE  NEGATIVE Final    Extra Tube 05/14/2025 Hold for add-ons.   Final    Extra Tube 05/14/2025 Hold for add-ons.   Final    Extra Tube 05/14/2025 Hold for add-ons.   Final    Extra Tube 05/14/2025 Hold for add-ons.   Final   Hospital Outpatient Visit on 05/07/2025   Component Date Value Ref Range Status    WBC 05/07/2025 5.0  4.4 - 11.3 x10*3/uL Final    nRBC 05/07/2025 0.0  0.0 - 0.0 /100 WBCs Final    RBC 05/07/2025 3.66 (L)  4.00 - 5.20 x10*6/uL Final    Hemoglobin 05/07/2025 11.7 (L)  12.0 - 16.0 g/dL Final    Hematocrit 05/07/2025 35.7 (L)  36.0 - 46.0 % Final    MCV 05/07/2025 98  80 - 100 fL Final    MCH 05/07/2025 32.0  26.0 - 34.0 pg Final    MCHC 05/07/2025 32.8  32.0 - 36.0 g/dL Final    RDW 05/07/2025 18.0 (H)  11.5 - 14.5 % Final    Platelets 05/07/2025 273  150 - 450 x10*3/uL Final    Neutrophils % 05/07/2025 64.9  40.0 - 80.0 % Final    Immature Granulocytes %, Automated 05/07/2025 0.2  0.0 - 0.9 % Final    Lymphocytes % 05/07/2025 26.1  13.0 - 44.0 % Final    Monocytes % 05/07/2025 5.6  2.0 - 10.0 % Final    Eosinophils % 05/07/2025 2.6  0.0 - 6.0 % Final    Basophils % 05/07/2025 0.6  0.0 - 2.0 % Final    Neutrophils Absolute 05/07/2025 3.25  1.20 - 7.70 x10*3/uL Final    Immature Granulocytes Absolute, Au* 05/07/2025 0.01  0.00 - 0.70 x10*3/uL Final    Lymphocytes Absolute 05/07/2025 1.31  1.20 - 4.80 x10*3/uL Final    Monocytes Absolute 05/07/2025 0.28  0.10 - 1.00 x10*3/uL Final    Eosinophils Absolute 05/07/2025 0.13  0.00 - 0.70 x10*3/uL Final    Basophils Absolute 05/07/2025 0.03  0.00 - 0.10 x10*3/uL Final    Glucose 05/07/2025 102 (H)  74 - 99 mg/dL Final    Sodium 05/07/2025 133 (L)  136 - 145 mmol/L Final    Potassium 05/07/2025 4.4  3.5 - 5.3  mmol/L Final    Chloride 05/07/2025 100  98 - 107 mmol/L Final    Bicarbonate 05/07/2025 25  21 - 32 mmol/L Final    Anion Gap 05/07/2025 12  10 - 20 mmol/L Final    Urea Nitrogen 05/07/2025 11  6 - 23 mg/dL Final    Creatinine 05/07/2025 0.70  0.50 - 1.05 mg/dL Final    eGFR 05/07/2025 >90  >60 mL/min/1.73m*2 Final    Calcium 05/07/2025 8.5 (L)  8.6 - 10.6 mg/dL Final    Albumin 05/07/2025 3.9  3.4 - 5.0 g/dL Final    Alkaline Phosphatase 05/07/2025 84  33 - 136 U/L Final    Total Protein 05/07/2025 6.2 (L)  6.4 - 8.2 g/dL Final    AST 05/07/2025 15  9 - 39 U/L Final    Bilirubin, Total 05/07/2025 0.3  0.0 - 1.2 mg/dL Final    ALT 05/07/2025 11  7 - 45 U/L Final    Bilirubin, Direct 05/07/2025 0.1  0.0 - 0.3 mg/dL Final    GGT 05/07/2025 34  5 - 55 U/L Final    LDH 05/07/2025 173  84 - 246 U/L Final    Magnesium 05/07/2025 2.07  1.60 - 2.40 mg/dL Final    Phosphorus 05/07/2025 4.1  2.5 - 4.9 mg/dL Final      MR liver w and wo IV contrast  Result Date: 5/9/2025  Impression: 1. Decreased size of multiple hepatic metastases in both the right and left hepatic lobes when compared to the PET-CT on 10/01/2024, however overall similar in size when compared to most recent CT on 03/26/2025 given the differences in technique. Findings are consistent with response to treatment with residual viable neoplasm. 2. Dominant hepatic segment VII lesion with the MR imaging features consistent with hemangioma. This lesion was photopenic on the prior PET-CT of 10/01/2024. 3. Multiple enhancing lesions throughout the thoracic and upper lumbar spine corresponding to sclerotic lesions on CT consistent with known metastatic disease.   I personally reviewed the images/study and I agree with the findings as stated by resident physician Dr. Enzo Mckeon. This study was interpreted at [facility], [place], [place].   MACRO: None   Signed by: Lokesh Ernandez 5/9/2025 8:42 AM Dictation  workstation:   [id number]    CT pelvis w IV contrast  Result Date: 5/7/2025  Impression: Lung cancer restaging scan. When compared to the prior CT dated 02/13/2025, there has been no significant interval change of the diffuse osseous metastatic disease involving the visualized pelvis and lumbar spine. No definite evidence of new disease within the pelvis, please refer to MRI dated 05/05/2025.   I personally reviewed the image(s) / study and I agree with the findings as stated by Terence Alexander MD. This study was interpreted at [place], [place].   MACRO: None   Signed by: Yao Parham 5/7/2025 6:53 PM Dictation workstation:   [id number]    CT chest w IV contrast  Result Date: 5/7/2025  Impression: 1. Improving disease burden in regards to the mediastinal/left hilar soft tissue mass as detailed above. 2. Few stable small bilateral noncalcified pulmonary nodules. No new pulmonary nodules or masses.. 3. Stable mediastinal/hilar lymphadenopathy without evidence of new pathologically enlarged lymph nodes. 4. New fracture deformity along the lateral aspect of the 8th rib as detailed above, not visualized on prior CT from 03/26/2025. Differential for this finding includes a subacute fracture  versus pathologic fracture due to underlying osseous metastatic disease. Correlation with any acute traumatic injury to this area could prove clinically useful. 5. Ill-defined hypodense hepatic lesions compatible with patient's known history of hepatic metastatic disease involvement, better characterized on same day MRI of the liver from 05/05/2025.   I personally reviewed the images/study and I agree with the findings as stated by resident Luciano Alegria. This study was interpreted at [place], [place].   Signed by: Orion Martinez 5/7/2025 5:03 PM Dictation workstation:   [id number]       Assessment/Plan     Ms. 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 Initial imaging post C2 reviewed by Dr Kohli appeared to have sub-optimal response to treatment. Unfortunately after C4 imaging confirmed she had progressive disease. In the last visit Dr Kohli discussed all treatment options which included SOC Taralatamab and also clinical trial [id number] with study drug ABBV-706 which is an ADC targeting SEZ6 with a topoisomerase 1 inhibitor payload. Patient opted to participate on study and completed screening requirements on study. Started treatment on 02.19.2025 on study and received couple of treatment today. Tolerated treatment well and recent imaging confirming she is benefiting from current treatment. Today she is in for C6D1  given she is tolerating and benefiting from current treatment we will proceed with treatment. and most recent imaging suggest she is benefiting from current treatment. Plan discussed in detail with the patient. Patient in agreement with the plan of care and is aware to call the office and research nurse if experiencing any new symptoms. RTC per protocol.         Cancer Staging   No matching staging information was found for the patient.      Oncology History   SCLC (small cell lung carcinoma)   9/27/2024 Initial Diagnosis    SCLC (small cell lung carcinoma) (Multi)     10/4/2024 - 10/8/2024 Chemotherapy    CARBOplatin / Etoposide, 21 Day Cycles - Lung     10/4/2024 - 10/6/2024 Research Study Participant    ([facility]) [id number] - Atezolizumab + CARBOplatin / Etoposide / Nx-SOSG-RGIQ, 21 Day Cycles  Plan Provider: NANCY Hernandez-CNP  Treatment goal: Control  Line of treatment: First Line  Associated studies: ([id number])[id number] with Carboplatin, Etoposide and Tislelizumab in Newly Diagnosed ES-SCLC     10/4/2024 - 1/17/2025 Chemotherapy    Durvalumab + CARBOplatin / Etoposide, 21 Day Cycles     10/28/2024 - 10/28/2024  Chemotherapy    Durvalumab + CARBOplatin / Etoposide, 21 Day Cycles     2/5/2025 - 2/5/2025 Chemotherapy    Durvalumab, 28 Day Cycles     2/19/2025 -  Research Study Participant    ([facility]) [id number] Part 1 - [id number]-[id number], 21 Day Cycles  Plan Provider: NANCY Hernandez-CNP  Treatment goal: Control  Line of treatment: Second Line  Associated studies: ABBV-706 alone or in combination in subjects with advanced solid tumors

## 2025-06-04 NOTE — RESEARCH NOTES
RSH><[id number]><C4+D1><PART1/ESCMONO>    DCRU NURSING VISIT NOTE  Study Name: [id number]- Part 1_Escalation- Monotherapy  IRB#: [id number]  [id number]  Protocol Version Dated:  6/15/2023  PI: Jd Kohli MD.      Time point: Cycle 4 and Beyond - Day 1  CYCLE 6    Encounter Date: 06/04/2025  Encounter Time:  9:00 AM EDT  Encounter Department: [place] [address] RESEARCH        Study Regimen and Dosing   For Oncology study, Refer [place] Treatment Plan for orders   Part 1_Escalation_Monotherapy - patients with advanced recurrent or refractory solid tumors (small cell lung cancer, neuroendocrine tumors or brain tumors) will receive gradually increasing doses of ABBV-706 to determine MTD (Maximum Tolerated Dose).  Cycle = 21-days.  ABBV-706 administered IV Day 1 of each cycle.     Dietary Guidelines   Regular diet       Admission and Prior to Starting Study Activities   Notify  when patient arrives to unit.  Complete DCRU/[facility] intake form in EMR.  Confirm DCRU Standing Orders (provided by Study Team/) are signed & available on chart (Expires after 1 year).  Obtain weight in kilograms - with shoes off & heavy items removed.  Obtain vital signs after sitting at least 3 minutes. Record in EMR.  Insert one peripheral IV line for sample collection procedures (flush line with 5 - 10 mL normal saline following each blood draw). Access mediport (if available) otherwise insert second peripheral line in opposite arm for Investigative drug administration (if peripheral line, flush line with 5 - 10 mL normal saline before & after infusion)  Do Not draw research samples from the same line the investigational drug is infused through.  Physical Exam including pulmonary exam & neuro assessment.       Study Specific Instructions and Documentation   1- Safety Labs  2- Vital Signs  3-Research Correlatives  4-Study Drug  5-Vital Signs  6-Discharge     PRE-DOSE  Safety Labs   For Oncology study, Refer [place] Treatment Plan for orders         Criteria to Treat   DCRU RN reviewed and meets eligibility to proceed with treatment plan   Time team notified: 1115   DCRU RN notifies study team to review eligibility and approval before dosing procedures  Time team approves: 1118      Jesus Sun is a 59 y.o. female and is here for a Research clinical visit.    Visit Provider: Jazzy Guerrero RN     Allergies: Allergies[1]    Objective     Vital Signs:    Vitals:    06/04/25 0917 06/04/25 1229 06/04/25 1312 06/04/25 1342   BP: 121/88 143/84 130/85 125/84   Pulse: 86 75 78 75   Resp: 18 18 18 16   Temp: 35.8 °C (96.4 °F) 35.9 °C (96.6 °F) 36 °C (96.8 °F) 36.2 °C (97.2 °F)   TempSrc: Temporal Temporal Temporal Temporal   SpO2: 96% 95% 97% 94%   Weight: 87.6 kg (193 lb 2 oz)      PainSc: 0-No pain          Physical Exam     ASSESSMENT and PLAN:  Problem List Items Addressed This Visit       SCLC (small cell lung carcinoma) - Primary    Relevant Medications    prochlorperazine (Compazine) tablet 10 mg    prochlorperazine (Compazine) injection 10 mg    Study [id number] ABBV-706 110.6 mg in sodium chloride 0.9% 100 mL IV (Completed)    sodium chloride 0.9 % bolus 500 mL    dextrose 5 % in water (D5W) bolus 500 mL    diphenhydrAMINE (BENADryl) injection 50 mg    methylPREDNISolone sod succinate (SOLU-Medrol) 40 mg/mL injection 40 mg    famotidine PF (Pepcid) injection 20 mg    EPINEPHrine HCl (PF) (Adrenalin) injection 0.3 mg    albuterol 2.5 mg /3 mL (0.083 %) nebulizer solution 3 mL (Completed)    heparin flush 100 unit/mL syringe 500 Units    Other Relevant Orders    Research collection: 4mL Red Top - Research Collect ABBV-706 ADA/NADA; Pre-Dose; Within 2 hours; Ambient; Other (5-8x); Allow to clot 30-60 minutes. (Completed)    Research collection: 2mL Red Top - Research Collect ABBV-706 PK ADC/Total AB; Pre-Dose; Within 2 hours; Ambient; Other (5-8x); Allow to clot  30-60 minutes. (Completed)    Research collection: 3mL Lavender EDTA - Research Collect Free Top1 Inhibitor; Pre-Dose; Within 2 hours; On ICE; 8-10x (Completed)    Research collection: 10mL RareCyte AccuCyte - [place] Collect CTC (SCLC Only); Pre-Dose; Within 2 hours; 8-10x (Completed)    CBC and Auto Differential (Completed)    Comprehensive metabolic panel (Completed)    Bilirubin, Direct (Completed)    Gamma GT (Completed)    Lactate dehydrogenase (Completed)    Magnesium (Completed)    Phosphorus (Completed)    Urinalysis with Reflex Microscopic (Completed)    Adult diet Regular    Research Communication (Completed)    Treatment Conditions (Completed)    Provider Communication - [id number] (Completed)    Nursing Communication - Hypersensitivity Management, Moderate (Completed)    Nursing Communication - Hypersensitivity Management, Severe (Completed)    Nursing Communication - Respiratory Management (Completed)    Pulse oximetry, continuous    Research collection: 4mL Red Top - Research Collect ABBV-706 ADA/NADA; Pre-Dose; Within 2 hours; Ambient; Other (5-8x); Allow to clot 30-60 minutes. (Completed)    Research collection: 2mL Red Top - Research Collect ABBV-706 PK ADC/Total AB; Pre-Dose; Within 2 hours; Ambient; Other (5-8x); Allow to clot 30-60 minutes. (Completed)    Research collection: 3mL Lavender EDTA - Research Collect Free Top1 Inhibitor; Pre-Dose; Within 2 hours; On ICE; 8-10x (Completed)    Research collection: 10mL RareCyte AccuCyte - [place]er[place] Collect CTC (SCLC Only); Pre-Dose; Within 2 hours; 8-10x (Completed)    Venous Access, CVAD        Medications as of the completion of today's visit:  Current Medications[2]    Administrations This Visit       albuterol 2.5 mg /3 mL (0.083 %) nebulizer solution 3 mL       Admin Date  06/04/2025 Action  Given Dose  3 mL Route  nebulization Documented By  Elba Srivastava RN              heparin flush 100 unit/mL syringe 500 Units       Admin Date  06/04/2025  Action  Given Dose  500 Units Route  intra-catheter Documented By  Elba Srivastava RN              ipratropium-albuteroL (Duo-Neb) 0.5-2.5 mg/3 mL nebulizer solution 3 mL       Admin Date  06/04/2025 Action  Given Dose  3 mL Route  nebulization Documented By  Elba Srivastava RN              Study [id number] ABBV-706 110.6 mg in sodium chloride 0.9% 100 mL IV       Admin Date  06/04/2025 Action  New Bag Dose  110.6 mg Rate  100 mL/hr Route  intravenous Documented By  Elba Srivastava RN                    Orders placed during today's visit:  Orders Placed This Encounter   Procedures    Research collection: 4mL Red Top - Research Collect ABBV-706 ADA/NADA; Pre-Dose; Within 2 hours; Ambient; Other (5-8x); Allow to clot 30-60 minutes.     Standing Status:   Future     Number of Occurrences:   1     Expected Date:   6/4/2025     Expiration Date:   6/4/2026     Test:   ABBV-706 ADA/NADA     Timepoint:   Pre-Dose     Pre-Dose:   Within 2 hours     Temperature:   Ambient     Invert:   Other              5-8x     Handling:   Allow to clot 30-60 minutes.     Optional?:   No    Research collection: 2mL Red Top - Research Collect ABBV-706 PK ADC/Total AB; Pre-Dose; Within 2 hours; Ambient; Other (5-8x); Allow to clot 30-60 minutes.     Standing Status:   Future     Number of Occurrences:   1     Expected Date:   6/4/2025     Expiration Date:   6/4/2026     Test:   ABBV-706 PK ADC/Total AB     Timepoint:   Pre-Dose     Pre-Dose:   Within 2 hours     Temperature:   Ambient     Invert:   Other              5-8x     Handling:   Allow to clot 30-60 minutes.     Optional?:   No    Research collection: 3mL Lavender EDTA - Research Collect Free Top1 Inhibitor; Pre-Dose; Within 2 hours; On ICE; 8-10x     Standing Status:   Future     Number of Occurrences:   1     Expected Date:   6/4/2025     Expiration Date:   6/4/2026     Test:   Free Top1 Inhibitor     Timepoint:   Pre-Dose     Pre-Dose:   Within 2 hours     Temperature:   On ICE     Invert:    8-10x     Optional?:   No    Research collection: 10mL RareCyte AccuCyte - [place]erach Collect CTC (SCLC Only); Pre-Dose; Within 2 hours; 8-10x     Standing Status:   Future     Number of Occurrences:   1     Expected Date:   6/4/2025     Expiration Date:   6/4/2026     Test:   CTC (SCLC Only)     Timepoint:   Pre-Dose     Pre-Dose:   Within 2 hours     Invert:   8-10x     Optional?:   No    CBC and Auto Differential     Standing Status:   Standing     Number of Occurrences:   1     [facility]   [facility] [1]    Comprehensive metabolic panel     Standing Status:   Standing     Number of Occurrences:   1     [facility]   Immediate [1]    Bilirubin, Direct     Standing Status:   Standing     Number of Occurrences:   1     [facility]   Immediate [1]    Gamma GT     Standing Status:   Standing     Number of Occurrences:   1     [facility]   Immediate [1]    Lactate dehydrogenase     Standing Status:   Standing     Number of Occurrences:   1     [facility]   Immediate [1]    Magnesium     Standing Status:   Standing     Number of Occurrences:   1     [facility]   Immediate [1]    Phosphorus     Standing Status:   Standing     Number of Occurrences:   1     [facility]   Immediate [1]    Urinalysis with Reflex Microscopic     Standing Status:   Standing     Number of Occurrences:   1     [facility]   Immediate [1]    Research collection: 4mL Tunepresto Collect ABBV-706 ADA/NADA; Pre-Dose; Within 2 hours; Ambient; Other (5-8x); Allow to clot 30-60 minutes.     Standing Status:   Standing     Number of Occurrences:   1     Test:   ABBV-706 ADA/NADA     Timepoint:   Pre-Dose     Pre-Dose:   Within 2 hours     Temperature:   Ambient     Invert:   Other              5-8x     Handling:   Allow to clot 30-60 minutes.     Optional?:   No    Research collection: 2mL Tunepresto Collect ABBV-706  PK ADC/Total AB; Pre-Dose; Within 2 hours; Ambient; Other (5-8x); Allow to clot 30-60 minutes.     Standing Status:   Standing     Number of Occurrences:   1     Test:   ABBV-706 PK ADC/Total AB     Timepoint:   Pre-Dose     Pre-Dose:   Within 2 hours     Temperature:   Ambient     Invert:   Other              5-8x     Handling:   Allow to clot 30-60 minutes.     Optional?:   No    Research collection: 3mL Lavender EDTA - Research Collect Free Top1 Inhibitor; Pre-Dose; Within 2 hours; On ICE; 8-10x     Standing Status:   Standing     Number of Occurrences:   1     Test:   Free Top1 Inhibitor     Timepoint:   Pre-Dose     Pre-Dose:   Within 2 hours     Temperature:   On ICE     Invert:   8-10x     Optional?:   No    Research collection: 10mL RareCyte AccuCyte - Reserach Collect CTC (SCLC Only); Pre-Dose; Within 2 hours; 8-10x     Standing Status:   Standing     Number of Occurrences:   1     Test:   CTC (SCLC Only)     Timepoint:   Pre-Dose     Pre-Dose:   Within 2 hours     Invert:   8-10x     Optional?:   No    Adult diet Regular     Standing Status:   Standing     Number of Occurrences:   1     Diet type:   Regular    Research Communication     Cohort: 1A  Dose Level: ABBV - 706 1.3 mg/kg     Standing Status:   Standing     Number of Occurrences:   1    Treatment Conditions     Hold treatment and notify provider if:   ANC LESS THAN 1.5 x 10*3/uL   Platelets LESS THAN 75 x 10*3/uL   AST/ALT GREATER THAN 5 x ULN   Total Bilirubin GREATER THAN 1.5 x ULN   Peripheral Neuropathy GREATER THAN OR EQUAL TO Grade 2   Pneumonitis GREATER THAN OR EQUAL TO Grade 1   Dermatologic Toxicity GREATER THAN OR EQUAL TO Grade 2    Adverse Event GREATER THAN OR EQUAL TO Grade 3     Standing Status:   Standing     Number of Occurrences:   1    Provider Communication - [id number]      Dose Level 1             ABBV-076 1.3 mg/kg   Dose Level 2             ABBV-076 2.5 mg/kg   Dose Level 3             ABBV-076 3.5 mg/kg   Dose Level 4              ABBV-076 5 mg/kg   Dose Level 5             ABBV-076 6.4 mg/kg   Dose Level 6             ABBV-076 8 mg/kg   Dose Level 7             ABBV-076 10 mg/kg     Standing Status:   Standing     Number of Occurrences:   1    Nursing Communication - Hypersensitivity Management, Moderate     Flushing, rash, pruritus, dyspnea, chest discomfort, back pain, angioedema, SBP LESS THAN 90 mmHg, and/or change in mental status above or below patient's baseline. In the event of moderate or severe hypersensitivity reaction to any medication:   Stop infusion.  Assess vital signs, pulse oximetry, nursing assessment, and patient complaints.  Administer medications per Hypersensitivity Reaction Medication Protocol.   Notify physician.     Standing Status:   Standing     Number of Occurrences:   1    Nursing Communication - Hypersensitivity Management, Severe     Worsening of moderate reaction symptoms, SBP LESS THAN 80 mmHg, and/or respiratory distress (respirations GREATER THAN 40 breaths per minute, wheezing, life-threatening symptoms). In the event of moderate or severe hypersensitivity reaction to any medication:   Stop infusion.  Assess vital signs, pulse oximetry, nursing assessment, and patient complaints.  Administer medications per Hypersensitivity Reaction Medication Protocol.   Notify physician.     Standing Status:   Standing     Number of Occurrences:   1    Nursing Communication - Respiratory Management     Oxygen via nasal cannula at 4 L per minute for respiratory rate GREATER THAN OR EQUAL TO to 28 breaths/minute and/or wheezing. Pulse Oximetry continuous monitoring.     Standing Status:   Standing     Number of Occurrences:   1    Pulse oximetry, continuous     Continuous monitoring to maintain SPO2 GREATER THAN 90%     Standing Status:   Standing     Number of Occurrences:   1    Venous Access, CVAD     Standing Status:   Standing     Number of Occurrences:   1        [id number] - ABBV-706 alone or in combination in  subjects with advanced solid tumors    Patient has no adverse events documented in the Research Adverse Events activity.      Safety Parameters and Special Instructions   ABBV-706  ABBV-706 is an antibody-drug conjugate (ADC) with an anti-seizure-related antibody (SEZ6) & topoisomerase inhibitor  Potential Side Effects/Adverse Events: GI effects (diarrhea, nausea, vomiting, stool abnormalities), peripheral neuropathy, myelosuppression, rash, infusion-related reaction.  Administer infusion over 60 mins (+/- 10 mins).  Observation post-dose 1 hour for Cycle 1 Day 1. If no reaction, no observation for subsequent visits.     PRE-DOSE Vital Signs     Temp, Heart Rate, Respiration, Blood Pressure: rest sitting at least 3 minutes prior to obtaining     Access Type: 23g butterfly Location: left hand   For Oncology study, Refer [place] Treatment Plan for orders  AFTER ECGs  Deliver to DCRU/[facility] lab for processing    Time point Specimen Test Cycle Volume Tube Handling Draw Time    Pre-dose (within 2 hours) (draw in order)      ABBV-706 ADA/NADA 4, 6, 9, 12 4 mL Red Top Serum Invert 5 - 8x; Ambient Allow to clot 30 -60 mins 1156    ABBV-706 PK ADC/Total AB 4, 6, 9, 12 2 mL Red Top Serum Invert 5 - 8x; Ambient  Allow to clot 30 -60 mins 1156    Biomarker Serum ODD 5 mL Gold Top SST Invert 5x; Upright. Ambient Allow to clot 30 mins na    Free TOP1 Inhibitor 4, 6, 9,12 3 mL EDTA Lavender Invert 8 - 10x. On ICE 1156    CTC (SCLC Only)-If ordered see EPCI All 10 mL RareCyte AccuCyte BCT See Below 1156    ctDNA Plasma/WB ODD 2 x 10 Streck Cell-Free DNA  na     CTC & ctDNA Plasma/WB instructions  Keep patient's arm in the downward position during collection procedure.  Hold the tube with the stopper in the uppermost position so that the tube contents do not touch the stopper or the end of the needle during sample collection.  Release tourniquet once blood start to flow in the tube, or within 2 minutes of application.  Invert 8 - 10x.           Weight-Based Dosing   Baseline (screening) weight (kg) 85.1kg  Date measured  ( Refer Previous encounters to enter)  Text :  Actual weight   Vitals:    06/04/25 0917   Weight: 87.6 kg (193 lb 2 oz)     (Weight on Day 1 of cycle)   Date Measured 06/04/2025  Enter Information here  Dose based on Cycle 1 Day 1 weight.  May use Cycle 1 Day 1 weight unless noted weight difference of 10% weight gain or loss.  The minimum dose of ABBV-706 is 50 mg.  Any calculated dose < 50 mg, patient to be administered 50 mg.  Body weight capped at 120 kg. 120 kg will be utilized to calculate the dose.       Research Drug Administration   Document medication administration in MAR activity. Document Research specific instructions below.  Enter Information here  ABBV-706  Infuse over 60 minutes (+ 10 minutes).  Observation time 1 hour post-dose.  If any Infusion-Related Reaction or Suspected Allergic-Type Reaction specific safety labs to be drawn- see EPIC     Infusion 1242 to 1342  Infusion-Related Reactions   Review Safety Parameters on the medication Order. Note specific instructions below.    - SOC Hypersensivity Orders             Þ If any Infusion-Related Reaction or Suspected Allergic-Type Reaction Þ  Clinical Safety Labs  Z-Req#:    Timepoint Blood Test Collection Priority Order of Origin     Within 2 hours after 1st sign of reaction C3a (FC3AR) (1 mL Lavender top) STAT Z-req (send to [facility])    C5 (354) (1 mL SST) STAT Z-req (send to [facility])    IgE STAT ð EMR or ð Z-req    Tryptase (2 mL Red Top) STAT ð EMR or ð Z-req       DURING-DOSE Vital Signs     Temp, Heart Rate, Respiration, Blood Pressure: rest sitting at least 3 minutes prior to obtaining  30 minutes (+/-10 minutes) after the start of infusion   See above at 1312       POST-DOSE Vital Signs      Temp, Heart Rate, Respiration, Blood Pressure: rest sitting at least 3 minutes prior to obtaining  End of Infusion (Time “0”)       See above at  2941  Discharge Instructions   Discharge patient to home after study requirements completed or PK sample obtained.  Remind patient to return: on Day 8 for vital signs & safety labs  Discharge time: 1350     Elba Srivastava RN  06/04/25                  [1]   Allergies  Allergen Reactions    Clindamycin Diarrhea    Erythromycin Diarrhea    Erythromycin Base Other and Nausea Only   [2]   Current Outpatient Medications   Medication Sig Dispense Refill    acetaminophen (TylenoL) 325 mg tablet Take 2 tablets (650 mg) by mouth every 6 hours if needed for mild pain (1 - 3) or moderate pain (4 - 6). 30 tablet 0    ALPRAZolam (Xanax) 0.5 mg tablet Take 2 tablets (1 mg) by mouth 2 times a day as needed for anxiety for up to 15 days. 30 tablet 0    apixaban (Eliquis) 5 mg tablet Take 1 tablet (5 mg) by mouth 2 times a day. 60 tablet 11    azelastine (Astelin) 137 mcg (0.1 %) nasal spray       carbinoxamine maleate 4 mg tablet Take 4 mg by mouth 2 times a day.      escitalopram (Lexapro) 10 mg tablet Take 1.5 tablets (15 mg) by mouth once daily. 150 tablet 1    guaiFENesin (Mucinex) 600 mg 12 hr tablet Take 2 tablets (1,200 mg) by mouth 2 times a day. Do not crush, chew, or split. 120 tablet 11    ipratropium-albuteroL (Duo-Neb) 0.5-2.5 mg/3 mL nebulizer solution       levothyroxine (Synthroid, Levoxyl) 50 mcg tablet Take 1 tablet (50 mcg) by mouth early in the morning.. Take on an empty stomach at the same time each day, either 30 to 60 minutes prior to breakfast 30 tablet 11    lidocaine-prilocaine (Emla) 2.5-2.5 % cream Apply topically a thin film of medication to [facility] site 45 mts prior to being accessed and cover with band aid 30 g 0    magnesium hydroxide (Milk of Magnesia) 400 mg/5 mL suspension Take 5 mL by mouth once daily as needed for constipation for up to 10 days. Take this when it has been 2 or more days without a bowel movement. 360 mL 0    metFORMIN  mg 24 hr tablet Take 1 tablet (500 mg) by mouth 2 times a  day. 200 tablet 1    methocarbamol (Robaxin) 500 mg tablet Take 1 tablet (500 mg) by mouth 4 times a day as needed for muscle spasms. 120 tablet 1    morphine CR (MS Contin) 15 mg 12 hr tablet Take 1 tablet (15 mg) by mouth once daily at bedtime. Do not crush, chew, or split. This is your LONG-ACTING pain medicine. 30 tablet 0    naloxone (Narcan) 4 mg/0.1 mL nasal spray Administer 1 spray (4 mg) into affected nostril(s) if needed for opioid reversal. May repeat every 2-3 minutes if needed, alternating nostrils, until medical assistance becomes available. 2 each 0    OLANZapine (ZyPREXA) 10 mg tablet Take 1 tablet (10 mg) by mouth once daily in the evening. Take with meals. This is to help with appetite, nausea, and sleep. This is a dose increase. 30 tablet 2    omeprazole (PriLOSEC) 40 mg DR capsule Take 1 capsule (40 mg) by mouth once daily. 100 capsule 1    ondansetron (Zofran) 8 mg tablet Take 1 tablet (8 mg) by mouth every 8 hours if needed for nausea or vomiting. 30 tablet 5    oxyCODONE (Roxicodone) 5 mg immediate release tablet Take 1 tablet (5 mg) by mouth every 4 hours if needed for moderate pain (4 - 6) for up to 60 doses. Can take 2 pill if need for severe pain (7-10) 20 tablet 0    ProAir HFA 90 mcg/actuation inhaler Inhale 2 puffs every 4 hours if needed.      prochlorperazine (Compazine) 10 mg tablet Take 1 tablet (10 mg) by mouth every 6 hours if needed for nausea or vomiting. 30 tablet 5    rosuvastatin (Crestor) 10 mg tablet Take 1 tablet (10 mg) by mouth once daily. 100 tablet 1    sennosides-docusate sodium (Marzena-Colace) 8.6-50 mg tablet Take 3 tablets by mouth 2 times a day. This is to help manage constipation. 180 tablet 11     Current Facility-Administered Medications   Medication Dose Route Frequency Provider Last Rate Last Admin    dextrose 5 % in water (D5W) bolus 500 mL  500 mL intravenous PRN NANCY Hernandez-CNP        diphenhydrAMINE (BENADryl) injection 50 mg  50 mg intravenous  PRN JESSICA Hernandez        EPINEPHrine HCl (PF) (Adrenalin) injection 0.3 mg  0.3 mg intramuscular q5 min PRN JESSICA Hernandez        famotidine PF (Pepcid) injection 20 mg  20 mg intravenous Once PRN JESSICA Hernandez        heparin flush 100 unit/mL syringe 500 Units  500 Units intra-catheter PRN Jd Kohli MD   500 Units at 06/04/25 1344    methylPREDNISolone sod succinate (SOLU-Medrol) 40 mg/mL injection 40 mg  40 mg intravenous PRN JESSICA Hernandez        prochlorperazine (Compazine) injection 10 mg  10 mg intravenous q6h PRN JESSICA Hernandez        prochlorperazine (Compazine) tablet 10 mg  10 mg oral q6h PRN JESSICA Hernandez        sodium chloride 0.9 % bolus 500 mL  500 mL intravenous PRN JESSICA Hernandez

## 2025-06-11 ENCOUNTER — EDUCATION (OUTPATIENT)
Dept: HEMATOLOGY/ONCOLOGY | Facility: HOSPITAL | Age: 60
End: 2025-06-11
Payer: MEDICARE

## 2025-06-11 ENCOUNTER — HOSPITAL ENCOUNTER (OUTPATIENT)
Dept: RESEARCH | Facility: HOSPITAL | Age: 60
Discharge: HOME | End: 2025-06-11
Payer: MEDICARE

## 2025-06-11 VITALS
TEMPERATURE: 97.5 F | DIASTOLIC BLOOD PRESSURE: 88 MMHG | HEART RATE: 89 BPM | RESPIRATION RATE: 18 BRPM | OXYGEN SATURATION: 97 % | SYSTOLIC BLOOD PRESSURE: 144 MMHG

## 2025-06-11 DIAGNOSIS — C34.90 SMALL CELL CARCINOMA OF LUNG, UNSPECIFIED LATERALITY, UNSPECIFIED PART OF LUNG: ICD-10-CM

## 2025-06-11 LAB
ALBUMIN SERPL BCP-MCNC: 4.2 G/DL (ref 3.4–5)
ALP SERPL-CCNC: 83 U/L (ref 33–136)
ALT SERPL W P-5'-P-CCNC: 13 U/L (ref 7–45)
ANION GAP SERPL CALC-SCNC: 13 MMOL/L (ref 10–20)
AST SERPL W P-5'-P-CCNC: 13 U/L (ref 9–39)
BASOPHILS # BLD AUTO: 0.03 X10*3/UL (ref 0–0.1)
BASOPHILS NFR BLD AUTO: 0.6 %
BILIRUB DIRECT SERPL-MCNC: 0.1 MG/DL (ref 0–0.3)
BILIRUB SERPL-MCNC: 0.3 MG/DL (ref 0–1.2)
BUN SERPL-MCNC: 14 MG/DL (ref 6–23)
CALCIUM SERPL-MCNC: 9.5 MG/DL (ref 8.6–10.6)
CHLORIDE SERPL-SCNC: 105 MMOL/L (ref 98–107)
CO2 SERPL-SCNC: 25 MMOL/L (ref 21–32)
CREAT SERPL-MCNC: 0.77 MG/DL (ref 0.5–1.05)
EGFRCR SERPLBLD CKD-EPI 2021: 88 ML/MIN/1.73M*2
EOSINOPHIL # BLD AUTO: 0.19 X10*3/UL (ref 0–0.7)
EOSINOPHIL NFR BLD AUTO: 3.8 %
ERYTHROCYTE [DISTWIDTH] IN BLOOD BY AUTOMATED COUNT: 17.8 % (ref 11.5–14.5)
GGT SERPL-CCNC: 49 U/L (ref 5–55)
GLUCOSE SERPL-MCNC: 134 MG/DL (ref 74–99)
HCT VFR BLD AUTO: 39 % (ref 36–46)
HGB BLD-MCNC: 12.7 G/DL (ref 12–16)
IMM GRANULOCYTES # BLD AUTO: 0.02 X10*3/UL (ref 0–0.7)
IMM GRANULOCYTES NFR BLD AUTO: 0.4 % (ref 0–0.9)
LDH SERPL L TO P-CCNC: 177 U/L (ref 84–246)
LYMPHOCYTES # BLD AUTO: 2.07 X10*3/UL (ref 1.2–4.8)
LYMPHOCYTES NFR BLD AUTO: 41.5 %
MAGNESIUM SERPL-MCNC: 2.17 MG/DL (ref 1.6–2.4)
MCH RBC QN AUTO: 32.9 PG (ref 26–34)
MCHC RBC AUTO-ENTMCNC: 32.6 G/DL (ref 32–36)
MCV RBC AUTO: 101 FL (ref 80–100)
MONOCYTES # BLD AUTO: 0.29 X10*3/UL (ref 0.1–1)
MONOCYTES NFR BLD AUTO: 5.8 %
NEUTROPHILS # BLD AUTO: 2.39 X10*3/UL (ref 1.2–7.7)
NEUTROPHILS NFR BLD AUTO: 47.9 %
NRBC BLD-RTO: 0 /100 WBCS (ref 0–0)
PHOSPHATE SERPL-MCNC: 4.6 MG/DL (ref 2.5–4.9)
PLATELET # BLD AUTO: 293 X10*3/UL (ref 150–450)
POTASSIUM SERPL-SCNC: 4.1 MMOL/L (ref 3.5–5.3)
PROT SERPL-MCNC: 6.7 G/DL (ref 6.4–8.2)
RBC # BLD AUTO: 3.86 X10*6/UL (ref 4–5.2)
SODIUM SERPL-SCNC: 139 MMOL/L (ref 136–145)
WBC # BLD AUTO: 5 X10*3/UL (ref 4.4–11.3)

## 2025-06-11 PROCEDURE — 80053 COMPREHEN METABOLIC PANEL: CPT

## 2025-06-11 PROCEDURE — 82977 ASSAY OF GGT: CPT

## 2025-06-11 PROCEDURE — 82248 BILIRUBIN DIRECT: CPT

## 2025-06-11 PROCEDURE — 83735 ASSAY OF MAGNESIUM: CPT

## 2025-06-11 PROCEDURE — 83615 LACTATE (LD) (LDH) ENZYME: CPT

## 2025-06-11 PROCEDURE — 85025 COMPLETE CBC W/AUTO DIFF WBC: CPT

## 2025-06-11 PROCEDURE — 84100 ASSAY OF PHOSPHORUS: CPT

## 2025-06-11 PROCEDURE — 36415 COLL VENOUS BLD VENIPUNCTURE: CPT

## 2025-06-11 NOTE — RESEARCH NOTES
RSH><[id number]><C2D8><[id number]>      DCRU NURSING VISIT NOTE  Study Name: [id number]- Part 1_Escalation- Monotherapy  IRB#: [id number]  DCRU#: ([facility] # [id number])  Protocol Version Dated:  6/15/2023  PI: Jd Kohli MD.      Time point: Cycle 2 and Beyond - Day 8  C6D8    Encounter Date: 06/11/2025  Encounter Time: 12:00 PM EDT  Encounter Department: [place] [address] RESEARCH    Study Regimen and Dosing   For Oncology study, Refer [place] Treatment Plan for orders   Part 1_Escalation_Monotherapy - patients with advanced recurrent or refractory solid tumors (small cell lung cancer, neuroendocrine tumors or brain tumors) will receive gradually increasing doses of ABBV-706 to determine MTD (Maximum Tolerated Dose).  Cycle = 21-days.  ABBV-706 administered IV Day 1 of each cycle.     Dietary Guidelines   Regular diet         Admission and Prior to Starting Study Activities   Notify  when patient arrives to unit.  Patient review/update DCRU/[place] intake form.  Obtain vital signs after sitting at least 3 minutes. Record on flow sheet & in EMR.  Perform venipuncture for sample collection procedures. Do Not draw research samples from mediport/central line if used for infusion  Physical Exam including pulmonary exam & neuro assessment Days 8 & 15.         Study Specific Instructions and Documentation   If available enter a snapshot of performable actions:  1-Safety Labs   2-Discharge           @Subjective   Minal Sun is a 59 y.o. female and is here for a Research clinical visit.    Visit Provider: Jazzy Guerrero RN     Allergies: Allergies[1]    Objective     Vital Signs:    There were no vitals filed for this visit.    Physical Exam     ASSESSMENT and PLAN:  Problem List Items Addressed This Visit    None       Medications as of the completion of today's visit:  Current Medications[2]        Orders placed during today's visit:  No orders of the defined types were  placed in this encounter.       [id number] - ABBV-706 alone or in combination in subjects with advanced solid tumors    Patient has no adverse events documented in the Research Adverse Events activity.        Day 8 Safety Labs   For Oncology study, Refer [place] Treatment Plan for orders             Discharge Instructions   Discharge patient to home after study requirements completed or PK sample obtained.  Remind patient to return: on Day 15 for physical exam, vital signs & safety labs.  Discharge time: ***     Chiqui Donis RN  06/11/25                  [1]   Allergies  Allergen Reactions    Clindamycin Diarrhea    Erythromycin Diarrhea    Erythromycin Base Other and Nausea Only   [2]   Current Outpatient Medications   Medication Sig Dispense Refill    acetaminophen (TylenoL) 325 mg tablet Take 2 tablets (650 mg) by mouth every 6 hours if needed for mild pain (1 - 3) or moderate pain (4 - 6). 30 tablet 0    ALPRAZolam (Xanax) 0.5 mg tablet Take 2 tablets (1 mg) by mouth 2 times a day as needed for anxiety for up to 15 days. 30 tablet 0    apixaban (Eliquis) 5 mg tablet Take 1 tablet (5 mg) by mouth 2 times a day. 60 tablet 11    azelastine (Astelin) 137 mcg (0.1 %) nasal spray       carbinoxamine maleate 4 mg tablet Take 4 mg by mouth 2 times a day.      escitalopram (Lexapro) 10 mg tablet Take 1.5 tablets (15 mg) by mouth once daily. 150 tablet 1    guaiFENesin (Mucinex) 600 mg 12 hr tablet Take 2 tablets (1,200 mg) by mouth 2 times a day. Do not crush, chew, or split. 120 tablet 11    ipratropium-albuteroL (Duo-Neb) 0.5-2.5 mg/3 mL nebulizer solution       levothyroxine (Synthroid, Levoxyl) 50 mcg tablet Take 1 tablet (50 mcg) by mouth early in the morning.. Take on an empty stomach at the same time each day, either 30 to 60 minutes prior to breakfast 30 tablet 11    lidocaine-prilocaine (Emla) 2.5-2.5 % cream Apply topically a thin film of medication to [facility] site 45 mts prior to being accessed and cover  with band aid 30 g 0    magnesium hydroxide (Milk of Magnesia) 400 mg/5 mL suspension Take 5 mL by mouth once daily as needed for constipation for up to 10 days. Take this when it has been 2 or more days without a bowel movement. 360 mL 0    metFORMIN  mg 24 hr tablet Take 1 tablet (500 mg) by mouth 2 times a day. 200 tablet 1    methocarbamol (Robaxin) 500 mg tablet Take 1 tablet (500 mg) by mouth 4 times a day as needed for muscle spasms. 120 tablet 1    morphine CR (MS Contin) 15 mg 12 hr tablet Take 1 tablet (15 mg) by mouth once daily at bedtime. Do not crush, chew, or split. This is your LONG-ACTING pain medicine. 30 tablet 0    naloxone (Narcan) 4 mg/0.1 mL nasal spray Administer 1 spray (4 mg) into affected nostril(s) if needed for opioid reversal. May repeat every 2-3 minutes if needed, alternating nostrils, until medical assistance becomes available. 2 each 0    OLANZapine (ZyPREXA) 10 mg tablet Take 1 tablet (10 mg) by mouth once daily in the evening. Take with meals. This is to help with appetite, nausea, and sleep. This is a dose increase. 30 tablet 2    omeprazole (PriLOSEC) 40 mg DR capsule Take 1 capsule (40 mg) by mouth once daily. 100 capsule 1    ondansetron (Zofran) 8 mg tablet Take 1 tablet (8 mg) by mouth every 8 hours if needed for nausea or vomiting. 30 tablet 5    oxyCODONE (Roxicodone) 5 mg immediate release tablet Take 1 tablet (5 mg) by mouth every 4 hours if needed for moderate pain (4 - 6) for up to 60 doses. Can take 2 pill if need for severe pain (7-10) 20 tablet 0    ProAir HFA 90 mcg/actuation inhaler Inhale 2 puffs every 4 hours if needed.      prochlorperazine (Compazine) 10 mg tablet Take 1 tablet (10 mg) by mouth every 6 hours if needed for nausea or vomiting. 30 tablet 5    rosuvastatin (Crestor) 10 mg tablet Take 1 tablet (10 mg) by mouth once daily. 100 tablet 1    sennosides-docusate sodium (Marzena-Colace) 8.6-50 mg tablet Take 3 tablets by mouth 2 times a day. This is  to help manage constipation. 180 tablet 11     No current facility-administered medications for this visit.

## 2025-06-11 NOTE — RESEARCH NOTES
RSH><[id number]><C4+D8><[id number]>    DCRU NURSING VISIT NOTE  Study Name: [id number]- Part 1_Escalation- Monotherapy  IRB#: [id number]  [id number]  Protocol Version Dated:  6/15/2023  PI: Jd Kohli MD.      Time point: Cycle 4 and beyond- Day 8  CYCLE 6 Day 8    Encounter Date: 06/11/2025  Encounter Time: 12:00 PM EDT  Encounter Department: [place] [address] RESEARCH        Study Regimen and Dosing   For Oncology study, Refer [place] Treatment Plan for orders   Part 1_Escalation_Monotherapy - patients with advanced recurrent or refractory solid tumors (small cell lung cancer, neuroendocrine tumors or brain tumors) will receive gradually increasing doses of ABBV-706 to determine MTD (Maximum Tolerated Dose).  Cycle = 21-days.  ABBV-706 administered IV Day 1 of each cycle.     Dietary Guidelines   Regular diet         Admission and Prior to Starting Study Activities   Notify  when patient arrives to unit.  Patient review/update DCRU/[place] intake form.  Obtain vital signs after sitting at least 3 minutes. Record on flow sheet & in EMR.  Perform venipuncture for sample collection procedures. Do Not draw research samples from mediport/central line if used for infusion  Physical Exam including pulmonary exam & neuro assessment Days 8 & 15.         Study Specific Instructions and Documentation   1-Sfety Labs   2-Discharge          Subjective   Minal Sun is a 59 y.o. female and is here for a Research clinical visit.    Visit Provider: Jazzy Guerrero RN     Allergies: Allergies[1]    Objective     Vital Signs:    There were no vitals filed for this visit.    Physical Exam     ASSESSMENT and PLAN:  Problem List Items Addressed This Visit    None       Medications as of the completion of today's visit:  Current Medications[2]        Orders placed during today's visit:  No orders of the defined types were placed in this encounter.       [id number] - ABBV-706  alone or in combination in subjects with advanced solid tumors    Patient has no adverse events documented in the Research Adverse Events activity.      Day 8 Safety Labs   For Oncology study, Refer [place] Treatment Plan for orders     Discharge Instructions   Discharge patient to home after study requirements completed or PK sample obtained.  Remind patient to return: on Day 15 for physical exam, vital signs & safety labs.  Discharge time: 1235     Chiqui Donis RN  06/11/25                      [1]   Allergies  Allergen Reactions    Clindamycin Diarrhea    Erythromycin Diarrhea    Erythromycin Base Other and Nausea Only   [2]   Current Outpatient Medications   Medication Sig Dispense Refill    acetaminophen (TylenoL) 325 mg tablet Take 2 tablets (650 mg) by mouth every 6 hours if needed for mild pain (1 - 3) or moderate pain (4 - 6). 30 tablet 0    ALPRAZolam (Xanax) 0.5 mg tablet Take 2 tablets (1 mg) by mouth 2 times a day as needed for anxiety for up to 15 days. 30 tablet 0    apixaban (Eliquis) 5 mg tablet Take 1 tablet (5 mg) by mouth 2 times a day. 60 tablet 11    azelastine (Astelin) 137 mcg (0.1 %) nasal spray       carbinoxamine maleate 4 mg tablet Take 4 mg by mouth 2 times a day.      escitalopram (Lexapro) 10 mg tablet Take 1.5 tablets (15 mg) by mouth once daily. 150 tablet 1    guaiFENesin (Mucinex) 600 mg 12 hr tablet Take 2 tablets (1,200 mg) by mouth 2 times a day. Do not crush, chew, or split. 120 tablet 11    ipratropium-albuteroL (Duo-Neb) 0.5-2.5 mg/3 mL nebulizer solution       levothyroxine (Synthroid, Levoxyl) 50 mcg tablet Take 1 tablet (50 mcg) by mouth early in the morning.. Take on an empty stomach at the same time each day, either 30 to 60 minutes prior to breakfast 30 tablet 11    lidocaine-prilocaine (Emla) 2.5-2.5 % cream Apply topically a thin film of medication to [facility] site 45 mts prior to being accessed and cover with band aid 30 g 0    magnesium hydroxide (Milk of  Magnesia) 400 mg/5 mL suspension Take 5 mL by mouth once daily as needed for constipation for up to 10 days. Take this when it has been 2 or more days without a bowel movement. 360 mL 0    metFORMIN  mg 24 hr tablet Take 1 tablet (500 mg) by mouth 2 times a day. 200 tablet 1    methocarbamol (Robaxin) 500 mg tablet Take 1 tablet (500 mg) by mouth 4 times a day as needed for muscle spasms. 120 tablet 1    morphine CR (MS Contin) 15 mg 12 hr tablet Take 1 tablet (15 mg) by mouth once daily at bedtime. Do not crush, chew, or split. This is your LONG-ACTING pain medicine. 30 tablet 0    naloxone (Narcan) 4 mg/0.1 mL nasal spray Administer 1 spray (4 mg) into affected nostril(s) if needed for opioid reversal. May repeat every 2-3 minutes if needed, alternating nostrils, until medical assistance becomes available. 2 each 0    OLANZapine (ZyPREXA) 10 mg tablet Take 1 tablet (10 mg) by mouth once daily in the evening. Take with meals. This is to help with appetite, nausea, and sleep. This is a dose increase. 30 tablet 2    omeprazole (PriLOSEC) 40 mg DR capsule Take 1 capsule (40 mg) by mouth once daily. 100 capsule 1    ondansetron (Zofran) 8 mg tablet Take 1 tablet (8 mg) by mouth every 8 hours if needed for nausea or vomiting. 30 tablet 5    oxyCODONE (Roxicodone) 5 mg immediate release tablet Take 1 tablet (5 mg) by mouth every 4 hours if needed for moderate pain (4 - 6) for up to 60 doses. Can take 2 pill if need for severe pain (7-10) 20 tablet 0    ProAir HFA 90 mcg/actuation inhaler Inhale 2 puffs every 4 hours if needed.      prochlorperazine (Compazine) 10 mg tablet Take 1 tablet (10 mg) by mouth every 6 hours if needed for nausea or vomiting. 30 tablet 5    rosuvastatin (Crestor) 10 mg tablet Take 1 tablet (10 mg) by mouth once daily. 100 tablet 1    sennosides-docusate sodium (Marzena-Colace) 8.6-50 mg tablet Take 3 tablets by mouth 2 times a day. This is to help manage constipation. 180 tablet 11     No  current facility-administered medications for this visit.

## 2025-06-11 NOTE — RESEARCH NOTES
Research Note Non-Treatment Day    Minal Sun is here today. Patient is on [id number]. Today is C6 D8. Procedures completed per protocol. AE's and con-meds reviewed with patient. Patient reports no new or worsening AE's. Patient is aware of treatment plan.      Education Documentation  Escort, Parking, Transportation Home, taught by Mei Vivas RN at 6/11/2025  2:11 PM.  Learner: Patient  Readiness: Eager  Method: Explanation  Response: Verbalizes Understanding    Nutrition/Diet, taught by Mei Vivas RN at 6/11/2025  2:11 PM.  Learner: Patient  Readiness: Eager  Method: Explanation  Response: Verbalizes Understanding    Comprehensive Metabolic Panel (CMP), taught by Mei Vivas RN at 6/11/2025  2:11 PM.  Learner: Patient  Readiness: Eager  Method: Explanation  Response: Verbalizes Understanding    Complete Blood Count with Differential (CBC w/ Diff), taught by Mei Vivas RN at 6/11/2025  2:11 PM.  Learner: Patient  Readiness: Eager  Method: Explanation  Response: Verbalizes Understanding    Education Comments  No comments found.

## 2025-06-12 DIAGNOSIS — C34.90 SMALL CELL CARCINOMA OF LUNG, UNSPECIFIED LATERALITY, UNSPECIFIED PART OF LUNG: ICD-10-CM

## 2025-06-15 PROCEDURE — RXMED WILLOW AMBULATORY MEDICATION CHARGE

## 2025-06-18 ENCOUNTER — EDUCATION (OUTPATIENT)
Dept: HEMATOLOGY/ONCOLOGY | Facility: HOSPITAL | Age: 60
End: 2025-06-18

## 2025-06-18 ENCOUNTER — APPOINTMENT (OUTPATIENT)
Dept: PHARMACY | Facility: HOSPITAL | Age: 60
End: 2025-06-18
Payer: MEDICARE

## 2025-06-18 ENCOUNTER — PHARMACY VISIT (OUTPATIENT)
Dept: PHARMACY | Facility: CLINIC | Age: 60
End: 2025-06-18
Payer: COMMERCIAL

## 2025-06-18 ENCOUNTER — HOSPITAL ENCOUNTER (OUTPATIENT)
Dept: RADIOLOGY | Facility: HOSPITAL | Age: 60
Discharge: HOME | End: 2025-06-18
Payer: MEDICARE

## 2025-06-18 ENCOUNTER — HOSPITAL ENCOUNTER (OUTPATIENT)
Dept: RESEARCH | Facility: HOSPITAL | Age: 60
Discharge: HOME | End: 2025-06-18
Payer: MEDICARE

## 2025-06-18 VITALS
OXYGEN SATURATION: 97 % | DIASTOLIC BLOOD PRESSURE: 90 MMHG | SYSTOLIC BLOOD PRESSURE: 146 MMHG | HEART RATE: 91 BPM | TEMPERATURE: 96.3 F | RESPIRATION RATE: 18 BRPM

## 2025-06-18 DIAGNOSIS — C34.90 SMALL CELL CARCINOMA OF LUNG, UNSPECIFIED LATERALITY, UNSPECIFIED PART OF LUNG: Primary | ICD-10-CM

## 2025-06-18 DIAGNOSIS — C34.90 SMALL CELL CARCINOMA OF LUNG, UNSPECIFIED LATERALITY, UNSPECIFIED PART OF LUNG: ICD-10-CM

## 2025-06-18 LAB
ALBUMIN SERPL BCP-MCNC: 4.1 G/DL (ref 3.4–5)
ALP SERPL-CCNC: 80 U/L (ref 33–136)
ALT SERPL W P-5'-P-CCNC: 9 U/L (ref 7–45)
ANION GAP SERPL CALC-SCNC: 10 MMOL/L (ref 10–20)
AST SERPL W P-5'-P-CCNC: 13 U/L (ref 9–39)
BASOPHILS # BLD AUTO: 0.03 X10*3/UL (ref 0–0.1)
BASOPHILS NFR BLD AUTO: 0.6 %
BILIRUB DIRECT SERPL-MCNC: 0.1 MG/DL (ref 0–0.3)
BILIRUB SERPL-MCNC: 0.3 MG/DL (ref 0–1.2)
BUN SERPL-MCNC: 12 MG/DL (ref 6–23)
CALCIUM SERPL-MCNC: 9 MG/DL (ref 8.6–10.6)
CHLORIDE SERPL-SCNC: 101 MMOL/L (ref 98–107)
CO2 SERPL-SCNC: 28 MMOL/L (ref 21–32)
CREAT SERPL-MCNC: 0.68 MG/DL (ref 0.5–1.05)
EGFRCR SERPLBLD CKD-EPI 2021: >90 ML/MIN/1.73M*2
EOSINOPHIL # BLD AUTO: 0.14 X10*3/UL (ref 0–0.7)
EOSINOPHIL NFR BLD AUTO: 2.7 %
ERYTHROCYTE [DISTWIDTH] IN BLOOD BY AUTOMATED COUNT: 17.8 % (ref 11.5–14.5)
GGT SERPL-CCNC: 39 U/L (ref 5–55)
GLUCOSE SERPL-MCNC: 95 MG/DL (ref 74–99)
HCT VFR BLD AUTO: 38.4 % (ref 36–46)
HGB BLD-MCNC: 12.4 G/DL (ref 12–16)
IMM GRANULOCYTES # BLD AUTO: 0.01 X10*3/UL (ref 0–0.7)
IMM GRANULOCYTES NFR BLD AUTO: 0.2 % (ref 0–0.9)
LDH SERPL L TO P-CCNC: 162 U/L (ref 84–246)
LYMPHOCYTES # BLD AUTO: 1.64 X10*3/UL (ref 1.2–4.8)
LYMPHOCYTES NFR BLD AUTO: 31.8 %
MAGNESIUM SERPL-MCNC: 2.05 MG/DL (ref 1.6–2.4)
MCH RBC QN AUTO: 33 PG (ref 26–34)
MCHC RBC AUTO-ENTMCNC: 32.3 G/DL (ref 32–36)
MCV RBC AUTO: 102 FL (ref 80–100)
MONOCYTES # BLD AUTO: 0.37 X10*3/UL (ref 0.1–1)
MONOCYTES NFR BLD AUTO: 7.2 %
NEUTROPHILS # BLD AUTO: 2.96 X10*3/UL (ref 1.2–7.7)
NEUTROPHILS NFR BLD AUTO: 57.5 %
NRBC BLD-RTO: 0 /100 WBCS (ref 0–0)
PHOSPHATE SERPL-MCNC: 4.4 MG/DL (ref 2.5–4.9)
PLATELET # BLD AUTO: 267 X10*3/UL (ref 150–450)
POTASSIUM SERPL-SCNC: 4.2 MMOL/L (ref 3.5–5.3)
PROT SERPL-MCNC: 6.4 G/DL (ref 6.4–8.2)
RBC # BLD AUTO: 3.76 X10*6/UL (ref 4–5.2)
SODIUM SERPL-SCNC: 135 MMOL/L (ref 136–145)
WBC # BLD AUTO: 5.2 X10*3/UL (ref 4.4–11.3)

## 2025-06-18 PROCEDURE — 2500000004 HC RX 250 GENERAL PHARMACY W/ HCPCS (ALT 636 FOR OP/ED): Performed by: INTERNAL MEDICINE

## 2025-06-18 PROCEDURE — 83735 ASSAY OF MAGNESIUM: CPT

## 2025-06-18 PROCEDURE — 82977 ASSAY OF GGT: CPT

## 2025-06-18 PROCEDURE — 71260 CT THORAX DX C+: CPT

## 2025-06-18 PROCEDURE — 36591 DRAW BLOOD OFF VENOUS DEVICE: CPT

## 2025-06-18 PROCEDURE — 83615 LACTATE (LD) (LDH) ENZYME: CPT

## 2025-06-18 PROCEDURE — 82248 BILIRUBIN DIRECT: CPT

## 2025-06-18 PROCEDURE — 85025 COMPLETE CBC W/AUTO DIFF WBC: CPT

## 2025-06-18 PROCEDURE — 2550000001 HC RX 255 CONTRASTS

## 2025-06-18 PROCEDURE — 84100 ASSAY OF PHOSPHORUS: CPT

## 2025-06-18 PROCEDURE — 80053 COMPREHEN METABOLIC PANEL: CPT

## 2025-06-18 RX ORDER — HEPARIN 100 UNIT/ML
500 SYRINGE INTRAVENOUS AS NEEDED
Status: DISCONTINUED | OUTPATIENT
Start: 2025-06-18 | End: 2025-06-19 | Stop reason: HOSPADM

## 2025-06-18 RX ORDER — HEPARIN 100 UNIT/ML
500 SYRINGE INTRAVENOUS AS NEEDED
OUTPATIENT
Start: 2025-06-18

## 2025-06-18 RX ORDER — HEPARIN SODIUM,PORCINE/PF 10 UNIT/ML
50 SYRINGE (ML) INTRAVENOUS AS NEEDED
OUTPATIENT
Start: 2025-06-18

## 2025-06-18 RX ADMIN — IOHEXOL 75 ML: 350 INJECTION, SOLUTION INTRAVENOUS at 12:44

## 2025-06-18 RX ADMIN — HEPARIN 500 UNITS: 100 SYRINGE at 13:05

## 2025-06-18 ASSESSMENT — PAIN SCALES - GENERAL: PAINLEVEL_OUTOF10: 0-NO PAIN

## 2025-06-18 NOTE — RESEARCH NOTES
RSH><[id number]><C4+D15><[id number]>    DCRU NURSING VISIT NOTE  Study Name: [id number]- Part 1_Escalation- Monotherapy  IRB#: [id number]  [id number]  Protocol Version Dated:  6/15/2023  PI: Jd Kohli MD.      Time point: Cycle 4 and beyond- Day 15  CYCLE 6    Encounter Date: 06/18/2025  Encounter Time: 12:00 PM EDT  Encounter Department: [place] [address] RESEARCH        Study Regimen and Dosing   For Oncology study, Refer [place] Treatment Plan for orders   Part 1_Escalation_Monotherapy - patients with advanced recurrent or refractory solid tumors (small cell lung cancer, neuroendocrine tumors or brain tumors) will receive gradually increasing doses of ABBV-706 to determine MTD (Maximum Tolerated Dose).  Cycle = 21-days.  ABBV-706 administered IV Day 1 of each cycle.     Dietary Guidelines   Regular diet         Admission and Prior to Starting Study Activities   Notify  when patient arrives to unit.  Patient review/update DCRU/[place] intake form.  Obtain vital signs after sitting at least 3 minutes. Record on flow sheet & in EMR.  Perform venipuncture for sample collection procedures. Do Not draw research samples from mediport/central line if used for infusion  Physical Exam including pulmonary exam & neuro assessment Days 8 & 15.         Study Specific Instructions and Documentation   1-Safety Labs   2-Discharge           Subjective   Minal Sun is a 59 y.o. female and is here for a Research clinical visit.    Visit Provider: Jazzy Guerrero RN     Allergies: Allergies[1]    Objective     Vital Signs:    Vitals:    06/18/25 1246   BP: 146/90   Pulse: 91   Resp: 18   Temp: 35.7 °C (96.3 °F)   TempSrc: Temporal   SpO2: 97%   PainSc: 0-No pain       Physical Exam     ASSESSMENT and PLAN:  Problem List Items Addressed This Visit       SCLC (small cell lung carcinoma) - Primary    Relevant Medications    heparin flush 100 unit/mL syringe 500 Units     Other Relevant Orders    Adult diet Regular    Venous Access, CVAD    CBC and Auto Differential    Comprehensive metabolic panel    Bilirubin, Direct    Gamma GT    Lactate dehydrogenase    Magnesium    Phosphorus        Medications as of the completion of today's visit:  Current Medications[2]    Administrations This Visit       heparin flush 100 unit/mL syringe 500 Units       Admin Date  06/18/2025 Action  Given Dose  500 Units Route  intra-catheter Documented By  Elba Srivastava RN                    Orders placed during today's visit:  Orders Placed This Encounter   Procedures    CBC and Auto Differential     Standing Status:   Standing     Number of Occurrences:   1     Release result to [facility]:   Immediate [1]    Comprehensive metabolic panel     Standing Status:   Standing     Number of Occurrences:   1     [facility]    Bilirubin, Direct     Standing Status:   Standing     Number of Occurrences:   1     [facility]    Gamma GT     Standing Status:   Standing     Number of Occurrences:   1     [facility] [1]    Lactate dehydrogenase     Standing Status:   Standing     Number of Occurrences:   1     [facility]   [facility] [1]    Magnesium     Standing Status:   Standing     Number of Occurrences:   1     [facility]   [facility] [1]    Phosphorus     Standing Status:   Standing     Number of Occurrences:   1     [facility]   [facility] [1]    Adult diet Regular     Standing Status:   Standing     Number of Occurrences:   1     Diet type:   Regular    Venous Access, CVAD     Standing Status:   Standing     Number of Occurrences:   1        [id number] - ABBV-706 alone or in combination in subjects with advanced solid tumors    Patient has no adverse events documented in the Research Adverse Events activity.        Day 15 Safety Labs   For Oncology study, Refer [place] Treatment Plan  for orders  Drawn from right chest [facility] at 1301         Discharge Instructions   Discharge patient to home after study requirements completed or PK sample obtained.  Discharge time: 1315     Elba Srivastava RN  06/18/25                        [1]   Allergies  Allergen Reactions    Clindamycin Diarrhea    Erythromycin Diarrhea    Erythromycin Base Other and Nausea Only   [2]   Current Outpatient Medications   Medication Sig Dispense Refill    acetaminophen (TylenoL) 325 mg tablet Take 2 tablets (650 mg) by mouth every 6 hours if needed for mild pain (1 - 3) or moderate pain (4 - 6). 30 tablet 0    ALPRAZolam (Xanax) 0.5 mg tablet Take 2 tablets (1 mg) by mouth 2 times a day as needed for anxiety for up to 15 days. 30 tablet 0    apixaban (Eliquis) 5 mg tablet Take 1 tablet (5 mg) by mouth 2 times a day. 60 tablet 11    azelastine (Astelin) 137 mcg (0.1 %) nasal spray       carbinoxamine maleate 4 mg tablet Take 4 mg by mouth 2 times a day.      escitalopram (Lexapro) 10 mg tablet Take 1.5 tablets (15 mg) by mouth once daily. 150 tablet 1    guaiFENesin (Mucinex) 600 mg 12 hr tablet Take 2 tablets (1,200 mg) by mouth 2 times a day. Do not crush, chew, or split. 120 tablet 11    ipratropium-albuteroL (Duo-Neb) 0.5-2.5 mg/3 mL nebulizer solution       levothyroxine (Synthroid, Levoxyl) 50 mcg tablet Take 1 tablet (50 mcg) by mouth early in the morning.. Take on an empty stomach at the same time each day, either 30 to 60 minutes prior to breakfast 30 tablet 11    lidocaine-prilocaine (Emla) 2.5-2.5 % cream Apply topically a thin film of medication to [facility] site 45 mts prior to being accessed and cover with band aid 30 g 0    magnesium hydroxide (Milk of Magnesia) 400 mg/5 mL suspension Take 5 mL by mouth once daily as needed for constipation for up to 10 days. Take this when it has been 2 or more days without a bowel movement. 360 mL 0    metFORMIN  mg 24 hr tablet Take 1 tablet (500 mg) by mouth 2 times a day.  200 tablet 1    methocarbamol (Robaxin) 500 mg tablet Take 1 tablet (500 mg) by mouth 4 times a day as needed for muscle spasms. 120 tablet 1    morphine CR (MS Contin) 15 mg 12 hr tablet Take 1 tablet (15 mg) by mouth once daily at bedtime. Do not crush, chew, or split. This is your LONG-ACTING pain medicine. 30 tablet 0    naloxone (Narcan) 4 mg/0.1 mL nasal spray Administer 1 spray (4 mg) into affected nostril(s) if needed for opioid reversal. May repeat every 2-3 minutes if needed, alternating nostrils, until medical assistance becomes available. 2 each 0    OLANZapine (ZyPREXA) 10 mg tablet Take 1 tablet (10 mg) by mouth once daily in the evening. Take with meals. This is to help with appetite, nausea, and sleep. This is a dose increase. 30 tablet 2    omeprazole (PriLOSEC) 40 mg DR capsule Take 1 capsule (40 mg) by mouth once daily. 100 capsule 1    ondansetron (Zofran) 8 mg tablet Take 1 tablet (8 mg) by mouth every 8 hours if needed for nausea or vomiting. 30 tablet 5    oxyCODONE (Roxicodone) 5 mg immediate release tablet Take 1 tablet (5 mg) by mouth every 4 hours if needed for moderate pain (4 - 6) for up to 60 doses. Can take 2 pill if need for severe pain (7-10) 20 tablet 0    ProAir HFA 90 mcg/actuation inhaler Inhale 2 puffs every 4 hours if needed.      prochlorperazine (Compazine) 10 mg tablet Take 1 tablet (10 mg) by mouth every 6 hours if needed for nausea or vomiting. 30 tablet 5    rosuvastatin (Crestor) 10 mg tablet Take 1 tablet (10 mg) by mouth once daily. 100 tablet 1    sennosides-docusate sodium (Marzena-Colace) 8.6-50 mg tablet Take 3 tablets by mouth 2 times a day. This is to help manage constipation. 180 tablet 11     Current Facility-Administered Medications   Medication Dose Route Frequency Provider Last Rate Last Admin    heparin flush 100 unit/mL syringe 500 Units  500 Units intra-catheter PRN Jd Kohli MD   500 Units at 06/18/25 1729

## 2025-06-18 NOTE — PROGRESS NOTES
Research Note Non-Treatment Day    Minal Sun is here today. Patient is on [id number]. Today is C6D15. Procedures completed per protocol. AE's and con-meds reviewed with patient. Patient states that her cough has improved since she has starting taken her Benzonatate again. No new or worsening AE's at this time. Patient is aware of treatment plan and will return on 6/25 for C7D1.      Education Documentation  Treatment Plan and Schedule, taught by Jazzy Guerrero RN at 6/18/2025  2:15 PM.  Learner: Patient  Readiness: Acceptance  Method: Explanation  Response: Verbalizes Understanding    Education Comments  No comments found.

## 2025-06-24 DIAGNOSIS — C34.90 SMALL CELL CARCINOMA OF LUNG, UNSPECIFIED LATERALITY, UNSPECIFIED PART OF LUNG: Primary | ICD-10-CM

## 2025-06-24 RX ORDER — PROCHLORPERAZINE EDISYLATE 5 MG/ML
10 INJECTION INTRAMUSCULAR; INTRAVENOUS EVERY 6 HOURS PRN
Status: CANCELLED | OUTPATIENT
Start: 2025-06-25

## 2025-06-24 RX ORDER — DIPHENHYDRAMINE HYDROCHLORIDE 50 MG/ML
50 INJECTION, SOLUTION INTRAMUSCULAR; INTRAVENOUS AS NEEDED
Status: CANCELLED | OUTPATIENT
Start: 2025-06-25

## 2025-06-24 RX ORDER — EPINEPHRINE 0.3 MG/.3ML
0.3 INJECTION SUBCUTANEOUS EVERY 5 MIN PRN
Status: CANCELLED | OUTPATIENT
Start: 2025-06-25

## 2025-06-24 RX ORDER — FAMOTIDINE 10 MG/ML
20 INJECTION, SOLUTION INTRAVENOUS ONCE AS NEEDED
Status: CANCELLED | OUTPATIENT
Start: 2025-06-25

## 2025-06-24 RX ORDER — PROCHLORPERAZINE MALEATE 10 MG
10 TABLET ORAL EVERY 6 HOURS PRN
Status: CANCELLED | OUTPATIENT
Start: 2025-06-25

## 2025-06-24 RX ORDER — ALBUTEROL SULFATE 0.83 MG/ML
3 SOLUTION RESPIRATORY (INHALATION) AS NEEDED
Status: CANCELLED | OUTPATIENT
Start: 2025-06-25

## 2025-06-25 ENCOUNTER — HOSPITAL ENCOUNTER (OUTPATIENT)
Dept: RESEARCH | Facility: HOSPITAL | Age: 60
Discharge: HOME | End: 2025-06-25
Payer: MEDICARE

## 2025-06-25 ENCOUNTER — EDUCATION (OUTPATIENT)
Dept: HEMATOLOGY/ONCOLOGY | Facility: HOSPITAL | Age: 60
End: 2025-06-25

## 2025-06-25 ENCOUNTER — OFFICE VISIT (OUTPATIENT)
Dept: HEMATOLOGY/ONCOLOGY | Facility: HOSPITAL | Age: 60
End: 2025-06-25
Payer: MEDICARE

## 2025-06-25 ENCOUNTER — OFFICE VISIT (OUTPATIENT)
Dept: PALLIATIVE MEDICINE | Facility: HOSPITAL | Age: 60
End: 2025-06-25
Payer: MEDICARE

## 2025-06-25 VITALS
SYSTOLIC BLOOD PRESSURE: 124 MMHG | HEART RATE: 78 BPM | WEIGHT: 191.8 LBS | BODY MASS INDEX: 35.08 KG/M2 | TEMPERATURE: 97 F | DIASTOLIC BLOOD PRESSURE: 65 MMHG | RESPIRATION RATE: 16 BRPM | OXYGEN SATURATION: 95 %

## 2025-06-25 DIAGNOSIS — K59.03 CONSTIPATION DUE TO OPIOID THERAPY: ICD-10-CM

## 2025-06-25 DIAGNOSIS — G89.3 CANCER RELATED PAIN: ICD-10-CM

## 2025-06-25 DIAGNOSIS — C34.90 SMALL CELL CARCINOMA OF LUNG, UNSPECIFIED LATERALITY, UNSPECIFIED PART OF LUNG: ICD-10-CM

## 2025-06-25 DIAGNOSIS — Z51.5 PALLIATIVE CARE ENCOUNTER: Primary | ICD-10-CM

## 2025-06-25 DIAGNOSIS — C34.90 SMALL CELL CARCINOMA OF LUNG, UNSPECIFIED LATERALITY, UNSPECIFIED PART OF LUNG: Primary | ICD-10-CM

## 2025-06-25 DIAGNOSIS — F32.A DEPRESSION, UNSPECIFIED DEPRESSION TYPE: ICD-10-CM

## 2025-06-25 DIAGNOSIS — F41.9 ANXIETY: ICD-10-CM

## 2025-06-25 DIAGNOSIS — T40.2X5A CONSTIPATION DUE TO OPIOID THERAPY: ICD-10-CM

## 2025-06-25 LAB
ALBUMIN SERPL BCP-MCNC: 4.4 G/DL (ref 3.4–5)
ALP SERPL-CCNC: 84 U/L (ref 33–136)
ALT SERPL W P-5'-P-CCNC: 10 U/L (ref 7–45)
ANION GAP SERPL CALC-SCNC: 14 MMOL/L (ref 10–20)
APPEARANCE UR: CLEAR
AST SERPL W P-5'-P-CCNC: 12 U/L (ref 9–39)
BASOPHILS # BLD AUTO: 0.03 X10*3/UL (ref 0–0.1)
BASOPHILS NFR BLD AUTO: 0.5 %
BILIRUB DIRECT SERPL-MCNC: 0 MG/DL (ref 0–0.3)
BILIRUB SERPL-MCNC: 0.3 MG/DL (ref 0–1.2)
BILIRUB UR STRIP.AUTO-MCNC: NEGATIVE MG/DL
BUN SERPL-MCNC: 17 MG/DL (ref 6–23)
CALCIUM SERPL-MCNC: 9.3 MG/DL (ref 8.6–10.6)
CHLORIDE SERPL-SCNC: 100 MMOL/L (ref 98–107)
CO2 SERPL-SCNC: 28 MMOL/L (ref 21–32)
COLOR UR: YELLOW
CREAT SERPL-MCNC: 0.81 MG/DL (ref 0.5–1.05)
EGFRCR SERPLBLD CKD-EPI 2021: 83 ML/MIN/1.73M*2
EOSINOPHIL # BLD AUTO: 0.19 X10*3/UL (ref 0–0.7)
EOSINOPHIL NFR BLD AUTO: 3 %
ERYTHROCYTE [DISTWIDTH] IN BLOOD BY AUTOMATED COUNT: 17.4 % (ref 11.5–14.5)
GGT SERPL-CCNC: 39 U/L (ref 5–55)
GLUCOSE SERPL-MCNC: 116 MG/DL (ref 74–99)
GLUCOSE UR STRIP.AUTO-MCNC: NORMAL MG/DL
HCT VFR BLD AUTO: 41.3 % (ref 36–46)
HGB BLD-MCNC: 13.1 G/DL (ref 12–16)
HOLD SPECIMEN: NORMAL
IMM GRANULOCYTES # BLD AUTO: 0.02 X10*3/UL (ref 0–0.7)
IMM GRANULOCYTES NFR BLD AUTO: 0.3 % (ref 0–0.9)
KETONES UR STRIP.AUTO-MCNC: NEGATIVE MG/DL
LDH SERPL L TO P-CCNC: 155 U/L (ref 84–246)
LEUKOCYTE ESTERASE UR QL STRIP.AUTO: NEGATIVE
LYMPHOCYTES # BLD AUTO: 2.07 X10*3/UL (ref 1.2–4.8)
LYMPHOCYTES NFR BLD AUTO: 32.9 %
MAGNESIUM SERPL-MCNC: 2.2 MG/DL (ref 1.6–2.4)
MCH RBC QN AUTO: 32.4 PG (ref 26–34)
MCHC RBC AUTO-ENTMCNC: 31.7 G/DL (ref 32–36)
MCV RBC AUTO: 102 FL (ref 80–100)
MONOCYTES # BLD AUTO: 0.41 X10*3/UL (ref 0.1–1)
MONOCYTES NFR BLD AUTO: 6.5 %
NEUTROPHILS # BLD AUTO: 3.57 X10*3/UL (ref 1.2–7.7)
NEUTROPHILS NFR BLD AUTO: 56.8 %
NITRITE UR QL STRIP.AUTO: NEGATIVE
NRBC BLD-RTO: 0 /100 WBCS (ref 0–0)
PH UR STRIP.AUTO: 5 [PH]
PHOSPHATE SERPL-MCNC: 5.1 MG/DL (ref 2.5–4.9)
PLATELET # BLD AUTO: 289 X10*3/UL (ref 150–450)
POTASSIUM SERPL-SCNC: 4 MMOL/L (ref 3.5–5.3)
PROT SERPL-MCNC: 6.8 G/DL (ref 6.4–8.2)
PROT UR STRIP.AUTO-MCNC: NEGATIVE MG/DL
RBC # BLD AUTO: 4.04 X10*6/UL (ref 4–5.2)
RBC # UR STRIP.AUTO: NEGATIVE MG/DL
SODIUM SERPL-SCNC: 138 MMOL/L (ref 136–145)
SP GR UR STRIP.AUTO: 1.02
T4 FREE SERPL-MCNC: 0.74 NG/DL (ref 0.78–1.48)
TSH SERPL-ACNC: >120 MIU/L (ref 0.44–3.98)
UROBILINOGEN UR STRIP.AUTO-MCNC: NORMAL MG/DL
WBC # BLD AUTO: 6.3 X10*3/UL (ref 4.4–11.3)

## 2025-06-25 PROCEDURE — 84100 ASSAY OF PHOSPHORUS: CPT | Performed by: INTERNAL MEDICINE

## 2025-06-25 PROCEDURE — 83735 ASSAY OF MAGNESIUM: CPT | Performed by: INTERNAL MEDICINE

## 2025-06-25 PROCEDURE — 96413 CHEMO IV INFUSION 1 HR: CPT

## 2025-06-25 PROCEDURE — 83615 LACTATE (LD) (LDH) ENZYME: CPT | Performed by: INTERNAL MEDICINE

## 2025-06-25 PROCEDURE — 85025 COMPLETE CBC W/AUTO DIFF WBC: CPT | Performed by: INTERNAL MEDICINE

## 2025-06-25 PROCEDURE — 2500000004 HC RX 250 GENERAL PHARMACY W/ HCPCS (ALT 636 FOR OP/ED): Performed by: INTERNAL MEDICINE

## 2025-06-25 PROCEDURE — 99214 OFFICE O/P EST MOD 30 MIN: CPT | Mod: 25

## 2025-06-25 PROCEDURE — 81003 URINALYSIS AUTO W/O SCOPE: CPT | Performed by: INTERNAL MEDICINE

## 2025-06-25 PROCEDURE — 84439 ASSAY OF FREE THYROXINE: CPT

## 2025-06-25 PROCEDURE — 36415 COLL VENOUS BLD VENIPUNCTURE: CPT

## 2025-06-25 PROCEDURE — 84443 ASSAY THYROID STIM HORMONE: CPT

## 2025-06-25 PROCEDURE — 99215 OFFICE O/P EST HI 40 MIN: CPT | Performed by: INTERNAL MEDICINE

## 2025-06-25 PROCEDURE — 2560000001 HC RX 256 EXPERIMENTAL DRUGS: Performed by: INTERNAL MEDICINE

## 2025-06-25 PROCEDURE — 82977 ASSAY OF GGT: CPT | Performed by: INTERNAL MEDICINE

## 2025-06-25 PROCEDURE — 80053 COMPREHEN METABOLIC PANEL: CPT | Performed by: INTERNAL MEDICINE

## 2025-06-25 PROCEDURE — 82248 BILIRUBIN DIRECT: CPT | Performed by: INTERNAL MEDICINE

## 2025-06-25 RX ORDER — PROCHLORPERAZINE MALEATE 10 MG
10 TABLET ORAL EVERY 6 HOURS PRN
Status: DISCONTINUED | OUTPATIENT
Start: 2025-06-25 | End: 2025-06-26 | Stop reason: HOSPADM

## 2025-06-25 RX ORDER — HEPARIN 100 UNIT/ML
500 SYRINGE INTRAVENOUS AS NEEDED
OUTPATIENT
Start: 2025-06-25

## 2025-06-25 RX ORDER — EPINEPHRINE 1 MG/ML
0.3 INJECTION, SOLUTION, CONCENTRATE INTRAVENOUS EVERY 5 MIN PRN
Status: DISCONTINUED | OUTPATIENT
Start: 2025-06-25 | End: 2025-06-26 | Stop reason: HOSPADM

## 2025-06-25 RX ORDER — HEPARIN 100 UNIT/ML
500 SYRINGE INTRAVENOUS AS NEEDED
Status: DISCONTINUED | OUTPATIENT
Start: 2025-06-25 | End: 2025-06-26 | Stop reason: HOSPADM

## 2025-06-25 RX ORDER — HEPARIN SODIUM,PORCINE/PF 10 UNIT/ML
50 SYRINGE (ML) INTRAVENOUS AS NEEDED
OUTPATIENT
Start: 2025-06-25

## 2025-06-25 RX ORDER — DIPHENHYDRAMINE HYDROCHLORIDE 50 MG/ML
50 INJECTION, SOLUTION INTRAMUSCULAR; INTRAVENOUS AS NEEDED
Status: DISCONTINUED | OUTPATIENT
Start: 2025-06-25 | End: 2025-06-26 | Stop reason: HOSPADM

## 2025-06-25 RX ORDER — ESCITALOPRAM OXALATE 10 MG/1
20 TABLET ORAL DAILY
Qty: 180 TABLET | Refills: 0 | Status: SHIPPED | OUTPATIENT
Start: 2025-06-25 | End: 2025-09-23

## 2025-06-25 RX ORDER — ALBUTEROL SULFATE 0.83 MG/ML
3 SOLUTION RESPIRATORY (INHALATION) AS NEEDED
Status: DISCONTINUED | OUTPATIENT
Start: 2025-06-25 | End: 2025-06-26 | Stop reason: HOSPADM

## 2025-06-25 RX ORDER — PROCHLORPERAZINE EDISYLATE 5 MG/ML
10 INJECTION INTRAMUSCULAR; INTRAVENOUS EVERY 6 HOURS PRN
Status: DISCONTINUED | OUTPATIENT
Start: 2025-06-25 | End: 2025-06-26 | Stop reason: HOSPADM

## 2025-06-25 RX ORDER — FAMOTIDINE 10 MG/ML
20 INJECTION, SOLUTION INTRAVENOUS ONCE AS NEEDED
Status: DISCONTINUED | OUTPATIENT
Start: 2025-06-25 | End: 2025-06-26 | Stop reason: HOSPADM

## 2025-06-25 RX ORDER — LEVOTHYROXINE SODIUM 75 UG/1
75 TABLET ORAL DAILY
Qty: 30 TABLET | Refills: 11 | Status: SHIPPED | OUTPATIENT
Start: 2025-06-25 | End: 2026-06-25

## 2025-06-25 RX ADMIN — HEPARIN 500 UNITS: 100 SYRINGE at 13:53

## 2025-06-25 RX ADMIN — Medication 110.6 MG: at 12:51

## 2025-06-25 ASSESSMENT — ENCOUNTER SYMPTOMS
MYALGIAS: 0
FEVER: 0
VOMITING: 0
NAUSEA: 0
ARTHRALGIAS: 0
VERTIGO: 0
CHANGE IN BOWEL HABIT: 0
ANOREXIA: 0
CHILLS: 0
VISUAL CHANGE: 0
DIAPHORESIS: 0
SWOLLEN GLANDS: 0
COUGH: 1
ABDOMINAL PAIN: 0
NECK PAIN: 0
HEADACHES: 0
FATIGUE: 1
NUMBNESS: 0
SORE THROAT: 0
JOINT SWELLING: 0

## 2025-06-25 ASSESSMENT — PAIN SCALES - GENERAL: PAINLEVEL_OUTOF10: 0-NO PAIN

## 2025-06-25 NOTE — RESEARCH NOTES
RSH><[id number]><C4+D1><PART1/ESCMONO>    DCRU NURSING VISIT NOTE  Study Name: [id number]- Part 1_Escalation- Monotherapy  IRB#: [id number]  [id number]  Protocol Version Dated:  6/15/2023  PI: Jd Kohli MD.      Time point: Cycle 4 and Beyond - Day 1  CYCLE 7    Encounter Date: 06/25/2025  Encounter Time: 0920  Encounter Department: [place] [address] RESEARCH        Study Regimen and Dosing   For Oncology study, Refer [place] Treatment Plan for orders   Part 1_Escalation_Monotherapy - patients with advanced recurrent or refractory solid tumors (small cell lung cancer, neuroendocrine tumors or brain tumors) will receive gradually increasing doses of ABBV-706 to determine MTD (Maximum Tolerated Dose).  Cycle = 21-days.  ABBV-706 administered IV Day 1 of each cycle.     Dietary Guidelines   Regular diet       Admission and Prior to Starting Study Activities   Notify  when patient arrives to unit.  Complete DCRU/[facility] intake form in EMR.  Confirm DCRU Standing Orders (provided by Study Team/) are signed & available on chart (Expires after 1 year).  Obtain weight in kilograms - with shoes off & heavy items removed.  Obtain vital signs after sitting at least 3 minutes. Record in EMR.  Insert one peripheral IV line for sample collection procedures (flush line with 5 - 10 mL normal saline following each blood draw). Access mediport (if available) otherwise insert second peripheral line in opposite arm for Investigative drug administration (if peripheral line, flush line with 5 - 10 mL normal saline before & after infusion)  Do Not draw research samples from the same line the investigational drug is infused through.  Physical Exam including pulmonary exam & neuro assessment.       Study Specific Instructions and Documentation   1- Safety Labs  2- Vital Signs  3-Research Correlatives  4-Study Drug  5-Vital Signs  6-Discharge     PRE-DOSE Safety Labs    For Oncology study, Refer [place] Treatment Plan for orders       Drawn at 0957 from right chest mediport  Criteria to Treat   DCRU RN reviewed and meets eligibility to proceed with treatment plan   Time team notified: 1128   DCRU RN notifies study team to review eligibility and approval before dosing procedures  Time team approves: 1130      Subjective   Minal Sun is a 59 y.o. female and is here for a Research clinical visit.    Visit Provider: Jazzy Guerrero RN     Allergies: Allergies[1]    Objective     Vital Signs:    Vitals:    06/25/25 0935 06/25/25 1215 06/25/25 1321 06/25/25 1351   BP: (!) 142/98 133/85 143/84 124/65   Pulse: 97 82 74 78   Resp: 20 18 18 16   Temp: 36.4 °C (97.5 °F) 35.9 °C (96.6 °F) 35.9 °C (96.6 °F) 36.1 °C (97 °F)   TempSrc: Temporal Temporal Temporal Temporal   SpO2: 95% 94% 97% 95%   Weight: 87 kg (191 lb 12.8 oz)      PainSc: 0-No pain          Physical Exam     ASSESSMENT and PLAN:  Problem List Items Addressed This Visit       SCLC (small cell lung carcinoma)    Relevant Medications    heparin flush 100 unit/mL syringe 500 Units    prochlorperazine (Compazine) tablet 10 mg    prochlorperazine (Compazine) injection 10 mg    Study [id number] ABBV-706 110.6 mg in sodium chloride 0.9% 100 mL IV (Completed)    sodium chloride 0.9 % bolus 500 mL    dextrose 5 % in water (D5W) bolus 500 mL    diphenhydrAMINE (BENADryl) injection 50 mg    methylPREDNISolone sod succinate (SOLU-Medrol) 40 mg/mL injection 40 mg    famotidine PF (Pepcid) injection 20 mg    EPINEPHrine HCl (PF) (Adrenalin) injection 0.3 mg    albuterol 2.5 mg /3 mL (0.083 %) nebulizer solution 3 mL    Other Relevant Orders    CBC and Auto Differential (Completed)    Comprehensive metabolic panel (Completed)    Bilirubin, Direct (Completed)    Gamma GT (Completed)    Lactate dehydrogenase (Completed)    Magnesium (Completed)    Phosphorus (Completed)    Urinalysis with Reflex Microscopic (Completed)    Venous Access,  CVAD    Thyroid Stimulating Hormone (Completed)    Thyroxine, Free (Completed)    Adult diet Regular    Research Communication (Completed)    Treatment Conditions (Completed)    Provider Communication - [id number] (Completed)    Nursing Communication - Hypersensitivity Management, Moderate (Completed)    Nursing Communication - Hypersensitivity Management, Severe (Completed)    Nursing Communication - Respiratory Management (Completed)    Pulse oximetry, continuous (Completed)    Research collection: 5mL Gold SST - Research Collect Biomarker Serum; Pre-Dose; Within 2 hours; Ambient; 5x; Allow to clot 30 minutes (Completed)    Research collection: 10mL RareCyte AccuCyte - Research Collect CTC (SCLC Only); Pre-Dose; Within 2 hours; 8-10x (Completed)    Research collection: 10mL Streck - Research Collect ctDNA Plasma/WB; Pre-Dose; Within 2 hours; 8-10x (Completed)    Research collection: 10mL Streck - Research Collect ctDNA Plasma/WB; Pre-Dose; Within 2 hours; 8-10x (Completed)        Medications as of the completion of today's visit:  Current Medications[2]    Administrations This Visit       heparin flush 100 unit/mL syringe 500 Units       Admin Date  06/25/2025 Action  Given Dose  500 Units Route  intra-catheter Documented By  Chiqui Donis, ANSLYE              Study [id number] ABBV-706 110.6 mg in sodium chloride 0.9% 100 mL IV       Admin Date  06/25/2025 Action  New Bag Dose  110.6 mg Route  intravenous Documented By  Elba Srivastava RN                    Orders placed during today's visit:  Orders Placed This Encounter   Procedures    CBC and Auto Differential     Standing Status:   Standing     Number of Occurrences:   1     Release result to [facility]:   Immediate [1]    Comprehensive metabolic panel     Standing Status:   Standing     Number of Occurrences:   1     Release result to MyChart:   Immediate [1]    Bilirubin, Direct     Standing Status:   Standing     Number of Occurrences:   1     Release result to [facility]:    Immediate [1]    Gamma GT     Standing Status:   Standing     Number of Occurrences:   1     [facility]   [facility]    Lactate dehydrogenase     Standing Status:   Standing     Number of Occurrences:   1     [facility]   [facility] [1]    Magnesium     Standing Status:   Standing     Number of Occurrences:   1     [facility]   [facility] [1]    Phosphorus     Standing Status:   Standing     Number of Occurrences:   1     [facility]   [facility] [1]    Urinalysis with Reflex Microscopic     Standing Status:   Standing     Number of Occurrences:   1     [facility]   [facility] [1]    Thyroid Stimulating Hormone     Standing Status:   Standing     Number of Occurrences:   1     [facility]   [facility] [1]    Thyroxine, Free     Standing Status:   Standing     Number of Occurrences:   1     [facility]   Immediate [1]    Research collection: 5mL Gold SST - Research Collect Biomarker Serum; Pre-Dose; Within 2 hours; Ambient; 5x; Allow to clot 30 minutes     Standing Status:   Standing     Number of Occurrences:   1     Test:   Biomarker Serum     Timepoint:   Pre-Dose     Pre-Dose:   Within 2 hours     Temperature:   Ambient     Invert:   5x     Handling:   Allow to clot 30 minutes    Research collection: 10mL [employer] AccuCyte - Research Collect CTC (SCLC Only); Pre-Dose; Within 2 hours; 8-10x     Standing Status:   Standing     Number of Occurrences:   1     Test:   CTC (SCLC Only)     Timepoint:   Pre-Dose     Pre-Dose:   Within 2 hours     Invert:   8-10x     Optional?:   No    Research collection: 10mL Streck - Research Collect ctDNA Plasma/WB; Pre-Dose; Within 2 hours; 8-10x     Standing Status:   Standing     Number of Occurrences:   1     Test:   ctDNA Plasma/WB     Timepoint:   Pre-Dose     Pre-Dose:   Within 2 hours     Invert:   8-10x     Optional?:   No    Research collection: 10mL Streck - Research  Collect ctDNA Plasma/WB; Pre-Dose; Within 2 hours; 8-10x     Standing Status:   Standing     Number of Occurrences:   1     Test:   ctDNA Plasma/WB     Timepoint:   Pre-Dose     Pre-Dose:   Within 2 hours     Invert:   8-10x     Optional?:   No    Adult diet Regular     Standing Status:   Standing     Number of Occurrences:   1     Diet type:   Regular    Research Communication     Cohort: 1A  Dose Level: ABBV - 706 1.3 mg/kg     Standing Status:   Standing     Number of Occurrences:   1    Treatment Conditions     Hold treatment and notify provider if:   ANC LESS THAN 1.5 x 10*3/uL   Platelets LESS THAN 75 x 10*3/uL   AST/ALT GREATER THAN 5 x ULN   Total Bilirubin GREATER THAN 1.5 x ULN   Peripheral Neuropathy GREATER THAN OR EQUAL TO Grade 2   Pneumonitis GREATER THAN OR EQUAL TO Grade 1   Dermatologic Toxicity GREATER THAN OR EQUAL TO Grade 2    Adverse Event GREATER THAN OR EQUAL TO Grade 3     Standing Status:   Standing     Number of Occurrences:   1    Provider Communication - [id number]      Dose Level 1             ABBV-076 1.3 mg/kg   Dose Level 2             ABBV-076 2.5 mg/kg   Dose Level 3             ABBV-076 3.5 mg/kg   Dose Level 4             ABBV-076 5 mg/kg   Dose Level 5             ABBV-076 6.4 mg/kg   Dose Level 6             ABBV-076 8 mg/kg   Dose Level 7             ABBV-076 10 mg/kg     Standing Status:   Standing     Number of Occurrences:   1    Nursing Communication - Hypersensitivity Management, Moderate     Flushing, rash, pruritus, dyspnea, chest discomfort, back pain, angioedema, SBP LESS THAN 90 mmHg, and/or change in mental status above or below patient's baseline. In the event of moderate or severe hypersensitivity reaction to any medication:   Stop infusion.  Assess vital signs, pulse oximetry, nursing assessment, and patient complaints.  Administer medications per Hypersensitivity Reaction Medication Protocol.   Notify physician.     Standing Status:   Standing     Number of  Occurrences:   1    Nursing Communication - Hypersensitivity Management, Severe     Worsening of moderate reaction symptoms, SBP LESS THAN 80 mmHg, and/or respiratory distress (respirations GREATER THAN 40 breaths per minute, wheezing, life-threatening symptoms). In the event of moderate or severe hypersensitivity reaction to any medication:   Stop infusion.  Assess vital signs, pulse oximetry, nursing assessment, and patient complaints.  Administer medications per Hypersensitivity Reaction Medication Protocol.   Notify physician.     Standing Status:   Standing     Number of Occurrences:   1    Nursing Communication - Respiratory Management     Oxygen via nasal cannula at 4 L per minute for respiratory rate GREATER THAN OR EQUAL TO to 28 breaths/minute and/or wheezing. Pulse Oximetry continuous monitoring.     Standing Status:   Standing     Number of Occurrences:   1    Pulse oximetry, continuous     Continuous monitoring to maintain SPO2 GREATER THAN 90%     Standing Status:   Standing     Number of Occurrences:   1    Venous Access, CVAD     Standing Status:   Standing     Number of Occurrences:   1        [id number] - ABBV-706 alone or in combination in subjects with advanced solid tumors    Patient has no adverse events documented in the Research Adverse Events activity.      Safety Parameters and Special Instructions   ABBV-706  ABBV-706 is an antibody-drug conjugate (ADC) with an anti-seizure-related antibody (SEZ6) & topoisomerase inhibitor  Potential Side Effects/Adverse Events: GI effects (diarrhea, nausea, vomiting, stool abnormalities), peripheral neuropathy, myelosuppression, rash, infusion-related reaction.  Administer infusion over 60 mins (+/- 10 mins).  Observation post-dose 1 hour for Cycle 1 Day 1. If no reaction, no observation for subsequent visits.     PRE-DOSE Vital Signs     Temp, Heart Rate, Respiration, Blood Pressure: rest sitting at least 3 minutes prior to obtaining     Access Type: 23g  butterfly Location: left hand   For Oncology study, Refer [place] Treatment Plan for orders  AFTER ECGs  Deliver to [facility] lab for processing    Time point Specimen Test Cycle Volume Tube Handling Draw Time    Pre-dose (within 2 hours) (draw in order)      ABBV-706 ADA/NADA 4, 6, 9, 12 4 mL Red Top Serum Invert 5 - 8x; Ambient Allow to clot 30 -60 mins na    ABBV-706 PK ADC/Total AB 4, 6, 9, 12 2 mL Red Top Serum Invert 5 - 8x; Ambient  Allow to clot 30 -60 mins na    Biomarker Serum ODD 5 mL Gold Top SST Invert 5x; Upright. Ambient Allow to clot 30 mins 1210    Free TOP1 Inhibitor 4, 6, 9,12 3 mL EDTA Lavender Invert 8 - 10x. On ICE na    CTC (SCLC Only)-If ordered see EPCI All 10 mL RareCyte AccuCyte BCT See Below 1210    ctDNA Plasma/WB ODD 2 x 10 Streck Cell-Free DNA  1210     CTC & ctDNA Plasma/WB instructions  Keep patient's arm in the downward position during collection procedure.  Hold the tube with the stopper in the uppermost position so that the tube contents do not touch the stopper or the end of the needle during sample collection.  Release tourniquet once blood start to flow in the tube, or within 2 minutes of application.  Invert 8 - 10x.          Weight-Based Dosing   Baseline (screening) weight (kg) 85.1  Date measured 2/19/25 ( Refer Previous encounters to enter)  Text :  Actual weight   Vitals:    06/25/25 0935   Weight: 87 kg (191 lb 12.8 oz)     (Weight on Day 1 of cycle)   Date Measured 06/25/2025  Enter Information here  Dose based on Cycle 1 Day 1 weight.  May use Cycle 1 Day 1 weight unless noted weight difference of 10% weight gain or loss.  The minimum dose of ABBV-706 is 50 mg.  Any calculated dose < 50 mg, patient to be administered 50 mg.  Body weight capped at 120 kg. 120 kg will be utilized to calculate the dose.       Research Drug Administration   Document medication administration in MAR activity. Document Research specific instructions below.  Enter Information  here  ABBV-706  Infuse over 60 minutes (+ 10 minutes).  Observation time 1 hour post-dose.  If any Infusion-Related Reaction or Suspected Allergic-Type Reaction specific safety labs to be drawn- see EPIC       Infusion-Related Reactions   Review Safety Parameters on the medication Order. Note specific instructions below.    - SOC Hypersensivity Orders             Þ If any Infusion-Related Reaction or Suspected Allergic-Type Reaction Þ  Clinical Safety Labs  Z-Req#:    Timepoint Blood Test Collection Priority Order of Origin     Within 2 hours after 1st sign of reaction C3a (FC3AR) (1 mL Lavender top) STAT Z-req (send to [facility])    C5 (354) (1 mL SST) STAT Z-req (send to [facility])    IgE STAT ð EMR or ð Z-req    Tryptase (2 mL Red Top) STAT ð EMR or ð Z-req       DURING-DOSE Vital Signs     Temp, Heart Rate, Respiration, Blood Pressure: rest sitting at least 3 minutes prior to obtaining  30 minutes (+/-10 minutes) after the start of infusion        POST-DOSE Vital Signs      Temp, Heart Rate, Respiration, Blood Pressure: rest sitting at least 3 minutes prior to obtaining  End of Infusion (Time “0”)         Discharge Instructions   Discharge patient to home after study requirements completed or PK sample obtained.  Remind patient to return: on Day 8 for vital signs & safety labs  Discharge time: 1400     Elba Srivastava RN  06/25/25                      [1]   Allergies  Allergen Reactions    Clindamycin Diarrhea    Erythromycin Diarrhea    Erythromycin Base Other and Nausea Only   [2]   Current Outpatient Medications   Medication Sig Dispense Refill    acetaminophen (TylenoL) 325 mg tablet Take 2 tablets (650 mg) by mouth every 6 hours if needed for mild pain (1 - 3) or moderate pain (4 - 6). 30 tablet 0    ALPRAZolam (Xanax) 0.5 mg tablet Take 2 tablets (1 mg) by mouth 2 times a day as needed for anxiety for up to 15 days. 30 tablet 0    apixaban (Eliquis) 5 mg tablet Take 1 tablet (5 mg) by mouth 2 times a  day. 60 tablet 11    azelastine (Astelin) 137 mcg (0.1 %) nasal spray       carbinoxamine maleate 4 mg tablet Take 4 mg by mouth 2 times a day.      escitalopram (Lexapro) 10 mg tablet Take 2 tablets (20 mg) by mouth once daily. 180 tablet 0    guaiFENesin (Mucinex) 600 mg 12 hr tablet Take 2 tablets (1,200 mg) by mouth 2 times a day. Do not crush, chew, or split. 120 tablet 11    ipratropium-albuteroL (Duo-Neb) 0.5-2.5 mg/3 mL nebulizer solution       levothyroxine (Synthroid, Levoxyl) 50 mcg tablet Take 1 tablet (50 mcg) by mouth early in the morning.. Take on an empty stomach at the same time each day, either 30 to 60 minutes prior to breakfast 30 tablet 11    lidocaine-prilocaine (Emla) 2.5-2.5 % cream Apply topically [facility] to being accessed and cover with band aid 30 g 0    magnesium hydroxide (Milk of Magnesia) 400 mg/5 mL suspension Take 5 mL by mouth once daily as needed for constipation for up to 10 days. Take this when it has been 2 or more days without a bowel movement. 360 mL 0    metFORMIN  mg 24 hr tablet Take 1 tablet (500 mg) by mouth 2 times a day. 200 tablet 1    methocarbamol (Robaxin) 500 mg tablet Take 1 tablet (500 mg) by mouth 4 times a day as needed for muscle spasms. 120 tablet 1    morphine CR (MS Contin) 15 mg 12 hr tablet Take 1 tablet (15 mg) by mouth once daily at bedtime. Do not crush, chew, or split. This is your LONG-ACTING pain medicine. 30 tablet 0    naloxone (Narcan) 4 mg/0.1 mL nasal spray Administer 1 spray (4 mg) into affected nostril(s) if needed for opioid reversal. May repeat every 2-3 minutes if needed, alternating nostrils, until medical assistance becomes available. 2 each 0    OLANZapine (ZyPREXA) 10 mg tablet Take 1 tablet (10 mg) by mouth once daily in the evening. Take with meals. This is to help with appetite, nausea, and sleep. This is a dose increase. 30 tablet 2    omeprazole (PriLOSEC) 40 mg DR capsule Take  1 capsule (40 mg) by mouth once daily. 100 capsule 1    ondansetron (Zofran) 8 mg tablet Take 1 tablet (8 mg) by mouth every 8 hours if needed for nausea or vomiting. 30 tablet 5    oxyCODONE (Roxicodone) 5 mg immediate release tablet Take 1 tablet (5 mg) by mouth every 4 hours if needed for moderate pain (4 - 6) for up to 60 doses. Can take 2 pill if need for severe pain (7-10) 20 tablet 0    ProAir HFA 90 mcg/actuation inhaler Inhale 2 puffs every 4 hours if needed.      prochlorperazine (Compazine) 10 mg tablet Take 1 tablet (10 mg) by mouth every 6 hours if needed for nausea or vomiting. 30 tablet 5    rosuvastatin (Crestor) 10 mg tablet Take 1 tablet (10 mg) by mouth once daily. 100 tablet 1    sennosides-docusate sodium (Marzena-Colace) 8.6-50 mg tablet Take 3 tablets by mouth 2 times a day. This is to help manage constipation. 180 tablet 11     Current Facility-Administered Medications   Medication Dose Route Frequency Provider Last Rate Last Admin    albuterol 2.5 mg /3 mL (0.083 %) nebulizer solution 3 mL  3 mL nebulization PRN Jd Kohli MD        dextrose 5 % in water (D5W) bolus 500 mL  500 mL intravenous PRN Jd Kohli MD        diphenhydrAMINE (BENADryl) injection 50 mg  50 mg intravenous PRN Jd Kohli MD        EPINEPHrine HCl (PF) (Adrenalin) injection 0.3 mg  0.3 mg intramuscular q5 min PRN Jd Kohli MD        famotidine PF (Pepcid) injection 20 mg  20 mg intravenous Once PRN Jd Kohli MD        heparin flush 100 unit/mL syringe 500 Units  500 Units intra-catheter PRN Jd Kohli MD   500 Units at 06/25/25 1353    methylPREDNISolone sod succinate (SOLU-Medrol) 40 mg/mL injection 40 mg  40 mg intravenous PRN Jd Kohli MD        prochlorperazine (Compazine) injection 10 mg  10 mg intravenous q6h PRN Jd Kohli MD        prochlorperazine (Compazine) tablet 10 mg  10 mg oral q6h PRN Jd Kohli MD        sodium chloride 0.9 % bolus 500 mL  500 mL intravenous  JESSICA Kohli MD

## 2025-06-25 NOTE — PROGRESS NOTES
SUPPORTIVE AND PALLIATIVE ONCOLOGY CONSULT - OUTPATIENT      SERVICE DATE: 6/25/2025    Medical Oncologist: Jd Kohli MD   Radiation Oncologist: No care team member to display  Primary Physician: Christopher D'Amico  [phone]    REASON FOR CONSULT/CHIEF CONSULT COMPLAINT: pain management, other symptom control  (decreased appetite, nausea, constipation), and Introduction to Supportive and Palliative Oncology Services    Subjective   HISTORY OF PRESENT ILLNESS: Minal Sun is a 59 y.o. female who presents with  history of COPD, PE, SCC of the lung, diagnosed 9/2024 (mets: mediastinum, liver, bone). On 2/5/2025 she was started on the Phase 1 study [id number] with study drug ABBV-706 (ADC targeting SEZ6 with a topoisomerase 1 inhibitor payload).     Pain Assessment:  Pain Score:  3  Location:  R lower back, L rib area  Education:  changes to current regimen      Symptom Assessment:  Pain:somewhat- not needing Oxycodone, states that L rib pain remains irritating, but well-managed  Headache: none  Dizziness:none  Lack of energy: a little  Difficulty sleeping: somewhat- chronic difficulty falling asleep  Worrying: none  Anxiety: none  Depression: somewhat- worse in recent weeks, feels she is having a difficult time completing daily tasks that are usually easy  Pain in mouth/swallowing: none  Dry mouth: none  Taste changes: none  Shortness of breath: none  Lack of appetite: somewhat- slowly improving  Nausea: a little- stable  Vomiting: none  Constipation: a little- taking Senna 2 tabs BID  Diarrhea: none  Sore muscles: none  Numbness or tingling in hands/feet/other: none  Weight loss: none  Other: none      Information obtained from: chart review and interview of patient  ______________________________________________________________________     Oncology History   SCLC (small cell lung carcinoma)   9/27/2024 Initial Diagnosis    SCLC (small cell lung carcinoma) (Multi)     10/4/2024 - 10/8/2024  Chemotherapy    CARBOplatin / Etoposide, 21 Day Cycles - Lung     10/4/2024 - 10/6/2024 Research Study Participant    ([facility]) [id number] - Atezolizumab + CARBOplatin / Etoposide / My-UUSE-IQSU, 21 Day Cycles  Plan Provider: JESSICA Hernandez  Treatment goal: Control  Line of treatment: First Line  Associated studies: ([id number])[id number] with Carboplatin, Etoposide and Tislelizumab in Newly Diagnosed ES-SCLC     10/4/2024 - 1/17/2025 Chemotherapy    Durvalumab + CARBOplatin / Etoposide, 21 Day Cycles     10/28/2024 - 10/28/2024 Chemotherapy    Durvalumab + CARBOplatin / Etoposide, 21 Day Cycles     2/5/2025 - 2/5/2025 Chemotherapy    Durvalumab, 28 Day Cycles     2/19/2025 -  Research Study Participant    ([facility]) [id number] Part 1 - ABBV-706, 21 Day Cycles  Plan Provider: JESSICA Hernandez  Treatment goal: Control  Line of treatment: Second Line  Associated studies: ABBV-706 alone or in combination in subjects with advanced solid tumors         Past Medical History:   Diagnosis Date    Chronic obstructive pulmonary disease, unspecified     Chronic obstructive pulmonary disease    Encounter for general adult medical examination without abnormal findings 11/18/2020    Encounter for preventive health examination    Personal history of other endocrine, nutritional and metabolic disease     History of diabetes mellitus    Post-traumatic stress disorder, unspecified     PTSD (post-traumatic stress disorder)    Type 2 diabetes mellitus      Past Surgical History:   Procedure Laterality Date    BACK SURGERY  09/17/2013    Lower Back Surgery    HYSTERECTOMY  07/01/2016    Hysterectomy     No family history on file.     SOCIAL HISTORY  Support system - lives with sisterRosy   Social History:  reports that she quit smoking about 4 years ago. Her smoking use included cigarettes. She started smoking about 42 years ago. She has a 38.3 pack-year smoking history. She has never used smokeless tobacco. She reports  that she does not drink alcohol and does not use drugs.  retired    Gnosticism and Importance of Gnosticism: unknown    REVIEW OF SYSTEMS  Review of systems negative unless noted in HPI.       Objective     Palliative Performance Scale % (PPS)       Current Outpatient Medications   Medication Instructions    acetaminophen (TYLENOL) 650 mg, oral, Every 6 hours PRN    ALPRAZolam (XANAX) 1 mg, oral, 2 times daily PRN    azelastine (Astelin) 137 mcg (0.1 %) nasal spray     carbinoxamine maleate 4 mg, 2 times daily    Eliquis 5 mg, oral, 2 times daily    escitalopram (LEXAPRO) 15 mg, oral, Daily    guaiFENesin (MUCINEX) 1,200 mg, oral, 2 times daily, Do not crush, chew, or split.    ipratropium-albuteroL (Duo-Neb) 0.5-2.5 mg/3 mL nebulizer solution     levothyroxine (SYNTHROID, LEVOXYL) 50 mcg, oral, Daily, Take on an empty stomach at the same time each day, either 30 to 60 minutes prior to breakfast    lidocaine-prilocaine (Emla) 2.5-2.5 % cream Apply topically a thin film of medication to mediport site 45 mts prior to being accessed and cover with band aid    magnesium hydroxide (Milk of Magnesia) 400 mg/5 mL suspension 5 mL, oral, Daily PRN, Take this when it has been 2 or more days without a bowel movement.    metFORMIN XR (GLUCOPHAGE-XR) 500 mg, oral, 2 times daily    methocarbamol (ROBAXIN) 500 mg, oral, 4 times daily PRN    morphine CR (MS CONTIN) 15 mg, oral, Nightly, Do not crush, chew, or split. This is your LONG-ACTING pain medicine.    naloxone (NARCAN) 4 mg, nasal, As needed, May repeat every 2-3 minutes if needed, alternating nostrils, until medical assistance becomes available.    OLANZapine (ZYPREXA) 10 mg, oral, Daily with evening meal, This is to help with appetite, nausea, and sleep. This is a dose increase.    omeprazole (PRILOSEC) 40 mg, oral, Daily    ondansetron (ZOFRAN) 8 mg, oral, Every 8 hours PRN    oxyCODONE (ROXICODONE) 5 mg, oral, Every 4 hours PRN, Can take 2 pill if need for severe pain (7-10)     ProAir HFA 90 mcg/actuation inhaler 2 puffs, Every 4 hours PRN    prochlorperazine (COMPAZINE) 10 mg, oral, Every 6 hours PRN    rosuvastatin (CRESTOR) 10 mg, oral, Daily    sennosides-docusate sodium (Marzena-Colace) 8.6-50 mg tablet 3 tablets, oral, 2 times daily, This is to help manage constipation.       Allergies:   Allergies   Allergen Reactions    Clindamycin Diarrhea    Erythromycin Diarrhea    Erythromycin Base Other and Nausea Only     No results found. However, due to the size of the patient record, not all encounters were searched. Please check Results Review for a complete set of results.               PHYSICAL EXAMINATION  Vital Signs:       6/4/2025     9:17 AM 6/4/2025    12:29 PM 6/4/2025     1:12 PM 6/4/2025     1:42 PM 6/11/2025    12:15 PM 6/18/2025    12:46 PM 6/25/2025     9:35 AM   Vitals   Systolic 121 143 130 125 144 146 142   Diastolic 88 84 85 84 88 90 98   BP Location Right arm Right arm Right arm Right arm Right arm Left arm Left arm   Heart Rate 86 75 78 75 89 91 97   Temp 35.8 °C (96.4 °F) 35.9 °C (96.6 °F) 36 °C (96.8 °F) 36.2 °C (97.2 °F) 36.4 °C (97.5 °F) 35.7 °C (96.3 °F) 36.4 °C (97.5 °F)   Resp 18 18 18 16 18 18 20   Weight (lb) 193.12      191.8   BMI 35.32 kg/m2      35.08 kg/m2   BSA (m2) 1.96 m2      1.95 m2       Vital signs reviewed     Physical Exam  Constitutional:       Appearance: She is ill-appearing.   HENT:      Mouth/Throat:      Mouth: Mucous membranes are moist.   Eyes:      Extraocular Movements: Extraocular movements intact.      Pupils: Pupils are equal, round, and reactive to light.   Cardiovascular:      Rate and Rhythm: Normal rate.   Pulmonary:      Effort: Pulmonary effort is normal.   Abdominal:      Palpations: Abdomen is soft.   Musculoskeletal:         General: Normal range of motion.   Skin:     General: Skin is warm and dry.   Neurological:      Mental Status: She is alert and oriented to person, place, and time.   Psychiatric:         Mood and  Affect: Mood normal.         Behavior: Behavior normal.       ASSESSMENT/PLAN    Pain  Pain is: cancer related pain  Type: visceral  Pain control: well-controlled  Home regimen:   Oxycodone 1-2 tabs q4h as needed (only taking occasionally now, has not needed in a few weeks)  Did not  Oxycodone ER  Methocarbamol 500mg QID as needed for muscle pain/stiffness- pending approval with clinical trial  Intolerances/previously tried: n/a  Personalized pain goal: feel comfortable throughout the day    Opioid Use  Medication Management:   - OARRS report reviewed with no aberrant behavior; consistent with  prescriptions/records and patient history  - MED 00.  Overdose Risk Score 420.   This has been discussed with patient.   - We will continue to closely monitor the patient for signs of prescription misuse including UDS, OARRS review and subjective reports at each visit.  - Yes concurrent benzodiazepine use   - I am a provider who either is or has consulted and collaborated with a provider certified in Hospice and Palliative Medicine and have conducted a face-face visit and examination for this patient.  - Routine Urine Drug Screen completed 4/23/25  - Controlled Substance Agreement complete if taking opiates more consistently  - Specifically discussed that controlled substance prescriptions will only be provided by our group as outlined in the completed agreement  - Prescribed naloxone pending  - Red Flags: n/a     Nausea   Intermittent nausea with vomiting related to chemotherapy, opioids, and constipation   Decreased appetite  Related to malignancy, chemotherapy, and taste changes  Nutrition consult- consider at future visit  Current regimen:    Olanzapine 10mg daily in the evening to help with appetite, nausea, and sleep- recent dose increase  Prochlorperazine 10mg q6h as needed    Constipation   At risk for constipation related to opioids,  currently constipated   Current regimen:   Senna to 3 tabs BID- encouraged  to take consistently  Miralax 17g daily as needed  Milk of Magnesia 400mg (5ml) daily as needed when it has been 2+ days without a BM  Encouraged adequate hydration, fiber intake, and physical activity to promote bowel motility     Altered Mood  Chronic anxiety and depression related to health concerns   controlled with home regimen  Current regimen:   See Escitalopram note above- 20mg daily  See Olanzapine note    Sleeping Difficulty:  Impaired sleep related to stress, pain  See Olanzapine note    Supportive Interventions: n/a    Introduction to Supportive and Palliative Oncology:  Spoke with patient   Introduced the role and philosophy of Supportive and Palliative oncology in the evaluation and management of symptoms during cancer treatment  Palliative care was introduced as a service for patients with serious illness to help with symptoms, assist with goals of care conversations, navigate complex decision making, improve quality of life for patients, and provide support both patients and families.  Patient seemed to appreciate the extra layer of support.    Medical Decision Making/Goals of Care/Advance Care Planning:  Patient's current clinical condition, including diagnosis, prognosis, and management plan, and goals of care were discussed.   Life limiting disease: metastatic malignancy  Family: Supportive sister  Goals: symptom control and cancer directed therapy    Advance Directives  Existence of Advance Directives:Yes, documentation or copy in medical record  Decision maker: HCPOA is pt is unsure  Code Status: Full code    Next Follow-Up Visit:  Return to clinic in 4-6 weeks in the [facility]    Signature and billing  Thank you for allowing us to participate in the care of this patient. Recommendations will be communicated back to the consulting service by way of shared electronic medical record or face-to-face.    Medical complexity was moderate level due to due to complexity of problems, extensive data review,  and high risk of management/treatment.  Time was spent on the following: Prep Time, Time Directly with Patient/Family/Caregiver, Documentation Time. Total time spent: 35min      DATA   Diagnostic tests and information reviewed for today's visit:  Conversation with primary team, Most recent labs and imaging results, Medications     6/25/25: changes to plan indicated in bold. Continue all other regimens as listed.    Some elements copied from Supportive Oncology note on 5/21/25, the elements have been updated and all reflect current decision making from today, 6/25/2025.      Plan of Care discussed with: Provider, RN, Patient      SIGNATURE: JESSICA Campbell    Contact information:  Supportive and Palliative Oncology  Monday-Friday 8 AM-5 PM  Phone:  [phone], press option #5, then option #1.   Or Epic Secure Chat

## 2025-06-25 NOTE — RESEARCH NOTES
Research Note Treatment Day    Minal Sun is here today for treatment on [id number]. Today is C7D1. Procedures completed per protocol. AE's and con-meds reviewed with patient. Patient states that her fatigue and cough remains unchanged. Her right eyelid appears to be more swollen today. Denies N/V/D/C. Lab work resulted and is ok to treat today. Today, TSH is greatly elevated and her medication will be adjusted. Patient is aware of treatment plan.    [x]   Received treatment as planned   OR  []    Treatment delayed; patient calendar updated as required   Treatment delayed because:    []   AE    []   Physician Discretion    []   Clinical Deterioration or Progression     []   Other    Education Documentation  Treatment Plan and Schedule, taught by Jazzy Guerrero RN at 6/25/2025  2:54 PM.  Learner: Patient  Readiness: Acceptance  Method: Explanation  Response: Verbalizes Understanding    General Medication Information, taught by Jazzy Guerrero RN at 6/25/2025  2:54 PM.  Learner: Patient  Readiness: Acceptance  Method: Explanation  Response: Verbalizes Understanding    Supportive Medications, taught by Jazzy Guerrero RN at 6/25/2025  2:54 PM.  Learner: Patient  Readiness: Acceptance  Method: Explanation  Response: Verbalizes Understanding    Tumor Markers, taught by Jazzy Guerrero RN at 6/25/2025  2:54 PM.  Learner: Patient  Readiness: Acceptance  Method: Explanation  Response: Verbalizes Understanding    Comprehensive Metabolic Panel (CMP), taught by Jazzy Guerrero RN at 6/25/2025  2:54 PM.  Learner: Patient  Readiness: Acceptance  Method: Explanation  Response: Verbalizes Understanding    Complete Blood Count with Differential (CBC w/ Diff), taught by Jazzy Guerrero RN at 6/25/2025  2:54 PM.  Learner: Patient  Readiness: Acceptance  Method: Explanation  Response: Verbalizes Understanding    Education Comments  No comments found.

## 2025-06-25 NOTE — PROGRESS NOTES
Patient ID: Minal Sun is a 59 y.o. female.    DIAGNOSIS     Small cell carcinoma of the lung.  Date of diagnosis is September 27, 2024 from a bronchoscopic biopsy of a subcarinal lymph node.  Immunohistochemistry positive for TTF-1, INSM1, synaptophysin and chromogranin, negative for p40, RB immunostain shows loss of nuclear expression.        STAGING     Clinical T4, N2, M1 C, stage IVc, extensive stage disease        CURRENT SITES OF DISEASE      Left lung, mediastinum, left hilum of the lung, liver, intra-abdominal lymph nodes in the portacaval and periportal region, bone metastases        MOLECULAR GENOMICS     Test Name: uStudio xF+ (XF.V3) dated October 2024     Molecular Findings:  * Gene: [id number], Variant: Missense variant (exon 1) - GOF, Potentially Actionable, p.R88Q (Allele Frequency - 0.3%)  * Gene: TP53, Variant: Missense variant - LOF, Biologically Relevant, p.R249G (Allele Frequency - 70.1%)  * Gene: RB1, Variant: Splice region variant - LOF, Biologically Relevant, c.540-1G>T, Splice Site (Allele Frequency - 56.8%)        Test Name: uStudio xT (XT.V4)  Laboratory Name: [facility]  Specimen Source: Lymph node, level 7  Collection Date: 27-Sep-2024     Molecular Findings:  * Gene: TP53, Variant: Missense variant - LOF, Biologically Relevant, p.R249G (Allele Frequency - 96.2%)  * Gene: RB1, Variant: Splice region variant - LOF, Biologically Relevant, c.540-1G>T, Splice Site (Allele Frequency - 96%)  * Gene: KMT2D, Variant: Stop gain - LOF, Biologically Relevant, p.*, Nonsense (Allele Frequency - 41.9%)  * Biomarker: Microsatellite Instability, Status: stable  * Biomarker: Tumor Mutation [place] (2.6 Muts/Mb, Percentile: 33)        SERUM TUMOR MARKERS     Baseline LDH within normal limits  C1D1 2.18.25 on study [id number] LDH was 274.   LDH has been WNL since 3.5.25.       PRIOR THERAPY     Combination chemotherapy and immunotherapy.  Initially enrolled on a clinical trial of Lutathera and  underwent octreotide scan which was positive.  However she decided to come off study and did not receive Lutathera with her chemo.  Chemotherapy started October 6, 2024.  Immunotherapy completed added with cycle #2 11.06.2024.  Minor response observed after 2 cycles of treatment. PD observed after C4.        CURRENT THERAPY     2.19.2025: Started on Phase 1 study [id number] with study drug [id number] which is an ADC targeting SEZ6 with a topoisomerase 1 inhibitor payload     CURRENT ONCOLOGICAL PROBLEMS    Cough and shortness of breath significantly improved with systemic treatment.  Pulmonary Embolism  Immunotherapy induced hypothyroidism  Neoplasm non cardiac chest/back pain        HISTORY OF PRESENT ILLNESS     This is a 59-year-old patient.  She states that she was feeling sick toward the end of spring of this year with some sinus problems.  Was seen by her allergist and was treated with antibiotics.  She was also seen at 1 point by primary care and steroids and antibiotics and inhalers were given.  She did not have any resolution and then ultimately developed a little bit of the hemoptysis which led to a chest x-ray showing an abnormality and finally a CT scan of the chest.The CT scan of the chest was done as a lung cancer screening low-dose CT and completed on September 20, 2024.  This demonstrated a extensive infiltrative mediastinal and hilar mass.  There was narrowing of the left mainstem bronchus and lower lobe bronchus secondary to extrinsic compression.  The predominant growth was within the left hilum and subcarinal region.  She underwent a bronchoscopy dated September 27, 2024 showing splaying of the main alanna.  There was mild erosion of a lymph node into the left mainstem bronchus.  There was erythema and mild erosions of an endobronchial tumor along the medial border.  Moderate to severe stenosis of the left lower lobe bronchus to 75%.        PAST MEDICAL HISTORY     Lumbar surgery about 30 years ago    hysterectomy in 2000  Diabetes  COPD  Right eye exophthalmus  herpes zoster        SOCIAL HISTORY     Patient lives with her sister.  She lives in [facility].  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  She started smoking at the age of 14 and continues to smoke at the age of 59.  Has smoked for 45 years on average 1 pack/day.        CURRENT MEDS     See medication list, meds reviewed, includes metformin, rosuvastatin, been done so Nied inhalers, Wellbutrin escitalopram, trazodone        ALLERGIES     Patient denies any drug allergies        FAMILY HISTORY      Mother had bladder cancer and possibly breast cancer.     Subjective    Today 6.25.2025 Patient in clinic for C7D1 on [id number].   Patient reports she continues to tolerate treatment well and reports she feels much better. Today she reports she feels much better since she started on treatment. She reports improved energy levels.  Continues to have baseline symptoms intermittent episodes of nausea, SOB with exertion, cough, constipation and fatigue. She however reports on occasion her cough is worse than usual with some help from Tessalon and Hyocdon. Today she denies any denies any headaches, rash/itching, chills or fever, chest pain or chest tightness, painful inflammation/ulceration oral mucous membranes, vomiting, diarrhea, hematemesis /hemoptysis, hematuria/rectal bleeding, urinary symptoms, swelling extremities, weakness, or peripheral neuropathy. ROS as above. Remainder of 10 point review of systems elicited and otherwise unremarkable.     Cancer  Associated symptoms include coughing and fatigue. Pertinent negatives include no abdominal pain, anorexia, arthralgias, change in bowel habit, chest pain, chills, congestion, diaphoresis, fever, headaches, joint swelling, myalgias, nausea, neck pain, numbness, rash, sore  throat, swollen glands, urinary symptoms, vertigo, visual change or vomiting.     Objective    BSA: There is no height or weight on file to calculate BSA.  There were no vitals taken for this visit.     Daily Weight  06/25/25 : 87 kg (191 lb 12.8 oz)  06/04/25 : 87.6 kg (193 lb 2 oz)  05/14/25 : 85 kg (187 lb 6.3 oz)  04/23/25 : 84.6 kg (186 lb 8.2 oz)  04/09/25 : 83.5 kg (184 lb 1.4 oz)         6/4/2025     9:17 AM 6/4/2025    12:29 PM 6/4/2025     1:12 PM 6/4/2025     1:42 PM 6/11/2025    12:15 PM 6/18/2025    12:46 PM 6/25/2025     9:35 AM   Vitals   Systolic 121 143 130 125 144 146 142   Diastolic 88 84 85 84 88 90 98   BP Location Right arm Right arm Right arm Right arm Right arm Left arm Left arm   Heart Rate 86 75 78 75 89 91 97   Temp 35.8 °C (96.4 °F) 35.9 °C (96.6 °F) 36 °C (96.8 °F) 36.2 °C (97.2 °F) 36.4 °C (97.5 °F) 35.7 °C (96.3 °F) 36.4 °C (97.5 °F)   Resp 18 18 18 16 18 18 20   Weight (lb) 193.12      191.8   BMI 35.32 kg/m2      35.08 kg/m2   BSA (m2) 1.96 m2      1.95 m2       Physical Exam  Constitutional:       General: She is not in acute distress.     Appearance: Normal appearance. She is not ill-appearing, toxic-appearing or diaphoretic.   HENT:      Nose: No congestion or rhinorrhea.      Mouth/Throat:      Pharynx: No oropharyngeal exudate or posterior oropharyngeal erythema.   Eyes:      General: No scleral icterus.     Conjunctiva/sclera: Conjunctivae normal.   Cardiovascular:      Rate and Rhythm: Normal rate and regular rhythm.      Pulses: Normal pulses.      Heart sounds: Normal heart sounds. No murmur heard.     No friction rub. No gallop.   Pulmonary:      Effort: Pulmonary effort is normal. No respiratory distress.      Breath sounds: No stridor. Wheezing present.   Chest:      Chest wall: No tenderness.   Abdominal:      General: There is no distension.      Palpations: There is no mass.      Tenderness: There is no abdominal tenderness. There is no guarding or rebound.    Musculoskeletal:      Cervical back: No tenderness.      Right lower leg: No edema.      Left lower leg: No edema.   Lymphadenopathy:      Cervical: No cervical adenopathy.   Skin:     General: Skin is warm.      Coloration: Skin is not jaundiced.      Findings: No bruising or lesion.   Neurological:      General: No focal deficit present.      Mental Status: She is oriented to person, place, and time.   Psychiatric:         Mood and Affect: Mood normal.         Behavior: Behavior normal.       Performance Status:  ECOG 1: Restricted in physically strenuous activity but ambulatory and able to carry out work of a light or sedentary nature, e.g., light house work, office work.     Hospital Outpatient Visit on 06/25/2025   Component Date Value Ref Range Status    WBC 06/25/2025 6.3  4.4 - 11.3 x10*3/uL Final    nRBC 06/25/2025 0.0  0.0 - 0.0 /100 WBCs Final    RBC 06/25/2025 4.04  4.00 - 5.20 x10*6/uL Final    Hemoglobin 06/25/2025 13.1  12.0 - 16.0 g/dL Final    Hematocrit 06/25/2025 41.3  36.0 - 46.0 % Final    MCV 06/25/2025 102 (H)  80 - 100 fL Final    MCH 06/25/2025 32.4  26.0 - 34.0 pg Final    MCHC 06/25/2025 31.7 (L)  32.0 - 36.0 g/dL Final    RDW 06/25/2025 17.4 (H)  11.5 - 14.5 % Final    Platelets 06/25/2025 289  150 - 450 x10*3/uL Final    Neutrophils % 06/25/2025 56.8  40.0 - 80.0 % Final    Immature Granulocytes %, Automated 06/25/2025 0.3  0.0 - 0.9 % Final    Lymphocytes % 06/25/2025 32.9  13.0 - 44.0 % Final    Monocytes % 06/25/2025 6.5  2.0 - 10.0 % Final    Eosinophils % 06/25/2025 3.0  0.0 - 6.0 % Final    Basophils % 06/25/2025 0.5  0.0 - 2.0 % Final    Neutrophils Absolute 06/25/2025 3.57  1.20 - 7.70 x10*3/uL Final    Immature Granulocytes Absolute, Au* 06/25/2025 0.02  0.00 - 0.70 x10*3/uL Final    Lymphocytes Absolute 06/25/2025 2.07  1.20 - 4.80 x10*3/uL Final    Monocytes Absolute 06/25/2025 0.41  0.10 - 1.00 x10*3/uL Final    Eosinophils Absolute 06/25/2025 0.19  0.00 - 0.70 x10*3/uL  Final    Basophils Absolute 06/25/2025 0.03  0.00 - 0.10 x10*3/uL Final    Glucose 06/25/2025 116 (H)  74 - 99 mg/dL Final    Sodium 06/25/2025 138  136 - 145 mmol/L Final    Potassium 06/25/2025 4.0  3.5 - 5.3 mmol/L Final    Chloride 06/25/2025 100  98 - 107 mmol/L Final    Bicarbonate 06/25/2025 28  21 - 32 mmol/L Final    Anion Gap 06/25/2025 14  10 - 20 mmol/L Final    Urea Nitrogen 06/25/2025 17  6 - 23 mg/dL Final    Creatinine 06/25/2025 0.81  0.50 - 1.05 mg/dL Final    eGFR 06/25/2025 83  >60 mL/min/1.73m*2 Final    Calcium 06/25/2025 9.3  8.6 - 10.6 mg/dL Final    Albumin 06/25/2025 4.4  3.4 - 5.0 g/dL Final    Alkaline Phosphatase 06/25/2025 84  33 - 136 U/L Final    Total Protein 06/25/2025 6.8  6.4 - 8.2 g/dL Final    AST 06/25/2025 12  9 - 39 U/L Final    Bilirubin, Total 06/25/2025 0.3  0.0 - 1.2 mg/dL Final    ALT 06/25/2025 10  7 - 45 U/L Final    Bilirubin, Direct 06/25/2025 0.0  0.0 - 0.3 mg/dL Final    GGT 06/25/2025 39  5 - 55 U/L Final    LDH 06/25/2025 155  84 - 246 U/L Final    Magnesium 06/25/2025 2.20  1.60 - 2.40 mg/dL Final    Phosphorus 06/25/2025 5.1 (H)  2.5 - 4.9 mg/dL Final    Color, Urine 06/25/2025 Yellow  Light-Yellow, Yellow, Dark-Yellow Final    Appearance, Urine 06/25/2025 Clear  Clear Final    Specific Gravity, Urine 06/25/2025 1.023  1.005 - 1.035 Final    pH, Urine 06/25/2025 5.0  5.0, 5.5, 6.0, 6.5, 7.0, 7.5, 8.0 Final    Protein, Urine 06/25/2025 NEGATIVE  NEGATIVE, 10 (TRACE), 20 (TRACE) mg/dL Final    Glucose, Urine 06/25/2025 Normal  Normal mg/dL Final    Blood, Urine 06/25/2025 NEGATIVE  NEGATIVE mg/dL Final    Ketones, Urine 06/25/2025 NEGATIVE  NEGATIVE mg/dL Final    Bilirubin, Urine 06/25/2025 NEGATIVE  NEGATIVE mg/dL Final    Urobilinogen, Urine 06/25/2025 Normal  Normal mg/dL Final    Nitrite, Urine 06/25/2025 NEGATIVE  NEGATIVE Final    Leukocyte Esterase, Urine 06/25/2025 NEGATIVE  NEGATIVE Final   Hospital Outpatient Visit on 06/18/2025   Component Date Value  Ref Range Status    WBC 06/18/2025 5.2  4.4 - 11.3 x10*3/uL Final    nRBC 06/18/2025 0.0  0.0 - 0.0 /100 WBCs Final    RBC 06/18/2025 3.76 (L)  4.00 - 5.20 x10*6/uL Final    Hemoglobin 06/18/2025 12.4  12.0 - 16.0 g/dL Final    Hematocrit 06/18/2025 38.4  36.0 - 46.0 % Final    MCV 06/18/2025 102 (H)  80 - 100 fL Final    MCH 06/18/2025 33.0  26.0 - 34.0 pg Final    MCHC 06/18/2025 32.3  32.0 - 36.0 g/dL Final    RDW 06/18/2025 17.8 (H)  11.5 - 14.5 % Final    Platelets 06/18/2025 267  150 - 450 x10*3/uL Final    Neutrophils % 06/18/2025 57.5  40.0 - 80.0 % Final    Immature Granulocytes %, Automated 06/18/2025 0.2  0.0 - 0.9 % Final    Lymphocytes % 06/18/2025 31.8  13.0 - 44.0 % Final    Monocytes % 06/18/2025 7.2  2.0 - 10.0 % Final    Eosinophils % 06/18/2025 2.7  0.0 - 6.0 % Final    Basophils % 06/18/2025 0.6  0.0 - 2.0 % Final    Neutrophils Absolute 06/18/2025 2.96  1.20 - 7.70 x10*3/uL Final    Immature Granulocytes Absolute, Au* 06/18/2025 0.01  0.00 - 0.70 x10*3/uL Final    Lymphocytes Absolute 06/18/2025 1.64  1.20 - 4.80 x10*3/uL Final    Monocytes Absolute 06/18/2025 0.37  0.10 - 1.00 x10*3/uL Final    Eosinophils Absolute 06/18/2025 0.14  0.00 - 0.70 x10*3/uL Final    Basophils Absolute 06/18/2025 0.03  0.00 - 0.10 x10*3/uL Final    Glucose 06/18/2025 95  74 - 99 mg/dL Final    Sodium 06/18/2025 135 (L)  136 - 145 mmol/L Final    Potassium 06/18/2025 4.2  3.5 - 5.3 mmol/L Final    Chloride 06/18/2025 101  98 - 107 mmol/L Final    Bicarbonate 06/18/2025 28  21 - 32 mmol/L Final    Anion Gap 06/18/2025 10  10 - 20 mmol/L Final    Urea Nitrogen 06/18/2025 12  6 - 23 mg/dL Final    Creatinine 06/18/2025 0.68  0.50 - 1.05 mg/dL Final    eGFR 06/18/2025 >90  >60 mL/min/1.73m*2 Final    Calcium 06/18/2025 9.0  8.6 - 10.6 mg/dL Final    Albumin 06/18/2025 4.1  3.4 - 5.0 g/dL Final    Alkaline Phosphatase 06/18/2025 80  33 - 136 U/L Final    Total Protein 06/18/2025 6.4  6.4 - 8.2 g/dL Final    AST 06/18/2025  13  9 - 39 U/L Final    Bilirubin, Total 06/18/2025 0.3  0.0 - 1.2 mg/dL Final    ALT 06/18/2025 9  7 - 45 U/L Final    Bilirubin, Direct 06/18/2025 0.1  0.0 - 0.3 mg/dL Final    GGT 06/18/2025 39  5 - 55 U/L Final    LDH 06/18/2025 162  84 - 246 U/L Final    Magnesium 06/18/2025 2.05  1.60 - 2.40 mg/dL Final    Phosphorus 06/18/2025 4.4  2.5 - 4.9 mg/dL Final   Hospital Outpatient Visit on 06/11/2025   Component Date Value Ref Range Status    WBC 06/11/2025 5.0  4.4 - 11.3 x10*3/uL Final    nRBC 06/11/2025 0.0  0.0 - 0.0 /100 WBCs Final    RBC 06/11/2025 3.86 (L)  4.00 - 5.20 x10*6/uL Final    Hemoglobin 06/11/2025 12.7  12.0 - 16.0 g/dL Final    Hematocrit 06/11/2025 39.0  36.0 - 46.0 % Final    MCV 06/11/2025 101 (H)  80 - 100 fL Final    MCH 06/11/2025 32.9  26.0 - 34.0 pg Final    MCHC 06/11/2025 32.6  32.0 - 36.0 g/dL Final    RDW 06/11/2025 17.8 (H)  11.5 - 14.5 % Final    Platelets 06/11/2025 293  150 - 450 x10*3/uL Final    Neutrophils % 06/11/2025 47.9  40.0 - 80.0 % Final    Immature Granulocytes %, Automated 06/11/2025 0.4  0.0 - 0.9 % Final    Lymphocytes % 06/11/2025 41.5  13.0 - 44.0 % Final    Monocytes % 06/11/2025 5.8  2.0 - 10.0 % Final    Eosinophils % 06/11/2025 3.8  0.0 - 6.0 % Final    Basophils % 06/11/2025 0.6  0.0 - 2.0 % Final    Neutrophils Absolute 06/11/2025 2.39  1.20 - 7.70 x10*3/uL Final    Immature Granulocytes Absolute, Au* 06/11/2025 0.02  0.00 - 0.70 x10*3/uL Final    Lymphocytes Absolute 06/11/2025 2.07  1.20 - 4.80 x10*3/uL Final    Monocytes Absolute 06/11/2025 0.29  0.10 - 1.00 x10*3/uL Final    Eosinophils Absolute 06/11/2025 0.19  0.00 - 0.70 x10*3/uL Final    Basophils Absolute 06/11/2025 0.03  0.00 - 0.10 x10*3/uL Final    Glucose 06/11/2025 134 (H)  74 - 99 mg/dL Final    Sodium 06/11/2025 139  136 - 145 mmol/L Final    Potassium 06/11/2025 4.1  3.5 - 5.3 mmol/L Final    Chloride 06/11/2025 105  98 - 107 mmol/L Final    Bicarbonate 06/11/2025 25  21 - 32 mmol/L Final     Anion Gap 06/11/2025 13  10 - 20 mmol/L Final    Urea Nitrogen 06/11/2025 14  6 - 23 mg/dL Final    Creatinine 06/11/2025 0.77  0.50 - 1.05 mg/dL Final    eGFR 06/11/2025 88  >60 mL/min/1.73m*2 Final    Calcium 06/11/2025 9.5  8.6 - 10.6 mg/dL Final    Albumin 06/11/2025 4.2  3.4 - 5.0 g/dL Final    Alkaline Phosphatase 06/11/2025 83  33 - 136 U/L Final    Total Protein 06/11/2025 6.7  6.4 - 8.2 g/dL Final    AST 06/11/2025 13  9 - 39 U/L Final    Bilirubin, Total 06/11/2025 0.3  0.0 - 1.2 mg/dL Final    ALT 06/11/2025 13  7 - 45 U/L Final    Bilirubin, Direct 06/11/2025 0.1  0.0 - 0.3 mg/dL Final    GGT 06/11/2025 49  5 - 55 U/L Final    LDH 06/11/2025 177  84 - 246 U/L Final    Magnesium 06/11/2025 2.17  1.60 - 2.40 mg/dL Final    Phosphorus 06/11/2025 4.6  2.5 - 4.9 mg/dL Final   Hospital Outpatient Visit on 06/04/2025   Component Date Value Ref Range Status    WBC 06/04/2025 5.3  4.4 - 11.3 x10*3/uL Final    nRBC 06/04/2025 0.0  0.0 - 0.0 /100 WBCs Final    RBC 06/04/2025 3.64 (L)  4.00 - 5.20 x10*6/uL Final    Hemoglobin 06/04/2025 12.0  12.0 - 16.0 g/dL Final    Hematocrit 06/04/2025 37.6  36.0 - 46.0 % Final    MCV 06/04/2025 103 (H)  80 - 100 fL Final    MCH 06/04/2025 33.0  26.0 - 34.0 pg Final    MCHC 06/04/2025 31.9 (L)  32.0 - 36.0 g/dL Final    RDW 06/04/2025 17.9 (H)  11.5 - 14.5 % Final    Platelets 06/04/2025 263  150 - 450 x10*3/uL Final    Neutrophils % 06/04/2025 45.1  40.0 - 80.0 % Final    Immature Granulocytes %, Automated 06/04/2025 0.4  0.0 - 0.9 % Final    Lymphocytes % 06/04/2025 42.2  13.0 - 44.0 % Final    Monocytes % 06/04/2025 8.3  2.0 - 10.0 % Final    Eosinophils % 06/04/2025 3.4  0.0 - 6.0 % Final    Basophils % 06/04/2025 0.6  0.0 - 2.0 % Final    Neutrophils Absolute 06/04/2025 2.40  1.20 - 7.70 x10*3/uL Final    Immature Granulocytes Absolute, Au* 06/04/2025 0.02  0.00 - 0.70 x10*3/uL Final    Lymphocytes Absolute 06/04/2025 2.24  1.20 - 4.80 x10*3/uL Final    Monocytes Absolute  06/04/2025 0.44  0.10 - 1.00 x10*3/uL Final    Eosinophils Absolute 06/04/2025 0.18  0.00 - 0.70 x10*3/uL Final    Basophils Absolute 06/04/2025 0.03  0.00 - 0.10 x10*3/uL Final    Glucose 06/04/2025 117 (H)  74 - 99 mg/dL Final    Sodium 06/04/2025 140  136 - 145 mmol/L Final    Potassium 06/04/2025 3.9  3.5 - 5.3 mmol/L Final    Chloride 06/04/2025 106  98 - 107 mmol/L Final    Bicarbonate 06/04/2025 26  21 - 32 mmol/L Final    Anion Gap 06/04/2025 12  10 - 20 mmol/L Final    Urea Nitrogen 06/04/2025 14  6 - 23 mg/dL Final    Creatinine 06/04/2025 0.70  0.50 - 1.05 mg/dL Final    eGFR 06/04/2025 >90  >60 mL/min/1.73m*2 Final    Calcium 06/04/2025 8.4 (L)  8.6 - 10.6 mg/dL Final    Albumin 06/04/2025 3.9  3.4 - 5.0 g/dL Final    Alkaline Phosphatase 06/04/2025 83  33 - 136 U/L Final    Total Protein 06/04/2025 6.3 (L)  6.4 - 8.2 g/dL Final    AST 06/04/2025 19  9 - 39 U/L Final    Bilirubin, Total 06/04/2025 0.2  0.0 - 1.2 mg/dL Final    ALT 06/04/2025 14  7 - 45 U/L Final    Bilirubin, Direct 06/04/2025 0.0  0.0 - 0.3 mg/dL Final    GGT 06/04/2025 53  5 - 55 U/L Final    LDH 06/04/2025 166  84 - 246 U/L Final    Magnesium 06/04/2025 2.12  1.60 - 2.40 mg/dL Final    Phosphorus 06/04/2025 4.5  2.5 - 4.9 mg/dL Final    Color, Urine 06/04/2025 Colorless (N)  Light-Yellow, Yellow, Dark-Yellow Final    Appearance, Urine 06/04/2025 Clear  Clear Final    Specific Gravity, Urine 06/04/2025 1.008  1.005 - 1.035 Final    pH, Urine 06/04/2025 5.5  5.0, 5.5, 6.0, 6.5, 7.0, 7.5, 8.0 Final    Protein, Urine 06/04/2025 NEGATIVE  NEGATIVE, 10 (TRACE), 20 (TRACE) mg/dL Final    Glucose, Urine 06/04/2025 Normal  Normal mg/dL Final    Blood, Urine 06/04/2025 NEGATIVE  NEGATIVE mg/dL Final    Ketones, Urine 06/04/2025 NEGATIVE  NEGATIVE mg/dL Final    Bilirubin, Urine 06/04/2025 NEGATIVE  NEGATIVE mg/dL Final    Urobilinogen, Urine 06/04/2025 Normal  Normal mg/dL Final    Nitrite, Urine 06/04/2025 NEGATIVE  NEGATIVE Final    Leukocyte  Esterase, Urine 06/04/2025 NEGATIVE  NEGATIVE Final    Extra Tube 06/04/2025 Hold for add-ons.   Final    Extra Tube 06/04/2025 Hold for add-ons.   Final    Extra Tube 06/04/2025 Hold for add-ons.   Final    Extra Tube 06/04/2025 Hold for add-ons.   Final   Hospital Outpatient Visit on 05/28/2025   Component Date Value Ref Range Status    WBC 05/28/2025 5.3  4.4 - 11.3 x10*3/uL Final    nRBC 05/28/2025 0.0  0.0 - 0.0 /100 WBCs Final    RBC 05/28/2025 3.77 (L)  4.00 - 5.20 x10*6/uL Final    Hemoglobin 05/28/2025 12.4  12.0 - 16.0 g/dL Final    Hematocrit 05/28/2025 37.4  36.0 - 46.0 % Final    MCV 05/28/2025 99  80 - 100 fL Final    MCH 05/28/2025 32.9  26.0 - 34.0 pg Final    MCHC 05/28/2025 33.2  32.0 - 36.0 g/dL Final    RDW 05/28/2025 17.8 (H)  11.5 - 14.5 % Final    Platelets 05/28/2025 274  150 - 450 x10*3/uL Final    Neutrophils % 05/28/2025 58.1  40.0 - 80.0 % Final    Immature Granulocytes %, Automated 05/28/2025 0.2  0.0 - 0.9 % Final    Lymphocytes % 05/28/2025 31.8  13.0 - 44.0 % Final    Monocytes % 05/28/2025 5.9  2.0 - 10.0 % Final    Eosinophils % 05/28/2025 3.4  0.0 - 6.0 % Final    Basophils % 05/28/2025 0.6  0.0 - 2.0 % Final    Neutrophils Absolute 05/28/2025 3.08  1.20 - 7.70 x10*3/uL Final    Immature Granulocytes Absolute, Au* 05/28/2025 0.01  0.00 - 0.70 x10*3/uL Final    Lymphocytes Absolute 05/28/2025 1.68  1.20 - 4.80 x10*3/uL Final    Monocytes Absolute 05/28/2025 0.31  0.10 - 1.00 x10*3/uL Final    Eosinophils Absolute 05/28/2025 0.18  0.00 - 0.70 x10*3/uL Final    Basophils Absolute 05/28/2025 0.03  0.00 - 0.10 x10*3/uL Final    Glucose 05/28/2025 122 (H)  74 - 99 mg/dL Final    Sodium 05/28/2025 137  136 - 145 mmol/L Final    Potassium 05/28/2025 4.0  3.5 - 5.3 mmol/L Final    Chloride 05/28/2025 103  98 - 107 mmol/L Final    Bicarbonate 05/28/2025 23  21 - 32 mmol/L Final    Anion Gap 05/28/2025 15  10 - 20 mmol/L Final    Urea Nitrogen 05/28/2025 11  6 - 23 mg/dL Final    Creatinine  05/28/2025 0.63  0.50 - 1.05 mg/dL Final    eGFR 05/28/2025 >90  >60 mL/min/1.73m*2 Final    Calcium 05/28/2025 9.3  8.6 - 10.6 mg/dL Final    Albumin 05/28/2025 4.2  3.4 - 5.0 g/dL Final    Alkaline Phosphatase 05/28/2025 99  33 - 136 U/L Final    Total Protein 05/28/2025 6.5  6.4 - 8.2 g/dL Final    AST 05/28/2025 12  9 - 39 U/L Final    Bilirubin, Total 05/28/2025 0.3  0.0 - 1.2 mg/dL Final    ALT 05/28/2025 11  7 - 45 U/L Final    Bilirubin, Direct 05/28/2025 0.1  0.0 - 0.3 mg/dL Final    GGT 05/28/2025 46  5 - 55 U/L Final    LDH 05/28/2025 138  84 - 246 U/L Final    Magnesium 05/28/2025 2.04  1.60 - 2.40 mg/dL Final    Phosphorus 05/28/2025 5.1 (H)  2.5 - 4.9 mg/dL Final         CT chest abdomen pelvis w IV contrast  Result Date: 6/18/2025  Impression: SMALL-CELL LUNG CANCER: 1.  Slight decreased size and stable morphology of infiltrative left paramediastinal/hilar lesion with involvement of the adjacent proximal lobar bronchi and left mainstem bronchus. 2. Slight decrease in size of the hepatic segment 6/7 metastatic liver lesion with similar size and morphology of the hepatic segment 2 lesion. No CT evidence of additional liver lesions. Overall the liver lesions are better assessed on dedicated MRI imaging from 05/05/2025. 3. Stable diffuse osseous metastatic disease. 4. Additional findings as described above.   I personally reviewed the images/study and I agree with the findings as stated by Resident Cecilio Lindsey MD.   MACRO: None   Signed by: Petr Sierra 6/18/2025 5:21 PM Dictation workstation:   [id number]     Assessment/Plan     Ms. 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 Initial imaging post C2 reviewed by Dr Kohli appeared to have sub-optimal response to treatment. Unfortunately after C4 imaging confirmed she had progressive disease. In the last visit Dr Kohli  discussed all treatment options which included SOC Taralatamab and also clinical trial [id number] with study drug [id number] which is an ADC targeting [id number] with a topoisomerase 1 inhibitor payload. Patient opted to participate on study and completed screening requirements on study. Started treatment on 02.19.2025 on study and received six cycles of treatment to date. Tolerated treatment fairly well and recent imaging confirming she continues to benefit from current treatment. Today she is in for C7D1  given she is tolerating and benefiting from current treatment we will proceed with treatment. Plan discussed in detail with the patient. Patient in agreement with the plan of care and is aware to call the office and research nurse if experiencing any new symptoms. RTC per protocol.         Cancer Staging   No matching staging information was found for the patient.      Oncology History   SCLC (small cell lung carcinoma)   9/27/2024 Initial Diagnosis    SCLC (small cell lung carcinoma) (Multi)     10/4/2024 - 10/8/2024 Chemotherapy    CARBOplatin / Etoposide, 21 Day Cycles - Lung     10/4/2024 - 10/6/2024 Research Study Participant    ([facility]) [id number] - Atezolizumab + CARBOplatin / Etoposide / [id number], 21 Day Cycles  Plan Provider: JESSICA Hernandez  Treatment goal: Control  Line of treatment: First Line  Associated studies: (177Lu)Wc-AROL-LBOG with Carboplatin, Etoposide and Tislelizumab in Newly Diagnosed ES-SCLC     10/4/2024 - 1/17/2025 Chemotherapy    Durvalumab + CARBOplatin / Etoposide, 21 Day Cycles     10/28/2024 - 10/28/2024 Chemotherapy    Durvalumab + CARBOplatin / Etoposide, 21 Day Cycles     2/5/2025 - 2/5/2025 Chemotherapy    Durvalumab, 28 Day Cycles     2/19/2025 -  Research Study Participant    ([facility]) [id number] Part 1 - ABBV-706, 21 Day Cycles  Plan Provider: JESSICA Hernandez  Treatment goal: Control  Line of treatment: Second Line  Associated studies: ABBV-706 alone or in  combination in subjects with advanced solid tumors

## 2025-06-27 ENCOUNTER — APPOINTMENT (OUTPATIENT)
Dept: PHARMACY | Facility: HOSPITAL | Age: 60
End: 2025-06-27
Payer: MEDICARE

## 2025-07-02 ENCOUNTER — HOSPITAL ENCOUNTER (OUTPATIENT)
Dept: RESEARCH | Facility: HOSPITAL | Age: 60
Discharge: HOME | End: 2025-07-02
Payer: MEDICARE

## 2025-07-02 ENCOUNTER — EDUCATION (OUTPATIENT)
Dept: HEMATOLOGY/ONCOLOGY | Facility: HOSPITAL | Age: 60
End: 2025-07-02
Payer: MEDICARE

## 2025-07-02 VITALS
TEMPERATURE: 98.2 F | RESPIRATION RATE: 20 BRPM | HEART RATE: 94 BPM | DIASTOLIC BLOOD PRESSURE: 91 MMHG | OXYGEN SATURATION: 95 % | SYSTOLIC BLOOD PRESSURE: 137 MMHG

## 2025-07-02 DIAGNOSIS — C34.90 SMALL CELL CARCINOMA OF LUNG, UNSPECIFIED LATERALITY, UNSPECIFIED PART OF LUNG: ICD-10-CM

## 2025-07-02 LAB
ALBUMIN SERPL BCP-MCNC: 4.2 G/DL (ref 3.4–5)
ALP SERPL-CCNC: 78 U/L (ref 33–136)
ALT SERPL W P-5'-P-CCNC: 9 U/L (ref 7–45)
ANION GAP SERPL CALC-SCNC: 15 MMOL/L (ref 10–20)
AST SERPL W P-5'-P-CCNC: 13 U/L (ref 9–39)
BASOPHILS # BLD AUTO: 0.04 X10*3/UL (ref 0–0.1)
BASOPHILS NFR BLD AUTO: 0.8 %
BILIRUB DIRECT SERPL-MCNC: 0 MG/DL (ref 0–0.3)
BILIRUB SERPL-MCNC: 0.3 MG/DL (ref 0–1.2)
BUN SERPL-MCNC: 12 MG/DL (ref 6–23)
CALCIUM SERPL-MCNC: 9.2 MG/DL (ref 8.6–10.6)
CHLORIDE SERPL-SCNC: 103 MMOL/L (ref 98–107)
CO2 SERPL-SCNC: 25 MMOL/L (ref 21–32)
CREAT SERPL-MCNC: 0.74 MG/DL (ref 0.5–1.05)
EGFRCR SERPLBLD CKD-EPI 2021: >90 ML/MIN/1.73M*2
EOSINOPHIL # BLD AUTO: 0.19 X10*3/UL (ref 0–0.7)
EOSINOPHIL NFR BLD AUTO: 3.7 %
ERYTHROCYTE [DISTWIDTH] IN BLOOD BY AUTOMATED COUNT: 16.5 % (ref 11.5–14.5)
GGT SERPL-CCNC: 38 U/L (ref 5–55)
GLUCOSE SERPL-MCNC: 124 MG/DL (ref 74–99)
HCT VFR BLD AUTO: 40.4 % (ref 36–46)
HGB BLD-MCNC: 12.7 G/DL (ref 12–16)
IMM GRANULOCYTES # BLD AUTO: 0.01 X10*3/UL (ref 0–0.7)
IMM GRANULOCYTES NFR BLD AUTO: 0.2 % (ref 0–0.9)
LDH SERPL L TO P-CCNC: 150 U/L (ref 84–246)
LYMPHOCYTES # BLD AUTO: 2.15 X10*3/UL (ref 1.2–4.8)
LYMPHOCYTES NFR BLD AUTO: 41.4 %
MAGNESIUM SERPL-MCNC: 2.01 MG/DL (ref 1.6–2.4)
MCH RBC QN AUTO: 32.3 PG (ref 26–34)
MCHC RBC AUTO-ENTMCNC: 31.4 G/DL (ref 32–36)
MCV RBC AUTO: 103 FL (ref 80–100)
MONOCYTES # BLD AUTO: 0.33 X10*3/UL (ref 0.1–1)
MONOCYTES NFR BLD AUTO: 6.4 %
NEUTROPHILS # BLD AUTO: 2.47 X10*3/UL (ref 1.2–7.7)
NEUTROPHILS NFR BLD AUTO: 47.5 %
NRBC BLD-RTO: 0 /100 WBCS (ref 0–0)
PHOSPHATE SERPL-MCNC: 4.3 MG/DL (ref 2.5–4.9)
PLATELET # BLD AUTO: 279 X10*3/UL (ref 150–450)
POTASSIUM SERPL-SCNC: 4.1 MMOL/L (ref 3.5–5.3)
PROT SERPL-MCNC: 6.4 G/DL (ref 6.4–8.2)
RBC # BLD AUTO: 3.93 X10*6/UL (ref 4–5.2)
SODIUM SERPL-SCNC: 139 MMOL/L (ref 136–145)
WBC # BLD AUTO: 5.2 X10*3/UL (ref 4.4–11.3)

## 2025-07-02 PROCEDURE — 83615 LACTATE (LD) (LDH) ENZYME: CPT | Performed by: INTERNAL MEDICINE

## 2025-07-02 PROCEDURE — 36415 COLL VENOUS BLD VENIPUNCTURE: CPT

## 2025-07-02 PROCEDURE — 82248 BILIRUBIN DIRECT: CPT | Performed by: INTERNAL MEDICINE

## 2025-07-02 PROCEDURE — 83735 ASSAY OF MAGNESIUM: CPT | Performed by: INTERNAL MEDICINE

## 2025-07-02 PROCEDURE — 85025 COMPLETE CBC W/AUTO DIFF WBC: CPT | Performed by: INTERNAL MEDICINE

## 2025-07-02 PROCEDURE — 82977 ASSAY OF GGT: CPT | Performed by: INTERNAL MEDICINE

## 2025-07-02 PROCEDURE — 80053 COMPREHEN METABOLIC PANEL: CPT | Performed by: INTERNAL MEDICINE

## 2025-07-02 PROCEDURE — 84100 ASSAY OF PHOSPHORUS: CPT | Performed by: INTERNAL MEDICINE

## 2025-07-02 NOTE — PROGRESS NOTES
Research Note Non-Treatment Day    Minal Sun is here today. Patient is on GCLR2B21. Today is C7D8. Procedures completed per protocol. Patient denies any new or worsening AE's at this time. Patient is aware of treatment plan and will return 7/9 for C7D15.      Education Documentation  Treatment Plan and Schedule, taught by Jazzy Guerrero RN at 7/2/2025 12:20 PM.  Learner: Patient  Readiness: Acceptance  Method: Explanation  Response: Verbalizes Understanding    General Medication Information, taught by Jazzy Guerrero RN at 7/2/2025 12:20 PM.  Learner: Patient  Readiness: Acceptance  Method: Explanation  Response: Verbalizes Understanding    Supportive Medications, taught by Jazzy Guerrero RN at 7/2/2025 12:20 PM.  Learner: Patient  Readiness: Acceptance  Method: Explanation  Response: Verbalizes Understanding    Education Comments  No comments found.

## 2025-07-02 NOTE — RESEARCH NOTES
RSH><[id number]><C4+D8><[id number]>    DCRU NURSING VISIT NOTE  Study Name: [id number]- Part 1_Escalation- Monotherapy  IRB#: [id number]  [id number]  Protocol Version Dated:  6/15/2023  PI: Jd Kohli MD.      Time point: Cycle 4 and beyond- Day 8  CYCLE 7    Encounter Date: 07/02/2025  Encounter Time: 12:00 PM EDT  Encounter Department: [place] [id number] RESEARCH        Study Regimen and Dosing   For Oncology study, Refer [place] Treatment Plan for orders   Part 1_Escalation_Monotherapy - patients with advanced recurrent or refractory solid tumors (small cell lung cancer, neuroendocrine tumors or brain tumors) will receive gradually increasing doses of ABBV-706 to determine MTD (Maximum Tolerated Dose).  Cycle = 21-days.  ABBV-706 administered IV Day 1 of each cycle.     Dietary Guidelines   Regular diet         Admission and Prior to Starting Study Activities   Notify  when patient arrives to unit.  Patient review/update DCRU/[place] intake form.  Obtain vital signs after sitting at least 3 minutes. Record on flow sheet & in EMR.  Perform venipuncture for sample collection procedures. Do Not draw research samples from mediport/central line if used for infusion  Physical Exam including pulmonary exam & neuro assessment Days 8 & 15.         Study Specific Instructions and Documentation   1-[facility] Labs   2-Discharge          Subjective   Minal Sun is a 59 y.o. female and is here for a Research clinical visit.    Visit Provider: Jazzy Guerrero RN     Allergies: Allergies[1]    Objective     Vital Signs:    Vitals:    07/02/25 1212   BP: (!) 137/91   Pulse: 94   Resp: 20   Temp: 36.8 °C (98.2 °F)   TempSrc: Oral   SpO2: 95%       Physical Exam     ASSESSMENT and PLAN:  Problem List Items Addressed This Visit    None       Medications as of the completion of today's visit:  Current Medications[2]        Orders placed during today's visit:  No orders of  the defined types were placed in this encounter.       OUSP2O01 - ABBV-706 alone or in combination in subjects with advanced solid tumors    Patient has no adverse events documented in the Research Adverse Events activity.      Day 8 Safety Labs   For Oncology study, Refer [place] Treatment Plan for orders     Discharge Instructions   Discharge patient to home after study requirements completed or PK sample obtained.  Remind patient to return: on Day 15 for physical exam, vital signs & safety labs.  Discharge time: 1220     Edil Rosales RN  07/02/25                      [1]   Allergies  Allergen Reactions    Clindamycin Diarrhea    Erythromycin Diarrhea    Erythromycin Base Other and Nausea Only   [2]   Current Outpatient Medications   Medication Sig Dispense Refill    acetaminophen (TylenoL) 325 mg tablet Take 2 tablets (650 mg) by mouth every 6 hours if needed for mild pain (1 - 3) or moderate pain (4 - 6). 30 tablet 0    ALPRAZolam (Xanax) 0.5 mg tablet Take 2 tablets (1 mg) by mouth 2 times a day as needed for anxiety for up to 15 days. 30 tablet 0    apixaban (Eliquis) 5 mg tablet Take 1 tablet (5 mg) by mouth 2 times a day. 60 tablet 11    azelastine (Astelin) 137 mcg (0.1 %) nasal spray       carbinoxamine maleate 4 mg tablet Take 4 mg by mouth 2 times a day.      escitalopram (Lexapro) 10 mg tablet Take 2 tablets (20 mg) by mouth once daily. 180 tablet 0    guaiFENesin (Mucinex) 600 mg 12 hr tablet Take 2 tablets (1,200 mg) by mouth 2 times a day. Do not crush, chew, or split. 120 tablet 11    ipratropium-albuteroL (Duo-Neb) 0.5-2.5 mg/3 mL nebulizer solution       levothyroxine (Synthroid, Levoxyl) 75 mcg tablet Take 1 tablet (75 mcg) by mouth early in the morning.. Take on an empty stomach at the same time each day, 60 minutes prior to breakfast 30 tablet 11    lidocaine-prilocaine (Emla) 2.5-2.5 % cream Apply topically a thin film of medication to [facility] site 45 mts prior to being accessed and cover  with band aid 30 g 0    magnesium hydroxide (Milk of Magnesia) 400 mg/5 mL suspension Take 5 mL by mouth once daily as needed for constipation for up to 10 days. Take this when it has been 2 or more days without a bowel movement. 360 mL 0    metFORMIN  mg 24 hr tablet Take 1 tablet (500 mg) by mouth 2 times a day. 200 tablet 1    methocarbamol (Robaxin) 500 mg tablet Take 1 tablet (500 mg) by mouth 4 times a day as needed for muscle spasms. 120 tablet 1    morphine CR (MS Contin) 15 mg 12 hr tablet Take 1 tablet (15 mg) by mouth once daily at bedtime. Do not crush, chew, or split. This is your LONG-ACTING pain medicine. 30 tablet 0    naloxone (Narcan) 4 mg/0.1 mL nasal spray Administer 1 spray (4 mg) into affected nostril(s) if needed for opioid reversal. May repeat every 2-3 minutes if needed, alternating nostrils, until medical assistance becomes available. 2 each 0    OLANZapine (ZyPREXA) 10 mg tablet Take 1 tablet (10 mg) by mouth once daily in the evening. Take with meals. This is to help with appetite, nausea, and sleep. This is a dose increase. 30 tablet 2    omeprazole (PriLOSEC) 40 mg DR capsule Take 1 capsule (40 mg) by mouth once daily. 100 capsule 1    ondansetron (Zofran) 8 mg tablet Take 1 tablet (8 mg) by mouth every 8 hours if needed for nausea or vomiting. 30 tablet 5    oxyCODONE (Roxicodone) 5 mg immediate release tablet Take 1 tablet (5 mg) by mouth every 4 hours if needed for moderate pain (4 - 6) for up to 60 doses. Can take 2 pill if need for severe pain (7-10) 20 tablet 0    ProAir HFA 90 mcg/actuation inhaler Inhale 2 puffs every 4 hours if needed.      prochlorperazine (Compazine) 10 mg tablet Take 1 tablet (10 mg) by mouth every 6 hours if needed for nausea or vomiting. 30 tablet 5    rosuvastatin (Crestor) 10 mg tablet Take 1 tablet (10 mg) by mouth once daily. 100 tablet 1    sennosides-docusate sodium (Marzena-Colace) 8.6-50 mg tablet Take 3 tablets by mouth 2 times a day. This is  to help manage constipation. 180 tablet 11     No current facility-administered medications for this encounter.

## 2025-07-09 ENCOUNTER — EDUCATION (OUTPATIENT)
Dept: HEMATOLOGY/ONCOLOGY | Facility: HOSPITAL | Age: 60
End: 2025-07-09
Payer: MEDICARE

## 2025-07-09 ENCOUNTER — HOSPITAL ENCOUNTER (OUTPATIENT)
Dept: RESEARCH | Facility: HOSPITAL | Age: 60
Discharge: HOME | End: 2025-07-09
Payer: MEDICARE

## 2025-07-09 VITALS
TEMPERATURE: 97.7 F | DIASTOLIC BLOOD PRESSURE: 78 MMHG | HEART RATE: 99 BPM | RESPIRATION RATE: 20 BRPM | SYSTOLIC BLOOD PRESSURE: 119 MMHG | OXYGEN SATURATION: 100 %

## 2025-07-09 DIAGNOSIS — C34.90 SMALL CELL CARCINOMA OF LUNG, UNSPECIFIED LATERALITY, UNSPECIFIED PART OF LUNG: ICD-10-CM

## 2025-07-09 LAB
ALBUMIN SERPL BCP-MCNC: 4.3 G/DL (ref 3.4–5)
ALP SERPL-CCNC: 77 U/L (ref 33–136)
ALT SERPL W P-5'-P-CCNC: 11 U/L (ref 7–45)
ANION GAP SERPL CALC-SCNC: 13 MMOL/L (ref 10–20)
AST SERPL W P-5'-P-CCNC: 14 U/L (ref 9–39)
BASOPHILS # BLD AUTO: 0.02 X10*3/UL (ref 0–0.1)
BASOPHILS NFR BLD AUTO: 0.4 %
BILIRUB DIRECT SERPL-MCNC: 0.1 MG/DL (ref 0–0.3)
BILIRUB SERPL-MCNC: 0.3 MG/DL (ref 0–1.2)
BUN SERPL-MCNC: 14 MG/DL (ref 6–23)
CALCIUM SERPL-MCNC: 9.4 MG/DL (ref 8.6–10.6)
CHLORIDE SERPL-SCNC: 102 MMOL/L (ref 98–107)
CO2 SERPL-SCNC: 27 MMOL/L (ref 21–32)
CREAT SERPL-MCNC: 0.72 MG/DL (ref 0.5–1.05)
EGFRCR SERPLBLD CKD-EPI 2021: >90 ML/MIN/1.73M*2
EOSINOPHIL # BLD AUTO: 0.15 X10*3/UL (ref 0–0.7)
EOSINOPHIL NFR BLD AUTO: 2.7 %
ERYTHROCYTE [DISTWIDTH] IN BLOOD BY AUTOMATED COUNT: 17 % (ref 11.5–14.5)
GGT SERPL-CCNC: 39 U/L (ref 5–55)
GLUCOSE SERPL-MCNC: 108 MG/DL (ref 74–99)
HCT VFR BLD AUTO: 39.5 % (ref 36–46)
HGB BLD-MCNC: 13 G/DL (ref 12–16)
IMM GRANULOCYTES # BLD AUTO: 0.01 X10*3/UL (ref 0–0.7)
IMM GRANULOCYTES NFR BLD AUTO: 0.2 % (ref 0–0.9)
LDH SERPL L TO P-CCNC: 168 U/L (ref 84–246)
LYMPHOCYTES # BLD AUTO: 1.99 X10*3/UL (ref 1.2–4.8)
LYMPHOCYTES NFR BLD AUTO: 35.7 %
MAGNESIUM SERPL-MCNC: 2.14 MG/DL (ref 1.6–2.4)
MCH RBC QN AUTO: 32.8 PG (ref 26–34)
MCHC RBC AUTO-ENTMCNC: 32.9 G/DL (ref 32–36)
MCV RBC AUTO: 100 FL (ref 80–100)
MONOCYTES # BLD AUTO: 0.34 X10*3/UL (ref 0.1–1)
MONOCYTES NFR BLD AUTO: 6.1 %
NEUTROPHILS # BLD AUTO: 3.06 X10*3/UL (ref 1.2–7.7)
NEUTROPHILS NFR BLD AUTO: 54.9 %
NRBC BLD-RTO: 0 /100 WBCS (ref 0–0)
PHOSPHATE SERPL-MCNC: 4.7 MG/DL (ref 2.5–4.9)
PLATELET # BLD AUTO: 282 X10*3/UL (ref 150–450)
POTASSIUM SERPL-SCNC: 4.1 MMOL/L (ref 3.5–5.3)
PROT SERPL-MCNC: 6.9 G/DL (ref 6.4–8.2)
RBC # BLD AUTO: 3.96 X10*6/UL (ref 4–5.2)
SODIUM SERPL-SCNC: 138 MMOL/L (ref 136–145)
WBC # BLD AUTO: 5.6 X10*3/UL (ref 4.4–11.3)

## 2025-07-09 PROCEDURE — 2500000004 HC RX 250 GENERAL PHARMACY W/ HCPCS (ALT 636 FOR OP/ED): Performed by: INTERNAL MEDICINE

## 2025-07-09 PROCEDURE — 36591 DRAW BLOOD OFF VENOUS DEVICE: CPT

## 2025-07-09 PROCEDURE — 83615 LACTATE (LD) (LDH) ENZYME: CPT | Performed by: INTERNAL MEDICINE

## 2025-07-09 PROCEDURE — 84100 ASSAY OF PHOSPHORUS: CPT | Performed by: INTERNAL MEDICINE

## 2025-07-09 PROCEDURE — 85025 COMPLETE CBC W/AUTO DIFF WBC: CPT | Performed by: INTERNAL MEDICINE

## 2025-07-09 PROCEDURE — 82977 ASSAY OF GGT: CPT | Performed by: INTERNAL MEDICINE

## 2025-07-09 PROCEDURE — 84075 ASSAY ALKALINE PHOSPHATASE: CPT | Performed by: INTERNAL MEDICINE

## 2025-07-09 PROCEDURE — 82248 BILIRUBIN DIRECT: CPT | Performed by: INTERNAL MEDICINE

## 2025-07-09 PROCEDURE — 83735 ASSAY OF MAGNESIUM: CPT | Performed by: INTERNAL MEDICINE

## 2025-07-09 RX ORDER — HEPARIN 100 UNIT/ML
500 SYRINGE INTRAVENOUS AS NEEDED
OUTPATIENT
Start: 2025-07-09

## 2025-07-09 RX ORDER — HEPARIN 100 UNIT/ML
500 SYRINGE INTRAVENOUS AS NEEDED
Status: DISCONTINUED | OUTPATIENT
Start: 2025-07-09 | End: 2025-07-10 | Stop reason: HOSPADM

## 2025-07-09 RX ORDER — HEPARIN SODIUM,PORCINE/PF 10 UNIT/ML
50 SYRINGE (ML) INTRAVENOUS AS NEEDED
OUTPATIENT
Start: 2025-07-09

## 2025-07-09 RX ADMIN — HEPARIN 500 UNITS: 100 SYRINGE at 12:36

## 2025-07-09 ASSESSMENT — PAIN SCALES - GENERAL: PAINLEVEL_OUTOF10: 0-NO PAIN

## 2025-07-09 NOTE — PROGRESS NOTES
Research Note Non-Treatment Day    Minal Sun is here today. Patient is on [id number]. Today is C7D15. Procedures completed per protocol. AE's and con-meds reviewed with patient. Patient does not have new or worsening AE's at this time. Patient is aware of treatment plan and will return 7/16 C8D1.      Education Documentation  Treatment Plan and Schedule, taught by Jazzy Guerrero RN at 7/9/2025 12:30 PM.  Learner: Patient  Readiness: Acceptance  Method: Explanation  Response: Verbalizes Understanding    General Medication Information, taught by Jazzy Guerrero RN at 7/9/2025 12:30 PM.  Learner: Patient  Readiness: Acceptance  Method: Explanation  Response: Verbalizes Understanding    Supportive Medications, taught by Jazzy uGerrero RN at 7/9/2025 12:30 PM.  Learner: Patient  Readiness: Acceptance  Method: Explanation  Response: Verbalizes Understanding    Comprehensive Metabolic Panel (CMP), taught by Jazzy Guerrero RN at 7/9/2025 12:30 PM.  Learner: Patient  Readiness: Acceptance  Method: Explanation  Response: Verbalizes Understanding    Complete Blood Count with Differential (CBC w/ Diff), taught by Jazzy Guerrero RN at 7/9/2025 12:30 PM.  Learner: Patient  Readiness: Acceptance  Method: Explanation  Response: Verbalizes Understanding    Education Comments  No comments found.

## 2025-07-09 NOTE — RESEARCH NOTES
RSH><[id number]><C4+D15><[id number]>    DCRU NURSING VISIT NOTE  Study Name: [id number]- Part 1_Escalation- Monotherapy  IRB#: [id number]  [id number]  Protocol Version Dated:  6/15/2023  PI: Jd Kohli MD.      Time point: Cycle 4 and beyond- Day 15  CYCLE 7    Encounter Date: 07/09/2025  Encounter Time: 12:00 PM EDT  Encounter Department: [place] [address] RESEARCH        Study Regimen and Dosing   For Oncology study, Refer [place] Treatment Plan for orders   Part 1_Escalation_Monotherapy - patients with advanced recurrent or refractory solid tumors (small cell lung cancer, neuroendocrine tumors or brain tumors) will receive gradually increasing doses of ABBV-706 to determine MTD (Maximum Tolerated Dose).  Cycle = 21-days.  ABBV-706 administered IV Day 1 of each cycle.     Dietary Guidelines   Regular diet         Admission and Prior to Starting Study Activities   Notify  when patient arrives to unit.  Patient review/update DCRU/[place] intake form.  Obtain vital signs after sitting at least 3 minutes. Record on flow sheet & in EMR.  Perform venipuncture for sample collection procedures. Do Not draw research samples from mediport/central line if used for infusion  Physical Exam including pulmonary exam & neuro assessment Days 8 & 15.         Study Specific Instructions and Documentation   1-Safety Labs   2-Discharge           Subjective   Minal Sun is a 59 y.o. female and is here for a Research clinical visit.    Visit Provider: Jazzy Guerrero RN     Allergies: Allergies[1]    Objective     Vital Signs:    Vitals:    07/09/25 1213   BP: 119/78   Pulse: 99   Resp: 20   Temp: 36.5 °C (97.7 °F)   TempSrc: Temporal   SpO2: 100%   PainSc: 0-No pain       Physical Exam     ASSESSMENT and PLAN:  Problem List Items Addressed This Visit       SCLC (small cell lung carcinoma)    Relevant Medications    heparin flush 100 unit/mL syringe 500 Units    Other  Relevant Orders    CBC and Auto Differential    Comprehensive metabolic panel    Bilirubin, Direct    Gamma GT    Lactate dehydrogenase    Magnesium    Phosphorus    Venous Access, CVAD    Adult diet Regular        Medications as of the completion of today's visit:  Current Medications[2]    Administrations This Visit       heparin flush 100 unit/mL syringe 500 Units       Admin Date  07/09/2025 Action  Given Dose  500 Units Route  intra-catheter Documented By  Elba Srivastava RN                    Orders placed during today's visit:  Orders Placed This Encounter   Procedures    CBC and Auto Differential     Standing Status:   Standing     Number of Occurrences:   1     Release result to [facility]:   Immediate [1]    Comprehensive metabolic panel     Standing Status:   Standing     Number of Occurrences:   1     [facility]    Bilirubin, Direct     Standing Status:   Standing     Number of Occurrences:   1     [facility] [1[facility]    Gamma GT     Standing Status:   Standing     Number of Occurrences:   1     [facility] [1]    Lactate dehydrogenase     Standing Status:   Standing     Number of Occurrences:   1     [facility]   [facility] [1]    Magnesium     Standing Status:   Standing     Number of Occurrences:   1     [facility]   [facility] [1]    Phosphorus     Standing Status:   Standing     Number of Occurrences:   1     [facility]   Im[facility] [1]    Adult diet Regular     Standing Status:   Standing     Number of Occurrences:   1     Diet type:   Regular    Venous Access, CVAD     Standing Status:   Standing     Number of Occurrences:   1        [id number] - ABBV-706 alone or in combination in subjects with advanced solid tumors    Patient has no adverse events documented in the Research Adverse Events activity.        Day 15 Safety Labs   For Oncology study, Refer [place] Treatment Plan for  orders    Drawn at 1230 per right chest [facility]         Discharge Instructions   Discharge patient to home after study requirements completed or PK sample obtained.  Discharge time: 1243     Elba Srivastava RN  07/09/25                        [1]   Allergies  Allergen Reactions    Clindamycin Diarrhea    Erythromycin Diarrhea    Erythromycin Base Other and Nausea Only   [2]   Current Outpatient Medications   Medication Sig Dispense Refill    acetaminophen (TylenoL) 325 mg tablet Take 2 tablets (650 mg) by mouth every 6 hours if needed for mild pain (1 - 3) or moderate pain (4 - 6). 30 tablet 0    ALPRAZolam (Xanax) 0.5 mg tablet Take 2 tablets (1 mg) by mouth 2 times a day as needed for anxiety for up to 15 days. 30 tablet 0    apixaban (Eliquis) 5 mg tablet Take 1 tablet (5 mg) by mouth 2 times a day. 60 tablet 11    azelastine (Astelin) 137 mcg (0.1 %) nasal spray       carbinoxamine maleate 4 mg tablet Take 4 mg by mouth 2 times a day.      escitalopram (Lexapro) 10 mg tablet Take 2 tablets (20 mg) by mouth once daily. 180 tablet 0    guaiFENesin (Mucinex) 600 mg 12 hr tablet Take 2 tablets (1,200 mg) by mouth 2 times a day. Do not crush, chew, or split. 120 tablet 11    ipratropium-albuteroL (Duo-Neb) 0.5-2.5 mg/3 mL nebulizer solution       levothyroxine (Synthroid, Levoxyl) 75 mcg tablet Take 1 tablet (75 mcg) by mouth early in the morning.. Take on an empty stomach at the same time each day, 60 minutes prior to breakfast 30 tablet 11    lidocaine-prilocaine (Emla) 2.5-2.5 % cream Apply topically a thin film of medication to [facility] site 45 mts prior to being accessed and cover with band aid 30 g 0    magnesium hydroxide (Milk of Magnesia) 400 mg/5 mL suspension Take 5 mL by mouth once daily as needed for constipation for up to 10 days. Take this when it has been 2 or more days without a bowel movement. 360 mL 0    metFORMIN  mg 24 hr tablet Take 1 tablet (500 mg) by mouth 2 times a day. 200 tablet 1     methocarbamol (Robaxin) 500 mg tablet Take 1 tablet (500 mg) by mouth 4 times a day as needed for muscle spasms. 120 tablet 1    morphine CR (MS Contin) 15 mg 12 hr tablet Take 1 tablet (15 mg) by mouth once daily at bedtime. Do not crush, chew, or split. This is your LONG-ACTING pain medicine. 30 tablet 0    naloxone (Narcan) 4 mg/0.1 mL nasal spray Administer 1 spray (4 mg) into affected nostril(s) if needed for opioid reversal. May repeat every 2-3 minutes if needed, alternating nostrils, until medical assistance becomes available. 2 each 0    OLANZapine (ZyPREXA) 10 mg tablet Take 1 tablet (10 mg) by mouth once daily in the evening. Take with meals. This is to help with appetite, nausea, and sleep. This is a dose increase. 30 tablet 2    omeprazole (PriLOSEC) 40 mg DR capsule Take 1 capsule (40 mg) by mouth once daily. 100 capsule 1    ondansetron (Zofran) 8 mg tablet Take 1 tablet (8 mg) by mouth every 8 hours if needed for nausea or vomiting. 30 tablet 5    oxyCODONE (Roxicodone) 5 mg immediate release tablet Take 1 tablet (5 mg) by mouth every 4 hours if needed for moderate pain (4 - 6) for up to 60 doses. Can take 2 pill if need for severe pain (7-10) 20 tablet 0    ProAir HFA 90 mcg/actuation inhaler Inhale 2 puffs every 4 hours if needed.      prochlorperazine (Compazine) 10 mg tablet Take 1 tablet (10 mg) by mouth every 6 hours if needed for nausea or vomiting. 30 tablet 5    rosuvastatin (Crestor) 10 mg tablet Take 1 tablet (10 mg) by mouth once daily. 100 tablet 1    sennosides-docusate sodium (Marzena-Colace) 8.6-50 mg tablet Take 3 tablets by mouth 2 times a day. This is to help manage constipation. 180 tablet 11     Current Facility-Administered Medications   Medication Dose Route Frequency Provider Last Rate Last Admin    heparin flush 100 unit/mL syringe 500 Units  500 Units intra-catheter PRN Jd Kohli MD   500 Units at 07/09/25 4803

## 2025-07-14 DIAGNOSIS — C34.90 SMALL CELL CARCINOMA OF LUNG, UNSPECIFIED LATERALITY, UNSPECIFIED PART OF LUNG: ICD-10-CM

## 2025-07-15 DIAGNOSIS — C34.90 SMALL CELL CARCINOMA OF LUNG, UNSPECIFIED LATERALITY, UNSPECIFIED PART OF LUNG: Primary | ICD-10-CM

## 2025-07-15 RX ORDER — PROCHLORPERAZINE MALEATE 10 MG
10 TABLET ORAL EVERY 6 HOURS PRN
Status: CANCELLED | OUTPATIENT
Start: 2025-07-16

## 2025-07-15 RX ORDER — PROCHLORPERAZINE EDISYLATE 5 MG/ML
10 INJECTION INTRAMUSCULAR; INTRAVENOUS EVERY 6 HOURS PRN
Status: CANCELLED | OUTPATIENT
Start: 2025-07-16

## 2025-07-15 RX ORDER — EPINEPHRINE 0.3 MG/.3ML
0.3 INJECTION SUBCUTANEOUS EVERY 5 MIN PRN
Status: CANCELLED | OUTPATIENT
Start: 2025-07-16

## 2025-07-15 RX ORDER — FAMOTIDINE 10 MG/ML
20 INJECTION, SOLUTION INTRAVENOUS ONCE AS NEEDED
Status: CANCELLED | OUTPATIENT
Start: 2025-07-16

## 2025-07-15 RX ORDER — DIPHENHYDRAMINE HYDROCHLORIDE 50 MG/ML
50 INJECTION, SOLUTION INTRAMUSCULAR; INTRAVENOUS AS NEEDED
Status: CANCELLED | OUTPATIENT
Start: 2025-07-16

## 2025-07-15 RX ORDER — ALBUTEROL SULFATE 0.83 MG/ML
3 SOLUTION RESPIRATORY (INHALATION) AS NEEDED
Status: CANCELLED | OUTPATIENT
Start: 2025-07-16

## 2025-07-16 ENCOUNTER — HOSPITAL ENCOUNTER (OUTPATIENT)
Dept: RESEARCH | Facility: HOSPITAL | Age: 60
Discharge: HOME | End: 2025-07-16
Payer: MEDICARE

## 2025-07-16 VITALS
BODY MASS INDEX: 35.12 KG/M2 | SYSTOLIC BLOOD PRESSURE: 137 MMHG | RESPIRATION RATE: 18 BRPM | DIASTOLIC BLOOD PRESSURE: 92 MMHG | TEMPERATURE: 96.4 F | HEART RATE: 71 BPM | OXYGEN SATURATION: 97 % | WEIGHT: 192.02 LBS

## 2025-07-16 DIAGNOSIS — C34.90 SMALL CELL CARCINOMA OF LUNG, UNSPECIFIED LATERALITY, UNSPECIFIED PART OF LUNG: ICD-10-CM

## 2025-07-16 LAB
ALBUMIN SERPL BCP-MCNC: 4.3 G/DL (ref 3.4–5)
ALP SERPL-CCNC: 80 U/L (ref 33–136)
ALT SERPL W P-5'-P-CCNC: 11 U/L (ref 7–45)
ANION GAP SERPL CALC-SCNC: 14 MMOL/L (ref 10–20)
APPEARANCE UR: CLEAR
AST SERPL W P-5'-P-CCNC: 13 U/L (ref 9–39)
BASOPHILS # BLD AUTO: 0.03 X10*3/UL (ref 0–0.1)
BASOPHILS NFR BLD AUTO: 0.5 %
BILIRUB DIRECT SERPL-MCNC: 0.1 MG/DL (ref 0–0.3)
BILIRUB SERPL-MCNC: 0.3 MG/DL (ref 0–1.2)
BILIRUB UR STRIP.AUTO-MCNC: NEGATIVE MG/DL
BUN SERPL-MCNC: 12 MG/DL (ref 6–23)
CALCIUM SERPL-MCNC: 9.4 MG/DL (ref 8.6–10.6)
CHLORIDE SERPL-SCNC: 103 MMOL/L (ref 98–107)
CO2 SERPL-SCNC: 26 MMOL/L (ref 21–32)
COLOR UR: NORMAL
CREAT SERPL-MCNC: 0.77 MG/DL (ref 0.5–1.05)
EGFRCR SERPLBLD CKD-EPI 2021: 88 ML/MIN/1.73M*2
EOSINOPHIL # BLD AUTO: 0.19 X10*3/UL (ref 0–0.7)
EOSINOPHIL NFR BLD AUTO: 3.3 %
ERYTHROCYTE [DISTWIDTH] IN BLOOD BY AUTOMATED COUNT: 17 % (ref 11.5–14.5)
GGT SERPL-CCNC: 42 U/L (ref 5–55)
GLUCOSE SERPL-MCNC: 121 MG/DL (ref 74–99)
GLUCOSE UR STRIP.AUTO-MCNC: NORMAL MG/DL
HCT VFR BLD AUTO: 41.1 % (ref 36–46)
HGB BLD-MCNC: 13.5 G/DL (ref 12–16)
HOLD SPECIMEN: NORMAL
IMM GRANULOCYTES # BLD AUTO: 0.02 X10*3/UL (ref 0–0.7)
IMM GRANULOCYTES NFR BLD AUTO: 0.4 % (ref 0–0.9)
KETONES UR STRIP.AUTO-MCNC: NEGATIVE MG/DL
LDH SERPL L TO P-CCNC: 164 U/L (ref 84–246)
LEUKOCYTE ESTERASE UR QL STRIP.AUTO: NEGATIVE
LYMPHOCYTES # BLD AUTO: 1.74 X10*3/UL (ref 1.2–4.8)
LYMPHOCYTES NFR BLD AUTO: 30.6 %
MAGNESIUM SERPL-MCNC: 2.22 MG/DL (ref 1.6–2.4)
MCH RBC QN AUTO: 34.2 PG (ref 26–34)
MCHC RBC AUTO-ENTMCNC: 32.8 G/DL (ref 32–36)
MCV RBC AUTO: 104 FL (ref 80–100)
MONOCYTES # BLD AUTO: 0.42 X10*3/UL (ref 0.1–1)
MONOCYTES NFR BLD AUTO: 7.4 %
NEUTROPHILS # BLD AUTO: 3.28 X10*3/UL (ref 1.2–7.7)
NEUTROPHILS NFR BLD AUTO: 57.8 %
NITRITE UR QL STRIP.AUTO: NEGATIVE
NRBC BLD-RTO: 0 /100 WBCS (ref 0–0)
PH UR STRIP.AUTO: 5.5 [PH]
PHOSPHATE SERPL-MCNC: 4.3 MG/DL (ref 2.5–4.9)
PLATELET # BLD AUTO: 283 X10*3/UL (ref 150–450)
POTASSIUM SERPL-SCNC: 4.1 MMOL/L (ref 3.5–5.3)
PROT SERPL-MCNC: 6.8 G/DL (ref 6.4–8.2)
PROT UR STRIP.AUTO-MCNC: NEGATIVE MG/DL
RBC # BLD AUTO: 3.95 X10*6/UL (ref 4–5.2)
RBC # UR STRIP.AUTO: NEGATIVE MG/DL
SODIUM SERPL-SCNC: 139 MMOL/L (ref 136–145)
SP GR UR STRIP.AUTO: 1.01
T4 FREE SERPL-MCNC: 0.95 NG/DL (ref 0.78–1.48)
TSH SERPL-ACNC: 85.79 MIU/L (ref 0.44–3.98)
UROBILINOGEN UR STRIP.AUTO-MCNC: NORMAL MG/DL
WBC # BLD AUTO: 5.7 X10*3/UL (ref 4.4–11.3)

## 2025-07-16 PROCEDURE — 83615 LACTATE (LD) (LDH) ENZYME: CPT

## 2025-07-16 PROCEDURE — 80053 COMPREHEN METABOLIC PANEL: CPT

## 2025-07-16 PROCEDURE — 2500000004 HC RX 250 GENERAL PHARMACY W/ HCPCS (ALT 636 FOR OP/ED): Performed by: INTERNAL MEDICINE

## 2025-07-16 PROCEDURE — 82977 ASSAY OF GGT: CPT

## 2025-07-16 PROCEDURE — 85025 COMPLETE CBC W/AUTO DIFF WBC: CPT

## 2025-07-16 PROCEDURE — 96413 CHEMO IV INFUSION 1 HR: CPT

## 2025-07-16 PROCEDURE — 82248 BILIRUBIN DIRECT: CPT

## 2025-07-16 PROCEDURE — 2560000001 HC RX 256 EXPERIMENTAL DRUGS: Performed by: INTERNAL MEDICINE

## 2025-07-16 PROCEDURE — 84443 ASSAY THYROID STIM HORMONE: CPT

## 2025-07-16 PROCEDURE — 84100 ASSAY OF PHOSPHORUS: CPT

## 2025-07-16 PROCEDURE — 81003 URINALYSIS AUTO W/O SCOPE: CPT

## 2025-07-16 PROCEDURE — 83735 ASSAY OF MAGNESIUM: CPT

## 2025-07-16 PROCEDURE — 84439 ASSAY OF FREE THYROXINE: CPT

## 2025-07-16 PROCEDURE — 36415 COLL VENOUS BLD VENIPUNCTURE: CPT

## 2025-07-16 RX ORDER — EPINEPHRINE 1 MG/ML
0.3 INJECTION, SOLUTION, CONCENTRATE INTRAVENOUS EVERY 5 MIN PRN
Status: DISCONTINUED | OUTPATIENT
Start: 2025-07-16 | End: 2025-07-17 | Stop reason: HOSPADM

## 2025-07-16 RX ORDER — HEPARIN 100 UNIT/ML
500 SYRINGE INTRAVENOUS AS NEEDED
OUTPATIENT
Start: 2025-07-16

## 2025-07-16 RX ORDER — DIPHENHYDRAMINE HYDROCHLORIDE 50 MG/ML
50 INJECTION, SOLUTION INTRAMUSCULAR; INTRAVENOUS AS NEEDED
Status: DISCONTINUED | OUTPATIENT
Start: 2025-07-16 | End: 2025-07-17 | Stop reason: HOSPADM

## 2025-07-16 RX ORDER — HEPARIN SODIUM,PORCINE/PF 10 UNIT/ML
50 SYRINGE (ML) INTRAVENOUS AS NEEDED
OUTPATIENT
Start: 2025-07-16

## 2025-07-16 RX ORDER — HEPARIN 100 UNIT/ML
500 SYRINGE INTRAVENOUS AS NEEDED
Status: DISCONTINUED | OUTPATIENT
Start: 2025-07-16 | End: 2025-07-17 | Stop reason: HOSPADM

## 2025-07-16 RX ORDER — PROCHLORPERAZINE EDISYLATE 5 MG/ML
10 INJECTION INTRAMUSCULAR; INTRAVENOUS EVERY 6 HOURS PRN
Status: DISCONTINUED | OUTPATIENT
Start: 2025-07-16 | End: 2025-07-17 | Stop reason: HOSPADM

## 2025-07-16 RX ORDER — ALBUTEROL SULFATE 0.83 MG/ML
3 SOLUTION RESPIRATORY (INHALATION) AS NEEDED
Status: DISCONTINUED | OUTPATIENT
Start: 2025-07-16 | End: 2025-07-17 | Stop reason: HOSPADM

## 2025-07-16 RX ORDER — FAMOTIDINE 10 MG/ML
20 INJECTION, SOLUTION INTRAVENOUS ONCE AS NEEDED
Status: DISCONTINUED | OUTPATIENT
Start: 2025-07-16 | End: 2025-07-17 | Stop reason: HOSPADM

## 2025-07-16 RX ORDER — PROCHLORPERAZINE MALEATE 10 MG
10 TABLET ORAL EVERY 6 HOURS PRN
Status: DISCONTINUED | OUTPATIENT
Start: 2025-07-16 | End: 2025-07-17 | Stop reason: HOSPADM

## 2025-07-16 RX ADMIN — Medication 110.6 MG: at 12:14

## 2025-07-16 RX ADMIN — HEPARIN 500 UNITS: 100 SYRINGE at 13:15

## 2025-07-16 ASSESSMENT — ENCOUNTER SYMPTOMS
NAUSEA: 0
CHILLS: 0
ABDOMINAL PAIN: 0
SORE THROAT: 0
VERTIGO: 0
VOMITING: 0
ARTHRALGIAS: 0
VISUAL CHANGE: 0
JOINT SWELLING: 0
ANOREXIA: 0
COUGH: 1
SWOLLEN GLANDS: 0
HEADACHES: 0
CHANGE IN BOWEL HABIT: 0
NECK PAIN: 0
FEVER: 0
NUMBNESS: 0
MYALGIAS: 0
DIAPHORESIS: 0
FATIGUE: 1

## 2025-07-16 ASSESSMENT — PAIN SCALES - GENERAL: PAINLEVEL_OUTOF10: 0-NO PAIN

## 2025-07-16 NOTE — PROGRESS NOTES
Patient ID: Minal Sun is a 59 y.o. female.    DIAGNOSIS     Small cell carcinoma of the lung.  Date of diagnosis is September 27, 2024 from a bronchoscopic biopsy of a subcarinal lymph node.  Immunohistochemistry positive for TTF-1, INSM1, synaptophysin and chromogranin, negative for p40, RB immunostain shows loss of nuclear expression.        STAGING     Clinical T4, N2, M1 C, stage IVc, extensive stage disease        CURRENT SITES OF DISEASE      Left lung, mediastinum, left hilum of the lung, liver, intra-abdominal lymph nodes in the portacaval and periportal region, bone metastases        MOLECULAR GENOMICS     Test Name: Adreima xF+ (XF.V3) dated October 2024     Molecular Findings:  * Gene: [id number], Variant: Missense variant (exon 1) - GOF, Potentially Actionable, p.R88Q (Allele Frequency - 0.3%)  * Gene: TP53, Variant: Missense variant - LOF, Biologically Relevant, p.R249G (Allele Frequency - 70.1%)  * Gene: RB1, Variant: Splice region variant - LOF, Biologically Relevant, c.540-1G>T, Splice Site (Allele Frequency - 56.8%)        Test Name: Adreima xT (XT.V4)  Laboratory Name: [facility]  Specimen Source: Lymph node, level 7  Collection Date: 27-Sep-2024     Molecular Findings:  * Gene: TP53, Variant: Missense variant - LOF, Biologically Relevant, p.R249G (Allele Frequency - 96.2%)  * Gene: RB1, Variant: Splice region variant - LOF, Biologically Relevant, c.540-1G>T, Splice Site (Allele Frequency - 96%)  * Gene: KMT2D, Variant: Stop gain - LOF, Biologically Relevant, p.*, Nonsense (Allele Frequency - 41.9%)  * Biomarker: Microsatellite Instability, Status: stable  * Biomarker: Tumor Mutation [place] (2.6 Muts/Mb, Percentile: 33)        SERUM TUMOR MARKERS     Baseline LDH within normal limits  C1D1 2.18.25 on study [id number] LDH was 274.   LDH has been WNL since 3.5.25.       PRIOR THERAPY     Combination chemotherapy and immunotherapy.  Initially enrolled on a clinical trial of Lutathera and  underwent octreotide scan which was positive.  However she decided to come off study and did not receive Lutathera with her chemo.  Chemotherapy started October 6, 2024.  Immunotherapy completed added with cycle #2 11.06.2024.  Minor response observed after 2 cycles of treatment. PD observed after C4.        CURRENT THERAPY     2.19.2025: Started on Phase 1 study [id number] with study drug [id number] which is an ADC targeting SEZ6 with a topoisomerase 1 inhibitor payload     CURRENT ONCOLOGICAL PROBLEMS    Cough and shortness of breath significantly improved with systemic treatment.  Pulmonary Embolism  Immunotherapy induced hypothyroidism  Neoplasm non cardiac chest/back pain        HISTORY OF PRESENT ILLNESS     This is a 59-year-old patient.  She states that she was feeling sick toward the end of spring of this year with some sinus problems.  Was seen by her allergist and was treated with antibiotics.  She was also seen at 1 point by primary care and steroids and antibiotics and inhalers were given.  She did not have any resolution and then ultimately developed a little bit of the hemoptysis which led to a chest x-ray showing an abnormality and finally a CT scan of the chest.The CT scan of the chest was done as a lung cancer screening low-dose CT and completed on September 20, 2024.  This demonstrated a extensive infiltrative mediastinal and hilar mass.  There was narrowing of the left mainstem bronchus and lower lobe bronchus secondary to extrinsic compression.  The predominant growth was within the left hilum and subcarinal region.  She underwent a bronchoscopy dated September 27, 2024 showing splaying of the main alanna.  There was mild erosion of a lymph node into the left mainstem bronchus.  There was erythema and mild erosions of an endobronchial tumor along the medial border.  Moderate to severe stenosis of the left lower lobe bronchus to 75%.        PAST MEDICAL HISTORY     Lumbar surgery about 30 years ago    hysterectomy in 2000  Diabetes  COPD  Right eye exophthalmus  herpes zoster        SOCIAL HISTORY     Patient lives with her sister.  She lives in [facility].  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  She started smoking at the age of 14 and continues to smoke at the age of 59.  Has smoked for 45 years on average 1 pack/day.        CURRENT MEDS     See medication list, meds reviewed, includes metformin, rosuvastatin, been done so Nied inhalers, Wellbutrin escitalopram, trazodone        ALLERGIES     Patient denies any drug allergies        FAMILY HISTORY      Mother had bladder cancer and possibly breast cancer.     Subjective    Today 7.16.2025 Patient in clinic for C8D1 on [id number].   Patient reports she continues to tolerate treatment well and denies any new side effects.  Continues to have baseline symptoms intermittent episodes of nausea, SOB with exertion, cough, constipation and fatigue. She however reports on occasion her cough is worse than usual with some help from Tessalon and Hyocdon. Today she denies any denies any headaches, rash/itching, chills or fever, chest pain or chest tightness, painful inflammation/ulceration oral mucous membranes, vomiting, diarrhea, hematemesis /hemoptysis, hematuria/rectal bleeding, urinary symptoms, swelling extremities, weakness, or peripheral neuropathy. ROS as above. Remainder of 10 point review of systems elicited and otherwise unremarkable.     Cancer  Associated symptoms include coughing and fatigue. Pertinent negatives include no abdominal pain, anorexia, arthralgias, change in bowel habit, chest pain, chills, congestion, diaphoresis, fever, headaches, joint swelling, myalgias, nausea, neck pain, numbness, rash, sore throat, swollen glands, urinary symptoms, vertigo, visual change or vomiting.     Objective    BSA: 1.95 meters  squared  /85 (BP Location: Left arm, Patient Position: Sitting)   Pulse 88   Temp 35.7 °C (96.3 °F) (Temporal)   Resp 18   Wt 87.1 kg (192 lb 0.3 oz)   SpO2 95%   BMI 35.12 kg/m²      Daily Weight  07/16/25 : 87.1 kg (192 lb 0.3 oz)  06/25/25 : 87 kg (191 lb 12.8 oz)  06/04/25 : 87.6 kg (193 lb 2 oz)  05/14/25 : 85 kg (187 lb 6.3 oz)  04/23/25 : 84.6 kg (186 lb 8.2 oz)         6/25/2025     9:35 AM 6/25/2025    12:15 PM 6/25/2025     1:21 PM 6/25/2025     1:51 PM 7/2/2025    12:12 PM 7/9/2025    12:13 PM 7/16/2025     9:24 AM   Vitals   Systolic 142 133 143 124 137 119 137   Diastolic 98 85 84 65 91 78 85   BP Location Left arm Left arm Left arm Left arm Right arm Left arm Left arm   Heart Rate 97 82 74 78 94 99 88   Temp 36.4 °C (97.5 °F) 35.9 °C (96.6 °F) 35.9 °C (96.6 °F) 36.1 °C (97 °F) 36.8 °C (98.2 °F) 36.5 °C (97.7 °F) 35.7 °C (96.3 °F)   Resp 20 18 18 16 20 20 18   Weight (lb) 191.8      192.02   BMI 35.08 kg/m2      35.12 kg/m2   BSA (m2) 1.95 m2      1.95 m2   Visit Report Report    Report Report    Report Report    Report Report    Report          Physical Exam  Constitutional:       General: She is not in acute distress.     Appearance: Normal appearance. She is not ill-appearing, toxic-appearing or diaphoretic.   HENT:      Nose: No congestion or rhinorrhea.      Mouth/Throat:      Pharynx: No oropharyngeal exudate or posterior oropharyngeal erythema.     Eyes:      General: No scleral icterus.     Conjunctiva/sclera: Conjunctivae normal.       Cardiovascular:      Rate and Rhythm: Normal rate and regular rhythm.      Pulses: Normal pulses.      Heart sounds: Normal heart sounds. No murmur heard.     No friction rub. No gallop.   Pulmonary:      Effort: Pulmonary effort is normal. No respiratory distress.      Breath sounds: No stridor. Wheezing present.   Chest:      Chest wall: No tenderness.   Abdominal:      General: There is no distension.      Palpations: There is no mass.       Tenderness: There is no abdominal tenderness. There is no guarding or rebound.     Musculoskeletal:      Cervical back: No tenderness.      Right lower leg: No edema.      Left lower leg: No edema.   Lymphadenopathy:      Cervical: No cervical adenopathy.     Skin:     General: Skin is warm.      Coloration: Skin is not jaundiced.      Findings: No bruising or lesion.     Neurological:      General: No focal deficit present.      Mental Status: She is oriented to person, place, and time.     Psychiatric:         Mood and Affect: Mood normal.         Behavior: Behavior normal.       Performance Status:  ECOG 1: Restricted in physically strenuous activity but ambulatory and able to carry out work of a light or sedentary nature, e.g., light house work, office work.     Hospital Outpatient Visit on 07/16/2025   Component Date Value Ref Range Status    WBC 07/16/2025 5.7  4.4 - 11.3 x10*3/uL Final    nRBC 07/16/2025 0.0  0.0 - 0.0 /100 WBCs Final    RBC 07/16/2025 3.95 (L)  4.00 - 5.20 x10*6/uL Final    Hemoglobin 07/16/2025 13.5  12.0 - 16.0 g/dL Final    Hematocrit 07/16/2025 41.1  36.0 - 46.0 % Final    MCV 07/16/2025 104 (H)  80 - 100 fL Final    MCH 07/16/2025 34.2 (H)  26.0 - 34.0 pg Final    MCHC 07/16/2025 32.8  32.0 - 36.0 g/dL Final    RDW 07/16/2025 17.0 (H)  11.5 - 14.5 % Final    Platelets 07/16/2025 283  150 - 450 x10*3/uL Final    Neutrophils % 07/16/2025 57.8  40.0 - 80.0 % Final    Immature Granulocytes %, Automated 07/16/2025 0.4  0.0 - 0.9 % Final    Lymphocytes % 07/16/2025 30.6  13.0 - 44.0 % Final    Monocytes % 07/16/2025 7.4  2.0 - 10.0 % Final    Eosinophils % 07/16/2025 3.3  0.0 - 6.0 % Final    Basophils % 07/16/2025 0.5  0.0 - 2.0 % Final    Neutrophils Absolute 07/16/2025 3.28  1.20 - 7.70 x10*3/uL Final    Immature Granulocytes Absolute, Au* 07/16/2025 0.02  0.00 - 0.70 x10*3/uL Final    Lymphocytes Absolute 07/16/2025 1.74  1.20 - 4.80 x10*3/uL Final    Monocytes Absolute 07/16/2025 0.42   0.10 - 1.00 x10*3/uL Final    Eosinophils Absolute 07/16/2025 0.19  0.00 - 0.70 x10*3/uL Final    Basophils Absolute 07/16/2025 0.03  0.00 - 0.10 x10*3/uL Final    Glucose 07/16/2025 121 (H)  74 - 99 mg/dL Final    Sodium 07/16/2025 139  136 - 145 mmol/L Final    Potassium 07/16/2025 4.1  3.5 - 5.3 mmol/L Final    Chloride 07/16/2025 103  98 - 107 mmol/L Final    Bicarbonate 07/16/2025 26  21 - 32 mmol/L Final    Anion Gap 07/16/2025 14  10 - 20 mmol/L Final    Urea Nitrogen 07/16/2025 12  6 - 23 mg/dL Final    Creatinine 07/16/2025 0.77  0.50 - 1.05 mg/dL Final    eGFR 07/16/2025 88  >60 mL/min/1.73m*2 Final    Calcium 07/16/2025 9.4  8.6 - 10.6 mg/dL Final    Albumin 07/16/2025 4.3  3.4 - 5.0 g/dL Final    Alkaline Phosphatase 07/16/2025 80  33 - 136 U/L Final    Total Protein 07/16/2025 6.8  6.4 - 8.2 g/dL Final    AST 07/16/2025 13  9 - 39 U/L Final    Bilirubin, Total 07/16/2025 0.3  0.0 - 1.2 mg/dL Final    ALT 07/16/2025 11  7 - 45 U/L Final    Bilirubin, Direct 07/16/2025 0.1  0.0 - 0.3 mg/dL Final    GGT 07/16/2025 42  5 - 55 U/L Final    LDH 07/16/2025 164  84 - 246 U/L Final    Magnesium 07/16/2025 2.22  1.60 - 2.40 mg/dL Final    Phosphorus 07/16/2025 4.3  2.5 - 4.9 mg/dL Final    Color, Urine 07/16/2025 Light-Yellow  Light-Yellow, Yellow, Dark-Yellow Final    Appearance, Urine 07/16/2025 Clear  Clear Final    Specific Gravity, Urine 07/16/2025 1.008  1.005 - 1.035 Final    pH, Urine 07/16/2025 5.5  5.0, 5.5, 6.0, 6.5, 7.0, 7.5, 8.0 Final    Protein, Urine 07/16/2025 NEGATIVE  NEGATIVE, 10 (TRACE), 20 (TRACE) mg/dL Final    Glucose, Urine 07/16/2025 Normal  Normal mg/dL Final    Blood, Urine 07/16/2025 NEGATIVE  NEGATIVE mg/dL Final    Ketones, Urine 07/16/2025 NEGATIVE  NEGATIVE mg/dL Final    Bilirubin, Urine 07/16/2025 NEGATIVE  NEGATIVE mg/dL Final    Urobilinogen, Urine 07/16/2025 Normal  Normal mg/dL Final    Nitrite, Urine 07/16/2025 NEGATIVE  NEGATIVE Final    Leukocyte Esterase, Urine 07/16/2025  NEGATIVE  NEGATIVE Final    Thyroid Stimulating Hormone 07/16/2025 85.79 (H)  0.44 - 3.98 mIU/L Final    Thyroxine, Free 07/16/2025 0.95  0.78 - 1.48 ng/dL Final   Hospital Outpatient Visit on 07/09/2025   Component Date Value Ref Range Status    WBC 07/09/2025 5.6  4.4 - 11.3 x10*3/uL Final    nRBC 07/09/2025 0.0  0.0 - 0.0 /100 WBCs Final    RBC 07/09/2025 3.96 (L)  4.00 - 5.20 x10*6/uL Final    Hemoglobin 07/09/2025 13.0  12.0 - 16.0 g/dL Final    Hematocrit 07/09/2025 39.5  36.0 - 46.0 % Final    MCV 07/09/2025 100  80 - 100 fL Final    MCH 07/09/2025 32.8  26.0 - 34.0 pg Final    MCHC 07/09/2025 32.9  32.0 - 36.0 g/dL Final    RDW 07/09/2025 17.0 (H)  11.5 - 14.5 % Final    Platelets 07/09/2025 282  150 - 450 x10*3/uL Final    Neutrophils % 07/09/2025 54.9  40.0 - 80.0 % Final    Immature Granulocytes %, Automated 07/09/2025 0.2  0.0 - 0.9 % Final    Lymphocytes % 07/09/2025 35.7  13.0 - 44.0 % Final    Monocytes % 07/09/2025 6.1  2.0 - 10.0 % Final    Eosinophils % 07/09/2025 2.7  0.0 - 6.0 % Final    Basophils % 07/09/2025 0.4  0.0 - 2.0 % Final    Neutrophils Absolute 07/09/2025 3.06  1.20 - 7.70 x10*3/uL Final    Immature Granulocytes Absolute, Au* 07/09/2025 0.01  0.00 - 0.70 x10*3/uL Final    Lymphocytes Absolute 07/09/2025 1.99  1.20 - 4.80 x10*3/uL Final    Monocytes Absolute 07/09/2025 0.34  0.10 - 1.00 x10*3/uL Final    Eosinophils Absolute 07/09/2025 0.15  0.00 - 0.70 x10*3/uL Final    Basophils Absolute 07/09/2025 0.02  0.00 - 0.10 x10*3/uL Final    Glucose 07/09/2025 108 (H)  74 - 99 mg/dL Final    Sodium 07/09/2025 138  136 - 145 mmol/L Final    Potassium 07/09/2025 4.1  3.5 - 5.3 mmol/L Final    Chloride 07/09/2025 102  98 - 107 mmol/L Final    Bicarbonate 07/09/2025 27  21 - 32 mmol/L Final    Anion Gap 07/09/2025 13  10 - 20 mmol/L Final    Urea Nitrogen 07/09/2025 14  6 - 23 mg/dL Final    Creatinine 07/09/2025 0.72  0.50 - 1.05 mg/dL Final    eGFR 07/09/2025 >90  >60 mL/min/1.73m*2 Final     Calcium 07/09/2025 9.4  8.6 - 10.6 mg/dL Final    Albumin 07/09/2025 4.3  3.4 - 5.0 g/dL Final    Alkaline Phosphatase 07/09/2025 77  33 - 136 U/L Final    Total Protein 07/09/2025 6.9  6.4 - 8.2 g/dL Final    AST 07/09/2025 14  9 - 39 U/L Final    Bilirubin, Total 07/09/2025 0.3  0.0 - 1.2 mg/dL Final    ALT 07/09/2025 11  7 - 45 U/L Final    Bilirubin, Direct 07/09/2025 0.1  0.0 - 0.3 mg/dL Final    GGT 07/09/2025 39  5 - 55 U/L Final    LDH 07/09/2025 168  84 - 246 U/L Final    Magnesium 07/09/2025 2.14  1.60 - 2.40 mg/dL Final    Phosphorus 07/09/2025 4.7  2.5 - 4.9 mg/dL Final   Hospital Outpatient Visit on 07/02/2025   Component Date Value Ref Range Status    WBC 07/02/2025 5.2  4.4 - 11.3 x10*3/uL Final    nRBC 07/02/2025 0.0  0.0 - 0.0 /100 WBCs Final    RBC 07/02/2025 3.93 (L)  4.00 - 5.20 x10*6/uL Final    Hemoglobin 07/02/2025 12.7  12.0 - 16.0 g/dL Final    Hematocrit 07/02/2025 40.4  36.0 - 46.0 % Final    MCV 07/02/2025 103 (H)  80 - 100 fL Final    MCH 07/02/2025 32.3  26.0 - 34.0 pg Final    MCHC 07/02/2025 31.4 (L)  32.0 - 36.0 g/dL Final    RDW 07/02/2025 16.5 (H)  11.5 - 14.5 % Final    Platelets 07/02/2025 279  150 - 450 x10*3/uL Final    Neutrophils % 07/02/2025 47.5  40.0 - 80.0 % Final    Immature Granulocytes %, Automated 07/02/2025 0.2  0.0 - 0.9 % Final    Lymphocytes % 07/02/2025 41.4  13.0 - 44.0 % Final    Monocytes % 07/02/2025 6.4  2.0 - 10.0 % Final    Eosinophils % 07/02/2025 3.7  0.0 - 6.0 % Final    Basophils % 07/02/2025 0.8  0.0 - 2.0 % Final    Neutrophils Absolute 07/02/2025 2.47  1.20 - 7.70 x10*3/uL Final    Immature Granulocytes Absolute, Au* 07/02/2025 0.01  0.00 - 0.70 x10*3/uL Final    Lymphocytes Absolute 07/02/2025 2.15  1.20 - 4.80 x10*3/uL Final    Monocytes Absolute 07/02/2025 0.33  0.10 - 1.00 x10*3/uL Final    Eosinophils Absolute 07/02/2025 0.19  0.00 - 0.70 x10*3/uL Final    Basophils Absolute 07/02/2025 0.04  0.00 - 0.10 x10*3/uL Final    Glucose 07/02/2025 124  (H)  74 - 99 mg/dL Final    Sodium 07/02/2025 139  136 - 145 mmol/L Final    Potassium 07/02/2025 4.1  3.5 - 5.3 mmol/L Final    Chloride 07/02/2025 103  98 - 107 mmol/L Final    Bicarbonate 07/02/2025 25  21 - 32 mmol/L Final    Anion Gap 07/02/2025 15  10 - 20 mmol/L Final    Urea Nitrogen 07/02/2025 12  6 - 23 mg/dL Final    Creatinine 07/02/2025 0.74  0.50 - 1.05 mg/dL Final    eGFR 07/02/2025 >90  >60 mL/min/1.73m*2 Final    Calcium 07/02/2025 9.2  8.6 - 10.6 mg/dL Final    Albumin 07/02/2025 4.2  3.4 - 5.0 g/dL Final    Alkaline Phosphatase 07/02/2025 78  33 - 136 U/L Final    Total Protein 07/02/2025 6.4  6.4 - 8.2 g/dL Final    AST 07/02/2025 13  9 - 39 U/L Final    Bilirubin, Total 07/02/2025 0.3  0.0 - 1.2 mg/dL Final    ALT 07/02/2025 9  7 - 45 U/L Final    Bilirubin, Direct 07/02/2025 0.0  0.0 - 0.3 mg/dL Final    GGT 07/02/2025 38  5 - 55 U/L Final    LDH 07/02/2025 150  84 - 246 U/L Final    Magnesium 07/02/2025 2.01  1.60 - 2.40 mg/dL Final    Phosphorus 07/02/2025 4.3  2.5 - 4.9 mg/dL Final   Hospital Outpatient Visit on 06/25/2025   Component Date Value Ref Range Status    WBC 06/25/2025 6.3  4.4 - 11.3 x10*3/uL Final    nRBC 06/25/2025 0.0  0.0 - 0.0 /100 WBCs Final    RBC 06/25/2025 4.04  4.00 - 5.20 x10*6/uL Final    Hemoglobin 06/25/2025 13.1  12.0 - 16.0 g/dL Final    Hematocrit 06/25/2025 41.3  36.0 - 46.0 % Final    MCV 06/25/2025 102 (H)  80 - 100 fL Final    MCH 06/25/2025 32.4  26.0 - 34.0 pg Final    MCHC 06/25/2025 31.7 (L)  32.0 - 36.0 g/dL Final    RDW 06/25/2025 17.4 (H)  11.5 - 14.5 % Final    Platelets 06/25/2025 289  150 - 450 x10*3/uL Final    Neutrophils % 06/25/2025 56.8  40.0 - 80.0 % Final    Immature Granulocytes %, Automated 06/25/2025 0.3  0.0 - 0.9 % Final    Lymphocytes % 06/25/2025 32.9  13.0 - 44.0 % Final    Monocytes % 06/25/2025 6.5  2.0 - 10.0 % Final    Eosinophils % 06/25/2025 3.0  0.0 - 6.0 % Final    Basophils % 06/25/2025 0.5  0.0 - 2.0 % Final    Neutrophils  Absolute 06/25/2025 3.57  1.20 - 7.70 x10*3/uL Final    Immature Granulocytes Absolute, Au* 06/25/2025 0.02  0.00 - 0.70 x10*3/uL Final    Lymphocytes Absolute 06/25/2025 2.07  1.20 - 4.80 x10*3/uL Final    Monocytes Absolute 06/25/2025 0.41  0.10 - 1.00 x10*3/uL Final    Eosinophils Absolute 06/25/2025 0.19  0.00 - 0.70 x10*3/uL Final    Basophils Absolute 06/25/2025 0.03  0.00 - 0.10 x10*3/uL Final    Glucose 06/25/2025 116 (H)  74 - 99 mg/dL Final    Sodium 06/25/2025 138  136 - 145 mmol/L Final    Potassium 06/25/2025 4.0  3.5 - 5.3 mmol/L Final    Chloride 06/25/2025 100  98 - 107 mmol/L Final    Bicarbonate 06/25/2025 28  21 - 32 mmol/L Final    Anion Gap 06/25/2025 14  10 - 20 mmol/L Final    Urea Nitrogen 06/25/2025 17  6 - 23 mg/dL Final    Creatinine 06/25/2025 0.81  0.50 - 1.05 mg/dL Final    eGFR 06/25/2025 83  >60 mL/min/1.73m*2 Final    Calcium 06/25/2025 9.3  8.6 - 10.6 mg/dL Final    Albumin 06/25/2025 4.4  3.4 - 5.0 g/dL Final    Alkaline Phosphatase 06/25/2025 84  33 - 136 U/L Final    Total Protein 06/25/2025 6.8  6.4 - 8.2 g/dL Final    AST 06/25/2025 12  9 - 39 U/L Final    Bilirubin, Total 06/25/2025 0.3  0.0 - 1.2 mg/dL Final    ALT 06/25/2025 10  7 - 45 U/L Final    Bilirubin, Direct 06/25/2025 0.0  0.0 - 0.3 mg/dL Final    GGT 06/25/2025 39  5 - 55 U/L Final    LDH 06/25/2025 155  84 - 246 U/L Final    Magnesium 06/25/2025 2.20  1.60 - 2.40 mg/dL Final    Phosphorus 06/25/2025 5.1 (H)  2.5 - 4.9 mg/dL Final    Color, Urine 06/25/2025 Yellow  Light-Yellow, Yellow, Dark-Yellow Final    Appearance, Urine 06/25/2025 Clear  Clear Final    Specific Gravity, Urine 06/25/2025 1.023  1.005 - 1.035 Final    pH, Urine 06/25/2025 5.0  5.0, 5.5, 6.0, 6.5, 7.0, 7.5, 8.0 Final    Protein, Urine 06/25/2025 NEGATIVE  NEGATIVE, 10 (TRACE), 20 (TRACE) mg/dL Final    Glucose, Urine 06/25/2025 Normal  Normal mg/dL Final    Blood, Urine 06/25/2025 NEGATIVE  NEGATIVE mg/dL Final    Ketones, Urine 06/25/2025  NEGATIVE  NEGATIVE mg/dL Final    Bilirubin, Urine 06/25/2025 NEGATIVE  NEGATIVE mg/dL Final    Urobilinogen, Urine 06/25/2025 Normal  Normal mg/dL Final    Nitrite, Urine 06/25/2025 NEGATIVE  NEGATIVE Final    Leukocyte Esterase, Urine 06/25/2025 NEGATIVE  NEGATIVE Final    Thyroid Stimulating Hormone 06/25/2025 >120.00 (H)  0.44 - 3.98 mIU/L Final    Thyroxine, Free 06/25/2025 0.74 (L)  0.78 - 1.48 ng/dL Final    Extra Tube 06/25/2025 Hold for add-ons.   Final    Extra Tube 06/25/2025 Hold for add-ons.   Final    Extra Tube 06/25/2025 Hold for add-ons.   Final    Extra Tube 06/25/2025 Hold for add-ons.   Final   Hospital Outpatient Visit on 06/18/2025   Component Date Value Ref Range Status    WBC 06/18/2025 5.2  4.4 - 11.3 x10*3/uL Final    nRBC 06/18/2025 0.0  0.0 - 0.0 /100 WBCs Final    RBC 06/18/2025 3.76 (L)  4.00 - 5.20 x10*6/uL Final    Hemoglobin 06/18/2025 12.4  12.0 - 16.0 g/dL Final    Hematocrit 06/18/2025 38.4  36.0 - 46.0 % Final    MCV 06/18/2025 102 (H)  80 - 100 fL Final    MCH 06/18/2025 33.0  26.0 - 34.0 pg Final    MCHC 06/18/2025 32.3  32.0 - 36.0 g/dL Final    RDW 06/18/2025 17.8 (H)  11.5 - 14.5 % Final    Platelets 06/18/2025 267  150 - 450 x10*3/uL Final    Neutrophils % 06/18/2025 57.5  40.0 - 80.0 % Final    Immature Granulocytes %, Automated 06/18/2025 0.2  0.0 - 0.9 % Final    Lymphocytes % 06/18/2025 31.8  13.0 - 44.0 % Final    Monocytes % 06/18/2025 7.2  2.0 - 10.0 % Final    Eosinophils % 06/18/2025 2.7  0.0 - 6.0 % Final    Basophils % 06/18/2025 0.6  0.0 - 2.0 % Final    Neutrophils Absolute 06/18/2025 2.96  1.20 - 7.70 x10*3/uL Final    Immature Granulocytes Absolute, Au* 06/18/2025 0.01  0.00 - 0.70 x10*3/uL Final    Lymphocytes Absolute 06/18/2025 1.64  1.20 - 4.80 x10*3/uL Final    Monocytes Absolute 06/18/2025 0.37  0.10 - 1.00 x10*3/uL Final    Eosinophils Absolute 06/18/2025 0.14  0.00 - 0.70 x10*3/uL Final    Basophils Absolute 06/18/2025 0.03  0.00 - 0.10 x10*3/uL Final     Glucose 06/18/2025 95  74 - 99 mg/dL Final    Sodium 06/18/2025 135 (L)  136 - 145 mmol/L Final    Potassium 06/18/2025 4.2  3.5 - 5.3 mmol/L Final    Chloride 06/18/2025 101  98 - 107 mmol/L Final    Bicarbonate 06/18/2025 28  21 - 32 mmol/L Final    Anion Gap 06/18/2025 10  10 - 20 mmol/L Final    Urea Nitrogen 06/18/2025 12  6 - 23 mg/dL Final    Creatinine 06/18/2025 0.68  0.50 - 1.05 mg/dL Final    eGFR 06/18/2025 >90  >60 mL/min/1.73m*2 Final    Calcium 06/18/2025 9.0  8.6 - 10.6 mg/dL Final    Albumin 06/18/2025 4.1  3.4 - 5.0 g/dL Final    Alkaline Phosphatase 06/18/2025 80  33 - 136 U/L Final    Total Protein 06/18/2025 6.4  6.4 - 8.2 g/dL Final    AST 06/18/2025 13  9 - 39 U/L Final    Bilirubin, Total 06/18/2025 0.3  0.0 - 1.2 mg/dL Final    ALT 06/18/2025 9  7 - 45 U/L Final    Bilirubin, Direct 06/18/2025 0.1  0.0 - 0.3 mg/dL Final    GGT 06/18/2025 39  5 - 55 U/L Final    LDH 06/18/2025 162  84 - 246 U/L Final    Magnesium 06/18/2025 2.05  1.60 - 2.40 mg/dL Final    Phosphorus 06/18/2025 4.4  2.5 - 4.9 mg/dL Final         CT chest abdomen pelvis w IV contrast  Result Date: 6/18/2025  Impression: SMALL-CELL LUNG CANCER: 1.  Slight decreased size and stable morphology of infiltrative left paramediastinal/hilar lesion with involvement of the adjacent proximal lobar bronchi and left mainstem bronchus. 2. Slight decrease in size of the hepatic segment 6/7 metastatic liver lesion with similar size and morphology of the hepatic segment 2 lesion. No CT evidence of additional liver lesions. Overall the liver lesions are better assessed on dedicated MRI imaging from 05/05/2025. 3. Stable diffuse osseous metastatic disease. 4. Additional findings as described above.   I personally reviewed the images/study and I agree with the findings as stated by Resident Cecilio Lindsey MD.   MACRO: None   Signed by: Petr Sierra 6/18/2025 5:21 PM Dictation workstation:   [id number]     Assessment/Plan     Ms.  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 Initial imaging post C2 reviewed by Dr Kohli appeared to have sub-optimal response to treatment. Unfortunately after C4 imaging confirmed she had progressive disease. In the last visit Dr Kohli discussed all treatment options which included SOC Taralatamab and also clinical trial [id number] with study drug [id number] which is an ADC targeting SEZ6 with a topoisomerase 1 inhibitor payload. Patient opted to participate on study and completed screening requirements on study. Started treatment on 02.19.2025 on study and received six cycles of treatment to date. Tolerated treatment fairly well and recent imaging confirming she continues to benefit from current treatment. Today she is in for C8D1 given she feel good, tolerating and benefiting from current treatment we will proceed with treatment. Plan discussed in detail with the patient. Patient in agreement with the plan of care and is aware to call the office and research nurse if experiencing any new symptoms. RTC per protocol.         Cancer Staging   No matching staging information was found for the patient.      Oncology History   SCLC (small cell lung carcinoma)   9/27/2024 Initial Diagnosis    SCLC (small cell lung carcinoma) (Multi)     10/4/2024 - 10/8/2024 Chemotherapy    CARBOplatin / Etoposide, 21 Day Cycles - Lung     10/4/2024 - 10/6/2024 Research Study Participant    ([facility]) [id number] - Atezolizumab + CARBOplatin / Etoposide / [id number], 21 Day Cycles  Plan Provider: NANCY Hernandez-CNP  Treatment goal: Control  Line of treatment: First Line  Associated studies: (177Lu)[id number] with Carboplatin, Etoposide and Tislelizumab in Newly Diagnosed ES-SCLC     10/4/2024 - 1/17/2025 Chemotherapy    Durvalumab + CARBOplatin / Etoposide, 21 Day Cycles     10/28/2024 - 10/28/2024  Chemotherapy    Durvalumab + CARBOplatin / Etoposide, 21 Day Cycles     2/5/2025 - 2/5/2025 Chemotherapy    Durvalumab, 28 Day Cycles     2/19/2025 -  Research Study Participant    ([facility]) [id number] Part 1 - ABBV-706, 21 Day Cycles  Plan Provider: NANCY Hernandez-CNP  Treatment goal: Control  Line of treatment: Second Line  Associated studies: ABBV-706 alone or in combination in subjects with advanced solid tumors

## 2025-07-16 NOTE — RESEARCH NOTES
RSH><[id number]><C4+D1><PART1/ESCMONO>    DCRU NURSING VISIT NOTE    Study Name: [id number]- Part 1_Escalation- Monotherapy  IRB#: [id number]  DCRU#: (DCRU # [id number])  Protocol Version Dated:  6/15/2023  PI: Jd Kohli MD.    Time point: Cycle 4 and Beyond - Day 1  CYCLE 8    Encounter Date: 07/16/2025  Encounter Time:  9:00 AM EDT  Encounter Department: [address]    Study Regimen and Dosing   For Oncology study, Refer [place] Treatment Plan for orders   Part 1_Escalation_Monotherapy - patients with advanced recurrent or refractory solid tumors (small cell lung cancer, neuroendocrine tumors or brain tumors) will receive gradually increasing doses of ABBV-706 to determine MTD (Maximum Tolerated Dose).  Cycle = 21-days.  ABBV-706 administered IV Day 1 of each cycle.     Dietary Guidelines   Regular diet     Admission and Prior to Starting Study Activities   Notify  when patient arrives to unit.  Complete [facility]CR[facility] intake form in EMR.  Obtain weight in kilograms - with shoes off & heavy items removed.  Obtain vital signs after sitting at least 3 minutes. Record in EMR.  Insert one peripheral IV line for sample collection procedures (flush line with normal saline following each blood draw). Access mediport (if available) otherwise insert second peripheral line in opposite arm for Investigative drug administration (if peripheral line, flush line with normal saline before & after infusion)  Do Not draw research samples from the same line the investigational drug is infused through.  Physical Exam including pulmonary exam & neuro assessment.     Study Specific Instructions and Documentation   1- Safety Labs  2- Vital Signs  3-Research Correlatives  4-Study Drug  5-Vital Signs  6-Discharge     PRE-DOSE Safety Labs   For Oncology study, Refer [place] Treatment Plan for orders   Drawn at 0953  Criteria to Treat   DCRU RN reviewed and meets eligibility to proceed with  treatment plan   Time team notified: 1100   DCRU RN notifies study team to review eligibility and approval before dosing procedures  Time team approves: 1105      Subjective   Minal Sun is a 59 y.o. female and is here for a Research clinical visit.    Visit Provider: Jazzy Guerrero RN     Allergies: Allergies[1]    Objective     Vital Signs:    Vitals:    07/16/25 0924 07/16/25 1205 07/16/25 1244 07/16/25 1314   BP: 137/85 132/88 (!) 144/91 (!) 137/92   Pulse: 88 92 78 71   Resp: 18 18 18 18   Temp: 35.7 °C (96.3 °F) 35.9 °C (96.6 °F) 35.7 °C (96.3 °F) 35.8 °C (96.4 °F)   TempSrc: Temporal Temporal Temporal Temporal   SpO2: 95% 95% 99% 97%   Weight: 87.1 kg (192 lb 0.3 oz)      PainSc: 0-No pain          Physical Exam     ASSESSMENT and PLAN:  Problem List Items Addressed This Visit       SCLC (small cell lung carcinoma)    Relevant Medications    heparin flush 100 unit/mL syringe 500 Units    prochlorperazine (Compazine) tablet 10 mg    prochlorperazine (Compazine) injection 10 mg    Study [id number] ABBV-706 110.6 mg in sodium chloride 0.9% 100 mL IV (Completed)    sodium chloride 0.9 % bolus 500 mL    dextrose 5 % in water (D5W) bolus 500 mL    diphenhydrAMINE (BENADryl) injection 50 mg    methylPREDNISolone sod succinate (SOLU-Medrol) 40 mg/mL injection 40 mg    famotidine PF (Pepcid) injection 20 mg    EPINEPHrine HCl (PF) (Adrenalin) injection 0.3 mg    albuterol 2.5 mg /3 mL (0.083 %) nebulizer solution 3 mL    Other Relevant Orders    CBC and Auto Differential (Completed)    Comprehensive metabolic panel (Completed)    Bilirubin, Direct (Completed)    Gamma GT (Completed)    Lactate dehydrogenase (Completed)    Magnesium (Completed)    Phosphorus (Completed)    Urinalysis with Reflex Microscopic (Completed)    Thyroid Stimulating Hormone (Completed)    Thyroxine, Free (Completed)    Venous Access, CVAD    Adult diet Regular    Research Communication (Completed)    Treatment Conditions (Completed)     Provider Communication - [id number] (Completed)    Nursing Communication - Hypersensitivity Management, Moderate (Completed)    Nursing Communication - Hypersensitivity Management, Severe (Completed)    Nursing Communication - Respiratory Management (Completed)    Pulse oximetry, continuous    Thyroid Stimulating Hormone (Completed)    Thyroxine, Free (Completed)    Research collection: 10mL [employer] - Research Collect CTC (SCLC Only); Pre-Dose; Within 2 hours; 8-10x        Medications as of the completion of today's visit:  Current Medications[2]    Administrations This Visit       heparin flush 100 unit/mL syringe 500 Units       Admin Date  07/16/2025 Action  Given Dose  500 Units Route  intra-catheter Documented By  Elba Srivastava, ANSLEY              Study [id number] ABBV-706 110.6 mg in sodium chloride 0.9% 100 mL IV       Admin Date  07/16/2025 Action  New Bag Dose  110.6 mg Route  intravenous Documented By  Elba Srivastava RN                    Orders placed during today's visit:  Orders Placed This Encounter   Procedures    CBC and Auto Differential     Standing Status:   Standing     Number of Occurrences:   1     [facility]   [facility] [1]    Comprehensive metabolic panel     Standing Status:   Standing     Number of Occurrences:   1     [facility] [1]    Bilirubin, Direct     Standing Status:   Standing     Number of Occurrences:   1     [facility]   Immediate [1]    Gamma GT     Standing Status:   Standing     Number of Occurrences:   1     [facility]:   Immediate [1]    Lactate dehydrogenase     Standing Status:   Standing     Number of Occurrences:   1     Release result to MyChart:   Immediate [1]    Magnesium     Standing Status:   Standing     Number of Occurrences:   1     [facility]   Immediate [1]    Phosphorus     Standing Status:   Standing     Number of Occurrences:   1     [facility]    Immediate [1]    Urinalysis with Reflex Microscopic     Standing Status:   Standing     Number of Occurrences:   1     Release result to MyChart:   Immediate [1]    Thyroid Stimulating Hormone     Standing Status:   Future     Number of Occurrences:   1     Expected Date:   7/16/2025     Expiration Date:   7/16/2026     Release result to MyChart:   Immediate [1]    Thyroxine, Free     Standing Status:   Future     Number of Occurrences:   1     Expected Date:   7/16/2025     Expiration Date:   7/16/2026     Release result to MyChart:   Immediate [1]    Thyroid Stimulating Hormone     Standing Status:   Standing     Number of Occurrences:   1     Release result to MyChart:   Immediate [1]    Thyroxine, Free     Standing Status:   Standing     Number of Occurrences:   1     Release result to MyChart:   Immediate [1]    Research collection: 10mL [email or web address] - Research Collect CTC (SCLC Only); Pre-Dose; Within 2 hours; 8-10x     Standing Status:   Standing     Number of Occurrences:   1     Test:   CTC (SCLC Only)     Timepoint:   Pre-Dose     Pre-Dose:   Within 2 hours     Invert:   8-10x     Optional?:   No    Adult diet Regular     Standing Status:   Standing     Number of Occurrences:   1     Diet type:   Regular    Research Communication     Cohort: 1A  Dose Level: ABBV - 706 1.3 mg/kg     Standing Status:   Standing     Number of Occurrences:   1    Treatment Conditions     Hold treatment and notify provider if:   ANC LESS THAN 1.5 x 10*3/uL   Platelets LESS THAN 75 x 10*3/uL   AST/ALT GREATER THAN 5 x ULN   Total Bilirubin GREATER THAN 1.5 x ULN   Peripheral Neuropathy GREATER THAN OR EQUAL TO Grade 2   Pneumonitis GREATER THAN OR EQUAL TO Grade 1   Dermatologic Toxicity GREATER THAN OR EQUAL TO Grade 2    Adverse Event GREATER THAN OR EQUAL TO Grade 3     Standing Status:   Standing     Number of Occurrences:   1    Provider Communication - [id number]      Dose Level 1             ABBV-076 1.3 mg/kg   Dose  Level 2             ABBV-076 2.5 mg/kg   Dose Level 3             ABBV-076 3.5 mg/kg   Dose Level 4             ABBV-076 5 mg/kg   Dose Level 5             ABBV-076 6.4 mg/kg   Dose Level 6             ABBV-076 8 mg/kg   Dose Level 7             ABBV-076 10 mg/kg     Standing Status:   Standing     Number of Occurrences:   1    Nursing Communication - Hypersensitivity Management, Moderate     Flushing, rash, pruritus, dyspnea, chest discomfort, back pain, angioedema, SBP LESS THAN 90 mmHg, and/or change in mental status above or below patient's baseline. In the event of moderate or severe hypersensitivity reaction to any medication:   Stop infusion.  Assess vital signs, pulse oximetry, nursing assessment, and patient complaints.  Administer medications per Hypersensitivity Reaction Medication Protocol.   Notify physician.     Standing Status:   Standing     Number of Occurrences:   1    Nursing Communication - Hypersensitivity Management, Severe     Worsening of moderate reaction symptoms, SBP LESS THAN 80 mmHg, and/or respiratory distress (respirations GREATER THAN 40 breaths per minute, wheezing, life-threatening symptoms). In the event of moderate or severe hypersensitivity reaction to any medication:   Stop infusion.  Assess vital signs, pulse oximetry, nursing assessment, and patient complaints.  Administer medications per Hypersensitivity Reaction Medication Protocol.   Notify physician.     Standing Status:   Standing     Number of Occurrences:   1    Nursing Communication - Respiratory Management     Oxygen via nasal cannula at 4 L per minute for respiratory rate GREATER THAN OR EQUAL TO to 28 breaths/minute and/or wheezing. Pulse Oximetry continuous monitoring.     Standing Status:   Standing     Number of Occurrences:   1    Pulse oximetry, continuous     Continuous monitoring to maintain SPO2 GREATER THAN 90%     Standing Status:   Standing     Number of Occurrences:   1    Venous Access, CVAD      Standing Status:   Standing     Number of Occurrences:   1        [id number] - ABBV-706 alone or in combination in subjects with advanced solid tumors    Patient has no adverse events documented in the Research Adverse Events activity.      Safety Parameters and Special Instructions   ABBV-706  ABBV-706 is an antibody-drug conjugate (ADC) with an anti-seizure-related antibody (SEZ6) & topoisomerase inhibitor  Potential Side Effects/Adverse Events: GI effects (diarrhea, nausea, vomiting, stool abnormalities), peripheral neuropathy, myelosuppression, rash, infusion-related reaction.  Administer infusion over 60 mins (+/- 10 mins).  Observation post-dose 1 hour for Cycle 1 Day 1. If no reaction, no observation for subsequent visits.     PRE-DOSE Vital Signs   Temp, Heart Rate, Respiration, Blood Pressure: rest sitting at least 3 minutes prior to obtaining   See above at 1205  Research Correlatives:  Deliver to [facility]CRU/[facility] lab for processing   Access Type: na Location: na   Refer to [place] Treatment Plan for orders  Time point Cycle(s) Specimen Test Volume Draw Time     Pre-dose  (within 2 hours)  (draw in order) 4, 6, 9, 12 ABBV-706 ADA/NADA 4mL na    4, 6, 9, 12 ABBV-706 PK ADC/Total AB 2mL na    ODD Biomarker Serum 5mL na    4, 6, 9, 12 Free TOP1 Inhibitor 3mL na    ALL CTC (SCLC Only)-If ordered see EPCI 10mL 1208    ODD ctDNA Plasma/WB 2x10mL na   CTC & ctDNA Plasma/WB instructions  Keep patient's arm in the downward position during collection procedure.  Hold the tube with the stopper in the uppermost position so that the tube contents do not touch the stopper or the end of the needle during sample collection.  Release tourniquet once blood starts to flow in the tube, or within 2 minutes of application.  Invert 8 - 10x.        Weight-Based Dosing   Baseline (screening) weight (kg) 85.1  Date measured 2/19/25 ( Refer Previous encounters to enter)  Text :  Actual weight   Vitals:    07/16/25 0924   Weight: 87.1 kg (192  lb 0.3 oz)     (Weight on Day 1 of cycle)   Date Measured 07/16/2025  Enter Information here  Dose based on Cycle 1 Day 1 weight.  May use Cycle 1 Day 1 weight unless noted weight difference of 10% weight gain or loss.  The minimum dose of ABBV-706 is 50 mg.  Any calculated dose < 50 mg, patient to be administered 50 mg.  Body weight capped at 120 kg. 120 kg will be utilized to calculate the dose.     Research Drug Administration   Document medication administration in MAR activity. Document Research specific instructions below.  Enter Information here  ABBV-706  Infuse over 60 minutes (+ 10 minutes).  Observation time 1 hour post-dose.  If any Infusion-Related Reaction or Suspected Allergic-Type Reaction specific safety labs to be drawn- see EPIC   1214 to 1314  Infusion-Related Reactions   [facility] Hypersensivity Orders      If any Infusion-Related Reaction or Suspected Allergic-Type Reaction   Clinical Safety Labs  Z-Req#:    Timepoint Blood Test Collection Priority Order of Origin     Within 2 hours after 1st sign of reaction C3a (FC3AR) (1 mL Lavender top) STAT Z-req (send to [facility])    C5 (354) (1 mL SST) STAT Z-req (send to [facility])    IgE STAT ð EMR or ð Z-req    Tryptase (2 mL Red Top) STAT ð EMR or ð Z-req     DURING-DOSE Vital Signs   Temp, Heart Rate, Respiration, Blood Pressure: rest sitting at least 3 minutes prior to obtaining  30 minutes (+/-10 minutes) after the start of infusion    See above at 1244  POST-DOSE Vital Signs    Temp, Heart Rate, Respiration, Blood Pressure: rest sitting at least 3 minutes prior to obtaining  End of Infusion (Time “0”)   See above at 1314  Discharge Instructions   Discharge patient to home after study requirements completed or PK sample obtained.  Remind patient to return: on Day 8 for vital signs & safety labs  Discharge time: 1325     Elba Srivastava RN  07/16/25                    [1]   Allergies  Allergen Reactions    Clindamycin Diarrhea    Erythromycin  Diarrhea    Erythromycin Base Other and Nausea Only   [2]   Current Outpatient Medications   Medication Sig Dispense Refill    acetaminophen (TylenoL) 325 mg tablet Take 2 tablets (650 mg) by mouth every 6 hours if needed for mild pain (1 - 3) or moderate pain (4 - 6). 30 tablet 0    ALPRAZolam (Xanax) 0.5 mg tablet Take 2 tablets (1 mg) by mouth 2 times a day as needed for anxiety for up to 15 days. 30 tablet 0    apixaban (Eliquis) 5 mg tablet Take 1 tablet (5 mg) by mouth 2 times a day. 60 tablet 11    azelastine (Astelin) 137 mcg (0.1 %) nasal spray       carbinoxamine maleate 4 mg tablet Take 4 mg by mouth 2 times a day.      escitalopram (Lexapro) 10 mg tablet Take 2 tablets (20 mg) by mouth once daily. 180 tablet 0    guaiFENesin (Mucinex) 600 mg 12 hr tablet Take 2 tablets (1,200 mg) by mouth 2 times a day. Do not crush, chew, or split. 120 tablet 11    ipratropium-albuteroL (Duo-Neb) 0.5-2.5 mg/3 mL nebulizer solution       levothyroxine (Synthroid, Levoxyl) 75 mcg tablet Take 1 tablet (75 mcg) by mouth early in the morning.. Take on an empty stomach at the same time each day, 60 minutes prior to breakfast 30 tablet 11    lidocaine-prilocaine (Emla) 2.5-2.5 % cream Apply topically a thin film of medication to [facility] site 45 mts prior to being accessed and cover with band aid 30 g 0    magnesium hydroxide (Milk of Magnesia) 400 mg/5 mL suspension Take 5 mL by mouth once daily as needed for constipation for up to 10 days. Take this when it has been 2 or more days without a bowel movement. 360 mL 0    metFORMIN  mg 24 hr tablet Take 1 tablet (500 mg) by mouth 2 times a day. 200 tablet 1    methocarbamol (Robaxin) 500 mg tablet Take 1 tablet (500 mg) by mouth 4 times a day as needed for muscle spasms. 120 tablet 1    morphine CR (MS Contin) 15 mg 12 hr tablet Take 1 tablet (15 mg) by mouth once daily at bedtime. Do not crush, chew, or split. This is your LONG-ACTING pain medicine. 30 tablet 0     naloxone (Narcan) 4 mg/0.1 mL nasal spray Administer 1 spray (4 mg) into affected nostril(s) if needed for opioid reversal. May repeat every 2-3 minutes if needed, alternating nostrils, until medical assistance becomes available. 2 each 0    OLANZapine (ZyPREXA) 10 mg tablet Take 1 tablet (10 mg) by mouth once daily in the evening. Take with meals. This is to help with appetite, nausea, and sleep. This is a dose increase. 30 tablet 2    omeprazole (PriLOSEC) 40 mg DR capsule Take 1 capsule (40 mg) by mouth once daily. 100 capsule 1    ondansetron (Zofran) 8 mg tablet Take 1 tablet (8 mg) by mouth every 8 hours if needed for nausea or vomiting. 30 tablet 5    oxyCODONE (Roxicodone) 5 mg immediate release tablet Take 1 tablet (5 mg) by mouth every 4 hours if needed for moderate pain (4 - 6) for up to 60 doses. Can take 2 pill if need for severe pain (7-10) 20 tablet 0    ProAir HFA 90 mcg/actuation inhaler Inhale 2 puffs every 4 hours if needed.      prochlorperazine (Compazine) 10 mg tablet Take 1 tablet (10 mg) by mouth every 6 hours if needed for nausea or vomiting. 30 tablet 5    rosuvastatin (Crestor) 10 mg tablet Take 1 tablet (10 mg) by mouth once daily. 100 tablet 1    sennosides-docusate sodium (Marzena-Colace) 8.6-50 mg tablet Take 3 tablets by mouth 2 times a day. This is to help manage constipation. 180 tablet 11     Current Facility-Administered Medications   Medication Dose Route Frequency Provider Last Rate Last Admin    albuterol 2.5 mg /3 mL (0.083 %) nebulizer solution 3 mL  3 mL nebulization PRN Jd Kohli MD        dextrose 5 % in water (D5W) bolus 500 mL  500 mL intravenous PRN Jd Kohli MD        diphenhydrAMINE (BENADryl) injection 50 mg  50 mg intravenous PRN Jd Kohli MD        EPINEPHrine HCl (PF) (Adrenalin) injection 0.3 mg  0.3 mg intramuscular q5 min PRN Jd Kohli MD        famotidine PF (Pepcid) injection 20 mg  20 mg intravenous Once PRN Jd Kohli MD         heparin flush 100 unit/mL syringe 500 Units  500 Units intra-catheter PRN dJ Kohli MD   500 Units at 07/16/25 1315    methylPREDNISolone sod succinate (SOLU-Medrol) 40 mg/mL injection 40 mg  40 mg intravenous PRN Jd Kohli MD        prochlorperazine (Compazine) injection 10 mg  10 mg intravenous q6h PRN Jd Kohli MD        prochlorperazine (Compazine) tablet 10 mg  10 mg oral q6h PRN Jd Kohli MD        sodium chloride 0.9 % bolus 500 mL  500 mL intravenous PRN Jd Kohli MD

## 2025-07-18 DIAGNOSIS — C34.90 SMALL CELL CARCINOMA OF LUNG, UNSPECIFIED LATERALITY, UNSPECIFIED PART OF LUNG: Primary | ICD-10-CM

## 2025-07-18 PROCEDURE — RXMED WILLOW AMBULATORY MEDICATION CHARGE

## 2025-07-21 ENCOUNTER — PHARMACY VISIT (OUTPATIENT)
Dept: PHARMACY | Facility: CLINIC | Age: 60
End: 2025-07-21
Payer: COMMERCIAL

## 2025-07-22 DIAGNOSIS — I26.94 MULTIPLE SUBSEGMENTAL PULMONARY EMBOLI WITHOUT ACUTE COR PULMONALE: ICD-10-CM

## 2025-07-22 DIAGNOSIS — C34.90 SMALL CELL CARCINOMA OF LUNG, UNSPECIFIED LATERALITY, UNSPECIFIED PART OF LUNG: ICD-10-CM

## 2025-07-23 ENCOUNTER — HOSPITAL ENCOUNTER (OUTPATIENT)
Dept: RESEARCH | Facility: HOSPITAL | Age: 60
Discharge: HOME | End: 2025-07-23
Payer: MEDICARE

## 2025-07-23 ENCOUNTER — EDUCATION (OUTPATIENT)
Dept: HEMATOLOGY/ONCOLOGY | Facility: HOSPITAL | Age: 60
End: 2025-07-23
Payer: MEDICARE

## 2025-07-23 VITALS
SYSTOLIC BLOOD PRESSURE: 136 MMHG | OXYGEN SATURATION: 96 % | HEART RATE: 98 BPM | DIASTOLIC BLOOD PRESSURE: 85 MMHG | RESPIRATION RATE: 20 BRPM | TEMPERATURE: 97.2 F

## 2025-07-23 DIAGNOSIS — C34.90 SMALL CELL CARCINOMA OF LUNG, UNSPECIFIED LATERALITY, UNSPECIFIED PART OF LUNG: ICD-10-CM

## 2025-07-23 LAB
ALBUMIN SERPL BCP-MCNC: 4.1 G/DL (ref 3.4–5)
ALP SERPL-CCNC: 76 U/L (ref 33–136)
ALT SERPL W P-5'-P-CCNC: 10 U/L (ref 7–45)
ANION GAP SERPL CALC-SCNC: 13 MMOL/L (ref 10–20)
AST SERPL W P-5'-P-CCNC: 14 U/L (ref 9–39)
BASOPHILS # BLD AUTO: 0.03 X10*3/UL (ref 0–0.1)
BASOPHILS NFR BLD AUTO: 0.6 %
BILIRUB DIRECT SERPL-MCNC: 0.1 MG/DL (ref 0–0.3)
BILIRUB SERPL-MCNC: 0.4 MG/DL (ref 0–1.2)
BUN SERPL-MCNC: 12 MG/DL (ref 6–23)
CALCIUM SERPL-MCNC: 9.1 MG/DL (ref 8.6–10.6)
CHLORIDE SERPL-SCNC: 105 MMOL/L (ref 98–107)
CO2 SERPL-SCNC: 26 MMOL/L (ref 21–32)
CREAT SERPL-MCNC: 0.64 MG/DL (ref 0.5–1.05)
EGFRCR SERPLBLD CKD-EPI 2021: >90 ML/MIN/1.73M*2
EOSINOPHIL # BLD AUTO: 0.16 X10*3/UL (ref 0–0.7)
EOSINOPHIL NFR BLD AUTO: 3.1 %
ERYTHROCYTE [DISTWIDTH] IN BLOOD BY AUTOMATED COUNT: 16.6 % (ref 11.5–14.5)
GGT SERPL-CCNC: 41 U/L (ref 5–55)
GLUCOSE SERPL-MCNC: 129 MG/DL (ref 74–99)
HCT VFR BLD AUTO: 39.2 % (ref 36–46)
HGB BLD-MCNC: 12.6 G/DL (ref 12–16)
IMM GRANULOCYTES # BLD AUTO: 0.01 X10*3/UL (ref 0–0.7)
IMM GRANULOCYTES NFR BLD AUTO: 0.2 % (ref 0–0.9)
LDH SERPL L TO P-CCNC: 174 U/L (ref 84–246)
LYMPHOCYTES # BLD AUTO: 1.9 X10*3/UL (ref 1.2–4.8)
LYMPHOCYTES NFR BLD AUTO: 37.3 %
MAGNESIUM SERPL-MCNC: 2.02 MG/DL (ref 1.6–2.4)
MCH RBC QN AUTO: 33.1 PG (ref 26–34)
MCHC RBC AUTO-ENTMCNC: 32.1 G/DL (ref 32–36)
MCV RBC AUTO: 103 FL (ref 80–100)
MONOCYTES # BLD AUTO: 0.35 X10*3/UL (ref 0.1–1)
MONOCYTES NFR BLD AUTO: 6.9 %
NEUTROPHILS # BLD AUTO: 2.64 X10*3/UL (ref 1.2–7.7)
NEUTROPHILS NFR BLD AUTO: 51.9 %
NRBC BLD-RTO: 0 /100 WBCS (ref 0–0)
PHOSPHATE SERPL-MCNC: 4.6 MG/DL (ref 2.5–4.9)
PLATELET # BLD AUTO: 266 X10*3/UL (ref 150–450)
POTASSIUM SERPL-SCNC: 4.2 MMOL/L (ref 3.5–5.3)
PROT SERPL-MCNC: 6.5 G/DL (ref 6.4–8.2)
RBC # BLD AUTO: 3.81 X10*6/UL (ref 4–5.2)
SODIUM SERPL-SCNC: 140 MMOL/L (ref 136–145)
WBC # BLD AUTO: 5.1 X10*3/UL (ref 4.4–11.3)

## 2025-07-23 PROCEDURE — 84100 ASSAY OF PHOSPHORUS: CPT | Performed by: INTERNAL MEDICINE

## 2025-07-23 PROCEDURE — 82977 ASSAY OF GGT: CPT | Performed by: INTERNAL MEDICINE

## 2025-07-23 PROCEDURE — 85025 COMPLETE CBC W/AUTO DIFF WBC: CPT | Performed by: INTERNAL MEDICINE

## 2025-07-23 PROCEDURE — 82248 BILIRUBIN DIRECT: CPT | Performed by: INTERNAL MEDICINE

## 2025-07-23 PROCEDURE — 83735 ASSAY OF MAGNESIUM: CPT | Performed by: INTERNAL MEDICINE

## 2025-07-23 PROCEDURE — 2500000004 HC RX 250 GENERAL PHARMACY W/ HCPCS (ALT 636 FOR OP/ED): Performed by: INTERNAL MEDICINE

## 2025-07-23 PROCEDURE — 83615 LACTATE (LD) (LDH) ENZYME: CPT | Performed by: INTERNAL MEDICINE

## 2025-07-23 PROCEDURE — 36591 DRAW BLOOD OFF VENOUS DEVICE: CPT

## 2025-07-23 PROCEDURE — 84075 ASSAY ALKALINE PHOSPHATASE: CPT | Performed by: INTERNAL MEDICINE

## 2025-07-23 RX ORDER — HEPARIN 100 UNIT/ML
500 SYRINGE INTRAVENOUS AS NEEDED
OUTPATIENT
Start: 2025-07-23

## 2025-07-23 RX ORDER — HEPARIN 100 UNIT/ML
500 SYRINGE INTRAVENOUS AS NEEDED
Status: DISCONTINUED | OUTPATIENT
Start: 2025-07-23 | End: 2025-07-24 | Stop reason: HOSPADM

## 2025-07-23 RX ORDER — HEPARIN SODIUM,PORCINE/PF 10 UNIT/ML
50 SYRINGE (ML) INTRAVENOUS AS NEEDED
OUTPATIENT
Start: 2025-07-23

## 2025-07-23 RX ADMIN — HEPARIN 500 UNITS: 100 SYRINGE at 12:30

## 2025-07-23 ASSESSMENT — PAIN SCALES - GENERAL: PAINLEVEL_OUTOF10: 0-NO PAIN

## 2025-07-23 NOTE — RESEARCH NOTES
RSH><[id number]><C4+D8><[id number]>    DCRU NURSING VISIT NOTE  Study Name: [id number]- Part 1_Escalation- Monotherapy  IRB#: [id number]  [id number]  Protocol Version Dated:  6/15/2023  PI: Jd Kohli MD.      Time point: Cycle 4 and beyond- Day 8  CYCLE 8    Encounter Date: 07/23/2025  Encounter Time: 12:00 PM EDT  Encounter Department: [place] [address] RESEARCH        Study Regimen and Dosing   For Oncology study, Refer [place] Treatment Plan for orders   Part 1_Escalation_Monotherapy - patients with advanced recurrent or refractory solid tumors (small cell lung cancer, neuroendocrine tumors or brain tumors) will receive gradually increasing doses of ABBV-706 to determine MTD (Maximum Tolerated Dose).  Cycle = 21-days.  ABBV-706 administered IV Day 1 of each cycle.     Dietary Guidelines   Regular diet         Admission and Prior to Starting Study Activities   Notify  when patient arrives to unit.  Patient review/update DCRU/[place] intake form.  Obtain vital signs after sitting at least 3 minutes. Record on flow sheet & in EMR.  Perform venipuncture for sample collection procedures. Do Not draw research samples from mediport/central line if used for infusion  Physical Exam including pulmonary exam & neuro assessment Days 8 & 15.         Study Specific Instructions and Documentation   1-[facility] Labs   2-Discharge          Subjective   Minal Sun is a 59 y.o. female and is here for a Research clinical visit.    Visit Provider: Jazzy Guerrero RN     Allergies: Allergies[1]    Objective     Vital Signs:    Vitals:    07/23/25 1213   BP: 136/85   Pulse: 98   Resp: 20   Temp: 36.2 °C (97.2 °F)   TempSrc: Temporal   SpO2: 96%   PainSc: 0-No pain       Physical Exam     ASSESSMENT and PLAN:  Problem List Items Addressed This Visit       SCLC (small cell lung carcinoma)    Relevant Medications    heparin flush 100 unit/mL syringe 500 Units    Other Relevant  Orders    CBC and Auto Differential    Comprehensive metabolic panel    Bilirubin, Direct    Gamma GT    Lactate dehydrogenase    Magnesium    Phosphorus    Adult diet Regular    Venous Access, CVAD        Medications as of the completion of today's visit:  Current Medications[2]    Administrations This Visit       heparin flush 100 unit/mL syringe 500 Units       Admin Date  07/23/2025 Action  Given Dose  500 Units Route  intra-catheter Documented By  Elba Srivastava RN                    Orders placed during today's visit:  Orders Placed This Encounter   Procedures    CBC and Auto Differential     Standing Status:   Standing     Number of Occurrences:   1     Release result to [facility]:   Immediate [1]    Comprehensive metabolic panel     Standing Status:   Standing     Number of Occurrences:   1     Release result to [facility]:   Immediate [1]    Bilirubin, Direct     Standing Status:   Standing     Number of Occurrences:   1     Release result to [facility]:   Immediate [1]    Gamma GT     Standing Status:   Standing     Number of Occurrences:   1     Release result to [facility]:   Immediate [1]    Lactate dehydrogenase     Standing Status:   Standing     Number of Occurrences:   1     Release result to [facility]:   Immediate [1]    Magnesium     Standing Status:   Standing     Number of Occurrences:   1     Release result to [facility]:   Immediate [1]    Phosphorus     Standing Status:   Standing     Number of Occurrences:   1     Release result to [facility]:   Immediate [1]    Adult diet Regular     Standing Status:   Standing     Number of Occurrences:   1     Diet type:   Regular    Venous Access, CVAD     Standing Status:   Standing     Number of Occurrences:   1        [id number] - ABBV-706 alone or in combination in subjects with advanced solid tumors    Patient has no adverse events documented in the Research Adverse Events activity.      Day 8 Safety Labs   For Oncology study, Refer [place] Treatment Plan for orders   Drawn  at 1228 per right [facility]  Discharge Instructions   Discharge patient to home after study requirements completed or PK sample obtained.  Remind patient to return: on Day 15 for physical exam, vital signs & safety labs.  Discharge time: 1240     Elba Srivastava RN  07/23/25                    [1]   Allergies  Allergen Reactions    Clindamycin Diarrhea    Erythromycin Diarrhea    Erythromycin Base Other and Nausea Only   [2]   Current Outpatient Medications   Medication Sig Dispense Refill    acetaminophen (TylenoL) 325 mg tablet Take 2 tablets (650 mg) by mouth every 6 hours if needed for mild pain (1 - 3) or moderate pain (4 - 6). 30 tablet 0    ALPRAZolam (Xanax) 0.5 mg tablet Take 2 tablets (1 mg) by mouth 2 times a day as needed for anxiety for up to 15 days. 30 tablet 0    apixaban (Eliquis) 5 mg tablet Take 1 tablet (5 mg) by mouth 2 times a day. 60 tablet 11    azelastine (Astelin) 137 mcg (0.1 %) nasal spray       carbinoxamine maleate 4 mg tablet Take 4 mg by mouth 2 times a day.      escitalopram (Lexapro) 10 mg tablet Take 2 tablets (20 mg) by mouth once daily. 180 tablet 0    guaiFENesin (Mucinex) 600 mg 12 hr tablet Take 2 tablets (1,200 mg) by mouth 2 times a day. Do not crush, chew, or split. 120 tablet 11    ipratropium-albuteroL (Duo-Neb) 0.5-2.5 mg/3 mL nebulizer solution       levothyroxine (Synthroid, Levoxyl) 75 mcg tablet Take 1 tablet (75 mcg) by mouth early in the morning.. Take on an empty stomach at the same time each day, 60 minutes prior to breakfast 30 tablet 11    lidocaine-prilocaine (Emla) 2.5-2.5 % cream Apply topically a thin film of medication to [facility] site 45 mts prior to being accessed and cover with band aid 30 g 0    magnesium hydroxide (Milk of Magnesia) 400 mg/5 mL suspension Take 5 mL by mouth once daily as needed for constipation for up to 10 days. Take this when it has been 2 or more days without a bowel movement. 360 mL 0    metFORMIN  mg 24 hr tablet Take 1 tablet  (500 mg) by mouth 2 times a day. 200 tablet 1    methocarbamol (Robaxin) 500 mg tablet Take 1 tablet (500 mg) by mouth 4 times a day as needed for muscle spasms. 120 tablet 1    morphine CR (MS Contin) 15 mg 12 hr tablet Take 1 tablet (15 mg) by mouth once daily at bedtime. Do not crush, chew, or split. This is your LONG-ACTING pain medicine. 30 tablet 0    naloxone (Narcan) 4 mg/0.1 mL nasal spray Administer 1 spray (4 mg) into affected nostril(s) if needed for opioid reversal. May repeat every 2-3 minutes if needed, alternating nostrils, until medical assistance becomes available. 2 each 0    OLANZapine (ZyPREXA) 10 mg tablet Take 1 tablet (10 mg) by mouth once daily in the evening. Take with meals. This is to help with appetite, nausea, and sleep. This is a dose increase. 30 tablet 2    omeprazole (PriLOSEC) 40 mg DR capsule Take 1 capsule (40 mg) by mouth once daily. 100 capsule 1    ondansetron (Zofran) 8 mg tablet Take 1 tablet (8 mg) by mouth every 8 hours if needed for nausea or vomiting. 30 tablet 5    oxyCODONE (Roxicodone) 5 mg immediate release tablet Take 1 tablet (5 mg) by mouth every 4 hours if needed for moderate pain (4 - 6) for up to 60 doses. Can take 2 pill if need for severe pain (7-10) 20 tablet 0    ProAir HFA 90 mcg/actuation inhaler Inhale 2 puffs every 4 hours if needed.      prochlorperazine (Compazine) 10 mg tablet Take 1 tablet (10 mg) by mouth every 6 hours if needed for nausea or vomiting. 30 tablet 5    rosuvastatin (Crestor) 10 mg tablet Take 1 tablet (10 mg) by mouth once daily. 100 tablet 1    sennosides-docusate sodium (Marzena-Colace) 8.6-50 mg tablet Take 3 tablets by mouth 2 times a day. This is to help manage constipation. 180 tablet 11     Current Facility-Administered Medications   Medication Dose Route Frequency Provider Last Rate Last Admin    heparin flush 100 unit/mL syringe 500 Units  500 Units intra-catheter PRN Jd Kohli MD   500 Units at 07/23/25 1230

## 2025-07-30 ENCOUNTER — EDUCATION (OUTPATIENT)
Dept: HEMATOLOGY/ONCOLOGY | Facility: HOSPITAL | Age: 60
End: 2025-07-30
Payer: MEDICARE

## 2025-07-30 ENCOUNTER — HOSPITAL ENCOUNTER (OUTPATIENT)
Dept: RADIOLOGY | Facility: HOSPITAL | Age: 60
Discharge: HOME | End: 2025-07-30
Payer: MEDICARE

## 2025-07-30 ENCOUNTER — HOSPITAL ENCOUNTER (OUTPATIENT)
Dept: RESEARCH | Facility: HOSPITAL | Age: 60
Discharge: HOME | End: 2025-07-30
Payer: MEDICARE

## 2025-07-30 VITALS
SYSTOLIC BLOOD PRESSURE: 117 MMHG | DIASTOLIC BLOOD PRESSURE: 84 MMHG | RESPIRATION RATE: 18 BRPM | OXYGEN SATURATION: 97 % | TEMPERATURE: 97.7 F

## 2025-07-30 DIAGNOSIS — C34.90 SMALL CELL CARCINOMA OF LUNG, UNSPECIFIED LATERALITY, UNSPECIFIED PART OF LUNG: ICD-10-CM

## 2025-07-30 LAB
ALBUMIN SERPL BCP-MCNC: 4.1 G/DL (ref 3.4–5)
ALP SERPL-CCNC: 78 U/L (ref 33–136)
ALT SERPL W P-5'-P-CCNC: 11 U/L (ref 7–45)
ANION GAP SERPL CALC-SCNC: 13 MMOL/L (ref 10–20)
AST SERPL W P-5'-P-CCNC: 13 U/L (ref 9–39)
BASOPHILS # BLD AUTO: 0.02 X10*3/UL (ref 0–0.1)
BASOPHILS NFR BLD AUTO: 0.3 %
BILIRUB DIRECT SERPL-MCNC: 0.1 MG/DL (ref 0–0.3)
BILIRUB SERPL-MCNC: 0.4 MG/DL (ref 0–1.2)
BUN SERPL-MCNC: 14 MG/DL (ref 6–23)
CALCIUM SERPL-MCNC: 9 MG/DL (ref 8.6–10.6)
CHLORIDE SERPL-SCNC: 99 MMOL/L (ref 98–107)
CO2 SERPL-SCNC: 28 MMOL/L (ref 21–32)
CREAT SERPL-MCNC: 0.75 MG/DL (ref 0.5–1.05)
EGFRCR SERPLBLD CKD-EPI 2021: >90 ML/MIN/1.73M*2
EOSINOPHIL # BLD AUTO: 0.15 X10*3/UL (ref 0–0.7)
EOSINOPHIL NFR BLD AUTO: 2.6 %
ERYTHROCYTE [DISTWIDTH] IN BLOOD BY AUTOMATED COUNT: 17 % (ref 11.5–14.5)
GGT SERPL-CCNC: 46 U/L (ref 5–55)
GLUCOSE SERPL-MCNC: 98 MG/DL (ref 74–99)
HCT VFR BLD AUTO: 39.6 % (ref 36–46)
HGB BLD-MCNC: 13 G/DL (ref 12–16)
IMM GRANULOCYTES # BLD AUTO: 0.01 X10*3/UL (ref 0–0.7)
IMM GRANULOCYTES NFR BLD AUTO: 0.2 % (ref 0–0.9)
LDH SERPL L TO P-CCNC: 162 U/L (ref 84–246)
LYMPHOCYTES # BLD AUTO: 1.76 X10*3/UL (ref 1.2–4.8)
LYMPHOCYTES NFR BLD AUTO: 30 %
MAGNESIUM SERPL-MCNC: 2.12 MG/DL (ref 1.6–2.4)
MCH RBC QN AUTO: 34.3 PG (ref 26–34)
MCHC RBC AUTO-ENTMCNC: 32.8 G/DL (ref 32–36)
MCV RBC AUTO: 105 FL (ref 80–100)
MONOCYTES # BLD AUTO: 0.44 X10*3/UL (ref 0.1–1)
MONOCYTES NFR BLD AUTO: 7.5 %
NEUTROPHILS # BLD AUTO: 3.49 X10*3/UL (ref 1.2–7.7)
NEUTROPHILS NFR BLD AUTO: 59.4 %
NRBC BLD-RTO: 0 /100 WBCS (ref 0–0)
PHOSPHATE SERPL-MCNC: 4.5 MG/DL (ref 2.5–4.9)
PLATELET # BLD AUTO: 274 X10*3/UL (ref 150–450)
POTASSIUM SERPL-SCNC: 4 MMOL/L (ref 3.5–5.3)
PROT SERPL-MCNC: 6.4 G/DL (ref 6.4–8.2)
RBC # BLD AUTO: 3.79 X10*6/UL (ref 4–5.2)
SODIUM SERPL-SCNC: 136 MMOL/L (ref 136–145)
WBC # BLD AUTO: 5.9 X10*3/UL (ref 4.4–11.3)

## 2025-07-30 PROCEDURE — 82977 ASSAY OF GGT: CPT | Performed by: INTERNAL MEDICINE

## 2025-07-30 PROCEDURE — 84100 ASSAY OF PHOSPHORUS: CPT | Performed by: INTERNAL MEDICINE

## 2025-07-30 PROCEDURE — 82248 BILIRUBIN DIRECT: CPT | Performed by: INTERNAL MEDICINE

## 2025-07-30 PROCEDURE — 2550000001 HC RX 255 CONTRASTS

## 2025-07-30 PROCEDURE — 85025 COMPLETE CBC W/AUTO DIFF WBC: CPT | Performed by: INTERNAL MEDICINE

## 2025-07-30 PROCEDURE — 74177 CT ABD & PELVIS W/CONTRAST: CPT

## 2025-07-30 PROCEDURE — 36591 DRAW BLOOD OFF VENOUS DEVICE: CPT

## 2025-07-30 PROCEDURE — 2500000004 HC RX 250 GENERAL PHARMACY W/ HCPCS (ALT 636 FOR OP/ED)

## 2025-07-30 PROCEDURE — 80053 COMPREHEN METABOLIC PANEL: CPT | Performed by: INTERNAL MEDICINE

## 2025-07-30 PROCEDURE — 83615 LACTATE (LD) (LDH) ENZYME: CPT | Performed by: INTERNAL MEDICINE

## 2025-07-30 PROCEDURE — 83735 ASSAY OF MAGNESIUM: CPT | Performed by: INTERNAL MEDICINE

## 2025-07-30 RX ORDER — HEPARIN 100 UNIT/ML
500 SYRINGE INTRAVENOUS ONCE
Status: COMPLETED | OUTPATIENT
Start: 2025-07-30 | End: 2025-07-30

## 2025-07-30 RX ORDER — HEPARIN 100 UNIT/ML
500 SYRINGE INTRAVENOUS AS NEEDED
Status: DISCONTINUED | OUTPATIENT
Start: 2025-07-30 | End: 2025-07-31 | Stop reason: HOSPADM

## 2025-07-30 RX ORDER — HEPARIN 100 UNIT/ML
500 SYRINGE INTRAVENOUS AS NEEDED
OUTPATIENT
Start: 2025-07-30

## 2025-07-30 RX ORDER — HEPARIN SODIUM,PORCINE/PF 10 UNIT/ML
50 SYRINGE (ML) INTRAVENOUS AS NEEDED
OUTPATIENT
Start: 2025-07-30

## 2025-07-30 RX ADMIN — HEPARIN 500 UNITS: 100 SYRINGE at 12:28

## 2025-07-30 RX ADMIN — IOHEXOL 75 ML: 350 INJECTION, SOLUTION INTRAVENOUS at 11:59

## 2025-07-30 ASSESSMENT — PAIN SCALES - GENERAL: PAINLEVEL_OUTOF10: 0-NO PAIN

## 2025-07-30 NOTE — RESEARCH NOTES
RSH><[id number]><C4+D15><[id number]>    DCRU NURSING VISIT NOTE  Study Name: [id number]- Part 1_Escalation- Monotherapy  IRB#: [id number]  DCRU#: ([facility]note 3[facility]U # [id number])  Protocol Version Dated:  6/15/2023  PI: Jd Kohli MD.      Time point: Cycle 4 and beyond- Day 15  CYCLE 8    Encounter Date: 07/30/2025  Encounter Time: 12:00 PM EDT  Encounter Department: [place] [address] RESEARCH        Study Regimen and Dosing   For Oncology study, Refer [place] Treatment Plan for orders   Part 1_Escalation_Monotherapy - patients with advanced recurrent or refractory solid tumors (small cell lung cancer, neuroendocrine tumors or brain tumors) will receive gradually increasing doses of ABBV-706 to determine MTD (Maximum Tolerated Dose).  Cycle = 21-days.  ABBV-706 administered IV Day 1 of each cycle.     Dietary Guidelines   Regular diet         Admission and Prior to Starting Study Activities   Notify  when patient arrives to unit.  Patient review/update DCRU/[place] intake form.  Obtain vital signs after sitting at least 3 minutes. Record on flow sheet & in EMR.  Perform venipuncture for sample collection procedures. Do Not draw research samples from mediport/central line if used for infusion  Physical Exam including pulmonary exam & neuro assessment Days 8 & 15.         Study Specific Instructions and Documentation   1-Safety Labs   2-Discharge           Subjective   Minal Sun is a 59 y.o. female and is here for a Research clinical visit.    Visit Provider: SERAFIN GONZALEZ RN     Allergies: Allergies[1]    Objective     Vital Signs:    Vitals:    07/30/25 1215   BP: 117/84   Resp: 18   Temp: 36.5 °C (97.7 °F)   TempSrc: Temporal   SpO2: 97%   PainSc: 0-No pain       Physical Exam     ASSESSMENT and PLAN:  Problem List Items Addressed This Visit       SCLC (small cell lung carcinoma)    Relevant Medications    heparin flush 100 unit/mL syringe 500 Units    Other Relevant Orders     CBC and Auto Differential    Comprehensive metabolic panel    Bilirubin, Direct    Gamma GT    Lactate dehydrogenase    Magnesium    Phosphorus    Adult diet Regular    Venous Access, CVAD        Medications as of the completion of today's visit:  Current Medications[2]    Administrations This Visit       heparin flush 100 unit/mL syringe 500 Units       Admin Date  07/30/2025 Action  Given Dose  500 Units Route  intra-catheter Documented By  Elba Srivastava RN                    Orders placed during today's visit:  Orders Placed This Encounter   Procedures    CBC and Auto Differential     Standing Status:   Standing     Number of Occurrences:   1     [facility]   [facility] [1]    Comprehensive metabolic panel     Standing Status:   Standing     Number of Occurrences:   1     [facility]   [facility] [1]    Bilirubin, Direct     Standing Status:   Standing     Number of Occurrences:   1     [facility]   [facility] [1]    Gamma GT     Standing Status:   Standing     Number of Occurrences:   1     [facility] [1]    Lactate dehydrogenase     Standing Status:   Standing     Number of Occurrences:   1     [facility] [1]    Magnesium     Standing Status:   Standing     Number of Occurrences:   1     [facility]   [facility] [1]    Phosphorus     Standing Status:   Standing     Number of Occurrences:   1     [facility]   [facility] [1]    Adult diet Regular     Standing Status:   Standing     Number of Occurrences:   1     Diet type:   Regular    Venous Access, CVAD     Standing Status:   Standing     Number of Occurrences:   1        [id number] - ABBV-706 alone or in combination in subjects with advanced solid tumors    Patient has no adverse events documented in the Research Adverse Events activity.        Day 15 Safety Labs   For Oncology study, Refer [place] Treatment Plan for orders              Discharge Instructions   Discharge patient to home after study requirements completed or PK sample obtained.  Discharge time: 1235     Elba Srivastava RN  07/30/25                        [1]   Allergies  Allergen Reactions    Clindamycin Diarrhea    Erythromycin Diarrhea    Erythromycin Base Other and Nausea Only   [2]   Current Outpatient Medications   Medication Sig Dispense Refill    acetaminophen (TylenoL) 325 mg tablet Take 2 tablets (650 mg) by mouth every 6 hours if needed for mild pain (1 - 3) or moderate pain (4 - 6). 30 tablet 0    ALPRAZolam (Xanax) 0.5 mg tablet Take 2 tablets (1 mg) by mouth 2 times a day as needed for anxiety for up to 15 days. 30 tablet 0    apixaban (Eliquis) 5 mg tablet Take 1 tablet (5 mg) by mouth 2 times a day. 60 tablet 11    azelastine (Astelin) 137 mcg (0.1 %) nasal spray       carbinoxamine maleate 4 mg tablet Take 4 mg by mouth 2 times a day.      escitalopram (Lexapro) 10 mg tablet Take 2 tablets (20 mg) by mouth once daily. 180 tablet 0    guaiFENesin (Mucinex) 600 mg 12 hr tablet Take 2 tablets (1,200 mg) by mouth 2 times a day. Do not crush, chew, or split. 120 tablet 11    ipratropium-albuteroL (Duo-Neb) 0.5-2.5 mg/3 mL nebulizer solution       levothyroxine (Synthroid, Levoxyl) 75 mcg tablet Take 1 tablet (75 mcg) by mouth early in the morning.. Take on an empty stomach at the same time each day, 60 minutes prior to breakfast 30 tablet 11    lidocaine-prilocaine (Emla) 2.5-2.5 % cream Apply topically a thin film of medication to [facility] site 45 mts prior to being accessed and cover with band aid 30 g 0    magnesium hydroxide (Milk of Magnesia) 400 mg/5 mL suspension Take 5 mL by mouth once daily as needed for constipation for up to 10 days. Take this when it has been 2 or more days without a bowel movement. 360 mL 0    metFORMIN  mg 24 hr tablet Take 1 tablet (500 mg) by mouth 2 times a day. 200 tablet 1    methocarbamol (Robaxin) 500 mg tablet Take 1 tablet  (500 mg) by mouth 4 times a day as needed for muscle spasms. 120 tablet 1    morphine CR (MS Contin) 15 mg 12 hr tablet Take 1 tablet (15 mg) by mouth once daily at bedtime. Do not crush, chew, or split. This is your LONG-ACTING pain medicine. 30 tablet 0    naloxone (Narcan) 4 mg/0.1 mL nasal spray Administer 1 spray (4 mg) into affected nostril(s) if needed for opioid reversal. May repeat every 2-3 minutes if needed, alternating nostrils, until medical assistance becomes available. 2 each 0    OLANZapine (ZyPREXA) 10 mg tablet Take 1 tablet (10 mg) by mouth once daily in the evening. Take with meals. This is to help with appetite, nausea, and sleep. This is a dose increase. 30 tablet 2    omeprazole (PriLOSEC) 40 mg DR capsule Take 1 capsule (40 mg) by mouth once daily. 100 capsule 1    ondansetron (Zofran) 8 mg tablet Take 1 tablet (8 mg) by mouth every 8 hours if needed for nausea or vomiting. 30 tablet 5    oxyCODONE (Roxicodone) 5 mg immediate release tablet Take 1 tablet (5 mg) by mouth every 4 hours if needed for moderate pain (4 - 6) for up to 60 doses. Can take 2 pill if need for severe pain (7-10) 20 tablet 0    ProAir HFA 90 mcg/actuation inhaler Inhale 2 puffs every 4 hours if needed.      prochlorperazine (Compazine) 10 mg tablet Take 1 tablet (10 mg) by mouth every 6 hours if needed for nausea or vomiting. 30 tablet 5    rosuvastatin (Crestor) 10 mg tablet Take 1 tablet (10 mg) by mouth once daily. 100 tablet 1    sennosides-docusate sodium (Marzena-Colace) 8.6-50 mg tablet Take 3 tablets by mouth 2 times a day. This is to help manage constipation. 180 tablet 11     Current Facility-Administered Medications   Medication Dose Route Frequency Provider Last Rate Last Admin    heparin flush 100 unit/mL syringe 500 Units  500 Units intra-catheter PRN Jd Kohli MD

## 2025-07-30 NOTE — PROGRESS NOTES
Research Note Non-Treatment Day    Minal Sun is here today. Patient is on [id number]. Today is C8D15. Procedures completed per protocol. Pt CT done today. Labs drawn today. AE's and con-meds reviewed with patient. Patient denies N/V/D/C. Patient is aware of treatment plan. Pt will return for C9D1 on 8/6.       Education Documentation  Treatment Plan and Schedule, taught by Delmy Queen RN at 7/30/2025 12:12 PM.  Learner: Patient  Readiness: Acceptance  Method: Explanation  Response: Verbalizes Understanding    Supportive Medications, taught by Delmy Queen RN at 7/30/2025 12:12 PM.  Learner: Patient  Readiness: Acceptance  Method: Explanation  Response: Verbalizes Understanding    Education Comments  No comments found.

## 2025-07-31 DIAGNOSIS — C34.90 SMALL CELL CARCINOMA OF LUNG, UNSPECIFIED LATERALITY, UNSPECIFIED PART OF LUNG: Primary | ICD-10-CM

## 2025-08-05 DIAGNOSIS — C34.90 SMALL CELL CARCINOMA OF LUNG, UNSPECIFIED LATERALITY, UNSPECIFIED PART OF LUNG: ICD-10-CM

## 2025-08-06 ENCOUNTER — EDUCATION (OUTPATIENT)
Dept: HEMATOLOGY/ONCOLOGY | Facility: HOSPITAL | Age: 60
End: 2025-08-06

## 2025-08-06 ENCOUNTER — HOSPITAL ENCOUNTER (OUTPATIENT)
Dept: RESEARCH | Facility: HOSPITAL | Age: 60
Discharge: HOME | End: 2025-08-06
Payer: MEDICARE

## 2025-08-06 ENCOUNTER — OFFICE VISIT (OUTPATIENT)
Dept: HEMATOLOGY/ONCOLOGY | Facility: HOSPITAL | Age: 60
End: 2025-08-06
Payer: MEDICARE

## 2025-08-06 ENCOUNTER — OFFICE VISIT (OUTPATIENT)
Dept: PALLIATIVE MEDICINE | Facility: HOSPITAL | Age: 60
End: 2025-08-06
Payer: MEDICARE

## 2025-08-06 VITALS
DIASTOLIC BLOOD PRESSURE: 92 MMHG | TEMPERATURE: 95.9 F | HEART RATE: 93 BPM | RESPIRATION RATE: 18 BRPM | OXYGEN SATURATION: 96 % | SYSTOLIC BLOOD PRESSURE: 146 MMHG | WEIGHT: 193.12 LBS | BODY MASS INDEX: 35.32 KG/M2

## 2025-08-06 VITALS
OXYGEN SATURATION: 96 % | HEART RATE: 97 BPM | TEMPERATURE: 97 F | SYSTOLIC BLOOD PRESSURE: 144 MMHG | BODY MASS INDEX: 35.36 KG/M2 | WEIGHT: 193.3 LBS | DIASTOLIC BLOOD PRESSURE: 82 MMHG

## 2025-08-06 DIAGNOSIS — K59.03 CONSTIPATION DUE TO OPIOID THERAPY: ICD-10-CM

## 2025-08-06 DIAGNOSIS — G89.3 CANCER RELATED PAIN: ICD-10-CM

## 2025-08-06 DIAGNOSIS — R59.1 LYMPHADENOPATHY: Primary | ICD-10-CM

## 2025-08-06 DIAGNOSIS — C34.90 SMALL CELL CARCINOMA OF LUNG, UNSPECIFIED LATERALITY, UNSPECIFIED PART OF LUNG: Primary | ICD-10-CM

## 2025-08-06 DIAGNOSIS — C34.90 SMALL CELL CARCINOMA OF LUNG, UNSPECIFIED LATERALITY, UNSPECIFIED PART OF LUNG: ICD-10-CM

## 2025-08-06 DIAGNOSIS — Z51.5 PALLIATIVE CARE ENCOUNTER: ICD-10-CM

## 2025-08-06 DIAGNOSIS — R53.83 FATIGUE DUE TO TREATMENT: ICD-10-CM

## 2025-08-06 DIAGNOSIS — T40.2X5A CONSTIPATION DUE TO OPIOID THERAPY: ICD-10-CM

## 2025-08-06 LAB
ALBUMIN SERPL BCP-MCNC: 4 G/DL (ref 3.4–5)
ALP SERPL-CCNC: 79 U/L (ref 33–136)
ALT SERPL W P-5'-P-CCNC: 13 U/L (ref 7–45)
ANION GAP SERPL CALC-SCNC: 15 MMOL/L (ref 10–20)
AST SERPL W P-5'-P-CCNC: 14 U/L (ref 9–39)
BASOPHILS # BLD AUTO: 0.03 X10*3/UL (ref 0–0.1)
BASOPHILS NFR BLD AUTO: 0.5 %
BILIRUB DIRECT SERPL-MCNC: 0.1 MG/DL (ref 0–0.3)
BILIRUB SERPL-MCNC: 0.3 MG/DL (ref 0–1.2)
BUN SERPL-MCNC: 12 MG/DL (ref 6–23)
CALCIUM SERPL-MCNC: 9.3 MG/DL (ref 8.6–10.6)
CHLORIDE SERPL-SCNC: 105 MMOL/L (ref 98–107)
CO2 SERPL-SCNC: 23 MMOL/L (ref 21–32)
CREAT SERPL-MCNC: 0.64 MG/DL (ref 0.5–1.05)
EGFRCR SERPLBLD CKD-EPI 2021: >90 ML/MIN/1.73M*2
EOSINOPHIL # BLD AUTO: 0.15 X10*3/UL (ref 0–0.7)
EOSINOPHIL NFR BLD AUTO: 2.6 %
ERYTHROCYTE [DISTWIDTH] IN BLOOD BY AUTOMATED COUNT: 16.7 % (ref 11.5–14.5)
GGT SERPL-CCNC: 52 U/L (ref 5–55)
GLUCOSE SERPL-MCNC: 107 MG/DL (ref 74–99)
HCT VFR BLD AUTO: 38.3 % (ref 36–46)
HGB BLD-MCNC: 12.6 G/DL (ref 12–16)
HOLD SPECIMEN: NORMAL
IMM GRANULOCYTES # BLD AUTO: 0.01 X10*3/UL (ref 0–0.7)
IMM GRANULOCYTES NFR BLD AUTO: 0.2 % (ref 0–0.9)
LYMPHOCYTES # BLD AUTO: 1.62 X10*3/UL (ref 1.2–4.8)
LYMPHOCYTES NFR BLD AUTO: 28.4 %
MAGNESIUM SERPL-MCNC: 2.06 MG/DL (ref 1.6–2.4)
MCH RBC QN AUTO: 33.4 PG (ref 26–34)
MCHC RBC AUTO-ENTMCNC: 32.9 G/DL (ref 32–36)
MCV RBC AUTO: 102 FL (ref 80–100)
MONOCYTES # BLD AUTO: 0.45 X10*3/UL (ref 0.1–1)
MONOCYTES NFR BLD AUTO: 7.9 %
NEUTROPHILS # BLD AUTO: 3.45 X10*3/UL (ref 1.2–7.7)
NEUTROPHILS NFR BLD AUTO: 60.4 %
NRBC BLD-RTO: 0 /100 WBCS (ref 0–0)
PHOSPHATE SERPL-MCNC: 4.6 MG/DL (ref 2.5–4.9)
PLATELET # BLD AUTO: 290 X10*3/UL (ref 150–450)
POTASSIUM SERPL-SCNC: 4.3 MMOL/L (ref 3.5–5.3)
PROT SERPL-MCNC: 6.5 G/DL (ref 6.4–8.2)
RBC # BLD AUTO: 3.77 X10*6/UL (ref 4–5.2)
SODIUM SERPL-SCNC: 139 MMOL/L (ref 136–145)
WBC # BLD AUTO: 5.7 X10*3/UL (ref 4.4–11.3)

## 2025-08-06 PROCEDURE — 84075 ASSAY ALKALINE PHOSPHATASE: CPT

## 2025-08-06 PROCEDURE — 99214 OFFICE O/P EST MOD 30 MIN: CPT

## 2025-08-06 PROCEDURE — 82977 ASSAY OF GGT: CPT

## 2025-08-06 PROCEDURE — 85025 COMPLETE CBC W/AUTO DIFF WBC: CPT

## 2025-08-06 PROCEDURE — 82248 BILIRUBIN DIRECT: CPT

## 2025-08-06 PROCEDURE — 83735 ASSAY OF MAGNESIUM: CPT

## 2025-08-06 PROCEDURE — 84100 ASSAY OF PHOSPHORUS: CPT

## 2025-08-06 PROCEDURE — 99215 OFFICE O/P EST HI 40 MIN: CPT | Performed by: INTERNAL MEDICINE

## 2025-08-06 PROCEDURE — 93005 ELECTROCARDIOGRAM TRACING: CPT | Mod: DCRU

## 2025-08-06 PROCEDURE — 36415 COLL VENOUS BLD VENIPUNCTURE: CPT

## 2025-08-06 ASSESSMENT — ENCOUNTER SYMPTOMS
CHANGE IN BOWEL HABIT: 0
JOINT SWELLING: 0
ANOREXIA: 0
SORE THROAT: 0
WEAKNESS: 0
MYALGIAS: 0
DIAPHORESIS: 0
NAUSEA: 0
NUMBNESS: 0
CHILLS: 0
VOMITING: 0
COUGH: 0
VERTIGO: 0
FATIGUE: 0
ARTHRALGIAS: 0
ABDOMINAL PAIN: 0
HEADACHES: 0
VISUAL CHANGE: 0
SWOLLEN GLANDS: 0
NECK PAIN: 0
FEVER: 0

## 2025-08-06 ASSESSMENT — PAIN SCALES - GENERAL
PAINLEVEL_OUTOF10: 0-NO PAIN
PAINLEVEL_OUTOF10: 0-NO PAIN

## 2025-08-06 NOTE — PROGRESS NOTES
Bronchoscopy Scheduling Request    Pre-bronchoscopy visit: Follow-up visit with Bronchoscopy group provider  Please schedule procedure: Next available  Post-bronchoscopy visit: Not necessary    Cytology on-site:  Yes  Location:  Either location  Performing physician:  Advanced diagnostic bronchoscopist  Referring physician:  Jd Kohli MD, Christopher D'Amico, DO  Indication:  Mediastinal lymphadenopathy  Sedation / Anesthesia:  GA  Procedure:  Dx EBUS  Time:  Tier 1  Fluorscopy:   No  Imaging needed:  None  Labs:  CBC, BMP  Meds:  Eliquis  Special Considerations:  Possible small cell lung cancer - research team (Jose Torres) notified  Reviewed by:  Sonu Nathan MD  Known small cell, now with progression. Request repeat biopsy for possible clinical trials

## 2025-08-06 NOTE — RESEARCH NOTES
RSH><[id number]><EOT>  DCRU NURSING VISIT NOTE  Study Name: [id number]   IRB#: [id number]  DCRU#: (DCRU # [id number])  Protocol Version Dated:  6/15/2023  PI: Jd Kohli MD.      Time point: EOT     Encounter Date: 08/06/2025  Encounter Time: 1117  Encounter Department: [place] [id number] RESEARCH      Admission and Prior to Starting Study Activities   1. Notify  when patient arrives to unit.  2. Verify consent was obtained prior to any study activities.  3. Complete DCRU and EPIC Admission/Visit Note.  4. Obtain weight in kilograms - with shoes off & heavy items removed.  5. Perform venipuncture or may Access mediport/Central Line for sample collection procedures.  6. Physical Exam.       Study Specific Instructions and Documentation   If available enter a snapshot of performable actions:  WEIGHT  VITALS  LABS  ECG  DISCHARGE       Subjective   Minal Sun is a 59 y.o. female and is here for a Research clinical visit.    Visit Provider: Jazzy Guerrero RN     Allergies: Allergies[1]    Objective     Vital Signs:    Vitals:    08/06/25 1123   BP: (!) 146/92   Pulse: 93   Resp: 18   Temp: 35.5 °C (95.9 °F)   TempSrc: Temporal   SpO2: 96%   Weight: 87.6 kg (193 lb 2 oz)   PainSc: 0-No pain       Physical Exam     ASSESSMENT and PLAN:  Problem List Items Addressed This Visit    None       Medications as of the completion of today's visit:  Current Medications[2]        Ordersplaced during today's visit:  No orders of the defined types were placed in this encounter.       [id number] - ABBV-706 alone or in combination in subjects with advanced solid tumors    Patient has no adverse events documented in the Research Adverse Events activity.        Vital Signs   Temp, Heart Rate, Respiration, Blood Pressure: rest sitting at least 3 minutes prior to obtaining     Safety Labs   For Oncology study, Refer [place] Treatment Plan  for orders   Drawn at 1203  Research Correlatives:     Access Type: 21g butterfly Location: left hand   For Oncology study, Refer [place] Treatment Plan for orders  Time point Specimen Test Volume Tube Handling Draw Time   EOT      ABBV-706 ADA/NADA 4 mL Red Top Serum Invert 5 - 8x; Ambient Allow to clot 30 -60 mins 1203    ABBV-706 PK ADC/Total AB 2 mL Red Top Serum Invert 5 - 8x; Ambient  Allow to clot 30 -60 mins 1203    Biomarker Serum 5 mL Gold Top SST Invert 5x; Upright. Ambient Allow to clot 30 mins 1203    Immunophenotyping - T-Cell Activation Panel (parts 2/3a only) 4 mL Sodium Heparin  Invert 8-10x; Ambient  na    Free TOP1 Inhibitor 3 mL EDTA Lavender Invert 8 - 10x. On ICE 1203    Immunophenotyping- TBNK Panel (parts 2/3a only)  5 mL  Cyto Chex  Invert 8-10x; Ambient na    CTC (SCLC Only) 10 mL RareCyte AccuCyte BCT See Below 1203    ctDNA Plasma/WB 2 x 10 Streck Cell-Free DNA  1203    CTC & ctDNA Plasma/WB instructions  Keep patient's arm in the downward position during collection procedure.  Hold the tube with the stopper in the uppermost position so that the tube contents do not touch the stopper or the end of the needle during sample collection.  Release tourniquet once blood start to flow in the tube, or within 2 minutes of application.  Invert 8 - 10x.        ECG   JOSEFA 150c Study-specific ECG Machine - rest supine or semi-recumbent at least 5 minutes prior:  Single ECG       triple per Jazzy with no time between  Patient to remain stationary during ECG (no talking, laughing, deep breathing, sleeping or swallowing) for approximately 10 seconds during the ECG recording  QTcF calculation: Fridericia's formula: S:\DCRU_Staff\Nursing\Protocols\QTcF calculator  Time Point Completion Time    EOT 12:24, 12:25, 12:26        Discharge Instructions   Discharge patient after study requirements completed.  Discharge time: 1227     Elba Srivastava RN  08/06/25                        [1]   Allergies  Allergen  Reactions    Clindamycin Diarrhea    Erythromycin Diarrhea    Erythromycin Base Other and Nausea Only   [2]   Current Outpatient Medications   Medication Sig Dispense Refill    acetaminophen (TylenoL) 325 mg tablet Take 2 tablets (650 mg) by mouth every 6 hours if needed for mild pain (1 - 3) or moderate pain (4 - 6). 30 tablet 0    ALPRAZolam (Xanax) 0.5 mg tablet Take 2 tablets (1 mg) by mouth 2 times a day as needed for anxiety for up to 15 days. 30 tablet 0    apixaban (Eliquis) 5 mg tablet Take 1 tablet (5 mg) by mouth 2 times a day. 60 tablet 11    azelastine (Astelin) 137 mcg (0.1 %) nasal spray       carbinoxamine maleate 4 mg tablet Take 4 mg by mouth 2 times a day.      escitalopram (Lexapro) 10 mg tablet Take 2 tablets (20 mg) by mouth once daily. 180 tablet 0    guaiFENesin (Mucinex) 600 mg 12 hr tablet Take 2 tablets (1,200 mg) by mouth 2 times a day. Do not crush, chew, or split. 120 tablet 11    ipratropium-albuteroL (Duo-Neb) 0.5-2.5 mg/3 mL nebulizer solution       levothyroxine (Synthroid, Levoxyl) 75 mcg tablet Take 1 tablet (75 mcg) by mouth early in the morning.. Take on an empty stomach at the same time each day, 60 minutes prior to breakfast 30 tablet 11    lidocaine-prilocaine (Emla) 2.5-2.5 % cream Apply topically a thin film of medication to [facility] site 45 mts prior to being accessed and cover with band aid 30 g 0    magnesium hydroxide (Milk of Magnesia) 400 mg/5 mL suspension Take 5 mL by mouth once daily as needed for constipation for up to 10 days. Take this when it has been 2 or more days without a bowel movement. 360 mL 0    metFORMIN  mg 24 hr tablet Take 1 tablet (500 mg) by mouth 2 times a day. 200 tablet 1    methocarbamol (Robaxin) 500 mg tablet Take 1 tablet (500 mg) by mouth 4 times a day as needed for muscle spasms. 120 tablet 1    morphine CR (MS Contin) 15 mg 12 hr tablet Take 1 tablet (15 mg) by mouth once daily at bedtime. Do not crush, chew, or split. This is your  LONG-ACTING pain medicine. 30 tablet 0    naloxone (Narcan) 4 mg/0.1 mL nasal spray Administer 1 spray (4 mg) into affected nostril(s) if needed for opioid reversal. May repeat every 2-3 minutes if needed, alternating nostrils, until medical assistance becomes available. 2 each 0    OLANZapine (ZyPREXA) 10 mg tablet Take 1 tablet (10 mg) by mouth once daily in the evening. Take with meals. This is to help with appetite, nausea, and sleep. This is a dose increase. 30 tablet 2    omeprazole (PriLOSEC) 40 mg DR capsule Take 1 capsule (40 mg) by mouth once daily. 100 capsule 1    ondansetron (Zofran) 8 mg tablet Take 1 tablet (8 mg) by mouth every 8 hours if needed for nausea or vomiting. 30 tablet 5    oxyCODONE (Roxicodone) 5 mg immediate release tablet Take 1 tablet (5 mg) by mouth every 4 hours if needed for moderate pain (4 - 6) for up to 60 doses. Can take 2 pill if need for severe pain (7-10) 20 tablet 0    ProAir HFA 90 mcg/actuation inhaler Inhale 2 puffs every 4 hours if needed.      prochlorperazine (Compazine) 10 mg tablet Take 1 tablet (10 mg) by mouth every 6 hours if needed for nausea or vomiting. 30 tablet 5    rosuvastatin (Crestor) 10 mg tablet Take 1 tablet (10 mg) by mouth once daily. 100 tablet 1    sennosides-docusate sodium (Marzena-Colace) 8.6-50 mg tablet Take 3 tablets by mouth 2 times a day. This is to help manage constipation. 180 tablet 11     No current facility-administered medications for this encounter.      PAST MEDICAL HISTORY:  No pertinent past medical history

## 2025-08-06 NOTE — PROGRESS NOTES
Patient ID: Minal Sun is a 59 y.o. female.    DIAGNOSIS     Small cell carcinoma of the lung.  Date of diagnosis is September 27, 2024 from a bronchoscopic biopsy of a subcarinal lymph node.  Immunohistochemistry positive for TTF-1, INSM1, synaptophysin and chromogranin, negative for p40, RB immunostain shows loss of nuclear expression.        STAGING     Clinical T4, N2, M1 C, stage IVc, extensive stage disease        CURRENT SITES OF DISEASE      Left lung, mediastinum, left hilum of the lung, liver, intra-abdominal lymph nodes in the portacaval and periportal region, bone metastases        MOLECULAR GENOMICS     Test Name: Somoto xF+ (XF.V3) dated October 2024     Molecular Findings:  * Gene: PIK3CA, Variant: Missense variant (exon 1) - GOF, Potentially Actionable, p.R88Q (Allele Frequency - 0.3%)  * Gene: TP53, Variant: Missense variant - LOF, Biologically Relevant, p.R249G (Allele Frequency - 70.1%)  * Gene: RB1, Variant: Splice region variant - LOF, Biologically Relevant, c.540-1G>T, Splice Site (Allele Frequency - 56.8%)        Test Name: Somoto xT (XT.V4)  Laboratory Name: [facility]  Specimen Source: Lymph node, level 7  Collection Date: 27-Sep-2024     Molecular Findings:  * Gene: TP53, Variant: Missense variant - LOF, Biologically Relevant, p.R249G (Allele Frequency - 96.2%)  * Gene: RB1, Variant: Splice region variant - LOF, Biologically Relevant, c.540-1G>T, Splice Site (Allele Frequency - 96%)  * Gene: KMT2D, Variant: Stop gain - LOF, Biologically Relevant, p.*, Nonsense (Allele Frequency - 41.9%)  * Biomarker: Microsatellite Instability, Status: stable  * Biomarker: Tumor Mutation [place] (2.6 Muts/Mb, Percentile: 33)        SERUM TUMOR MARKERS     Baseline LDH within normal limits  C1D1 on Study LDH was 274. Trending down at C2D1         PRIOR THERAPY     1- Combination chemotherapy and immunotherapy.  Initially enrolled on a clinical trial of Lutathera and underwent octreotide scan  which was positive.  However she decided to come off study and did not receive Lutathera with her chemo.  Chemotherapy started October 6, 2024.  Immunotherapy completed added with cycle #2 11.06.2024.  Minor response observed after 2 cycles of treatment. PD observed after C4.     2-  2.19.2025: Started on Phase 1 study [id number] with study drug ABBV-706 which is an ADC targeting SEZ6 with a topoisomerase 1 inhibitor payload.  Documented response based on imaging of the May 7, 2025.  Progression of disease based on imaging of July 30, 2025      CURRENT THERAPY     Consideration for a DLL 3 targeting bi-specific antibody clinical trial       CURRENT ONCOLOGICAL PROBLEMS     Cough and shortness of breath significantly improved with systemic treatment.  Pulmonary Embolism on anticoagulation  Immunotherapy induced hypothyroidism  Neoplasm non cardiac chest/back pain, substantially improved May 2025        HISTORY OF PRESENT ILLNESS     This is a 59-year-old patient.  She states that she was feeling sick toward the end of spring of this year with some sinus problems.  Was seen by her allergist and was treated with antibiotics.  She was also seen at 1 point by primary care and steroids and antibiotics and inhalers were given.  She did not have any resolution and then ultimately developed a little bit of the hemoptysis which led to a chest x-ray showing an abnormality and finally a CT scan of the chest.The CT scan of the chest was done as a lung cancer screening low-dose CT and completed on September 20, 2024.  This demonstrated a extensive infiltrative mediastinal and hilar mass.  There was narrowing of the left mainstem bronchus and lower lobe bronchus secondary to extrinsic compression.  The predominant growth was within the left hilum and subcarinal region.  She underwent a bronchoscopy dated September 27, 2024 showing splaying of the main alanna.  There was mild erosion of a lymph node into the left mainstem bronchus.   There was erythema and mild erosions of an endobronchial tumor along the medial border.  Moderate to severe stenosis of the left lower lobe bronchus to 75%.        PAST MEDICAL HISTORY     Lumbar surgery about 30 years ago   hysterectomy in 2000  Diabetes  COPD  Right eye exophthalmus  herpes zoster  7.  Hemangioma of the right lobe of the liver, confirmed by imaging, MRI of the liver, May 2025         SOCIAL HISTORY     Patient lives with her sister.  She lives in [facility].  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  She started smoking at the age of 14 and continues to smoke at the age of 59.  Has smoked for 45 years on average 1 pack/day.        CURRENT MEDS     See medication list, meds reviewed, includes metformin, rosuvastatin, been done so Nied inhalers, Wellbutrin escitalopram, trazodone        ALLERGIES     Patient denies any drug allergies        FAMILY HISTORY      Mother had bladder cancer and possibly breast cancer       Subjective    Cancer  Pertinent negatives include no abdominal pain, anorexia, arthralgias, change in bowel habit, chest pain, chills, congestion, coughing, diaphoresis, fatigue, fever, headaches, joint swelling, myalgias, nausea, neck pain, numbness, rash, sore throat, swollen glands, urinary symptoms, vertigo, visual change, vomiting or weakness.         Objective    BSA: 1.96 meters squared  /82 (BP Location: Left arm, Patient Position: Sitting, BP Cuff Size: Adult)   Pulse 97   Temp 36.1 °C (97 °F) (Temporal)   Wt 87.7 kg (193 lb 4.8 oz)   SpO2 96%   BMI 35.36 kg/m²      Physical Exam  Constitutional:       General: She is not in acute distress.     Appearance: Normal appearance. She is not ill-appearing, toxic-appearing or diaphoretic.   HENT:      Nose: No congestion or rhinorrhea.      Mouth/Throat:      Pharynx: No oropharyngeal  exudate or posterior oropharyngeal erythema.     Eyes:      General: No scleral icterus.     Conjunctiva/sclera: Conjunctivae normal.       Cardiovascular:      Rate and Rhythm: Normal rate and regular rhythm.      Pulses: Normal pulses.      Heart sounds: Normal heart sounds. No murmur heard.     No friction rub. No gallop.   Pulmonary:      Effort: Pulmonary effort is normal. No respiratory distress.      Breath sounds: Normal breath sounds. No stridor. No wheezing, rhonchi or rales.   Chest:      Chest wall: No tenderness.   Abdominal:      General: Abdomen is flat. There is no distension.      Palpations: Abdomen is soft. There is no mass.      Tenderness: There is no abdominal tenderness. There is no guarding or rebound.     Musculoskeletal:      Cervical back: No tenderness.      Right lower leg: No edema.      Left lower leg: No edema.   Lymphadenopathy:      Cervical: No cervical adenopathy.     Skin:     General: Skin is warm.      Coloration: Skin is not jaundiced.     Neurological:      General: No focal deficit present.      Mental Status: She is oriented to person, place, and time.     Psychiatric:         Mood and Affect: Mood normal.         Behavior: Behavior normal.         Performance Status:  Asymptomatic     Latest Reference Range & Units 07/30/25 12:23   GLUCOSE 74 - 99 mg/dL 98   SODIUM 136 - 145 mmol/L 136   POTASSIUM 3.5 - 5.3 mmol/L 4.0   CHLORIDE 98 - 107 mmol/L 99   Bicarbonate 21 - 32 mmol/L 28   Anion Gap 10 - 20 mmol/L 13   Blood Urea Nitrogen 6 - 23 mg/dL 14   Creatinine 0.50 - 1.05 mg/dL 0.75   EGFR >60 mL/min/1.73m*2 >90   Calcium 8.6 - 10.6 mg/dL 9.0   PHOSPHORUS 2.5 - 4.9 mg/dL 4.5   Albumin 3.4 - 5.0 g/dL 4.1   Alkaline Phosphatase 33 - 136 U/L 78   ALT 7 - 45 U/L 11   AST 9 - 39 U/L 13   Bilirubin Total 0.0 - 1.2 mg/dL 0.4   Bilirubin, Direct 0.0 - 0.3 mg/dL 0.1   GGT 5 - 55 U/L 46   Total Protein 6.4 - 8.2 g/dL 6.4   MAGNESIUM 1.60 - 2.40 mg/dL 2.12   LDH 84 - 246 U/L 162   WBC  4.4 - 11.3 x10*3/uL 5.9   nRBC 0.0 - 0.0 /100 WBCs 0.0   RBC 4.00 - 5.20 x10*6/uL 3.79 (L)   HEMOGLOBIN 12.0 - 16.0 g/dL 13.0   HEMATOCRIT 36.0 - 46.0 % 39.6   MCV 80 - 100 fL 105 (H)   MCH 26.0 - 34.0 pg 34.3 (H)   MCHC 32.0 - 36.0 g/dL 32.8   RED CELL DISTRIBUTION WIDTH 11.5 - 14.5 % 17.0 (H)   Platelets 150 - 450 x10*3/uL 274   Neutrophils % 40.0 - 80.0 % 59.4   Immature Granulocytes %, Automated 0.0 - 0.9 % 0.2   Lymphocytes % 13.0 - 44.0 % 30.0   Monocytes % 2.0 - 10.0 % 7.5   Eosinophils % 0.0 - 6.0 % 2.6   Basophils % 0.0 - 2.0 % 0.3   Neutrophils Absolute 1.20 - 7.70 x10*3/uL 3.49   Immature Granulocytes Absolute, Automated 0.00 - 0.70 x10*3/uL 0.01   Lymphocytes Absolute 1.20 - 4.80 x10*3/uL 1.76   Monocytes Absolute 0.10 - 1.00 x10*3/uL 0.44   Eosinophils Absolute 0.00 - 0.70 x10*3/uL 0.15   Basophils Absolute 0.00 - 0.10 x10*3/uL 0.02   (L): Data is abnormally low  (H): Data is abnormally high      CT CHEST ABDOMEN PELVIS W IV CONTRAST; 7/30/2025   IMPRESSION:  Small-cell lung cancer restaging scan. When compared to CT 06/18/2025:  1. Worsening disease including increase in size of the subcarinal  mass and hepatic lesions, with similar osseous disease. The  subcarinal mass exerts mass-effect on the bronchi, esophagus, and  pulmonary vein without evidence of occlusion.        Assessment/Plan     This is a patient with extensive stage small cell lung cancer on second line treatment with an antibody drug conjugate.  She had an initial response to treatment but unfortunately now based on imaging of July 30, 2025 has progressive disease.  I discussed this with the patient and potential treatment options moving forward.  I recommended a repeat biopsy of her subcarinal lymph node.  We are looking into potential further clinical trials for the patient.  ECOG performance status 0.      Cancer Staging   No matching staging information was found for the patient.      Oncology History   SCLC (small cell lung  carcinoma)   9/27/2024 Initial Diagnosis    SCLC (small cell lung carcinoma) (Multi)     10/4/2024 - 10/8/2024 Chemotherapy    CARBOplatin / Etoposide, 21 Day Cycles - Lung     10/4/2024 - 10/6/2024 Research Study Participant    ([facility]) [id number] - Atezolizumab + CARBOplatin / Etoposide / [id number]-EXPT, 21 Day Cycles  Plan Provider: JESSICA Hernandez  Treatment goal: Control  Line of treatment: First Line  Associated studies: (177Lu)[id number] with Carboplatin, Etoposide and Tislelizumab in Newly Diagnosed ES-SCLC     10/4/2024 - 1/17/2025 Chemotherapy    Durvalumab + CARBOplatin / Etoposide, 21 Day Cycles     10/28/2024 - 10/28/2024 Chemotherapy    Durvalumab + CARBOplatin / Etoposide, 21 Day Cycles     2/5/2025 - 2/5/2025 Chemotherapy    Durvalumab, 28 Day Cycles     2/19/2025 -  Research Study Participant    ([facility]) [id number] Part 1 - ABBV-706, 21 Day Cycles  Plan Provider: JESSICA Hernandez  Treatment goal: Control  Line of treatment: Second Line  Associated studies: ABBV-706 alone or in combination in subjects with advanced solid tumors                   Jd Kohli MD             [facility]

## 2025-08-06 NOTE — PROGRESS NOTES
Research Note EOT Visit    Minal ROBLES Dino is here today for an EOT visit on [id number]. EOT procedures completed per protocol. EOT labs drawn today. AE's and con-meds reviewed with patient. Dr. Kohli visit today. Follow up plan discussed with patient.      Patient discontinued treatment due to:    [x]    Disease Progression  []   Worsening AEs  []   Physician Decision  []   Patient Decision  []  Consent Withdrawal  []   Completed per Protocol  []   Other: <please explain>    Name of drug discontinued: ABBV-706   Date of last dose: 7/16/25        Education Documentation  Treatment Plan and Schedule, taught by Delmy Queen RN at 8/6/2025 10:05 AM.  Learner: Family, Patient  Readiness: Acceptance  Method: Explanation  Response: Verbalizes Understanding    General Medication Information, taught by Delmy Queen RN at 8/6/2025 10:05 AM.  Learner: Family, Patient  Readiness: Acceptance  Method: Explanation  Response: Verbalizes Understanding    Supportive Medications, taught by Delmy Queen RN at 8/6/2025 10:05 AM.  Learner: Family, Patient  Readiness: Acceptance  Method: Explanation  Response: Verbalizes Understanding    Education Comments  No comments found.

## 2025-08-06 NOTE — PROGRESS NOTES
SUPPORTIVE AND PALLIATIVE ONCOLOGY CONSULT - OUTPATIENT      SERVICE DATE: 8/6/2025    Medical Oncologist: Jd Kohli MD   Radiation Oncologist: No care team member to display  Primary Physician: Christopher D'Amico  [phone]    REASON FOR CONSULT/CHIEF CONSULT COMPLAINT: pain management, other symptom control  (decreased appetite, nausea, constipation), and Introduction to Supportive and Palliative Oncology Services    Subjective   HISTORY OF PRESENT ILLNESS: Minal Sun is a 59 y.o. female who presents with  history of COPD, PE, SCC of the lung, diagnosed 9/2024 (mets: mediastinum, liver, bone). On 2/5/2025 she was started on the Phase 1 study [id number] with study drug ABBV-706 (ADC targeting SEZ6 with a topoisomerase 1 inhibitor payload). Per pt, she is rotating to a different trial in the coming weeks (presumably d/t PD on 7/30/25 scan).    Pain Assessment:  Pain Score:  3  Location:  R lower back, L rib area  Education:  changes to current regimen      Symptom Assessment:  Pain:somewhat- not needing Oxycodone, states that L rib pain remains irritating, but well-managed  Headache: none  Dizziness:none  Lack of energy: a little  Difficulty sleeping: somewhat- chronic difficulty falling asleep, but feels Olanzapine is making her too sleepy  Worrying: none  Anxiety: none  Depression: somewhat- feels that fatigue may be worsening this  Pain in mouth/swallowing: none  Dry mouth: none  Taste changes: none  Shortness of breath: none  Lack of appetite: somewhat- stable  Nausea: a little- stable  Vomiting: none  Constipation: a little- taking Senna 2 tabs BID  Diarrhea: none  Sore muscles: none  Numbness or tingling in hands/feet/other: none  Weight loss: none  Other: none      Information obtained from: chart review and interview of patient  ______________________________________________________________________     Oncology History   SCLC (small cell lung carcinoma)   9/27/2024 Initial Diagnosis    SCLC  (small cell lung carcinoma) (Multi)     10/4/2024 - 10/8/2024 Chemotherapy    CARBOplatin / Etoposide, 21 Day Cycles - Lung     10/4/2024 - 10/6/2024 Research Study Participant    ([facility]) [id number] - Atezolizumab + CARBOplatin / Etoposide / [id number], 21 Day Cycles  Plan Provider: JESSICA Hernandez  Treatment goal: Control  Line of treatment: First Line  Associated studies: (177Lu)[id number] with Carboplatin, Etoposide and Tislelizumab in Newly Diagnosed ES-SCLC     10/4/2024 - 1/17/2025 Chemotherapy    Durvalumab + CARBOplatin / Etoposide, 21 Day Cycles     10/28/2024 - 10/28/2024 Chemotherapy    Durvalumab + CARBOplatin / Etoposide, 21 Day Cycles     2/5/2025 - 2/5/2025 Chemotherapy    Durvalumab, 28 Day Cycles     2/19/2025 -  Research Study Participant    ([facility]) [id number] Part 1 - ABBV-706, 21 Day Cycles  Plan Provider: JESSICA Hernandez  Treatment goal: Control  Line of treatment: Second Line  Associated studies: ABBV-706 alone or in combination in subjects with advanced solid tumors         Past Medical History:   Diagnosis Date    Chronic obstructive pulmonary disease, unspecified     Chronic obstructive pulmonary disease    Encounter for general adult medical examination without abnormal findings 11/18/2020    Encounter for preventive health examination    Personal history of other endocrine, nutritional and metabolic disease     History of diabetes mellitus    Post-traumatic stress disorder, unspecified     PTSD (post-traumatic stress disorder)    Type 2 diabetes mellitus      Past Surgical History:   Procedure Laterality Date    BACK SURGERY  09/17/2013    Lower Back Surgery    HYSTERECTOMY  07/01/2016    Hysterectomy     No family history on file.     SOCIAL HISTORY  Support system - lives with sisterRosy   Social History:  reports that she quit smoking about 4 years ago. Her smoking use included cigarettes. She started smoking about 42 years ago. She has a 38.3 pack-year  smoking history. She has never used smokeless tobacco. She reports that she does not drink alcohol and does not use drugs.  retired    Mandaen and Importance of Mandaen: unknown    REVIEW OF SYSTEMS  Review of systems negative unless noted in HPI.       Objective     Palliative Performance Scale % (PPS)       Current Outpatient Medications   Medication Instructions    acetaminophen (TYLENOL) 650 mg, oral, Every 6 hours PRN    ALPRAZolam (XANAX) 1 mg, oral, 2 times daily PRN    azelastine (Astelin) 137 mcg (0.1 %) nasal spray     carbinoxamine maleate 4 mg, 2 times daily    Eliquis 5 mg, oral, 2 times daily    escitalopram (LEXAPRO) 20 mg, oral, Daily    guaiFENesin (MUCINEX) 1,200 mg, oral, 2 times daily, Do not crush, chew, or split.    ipratropium-albuteroL (Duo-Neb) 0.5-2.5 mg/3 mL nebulizer solution     levothyroxine (SYNTHROID, LEVOXYL) 75 mcg, oral, Daily, Take on an empty stomach at the same time each day, 60 minutes prior to breakfast    lidocaine-prilocaine (Emla) 2.5-2.5 % cream Apply topically a thin film of medication to mediport site 45 mts prior to being accessed and cover with band aid    magnesium hydroxide (Milk of Magnesia) 400 mg/5 mL suspension 5 mL, oral, Daily PRN, Take this when it has been 2 or more days without a bowel movement.    metFORMIN XR (GLUCOPHAGE-XR) 500 mg, oral, 2 times daily    methocarbamol (ROBAXIN) 500 mg, oral, 4 times daily PRN    morphine CR (MS CONTIN) 15 mg, oral, Nightly, Do not crush, chew, or split. This is your LONG-ACTING pain medicine.    naloxone (NARCAN) 4 mg, nasal, As needed, May repeat every 2-3 minutes if needed, alternating nostrils, until medical assistance becomes available.    OLANZapine (ZYPREXA) 10 mg, oral, Daily with evening meal, This is to help with appetite, nausea, and sleep. This is a dose increase.    omeprazole (PRILOSEC) 40 mg, oral, Daily    ondansetron (ZOFRAN) 8 mg, oral, Every 8 hours PRN    oxyCODONE (ROXICODONE) 5 mg, oral, Every 4  hours PRN, Can take 2 pill if need for severe pain (7-10)    ProAir HFA 90 mcg/actuation inhaler 2 puffs, Every 4 hours PRN    prochlorperazine (COMPAZINE) 10 mg, oral, Every 6 hours PRN    rosuvastatin (CRESTOR) 10 mg, oral, Daily    sennosides-docusate sodium (Marzena-Colace) 8.6-50 mg tablet 3 tablets, oral, 2 times daily, This is to help manage constipation.       Allergies:   Allergies   Allergen Reactions    Clindamycin Diarrhea    Erythromycin Diarrhea    Erythromycin Base Other and Nausea Only     No results found for this or any previous visit (from the past 96 hours).               PHYSICAL EXAMINATION  Vital Signs:       7/16/2025    12:05 PM 7/16/2025    12:44 PM 7/16/2025     1:14 PM 7/23/2025    12:13 PM 7/30/2025    12:15 PM 8/6/2025    10:24 AM 8/6/2025    11:23 AM   Vitals   Systolic 132 144 137 136 117 144 146   Diastolic 88 91 92 85 84 82 92   BP Location Left arm Left arm Left arm Left arm Right arm Left arm Left arm   Heart Rate 92 78 71 98  97 93   Temp 35.9 °C (96.6 °F) 35.7 °C (96.3 °F) 35.8 °C (96.4 °F) 36.2 °C (97.2 °F) 36.5 °C (97.7 °F) 36.1 °C (97 °F) 35.5 °C (95.9 °F)   Resp 18 18 18 20 18  18   Weight (lb)      193.3 193.12   BMI      35.36 kg/m2 35.32 kg/m2   BSA (m2)      1.96 m2 1.96 m2   Visit Report      Report Report     Wt Readings from Last 10 Encounters:   08/06/25 87.6 kg (193 lb 2 oz)   08/06/25 87.7 kg (193 lb 4.8 oz)   07/16/25 87.1 kg (192 lb 0.3 oz)   06/25/25 87 kg (191 lb 12.8 oz)   06/04/25 87.6 kg (193 lb 2 oz)   05/14/25 85 kg (187 lb 6.3 oz)   04/23/25 84.6 kg (186 lb 8.2 oz)   04/09/25 83.5 kg (184 lb 1.4 oz)   04/08/25 82.6 kg (182 lb)   04/02/25 83.7 kg (184 lb 8.4 oz)       Vital signs reviewed     Physical Exam  Constitutional:       Appearance: She is ill-appearing.   HENT:      Mouth/Throat:      Mouth: Mucous membranes are moist.     Eyes:      Extraocular Movements: Extraocular movements intact.      Pupils: Pupils are equal, round, and reactive to light.        Cardiovascular:      Rate and Rhythm: Normal rate.   Pulmonary:      Effort: Pulmonary effort is normal.   Abdominal:      Palpations: Abdomen is soft.     Musculoskeletal:         General: Normal range of motion.     Skin:     General: Skin is warm and dry.     Neurological:      Mental Status: She is alert and oriented to person, place, and time.     Psychiatric:         Mood and Affect: Mood normal.         Behavior: Behavior normal.         ASSESSMENT/PLAN    Pain  Pain is: cancer related pain  Type: visceral  Pain control: well-controlled  Home regimen:   Oxycodone 1-2 tabs q4h as needed (only taking occasionally now, has not needed in a few weeks)  Did not  Oxycodone ER  Methocarbamol 500mg QID as needed for muscle pain/stiffness- pending approval with clinical trial  Intolerances/previously tried: n/a  Personalized pain goal: feel comfortable throughout the day    Opioid Use  Medication Management:   - OARRS report reviewed with no aberrant behavior; consistent with  prescriptions/records and patient history  - MED 00.  Overdose Risk Score 380.   This has been discussed with patient.   - We will continue to closely monitor the patient for signs of prescription misuse including UDS, OARRS review and subjective reports at each visit.  - Yes concurrent benzodiazepine use   - I am a provider who either is or has consulted and collaborated with a provider certified in Hospice and Palliative Medicine and have conducted a face-face visit and examination for this patient.  - Routine Urine Drug Screen completed 4/23/25  - Controlled Substance Agreement complete if taking opiates more consistently  - Specifically discussed that controlled substance prescriptions will only be provided by our group as outlined in the completed agreement  - Prescribed naloxone pending  - Red Flags: n/a     Nausea   Intermittent nausea with vomiting related to chemotherapy, opioids, and constipation   Decreased  appetite  Related to malignancy, chemotherapy, and taste changes  Nutrition consult- consider at future visit  Current regimen:    Lower Olanzapine to 7.5mg daily in the evening to help with appetite, nausea, and sleep (10mg caused excessive sleepiness)  Prochlorperazine 10mg q6h as needed    Constipation   At risk for constipation related to opioids,  currently constipated   Current regimen:   Senna to 3 tabs BID- encouraged to take consistently  Miralax 17g daily as needed  Milk of Magnesia 400mg (5ml) daily as needed when it has been 2+ days without a BM  Encouraged adequate hydration, fiber intake, and physical activity to promote bowel motility     Altered Mood  Chronic anxiety and depression related to health concerns   controlled with home regimen  Current regimen:   See Escitalopram note above- 20mg daily  See Olanzapine note    Sleeping Difficulty:  Impaired sleep related to stress, pain  See Olanzapine note    Supportive Interventions: n/a    Introduction to Supportive and Palliative Oncology:  Spoke with patient   Introduced the role and philosophy of Supportive and Palliative oncology in the evaluation and management of symptoms during cancer treatment  Palliative care was introduced as a service for patients with serious illness to help with symptoms, assist with goals of care conversations, navigate complex decision making, improve quality of life for patients, and provide support both patients and families.  Patient seemed to appreciate the extra layer of support.    Medical Decision Making/Goals of Care/Advance Care Planning:  Patient's current clinical condition, including diagnosis, prognosis, and management plan, and goals of care were discussed.   Life limiting disease: metastatic malignancy  Family: Supportive sister  Goals: symptom control and cancer directed therapy    Advance Directives  Existence of Advance Directives:Yes, documentation or copy in medical record  Decision maker: PADILLA is pt  is unsure  Code Status: Full code    Next Follow-Up Visit:  Return to clinic in 4-6 weeks in the [facility]    Signature and billing  Thank you for allowing us to participate in the care of this patient. Recommendations will be communicated back to the consulting service by way of shared electronic medical record or face-to-face.    Medical complexity was moderate level due to due to complexity of problems, extensive data review, and high risk of management/treatment.  Time was spent on the following: Prep Time, Time Directly with Patient/Family/Caregiver, Documentation Time. Total time spent: 35min      DATA   Diagnostic tests and information reviewed for today's visit:  Conversation with primary team, Most recent labs and imaging results, Medications     8/6/25: changes to plan indicated in bold. Continue all other regimens as listed.    Some elements copied from Supportive Oncology note on 6/25/25, the elements have been updated and all reflect current decision making from today, 8/6/2025.      Plan of Care discussed with: Provider, RN, Patient      SIGNATURE: NANCY Campbell-CNP    Contact information:  Supportive and Palliative Oncology  Monday-Friday 8 AM-5 PM  Phone:  [phone], press option #5  Or Epic Secure Chat

## 2025-08-11 ENCOUNTER — EDUCATION (OUTPATIENT)
Dept: HEMATOLOGY/ONCOLOGY | Facility: HOSPITAL | Age: 60
End: 2025-08-11
Payer: MEDICARE

## 2025-08-11 ENCOUNTER — HOSPITAL ENCOUNTER (OUTPATIENT)
Dept: RESEARCH | Facility: HOSPITAL | Age: 60
Discharge: HOME | End: 2025-08-11
Payer: MEDICARE

## 2025-08-11 VITALS
HEART RATE: 73 BPM | DIASTOLIC BLOOD PRESSURE: 96 MMHG | WEIGHT: 197.53 LBS | RESPIRATION RATE: 18 BRPM | TEMPERATURE: 97.7 F | HEIGHT: 63 IN | BODY MASS INDEX: 35 KG/M2 | SYSTOLIC BLOOD PRESSURE: 143 MMHG | OXYGEN SATURATION: 96 %

## 2025-08-11 DIAGNOSIS — Z00.6 EXAMINATION OF PARTICIPANT IN CLINICAL TRIAL: Primary | ICD-10-CM

## 2025-08-11 DIAGNOSIS — C34.90 SMALL CELL CARCINOMA OF LUNG, UNSPECIFIED LATERALITY, UNSPECIFIED PART OF LUNG: ICD-10-CM

## 2025-08-11 DIAGNOSIS — Z00.6 EXAMINATION OF PARTICIPANT IN CLINICAL TRIAL: ICD-10-CM

## 2025-08-11 DIAGNOSIS — E05.00 THYROID-RELATED PROPTOSIS: ICD-10-CM

## 2025-08-11 DIAGNOSIS — E05.00 THYROTOXICOSIS WITH DIFFUSE GOITER WITHOUT THYROTOXIC CRISIS OR STORM: Primary | ICD-10-CM

## 2025-08-11 LAB
ALBUMIN SERPL BCP-MCNC: 4.2 G/DL (ref 3.4–5)
ALP SERPL-CCNC: 80 U/L (ref 33–136)
ALT SERPL W P-5'-P-CCNC: 16 U/L (ref 7–45)
AMYLASE SERPL-CCNC: 34 U/L (ref 29–103)
ANION GAP SERPL CALC-SCNC: 14 MMOL/L (ref 10–20)
APTT PPP: 43 SECONDS (ref 26–36)
AST SERPL W P-5'-P-CCNC: 17 U/L (ref 9–39)
BASOPHILS # BLD AUTO: 0.03 X10*3/UL (ref 0–0.1)
BASOPHILS NFR BLD AUTO: 0.5 %
BILIRUB DIRECT SERPL-MCNC: 0.1 MG/DL (ref 0–0.3)
BILIRUB SERPL-MCNC: 0.3 MG/DL (ref 0–1.2)
BUN SERPL-MCNC: 10 MG/DL (ref 6–23)
CALCIUM SERPL-MCNC: 9.2 MG/DL (ref 8.6–10.6)
CHLORIDE SERPL-SCNC: 103 MMOL/L (ref 98–107)
CO2 SERPL-SCNC: 25 MMOL/L (ref 21–32)
CORTIS SERPL-MCNC: 11.9 UG/DL (ref 2.5–20)
CREAT SERPL-MCNC: 0.65 MG/DL (ref 0.5–1.05)
CRP SERPL-MCNC: 0.42 MG/DL
EGFRCR SERPLBLD CKD-EPI 2021: >90 ML/MIN/1.73M*2
EOSINOPHIL # BLD AUTO: 0.13 X10*3/UL (ref 0–0.7)
EOSINOPHIL NFR BLD AUTO: 2 %
ERYTHROCYTE [DISTWIDTH] IN BLOOD BY AUTOMATED COUNT: 16.5 % (ref 11.5–14.5)
GLUCOSE SERPL-MCNC: 110 MG/DL (ref 74–99)
HCT VFR BLD AUTO: 41.6 % (ref 36–46)
HGB BLD-MCNC: 13.4 G/DL (ref 12–16)
IMM GRANULOCYTES # BLD AUTO: 0.04 X10*3/UL (ref 0–0.7)
IMM GRANULOCYTES NFR BLD AUTO: 0.6 % (ref 0–0.9)
INR PPP: 1.1 (ref 0.9–1.1)
LDH SERPL L TO P-CCNC: 242 U/L (ref 84–246)
LIPASE SERPL-CCNC: 13 U/L (ref 9–82)
LYMPHOCYTES # BLD AUTO: 1.78 X10*3/UL (ref 1.2–4.8)
LYMPHOCYTES NFR BLD AUTO: 27.5 %
MAGNESIUM SERPL-MCNC: 1.96 MG/DL (ref 1.6–2.4)
MCH RBC QN AUTO: 33.8 PG (ref 26–34)
MCHC RBC AUTO-ENTMCNC: 32.2 G/DL (ref 32–36)
MCV RBC AUTO: 105 FL (ref 80–100)
MONOCYTES # BLD AUTO: 0.52 X10*3/UL (ref 0.1–1)
MONOCYTES NFR BLD AUTO: 8 %
NEUTROPHILS # BLD AUTO: 3.98 X10*3/UL (ref 1.2–7.7)
NEUTROPHILS NFR BLD AUTO: 61.4 %
NRBC BLD-RTO: 0 /100 WBCS (ref 0–0)
PHOSPHATE SERPL-MCNC: 3.8 MG/DL (ref 2.5–4.9)
PLATELET # BLD AUTO: 302 X10*3/UL (ref 150–450)
POTASSIUM SERPL-SCNC: 3.9 MMOL/L (ref 3.5–5.3)
PROT SERPL-MCNC: 6.6 G/DL (ref 6.4–8.2)
PROTHROMBIN TIME: 12.6 SECONDS (ref 9.8–12.4)
RBC # BLD AUTO: 3.97 X10*6/UL (ref 4–5.2)
SODIUM SERPL-SCNC: 138 MMOL/L (ref 136–145)
T3FREE SERPL-MCNC: 2.1 PG/ML (ref 2.3–4.2)
T4 FREE SERPL-MCNC: 0.84 NG/DL (ref 0.78–1.48)
TSH SERPL-ACNC: 54.24 MIU/L (ref 0.44–3.98)
URATE SERPL-MCNC: 3.9 MG/DL (ref 2.3–6.7)
WBC # BLD AUTO: 6.5 X10*3/UL (ref 4.4–11.3)

## 2025-08-11 PROCEDURE — 2500000004 HC RX 250 GENERAL PHARMACY W/ HCPCS (ALT 636 FOR OP/ED): Performed by: INTERNAL MEDICINE

## 2025-08-11 PROCEDURE — 84481 FREE ASSAY (FT-3): CPT

## 2025-08-11 PROCEDURE — 85025 COMPLETE CBC W/AUTO DIFF WBC: CPT

## 2025-08-11 PROCEDURE — 86140 C-REACTIVE PROTEIN: CPT

## 2025-08-11 PROCEDURE — 85610 PROTHROMBIN TIME: CPT

## 2025-08-11 PROCEDURE — 36591 DRAW BLOOD OFF VENOUS DEVICE: CPT

## 2025-08-11 PROCEDURE — 82024 ASSAY OF ACTH: CPT

## 2025-08-11 PROCEDURE — 80053 COMPREHEN METABOLIC PANEL: CPT

## 2025-08-11 PROCEDURE — 83690 ASSAY OF LIPASE: CPT

## 2025-08-11 PROCEDURE — 83735 ASSAY OF MAGNESIUM: CPT

## 2025-08-11 PROCEDURE — 83615 LACTATE (LD) (LDH) ENZYME: CPT

## 2025-08-11 PROCEDURE — 84100 ASSAY OF PHOSPHORUS: CPT

## 2025-08-11 PROCEDURE — 84439 ASSAY OF FREE THYROXINE: CPT

## 2025-08-11 PROCEDURE — 84443 ASSAY THYROID STIM HORMONE: CPT

## 2025-08-11 PROCEDURE — 85730 THROMBOPLASTIN TIME PARTIAL: CPT

## 2025-08-11 PROCEDURE — 82150 ASSAY OF AMYLASE: CPT

## 2025-08-11 PROCEDURE — 84550 ASSAY OF BLOOD/URIC ACID: CPT

## 2025-08-11 PROCEDURE — 82533 TOTAL CORTISOL: CPT

## 2025-08-11 PROCEDURE — 82248 BILIRUBIN DIRECT: CPT

## 2025-08-11 RX ORDER — HEPARIN SODIUM,PORCINE/PF 10 UNIT/ML
50 SYRINGE (ML) INTRAVENOUS AS NEEDED
OUTPATIENT
Start: 2025-08-11

## 2025-08-11 RX ORDER — HEPARIN 100 UNIT/ML
500 SYRINGE INTRAVENOUS AS NEEDED
Status: DISCONTINUED | OUTPATIENT
Start: 2025-08-11 | End: 2025-08-12 | Stop reason: HOSPADM

## 2025-08-11 RX ORDER — LEVOTHYROXINE SODIUM 88 UG/1
88 TABLET ORAL DAILY
Qty: 30 TABLET | Refills: 11 | Status: SHIPPED | OUTPATIENT
Start: 2025-08-11 | End: 2026-08-11

## 2025-08-11 RX ORDER — OLANZAPINE 7.5 MG/1
7.5 TABLET, FILM COATED ORAL
Qty: 30 TABLET | Refills: 2 | Status: SHIPPED | OUTPATIENT
Start: 2025-08-11 | End: 2025-08-11

## 2025-08-11 RX ORDER — OLANZAPINE 7.5 MG/1
7.5 TABLET, FILM COATED ORAL
Qty: 30 TABLET | Refills: 2 | Status: SHIPPED | OUTPATIENT
Start: 2025-08-11 | End: 2025-11-09

## 2025-08-11 RX ORDER — HEPARIN 100 UNIT/ML
500 SYRINGE INTRAVENOUS AS NEEDED
OUTPATIENT
Start: 2025-08-11

## 2025-08-11 RX ADMIN — HEPARIN 500 UNITS: 100 SYRINGE at 14:19

## 2025-08-11 ASSESSMENT — ENCOUNTER SYMPTOMS
JOINT SWELLING: 0
ANOREXIA: 0
VOMITING: 0
NUMBNESS: 0
CHANGE IN BOWEL HABIT: 0
VERTIGO: 0
COUGH: 1
CHILLS: 0
NECK PAIN: 0
FATIGUE: 1
SWOLLEN GLANDS: 0
VISUAL CHANGE: 0
FEVER: 0
NAUSEA: 0
ARTHRALGIAS: 0
DIAPHORESIS: 0
MYALGIAS: 0
ABDOMINAL PAIN: 0
HEADACHES: 0
SORE THROAT: 0

## 2025-08-11 ASSESSMENT — PAIN SCALES - GENERAL: PAINLEVEL_OUTOF10: 4

## 2025-08-12 ENCOUNTER — SOCIAL WORK (OUTPATIENT)
Dept: CASE MANAGEMENT | Facility: HOSPITAL | Age: 60
End: 2025-08-12

## 2025-08-12 ENCOUNTER — TELEMEDICINE (OUTPATIENT)
Dept: PULMONOLOGY | Facility: CLINIC | Age: 60
End: 2025-08-12
Payer: MEDICARE

## 2025-08-12 DIAGNOSIS — C34.90 SMALL CELL CARCINOMA OF LUNG, UNSPECIFIED LATERALITY, UNSPECIFIED PART OF LUNG: Primary | ICD-10-CM

## 2025-08-12 RX ORDER — BUDESONIDE 0.5 MG/2ML
0.5 INHALANT ORAL 2 TIMES DAILY
COMMUNITY

## 2025-08-13 ENCOUNTER — HOSPITAL ENCOUNTER (OUTPATIENT)
Dept: RADIOLOGY | Facility: HOSPITAL | Age: 60
Discharge: HOME | End: 2025-08-13
Payer: MEDICARE

## 2025-08-13 ENCOUNTER — DOCUMENTATION (OUTPATIENT)
Dept: HEMATOLOGY/ONCOLOGY | Facility: HOSPITAL | Age: 60
End: 2025-08-13
Payer: MEDICARE

## 2025-08-13 ENCOUNTER — APPOINTMENT (OUTPATIENT)
Dept: RESEARCH | Facility: HOSPITAL | Age: 60
End: 2025-08-13
Payer: MEDICARE

## 2025-08-13 DIAGNOSIS — C34.90 SMALL CELL CARCINOMA OF LUNG, UNSPECIFIED LATERALITY, UNSPECIFIED PART OF LUNG: ICD-10-CM

## 2025-08-13 DIAGNOSIS — Z00.6 EXAMINATION OF PARTICIPANT IN CLINICAL TRIAL: ICD-10-CM

## 2025-08-13 LAB — ACTH PLAS-MCNC: 16.7 PG/ML (ref 7.2–63.3)

## 2025-08-13 PROCEDURE — 70551 MRI BRAIN STEM W/O DYE: CPT | Mod: 52

## 2025-08-13 RX ORDER — HEPARIN 100 UNIT/ML
5 SYRINGE INTRAVENOUS AS NEEDED
Status: DISCONTINUED | OUTPATIENT
Start: 2025-08-13 | End: 2025-08-14 | Stop reason: HOSPADM

## 2025-08-14 PROCEDURE — RXMED WILLOW AMBULATORY MEDICATION CHARGE

## 2025-08-15 ENCOUNTER — PHARMACY VISIT (OUTPATIENT)
Dept: PHARMACY | Facility: CLINIC | Age: 60
End: 2025-08-15
Payer: COMMERCIAL

## 2025-08-16 ENCOUNTER — HOSPITAL ENCOUNTER (OUTPATIENT)
Dept: RADIOLOGY | Facility: HOSPITAL | Age: 60
Discharge: HOME | End: 2025-08-16
Payer: MEDICARE

## 2025-08-16 DIAGNOSIS — C34.90 SMALL CELL CARCINOMA OF LUNG, UNSPECIFIED LATERALITY, UNSPECIFIED PART OF LUNG: ICD-10-CM

## 2025-08-16 PROCEDURE — 2550000001 HC RX 255 CONTRASTS: Mod: JW

## 2025-08-16 PROCEDURE — A9575 INJ GADOTERATE MEGLUMI 0.1ML: HCPCS | Mod: JW

## 2025-08-16 PROCEDURE — 70553 MRI BRAIN STEM W/O & W/DYE: CPT

## 2025-08-16 RX ORDER — GADOTERATE MEGLUMINE 376.9 MG/ML
18 INJECTION INTRAVENOUS
Status: COMPLETED | OUTPATIENT
Start: 2025-08-16 | End: 2025-08-16

## 2025-08-16 RX ADMIN — GADOTERATE MEGLUMINE 18 ML: 376.9 INJECTION INTRAVENOUS at 14:06

## 2025-08-18 PROCEDURE — 99284 EMERGENCY DEPT VISIT MOD MDM: CPT | Performed by: STUDENT IN AN ORGANIZED HEALTH CARE EDUCATION/TRAINING PROGRAM

## 2025-08-18 PROCEDURE — 94640 AIRWAY INHALATION TREATMENT: CPT | Mod: 59

## 2025-08-18 ASSESSMENT — PAIN DESCRIPTION - PROGRESSION: CLINICAL_PROGRESSION: NOT CHANGED

## 2025-08-18 ASSESSMENT — PAIN - FUNCTIONAL ASSESSMENT: PAIN_FUNCTIONAL_ASSESSMENT: 0-10

## 2025-08-18 ASSESSMENT — PAIN SCALES - GENERAL: PAINLEVEL_OUTOF10: 0 - NO PAIN

## 2025-08-19 ENCOUNTER — HOSPITAL ENCOUNTER (INPATIENT)
Facility: HOSPITAL | Age: 60
LOS: 1 days | Discharge: ANOTHER HOSPITAL | DRG: 055 | End: 2025-08-20
Attending: EMERGENCY MEDICINE | Admitting: STUDENT IN AN ORGANIZED HEALTH CARE EDUCATION/TRAINING PROGRAM
Payer: MEDICARE

## 2025-08-19 ENCOUNTER — ANESTHESIA (OUTPATIENT)
Dept: GASTROENTEROLOGY | Facility: HOSPITAL | Age: 60
End: 2025-08-19
Payer: MEDICARE

## 2025-08-19 ENCOUNTER — HOSPITAL ENCOUNTER (EMERGENCY)
Facility: HOSPITAL | Age: 60
Discharge: HOME | End: 2025-08-19
Attending: STUDENT IN AN ORGANIZED HEALTH CARE EDUCATION/TRAINING PROGRAM
Payer: MEDICARE

## 2025-08-19 ENCOUNTER — ANESTHESIA EVENT (OUTPATIENT)
Dept: GASTROENTEROLOGY | Facility: HOSPITAL | Age: 60
End: 2025-08-19
Payer: MEDICARE

## 2025-08-19 ENCOUNTER — TELEPHONE (OUTPATIENT)
Dept: HEMATOLOGY/ONCOLOGY | Facility: HOSPITAL | Age: 60
End: 2025-08-19
Payer: MEDICARE

## 2025-08-19 ENCOUNTER — APPOINTMENT (OUTPATIENT)
Dept: RADIOLOGY | Facility: HOSPITAL | Age: 60
End: 2025-08-19
Payer: MEDICARE

## 2025-08-19 VITALS
BODY MASS INDEX: 33.13 KG/M2 | RESPIRATION RATE: 16 BRPM | HEART RATE: 87 BPM | SYSTOLIC BLOOD PRESSURE: 141 MMHG | WEIGHT: 180 LBS | TEMPERATURE: 96.6 F | DIASTOLIC BLOOD PRESSURE: 88 MMHG | HEIGHT: 62 IN | OXYGEN SATURATION: 98 %

## 2025-08-19 DIAGNOSIS — C34.90 SMALL CELL CARCINOMA OF LUNG, UNSPECIFIED LATERALITY, UNSPECIFIED PART OF LUNG: Primary | ICD-10-CM

## 2025-08-19 DIAGNOSIS — C79.31 NON-SMALL CELL LUNG CANCER METASTATIC TO BRAIN (MULTI): Primary | ICD-10-CM

## 2025-08-19 DIAGNOSIS — R11.2 NAUSEA AND VOMITING, UNSPECIFIED VOMITING TYPE: ICD-10-CM

## 2025-08-19 DIAGNOSIS — C34.90 NON-SMALL CELL LUNG CANCER METASTATIC TO BRAIN (MULTI): Primary | ICD-10-CM

## 2025-08-19 LAB
ALBUMIN SERPL BCP-MCNC: 4.4 G/DL (ref 3.4–5)
ALP SERPL-CCNC: 89 U/L (ref 33–136)
ALT SERPL W P-5'-P-CCNC: 23 U/L (ref 7–45)
ANION GAP SERPL CALCULATED.3IONS-SCNC: 14 MMOL/L (ref 10–20)
AST SERPL W P-5'-P-CCNC: 28 U/L (ref 9–39)
BASOPHILS # BLD AUTO: 0.02 X10*3/UL (ref 0–0.1)
BASOPHILS NFR BLD AUTO: 0.3 %
BILIRUB SERPL-MCNC: 0.5 MG/DL (ref 0–1.2)
BUN SERPL-MCNC: 15 MG/DL (ref 6–23)
CALCIUM SERPL-MCNC: 9.4 MG/DL (ref 8.6–10.3)
CHLORIDE SERPL-SCNC: 103 MMOL/L (ref 98–107)
CO2 SERPL-SCNC: 24 MMOL/L (ref 21–32)
CREAT SERPL-MCNC: 0.71 MG/DL (ref 0.5–1.05)
EGFRCR SERPLBLD CKD-EPI 2021: >90 ML/MIN/1.73M*2
EOSINOPHIL # BLD AUTO: 0.05 X10*3/UL (ref 0–0.7)
EOSINOPHIL NFR BLD AUTO: 0.7 %
ERYTHROCYTE [DISTWIDTH] IN BLOOD BY AUTOMATED COUNT: 15.9 % (ref 11.5–14.5)
FLUAV RNA RESP QL NAA+PROBE: NOT DETECTED
FLUBV RNA RESP QL NAA+PROBE: NOT DETECTED
GLUCOSE SERPL-MCNC: 129 MG/DL (ref 74–99)
HCT VFR BLD AUTO: 40 % (ref 36–46)
HGB BLD-MCNC: 13.6 G/DL (ref 12–16)
IMM GRANULOCYTES # BLD AUTO: 0.02 X10*3/UL (ref 0–0.7)
IMM GRANULOCYTES NFR BLD AUTO: 0.3 % (ref 0–0.9)
LYMPHOCYTES # BLD AUTO: 1.08 X10*3/UL (ref 1.2–4.8)
LYMPHOCYTES NFR BLD AUTO: 15.4 %
MCH RBC QN AUTO: 33.4 PG (ref 26–34)
MCHC RBC AUTO-ENTMCNC: 34 G/DL (ref 32–36)
MCV RBC AUTO: 98 FL (ref 80–100)
MONOCYTES # BLD AUTO: 0.46 X10*3/UL (ref 0.1–1)
MONOCYTES NFR BLD AUTO: 6.5 %
NEUTROPHILS # BLD AUTO: 5.4 X10*3/UL (ref 1.2–7.7)
NEUTROPHILS NFR BLD AUTO: 76.8 %
NRBC BLD-RTO: 0 /100 WBCS (ref 0–0)
PLATELET # BLD AUTO: 270 X10*3/UL (ref 150–450)
POTASSIUM SERPL-SCNC: 4.4 MMOL/L (ref 3.5–5.3)
PROT SERPL-MCNC: 7.5 G/DL (ref 6.4–8.2)
RBC # BLD AUTO: 4.07 X10*6/UL (ref 4–5.2)
SARS-COV-2 RNA RESP QL NAA+PROBE: NOT DETECTED
SODIUM SERPL-SCNC: 137 MMOL/L (ref 136–145)
WBC # BLD AUTO: 7 X10*3/UL (ref 4.4–11.3)

## 2025-08-19 PROCEDURE — 96375 TX/PRO/DX INJ NEW DRUG ADDON: CPT | Performed by: STUDENT IN AN ORGANIZED HEALTH CARE EDUCATION/TRAINING PROGRAM

## 2025-08-19 PROCEDURE — 80053 COMPREHEN METABOLIC PANEL: CPT

## 2025-08-19 PROCEDURE — 2500000002 HC RX 250 W HCPCS SELF ADMINISTERED DRUGS (ALT 637 FOR MEDICARE OP, ALT 636 FOR OP/ED)

## 2025-08-19 PROCEDURE — 2500000004 HC RX 250 GENERAL PHARMACY W/ HCPCS (ALT 636 FOR OP/ED)

## 2025-08-19 PROCEDURE — 85025 COMPLETE CBC W/AUTO DIFF WBC: CPT

## 2025-08-19 PROCEDURE — 71045 X-RAY EXAM CHEST 1 VIEW: CPT

## 2025-08-19 PROCEDURE — 87636 SARSCOV2 & INF A&B AMP PRB: CPT | Performed by: STUDENT IN AN ORGANIZED HEALTH CARE EDUCATION/TRAINING PROGRAM

## 2025-08-19 PROCEDURE — 96374 THER/PROPH/DIAG INJ IV PUSH: CPT | Performed by: STUDENT IN AN ORGANIZED HEALTH CARE EDUCATION/TRAINING PROGRAM

## 2025-08-19 RX ORDER — DEXAMETHASONE SODIUM PHOSPHATE 10 MG/ML
10 INJECTION INTRAMUSCULAR; INTRAVENOUS ONCE
Status: COMPLETED | OUTPATIENT
Start: 2025-08-19 | End: 2025-08-19

## 2025-08-19 RX ORDER — IPRATROPIUM BROMIDE AND ALBUTEROL SULFATE 2.5; .5 MG/3ML; MG/3ML
3 SOLUTION RESPIRATORY (INHALATION) ONCE
Status: COMPLETED | OUTPATIENT
Start: 2025-08-19 | End: 2025-08-19

## 2025-08-19 RX ORDER — DEXAMETHASONE 4 MG/1
8 TABLET ORAL AS NEEDED
Qty: 12 TABLET | Refills: 2 | Status: ON HOLD | OUTPATIENT
Start: 2025-08-19

## 2025-08-19 RX ORDER — PROCHLORPERAZINE MALEATE 10 MG
10 TABLET ORAL EVERY 6 HOURS PRN
Qty: 30 TABLET | Refills: 5 | Status: ON HOLD | OUTPATIENT
Start: 2025-08-19

## 2025-08-19 RX ORDER — DROPERIDOL 2.5 MG/ML
0.62 INJECTION, SOLUTION INTRAMUSCULAR; INTRAVENOUS ONCE
Status: COMPLETED | OUTPATIENT
Start: 2025-08-19 | End: 2025-08-19

## 2025-08-19 RX ADMIN — IPRATROPIUM BROMIDE AND ALBUTEROL SULFATE 3 ML: 2.5; .5 SOLUTION RESPIRATORY (INHALATION) at 01:29

## 2025-08-19 RX ADMIN — DEXAMETHASONE SODIUM PHOSPHATE 10 MG: 10 INJECTION INTRAMUSCULAR; INTRAVENOUS at 01:31

## 2025-08-19 RX ADMIN — DROPERIDOL 0.62 MG: 2.5 INJECTION, SOLUTION INTRAMUSCULAR; INTRAVENOUS at 01:30

## 2025-08-19 ASSESSMENT — LIFESTYLE VARIABLES
HAVE YOU EVER FELT YOU SHOULD CUT DOWN ON YOUR DRINKING: NO
TOTAL SCORE: 0
EVER FELT BAD OR GUILTY ABOUT YOUR DRINKING: NO
EVER HAD A DRINK FIRST THING IN THE MORNING TO STEADY YOUR NERVES TO GET RID OF A HANGOVER: NO
HAVE PEOPLE ANNOYED YOU BY CRITICIZING YOUR DRINKING: NO

## 2025-08-19 ASSESSMENT — PAIN DESCRIPTION - LOCATION
LOCATION: HEAD
LOCATION: HEAD

## 2025-08-19 ASSESSMENT — PAIN - FUNCTIONAL ASSESSMENT
PAIN_FUNCTIONAL_ASSESSMENT: 0-10
PAIN_FUNCTIONAL_ASSESSMENT: 0-10

## 2025-08-19 ASSESSMENT — PAIN SCALES - GENERAL
PAINLEVEL_OUTOF10: 2
PAINLEVEL_OUTOF10: 3

## 2025-08-19 ASSESSMENT — PAIN DESCRIPTION - DESCRIPTORS: DESCRIPTORS: ACHING

## 2025-08-20 ENCOUNTER — HOSPITAL ENCOUNTER (INPATIENT)
Facility: HOSPITAL | Age: 60
DRG: 054 | End: 2025-08-20
Attending: INTERNAL MEDICINE | Admitting: INTERNAL MEDICINE
Payer: MEDICARE

## 2025-08-20 ENCOUNTER — TELEPHONE (OUTPATIENT)
Dept: RADIATION ONCOLOGY | Facility: HOSPITAL | Age: 60
End: 2025-08-20

## 2025-08-20 ENCOUNTER — APPOINTMENT (OUTPATIENT)
Dept: RADIOLOGY | Facility: HOSPITAL | Age: 60
DRG: 054 | End: 2025-08-20
Payer: MEDICARE

## 2025-08-20 PROBLEM — R27.0 ATAXIA: Status: ACTIVE | Noted: 2025-08-20

## 2025-08-20 PROBLEM — C79.31 METASTASIS TO BRAIN (MULTI): Status: ACTIVE | Noted: 2025-08-20

## 2025-08-20 PROBLEM — R11.2 NAUSEA AND VOMITING, UNSPECIFIED VOMITING TYPE: Status: ACTIVE | Noted: 2025-08-20

## 2025-08-20 PROBLEM — R11.2 NAUSEA & VOMITING: Status: ACTIVE | Noted: 2025-08-20

## 2025-08-20 SDOH — SOCIAL STABILITY: SOCIAL INSECURITY: WITHIN THE LAST YEAR, HAVE YOU BEEN AFRAID OF YOUR PARTNER OR EX-PARTNER?: NO

## 2025-08-20 SDOH — SOCIAL STABILITY: SOCIAL INSECURITY
WITHIN THE LAST YEAR, HAVE YOU BEEN KICKED, HIT, SLAPPED, OR OTHERWISE PHYSICALLY HURT BY YOUR PARTNER OR EX-PARTNER?: NO

## 2025-08-20 SDOH — SOCIAL STABILITY: SOCIAL INSECURITY: HAS ANYONE EVER THREATENED TO HURT YOUR FAMILY OR YOUR PETS?: NO

## 2025-08-20 SDOH — ECONOMIC STABILITY: FOOD INSECURITY: WITHIN THE PAST 12 MONTHS, THE FOOD YOU BOUGHT JUST DIDN'T LAST AND YOU DIDN'T HAVE MONEY TO GET MORE.: NEVER TRUE

## 2025-08-20 SDOH — SOCIAL STABILITY: SOCIAL INSECURITY: WITHIN THE LAST YEAR, HAVE YOU BEEN HUMILIATED OR EMOTIONALLY ABUSED IN OTHER WAYS BY YOUR PARTNER OR EX-PARTNER?: NO

## 2025-08-20 SDOH — SOCIAL STABILITY: SOCIAL INSECURITY: ARE THERE ANY APPARENT SIGNS OF INJURIES/BEHAVIORS THAT COULD BE RELATED TO ABUSE/NEGLECT?: NO

## 2025-08-20 SDOH — SOCIAL STABILITY: SOCIAL INSECURITY
WITHIN THE LAST YEAR, HAVE YOU BEEN RAPED OR FORCED TO HAVE ANY KIND OF SEXUAL ACTIVITY BY YOUR PARTNER OR EX-PARTNER?: NO

## 2025-08-20 SDOH — SOCIAL STABILITY: SOCIAL INSECURITY: HAVE YOU HAD ANY THOUGHTS OF HARMING ANYONE ELSE?: NO

## 2025-08-20 SDOH — ECONOMIC STABILITY: TRANSPORTATION INSECURITY: IN THE PAST 12 MONTHS, HAS LACK OF TRANSPORTATION KEPT YOU FROM MEDICAL APPOINTMENTS OR FROM GETTING MEDICATIONS?: NO

## 2025-08-20 SDOH — SOCIAL STABILITY: SOCIAL INSECURITY: DOES ANYONE TRY TO KEEP YOU FROM HAVING/CONTACTING OTHER FRIENDS OR DOING THINGS OUTSIDE YOUR HOME?: NO

## 2025-08-20 SDOH — ECONOMIC STABILITY: HOUSING INSECURITY: AT ANY TIME IN THE PAST 12 MONTHS, WERE YOU HOMELESS OR LIVING IN A SHELTER (INCLUDING NOW)?: NO

## 2025-08-20 SDOH — ECONOMIC STABILITY: FOOD INSECURITY: WITHIN THE PAST 12 MONTHS, YOU WORRIED THAT YOUR FOOD WOULD RUN OUT BEFORE YOU GOT THE MONEY TO BUY MORE.: NEVER TRUE

## 2025-08-20 SDOH — ECONOMIC STABILITY: HOUSING INSECURITY: IN THE LAST 12 MONTHS, WAS THERE A TIME WHEN YOU WERE NOT ABLE TO PAY THE MORTGAGE OR RENT ON TIME?: NO

## 2025-08-20 SDOH — SOCIAL STABILITY: SOCIAL INSECURITY: HAVE YOU HAD THOUGHTS OF HARMING ANYONE ELSE?: NO

## 2025-08-20 SDOH — SOCIAL STABILITY: SOCIAL INSECURITY: ARE YOU OR HAVE YOU BEEN THREATENED OR ABUSED PHYSICALLY, EMOTIONALLY, OR SEXUALLY BY ANYONE?: NO

## 2025-08-20 SDOH — SOCIAL STABILITY: SOCIAL INSECURITY: DO YOU FEEL UNSAFE GOING BACK TO THE PLACE WHERE YOU ARE LIVING?: NO

## 2025-08-20 SDOH — ECONOMIC STABILITY: FOOD INSECURITY: HOW HARD IS IT FOR YOU TO PAY FOR THE VERY BASICS LIKE FOOD, HOUSING, MEDICAL CARE, AND HEATING?: NOT HARD AT ALL

## 2025-08-20 SDOH — ECONOMIC STABILITY: INCOME INSECURITY: IN THE PAST 12 MONTHS HAS THE ELECTRIC, GAS, OIL, OR WATER COMPANY THREATENED TO SHUT OFF SERVICES IN YOUR HOME?: NO

## 2025-08-20 SDOH — SOCIAL STABILITY: SOCIAL INSECURITY: DO YOU FEEL ANYONE HAS EXPLOITED OR TAKEN ADVANTAGE OF YOU FINANCIALLY OR OF YOUR PERSONAL PROPERTY?: NO

## 2025-08-20 SDOH — SOCIAL STABILITY: SOCIAL INSECURITY: ABUSE: ADULT

## 2025-08-20 ASSESSMENT — COGNITIVE AND FUNCTIONAL STATUS - GENERAL
CLIMB 3 TO 5 STEPS WITH RAILING: A LITTLE
DAILY ACTIVITIY SCORE: 24
DAILY ACTIVITIY SCORE: 24
PATIENT BASELINE BEDBOUND: NO
MOBILITY SCORE: 24
DAILY ACTIVITIY SCORE: 24
PATIENT BASELINE BEDBOUND: NO
MOBILITY SCORE: 24
MOBILITY SCORE: 23

## 2025-08-20 ASSESSMENT — ACTIVITIES OF DAILY LIVING (ADL)
PATIENT'S MEMORY ADEQUATE TO SAFELY COMPLETE DAILY ACTIVITIES?: YES
FEEDING YOURSELF: INDEPENDENT
LACK_OF_TRANSPORTATION: NO
HEARING - LEFT EAR: FUNCTIONAL
ADEQUATE_TO_COMPLETE_ADL: YES
GROOMING: INDEPENDENT
TOILETING: INDEPENDENT
WALKS IN HOME: INDEPENDENT
DRESSING YOURSELF: INDEPENDENT
DRESSING YOURSELF: INDEPENDENT
ASSISTIVE_DEVICE: EYEGLASSES
LACK_OF_TRANSPORTATION: NO
GROOMING: INDEPENDENT
WALKS IN HOME: INDEPENDENT
BATHING: INDEPENDENT
HEARING - RIGHT EAR: FUNCTIONAL
JUDGMENT_ADEQUATE_SAFELY_COMPLETE_DAILY_ACTIVITIES: YES
JUDGMENT_ADEQUATE_SAFELY_COMPLETE_DAILY_ACTIVITIES: YES
HEARING - RIGHT EAR: FUNCTIONAL
HEARING - LEFT EAR: FUNCTIONAL
TOILETING: INDEPENDENT
LACK_OF_TRANSPORTATION: NO
BATHING: INDEPENDENT
FEEDING YOURSELF: INDEPENDENT
ADEQUATE_TO_COMPLETE_ADL: YES
PATIENT'S MEMORY ADEQUATE TO SAFELY COMPLETE DAILY ACTIVITIES?: YES
LACK_OF_TRANSPORTATION: NO

## 2025-08-20 ASSESSMENT — LIFESTYLE VARIABLES
SKIP TO QUESTIONS 9-10: 1
AUDIT-C TOTAL SCORE: 0
HOW OFTEN DO YOU HAVE A DRINK CONTAINING ALCOHOL: NEVER
HOW OFTEN DO YOU HAVE 6 OR MORE DRINKS ON ONE OCCASION: NEVER
HOW MANY STANDARD DRINKS CONTAINING ALCOHOL DO YOU HAVE ON A TYPICAL DAY: PATIENT DOES NOT DRINK
HOW OFTEN DO YOU HAVE A DRINK CONTAINING ALCOHOL: NEVER
HOW MANY STANDARD DRINKS CONTAINING ALCOHOL DO YOU HAVE ON A TYPICAL DAY: PATIENT DOES NOT DRINK
SKIP TO QUESTIONS 9-10: 1
AUDIT-C TOTAL SCORE: 0
AUDIT-C TOTAL SCORE: 0
HOW OFTEN DO YOU HAVE 6 OR MORE DRINKS ON ONE OCCASION: NEVER
AUDIT-C TOTAL SCORE: 0

## 2025-08-20 ASSESSMENT — PAIN - FUNCTIONAL ASSESSMENT
PAIN_FUNCTIONAL_ASSESSMENT: 0-10
PAIN_FUNCTIONAL_ASSESSMENT: 0-10
PAIN_FUNCTIONAL_ASSESSMENT: FLACC (FACE, LEGS, ACTIVITY, CRY, CONSOLABILITY)

## 2025-08-20 ASSESSMENT — PAIN SCALES - GENERAL
PAINLEVEL_OUTOF10: 0 - NO PAIN
PAINLEVEL_OUTOF10: 3
PAINLEVEL_OUTOF10: 0 - NO PAIN

## 2025-08-20 ASSESSMENT — PAIN DESCRIPTION - LOCATION: LOCATION: RIB CAGE

## 2025-08-20 ASSESSMENT — PATIENT HEALTH QUESTIONNAIRE - PHQ9
2. FEELING DOWN, DEPRESSED OR HOPELESS: NOT AT ALL
1. LITTLE INTEREST OR PLEASURE IN DOING THINGS: NOT AT ALL
SUM OF ALL RESPONSES TO PHQ9 QUESTIONS 1 & 2: 0
SUM OF ALL RESPONSES TO PHQ9 QUESTIONS 1 & 2: 0
2. FEELING DOWN, DEPRESSED OR HOPELESS: NOT AT ALL
1. LITTLE INTEREST OR PLEASURE IN DOING THINGS: NOT AT ALL

## 2025-08-20 ASSESSMENT — PAIN DESCRIPTION - ORIENTATION: ORIENTATION: LEFT

## 2025-08-21 ENCOUNTER — APPOINTMENT (OUTPATIENT)
Dept: RADIOLOGY | Facility: HOSPITAL | Age: 60
DRG: 054 | End: 2025-08-21
Payer: MEDICARE

## 2025-08-21 ENCOUNTER — APPOINTMENT (OUTPATIENT)
Dept: RADIOLOGY | Facility: HOSPITAL | Age: 60
End: 2025-08-21
Payer: MEDICARE

## 2025-08-21 ASSESSMENT — COGNITIVE AND FUNCTIONAL STATUS - GENERAL
MOBILITY SCORE: 23
CLIMB 3 TO 5 STEPS WITH RAILING: A LITTLE
MOBILITY SCORE: 23
DAILY ACTIVITIY SCORE: 24
CLIMB 3 TO 5 STEPS WITH RAILING: A LITTLE
CLIMB 3 TO 5 STEPS WITH RAILING: A LITTLE
MOBILITY SCORE: 23

## 2025-08-21 ASSESSMENT — ACTIVITIES OF DAILY LIVING (ADL): LACK_OF_TRANSPORTATION: NO

## 2025-08-21 ASSESSMENT — ENCOUNTER SYMPTOMS
FATIGUE: 1
DYSPHORIC MOOD: 1
NECK PAIN: 1
BACK PAIN: 1
SLEEP DISTURBANCE: 1
NERVOUS/ANXIOUS: 1
NAUSEA: 1

## 2025-08-21 ASSESSMENT — PAIN - FUNCTIONAL ASSESSMENT
PAIN_FUNCTIONAL_ASSESSMENT: 0-10

## 2025-08-21 ASSESSMENT — PAIN SCALES - GENERAL
PAINLEVEL_OUTOF10: 0 - NO PAIN

## 2025-08-22 ENCOUNTER — DOCUMENTATION (OUTPATIENT)
Dept: HEMATOLOGY/ONCOLOGY | Facility: HOSPITAL | Age: 60
End: 2025-08-22
Payer: MEDICARE

## 2025-08-22 ENCOUNTER — HOSPITAL ENCOUNTER (OUTPATIENT)
Dept: RADIOLOGY | Facility: EXTERNAL LOCATION | Age: 60
Discharge: HOME | End: 2025-08-22

## 2025-08-22 ENCOUNTER — APPOINTMENT (OUTPATIENT)
Dept: RADIATION ONCOLOGY | Facility: HOSPITAL | Age: 60
End: 2025-08-22
Payer: MEDICARE

## 2025-08-22 DIAGNOSIS — C79.31 SECONDARY MALIGNANT NEOPLASM OF BRAIN (MULTI): ICD-10-CM

## 2025-08-22 DIAGNOSIS — C79.31 SECONDARY MALIGNANT NEOPLASM OF BRAIN (MULTI): Primary | ICD-10-CM

## 2025-08-22 ASSESSMENT — PAIN SCALES - GENERAL
PAINLEVEL_OUTOF10: 0 - NO PAIN
PAINLEVEL_OUTOF10: 0 - NO PAIN

## 2025-08-22 ASSESSMENT — PAIN - FUNCTIONAL ASSESSMENT
PAIN_FUNCTIONAL_ASSESSMENT: 0-10
PAIN_FUNCTIONAL_ASSESSMENT: 0-10

## 2025-08-23 ENCOUNTER — APPOINTMENT (OUTPATIENT)
Dept: RADIOLOGY | Facility: HOSPITAL | Age: 60
DRG: 054 | End: 2025-08-23
Payer: MEDICARE

## 2025-08-23 ASSESSMENT — PAIN SCALES - GENERAL
PAINLEVEL_OUTOF10: 0 - NO PAIN

## 2025-08-23 ASSESSMENT — PAIN - FUNCTIONAL ASSESSMENT
PAIN_FUNCTIONAL_ASSESSMENT: 0-10

## 2025-08-24 ASSESSMENT — PAIN - FUNCTIONAL ASSESSMENT
PAIN_FUNCTIONAL_ASSESSMENT: 0-10

## 2025-08-24 ASSESSMENT — PAIN SCALES - GENERAL
PAINLEVEL_OUTOF10: 0 - NO PAIN

## 2025-08-25 ENCOUNTER — HOSPITAL ENCOUNTER (OUTPATIENT)
Dept: RADIATION ONCOLOGY | Facility: HOSPITAL | Age: 60
Setting detail: RADIATION/ONCOLOGY SERIES
Discharge: HOME | End: 2025-08-25
Payer: MEDICARE

## 2025-08-25 ENCOUNTER — HOSPITAL ENCOUNTER (OUTPATIENT)
Dept: RADIOLOGY | Facility: EXTERNAL LOCATION | Age: 60
Discharge: HOME | End: 2025-08-25

## 2025-08-25 DIAGNOSIS — Z51.0 ENCOUNTER FOR ANTINEOPLASTIC RADIATION THERAPY: ICD-10-CM

## 2025-08-25 DIAGNOSIS — C70.0 MALIGNANT NEOPLASM OF CEREBRAL MENINGES (MULTI): ICD-10-CM

## 2025-08-25 DIAGNOSIS — C78.1 SECONDARY MALIGNANT NEOPLASM OF MEDIASTINUM (MULTI): ICD-10-CM

## 2025-08-25 DIAGNOSIS — C79.31 SECONDARY MALIGNANT NEOPLASM OF BRAIN (MULTI): Primary | ICD-10-CM

## 2025-08-25 DIAGNOSIS — C79.31 SECONDARY MALIGNANT NEOPLASM OF BRAIN (MULTI): ICD-10-CM

## 2025-08-25 DIAGNOSIS — C34.90 SMALL CELL CARCINOMA OF LUNG, UNSPECIFIED LATERALITY, UNSPECIFIED PART OF LUNG: Primary | ICD-10-CM

## 2025-08-25 PROCEDURE — 77290 THER RAD SIMULAJ FIELD CPLX: CPT | Performed by: STUDENT IN AN ORGANIZED HEALTH CARE EDUCATION/TRAINING PROGRAM

## 2025-08-25 PROCEDURE — 77334 RADIATION TREATMENT AID(S): CPT | Performed by: STUDENT IN AN ORGANIZED HEALTH CARE EDUCATION/TRAINING PROGRAM

## 2025-08-25 ASSESSMENT — PAIN SCALES - GENERAL
PAINLEVEL_OUTOF10: 6
PAINLEVEL_OUTOF10: 6
PAINLEVEL_OUTOF10: 3
PAINLEVEL_OUTOF10: 2

## 2025-08-25 ASSESSMENT — PAIN - FUNCTIONAL ASSESSMENT
PAIN_FUNCTIONAL_ASSESSMENT: 0-10
PAIN_FUNCTIONAL_ASSESSMENT: 0-10
PAIN_FUNCTIONAL_ASSESSMENT: UNABLE TO SELF-REPORT
PAIN_FUNCTIONAL_ASSESSMENT: 0-10

## 2025-08-25 ASSESSMENT — COGNITIVE AND FUNCTIONAL STATUS - GENERAL
CLIMB 3 TO 5 STEPS WITH RAILING: A LITTLE
MOBILITY SCORE: 23
DAILY ACTIVITIY SCORE: 24

## 2025-08-26 ENCOUNTER — APPOINTMENT (OUTPATIENT)
Dept: RADIATION ONCOLOGY | Facility: HOSPITAL | Age: 60
End: 2025-08-26
Payer: MEDICARE

## 2025-08-26 ENCOUNTER — PHARMACY VISIT (OUTPATIENT)
Dept: PHARMACY | Facility: CLINIC | Age: 60
End: 2025-08-26
Payer: COMMERCIAL

## 2025-08-26 PROCEDURE — RXMED WILLOW AMBULATORY MEDICATION CHARGE

## 2025-08-26 ASSESSMENT — PAIN SCALES - GENERAL: PAINLEVEL_OUTOF10: 0 - NO PAIN

## 2025-08-26 ASSESSMENT — PAIN - FUNCTIONAL ASSESSMENT: PAIN_FUNCTIONAL_ASSESSMENT: 0-10

## 2025-08-27 ENCOUNTER — TELEPHONE (OUTPATIENT)
Dept: HEMATOLOGY/ONCOLOGY | Facility: CLINIC | Age: 60
End: 2025-08-27
Payer: MEDICARE

## 2025-08-27 ENCOUNTER — TELEPHONE (OUTPATIENT)
Dept: PALLIATIVE MEDICINE | Facility: CLINIC | Age: 60
End: 2025-08-27
Payer: MEDICARE

## 2025-08-27 DIAGNOSIS — F41.9 ANXIETY: ICD-10-CM

## 2025-08-27 RX ORDER — LORAZEPAM 0.5 MG/1
0.5 TABLET ORAL SEE ADMIN INSTRUCTIONS
Qty: 10 TABLET | Refills: 0 | Status: ON HOLD | OUTPATIENT
Start: 2025-08-27 | End: 2025-09-06

## 2025-08-28 ENCOUNTER — APPOINTMENT (OUTPATIENT)
Dept: CARDIOLOGY | Facility: HOSPITAL | Age: 60
DRG: 181 | End: 2025-08-28
Payer: MEDICARE

## 2025-08-28 ENCOUNTER — NURSE TRIAGE (OUTPATIENT)
Dept: ADMISSION | Facility: HOSPITAL | Age: 60
End: 2025-08-28
Payer: MEDICARE

## 2025-08-28 ENCOUNTER — APPOINTMENT (OUTPATIENT)
Dept: RADIATION ONCOLOGY | Facility: HOSPITAL | Age: 60
End: 2025-08-28
Payer: MEDICARE

## 2025-08-28 ENCOUNTER — HOSPITAL ENCOUNTER (INPATIENT)
Facility: HOSPITAL | Age: 60
LOS: 1 days | Discharge: ANOTHER HOSPITAL | DRG: 181 | End: 2025-08-29
Attending: STUDENT IN AN ORGANIZED HEALTH CARE EDUCATION/TRAINING PROGRAM | Admitting: ANESTHESIOLOGY
Payer: MEDICARE

## 2025-08-28 ENCOUNTER — HOSPITAL ENCOUNTER (OUTPATIENT)
Dept: RADIATION ONCOLOGY | Facility: CLINIC | Age: 60
Setting detail: RADIATION/ONCOLOGY SERIES
Discharge: HOME | End: 2025-08-28
Payer: MEDICARE

## 2025-08-28 ENCOUNTER — TELEPHONE (OUTPATIENT)
Dept: ADMISSION | Facility: HOSPITAL | Age: 60
End: 2025-08-28

## 2025-08-28 ENCOUNTER — APPOINTMENT (OUTPATIENT)
Dept: RADIOLOGY | Facility: HOSPITAL | Age: 60
DRG: 181 | End: 2025-08-28
Payer: MEDICARE

## 2025-08-28 PROBLEM — R06.02 SHORTNESS OF BREATH: Status: ACTIVE | Noted: 2025-08-28

## 2025-08-28 PROBLEM — R00.0 TACHYCARDIA: Status: ACTIVE | Noted: 2025-08-28

## 2025-08-28 PROBLEM — R91.8 LUNG MASS: Status: ACTIVE | Noted: 2025-08-28

## 2025-08-28 PROBLEM — R74.01 ELEVATED ALT MEASUREMENT: Status: ACTIVE | Noted: 2025-08-28

## 2025-08-28 SDOH — ECONOMIC STABILITY: INCOME INSECURITY: IN THE PAST 12 MONTHS HAS THE ELECTRIC, GAS, OIL, OR WATER COMPANY THREATENED TO SHUT OFF SERVICES IN YOUR HOME?: NO

## 2025-08-28 SDOH — SOCIAL STABILITY: SOCIAL INSECURITY: WERE YOU ABLE TO COMPLETE ALL THE BEHAVIORAL HEALTH SCREENINGS?: YES

## 2025-08-28 SDOH — SOCIAL STABILITY: SOCIAL INSECURITY: WITHIN THE LAST YEAR, HAVE YOU BEEN HUMILIATED OR EMOTIONALLY ABUSED IN OTHER WAYS BY YOUR PARTNER OR EX-PARTNER?: NO

## 2025-08-28 SDOH — SOCIAL STABILITY: SOCIAL INSECURITY: HAS ANYONE EVER THREATENED TO HURT YOUR FAMILY OR YOUR PETS?: NO

## 2025-08-28 SDOH — ECONOMIC STABILITY: FOOD INSECURITY: WITHIN THE PAST 12 MONTHS, THE FOOD YOU BOUGHT JUST DIDN'T LAST AND YOU DIDN'T HAVE MONEY TO GET MORE.: NEVER TRUE

## 2025-08-28 SDOH — SOCIAL STABILITY: SOCIAL INSECURITY: DO YOU FEEL UNSAFE GOING BACK TO THE PLACE WHERE YOU ARE LIVING?: NO

## 2025-08-28 SDOH — SOCIAL STABILITY: SOCIAL INSECURITY: WITHIN THE LAST YEAR, HAVE YOU BEEN AFRAID OF YOUR PARTNER OR EX-PARTNER?: NO

## 2025-08-28 SDOH — SOCIAL STABILITY: SOCIAL INSECURITY: ARE THERE ANY APPARENT SIGNS OF INJURIES/BEHAVIORS THAT COULD BE RELATED TO ABUSE/NEGLECT?: NO

## 2025-08-28 SDOH — SOCIAL STABILITY: SOCIAL INSECURITY: ARE YOU OR HAVE YOU BEEN THREATENED OR ABUSED PHYSICALLY, EMOTIONALLY, OR SEXUALLY BY ANYONE?: NO

## 2025-08-28 SDOH — ECONOMIC STABILITY: FOOD INSECURITY: WITHIN THE PAST 12 MONTHS, YOU WORRIED THAT YOUR FOOD WOULD RUN OUT BEFORE YOU GOT THE MONEY TO BUY MORE.: NEVER TRUE

## 2025-08-28 SDOH — SOCIAL STABILITY: SOCIAL INSECURITY: ABUSE: ADULT

## 2025-08-28 SDOH — SOCIAL STABILITY: SOCIAL INSECURITY: HAVE YOU HAD THOUGHTS OF HARMING ANYONE ELSE?: NO

## 2025-08-28 SDOH — SOCIAL STABILITY: SOCIAL INSECURITY: DO YOU FEEL ANYONE HAS EXPLOITED OR TAKEN ADVANTAGE OF YOU FINANCIALLY OR OF YOUR PERSONAL PROPERTY?: NO

## 2025-08-28 SDOH — SOCIAL STABILITY: SOCIAL INSECURITY: DOES ANYONE TRY TO KEEP YOU FROM HAVING/CONTACTING OTHER FRIENDS OR DOING THINGS OUTSIDE YOUR HOME?: NO

## 2025-08-28 SDOH — SOCIAL STABILITY: SOCIAL INSECURITY: HAVE YOU HAD ANY THOUGHTS OF HARMING ANYONE ELSE?: NO

## 2025-08-28 ASSESSMENT — ACTIVITIES OF DAILY LIVING (ADL)
FEEDING YOURSELF: INDEPENDENT
DRESSING YOURSELF: INDEPENDENT
GROOMING: INDEPENDENT
TOILETING: INDEPENDENT
HEARING - RIGHT EAR: FUNCTIONAL
HEARING - LEFT EAR: FUNCTIONAL
WALKS IN HOME: INDEPENDENT
BATHING: INDEPENDENT
ASSISTIVE_DEVICE: EYEGLASSES
LACK_OF_TRANSPORTATION: NO
PATIENT'S MEMORY ADEQUATE TO SAFELY COMPLETE DAILY ACTIVITIES?: YES
ADEQUATE_TO_COMPLETE_ADL: YES
JUDGMENT_ADEQUATE_SAFELY_COMPLETE_DAILY_ACTIVITIES: YES

## 2025-08-28 ASSESSMENT — COGNITIVE AND FUNCTIONAL STATUS - GENERAL
MOBILITY SCORE: 24
DAILY ACTIVITIY SCORE: 24
PATIENT BASELINE BEDBOUND: NO

## 2025-08-28 ASSESSMENT — LIFESTYLE VARIABLES
HOW MANY STANDARD DRINKS CONTAINING ALCOHOL DO YOU HAVE ON A TYPICAL DAY: PATIENT DOES NOT DRINK
HOW OFTEN DO YOU HAVE A DRINK CONTAINING ALCOHOL: NEVER
AUDIT-C TOTAL SCORE: 0
SKIP TO QUESTIONS 9-10: 1
HOW OFTEN DO YOU HAVE 6 OR MORE DRINKS ON ONE OCCASION: NEVER
AUDIT-C TOTAL SCORE: 0

## 2025-08-28 ASSESSMENT — PATIENT HEALTH QUESTIONNAIRE - PHQ9
1. LITTLE INTEREST OR PLEASURE IN DOING THINGS: SEVERAL DAYS
SUM OF ALL RESPONSES TO PHQ9 QUESTIONS 1 & 2: 2
2. FEELING DOWN, DEPRESSED OR HOPELESS: SEVERAL DAYS

## 2025-08-28 ASSESSMENT — PAIN SCALES - GENERAL
PAINLEVEL_OUTOF10: 0 - NO PAIN
PAINLEVEL_OUTOF10: 0 - NO PAIN

## 2025-08-28 ASSESSMENT — PAIN - FUNCTIONAL ASSESSMENT: PAIN_FUNCTIONAL_ASSESSMENT: 0-10

## 2025-08-29 ENCOUNTER — TELEPHONE (OUTPATIENT)
Dept: ADMISSION | Facility: HOSPITAL | Age: 60
End: 2025-08-29
Payer: MEDICARE

## 2025-08-29 ENCOUNTER — HOSPITAL ENCOUNTER (INPATIENT)
Facility: HOSPITAL | Age: 60
End: 2025-08-29
Attending: STUDENT IN AN ORGANIZED HEALTH CARE EDUCATION/TRAINING PROGRAM | Admitting: STUDENT IN AN ORGANIZED HEALTH CARE EDUCATION/TRAINING PROGRAM
Payer: MEDICARE

## 2025-08-29 ENCOUNTER — APPOINTMENT (OUTPATIENT)
Dept: RADIATION ONCOLOGY | Facility: HOSPITAL | Age: 60
End: 2025-08-29
Payer: MEDICARE

## 2025-08-29 ENCOUNTER — APPOINTMENT (OUTPATIENT)
Dept: CARDIOLOGY | Facility: HOSPITAL | Age: 60
DRG: 181 | End: 2025-08-29
Payer: MEDICARE

## 2025-08-29 ENCOUNTER — APPOINTMENT (OUTPATIENT)
Dept: HEMATOLOGY/ONCOLOGY | Facility: CLINIC | Age: 60
End: 2025-08-29
Payer: MEDICARE

## 2025-08-29 DIAGNOSIS — R91.8 LUNG MASS: ICD-10-CM

## 2025-08-29 DIAGNOSIS — I31.39 PERICARDIAL EFFUSION WITHOUT CARDIAC TAMPONADE: ICD-10-CM

## 2025-08-29 DIAGNOSIS — C34.90 SMALL CELL LUNG CANCER IN ADULT (MULTI): Primary | ICD-10-CM

## 2025-08-29 LAB
25(OH)D3 SERPL-MCNC: 21 NG/ML (ref 30–100)
ABO GROUP (TYPE) IN BLOOD: NORMAL
ALBUMIN SERPL BCP-MCNC: 4.1 G/DL (ref 3.4–5)
ALP SERPL-CCNC: 169 U/L (ref 33–136)
ALT SERPL W P-5'-P-CCNC: 55 U/L (ref 7–45)
ANION GAP SERPL CALC-SCNC: 18 MMOL/L (ref 10–20)
ANTIBODY SCREEN: NORMAL
APTT PPP: 78 SECONDS (ref 26–36)
AST SERPL W P-5'-P-CCNC: 33 U/L (ref 9–39)
BASOPHILS # BLD AUTO: 0.02 X10*3/UL (ref 0–0.1)
BASOPHILS NFR BLD AUTO: 0.2 %
BILIRUB SERPL-MCNC: 0.5 MG/DL (ref 0–1.2)
BUN SERPL-MCNC: 28 MG/DL (ref 6–23)
CALCIUM SERPL-MCNC: 9.3 MG/DL (ref 8.6–10.6)
CHLORIDE SERPL-SCNC: 104 MMOL/L (ref 98–107)
CO2 SERPL-SCNC: 18 MMOL/L (ref 21–32)
CREAT SERPL-MCNC: 0.89 MG/DL (ref 0.5–1.05)
EGFRCR SERPLBLD CKD-EPI 2021: 74 ML/MIN/1.73M*2
EOSINOPHIL # BLD AUTO: 0.01 X10*3/UL (ref 0–0.7)
EOSINOPHIL NFR BLD AUTO: 0.1 %
ERYTHROCYTE [DISTWIDTH] IN BLOOD BY AUTOMATED COUNT: 15.5 % (ref 11.5–14.5)
GLUCOSE SERPL-MCNC: 132 MG/DL (ref 74–99)
HCT VFR BLD AUTO: 41.4 % (ref 36–46)
HGB BLD-MCNC: 13.9 G/DL (ref 12–16)
IMM GRANULOCYTES # BLD AUTO: 0.11 X10*3/UL (ref 0–0.7)
IMM GRANULOCYTES NFR BLD AUTO: 1.1 % (ref 0–0.9)
INR PPP: 1 (ref 0.9–1.1)
LYMPHOCYTES # BLD AUTO: 1.77 X10*3/UL (ref 1.2–4.8)
LYMPHOCYTES NFR BLD AUTO: 17.2 %
MAGNESIUM SERPL-MCNC: 2.42 MG/DL (ref 1.6–2.4)
MCH RBC QN AUTO: 33.1 PG (ref 26–34)
MCHC RBC AUTO-ENTMCNC: 33.6 G/DL (ref 32–36)
MCV RBC AUTO: 99 FL (ref 80–100)
MONOCYTES # BLD AUTO: 0.66 X10*3/UL (ref 0.1–1)
MONOCYTES NFR BLD AUTO: 6.4 %
NEUTROPHILS # BLD AUTO: 7.71 X10*3/UL (ref 1.2–7.7)
NEUTROPHILS NFR BLD AUTO: 75 %
NRBC BLD-RTO: 0 /100 WBCS (ref 0–0)
PHOSPHATE SERPL-MCNC: 3.4 MG/DL (ref 2.5–4.9)
PLATELET # BLD AUTO: 292 X10*3/UL (ref 150–450)
POTASSIUM SERPL-SCNC: 3.6 MMOL/L (ref 3.5–5.3)
PROT SERPL-MCNC: 6.7 G/DL (ref 6.4–8.2)
PROTHROMBIN TIME: 11.4 SECONDS (ref 9.8–12.4)
RBC # BLD AUTO: 4.2 X10*6/UL (ref 4–5.2)
RH FACTOR (ANTIGEN D): NORMAL
SODIUM SERPL-SCNC: 136 MMOL/L (ref 136–145)
T3FREE SERPL-MCNC: 1.5 PG/ML (ref 2.3–4.2)
T4 FREE SERPL-MCNC: 1.44 NG/DL (ref 0.78–1.48)
TSH SERPL-ACNC: 37.3 MIU/L (ref 0.44–3.98)
UFH PPP CHRO-ACNC: 0.6 IU/ML (ref ?–1.1)
UFH PPP CHRO-ACNC: 0.7 IU/ML (ref ?–1.1)
WBC # BLD AUTO: 10.3 X10*3/UL (ref 4.4–11.3)

## 2025-08-29 PROCEDURE — 93005 ELECTROCARDIOGRAM TRACING: CPT

## 2025-08-29 PROCEDURE — 36415 COLL VENOUS BLD VENIPUNCTURE: CPT

## 2025-08-29 PROCEDURE — 77334 RADIATION TREATMENT AID(S): CPT | Performed by: STUDENT IN AN ORGANIZED HEALTH CARE EDUCATION/TRAINING PROGRAM

## 2025-08-29 PROCEDURE — 83036 HEMOGLOBIN GLYCOSYLATED A1C: CPT | Mod: WESLAB

## 2025-08-29 PROCEDURE — 84100 ASSAY OF PHOSPHORUS: CPT

## 2025-08-29 PROCEDURE — 84481 FREE ASSAY (FT-3): CPT

## 2025-08-29 PROCEDURE — 84439 ASSAY OF FREE THYROXINE: CPT

## 2025-08-29 PROCEDURE — 83735 ASSAY OF MAGNESIUM: CPT

## 2025-08-29 PROCEDURE — 2500000004 HC RX 250 GENERAL PHARMACY W/ HCPCS (ALT 636 FOR OP/ED)

## 2025-08-29 PROCEDURE — 82306 VITAMIN D 25 HYDROXY: CPT

## 2025-08-29 PROCEDURE — 1200000003 HC ONCOLOGY  ROOM WITH TELEMETRY DAILY

## 2025-08-29 PROCEDURE — 2500000001 HC RX 250 WO HCPCS SELF ADMINISTERED DRUGS (ALT 637 FOR MEDICARE OP)

## 2025-08-29 PROCEDURE — 94640 AIRWAY INHALATION TREATMENT: CPT

## 2025-08-29 PROCEDURE — 77300 RADIATION THERAPY DOSE PLAN: CPT | Performed by: STUDENT IN AN ORGANIZED HEALTH CARE EDUCATION/TRAINING PROGRAM

## 2025-08-29 PROCEDURE — 85025 COMPLETE CBC W/AUTO DIFF WBC: CPT

## 2025-08-29 PROCEDURE — 2500000002 HC RX 250 W HCPCS SELF ADMINISTERED DRUGS (ALT 637 FOR MEDICARE OP, ALT 636 FOR OP/ED)

## 2025-08-29 PROCEDURE — 85520 HEPARIN ASSAY: CPT

## 2025-08-29 PROCEDURE — 77295 3-D RADIOTHERAPY PLAN: CPT | Performed by: STUDENT IN AN ORGANIZED HEALTH CARE EDUCATION/TRAINING PROGRAM

## 2025-08-29 PROCEDURE — 84443 ASSAY THYROID STIM HORMONE: CPT

## 2025-08-29 PROCEDURE — 85610 PROTHROMBIN TIME: CPT

## 2025-08-29 PROCEDURE — 80048 BASIC METABOLIC PNL TOTAL CA: CPT

## 2025-08-29 PROCEDURE — 86901 BLOOD TYPING SEROLOGIC RH(D): CPT

## 2025-08-29 RX ORDER — OLANZAPINE 5 MG/1
7.5 TABLET, FILM COATED ORAL
Status: DISPENSED | OUTPATIENT
Start: 2025-08-29

## 2025-08-29 RX ORDER — DEXAMETHASONE 2 MG/1
2 TABLET ORAL DAILY
Status: ACTIVE | OUTPATIENT
Start: 2025-09-03 | End: 2025-09-05

## 2025-08-29 RX ORDER — HYDROXYZINE HYDROCHLORIDE 25 MG/1
25 TABLET, FILM COATED ORAL EVERY 6 HOURS PRN
Status: DISCONTINUED | OUTPATIENT
Start: 2025-08-29 | End: 2025-08-31

## 2025-08-29 RX ORDER — POLYETHYLENE GLYCOL 3350 17 G/17G
17 POWDER, FOR SOLUTION ORAL 2 TIMES DAILY
Status: DISPENSED | OUTPATIENT
Start: 2025-08-30

## 2025-08-29 RX ORDER — PANTOPRAZOLE SODIUM 40 MG/1
40 TABLET, DELAYED RELEASE ORAL
Status: DISPENSED | OUTPATIENT
Start: 2025-08-30

## 2025-08-29 RX ORDER — HEPARIN SODIUM 10000 [USP'U]/100ML
0-4000 INJECTION, SOLUTION INTRAVENOUS CONTINUOUS
Status: DISPENSED | OUTPATIENT
Start: 2025-08-29

## 2025-08-29 RX ORDER — PROCHLORPERAZINE EDISYLATE 5 MG/ML
10 INJECTION INTRAMUSCULAR; INTRAVENOUS EVERY 6 HOURS PRN
Status: DISPENSED | OUTPATIENT
Start: 2025-08-29

## 2025-08-29 RX ORDER — GUAIFENESIN 100 MG/5ML
200 LIQUID ORAL EVERY 4 HOURS PRN
Status: ACTIVE | OUTPATIENT
Start: 2025-08-29

## 2025-08-29 RX ORDER — POLYETHYLENE GLYCOL 3350 17 G/17G
17 POWDER, FOR SOLUTION ORAL DAILY
Status: DISCONTINUED | OUTPATIENT
Start: 2025-08-29 | End: 2025-08-29

## 2025-08-29 RX ORDER — LORAZEPAM 0.5 MG/1
0.5 TABLET ORAL DAILY PRN
Status: DISCONTINUED | OUTPATIENT
Start: 2025-08-29 | End: 2025-08-29

## 2025-08-29 RX ORDER — LEVOTHYROXINE SODIUM 88 UG/1
88 TABLET ORAL DAILY
Status: DISPENSED | OUTPATIENT
Start: 2025-08-30

## 2025-08-29 RX ORDER — IPRATROPIUM BROMIDE AND ALBUTEROL SULFATE 2.5; .5 MG/3ML; MG/3ML
3 SOLUTION RESPIRATORY (INHALATION) EVERY 4 HOURS PRN
Status: DISPENSED | OUTPATIENT
Start: 2025-08-29

## 2025-08-29 RX ORDER — CYANOCOBALAMIN (VITAMIN B-12) 500 MCG
20 TABLET ORAL DAILY
Status: CANCELLED | OUTPATIENT
Start: 2025-08-30

## 2025-08-29 RX ORDER — DEXAMETHASONE 4 MG/1
4 TABLET ORAL 2 TIMES DAILY
Status: COMPLETED | OUTPATIENT
Start: 2025-08-29 | End: 2025-08-30

## 2025-08-29 RX ORDER — BUDESONIDE 0.5 MG/2ML
0.5 INHALANT ORAL 2 TIMES DAILY
Status: DISCONTINUED | OUTPATIENT
Start: 2025-08-29 | End: 2025-08-29

## 2025-08-29 RX ORDER — AZELASTINE 1 MG/ML
1 SPRAY, METERED NASAL AS NEEDED
Status: DISPENSED | OUTPATIENT
Start: 2025-08-29

## 2025-08-29 RX ORDER — ENOXAPARIN SODIUM 100 MG/ML
40 INJECTION SUBCUTANEOUS EVERY 24 HOURS
Status: DISCONTINUED | OUTPATIENT
Start: 2025-08-29 | End: 2025-08-29

## 2025-08-29 RX ORDER — ACETAMINOPHEN 325 MG/1
650 TABLET ORAL EVERY 4 HOURS PRN
Status: ACTIVE | OUTPATIENT
Start: 2025-08-29

## 2025-08-29 RX ORDER — ACETAMINOPHEN 160 MG/5ML
650 SOLUTION ORAL EVERY 4 HOURS PRN
Status: ACTIVE | OUTPATIENT
Start: 2025-08-29

## 2025-08-29 RX ORDER — BUDESONIDE 0.5 MG/2ML
1 INHALANT ORAL 2 TIMES DAILY
Status: DISPENSED | OUTPATIENT
Start: 2025-08-29

## 2025-08-29 RX ORDER — SENNOSIDES 8.6 MG/1
1 TABLET ORAL NIGHTLY
Status: DISCONTINUED | OUTPATIENT
Start: 2025-08-29 | End: 2025-08-31

## 2025-08-29 RX ORDER — DEXAMETHASONE 2 MG/1
2 TABLET ORAL 2 TIMES DAILY
Status: DISPENSED | OUTPATIENT
Start: 2025-08-31 | End: 2025-09-03

## 2025-08-29 RX ORDER — CYANOCOBALAMIN (VITAMIN B-12) 500 MCG
20 TABLET ORAL DAILY
Status: DISPENSED | OUTPATIENT
Start: 2025-08-30

## 2025-08-29 RX ORDER — ACETAMINOPHEN 650 MG/1
650 SUPPOSITORY RECTAL EVERY 4 HOURS PRN
Status: ACTIVE | OUTPATIENT
Start: 2025-08-29

## 2025-08-29 RX ORDER — ONDANSETRON 4 MG/1
4 TABLET, FILM COATED ORAL EVERY 8 HOURS PRN
Status: DISPENSED | OUTPATIENT
Start: 2025-08-29

## 2025-08-29 RX ORDER — PROCHLORPERAZINE 25 MG/1
25 SUPPOSITORY RECTAL EVERY 12 HOURS PRN
Status: ACTIVE | OUTPATIENT
Start: 2025-08-29

## 2025-08-29 RX ORDER — ONDANSETRON HYDROCHLORIDE 2 MG/ML
4 INJECTION, SOLUTION INTRAVENOUS EVERY 8 HOURS PRN
Status: DISPENSED | OUTPATIENT
Start: 2025-08-29

## 2025-08-29 RX ORDER — ALBUTEROL SULFATE 90 UG/1
2 INHALANT RESPIRATORY (INHALATION) EVERY 4 HOURS PRN
Status: DISPENSED | OUTPATIENT
Start: 2025-08-29

## 2025-08-29 RX ORDER — PROCHLORPERAZINE MALEATE 10 MG
10 TABLET ORAL EVERY 6 HOURS PRN
Status: DISPENSED | OUTPATIENT
Start: 2025-08-29

## 2025-08-29 RX ORDER — POTASSIUM CHLORIDE 20 MEQ/1
40 TABLET, EXTENDED RELEASE ORAL ONCE
Status: COMPLETED | OUTPATIENT
Start: 2025-08-29 | End: 2025-08-29

## 2025-08-29 RX ORDER — DEXTROSE 50 % IN WATER (D50W) INTRAVENOUS SYRINGE
12.5
Status: ACTIVE | OUTPATIENT
Start: 2025-08-29

## 2025-08-29 RX ORDER — ROSUVASTATIN CALCIUM 10 MG/1
10 TABLET, COATED ORAL DAILY
Status: DISPENSED | OUTPATIENT
Start: 2025-08-30

## 2025-08-29 RX ORDER — DEXTROSE 50 % IN WATER (D50W) INTRAVENOUS SYRINGE
25
Status: ACTIVE | OUTPATIENT
Start: 2025-08-29

## 2025-08-29 RX ADMIN — POTASSIUM CHLORIDE 40 MEQ: 1500 TABLET, EXTENDED RELEASE ORAL at 22:02

## 2025-08-29 RX ADMIN — Medication: at 21:00

## 2025-08-29 RX ADMIN — HEPARIN SODIUM 1000 UNITS/HR: 10000 INJECTION, SOLUTION INTRAVENOUS at 18:44

## 2025-08-29 RX ADMIN — BUDESONIDE 1 MG: 0.5 INHALANT RESPIRATORY (INHALATION) at 21:45

## 2025-08-29 RX ADMIN — ALBUTEROL SULFATE 2 PUFF: 90 AEROSOL, METERED RESPIRATORY (INHALATION) at 20:23

## 2025-08-29 RX ADMIN — PROCHLORPERAZINE MALEATE 10 MG: 10 TABLET ORAL at 23:02

## 2025-08-29 RX ADMIN — DEXAMETHASONE 4 MG: 4 TABLET ORAL at 20:20

## 2025-08-29 RX ADMIN — OLANZAPINE 7.5 MG: 5 TABLET, FILM COATED ORAL at 18:37

## 2025-08-29 RX ADMIN — SENNOSIDES 8.6 MG: 8.6 TABLET, FILM COATED ORAL at 20:20

## 2025-08-29 SDOH — ECONOMIC STABILITY: FOOD INSECURITY: WITHIN THE PAST 12 MONTHS, THE FOOD YOU BOUGHT JUST DIDN'T LAST AND YOU DIDN'T HAVE MONEY TO GET MORE.: NEVER TRUE

## 2025-08-29 SDOH — ECONOMIC STABILITY: FOOD INSECURITY: WITHIN THE PAST 12 MONTHS, YOU WORRIED THAT YOUR FOOD WOULD RUN OUT BEFORE YOU GOT THE MONEY TO BUY MORE.: NEVER TRUE

## 2025-08-29 SDOH — HEALTH STABILITY: MENTAL HEALTH
DO YOU FEEL STRESS - TENSE, RESTLESS, NERVOUS, OR ANXIOUS, OR UNABLE TO SLEEP AT NIGHT BECAUSE YOUR MIND IS TROUBLED ALL THE TIME - THESE DAYS?: VERY MUCH

## 2025-08-29 SDOH — SOCIAL STABILITY: SOCIAL INSECURITY: HAVE YOU HAD ANY THOUGHTS OF HARMING ANYONE ELSE?: NO

## 2025-08-29 SDOH — HEALTH STABILITY: PHYSICAL HEALTH: ON AVERAGE, HOW MANY DAYS PER WEEK DO YOU ENGAGE IN MODERATE TO STRENUOUS EXERCISE (LIKE A BRISK WALK)?: 0 DAYS

## 2025-08-29 SDOH — ECONOMIC STABILITY: HOUSING INSECURITY: AT ANY TIME IN THE PAST 12 MONTHS, WERE YOU HOMELESS OR LIVING IN A SHELTER (INCLUDING NOW)?: NO

## 2025-08-29 SDOH — SOCIAL STABILITY: SOCIAL INSECURITY: ARE YOU OR HAVE YOU BEEN THREATENED OR ABUSED PHYSICALLY, EMOTIONALLY, OR SEXUALLY BY ANYONE?: NO

## 2025-08-29 SDOH — ECONOMIC STABILITY: TRANSPORTATION INSECURITY: IN THE PAST 12 MONTHS, HAS LACK OF TRANSPORTATION KEPT YOU FROM MEDICAL APPOINTMENTS OR FROM GETTING MEDICATIONS?: NO

## 2025-08-29 SDOH — ECONOMIC STABILITY: HOUSING INSECURITY: IN THE LAST 12 MONTHS, WAS THERE A TIME WHEN YOU WERE NOT ABLE TO PAY THE MORTGAGE OR RENT ON TIME?: NO

## 2025-08-29 SDOH — SOCIAL STABILITY: SOCIAL INSECURITY: WITHIN THE LAST YEAR, HAVE YOU BEEN HUMILIATED OR EMOTIONALLY ABUSED IN OTHER WAYS BY YOUR PARTNER OR EX-PARTNER?: NO

## 2025-08-29 SDOH — ECONOMIC STABILITY: INCOME INSECURITY: IN THE PAST 12 MONTHS HAS THE ELECTRIC, GAS, OIL, OR WATER COMPANY THREATENED TO SHUT OFF SERVICES IN YOUR HOME?: NO

## 2025-08-29 SDOH — HEALTH STABILITY: MENTAL HEALTH: HOW OFTEN DO YOU HAVE A DRINK CONTAINING ALCOHOL?: NEVER

## 2025-08-29 SDOH — SOCIAL STABILITY: SOCIAL INSECURITY: WITHIN THE LAST YEAR, HAVE YOU BEEN AFRAID OF YOUR PARTNER OR EX-PARTNER?: NO

## 2025-08-29 SDOH — SOCIAL STABILITY: SOCIAL INSECURITY: HAVE YOU HAD THOUGHTS OF HARMING ANYONE ELSE?: NO

## 2025-08-29 SDOH — HEALTH STABILITY: MENTAL HEALTH: HOW MANY DRINKS CONTAINING ALCOHOL DO YOU HAVE ON A TYPICAL DAY WHEN YOU ARE DRINKING?: PATIENT DOES NOT DRINK

## 2025-08-29 SDOH — HEALTH STABILITY: PHYSICAL HEALTH: ON AVERAGE, HOW MANY MINUTES DO YOU ENGAGE IN EXERCISE AT THIS LEVEL?: 0 MIN

## 2025-08-29 SDOH — SOCIAL STABILITY: SOCIAL NETWORK
DO YOU BELONG TO ANY CLUBS OR ORGANIZATIONS SUCH AS CHURCH GROUPS, UNIONS, FRATERNAL OR ATHLETIC GROUPS, OR SCHOOL GROUPS?: NO

## 2025-08-29 SDOH — HEALTH STABILITY: MENTAL HEALTH: HOW OFTEN DO YOU HAVE SIX OR MORE DRINKS ON ONE OCCASION?: NEVER

## 2025-08-29 SDOH — SOCIAL STABILITY: SOCIAL INSECURITY: ARE THERE ANY APPARENT SIGNS OF INJURIES/BEHAVIORS THAT COULD BE RELATED TO ABUSE/NEGLECT?: NO

## 2025-08-29 SDOH — ECONOMIC STABILITY: FOOD INSECURITY: HOW HARD IS IT FOR YOU TO PAY FOR THE VERY BASICS LIKE FOOD, HOUSING, MEDICAL CARE, AND HEATING?: NOT VERY HARD

## 2025-08-29 SDOH — HEALTH STABILITY: PHYSICAL HEALTH
HOW OFTEN DO YOU NEED TO HAVE SOMEONE HELP YOU WHEN YOU READ INSTRUCTIONS, PAMPHLETS, OR OTHER WRITTEN MATERIAL FROM YOUR DOCTOR OR PHARMACY?: NEVER

## 2025-08-29 SDOH — SOCIAL STABILITY: SOCIAL NETWORK: HOW OFTEN DO YOU GET TOGETHER WITH FRIENDS OR RELATIVES?: MORE THAN THREE TIMES A WEEK

## 2025-08-29 SDOH — SOCIAL STABILITY: SOCIAL INSECURITY: DO YOU FEEL UNSAFE GOING BACK TO THE PLACE WHERE YOU ARE LIVING?: NO

## 2025-08-29 SDOH — SOCIAL STABILITY: SOCIAL NETWORK: IN A TYPICAL WEEK, HOW MANY TIMES DO YOU TALK ON THE PHONE WITH FAMILY, FRIENDS, OR NEIGHBORS?: NEVER

## 2025-08-29 SDOH — ECONOMIC STABILITY: HOUSING INSECURITY: IN THE PAST 12 MONTHS, HOW MANY TIMES HAVE YOU MOVED WHERE YOU WERE LIVING?: 0

## 2025-08-29 SDOH — SOCIAL STABILITY: SOCIAL INSECURITY: DO YOU FEEL ANYONE HAS EXPLOITED OR TAKEN ADVANTAGE OF YOU FINANCIALLY OR OF YOUR PERSONAL PROPERTY?: NO

## 2025-08-29 SDOH — ECONOMIC STABILITY: FOOD INSECURITY: HOW HARD IS IT FOR YOU TO PAY FOR THE VERY BASICS LIKE FOOD, HOUSING, MEDICAL CARE, AND HEATING?: NOT HARD AT ALL

## 2025-08-29 SDOH — SOCIAL STABILITY: SOCIAL NETWORK: HOW OFTEN DO YOU ATTEND CHURCH OR RELIGIOUS SERVICES?: NEVER

## 2025-08-29 SDOH — SOCIAL STABILITY: SOCIAL INSECURITY: WERE YOU ABLE TO COMPLETE ALL THE BEHAVIORAL HEALTH SCREENINGS?: YES

## 2025-08-29 SDOH — SOCIAL STABILITY: SOCIAL NETWORK: HOW OFTEN DO YOU ATTEND MEETINGS OF THE CLUBS OR ORGANIZATIONS YOU BELONG TO?: NEVER

## 2025-08-29 SDOH — SOCIAL STABILITY: SOCIAL INSECURITY: ARE YOU MARRIED, WIDOWED, DIVORCED, SEPARATED, NEVER MARRIED, OR LIVING WITH A PARTNER?: NEVER MARRIED

## 2025-08-29 SDOH — SOCIAL STABILITY: SOCIAL INSECURITY: DOES ANYONE TRY TO KEEP YOU FROM HAVING/CONTACTING OTHER FRIENDS OR DOING THINGS OUTSIDE YOUR HOME?: NO

## 2025-08-29 SDOH — SOCIAL STABILITY: SOCIAL INSECURITY: ABUSE: ADULT

## 2025-08-29 SDOH — SOCIAL STABILITY: SOCIAL INSECURITY: HAS ANYONE EVER THREATENED TO HURT YOUR FAMILY OR YOUR PETS?: NO

## 2025-08-29 ASSESSMENT — COGNITIVE AND FUNCTIONAL STATUS - GENERAL
HELP NEEDED FOR BATHING: A LITTLE
DRESSING REGULAR LOWER BODY CLOTHING: A LITTLE
CLIMB 3 TO 5 STEPS WITH RAILING: A LOT
MOBILITY SCORE: 17
DRESSING REGULAR UPPER BODY CLOTHING: A LITTLE
DAILY ACTIVITIY SCORE: 20
MOVING TO AND FROM BED TO CHAIR: A LITTLE
MOVING FROM LYING ON BACK TO SITTING ON SIDE OF FLAT BED WITH BEDRAILS: A LITTLE
TOILETING: A LITTLE
PATIENT BASELINE BEDBOUND: NO
STANDING UP FROM CHAIR USING ARMS: A LITTLE
WALKING IN HOSPITAL ROOM: A LITTLE
TURNING FROM BACK TO SIDE WHILE IN FLAT BAD: A LITTLE

## 2025-08-29 ASSESSMENT — LIFESTYLE VARIABLES
AUDIT-C TOTAL SCORE: 0
SKIP TO QUESTIONS 9-10: 1
HOW MANY STANDARD DRINKS CONTAINING ALCOHOL DO YOU HAVE ON A TYPICAL DAY: PATIENT DOES NOT DRINK
HOW OFTEN DO YOU HAVE A DRINK CONTAINING ALCOHOL: NEVER
HOW OFTEN DO YOU HAVE 6 OR MORE DRINKS ON ONE OCCASION: NEVER
SKIP TO QUESTIONS 9-10: 1

## 2025-08-29 ASSESSMENT — ACTIVITIES OF DAILY LIVING (ADL)
ADEQUATE_TO_COMPLETE_ADL: YES
LACK_OF_TRANSPORTATION: NO
HEARING - LEFT EAR: FUNCTIONAL
LACK_OF_TRANSPORTATION: NO
JUDGMENT_ADEQUATE_SAFELY_COMPLETE_DAILY_ACTIVITIES: YES
DRESSING YOURSELF: NEEDS ASSISTANCE
BATHING: NEEDS ASSISTANCE
FEEDING YOURSELF: INDEPENDENT
ASSISTIVE_DEVICE: EYEGLASSES
LACK_OF_TRANSPORTATION: NO
PATIENT'S MEMORY ADEQUATE TO SAFELY COMPLETE DAILY ACTIVITIES?: YES
TOILETING: NEEDS ASSISTANCE
HEARING - RIGHT EAR: FUNCTIONAL
WALKS IN HOME: NEEDS ASSISTANCE
LACK_OF_TRANSPORTATION: NO
GROOMING: INDEPENDENT

## 2025-08-29 ASSESSMENT — PATIENT HEALTH QUESTIONNAIRE - PHQ9
SUM OF ALL RESPONSES TO PHQ9 QUESTIONS 1 & 2: 1
2. FEELING DOWN, DEPRESSED OR HOPELESS: NOT AT ALL
1. LITTLE INTEREST OR PLEASURE IN DOING THINGS: SEVERAL DAYS

## 2025-08-29 ASSESSMENT — PAIN - FUNCTIONAL ASSESSMENT
PAIN_FUNCTIONAL_ASSESSMENT: 0-10

## 2025-08-29 ASSESSMENT — PAIN SCALES - GENERAL
PAINLEVEL_OUTOF10: 0 - NO PAIN
PAINLEVEL_OUTOF10: 5 - MODERATE PAIN
PAINLEVEL_OUTOF10: 3

## 2025-08-30 ENCOUNTER — RADIATION ONCOLOGY OTV (OUTPATIENT)
Dept: RADIATION ONCOLOGY | Facility: HOSPITAL | Age: 60
End: 2025-08-30

## 2025-08-30 ENCOUNTER — APPOINTMENT (OUTPATIENT)
Dept: RADIATION ONCOLOGY | Facility: HOSPITAL | Age: 60
End: 2025-08-30
Payer: MEDICARE

## 2025-08-30 LAB
ALBUMIN SERPL BCP-MCNC: 4.1 G/DL (ref 3.4–5)
ANION GAP SERPL CALC-SCNC: 16 MMOL/L (ref 10–20)
BASOPHILS # BLD AUTO: 0.01 X10*3/UL (ref 0–0.1)
BASOPHILS NFR BLD AUTO: 0.1 %
BUN SERPL-MCNC: 29 MG/DL (ref 6–23)
CALCIUM SERPL-MCNC: 9.2 MG/DL (ref 8.6–10.6)
CHLORIDE SERPL-SCNC: 106 MMOL/L (ref 98–107)
CO2 SERPL-SCNC: 19 MMOL/L (ref 21–32)
CREAT SERPL-MCNC: 0.95 MG/DL (ref 0.5–1.05)
EGFRCR SERPLBLD CKD-EPI 2021: 68 ML/MIN/1.73M*2
EOSINOPHIL # BLD AUTO: 0.02 X10*3/UL (ref 0–0.7)
EOSINOPHIL NFR BLD AUTO: 0.2 %
ERYTHROCYTE [DISTWIDTH] IN BLOOD BY AUTOMATED COUNT: 15.3 % (ref 11.5–14.5)
EST. AVERAGE GLUCOSE BLD GHB EST-MCNC: 140 MG/DL
GLUCOSE SERPL-MCNC: 127 MG/DL (ref 74–99)
HBA1C MFR BLD: 6.5 % (ref ?–5.7)
HCT VFR BLD AUTO: 40.4 % (ref 36–46)
HGB BLD-MCNC: 13.7 G/DL (ref 12–16)
IMM GRANULOCYTES # BLD AUTO: 0.13 X10*3/UL (ref 0–0.7)
IMM GRANULOCYTES NFR BLD AUTO: 1.1 % (ref 0–0.9)
LYMPHOCYTES # BLD AUTO: 2.08 X10*3/UL (ref 1.2–4.8)
LYMPHOCYTES NFR BLD AUTO: 18.2 %
MAGNESIUM SERPL-MCNC: 2.44 MG/DL (ref 1.6–2.4)
MCH RBC QN AUTO: 33.3 PG (ref 26–34)
MCHC RBC AUTO-ENTMCNC: 33.9 G/DL (ref 32–36)
MCV RBC AUTO: 98 FL (ref 80–100)
MONOCYTES # BLD AUTO: 0.74 X10*3/UL (ref 0.1–1)
MONOCYTES NFR BLD AUTO: 6.5 %
NEUTROPHILS # BLD AUTO: 8.46 X10*3/UL (ref 1.2–7.7)
NEUTROPHILS NFR BLD AUTO: 73.9 %
NRBC BLD-RTO: 0 /100 WBCS (ref 0–0)
PHOSPHATE SERPL-MCNC: 4.4 MG/DL (ref 2.5–4.9)
PLATELET # BLD AUTO: 295 X10*3/UL (ref 150–450)
POTASSIUM SERPL-SCNC: 4.3 MMOL/L (ref 3.5–5.3)
RAD ONC MSQ ACTUAL FRACTIONS DELIVERED: 1
RAD ONC MSQ ACTUAL FRACTIONS DELIVERED: 1
RAD ONC MSQ ACTUAL SESSION DELIVERED DOSE: 300 CGRAY
RAD ONC MSQ ACTUAL SESSION DELIVERED DOSE: 400 CGRAY
RAD ONC MSQ ACTUAL TOTAL DOSE: 300 CGRAY
RAD ONC MSQ ACTUAL TOTAL DOSE: 400 CGRAY
RAD ONC MSQ ELAPSED DAYS: 0
RAD ONC MSQ ELAPSED DAYS: 0
RAD ONC MSQ LAST DATE: NORMAL
RAD ONC MSQ LAST DATE: NORMAL
RAD ONC MSQ PRESCRIBED FRACTIONAL DOSE: 300 CGRAY
RAD ONC MSQ PRESCRIBED FRACTIONAL DOSE: 400 CGRAY
RAD ONC MSQ PRESCRIBED NUMBER OF FRACTIONS: 10
RAD ONC MSQ PRESCRIBED NUMBER OF FRACTIONS: 5
RAD ONC MSQ PRESCRIBED TECHNIQUE: NORMAL
RAD ONC MSQ PRESCRIBED TECHNIQUE: NORMAL
RAD ONC MSQ PRESCRIBED TOTAL DOSE: 2000 CGRAY
RAD ONC MSQ PRESCRIBED TOTAL DOSE: 3000 CGRAY
RAD ONC MSQ PRESCRIPTION PATTERN COMMENT: NORMAL
RAD ONC MSQ PRESCRIPTION PATTERN COMMENT: NORMAL
RAD ONC MSQ START DATE: NORMAL
RAD ONC MSQ START DATE: NORMAL
RAD ONC MSQ TREATMENT COURSE NUMBER: 1
RAD ONC MSQ TREATMENT COURSE NUMBER: 1
RAD ONC MSQ TREATMENT SITE: NORMAL
RAD ONC MSQ TREATMENT SITE: NORMAL
RBC # BLD AUTO: 4.11 X10*6/UL (ref 4–5.2)
SODIUM SERPL-SCNC: 137 MMOL/L (ref 136–145)
UFH PPP CHRO-ACNC: 0.4 IU/ML (ref ?–1.1)
UFH PPP CHRO-ACNC: 0.5 IU/ML (ref ?–1.1)
WBC # BLD AUTO: 11.4 X10*3/UL (ref 4.4–11.3)

## 2025-08-30 PROCEDURE — 77412 RADIATION TX DELIVERY LVL 3: CPT | Performed by: RADIOLOGY

## 2025-08-30 PROCEDURE — 2500000001 HC RX 250 WO HCPCS SELF ADMINISTERED DRUGS (ALT 637 FOR MEDICARE OP)

## 2025-08-30 PROCEDURE — 2500000004 HC RX 250 GENERAL PHARMACY W/ HCPCS (ALT 636 FOR OP/ED)

## 2025-08-30 PROCEDURE — 85520 HEPARIN ASSAY: CPT

## 2025-08-30 PROCEDURE — 80069 RENAL FUNCTION PANEL: CPT

## 2025-08-30 PROCEDURE — 1200000003 HC ONCOLOGY  ROOM WITH TELEMETRY DAILY

## 2025-08-30 PROCEDURE — 85025 COMPLETE CBC W/AUTO DIFF WBC: CPT

## 2025-08-30 PROCEDURE — 36415 COLL VENOUS BLD VENIPUNCTURE: CPT

## 2025-08-30 PROCEDURE — 77280 THER RAD SIMULAJ FIELD SMPL: CPT | Performed by: RADIOLOGY

## 2025-08-30 PROCEDURE — 2500000002 HC RX 250 W HCPCS SELF ADMINISTERED DRUGS (ALT 637 FOR MEDICARE OP, ALT 636 FOR OP/ED)

## 2025-08-30 PROCEDURE — 83735 ASSAY OF MAGNESIUM: CPT

## 2025-08-30 RX ORDER — ESCITALOPRAM OXALATE 20 MG/1
20 TABLET ORAL DAILY
Status: DISPENSED | OUTPATIENT
Start: 2025-08-30

## 2025-08-30 RX ADMIN — CHOLECALCIFEROL (VITAMIN D3) 10 MCG (400 UNIT) TABLET 20 MCG: at 09:38

## 2025-08-30 RX ADMIN — ALBUTEROL SULFATE 2 PUFF: 90 AEROSOL, METERED RESPIRATORY (INHALATION) at 06:08

## 2025-08-30 RX ADMIN — PROCHLORPERAZINE EDISYLATE 10 MG: 5 INJECTION INTRAMUSCULAR; INTRAVENOUS at 22:08

## 2025-08-30 RX ADMIN — BUDESONIDE 1 MG: 0.5 INHALANT RESPIRATORY (INHALATION) at 20:16

## 2025-08-30 RX ADMIN — PANTOPRAZOLE SODIUM 40 MG: 40 TABLET, DELAYED RELEASE ORAL at 06:06

## 2025-08-30 RX ADMIN — BUDESONIDE 1 MG: 0.5 INHALANT RESPIRATORY (INHALATION) at 07:42

## 2025-08-30 RX ADMIN — HYDROXYZINE HYDROCHLORIDE 25 MG: 25 TABLET, FILM COATED ORAL at 20:24

## 2025-08-30 RX ADMIN — ESCITALOPRAM OXALATE 20 MG: 20 TABLET, FILM COATED ORAL at 11:53

## 2025-08-30 RX ADMIN — OLANZAPINE 7.5 MG: 5 TABLET, FILM COATED ORAL at 17:17

## 2025-08-30 RX ADMIN — IPRATROPIUM BROMIDE AND ALBUTEROL SULFATE 3 ML: .5; 3 SOLUTION RESPIRATORY (INHALATION) at 06:08

## 2025-08-30 RX ADMIN — DEXAMETHASONE 4 MG: 4 TABLET ORAL at 09:38

## 2025-08-30 RX ADMIN — ONDANSETRON HYDROCHLORIDE 4 MG: 4 TABLET, FILM COATED ORAL at 16:19

## 2025-08-30 RX ADMIN — LEVOTHYROXINE SODIUM 88 MCG: 0.09 TABLET ORAL at 06:06

## 2025-08-30 RX ADMIN — DEXAMETHASONE 4 MG: 4 TABLET ORAL at 20:16

## 2025-08-30 RX ADMIN — ROSUVASTATIN CALCIUM 10 MG: 10 TABLET, FILM COATED ORAL at 09:38

## 2025-08-30 RX ADMIN — IPRATROPIUM BROMIDE AND ALBUTEROL SULFATE 3 ML: .5; 3 SOLUTION RESPIRATORY (INHALATION) at 15:00

## 2025-08-30 ASSESSMENT — PAIN SCALES - GENERAL
PAINLEVEL_OUTOF10: 0 - NO PAIN
PAINLEVEL_OUTOF10: 0 - NO PAIN

## 2025-08-30 ASSESSMENT — COGNITIVE AND FUNCTIONAL STATUS - GENERAL
STANDING UP FROM CHAIR USING ARMS: A LITTLE
DRESSING REGULAR LOWER BODY CLOTHING: A LITTLE
MOVING TO AND FROM BED TO CHAIR: A LITTLE
MOBILITY SCORE: 18
CLIMB 3 TO 5 STEPS WITH RAILING: A LOT
HELP NEEDED FOR BATHING: A LITTLE
TOILETING: A LITTLE
DRESSING REGULAR UPPER BODY CLOTHING: A LITTLE
DAILY ACTIVITIY SCORE: 20
TURNING FROM BACK TO SIDE WHILE IN FLAT BAD: A LITTLE
WALKING IN HOSPITAL ROOM: A LITTLE

## 2025-08-30 ASSESSMENT — PAIN - FUNCTIONAL ASSESSMENT
PAIN_FUNCTIONAL_ASSESSMENT: 0-10
PAIN_FUNCTIONAL_ASSESSMENT: 0-10

## 2025-08-31 ENCOUNTER — APPOINTMENT (OUTPATIENT)
Dept: HEMATOLOGY/ONCOLOGY | Facility: HOSPITAL | Age: 60
End: 2025-08-31
Payer: MEDICARE

## 2025-08-31 ENCOUNTER — APPOINTMENT (OUTPATIENT)
Dept: RADIATION ONCOLOGY | Facility: HOSPITAL | Age: 60
End: 2025-08-31
Payer: MEDICARE

## 2025-08-31 VITALS
HEART RATE: 109 BPM | BODY MASS INDEX: 33.13 KG/M2 | HEIGHT: 62 IN | TEMPERATURE: 96.8 F | RESPIRATION RATE: 18 BRPM | DIASTOLIC BLOOD PRESSURE: 97 MMHG | OXYGEN SATURATION: 98 % | SYSTOLIC BLOOD PRESSURE: 143 MMHG | WEIGHT: 180 LBS

## 2025-08-31 LAB
ALBUMIN SERPL BCP-MCNC: 4.1 G/DL (ref 3.4–5)
ANION GAP SERPL CALC-SCNC: 18 MMOL/L (ref 10–20)
BASOPHILS # BLD AUTO: 0.01 X10*3/UL (ref 0–0.1)
BASOPHILS NFR BLD AUTO: 0.1 %
BUN SERPL-MCNC: 27 MG/DL (ref 6–23)
CALCIUM SERPL-MCNC: 9.3 MG/DL (ref 8.6–10.6)
CHLORIDE SERPL-SCNC: 103 MMOL/L (ref 98–107)
CO2 SERPL-SCNC: 19 MMOL/L (ref 21–32)
CREAT SERPL-MCNC: 0.88 MG/DL (ref 0.5–1.05)
EGFRCR SERPLBLD CKD-EPI 2021: 75 ML/MIN/1.73M*2
EOSINOPHIL # BLD AUTO: 0.01 X10*3/UL (ref 0–0.7)
EOSINOPHIL NFR BLD AUTO: 0.1 %
ERYTHROCYTE [DISTWIDTH] IN BLOOD BY AUTOMATED COUNT: 15.6 % (ref 11.5–14.5)
GLUCOSE SERPL-MCNC: 130 MG/DL (ref 74–99)
HCT VFR BLD AUTO: 42 % (ref 36–46)
HGB BLD-MCNC: 14.1 G/DL (ref 12–16)
IMM GRANULOCYTES # BLD AUTO: 0.09 X10*3/UL (ref 0–0.7)
IMM GRANULOCYTES NFR BLD AUTO: 0.8 % (ref 0–0.9)
LYMPHOCYTES # BLD AUTO: 1.43 X10*3/UL (ref 1.2–4.8)
LYMPHOCYTES NFR BLD AUTO: 13.3 %
MAGNESIUM SERPL-MCNC: 2.52 MG/DL (ref 1.6–2.4)
MCH RBC QN AUTO: 33.7 PG (ref 26–34)
MCHC RBC AUTO-ENTMCNC: 33.6 G/DL (ref 32–36)
MCV RBC AUTO: 100 FL (ref 80–100)
MONOCYTES # BLD AUTO: 0.59 X10*3/UL (ref 0.1–1)
MONOCYTES NFR BLD AUTO: 5.5 %
NEUTROPHILS # BLD AUTO: 8.62 X10*3/UL (ref 1.2–7.7)
NEUTROPHILS NFR BLD AUTO: 80.2 %
NRBC BLD-RTO: 0 /100 WBCS (ref 0–0)
PHOSPHATE SERPL-MCNC: 3.7 MG/DL (ref 2.5–4.9)
PLATELET # BLD AUTO: 269 X10*3/UL (ref 150–450)
POTASSIUM SERPL-SCNC: 4.2 MMOL/L (ref 3.5–5.3)
RAD ONC MSQ ACTUAL FRACTIONS DELIVERED: 2
RAD ONC MSQ ACTUAL FRACTIONS DELIVERED: 2
RAD ONC MSQ ACTUAL SESSION DELIVERED DOSE: 300 CGRAY
RAD ONC MSQ ACTUAL SESSION DELIVERED DOSE: 400 CGRAY
RAD ONC MSQ ACTUAL TOTAL DOSE: 600 CGRAY
RAD ONC MSQ ACTUAL TOTAL DOSE: 800 CGRAY
RAD ONC MSQ ELAPSED DAYS: 1
RAD ONC MSQ ELAPSED DAYS: 1
RAD ONC MSQ LAST DATE: NORMAL
RAD ONC MSQ LAST DATE: NORMAL
RAD ONC MSQ PRESCRIBED FRACTIONAL DOSE: 300 CGRAY
RAD ONC MSQ PRESCRIBED FRACTIONAL DOSE: 400 CGRAY
RAD ONC MSQ PRESCRIBED NUMBER OF FRACTIONS: 10
RAD ONC MSQ PRESCRIBED NUMBER OF FRACTIONS: 5
RAD ONC MSQ PRESCRIBED TECHNIQUE: NORMAL
RAD ONC MSQ PRESCRIBED TECHNIQUE: NORMAL
RAD ONC MSQ PRESCRIBED TOTAL DOSE: 2000 CGRAY
RAD ONC MSQ PRESCRIBED TOTAL DOSE: 3000 CGRAY
RAD ONC MSQ PRESCRIPTION PATTERN COMMENT: NORMAL
RAD ONC MSQ PRESCRIPTION PATTERN COMMENT: NORMAL
RAD ONC MSQ START DATE: NORMAL
RAD ONC MSQ START DATE: NORMAL
RAD ONC MSQ TREATMENT COURSE NUMBER: 1
RAD ONC MSQ TREATMENT COURSE NUMBER: 1
RAD ONC MSQ TREATMENT SITE: NORMAL
RAD ONC MSQ TREATMENT SITE: NORMAL
RBC # BLD AUTO: 4.19 X10*6/UL (ref 4–5.2)
SODIUM SERPL-SCNC: 136 MMOL/L (ref 136–145)
UFH PPP CHRO-ACNC: 0.4 IU/ML (ref ?–1.1)
WBC # BLD AUTO: 10.8 X10*3/UL (ref 4.4–11.3)

## 2025-08-31 PROCEDURE — 2500000004 HC RX 250 GENERAL PHARMACY W/ HCPCS (ALT 636 FOR OP/ED)

## 2025-08-31 PROCEDURE — 2500000001 HC RX 250 WO HCPCS SELF ADMINISTERED DRUGS (ALT 637 FOR MEDICARE OP)

## 2025-08-31 PROCEDURE — 36415 COLL VENOUS BLD VENIPUNCTURE: CPT

## 2025-08-31 PROCEDURE — 77412 RADIATION TX DELIVERY LVL 3: CPT | Performed by: STUDENT IN AN ORGANIZED HEALTH CARE EDUCATION/TRAINING PROGRAM

## 2025-08-31 PROCEDURE — 80069 RENAL FUNCTION PANEL: CPT

## 2025-08-31 PROCEDURE — 83735 ASSAY OF MAGNESIUM: CPT

## 2025-08-31 PROCEDURE — 2500000002 HC RX 250 W HCPCS SELF ADMINISTERED DRUGS (ALT 637 FOR MEDICARE OP, ALT 636 FOR OP/ED)

## 2025-08-31 PROCEDURE — 94640 AIRWAY INHALATION TREATMENT: CPT

## 2025-08-31 PROCEDURE — 93005 ELECTROCARDIOGRAM TRACING: CPT

## 2025-08-31 PROCEDURE — 85025 COMPLETE CBC W/AUTO DIFF WBC: CPT

## 2025-08-31 PROCEDURE — 85520 HEPARIN ASSAY: CPT

## 2025-08-31 PROCEDURE — 1200000003 HC ONCOLOGY  ROOM WITH TELEMETRY DAILY

## 2025-08-31 PROCEDURE — 77387 GUIDANCE FOR RADJ TX DLVR: CPT | Performed by: STUDENT IN AN ORGANIZED HEALTH CARE EDUCATION/TRAINING PROGRAM

## 2025-08-31 RX ORDER — LORAZEPAM 0.5 MG/1
0.5 TABLET ORAL EVERY 8 HOURS PRN
Status: DISCONTINUED | OUTPATIENT
Start: 2025-08-31 | End: 2025-08-31

## 2025-08-31 RX ORDER — SCOPOLAMINE 1 MG/3D
1 PATCH, EXTENDED RELEASE TRANSDERMAL
Status: DISPENSED | OUTPATIENT
Start: 2025-08-31

## 2025-08-31 RX ORDER — SENNOSIDES 8.6 MG/1
3 TABLET ORAL 2 TIMES DAILY
Status: ACTIVE | OUTPATIENT
Start: 2025-09-01

## 2025-08-31 RX ORDER — LACTULOSE 10 G/15ML
20 SOLUTION ORAL ONCE
Status: DISCONTINUED | OUTPATIENT
Start: 2025-08-31 | End: 2025-08-31

## 2025-08-31 RX ORDER — SENNOSIDES 8.6 MG/1
2 TABLET ORAL 2 TIMES DAILY
Status: DISCONTINUED | OUTPATIENT
Start: 2025-08-31 | End: 2025-08-31

## 2025-08-31 RX ORDER — LACTULOSE 10 G/15ML
20 SOLUTION ORAL DAILY
Status: ACTIVE | OUTPATIENT
Start: 2025-09-01

## 2025-08-31 RX ORDER — CARVEDILOL 6.25 MG/1
6.25 TABLET ORAL DAILY
Status: DISPENSED | OUTPATIENT
Start: 2025-08-31

## 2025-08-31 RX ORDER — LORAZEPAM 0.5 MG/1
0.5 TABLET ORAL EVERY 8 HOURS PRN
Status: ACTIVE | OUTPATIENT
Start: 2025-08-31

## 2025-08-31 RX ORDER — HYDROXYZINE HYDROCHLORIDE 25 MG/1
25 TABLET, FILM COATED ORAL EVERY 6 HOURS
Status: DISCONTINUED | OUTPATIENT
Start: 2025-08-31 | End: 2025-08-31

## 2025-08-31 RX ORDER — BISACODYL 10 MG/1
10 SUPPOSITORY RECTAL DAILY PRN
Status: ACTIVE | OUTPATIENT
Start: 2025-08-31

## 2025-08-31 RX ADMIN — SCOPOLAMINE 1 PATCH: 1.5 PATCH, EXTENDED RELEASE TRANSDERMAL at 14:09

## 2025-08-31 RX ADMIN — ROSUVASTATIN CALCIUM 10 MG: 10 TABLET, FILM COATED ORAL at 09:21

## 2025-08-31 RX ADMIN — HYDROXYZINE HYDROCHLORIDE 25 MG: 25 TABLET, FILM COATED ORAL at 08:04

## 2025-08-31 RX ADMIN — HEPARIN SODIUM 800 UNITS/HR: 10000 INJECTION, SOLUTION INTRAVENOUS at 03:01

## 2025-08-31 RX ADMIN — CARVEDILOL 6.25 MG: 6.25 TABLET, FILM COATED ORAL at 11:25

## 2025-08-31 RX ADMIN — DEXAMETHASONE 2 MG: 2 TABLET ORAL at 21:33

## 2025-08-31 RX ADMIN — SENNOSIDES 17.2 MG: 8.6 TABLET, FILM COATED ORAL at 10:15

## 2025-08-31 RX ADMIN — ESCITALOPRAM OXALATE 20 MG: 20 TABLET, FILM COATED ORAL at 09:21

## 2025-08-31 RX ADMIN — IPRATROPIUM BROMIDE AND ALBUTEROL SULFATE 3 ML: .5; 3 SOLUTION RESPIRATORY (INHALATION) at 15:17

## 2025-08-31 RX ADMIN — ONDANSETRON 4 MG: 2 INJECTION INTRAMUSCULAR; INTRAVENOUS at 08:14

## 2025-08-31 RX ADMIN — BUDESONIDE 1 MG: 0.5 INHALANT RESPIRATORY (INHALATION) at 09:16

## 2025-08-31 RX ADMIN — IPRATROPIUM BROMIDE AND ALBUTEROL SULFATE 3 ML: .5; 3 SOLUTION RESPIRATORY (INHALATION) at 07:39

## 2025-08-31 RX ADMIN — PANTOPRAZOLE SODIUM 40 MG: 40 TABLET, DELAYED RELEASE ORAL at 05:32

## 2025-08-31 RX ADMIN — CHOLECALCIFEROL (VITAMIN D3) 10 MCG (400 UNIT) TABLET 20 MCG: at 09:21

## 2025-08-31 RX ADMIN — LEVOTHYROXINE SODIUM 88 MCG: 0.09 TABLET ORAL at 05:32

## 2025-08-31 RX ADMIN — DEXAMETHASONE 2 MG: 2 TABLET ORAL at 09:21

## 2025-08-31 RX ADMIN — BUDESONIDE 1 MG: 0.5 INHALANT RESPIRATORY (INHALATION) at 20:19

## 2025-08-31 RX ADMIN — Medication: at 21:03

## 2025-08-31 ASSESSMENT — COGNITIVE AND FUNCTIONAL STATUS - GENERAL
MOBILITY SCORE: 22
WALKING IN HOSPITAL ROOM: A LITTLE
TOILETING: A LITTLE
DAILY ACTIVITIY SCORE: 20
DRESSING REGULAR UPPER BODY CLOTHING: A LITTLE
DRESSING REGULAR LOWER BODY CLOTHING: A LITTLE
STANDING UP FROM CHAIR USING ARMS: A LITTLE
MOBILITY SCORE: 20
CLIMB 3 TO 5 STEPS WITH RAILING: A LITTLE
CLIMB 3 TO 5 STEPS WITH RAILING: A LITTLE
HELP NEEDED FOR BATHING: A LITTLE
DRESSING REGULAR LOWER BODY CLOTHING: A LITTLE
WALKING IN HOSPITAL ROOM: A LITTLE
DRESSING REGULAR UPPER BODY CLOTHING: A LITTLE
DAILY ACTIVITIY SCORE: 21
MOVING TO AND FROM BED TO CHAIR: A LITTLE
HELP NEEDED FOR BATHING: A LITTLE

## 2025-08-31 ASSESSMENT — PAIN - FUNCTIONAL ASSESSMENT
PAIN_FUNCTIONAL_ASSESSMENT: 0-10
PAIN_FUNCTIONAL_ASSESSMENT: 0-10

## 2025-08-31 ASSESSMENT — PAIN SCALES - GENERAL
PAINLEVEL_OUTOF10: 0 - NO PAIN
PAINLEVEL_OUTOF10: 0 - NO PAIN

## 2025-08-31 ASSESSMENT — ACTIVITIES OF DAILY LIVING (ADL): LACK_OF_TRANSPORTATION: NO

## 2025-09-01 LAB
ALBUMIN SERPL BCP-MCNC: 4.4 G/DL (ref 3.4–5)
ANION GAP SERPL CALC-SCNC: 19 MMOL/L (ref 10–20)
BASOPHILS # BLD AUTO: 0.01 X10*3/UL (ref 0–0.1)
BASOPHILS NFR BLD AUTO: 0.1 %
BUN SERPL-MCNC: 35 MG/DL (ref 6–23)
CALCIUM SERPL-MCNC: 9.5 MG/DL (ref 8.6–10.6)
CHLORIDE SERPL-SCNC: 101 MMOL/L (ref 98–107)
CO2 SERPL-SCNC: 20 MMOL/L (ref 21–32)
CREAT SERPL-MCNC: 1.05 MG/DL (ref 0.5–1.05)
EGFRCR SERPLBLD CKD-EPI 2021: 61 ML/MIN/1.73M*2
EOSINOPHIL # BLD AUTO: 0.01 X10*3/UL (ref 0–0.7)
EOSINOPHIL NFR BLD AUTO: 0.1 %
ERYTHROCYTE [DISTWIDTH] IN BLOOD BY AUTOMATED COUNT: 15.2 % (ref 11.5–14.5)
GLUCOSE SERPL-MCNC: 135 MG/DL (ref 74–99)
HCT VFR BLD AUTO: 43 % (ref 36–46)
HGB BLD-MCNC: 15.1 G/DL (ref 12–16)
IMM GRANULOCYTES # BLD AUTO: 0.1 X10*3/UL (ref 0–0.7)
IMM GRANULOCYTES NFR BLD AUTO: 0.9 % (ref 0–0.9)
LYMPHOCYTES # BLD AUTO: 1.52 X10*3/UL (ref 1.2–4.8)
LYMPHOCYTES NFR BLD AUTO: 13.3 %
MAGNESIUM SERPL-MCNC: 2.51 MG/DL (ref 1.6–2.4)
MCH RBC QN AUTO: 33.6 PG (ref 26–34)
MCHC RBC AUTO-ENTMCNC: 35.1 G/DL (ref 32–36)
MCV RBC AUTO: 96 FL (ref 80–100)
MONOCYTES # BLD AUTO: 0.59 X10*3/UL (ref 0.1–1)
MONOCYTES NFR BLD AUTO: 5.1 %
NEUTROPHILS # BLD AUTO: 9.23 X10*3/UL (ref 1.2–7.7)
NEUTROPHILS NFR BLD AUTO: 80.5 %
NRBC BLD-RTO: 0 /100 WBCS (ref 0–0)
PHOSPHATE SERPL-MCNC: 4.4 MG/DL (ref 2.5–4.9)
PLATELET # BLD AUTO: 282 X10*3/UL (ref 150–450)
POTASSIUM SERPL-SCNC: 3.8 MMOL/L (ref 3.5–5.3)
RBC # BLD AUTO: 4.5 X10*6/UL (ref 4–5.2)
SODIUM SERPL-SCNC: 136 MMOL/L (ref 136–145)
UFH PPP CHRO-ACNC: 0.3 IU/ML (ref ?–1.1)
WBC # BLD AUTO: 11.5 X10*3/UL (ref 4.4–11.3)

## 2025-09-02 ENCOUNTER — APPOINTMENT (OUTPATIENT)
Dept: RADIOLOGY | Facility: HOSPITAL | Age: 60
End: 2025-09-02
Payer: MEDICARE

## 2025-09-02 ENCOUNTER — HOSPITAL ENCOUNTER (OUTPATIENT)
Dept: RADIATION ONCOLOGY | Facility: HOSPITAL | Age: 60
Discharge: HOME | End: 2025-09-02

## 2025-09-02 ENCOUNTER — APPOINTMENT (OUTPATIENT)
Dept: GASTROENTEROLOGY | Facility: HOSPITAL | Age: 60
End: 2025-09-02
Payer: MEDICARE

## 2025-09-02 ENCOUNTER — APPOINTMENT (OUTPATIENT)
Dept: CARDIOLOGY | Facility: HOSPITAL | Age: 60
End: 2025-09-02
Payer: MEDICARE

## 2025-09-02 ENCOUNTER — ANESTHESIA EVENT (OUTPATIENT)
Dept: GASTROENTEROLOGY | Facility: HOSPITAL | Age: 60
End: 2025-09-02
Payer: MEDICARE

## 2025-09-02 ENCOUNTER — ANESTHESIA (OUTPATIENT)
Dept: GASTROENTEROLOGY | Facility: HOSPITAL | Age: 60
End: 2025-09-02
Payer: MEDICARE

## 2025-09-02 DIAGNOSIS — C70.0 MALIGNANT NEOPLASM OF CEREBRAL MENINGES (MULTI): ICD-10-CM

## 2025-09-02 DIAGNOSIS — C79.31 SECONDARY MALIGNANT NEOPLASM OF BRAIN (MULTI): ICD-10-CM

## 2025-09-02 DIAGNOSIS — Z51.0 ENCOUNTER FOR ANTINEOPLASTIC RADIATION THERAPY: ICD-10-CM

## 2025-09-02 DIAGNOSIS — C78.1 SECONDARY MALIGNANT NEOPLASM OF MEDIASTINUM (MULTI): ICD-10-CM

## 2025-09-02 LAB
ALBUMIN SERPL BCP-MCNC: 4.2 G/DL (ref 3.4–5)
ANION GAP SERPL CALC-SCNC: 18 MMOL/L (ref 10–20)
ATRIAL RATE: 109 BPM
BASOPHILS # BLD AUTO: 0.01 X10*3/UL (ref 0–0.1)
BASOPHILS NFR BLD AUTO: 0.1 %
BUN SERPL-MCNC: 43 MG/DL (ref 6–23)
CALCIUM SERPL-MCNC: 9.5 MG/DL (ref 8.6–10.6)
CHLORIDE SERPL-SCNC: 101 MMOL/L (ref 98–107)
CO2 SERPL-SCNC: 20 MMOL/L (ref 21–32)
CREAT SERPL-MCNC: 0.94 MG/DL (ref 0.5–1.05)
EGFRCR SERPLBLD CKD-EPI 2021: 69 ML/MIN/1.73M*2
EJECTION FRACTION APICAL 4 CHAMBER: 69.6
EJECTION FRACTION: 63 %
EOSINOPHIL # BLD AUTO: 0.02 X10*3/UL (ref 0–0.7)
EOSINOPHIL NFR BLD AUTO: 0.2 %
ERYTHROCYTE [DISTWIDTH] IN BLOOD BY AUTOMATED COUNT: 15.4 % (ref 11.5–14.5)
GLUCOSE BLD MANUAL STRIP-MCNC: 134 MG/DL (ref 74–99)
GLUCOSE SERPL-MCNC: 116 MG/DL (ref 74–99)
HCT VFR BLD AUTO: 44.7 % (ref 36–46)
HGB BLD-MCNC: 15 G/DL (ref 12–16)
IMM GRANULOCYTES # BLD AUTO: 0.12 X10*3/UL (ref 0–0.7)
IMM GRANULOCYTES NFR BLD AUTO: 1.1 % (ref 0–0.9)
LEFT VENTRICLE INTERNAL DIMENSION DIASTOLE: 4 CM (ref 3.5–6)
LYMPHOCYTES # BLD AUTO: 1.13 X10*3/UL (ref 1.2–4.8)
LYMPHOCYTES NFR BLD AUTO: 10.3 %
MAGNESIUM SERPL-MCNC: 2.65 MG/DL (ref 1.6–2.4)
MCH RBC QN AUTO: 33.9 PG (ref 26–34)
MCHC RBC AUTO-ENTMCNC: 33.6 G/DL (ref 32–36)
MCV RBC AUTO: 101 FL (ref 80–100)
MONOCYTES # BLD AUTO: 0.47 X10*3/UL (ref 0.1–1)
MONOCYTES NFR BLD AUTO: 4.3 %
NEUTROPHILS # BLD AUTO: 9.18 X10*3/UL (ref 1.2–7.7)
NEUTROPHILS NFR BLD AUTO: 84 %
NRBC BLD-RTO: 0.2 /100 WBCS (ref 0–0)
P AXIS: 49 DEGREES
P OFFSET: 205 MS
P ONSET: 151 MS
PHOSPHATE SERPL-MCNC: 4.2 MG/DL (ref 2.5–4.9)
PLATELET # BLD AUTO: 271 X10*3/UL (ref 150–450)
POTASSIUM SERPL-SCNC: 3.8 MMOL/L (ref 3.5–5.3)
PR INTERVAL: 136 MS
Q ONSET: 219 MS
QRS COUNT: 18 BEATS
QRS DURATION: 88 MS
QT INTERVAL: 378 MS
QTC CALCULATION(BAZETT): 509 MS
QTC FREDERICIA: 461 MS
R AXIS: 68 DEGREES
RAD ONC MSQ ACTUAL FRACTIONS DELIVERED: 3
RAD ONC MSQ ACTUAL FRACTIONS DELIVERED: 3
RAD ONC MSQ ACTUAL SESSION DELIVERED DOSE: 300 CGRAY
RAD ONC MSQ ACTUAL SESSION DELIVERED DOSE: 400 CGRAY
RAD ONC MSQ ACTUAL TOTAL DOSE: 1200 CGRAY
RAD ONC MSQ ACTUAL TOTAL DOSE: 900 CGRAY
RAD ONC MSQ ELAPSED DAYS: 3
RAD ONC MSQ ELAPSED DAYS: 3
RAD ONC MSQ LAST DATE: NORMAL
RAD ONC MSQ LAST DATE: NORMAL
RAD ONC MSQ PRESCRIBED FRACTIONAL DOSE: 300 CGRAY
RAD ONC MSQ PRESCRIBED FRACTIONAL DOSE: 400 CGRAY
RAD ONC MSQ PRESCRIBED NUMBER OF FRACTIONS: 10
RAD ONC MSQ PRESCRIBED NUMBER OF FRACTIONS: 5
RAD ONC MSQ PRESCRIBED TECHNIQUE: NORMAL
RAD ONC MSQ PRESCRIBED TECHNIQUE: NORMAL
RAD ONC MSQ PRESCRIBED TOTAL DOSE: 2000 CGRAY
RAD ONC MSQ PRESCRIBED TOTAL DOSE: 3000 CGRAY
RAD ONC MSQ PRESCRIPTION PATTERN COMMENT: NORMAL
RAD ONC MSQ PRESCRIPTION PATTERN COMMENT: NORMAL
RAD ONC MSQ START DATE: NORMAL
RAD ONC MSQ START DATE: NORMAL
RAD ONC MSQ TREATMENT COURSE NUMBER: 1
RAD ONC MSQ TREATMENT COURSE NUMBER: 1
RAD ONC MSQ TREATMENT SITE: NORMAL
RAD ONC MSQ TREATMENT SITE: NORMAL
RBC # BLD AUTO: 4.43 X10*6/UL (ref 4–5.2)
RIGHT VENTRICLE FREE WALL PEAK S': 12 CM/S
SODIUM SERPL-SCNC: 135 MMOL/L (ref 136–145)
T AXIS: 32 DEGREES
T OFFSET: 408 MS
TRICUSPID ANNULAR PLANE SYSTOLIC EXCURSION: 1.1 CM
UFH PPP CHRO-ACNC: 0.1 IU/ML (ref ?–1.1)
VENTRICULAR RATE: 109 BPM
WBC # BLD AUTO: 10.9 X10*3/UL (ref 4.4–11.3)

## 2025-09-02 PROCEDURE — 3700000002 HC GENERAL ANESTHESIA TIME - EACH INCREMENTAL 1 MINUTE

## 2025-09-02 PROCEDURE — 7100000002 HC RECOVERY ROOM TIME - EACH INCREMENTAL 1 MINUTE

## 2025-09-02 PROCEDURE — 2500000004 HC RX 250 GENERAL PHARMACY W/ HCPCS (ALT 636 FOR OP/ED)

## 2025-09-02 PROCEDURE — 77387 GUIDANCE FOR RADJ TX DLVR: CPT | Performed by: STUDENT IN AN ORGANIZED HEALTH CARE EDUCATION/TRAINING PROGRAM

## 2025-09-02 PROCEDURE — 71045 X-RAY EXAM CHEST 1 VIEW: CPT

## 2025-09-02 PROCEDURE — 93325 DOPPLER ECHO COLOR FLOW MAPG: CPT

## 2025-09-02 PROCEDURE — C1757 CATH, THROMBECTOMY/EMBOLECT: HCPCS

## 2025-09-02 PROCEDURE — C1726 CATH, BAL DIL, NON-VASCULAR: HCPCS

## 2025-09-02 PROCEDURE — 2720000007 HC OR 272 NO HCPCS

## 2025-09-02 PROCEDURE — 3700000001 HC GENERAL ANESTHESIA TIME - INITIAL BASE CHARGE

## 2025-09-02 PROCEDURE — 77412 RADIATION TX DELIVERY LVL 3: CPT | Performed by: STUDENT IN AN ORGANIZED HEALTH CARE EDUCATION/TRAINING PROGRAM

## 2025-09-02 PROCEDURE — 31622 DX BRONCHOSCOPE/WASH: CPT | Performed by: STUDENT IN AN ORGANIZED HEALTH CARE EDUCATION/TRAINING PROGRAM

## 2025-09-02 PROCEDURE — 7100000010 HC PHASE TWO TIME - EACH INCREMENTAL 1 MINUTE

## 2025-09-02 PROCEDURE — 7100000009 HC PHASE TWO TIME - INITIAL BASE CHARGE

## 2025-09-02 PROCEDURE — 2780000003 HC OR 278 NO HCPCS

## 2025-09-02 PROCEDURE — 2500000004 HC RX 250 GENERAL PHARMACY W/ HCPCS (ALT 636 FOR OP/ED): Performed by: ANESTHESIOLOGIST ASSISTANT

## 2025-09-02 PROCEDURE — C1874 STENT, COATED/COV W/DEL SYS: HCPCS

## 2025-09-02 PROCEDURE — 7100000001 HC RECOVERY ROOM TIME - INITIAL BASE CHARGE

## 2025-09-02 RX ORDER — FENTANYL CITRATE 50 UG/ML
INJECTION, SOLUTION INTRAMUSCULAR; INTRAVENOUS AS NEEDED
Status: DISCONTINUED | OUTPATIENT
Start: 2025-09-02 | End: 2025-09-02

## 2025-09-02 RX ORDER — LIDOCAINE HYDROCHLORIDE 20 MG/ML
INJECTION, SOLUTION INFILTRATION; PERINEURAL AS NEEDED
Status: DISCONTINUED | OUTPATIENT
Start: 2025-09-02 | End: 2025-09-02

## 2025-09-02 RX ORDER — MIDAZOLAM HYDROCHLORIDE 2 MG/2ML
INJECTION, SOLUTION INTRAMUSCULAR; INTRAVENOUS AS NEEDED
Status: DISCONTINUED | OUTPATIENT
Start: 2025-09-02 | End: 2025-09-02

## 2025-09-02 RX ORDER — PROPOFOL 10 MG/ML
INJECTION, EMULSION INTRAVENOUS CONTINUOUS PRN
Status: DISCONTINUED | OUTPATIENT
Start: 2025-09-02 | End: 2025-09-02

## 2025-09-02 RX ORDER — ESMOLOL HYDROCHLORIDE 10 MG/ML
INJECTION INTRAVENOUS AS NEEDED
Status: DISCONTINUED | OUTPATIENT
Start: 2025-09-02 | End: 2025-09-02

## 2025-09-02 RX ORDER — ROCURONIUM BROMIDE 10 MG/ML
INJECTION, SOLUTION INTRAVENOUS AS NEEDED
Status: DISCONTINUED | OUTPATIENT
Start: 2025-09-02 | End: 2025-09-02

## 2025-09-02 RX ADMIN — PROPOFOL 100 MCG/KG/MIN: 10 INJECTION, EMULSION INTRAVENOUS at 13:00

## 2025-09-02 RX ADMIN — DEXAMETHASONE SODIUM PHOSPHATE 10 MG: 4 INJECTION INTRA-ARTICULAR; INTRALESIONAL; INTRAMUSCULAR; INTRAVENOUS; SOFT TISSUE at 15:10

## 2025-09-02 RX ADMIN — PROPOFOL 40 MG: 10 INJECTION, EMULSION INTRAVENOUS at 13:30

## 2025-09-02 RX ADMIN — PROPOFOL 130 MG: 10 INJECTION, EMULSION INTRAVENOUS at 12:58

## 2025-09-02 RX ADMIN — ROCURONIUM BROMIDE 20 MG: 10 INJECTION, SOLUTION INTRAVENOUS at 14:54

## 2025-09-02 RX ADMIN — FENTANYL CITRATE 50 MCG: 50 INJECTION, SOLUTION INTRAMUSCULAR; INTRAVENOUS at 12:58

## 2025-09-02 RX ADMIN — ESMOLOL HYDROCHLORIDE 50 MG: 10 INJECTION, SOLUTION INTRAVENOUS at 14:26

## 2025-09-02 RX ADMIN — FENTANYL CITRATE 50 MCG: 50 INJECTION, SOLUTION INTRAMUSCULAR; INTRAVENOUS at 13:26

## 2025-09-02 RX ADMIN — ESMOLOL HYDROCHLORIDE 50 MG: 10 INJECTION, SOLUTION INTRAVENOUS at 13:54

## 2025-09-02 RX ADMIN — LIDOCAINE HYDROCHLORIDE 100 MG: 20 INJECTION, SOLUTION INFILTRATION; PERINEURAL at 12:58

## 2025-09-02 RX ADMIN — MIDAZOLAM HYDROCHLORIDE 2 MG: 2 INJECTION, SOLUTION INTRAMUSCULAR; INTRAVENOUS at 12:50

## 2025-09-02 RX ADMIN — SODIUM CHLORIDE, SODIUM LACTATE, POTASSIUM CHLORIDE, AND CALCIUM CHLORIDE: 600; 310; 30; 20 INJECTION, SOLUTION INTRAVENOUS at 12:50

## 2025-09-02 RX ADMIN — SUGAMMADEX 200 MG: 100 INJECTION, SOLUTION INTRAVENOUS at 15:39

## 2025-09-02 RX ADMIN — ROCURONIUM BROMIDE 60 MG: 10 INJECTION, SOLUTION INTRAVENOUS at 12:58

## 2025-09-02 ASSESSMENT — PAIN SCALES - GENERAL: PAIN_LEVEL: 0

## 2025-09-03 ENCOUNTER — HOSPITAL ENCOUNTER (OUTPATIENT)
Dept: RADIATION ONCOLOGY | Facility: HOSPITAL | Age: 60
Discharge: HOME | End: 2025-09-03

## 2025-09-03 DIAGNOSIS — C78.1 SECONDARY MALIGNANT NEOPLASM OF MEDIASTINUM (MULTI): ICD-10-CM

## 2025-09-03 DIAGNOSIS — C79.31 SECONDARY MALIGNANT NEOPLASM OF BRAIN (MULTI): ICD-10-CM

## 2025-09-03 DIAGNOSIS — Z51.0 ENCOUNTER FOR ANTINEOPLASTIC RADIATION THERAPY: ICD-10-CM

## 2025-09-03 DIAGNOSIS — C70.0 MALIGNANT NEOPLASM OF CEREBRAL MENINGES (MULTI): ICD-10-CM

## 2025-09-03 LAB
ALBUMIN SERPL BCP-MCNC: 4.1 G/DL (ref 3.4–5)
ANION GAP SERPL CALC-SCNC: 18 MMOL/L (ref 10–20)
BASOPHILS # BLD AUTO: 0.03 X10*3/UL (ref 0–0.1)
BASOPHILS # BLD AUTO: 0.04 X10*3/UL (ref 0–0.1)
BASOPHILS NFR BLD AUTO: 0.2 %
BASOPHILS NFR BLD AUTO: 0.2 %
BUN SERPL-MCNC: 43 MG/DL (ref 6–23)
CALCIUM SERPL-MCNC: 9.3 MG/DL (ref 8.6–10.6)
CHLORIDE SERPL-SCNC: 101 MMOL/L (ref 98–107)
CO2 SERPL-SCNC: 21 MMOL/L (ref 21–32)
CREAT SERPL-MCNC: 1.01 MG/DL (ref 0.5–1.05)
EGFRCR SERPLBLD CKD-EPI 2021: 63 ML/MIN/1.73M*2
EOSINOPHIL # BLD AUTO: 0 X10*3/UL (ref 0–0.7)
EOSINOPHIL # BLD AUTO: 0 X10*3/UL (ref 0–0.7)
EOSINOPHIL NFR BLD AUTO: 0 %
EOSINOPHIL NFR BLD AUTO: 0 %
ERYTHROCYTE [DISTWIDTH] IN BLOOD BY AUTOMATED COUNT: 14.8 % (ref 11.5–14.5)
ERYTHROCYTE [DISTWIDTH] IN BLOOD BY AUTOMATED COUNT: 14.9 % (ref 11.5–14.5)
GLUCOSE SERPL-MCNC: 134 MG/DL (ref 74–99)
HCT VFR BLD AUTO: 43.8 % (ref 36–46)
HCT VFR BLD AUTO: 44.1 % (ref 36–46)
HGB BLD-MCNC: 14.7 G/DL (ref 12–16)
HGB BLD-MCNC: 14.7 G/DL (ref 12–16)
IMM GRANULOCYTES # BLD AUTO: 0.12 X10*3/UL (ref 0–0.7)
IMM GRANULOCYTES # BLD AUTO: 0.18 X10*3/UL (ref 0–0.7)
IMM GRANULOCYTES NFR BLD AUTO: 0.7 % (ref 0–0.9)
IMM GRANULOCYTES NFR BLD AUTO: 1 % (ref 0–0.9)
LYMPHOCYTES # BLD AUTO: 0.58 X10*3/UL (ref 1.2–4.8)
LYMPHOCYTES # BLD AUTO: 0.93 X10*3/UL (ref 1.2–4.8)
LYMPHOCYTES NFR BLD AUTO: 3.1 %
LYMPHOCYTES NFR BLD AUTO: 5.2 %
MAGNESIUM SERPL-MCNC: 2.51 MG/DL (ref 1.6–2.4)
MCH RBC QN AUTO: 33.4 PG (ref 26–34)
MCH RBC QN AUTO: 33.6 PG (ref 26–34)
MCHC RBC AUTO-ENTMCNC: 33.3 G/DL (ref 32–36)
MCHC RBC AUTO-ENTMCNC: 33.6 G/DL (ref 32–36)
MCV RBC AUTO: 100 FL (ref 80–100)
MCV RBC AUTO: 101 FL (ref 80–100)
MONOCYTES # BLD AUTO: 0.6 X10*3/UL (ref 0.1–1)
MONOCYTES # BLD AUTO: 0.63 X10*3/UL (ref 0.1–1)
MONOCYTES NFR BLD AUTO: 3.2 %
MONOCYTES NFR BLD AUTO: 3.5 %
NEUTROPHILS # BLD AUTO: 16.2 X10*3/UL (ref 1.2–7.7)
NEUTROPHILS # BLD AUTO: 17.21 X10*3/UL (ref 1.2–7.7)
NEUTROPHILS NFR BLD AUTO: 90.4 %
NEUTROPHILS NFR BLD AUTO: 92.5 %
NRBC BLD-RTO: 0 /100 WBCS (ref 0–0)
NRBC BLD-RTO: 0 /100 WBCS (ref 0–0)
PHOSPHATE SERPL-MCNC: 4.9 MG/DL (ref 2.5–4.9)
PLATELET # BLD AUTO: 241 X10*3/UL (ref 150–450)
PLATELET # BLD AUTO: 255 X10*3/UL (ref 150–450)
POTASSIUM SERPL-SCNC: 4.6 MMOL/L (ref 3.5–5.3)
RAD ONC MSQ ACTUAL FRACTIONS DELIVERED: 4
RAD ONC MSQ ACTUAL FRACTIONS DELIVERED: 4
RAD ONC MSQ ACTUAL SESSION DELIVERED DOSE: 300 CGRAY
RAD ONC MSQ ACTUAL SESSION DELIVERED DOSE: 400 CGRAY
RAD ONC MSQ ACTUAL TOTAL DOSE: 1200 CGRAY
RAD ONC MSQ ACTUAL TOTAL DOSE: 1600 CGRAY
RAD ONC MSQ ELAPSED DAYS: 4
RAD ONC MSQ ELAPSED DAYS: 4
RAD ONC MSQ LAST DATE: NORMAL
RAD ONC MSQ LAST DATE: NORMAL
RAD ONC MSQ PRESCRIBED FRACTIONAL DOSE: 300 CGRAY
RAD ONC MSQ PRESCRIBED FRACTIONAL DOSE: 400 CGRAY
RAD ONC MSQ PRESCRIBED NUMBER OF FRACTIONS: 10
RAD ONC MSQ PRESCRIBED NUMBER OF FRACTIONS: 5
RAD ONC MSQ PRESCRIBED TECHNIQUE: NORMAL
RAD ONC MSQ PRESCRIBED TECHNIQUE: NORMAL
RAD ONC MSQ PRESCRIBED TOTAL DOSE: 2000 CGRAY
RAD ONC MSQ PRESCRIBED TOTAL DOSE: 3000 CGRAY
RAD ONC MSQ PRESCRIPTION PATTERN COMMENT: NORMAL
RAD ONC MSQ PRESCRIPTION PATTERN COMMENT: NORMAL
RAD ONC MSQ START DATE: NORMAL
RAD ONC MSQ START DATE: NORMAL
RAD ONC MSQ TREATMENT COURSE NUMBER: 1
RAD ONC MSQ TREATMENT COURSE NUMBER: 1
RAD ONC MSQ TREATMENT SITE: NORMAL
RAD ONC MSQ TREATMENT SITE: NORMAL
RBC # BLD AUTO: 4.38 X10*6/UL (ref 4–5.2)
RBC # BLD AUTO: 4.4 X10*6/UL (ref 4–5.2)
SODIUM SERPL-SCNC: 135 MMOL/L (ref 136–145)
UFH PPP CHRO-ACNC: 0.3 IU/ML (ref ?–1.1)
UFH PPP CHRO-ACNC: 0.4 IU/ML (ref ?–1.1)
WBC # BLD AUTO: 17.9 X10*3/UL (ref 4.4–11.3)
WBC # BLD AUTO: 18.6 X10*3/UL (ref 4.4–11.3)

## 2025-09-03 PROCEDURE — 77412 RADIATION TX DELIVERY LVL 3: CPT | Performed by: STUDENT IN AN ORGANIZED HEALTH CARE EDUCATION/TRAINING PROGRAM

## 2025-09-03 PROCEDURE — RXMED WILLOW AMBULATORY MEDICATION CHARGE

## 2025-09-03 PROCEDURE — 77336 RADIATION PHYSICS CONSULT: CPT | Performed by: STUDENT IN AN ORGANIZED HEALTH CARE EDUCATION/TRAINING PROGRAM

## 2025-09-03 PROCEDURE — 77387 GUIDANCE FOR RADJ TX DLVR: CPT | Performed by: STUDENT IN AN ORGANIZED HEALTH CARE EDUCATION/TRAINING PROGRAM

## 2025-09-04 ENCOUNTER — HOSPITAL ENCOUNTER (OUTPATIENT)
Dept: RADIATION ONCOLOGY | Facility: HOSPITAL | Age: 60
Setting detail: RADIATION/ONCOLOGY SERIES
Discharge: HOME | End: 2025-09-04
Payer: MEDICARE

## 2025-09-04 ENCOUNTER — PHARMACY VISIT (OUTPATIENT)
Dept: PHARMACY | Facility: CLINIC | Age: 60
End: 2025-09-04
Payer: COMMERCIAL

## 2025-09-04 ENCOUNTER — APPOINTMENT (OUTPATIENT)
Dept: HEMATOLOGY/ONCOLOGY | Facility: HOSPITAL | Age: 60
End: 2025-09-04
Payer: MEDICARE

## 2025-09-04 ENCOUNTER — DOCUMENTATION (OUTPATIENT)
Dept: HOME HEALTH SERVICES | Facility: HOME HEALTH | Age: 60
End: 2025-09-04
Payer: MEDICARE

## 2025-09-04 DIAGNOSIS — C78.1 SECONDARY MALIGNANT NEOPLASM OF MEDIASTINUM (MULTI): ICD-10-CM

## 2025-09-04 DIAGNOSIS — C70.0 MALIGNANT NEOPLASM OF CEREBRAL MENINGES (MULTI): ICD-10-CM

## 2025-09-04 DIAGNOSIS — Z51.0 ENCOUNTER FOR ANTINEOPLASTIC RADIATION THERAPY: ICD-10-CM

## 2025-09-04 DIAGNOSIS — C79.31 SECONDARY MALIGNANT NEOPLASM OF BRAIN (MULTI): ICD-10-CM

## 2025-09-04 LAB
ALBUMIN SERPL BCP-MCNC: 3.8 G/DL (ref 3.4–5)
ANION GAP SERPL CALC-SCNC: 15 MMOL/L (ref 10–20)
BASOPHILS # BLD AUTO: 0.01 X10*3/UL (ref 0–0.1)
BASOPHILS NFR BLD AUTO: 0.1 %
BUN SERPL-MCNC: 39 MG/DL (ref 6–23)
CALCIUM SERPL-MCNC: 9 MG/DL (ref 8.6–10.6)
CHLORIDE SERPL-SCNC: 99 MMOL/L (ref 98–107)
CO2 SERPL-SCNC: 23 MMOL/L (ref 21–32)
CREAT SERPL-MCNC: 0.7 MG/DL (ref 0.5–1.05)
EGFRCR SERPLBLD CKD-EPI 2021: >90 ML/MIN/1.73M*2
EOSINOPHIL # BLD AUTO: 0.01 X10*3/UL (ref 0–0.7)
EOSINOPHIL NFR BLD AUTO: 0.1 %
ERYTHROCYTE [DISTWIDTH] IN BLOOD BY AUTOMATED COUNT: 15.2 % (ref 11.5–14.5)
GLUCOSE SERPL-MCNC: 111 MG/DL (ref 74–99)
HCT VFR BLD AUTO: 40.7 % (ref 36–46)
HGB BLD-MCNC: 14 G/DL (ref 12–16)
IMM GRANULOCYTES # BLD AUTO: 0.12 X10*3/UL (ref 0–0.7)
IMM GRANULOCYTES NFR BLD AUTO: 1.4 % (ref 0–0.9)
LABORATORY COMMENT REPORT: NORMAL
LYMPHOCYTES # BLD AUTO: 1.01 X10*3/UL (ref 1.2–4.8)
LYMPHOCYTES NFR BLD AUTO: 12 %
MAGNESIUM SERPL-MCNC: 2.49 MG/DL (ref 1.6–2.4)
MCH RBC QN AUTO: 34.3 PG (ref 26–34)
MCHC RBC AUTO-ENTMCNC: 34.4 G/DL (ref 32–36)
MCV RBC AUTO: 100 FL (ref 80–100)
MONOCYTES # BLD AUTO: 0.42 X10*3/UL (ref 0.1–1)
MONOCYTES NFR BLD AUTO: 5 %
NEUTROPHILS # BLD AUTO: 6.84 X10*3/UL (ref 1.2–7.7)
NEUTROPHILS NFR BLD AUTO: 81.4 %
NRBC BLD-RTO: 0.2 /100 WBCS (ref 0–0)
PATH REPORT.FINAL DX SPEC: NORMAL
PATH REPORT.GROSS SPEC: NORMAL
PATH REPORT.TOTAL CANCER: NORMAL
PHOSPHATE SERPL-MCNC: 2.2 MG/DL (ref 2.5–4.9)
PLATELET # BLD AUTO: 220 X10*3/UL (ref 150–450)
POTASSIUM SERPL-SCNC: 3.9 MMOL/L (ref 3.5–5.3)
RAD ONC MSQ ACTUAL FRACTIONS DELIVERED: 5
RAD ONC MSQ ACTUAL FRACTIONS DELIVERED: 5
RAD ONC MSQ ACTUAL SESSION DELIVERED DOSE: 300 CGRAY
RAD ONC MSQ ACTUAL SESSION DELIVERED DOSE: 400 CGRAY
RAD ONC MSQ ACTUAL TOTAL DOSE: 1500 CGRAY
RAD ONC MSQ ACTUAL TOTAL DOSE: 2000 CGRAY
RAD ONC MSQ ELAPSED DAYS: 5
RAD ONC MSQ ELAPSED DAYS: 5
RAD ONC MSQ LAST DATE: NORMAL
RAD ONC MSQ LAST DATE: NORMAL
RAD ONC MSQ PRESCRIBED FRACTIONAL DOSE: 300 CGRAY
RAD ONC MSQ PRESCRIBED FRACTIONAL DOSE: 400 CGRAY
RAD ONC MSQ PRESCRIBED NUMBER OF FRACTIONS: 10
RAD ONC MSQ PRESCRIBED NUMBER OF FRACTIONS: 5
RAD ONC MSQ PRESCRIBED TECHNIQUE: NORMAL
RAD ONC MSQ PRESCRIBED TECHNIQUE: NORMAL
RAD ONC MSQ PRESCRIBED TOTAL DOSE: 2000 CGRAY
RAD ONC MSQ PRESCRIBED TOTAL DOSE: 3000 CGRAY
RAD ONC MSQ PRESCRIPTION PATTERN COMMENT: NORMAL
RAD ONC MSQ PRESCRIPTION PATTERN COMMENT: NORMAL
RAD ONC MSQ START DATE: NORMAL
RAD ONC MSQ START DATE: NORMAL
RAD ONC MSQ TREATMENT COURSE NUMBER: 1
RAD ONC MSQ TREATMENT COURSE NUMBER: 1
RAD ONC MSQ TREATMENT SITE: NORMAL
RAD ONC MSQ TREATMENT SITE: NORMAL
RBC # BLD AUTO: 4.08 X10*6/UL (ref 4–5.2)
SODIUM SERPL-SCNC: 133 MMOL/L (ref 136–145)
UFH PPP CHRO-ACNC: 0.4 IU/ML (ref ?–1.1)
WBC # BLD AUTO: 8.4 X10*3/UL (ref 4.4–11.3)

## 2025-09-04 PROCEDURE — 77387 GUIDANCE FOR RADJ TX DLVR: CPT | Performed by: RADIOLOGY

## 2025-09-04 PROCEDURE — 93005 ELECTROCARDIOGRAM TRACING: CPT

## 2025-09-04 PROCEDURE — 77412 RADIATION TX DELIVERY LVL 3: CPT | Performed by: STUDENT IN AN ORGANIZED HEALTH CARE EDUCATION/TRAINING PROGRAM

## 2025-09-05 ENCOUNTER — HOSPITAL ENCOUNTER (OUTPATIENT)
Dept: RADIATION ONCOLOGY | Facility: CLINIC | Age: 60
Setting detail: RADIATION/ONCOLOGY SERIES
Discharge: HOME | End: 2025-09-05
Payer: MEDICARE

## 2025-09-05 ENCOUNTER — HOME CARE VISIT (OUTPATIENT)
Dept: HOME HEALTH SERVICES | Facility: HOME HEALTH | Age: 60
End: 2025-09-05

## 2025-09-05 ENCOUNTER — PATIENT OUTREACH (OUTPATIENT)
Dept: PRIMARY CARE | Facility: CLINIC | Age: 60
End: 2025-09-05
Payer: MEDICARE

## 2025-09-05 ENCOUNTER — PATIENT OUTREACH (OUTPATIENT)
Dept: HEMATOLOGY/ONCOLOGY | Facility: HOSPITAL | Age: 60
End: 2025-09-05
Payer: MEDICARE

## 2025-09-05 DIAGNOSIS — Z51.0 ENCOUNTER FOR ANTINEOPLASTIC RADIATION THERAPY: ICD-10-CM

## 2025-09-05 DIAGNOSIS — C79.31 SECONDARY MALIGNANT NEOPLASM OF BRAIN (MULTI): ICD-10-CM

## 2025-09-05 DIAGNOSIS — C70.0 MALIGNANT NEOPLASM OF CEREBRAL MENINGES (MULTI): ICD-10-CM

## 2025-09-05 DIAGNOSIS — C78.1 SECONDARY MALIGNANT NEOPLASM OF MEDIASTINUM (MULTI): ICD-10-CM

## 2025-09-05 LAB
ATRIAL RATE: 108 BPM
LAB AP ASR DISCLAIMER: NORMAL
LABORATORY COMMENT REPORT: NORMAL
LABORATORY COMMENT REPORT: NORMAL
P AXIS: 70 DEGREES
P OFFSET: 205 MS
P ONSET: 147 MS
PATH REPORT.FINAL DX SPEC: NORMAL
PATH REPORT.GROSS SPEC: NORMAL
PATH REPORT.INTRAOP OBS SPEC DOC: NORMAL
PATH REPORT.TOTAL CANCER: NORMAL
PR INTERVAL: 142 MS
Q ONSET: 218 MS
QRS COUNT: 18 BEATS
QRS DURATION: 96 MS
QT INTERVAL: 354 MS
QTC CALCULATION(BAZETT): 474 MS
QTC FREDERICIA: 430 MS
R AXIS: 71 DEGREES
RAD ONC MSQ ACTUAL FRACTIONS DELIVERED: 6
RAD ONC MSQ ACTUAL SESSION DELIVERED DOSE: 300 CGRAY
RAD ONC MSQ ACTUAL TOTAL DOSE: 1800 CGRAY
RAD ONC MSQ ELAPSED DAYS: 6
RAD ONC MSQ LAST DATE: NORMAL
RAD ONC MSQ PRESCRIBED FRACTIONAL DOSE: 300 CGRAY
RAD ONC MSQ PRESCRIBED NUMBER OF FRACTIONS: 10
RAD ONC MSQ PRESCRIBED TECHNIQUE: NORMAL
RAD ONC MSQ PRESCRIBED TOTAL DOSE: 3000 CGRAY
RAD ONC MSQ PRESCRIPTION PATTERN COMMENT: NORMAL
RAD ONC MSQ START DATE: NORMAL
RAD ONC MSQ TREATMENT COURSE NUMBER: 1
RAD ONC MSQ TREATMENT SITE: NORMAL
T AXIS: 13 DEGREES
T OFFSET: 395 MS
VENTRICULAR RATE: 108 BPM

## 2025-09-05 PROCEDURE — 77412 RADIATION TX DELIVERY LVL 3: CPT | Performed by: STUDENT IN AN ORGANIZED HEALTH CARE EDUCATION/TRAINING PROGRAM

## 2025-09-07 ENCOUNTER — HOME CARE VISIT (OUTPATIENT)
Dept: HOME HEALTH SERVICES | Facility: HOME HEALTH | Age: 60
End: 2025-09-07
Payer: MEDICARE

## 2025-09-07 VITALS
RESPIRATION RATE: 20 BRPM | SYSTOLIC BLOOD PRESSURE: 115 MMHG | TEMPERATURE: 97.9 F | DIASTOLIC BLOOD PRESSURE: 72 MMHG | HEART RATE: 104 BPM | OXYGEN SATURATION: 96 %

## 2025-09-07 SDOH — HEALTH STABILITY: PHYSICAL HEALTH: PHYSICAL EXERCISE: 5

## 2025-09-07 SDOH — HEALTH STABILITY: PHYSICAL HEALTH: EXERCISE TYPE: WRITTEN

## 2025-09-07 ASSESSMENT — ENCOUNTER SYMPTOMS
LOWEST PAIN SEVERITY IN PAST 24 HOURS: 5/10
AGGRESSION WITHIN DEFINED LIMITS: 1
PAIN LOCATION - PAIN QUALITY: ACHING
PAIN LOCATION - EXACERBATING FACTORS: MVMT
PAIN LOCATION - PAIN FREQUENCY: INTERMITTENT
SHORTNESS OF BREATH: T
PAIN SEVERITY GOAL: 3/10
PAIN LOCATION - PAIN DURATION: VARIES
MUSCLE WEAKNESS: 1
HIGHEST PAIN SEVERITY IN PAST 24 HOURS: 10/10
PAIN LOCATION - RELIEVING FACTORS: REST
PAIN: 1
SUBJECTIVE PAIN PROGRESSION: WAXING AND WANING
PAIN LOCATION: CHEST
ARTHRALGIAS: 1
ANGER WITHIN DEFINED LIMITS: 1
PERSON REPORTING PAIN: PATIENT
PAIN LOCATION - PAIN SEVERITY: 7/10
SLEEP QUALITY: FAIR

## 2025-09-07 ASSESSMENT — BALANCE ASSESSMENTS
EYES CLOSED AT MAXIMUM POSITION NUDGED: 1 - STEADY
ATTEMPTS TO ARISE: 1 - ABLE, REQUIRES MORE THAN ONE ATTEMPT
ARISING SCORE: 1
ARISES: 1 - ABLE, USES ARMS TO HELP
NUDGED SCORE: 1
STANDING BALANCE: 1 - STEADY BUT WIDE STANCE AND USES CANE OR OTHER SUPPORT
NUDGED: 1 - STAGGERS, GRABS, CATCHES SELF
BALANCE SCORE: 10
IMMEDIATE STANDING BALANCE FIRST 5 SECONDS: 1 - STEADY BUT USES WALKER OR OTHER SUPPORT
TURNING 360 DEGREES STEPS: 1 - CONTINUOUS STEPS
SITTING DOWN: 1 - USES ARMS OR NOT SMOOTH MOTION
SITTING BALANCE: 1 - STEADY, SAFE

## 2025-09-07 ASSESSMENT — GAIT ASSESSMENTS
STEP CONTINUITY: 0 - STOPPING OR DISCONTINUITY BETWEEN STEPS
TRUNK: 1 - NO SWAY BUT FLEXION OF KNEES OR BACK OR SPREADS ARMS WHILE WALKING
WALKING STANCE: 0 - HEELS APART
PATH: 1 - MILD/MODERATE DEVIATION OR USES WALKING AID
PATH SCORE: 1
STEP SYMMETRY: 0 - RIGHT AND LEFT STEP LENGTH NOT EQUAL
GAIT SCORE: 6
INITIATION OF GAIT IMMEDIATELY AFTER GO: 0 - ANY HESITANCY OR MULTIPLE ATTEMPTS TO START
TRUNK SCORE: 1
BALANCE AND GAIT SCORE: 16

## 2025-09-07 ASSESSMENT — ACTIVITIES OF DAILY LIVING (ADL)
CURRENT_FUNCTION: STAND BY ASSIST
AMBULATION ASSISTANCE ON FLAT SURFACES: 1
AMBULATION ASSISTANCE: STAND BY ASSIST
OASIS_M1830: 03
AMBULATION ASSISTANCE: 1
PHYSICAL TRANSFERS ASSESSED: 1
ENTERING_EXITING_HOME: MINIMUM ASSIST
AMBULATION_DISTANCE/DURATION_TOLERATED: 50'

## 2025-09-08 ENCOUNTER — APPOINTMENT (OUTPATIENT)
Dept: HEMATOLOGY/ONCOLOGY | Facility: HOSPITAL | Age: 60
End: 2025-09-08
Payer: MEDICARE

## 2025-10-14 ENCOUNTER — APPOINTMENT (OUTPATIENT)
Dept: PRIMARY CARE | Facility: CLINIC | Age: 60
End: 2025-10-14
Payer: MEDICARE

## 2025-10-22 ENCOUNTER — APPOINTMENT (OUTPATIENT)
Dept: RADIOLOGY | Facility: HOSPITAL | Age: 60
End: 2025-10-22
Payer: MEDICARE